# Patient Record
Sex: MALE | Race: WHITE
[De-identification: names, ages, dates, MRNs, and addresses within clinical notes are randomized per-mention and may not be internally consistent; named-entity substitution may affect disease eponyms.]

---

## 2016-12-29 NOTE — HP
Admitting History and Physical





- Primary Care Physician


PCP: Sony Poon





- Admission


Chief Complaint: I have a bladder infection


History of Present Illness: 


Mr Sumner is a 61 year old male who comes in for a bladder infection. He says 

it started around [place], he presented to the ED and received bactrim. He 

started taking it but did not improve. He says he wears a condom catheter and 

in the past couple of days noted pain on urination and general discomfort in 

his suprapubic area. He says he feels feverish with sweats and chills. He 

denies lightheadedness, dizziness, passing out, chest pain, shortness of breath

, nausea, vomiting, diarrhea, constipation, or swelling.


History Source: Patient


Limitations to Obtaining History: No Limitations





- Past Medical History


CNS: Yes: Other (paraplegia)


Infectious Disease: Yes: Other (multiple UTIs)





- Past Surgical History


Past Surgical History: Yes: Splenectomy





- Smoking History


Smoking history: Never smoked


Have you smoked in the past 12 months: No


Aproximately how many cigarettes per day: 0





- Alcohol/Substance Use


Hx Alcohol Use: Yes


History of Substance Use: reports: None





- Social History


Usual Living Arrangement: Yes: Other


ADL: Support Services


History of Recent Travel: No





Home Medications





- Allergies


Allergies/Adverse Reactions: 


 Allergies











Allergy/AdvReac Type Severity Reaction Status Date / Time


 


No Known Allergies Allergy   Verified 12/29/16 04:32














- Home Medications


Home Medications: 


Ambulatory Orders





Diazepam [Valium -] 10 mg PO TID PRN 11/20/15 


Morphine 10 mg/5 ml Liquid [Morphine 10 mg/5 mL Liquid -] 4 mg PO Q4H PRN 11/20/

15 


Sulfamethoxazole/Trimethoprim [Bactrim Ds -] 1 tab PO BID #20 tablet 12/26/16 


Levofloxacin [Levaquin] 750 mg PO DAILY #5 tablet 12/29/16 











Family Disease History





- Family Disease History


Family Disease History: Heart Disease: Father





Review of Systems


Findings/Remarks: 


full review of systems obtained, as per HPI and otherwise negative





Physical Examination


Vital Signs: 


 Vital Signs











Temperature  97.7 F   12/29/16 09:11


 


Pulse Rate  85   12/29/16 08:00


 


Respiratory Rate  18   12/29/16 08:00


 


Blood Pressure  112/60   12/29/16 08:00


 


O2 Sat by Pulse Oximetry (%)  96   12/29/16 08:00











Constitutional: Yes: Well Nourished, No Distress, Calm


Eyes: Yes: Conjunctiva Clear, EOM Intact


HENT: Yes: Atraumatic, Normocephalic


Cardiovascular: Yes: Regular Rate and Rhythm.  No: Gallop, Murmur, Rub


Respiratory: Yes: Regular, CTA Bilaterally.  No: Rales, Rhonchi, Wheezes


Gastrointestinal: Yes: Normal Bowel Sounds, Soft.  No: Distention, Tenderness


Extremities: Yes: WNL


Edema: No


Labs: 


Lab test reviewed and notable for UTI on urinalysis (positive nitrites and 

leukocyte esterase, elevated WBCs and bacteria present). All other lab test WNL





Problem List





- Problems


(1) Complicated UTI (urinary tract infection)


Assessment/Plan: 


-patient with complicated UTI, failed bactrim


-admit to hospital


-begin IV levaquin


-follow up urine cultures


-monitor for improvement


Code(s): N39.0 - URINARY TRACT INFECTION, SITE NOT SPECIFIED





(2) Paraplegia


Assessment/Plan: 


-chronic


Code(s): G82.20 - PARAPLEGIA, UNSPECIFIED





(3) Chronic pain


Assessment/Plan: 


-continue home dose of morphine and valium for pain and spasm


Code(s): G89.29 - OTHER CHRONIC PAIN

## 2016-12-29 NOTE — PDOC
History of Present Illness





<Glenroy Scanlon - Last Filed: 12/29/16 04:30>





- General


History Source: Patient


Exam Limitations: No Limitations





- History of Present Illness


Initial Comments: 





12/29/16 04:31


The patient is a 61 year old male with a significant past medical history of 

paraplegia below T2 and recurrent UTIs  who presents to the ED for a UTI. The 

patient was discharged home from the ED last night but returned because his HHA 

was not home.


Patient denies any other symptoms. 

















<Raman Kruse - Last Filed: 12/29/16 04:32>





- General


Stated Complaint: UTI


Time Seen by Provider: 12/29/16 03:56





Past History





- Past Medical History


COPD: Yes


 Disorders: Yes (Recurrent UTI)


HTN:  (Hypotension)





- Surgical History


Orthopedic Surgery: Yes (fracture right wrist s/p fusion)





- Immunization History


Td Vaccination: No


Immunization Up to Date: No





- Psycho/Social/Smoking Cessation Hx


Anxiety: No


Suicidal Ideation: No


Smoking Status: No


Smoking History: Never smoked


Years of Tobacco Use: 0


Have you smoked in the past 12 months: No


Number of Cigarettes Smoked Daily: 0


Cigars Per Day: 0


Hx Alcohol Use: No


Drug/Substance Use Hx: No


Substance Use Type: Alcohol


Hx Substance Use Treatment: No





<Glenroy Scanlon - Last Filed: 12/29/16 04:30>





<Raman Kruse - Last Filed: 12/29/16 04:32>





- Past Medical History


Allergies/Adverse Reactions: 


 Allergies











Allergy/AdvReac Type Severity Reaction Status Date / Time


 


No Known Allergies Allergy   Verified 12/26/16 13:42











Home Medications: 


Ambulatory Orders





Diazepam [Valium -] 10 mg PO TID PRN 11/20/15 


Morphine 10 mg/5 ml Liquid [Morphine 10 mg/5 mL Liquid -] 4 mg PO Q4H PRN 11/20/

15 


Sulfamethoxazole/Trimethoprim [Bactrim Ds -] 1 tab PO BID #20 tablet 12/26/16 


Levofloxacin [Levaquin] 750 mg PO DAILY #5 tablet 12/29/16 











**Review of Systems





- Review of Systems


Able to Perform ROS?: Yes


Comments:: 


12/29/16 04:31


CONSTITUTIONAL:


Absent: fever, no chills, no fatigue


EYES:


Absent: visual changes


ENT:


Absent: ear pain, no sore throat


CARDIOVASCULAR:


Absent: chest pain, no palpitations


RESPIRATORY:


Absent: cough, no SOB


GI:


Absent: abdominal pain, no nausea, no vomiting, no constipation, no diarrhea


GENITOURINARY:


Absent: dysuria, no frequency, no hematuria


MUSKULOSKELETAL:


Absent: back pain, no arthralgia, no myalgia


SKIN:


Absent: rash


NEURO:


Absent: headache











All Other Systems: Reviewed and Negative





<Raman Kruse - Last Filed: 12/29/16 04:32>





*Physical Exam





- Physical Exam


Comments: 





12/29/16 04:32


GENERAL: 


Well-appearing, well-nourished. No apparent distress.


HEENT: 


Normocephalic, atraumatic. PERRL, EOM intact.


CARDIOVASCULAR: 


Normal S1, S2. Regular rate and rhythm.


PULMONARY: 


Clear to auscultation bilaterally.


ABDOMEN: 


Soft, non-distended, non-tender. 


EXTREMITIES: 


Normal ROM in all four extremities. No gross deformities.


SKIN: 


Warm, dry.  No rash


NEUROLOGICAL: + paraplegia, contracted right hand








<Raman Kruse - Last Filed: 12/29/16 04:32>





*DC/Admit/Observation/Transfer





- Discharge Dispostion


Admit: Yes





<Glenroy Scanlon - Last Filed: 12/29/16 04:30>





- Attestations


Scribe Attestion: 





12/29/16 04:32





Documentation prepared by Raman Kruse, acting as medical scribe for Glenroy Scanlon MD





<Raman Kruse - Last Filed: 12/29/16 04:32>


Diagnosis at time of Disposition: 


UTI (urinary tract infection)


Qualifiers:


 Urinary tract infection type: site unspecified Hematuria presence: without 

hematuria Qualified Code(s): N39.0 - Urinary tract infection, site not specified

## 2016-12-30 NOTE — PN
Progress Note, Physician


Chief Complaint: 


Mr Sumner says he needs to have a bowel movement, however he needs water 

stimulation to assist with moving his bowels. Also with bladder pain still. No 

cp or sob.





- Current Medication List


Current Medications: 


Active Medications





Acetaminophen (Tylenol -)  650 mg PO Q4H PRN


   PRN Reason: FEVER OR PAIN


Diazepam (Valium -)  10 mg PO TID PRN


   PRN Reason: muscle spasms


   Last Admin: 12/30/16 10:05 Dose:  10 mg


Docusate Sodium (Colace -)  100 mg PO BID [facility]


   Last Admin: 12/30/16 10:04 Dose:  100 mg


Heparin Sodium (Porcine) (Heparin -)  5,000 unit SQ TID [facility]


   Last Admin: 12/30/16 06:21 Dose:  5,000 unit


Morphine Sulfate (Morphine 10 Mg/5 Ml Liquid)  10 mg PO Q4H PRN


   PRN Reason: PAIN


   Last Admin: 12/30/16 06:20 Dose:  10 mg


Ondansetron HCl (Zofran Injection)  4 mg IVPB Q6H PRN


   PRN Reason: NAUSEA


Polyethylene Glycol (Miralax (For Daily Use) -)  17 gm PO DAILY PRN


   PRN Reason: CONSTIPATION











- Objective


Vital Signs: 


 Vital Signs











Temperature  97.6 F   12/30/16 09:28


 


Pulse Rate  96 H  12/30/16 09:28


 


Respiratory Rate  20   12/30/16 09:28


 


Blood Pressure  96/57   12/30/16 09:28


 


O2 Sat by Pulse Oximetry (%)  95   12/30/16 09:00











Constitutional: Yes: Well Nourished, No Distress, Calm


Cardiovascular: Yes: Regular Rate and Rhythm.  No: Gallop, Murmur, Rub


Respiratory: Yes: Regular, CTA Bilaterally.  No: Rales, Rhonchi, Wheezes


Gastrointestinal: Yes: Normal Bowel Sounds, Soft.  No: Distention, Tenderness


Extremities: Yes: WNL


Edema: No


Labs: 


 CBC, BMP





 12/30/16 07:00 





 12/30/16 07:00 











Problem List





- Problems


(1) Complicated UTI (urinary tract infection)


Code(s): N39.0 - URINARY TRACT INFECTION, SITE NOT SPECIFIED





(2) Paraplegia


Code(s): G82.20 - PARAPLEGIA, UNSPECIFIED





(3) Chronic pain


Code(s): G89.29 - OTHER CHRONIC PAIN   








Assessment/Plan


(1) Complicated UTI (urinary tract infection)


Assessment/Plan: 


-patient with complicated UTI, failed bactrim


-continue levaquin


-urine cultures are contaminant, will repeat (note urine cultures do not show 

up this admission but last admission because patient left AMA and came right 

back)


Code(s): N39.0 - URINARY TRACT INFECTION, SITE NOT SPECIFIED





(2) Paraplegia


Assessment/Plan: 


-chronic


Code(s): G82.20 - PARAPLEGIA, UNSPECIFIED





(3) Chronic pain


Assessment/Plan: 


-continue home dose of morphine and valium for pain and spasm


Code(s): G89.29 - OTHER CHRONIC PAIN

## 2016-12-31 NOTE — PN
Physical Exam: 


SUBJECTIVE: Patient seen and examined.  He was comfortable at rest, denies any 

pain.  Refusing IV antibiotics, but now in agreement. 








OBJECTIVE:





GENERAL: The patient is awake, alert, and fully oriented, in no acute distress.


HEAD: Normal with no signs of trauma.


EYES: PERRL, extraocular movements intact, sclera anicteric, conjunctiva clear. 

No ptosis. 


ENT: Ears normal, nares patent, oropharynx clear without exudates, moist mucous 


membranes.


NECK: Trachea midline, full range of motion, supple. 


LUNGS: Breath sounds equal, clear to auscultation bilaterally, no wheezes, no 

crackles, no 


accessory muscle use. 


ABDOMEN: Soft, nontender, nondistended, normoactive bowel sounds, no guarding, 

no 


rebound, no hepatosplenomegaly, no masses.


PSYCH: Normal mood, normal affect.


SKIN: Warm, dry, normal turgor, no rashes or lesions noted











 Period  Temp  Pulse  Resp  BP Sys/Malone  Pulse Ox


 


 Last 24 Hr  97.8 F-98.5 F  63-98  18-20  100-139/52-61  95-95














 Laboratory Results - last 24 hr











  12/30/16 12/31/16 12/31/16





  07:00 08:45 08:45


 


WBC   4.8 


 


RBC   4.32 


 


Hgb   13.5 


 


Hct   40.3 


 


MCV   93.4 


 


MCHC   33.6 


 


RDW   15.7 


 


Plt Count   241 


 


MPV   9.5 


 


Neutrophils %   52.0  D 


 


Lymphocytes %   30.0  D 


 


Monocytes %   12.0 H 


 


Eosinophils %   2.0  D 


 


Band Neutrophils   4.0  D 


 


Platelet Estimate   Adequate 


 


Platelet Comment   No clotting detected 


 


RBC Morphology   Appears normal 


 


Sodium    139


 


Potassium    3.5


 


Chloride    104


 


Carbon Dioxide    27


 


Anion Gap    8


 


BUN    5 L D


 


Creatinine    0.5 L


 


Creat Clearance w eGFR    > 60


 


Random Glucose    101  D


 


Calcium    8.4 L


 


Total Bilirubin    0.5  D


 


AST    21


 


ALT    18


 


Alkaline Phosphatase    88  D


 


Total Protein    6.5


 


Albumin    3.0 L


 


Hepatitis C Antibody  10.2 H  








Active Medications











Generic Name Dose Route Start Last Admin





  Trade Name Freq  PRN Reason Stop Dose Admin


 


Acetaminophen  650 mg 12/29/16 09:06  





  Tylenol -  PO   





  Q4H PRN   





  FEVER OR PAIN   


 


Diazepam  10 mg 12/29/16 09:05 12/31/16 10:48





  Valium -  PO   10 mg





  TID PRN   Administration





  muscle spasms   


 


Docusate Sodium  100 mg 12/29/16 10:00 12/31/16 10:27





  Colace -  PO   100 mg





  BID ALE   Administration


 


Heparin Sodium (Porcine)  5,000 unit 12/29/16 14:00 12/31/16 14:12





  Heparin -  SQ   5,000 unit





  TID ALE   Administration


 


Levofloxacin  150 mls @ 100 mls/hr 01/01/17 08:00  





  Levaquin 750 Mg Premixed Ivpb -  IVPB 01/01/17 09:29  





  ONCE ONE   


 


Levofloxacin  750 mg 12/31/16 11:30 12/31/16 11:39





  Levaquin -  PO   750 mg





  DAILY ALE   Administration


 


Magnesium Hydroxide  30 ml 12/31/16 17:52 12/31/16 18:37





  Milk Of Magnesia -  PO   30 ml





  DAILY PRN   Administration





  CONSTIPATION   


 


Morphine Sulfate  10 mg 12/29/16 09:59 12/31/16 07:03





  Morphine 10 Mg/5 Ml Liquid  PO   10 mg





  Q4H PRN   Administration





  PAIN   


 


Ondansetron HCl  4 mg 12/29/16 09:06  





  Zofran Injection  IVPB   





  Q6H PRN   





  NAUSEA   


 


Polyethylene Glycol  17 gm 12/29/16 10:45 12/31/16 10:27





  Miralax (For Daily Use) -  PO   17 gm





  DAILY PRN   Administration





  CONSTIPATION   











ASSESSMENT/PLAN:





The patient is a 61 year old male with a significant past medical history of 

paraplegia below T2 and recurrent UTIs  who presents to the ED for a UTI. The 

patient was discharged home from the ED but returned because his HHA was not 

home.


Patient denies any other symptoms. 





:


Urinary Tract Infection - chronic


Assessment/Plan:  Patient was being treated with PO Bactrim as outpatient.  

Will be receiving Levaquin IV here for UTI, but today refused IV placement.  


Ordered Levaquin 750mg PO x 1 dose and stressed importance of Levaquin IV for 

his recurrent UTI.  He is now in agreement to get IV antibiotics.


Condom catheter with hazy yellow urine.


Urine cultures pending


Continue to monitor, he is afebrile, vitals stable





Muscular/Skeletal


Paraplegia - chronic


Assessment/Plan: Monitor skin integrity, turn and position q 2, high risk for 

skin breakdown


Pt encouraged to shift his weight, bed trapeze.


Monitor closely





Pain Management - chronic


Assessment/Plan: Continue morphine and Valium





GI:


Constipation - chronic


Assessment/Plan:  Miralax, Colace and Milk of Mag


If no BM, will order tap water enema





F.E.N.


Tolerating PO fluids


BMP in a.m.


Regular diet





Prophylaxis:


heparin TID SC





Disposition:  Requires inpatient hospitalization.  Full Code. 





Visit type





- Emergency Visit


Emergency Visit: Yes


ED Registration Date: 12/29/16


Care time: The patient presented to the Emergency Department on the above date 

and was hospitalized for further evaluation of their emergent condition.





- New Patient


This patient is new to me today: Yes


Date on this admission: 01/01/17





- Critical Care


Critical Care patient: No





- Discharge Referral


Referred to [facility] No

## 2017-01-01 NOTE — PN
Physical Exam: 


SUBJECTIVE: Patient seen and examined.  States he feels well, asymptomatic, 

denies dizziness.  States he is chronically constipated and medications are not 

working.








OBJECTIVE:





GENERAL: The patient is awake, alert, and fully oriented, in no acute distress.


HEAD: Normal with no signs of trauma.


EYES: PERRL, extraocular movements intact, sclera anicteric, conjunctiva clear. 

No ptosis. 


ENT: Ears normal, nares patent, oropharynx clear without exudates, moist mucous 


membranes.


NECK: Trachea midline, full range of motion, supple. 


LUNGS: Breath sounds equal, clear to auscultation bilaterally, no wheezes, no 

crackles, no 


accessory muscle use. 


ABDOMEN: Soft, nontender, nondistended, normoactive bowel sounds, no guarding, 

no 


rebound, no hepatosplenomegaly, no masses.


PSYCH: Normal mood, normal affect.


SKIN: Warm, dry, normal turgor, no rashes or lesions noted





               Vital Signs











 Period  Temp  Pulse  Resp  BP Sys/Malone  Pulse Ox


 


 Last 24 Hr  98.2 F-99.4 F    18-20  /50-69  96-96











 Laboratory Results - last 24 hr











  01/01/17 01/01/17





  06:00 06:00


 


WBC  5.9 


 


RBC  4.36 


 


Hgb  13.6 


 


Hct  40.8 


 


MCV  93.6 


 


MCHC  33.4 


 


RDW  15.8 


 


Plt Count  223 


 


MPV  10.0 


 


Neutrophils %  52.0 


 


Lymphocytes %  21.0  D 


 


Monocytes %  13.0 H 


 


Band Neutrophils  14.0 H D 


 


Sodium   139


 


Potassium   3.7


 


Chloride   99


 


Carbon Dioxide   29


 


Anion Gap   11


 


BUN   5 L


 


Creatinine   0.6 L


 


Creat Clearance w eGFR   > 60


 


Random Glucose   84


 


Calcium   8.4 L


 


Total Bilirubin   0.4


 


AST   17


 


ALT   22  D


 


Alkaline Phosphatase   92


 


Total Protein   6.7


 


Albumin   3.0 L








Active Medications











Generic Name Dose Route Start Last Admin





  Trade Name Freq  PRN Reason Stop Dose Admin


 


Acetaminophen  650 mg 12/29/16 09:06  





  Tylenol -  PO   





  Q4H PRN   





  FEVER OR PAIN   


 


Diazepam  10 mg 12/29/16 09:05 12/31/16 10:48





  Valium -  PO   10 mg





  TID PRN   Administration





  muscle spasms   


 


Docusate Sodium  100 mg 12/29/16 10:00 01/01/17 10:30





  Colace -  PO   100 mg





  BID ALE   Administration


 


Heparin Sodium (Porcine)  5,000 unit 12/29/16 14:00 01/01/17 14:12





  Heparin -  SQ   5,000 unit





  TID ALE   Administration


 


Sodium Chloride  1,000 mls @ 100 mls/hr 01/01/17 10:15 01/01/17 12:17





  Normal Saline -  IV   100 mls/hr





  ASDIR ALE   Administration


 


Magnesium Hydroxide  30 ml 12/31/16 17:52 01/01/17 10:30





  Milk Of Magnesia -  PO   30 ml





  DAILY PRN   Administration





  CONSTIPATION   


 


Morphine Sulfate  10 mg 12/29/16 09:59 01/01/17 10:35





  Morphine 10 Mg/5 Ml Liquid  PO   10 mg





  Q4H PRN   Administration





  PAIN   


 


Ondansetron HCl  4 mg 12/29/16 09:06  





  Zofran Injection  IVPB   





  Q6H PRN   





  NAUSEA   


 


Polyethylene Glycol  17 gm 12/29/16 10:45 12/31/16 10:27





  Miralax (For Daily Use) -  PO   17 gm





  DAILY PRN   Administration





  CONSTIPATION   











ASSESSMENT/PLAN:





The patient is a 61 year old male with a significant past medical history of 

paraplegia below T2 and recurrent UTIs  who presents to the ED for a UTI. The 

patient was discharged home from the ED but returned because his HHA was not 

home.


Patient denies any other symptoms. 





:


Urinary Tract Infection - chronic


Assessment/Plan:  Patient was being treated with PO Bactrim as outpatient, 

failed Bactrim.


Now on Levaquin IV 750mg daily.  Awaiting urine cultures.


Condom catheter with hazy yellow urine.


Today had low grade temps, tachycardia and was hypotensive.  Blood cultures 

ordered.


Normal saline 250cc x 1 and NS at 100cc maintenance fluids ordered 


Continue to monitor.





Muscular/Skeletal


Paraplegia - chronic


Assessment/Plan: Monitor skin integrity, turn and position q 2, high risk for 

skin breakdown


Pt encouraged to shift his weight, bed trapeze.


Monitor closely





Pain Management - chronic


Assessment/Plan: Continue morphine and Valium





GI:


Constipation - chronic 


Assessment/Plan:  Miralax, Colace and Milk of Mag without result.  Ordered tap 

water enema.


Not distended, abdomen soft


If no BM, may require Relistor 





F.E.N.


Fluids:  Normal saline 100cc/hr, 250cc NS bolus x 1 for hypotension


Electrolytes: within normal limits


Regular diet





Prophylaxis:


DVT: heparin TID SC


GI: tap water enema, Miralax, milk of mag, colace





Disposition.  Continues to require inpatient hospitalization.  Full Code. 





Visit type





- Emergency Visit


Emergency Visit: Yes


ED Registration Date: 12/29/16


Care time: The patient presented to the Emergency Department on the above date 

and was hospitalized for further evaluation of their emergent condition.





- New Patient


This patient is new to me today: No





- Critical Care


Critical Care patient: No





- Discharge Referral


Referred to [facility] No

## 2017-01-02 NOTE — CONSULT
Consult


Consult Specialty:: infectious diseases


Reason for Consultation:: fever,lactic acidosis





- History of Present Illness


Chief Complaint: burning in urine


History of Present Illness: 





patient known to me who has a history of repeated utis


60 to male with a PMHx of paraplegia below the armpits and recurrent bladder 

infections who presents with suprapubic pressure radiating to his back. 


patient was admitted to the hospital and was started on levaquin 


patient inspite of that spiked a fever and had a spike in lactic acid


he still has pain on palpation and he has been not feeling well for couple of 

days now and was admitted to the hospital 3 days abck


has condom catheter and has clear urine





- History Source


History Provided By: Patient


Limitations to Obtaining History: No Limitations





- Past Medical History


CNS: Yes: Other (paraplegia)


Infectious Disease: Yes: Other (multiple UTIs)





- Past Surgical History


Past Surgical History: Yes: Splenectomy





- Alcohol/Substance Use


Hx Alcohol Use: Yes


History of Substance Use: reports: None





- Smoking History


Smoking history: Never smoked


Have you smoked in the past 12 months: No


Aproximately how many cigarettes per day: 0


If you are a former smoker, when did you quit?: 1986





- Social History


ADL: Support Services


History of Recent Travel: No





Home Medications





- Allergies


Allergies/Adverse Reactions: 


 Allergies











Allergy/AdvReac Type Severity Reaction Status Date / Time


 


No Known Allergies Allergy   Verified 12/29/16 04:32














- Home Medications


Home Medications: 


Ambulatory Orders





Diazepam [Valium -] 10 mg PO TID PRN 11/20/15 


Morphine 10 mg/5 ml Liquid [Morphine 10 mg/5 mL Liquid -] 4 mg PO Q4H PRN 11/20/

15 


Levofloxacin [Levaquin] 750 mg PO DAILY #5 tablet 12/29/16 











Family Disease History





- Family Disease History


Family Disease History: Heart Disease: Father





Review of Systems





- Review of Systems


Constitutional: reports: Fever, Other


Eyes: reports: No Symptoms


HENT: reports: No Symptoms


Neck: reports: No Symptoms


Cardiovascular: reports: No Symptoms


Respiratory: reports: No Symptoms


Gastrointestinal: reports: No Symptoms


Genitourinary: reports: Pain, Other


Musculoskeletal: reports: No Symptoms


Integumentary: reports: No Symptoms


Neurological: reports: No Symptoms


Endocrine: reports: No Symptoms


Hematology/Lymphatic: reports: No Symptoms


Psychiatric: reports: No Symptoms





Physical Exam


Vital Signs: 


 Vital Signs











Temperature  98.6 F   01/02/17 14:04


 


Pulse Rate  67   01/02/17 14:04


 


Respiratory Rate  22   01/02/17 14:04


 


Blood Pressure  133/62   01/02/17 14:04


 


O2 Sat by Pulse Oximetry (%)  98   01/02/17 09:00











Constitutional: Yes: No Distress, Calm


Eyes: Yes: Conjunctiva Clear


HENT: Yes: Atraumatic


Neck: Yes: Supple, Trachea Midline


Cardiovascular: Yes: Regular Rate and Rhythm


Respiratory: Yes: Regular, CTA Bilaterally


Gastrointestinal: Yes: Normal Bowel Sounds, Soft


Renal/: Yes: Other (suprapubic tenderness,condom catheter)


Musculoskeletal: Yes: Other


Extremities: Yes: Other (deformities)


Neurological: Yes: Alert, Oriented


Psychiatric: Yes: Alert, Oriented


Labs: 


 CBC, BMP





 01/02/17 07:20 





 01/02/17 07:20 











Imaging





- Results


Chest X-ray: Report Reviewed, Image Reviewed


Cat Scan: Report Reviewed, Image Reviewed





Assessment/Plan


Assessment/Plan


(1) Complicated UTI (urinary tract infection)


Code: N39.0


paraplegia


deformities


recurrent uti





plan


i have switched patient to zosyn


will continue to monitor for fever


ct scan seen and results noted


await for all the cx report


lactic acid has come down

## 2017-01-02 NOTE — PN
Physical Exam: 


SUBJECTIVE: Patient seen and examined.  He is complaining of abdominal pain, 

abdomen noted to be distended.  States pain is worse on LLQ on light palpation. 

Denies vomiting.  No BM since admission.  Pt reports no BM since last Tuesday.  








OBJECTIVE:





GENERAL: The patient is awake, alert, and fully oriented


HEAD: Normal with no signs of trauma.


EYES: PERRL, extraocular movements intact, sclera anicteric, conjunctiva clear. 

No ptosis. 


ENT: Ears normal, nares patent, oropharynx clear without exudates, moist mucous 


membranes.


NECK: Trachea midline, full range of motion, supple. 


LUNGS: Breath sounds equal


ABDOMEN: Distended, soft, pain on palpaltion of LLQ, hypoactive bowels sounds, 

refused tap water enema last night CT of abd/pelvis ordered to r/o acute 

pathology.


PSYCH: Normal mood, normal affect.


SKIN: Warm, dry, normal turgor, no rashes or lesions noted


 Vital Signs











 Period  Temp  Pulse  Resp  BP Sys/Malone  Pulse Ox


 


 Last 24 Hr  97.6 F-99.2 F  61-80  18-18  /60-63  94








 Laboratory Results - last 24 hr











  01/02/17 01/02/17 01/02/17





  07:20 07:20 10:00


 


WBC  5.2  


 


RBC  3.99 L  


 


Hgb  12.6  


 


Hct  37.6  


 


MCV  94.3  


 


MCHC  33.5  


 


RDW  15.9  


 


Plt Count  199  


 


MPV  10.1  


 


Neutrophils %  42.8  


 


Lymphocytes %  32.9  D  


 


Monocytes %  21.5 H  


 


Eosinophils %  1.8  


 


Basophils %  1.0  D  


 


Sodium   140 


 


Potassium   3.5 


 


Chloride   105 


 


Carbon Dioxide   26 


 


Anion Gap   9 


 


BUN   6 L 


 


Creatinine   0.5 L 


 


Creat Clearance w eGFR   > 60 


 


Random Glucose   82 


 


Lactic Acid    3.045 H*


 


Calcium   7.7 L 


 


Total Bilirubin   0.3  D 


 


AST   21  D 


 


ALT   21 


 


Alkaline Phosphatase   77 


 


Total Protein   6.0 L 


 


Albumin   2.7 L 








Active Medications











Generic Name Dose Route Start Last Admin





  Trade Name Freq  PRN Reason Stop Dose Admin


 


Acetaminophen  650 mg 12/29/16 09:06  





  Tylenol -  PO   





  Q4H PRN   





  FEVER OR PAIN   


 


Diazepam  10 mg 12/29/16 09:05 01/01/17 21:34





  Valium -  PO   10 mg





  TID PRN   Administration





  muscle spasms   


 


Docusate Sodium  100 mg 12/29/16 10:00 01/02/17 09:07





  Colace -  PO   100 mg





  BID ALE   Administration


 


Heparin Sodium (Porcine)  5,000 unit 12/29/16 14:00 01/02/17 06:00





  Heparin -  SQ   Not Given





  TID ALE   


 


Sodium Chloride  1,000 mls @ 100 mls/hr 01/01/17 10:15 01/01/17 12:17





  Normal Saline -  IV   100 mls/hr





  ASDIR ALE   Administration


 


Levofloxacin  150 mls @ 150 mls/hr 01/02/17 10:00 01/02/17 09:07





  Levaquin 750 Mg Premixed Ivpb -  IVPB   150 mls/hr





  DAILY ALE   Administration


 


Magnesium Hydroxide  30 ml 12/31/16 17:52 01/02/17 10:50





  Milk Of Magnesia -  PO   30 ml





  DAILY PRN   Administration





  CONSTIPATION   


 


Morphine Sulfate  10 mg 12/29/16 09:59 01/02/17 07:14





  Morphine 10 Mg/5 Ml Liquid  PO   10 mg





  Q4H PRN   Administration





  PAIN   


 


Ondansetron HCl  4 mg 12/29/16 09:06  





  Zofran Injection  IVPB   





  Q6H PRN   





  NAUSEA   


 


Polyethylene Glycol  17 gm 12/29/16 10:45 01/02/17 09:12





  Miralax (For Daily Use) -  PO   17 gm





  DAILY PRN   Administration





  CONSTIPATION   











ASSESSMENT/PLAN:





The patient is a 61 year old male with a significant past medical history of 

paraplegia below T2 and recurrent UTIs  who presents to the ED for a UTI. The 

patient was discharged home from the ED but returned because his HHA was not 

home.


Patient denies any other symptoms. 





:


Urinary Tract Infection - chronic


Assessment/Plan:  Patient was being treated with PO Bactrim as outpatient, 

failed Bactrim therapy.  Now on Levaquin IV 750mg daily.  


Urine cultures contaminated, repeat ordered, blood cultures pending


Condom catheter with hazy yellow urine.





ID:


Lactic Acidosis - acute


Lactic levels elevated at 3.045 today, NS @500cc ordered x 1, followed by 

repeat 1 liter NS bolus.  


Repeat lactic acid now 1.006.  No fever overnight, WBC within normal limits


Noted to have elevated band neutrophils.  Blood cultures sent yesterday - 

pending.


Chest xray with no acute findings


ID consulted. On Levaquin IV may need broader spectrum coverage so Zosyn 3.375 

x 1 ordered pending ID recommendations


Continue to monitor.





GI:


Abdominal distention - acute


Assessment/Plan:  Abdomen distended this morning, soft with hypoactive bowel 

sounds, tenderness on LLQ.  Pt describes pain on palpation as "dull", non 

radiating, denies nausea or vomiting.


CT of abdomen/pelvis: bibasilar atelectasis and new left lower lobe nodule of 

uncertain etiology, no evidence of bowel obstruction or acute pathology.





Muscular/Skeletal:


Paraplegia - chronic


Assessment/Plan: Monitor skin integrity, turn and position q 2, high risk for 

skin breakdown


Pt encouraged to shift his weight, will benefit from a bed trapeze.


Monitor closely





Pain Management - chronic


Assessment/Plan: Continue morphine and Valium





F.E.N.


Fluids:  Bolus of 500 cc/NS for elevated lactic levels followed by 1 liter 

bolus of NS.   


NS at 100cc maintenance fluids to continue


Electrolytes: within normal limits


Can continue regular diet





Prophylaxis:


DVT: heparin TID SC


GI: tap water enema as needed, Fleet enema x 1 ordered, colace increased to TID





Disposition.  Continues to require inpatient hospitalization.  Full Code. 











Visit type





- Emergency Visit


Emergency Visit: Yes


ED Registration Date: 12/29/16


Care time: The patient presented to the Emergency Department on the above date 

and was hospitalized for further evaluation of their emergent condition.





- New Patient


This patient is new to me today: No





- Critical Care


Critical Care patient: No





- Discharge Referral


Referred to [facility] No

## 2017-01-03 NOTE — PN
Progress Note, Physician


History of Present Illness: 


says he feels more better


no new events





- Current Medication List


Current Medications: 


Active Medications





Acetaminophen (Tylenol -)  650 mg PO Q4H PRN


   PRN Reason: FEVER OR PAIN


Albuterol/Ipratropium (Duoneb -)  1 amp NEB Q6H PRN


   PRN Reason: SHORTNESS OF BREATH


Diazepam (Valium -)  10 mg PO TID PRN


   PRN Reason: muscle spasms


   Last Admin: 01/03/17 05:42 Dose:  10 mg


Docusate Sodium (Colace -)  100 mg PO TID [facility]


   Last Admin: 01/03/17 13:54 Dose:  Not Given


Heparin Sodium (Porcine) (Heparin -)  5,000 unit SQ TID [facility]


   Last Admin: 01/03/17 13:54 Dose:  Not Given


Sodium Chloride (Normal Saline -)  1,000 mls @ 100 mls/hr IV ASDIR [facility]


   Last Admin: 01/03/17 14:01 Dose:  Not Given


Piperacillin Sod/Tazobactam Sod (Zosyn 3.375gm Ivpb (Pre-Docked))  50 mls @ 100 

mls/hr IVPB Q8H-IV ALE


   Last Admin: 01/03/17 09:30 Dose:  100 mls/hr


Magnesium Hydroxide (Milk Of Magnesia -)  30 ml PO DAILY PRN


   PRN Reason: CONSTIPATION


   Last Admin: 01/03/17 09:30 Dose:  30 ml


Morphine Sulfate (Morphine 10 Mg/5 Ml Liquid)  10 mg PO Q4H PRN


   PRN Reason: PAIN


   Last Admin: 01/03/17 06:50 Dose:  10 mg


Ondansetron HCl (Zofran Injection)  4 mg IVPB Q6H PRN


   PRN Reason: NAUSEA


Polyethylene Glycol (Miralax (For Daily Use) -)  17 gm PO DAILY PRN


   PRN Reason: CONSTIPATION


   Last Admin: 01/03/17 09:30 Dose:  17 gm











- Objective


Vital Signs: 


 Vital Signs











Temperature  97.5 F L  01/03/17 14:00


 


Pulse Rate  80   01/03/17 14:00


 


Respiratory Rate  20   01/03/17 14:00


 


Blood Pressure  136/65   01/03/17 12:00


 


O2 Sat by Pulse Oximetry (%)  95   01/02/17 20:36











Constitutional: Yes: No Distress, Calm


Cardiovascular: Yes: Regular Rate and Rhythm


Respiratory: Yes: Regular, CTA Bilaterally


Gastrointestinal: Yes: Normal Bowel Sounds, Soft


Genitourinary: Yes: Other


Extremities: Yes: Other


Neurological: Yes: Alert, Oriented


Psychiatric: Yes: Alert


Labs: 


 CBC, BMP





 01/03/17 08:25 





 01/03/17 08:25 











Assessment/Plan


Assessment/Plan


(1) Complicated UTI (urinary tract infection)


Code: N39.0


paraplegia


deformities


recurrent uti





plan


continue zosyn


hydration

## 2017-01-03 NOTE — PN
Physical Exam: 


SUBJECTIVE: Patient seen and examined.  He denies any abdominal pain or 

discomfort.  Had multiple BMs today.  States abdomen feels better. 





OBJECTIVE:











 Period  Temp  Pulse  Resp  BP Sys/Malone  Pulse Ox


 


 Last 24 Hr  97.5 F-98.2 F  61-80  18-20  127-144/65-71  95











GENERAL: The patient is awake, alert, and fully oriented


HEAD: Normal with no signs of trauma.


EYES: PERRL, extraocular movements intact, sclera anicteric, conjunctiva clear. 

No ptosis. 


ENT: Ears normal, nares patent, oropharynx clear without exudates, moist mucous 


membranes.


NECK: Trachea midline, full range of motion, supple. 


LUNGS: Breath sounds equal


ABDOMEN: less distended today, multiple bowel movements, very loose and soft


PSYCH: Normal mood, normal affect.


SKIN: Warm, dry, normal turgor, no rashes or lesions noted


 Laboratory Results - last 24 hr











  12/30/16 01/02/17 01/03/17





  07:00 15:30 08:25


 


WBC    7.4  D


 


RBC    4.00


 


Hgb    12.6


 


Hct    37.9


 


MCV    94.6


 


MCHC    33.2


 


RDW    15.7


 


Plt Count    200


 


MPV    9.7


 


Neutrophils %    60.5  D


 


Lymphocytes %    22.9  D


 


Monocytes %    11.8 H


 


Eosinophils %    3.5  D


 


Basophils %    1.3


 


Sodium   


 


Potassium   


 


Chloride   


 


Carbon Dioxide   


 


Anion Gap   


 


BUN   


 


Creatinine   


 


Creat Clearance w eGFR   


 


Random Glucose   


 


Lactic Acid   1.006 


 


Calcium   


 


Total Bilirubin   


 


AST   


 


ALT   


 


Alkaline Phosphatase   


 


Total Protein   


 


Albumin   


 


Hepatitis C Antibody  10.2 H  














  01/03/17





  08:25


 


WBC 


 


RBC 


 


Hgb 


 


Hct 


 


MCV 


 


MCHC 


 


RDW 


 


Plt Count 


 


MPV 


 


Neutrophils % 


 


Lymphocytes % 


 


Monocytes % 


 


Eosinophils % 


 


Basophils % 


 


Sodium  139


 


Potassium  4.6  D


 


Chloride  105


 


Carbon Dioxide  28


 


Anion Gap  6 L


 


BUN  3 L D


 


Creatinine  0.6 L


 


Creat Clearance w eGFR  > 60


 


Random Glucose  97


 


Lactic Acid 


 


Calcium  7.7 L


 


Total Bilirubin  0.5  D


 


AST  34  D


 


ALT  20


 


Alkaline Phosphatase  73


 


Total Protein  6.3 L


 


Albumin  2.9 L


 


Hepatitis C Antibody 








Active Medications











Generic Name Dose Route Start Last Admin





  Trade Name Freq  PRN Reason Stop Dose Admin


 


Acetaminophen  650 mg 12/29/16 09:06  





  Tylenol -  PO   





  Q4H PRN   





  FEVER OR PAIN   


 


Albuterol/Ipratropium  1 amp 01/02/17 17:34  





  Duoneb -  NEB   





  Q6H PRN   





  SHORTNESS OF BREATH   


 


Diazepam  10 mg 12/29/16 09:05 01/03/17 05:42





  Valium -  PO   10 mg





  TID PRN   Administration





  muscle spasms   


 


Docusate Sodium  100 mg 01/02/17 22:00 01/03/17 13:54





  Colace -  PO   Not Given





  TID ALE   


 


Heparin Sodium (Porcine)  5,000 unit 12/29/16 14:00 01/03/17 13:54





  Heparin -  SQ   Not Given





  TID ALE   


 


Sodium Chloride  1,000 mls @ 100 mls/hr 01/01/17 10:15 01/03/17 14:01





  Normal Saline -  IV   Not Given





  ASDIR ALE   


 


Piperacillin Sod/Tazobactam Sod  50 mls @ 100 mls/hr 01/03/17 02:00 01/03/17 09:

30





  Zosyn 3.375gm Ivpb (Pre-Docked)  IVPB   100 mls/hr





  Q8H-IV ALE   Administration


 


Lactobacillus Acidophilus  1 tab 01/03/17 14:45  





  Bacid -  PO   





  DAILY ALE   


 


Magnesium Hydroxide  30 ml 12/31/16 17:52 01/03/17 09:30





  Milk Of Magnesia -  PO   30 ml





  DAILY PRN   Administration





  CONSTIPATION   


 


Morphine Sulfate  10 mg 12/29/16 09:59 01/03/17 06:50





  Morphine 10 Mg/5 Ml Liquid  PO   10 mg





  Q4H PRN   Administration





  PAIN   


 


Ondansetron HCl  4 mg 12/29/16 09:06  





  Zofran Injection  IVPB   





  Q6H PRN   





  NAUSEA   


 


Polyethylene Glycol  17 gm 12/29/16 10:45 01/03/17 09:30





  Miralax (For Daily Use) -  PO   17 gm





  DAILY PRN   Administration





  CONSTIPATION   











ASSESSMENT/PLAN:





The patient is a 61 year old male with a significant past medical history of 

paraplegia below T2, COPD and recurrent UTIs  who presents to the ED for a UTI. 

The patient was discharged home from the ED but returned because his A was 

not home. 





Patient denies any other symptoms. 





Had elevated lactic acid levels elevated yesterday and was treated with 

aggressive hydration of 1.5 liters of saline, Zosyn, Xray, and repeat labs. He 

was also re-cultured.  





:


Urinary Tract Infection - chronic


Assessment/Plan:  Patient was being treated with PO Bactrim as outpatient, 

failed Bactrim therapy. 


He is now on Zosyn IV secondary to elevated Lactic acid levels.  





ID:


Lactic Acidosis - acute


Lactic levels elevated at 3.045 yestereda=ay, NS @500cc ordered x 1, followed 

by repeat 1 liter NS bolus.  


Repeat lactic acid 1.006.  No fever overnight, WBC within normal limits


Noted to have elevated band neutrophils.  Blood cultures and urine cultures 

repeated


Chest xray with no acute findings


ID consulted, started on Zosyn 


Continue to monitor.





GI:


Abdominal distention - improving


Assessment/Plan:  Abdomen less distended, soft with +bowel sounds now, no 

tenderness, no pain.


Multiple soft BMs today, started on Acidophilus


CT of abdomen/pelvis: bibasilar atelectasis and new left lower lobe nodule of 

uncertain etiology, no evidence of bowel obstruction or acute pathology.


Will need further workup of lower lobe nodule as outpatient


Hep C antibody detection - plse follow up as outpatient





Muscular/Skeletal:


Paraplegia - chronic


Assessment/Plan: Monitor skin integrity, turn and position q 2, high risk for 

skin breakdown


Pt encouraged to shift his weight, will benefit from a bed trapeze.


Monitor closely





Pain Management - chronic


Assessment/Plan: Continue morphine and Valium





F.E.N.


Fluids:  Normal Saline at 100cc/hr maintenance fluids 


Electrolytes: within normal limits


Can continue regular diet





Prophylaxis:


DVT: heparin TID SC


GI: tap water enema as needed, no longer constipated





Disposition.  Continues to require inpatient hospitalization.  Full Code. 








Coverage for Dr. Ruvalcaba.  Thank you for this opportunity.   











Visit type





- Emergency Visit


Emergency Visit: Yes


ED Registration Date: 12/29/16


Care time: The patient presented to the Emergency Department on the above date 

and was hospitalized for further evaluation of their emergent condition.





- New Patient


This patient is new to me today: No





- Critical Care


Critical Care patient: No





- Discharge Referral


Referred to [facility] No

## 2017-01-04 NOTE — PN
Physical Exam: 


SUBJECTIVE: Patient seen and examined. He has no active complaints. No fever, 

chills. 








OBJECTIVE:





 Vital Signs











 Period  Temp  Pulse  Resp  BP Sys/Malone  Pulse Ox


 


 Last 24 Hr  97.8 F-98.7 F  67-82  17-20  132-140/67-76  








PE:


Neuro: alert, awake, cn 2-12intact


Pulm: CTAB


CV: s1 s2 rrr no mrg


Abd: S NT ND +BS


: condom catheter placed 


Ext: paraplegia, emaciated b/l le





 Laboratory Results - last 24 hr











  01/04/17 01/04/17





  08:00 08:00


 


WBC  6.0 


 


RBC  3.96 L 


 


Hgb  12.4 


 


Hct  37.0 


 


MCV  93.4 


 


MCHC  33.5 


 


RDW  15.5 


 


Plt Count  219 


 


MPV  9.5 


 


Neutrophils %  50.9 


 


Lymphocytes %  26.9 


 


Monocytes %  14.2 H 


 


Eosinophils %  7.1 H D 


 


Basophils %  0.9 


 


Sodium   142


 


Potassium   3.6  D


 


Chloride   105


 


Carbon Dioxide   27


 


Anion Gap   10


 


BUN   4 L D


 


Creatinine   0.5 L


 


Creat Clearance w eGFR   > 60


 


Random Glucose   86


 


Calcium   7.6 L


 


Total Bilirubin   0.3  D


 


AST   19  D


 


ALT   20


 


Alkaline Phosphatase   64


 


Total Protein   5.9 L


 


Albumin   2.7 L








Active Medications











Generic Name Dose Route Start Last Admin





  Trade Name Chintanq  PRN Reason Stop Dose Admin


 


Acetaminophen  650 mg 12/29/16 09:06  





  Tylenol -  PO   





  Q4H PRN   





  FEVER OR PAIN   


 


Albuterol/Ipratropium  1 amp 01/02/17 17:34  





  Duoneb -  NEB   





  Q6H PRN   





  SHORTNESS OF BREATH   


 


Diazepam  10 mg 12/29/16 09:05 01/04/17 11:19





  Valium -  PO   10 mg





  TID PRN   Administration





  muscle spasms   


 


Docusate Sodium  100 mg 01/02/17 22:00 01/04/17 14:44





  Colace -  PO   100 mg





  TID ALE   Administration


 


Heparin Sodium (Porcine)  5,000 unit 12/29/16 14:00 01/04/17 14:44





  Heparin -  SQ   Not Given





  TID ALE   


 


Sodium Chloride  1,000 mls @ 100 mls/hr 01/01/17 10:15 01/04/17 14:43





  Normal Saline -  IV   100 mls/hr





  ASDIR ALE   Administration


 


Piperacillin Sod/Tazobactam Sod  50 mls @ 100 mls/hr 01/03/17 02:00 01/04/17 09:

09





  Zosyn 3.375gm Ivpb (Pre-Docked)  IVPB   100 mls/hr





  Q8H-IV ALE   Administration


 


Lactobacillus Acidophilus  1 tab 01/03/17 14:45 01/04/17 09:09





  Bacid -  PO   1 tab





  DAILY ALE   Administration


 


Magnesium Hydroxide  30 ml 12/31/16 17:52 01/03/17 09:30





  Milk Of Magnesia -  PO   30 ml





  DAILY PRN   Administration





  CONSTIPATION   


 


Morphine Sulfate  10 mg 12/29/16 09:59 01/04/17 14:44





  Morphine 10 Mg/5 Ml Liquid  PO   10 mg





  Q4H PRN   Administration





  PAIN   


 


Ondansetron HCl  4 mg 12/29/16 09:06  





  Zofran Injection  IVPB   





  Q6H PRN   





  NAUSEA   


 


Polyethylene Glycol  17 gm 12/29/16 10:45 01/03/17 09:30





  Miralax (For Daily Use) -  PO   17 gm





  DAILY PRN   Administration





  CONSTIPATION   








Assessment: 58 year old male with PMHx of splenectomy, traumatic spinal injury 

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 

hx renal calculi, multiple  presented admitted with complicated UTI. 





Plan: 





1. Complicated UTI 


- Cont Zosyn 


- ID following 





2. Elevated lactate


- WNL





3. Psych 


- Discussed with home , who states for some time now patient has 

been having hallucinations, making up scenarios and bizarre situations. She is 

concerned and would like to have psych evaluate him. 


- Psych eval placed 





4. DVT


- Lovenox sq 





Visit type





- Emergency Visit


Emergency Visit: Yes


ED Registration Date: 12/29/16


Care time: The patient presented to the Emergency Department on the above date 

and was hospitalized for further evaluation of their emergent condition.





- New Patient


This patient is new to me today: Yes


Date on this admission: 01/04/17





- Critical Care


Critical Care patient: No





- Discharge Referral


Referred to [facility] No

## 2017-01-04 NOTE — PN
Progress Note, Physician


History of Present Illness: 


patient stable


no new events


noted from the notes about patient having hallucinations





- Current Medication List


Current Medications: 


Active Medications





Acetaminophen (Tylenol -)  650 mg PO Q4H PRN


   PRN Reason: FEVER OR PAIN


Albuterol/Ipratropium (Duoneb -)  1 amp NEB Q6H PRN


   PRN Reason: SHORTNESS OF BREATH


Diazepam (Valium -)  10 mg PO TID PRN


   PRN Reason: muscle spasms


   Last Admin: 01/04/17 11:19 Dose:  10 mg


Docusate Sodium (Colace -)  100 mg PO TID ALE


   Last Admin: 01/04/17 14:44 Dose:  100 mg


Enoxaparin Sodium (Lovenox -)  40 mg SQ DAILY ALE


Piperacillin Sod/Tazobactam Sod (Zosyn 3.375gm Ivpb (Pre-Docked))  50 mls @ 100 

mls/hr IVPB Q8H-IV ALE


   Last Admin: 01/04/17 09:09 Dose:  100 mls/hr


Lactobacillus Acidophilus (Bacid -)  1 tab PO DAILY ALE


   Last Admin: 01/04/17 09:09 Dose:  1 tab


Magnesium Hydroxide (Milk Of Magnesia -)  30 ml PO DAILY PRN


   PRN Reason: CONSTIPATION


   Last Admin: 01/03/17 09:30 Dose:  30 ml


Morphine Sulfate (Morphine 10 Mg/5 Ml Liquid)  10 mg PO Q4H PRN


   PRN Reason: PAIN


   Last Admin: 01/04/17 14:44 Dose:  10 mg


Ondansetron HCl (Zofran Injection)  4 mg IVPB Q6H PRN


   PRN Reason: NAUSEA


Polyethylene Glycol (Miralax (For Daily Use) -)  17 gm PO DAILY PRN


   PRN Reason: CONSTIPATION


   Last Admin: 01/03/17 09:30 Dose:  17 gm











- Objective


Vital Signs: 


 Vital Signs











Temperature  97.8 F   01/04/17 13:52


 


Pulse Rate  67   01/04/17 13:52


 


Respiratory Rate  17   01/04/17 13:52


 


Blood Pressure  140/76   01/04/17 05:40


 


O2 Sat by Pulse Oximetry (%)  100   01/04/17 09:00











Constitutional: Yes: No Distress, Calm


Cardiovascular: Yes: Regular Rate and Rhythm


Respiratory: Yes: Regular, CTA Bilaterally


Gastrointestinal: Yes: Normal Bowel Sounds, Soft


Extremities: Yes: Other


Labs: 


 CBC, BMP





 01/04/17 08:00 





 01/04/17 08:00 











Assessment/Plan


Assessment/Plan


(1) Complicated UTI (urinary tract infection)


Code: N39.0


paraplegia


deformities


recurrent uti





plan


continue zosyn


hydration

## 2017-01-05 NOTE — PN
Progress Note, Physician


History of Present Illness: 


patient stable


no new events








- Current Medication List


Current Medications: 


Active Medications





Acetaminophen (Tylenol -)  650 mg PO Q4H PRN


   PRN Reason: FEVER OR PAIN


Albuterol/Ipratropium (Duoneb -)  1 amp NEB Q6H PRN


   PRN Reason: SHORTNESS OF BREATH


Diazepam (Valium -)  10 mg PO TID PRN


   PRN Reason: muscle spasms


   Last Admin: 01/05/17 06:42 Dose:  10 mg


Docusate Sodium (Colace -)  [facility]ID [facility]


   Last Admin: 01/05/17 15:04 Dose:  Not Given


Enoxaparin Sodium (Lovenox -)  40 mg SQ DAILY [facility]


   Last Admin: 01/05/17 10:05 Dose:  40 mg


Piperacillin Sod/Tazobactam Sod (Zosyn 3.375gm Ivpb (Pre-Docked))  50 mls @ 100 

mls/hr IVPB Q8H-IV ALE


   Last Admin: 01/05/17 10:05 Dose:  100 mls/hr


Lactobacillus Acidophilus (Bacid -)  1 tab PO DAILY ALE


   Last Admin: 01/05/17 10:05 Dose:  1 tab


Magnesium Hydroxide (Milk Of Magnesia -)  30 ml PO DAILY PRN


   PRN Reason: CONSTIPATION


   Last Admin: 01/03/17 09:30 Dose:  30 ml


Morphine Sulfate (Morphine 10 Mg/5 Ml Liquid)  10 mg PO Q4H PRN


   PRN Reason: PAIN


   Last Admin: 01/05/17 15:42 Dose:  10 mg


Ondansetron HCl (Zofran Injection)  4 mg IVPB Q6H PRN


   PRN Reason: NAUSEA


Polyethylene Glycol (Miralax (For Daily Use) -)  17 gm PO DAILY PRN


   PRN Reason: CONSTIPATION


   Last Admin: 01/03/17 09:30 Dose:  17 gm











- Objective


Vital Signs: 


 Vital Signs











Temperature  98.0 F   01/05/17 13:39


 


Pulse Rate  62   01/05/17 13:39


 


Respiratory Rate  17   01/05/17 13:39


 


Blood Pressure  82/42   01/05/17 10:00


 


O2 Sat by Pulse Oximetry (%)  96   01/05/17 09:00











Constitutional: Yes: No Distress, Calm


Cardiovascular: Yes: Regular Rate and Rhythm


Respiratory: Yes: Regular, CTA Bilaterally


Gastrointestinal: Yes: Normal Bowel Sounds, Soft


Musculoskeletal: Yes: WNL


Extremities: Yes: Other


Neurological: Yes: Alert, Oriented


Psychiatric: Yes: Alert


Labs: 


 CBC, BMP





 01/05/17 07:35 





 01/05/17 07:35 











Assessment/Plan


Assessment/Plan


(1) Complicated UTI (urinary tract infection)


Code: N39.0


paraplegia


deformities


recurrent uti





plan


continue zosyn


hydration


i think patient is close to his baseline


we will give him one more day of abx tomorrow and then will stop it

## 2017-01-05 NOTE — CONSULT
Psychiatry Consult


Chief Complaint: Peports of hallucinations and paranoia on and off. Patient 

reports drinking alcohol and smoking [place] . Also on Morphine and Valium.





- Previous Psychiatric Treatment


Outpatient: None


Inpatient: None





- Previous Substance Abuse Treatment


Outpatient: None


Inpatient: None





- Family History


Family History: Unremarkable





- Current Medications


Current Medications: 


Active Medications





Acetaminophen (Tylenol -)  650 mg PO Q4H PRN


   PRN Reason: FEVER OR PAIN


Albuterol/Ipratropium (Duoneb -)  1 amp NEB Q6H PRN


   PRN Reason: SHORTNESS OF BREATH


Diazepam (Valium -)  10 mg PO TID PRN


   PRN Reason: muscle spasms


   Last Admin: 17 06:42 Dose:  10 mg


Docusate Sodium (Colace -)  100 mg PO TID [facility]


   Last Admin: 17 15:04 Dose:  Not Given


Enoxaparin Sodium (Lovenox -)  40 mg SQ DAILY [facility]


   Last Admin: 17 10:05 Dose:  40 mg


Piperacillin Sod/Tazobactam Sod (Zosyn 3.375gm Ivpb (Pre-Docked))  50 mls @ 100 

mls/hr IVPB Q8H-IV [facility]


   Last Admin: 17 10:05 Dose:  100 mls/hr


Lactobacillus Acidophilus (Bacid -)  1 tab PO DAILY [facility]


   Last Admin: 17 10:05 Dose:  1 tab


Magnesium Hydroxide (Milk Of Magnesia -)  30 ml PO DAILY PRN


   PRN Reason: CONSTIPATION


   Last Admin: 17 09:30 Dose:  30 ml


Morphine Sulfate (Morphine 10 Mg/5 Ml Liquid)  10 mg PO Q4H PRN


   PRN Reason: PAIN


   Last Admin: 17 06:40 Dose:  10 mg


Ondansetron HCl (Zofran Injection)  4 mg IVPB Q6H PRN


   PRN Reason: NAUSEA


Polyethylene Glycol (Miralax (For Daily Use) -)  17 gm PO DAILY PRN


   PRN Reason: CONSTIPATION


   Last Admin: 17 09:30 Dose:  17 gm











- Allergies


Allergies: 


 Allergies











Allergy/AdvReac Type Severity Reaction Status Date / Time


 


No Known Allergies Allergy   Verified 16 04:32














- Current Living Status


Usual Living Arrangement: With Significant Other





- Current Mental Status Evaluation


Appearance: Well Groomed


Attitude: Cooperative





- Affect


Affect: Full Range


Appropriateness: Appropriate to Content





- Mood


Mood: Euthymic





- Speech/Language


Expressive: Coherent





- Thought Process


Thought Process: Intact





- Thought Content


Hallucinations: Absent


Delusions: Absent





- Self Perception


Self Perception: No Impairment





- Cognition


Attention: Alert


Orientation: Time


Memory, Immediate Recall: Intact


Memory, Short Term: 2/3


Memory, Remote with Promptin/3





- Concentration


Serial Sevens Intact: Yes


Simple Calculations Intact: Yes





- Abstraction


Judgement: Intact





- Insight


Insight: Intact





- Impulse Control


Impulse Control: Good Control





- Suicidal Ideation


Suicidal Ideation: No





- Homicidal Ideation


Homicidal Ideation: No





Assessment/Plan


there is no on going Psychotic  Disoder in this patient. episodic psychotic 

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 

Valium, alcohol, Marijuana. 


No need for Psych Meds at tyhis time.

## 2017-01-05 NOTE — PN
Progress Note, Physician


Chief Complaint: 


Mr Sumner says he feels well and is without complaint. No further bladder pain. 

Chronic pain controlled. No cp, sob, n/v.





- Current Medication List


Current Medications: 


Active Medications





Acetaminophen (Tylenol -)  650 mg PO Q4H PRN


   PRN Reason: FEVER OR PAIN


Albuterol/Ipratropium (Duoneb -)  1 amp NEB Q6H PRN


   PRN Reason: SHORTNESS OF BREATH


Diazepam (Valium -)  10 mg PO TID PRN


   PRN Reason: muscle spasms


   Last Admin: 01/05/17 06:42 Dose:  10 mg


Docusate Sodium (Colace -)  100 mg PO TID Pending sale to [facility]


   Last Admin: 01/05/17 15:04 Dose:  Not Given


Enoxaparin Sodium (Lovenox -)  40 mg SQ DAILY Pending sale to [facility]


   Last Admin: 01/05/17 10:05 Dose:  40 mg


Piperacillin Sod/Tazobactam Sod (Zosyn 3.375gm Ivpb (Pre-Docked))  50 mls @ 100 

mls/hr IVPB Q8H-IV ALE


   Last Admin: 01/05/17 10:05 Dose:  100 mls/hr


Lactobacillus Acidophilus (Bacid -)  1 tab PO DAILY [facility]


   Last Admin: 01/05/17 10:05 Dose:  1 tab


Magnesium Hydroxide (Milk Of Magnesia -)  30 ml PO DAILY PRN


   PRN Reason: CONSTIPATION


   Last Admin: 01/03/17 09:30 Dose:  30 ml


Morphine Sulfate (Morphine 10 Mg/5 Ml Liquid)  10 mg PO Q4H PRN


   PRN Reason: PAIN


   Last Admin: 01/05/17 15:42 Dose:  10 mg


Ondansetron HCl (Zofran Injection)  4 mg IVPB Q6H PRN


   PRN Reason: NAUSEA


Polyethylene Glycol (Miralax (For Daily Use) -)  17 gm PO DAILY PRN


   PRN Reason: CONSTIPATION


   Last Admin: 01/03/17 09:30 Dose:  17 gm











- Objective


Vital Signs: 


 Vital Signs











Temperature  98.0 F   01/05/17 13:39


 


Pulse Rate  62   01/05/17 13:39


 


Respiratory Rate  17   01/05/17 13:39


 


Blood Pressure  82/42   01/05/17 10:00


 


O2 Sat by Pulse Oximetry (%)  96   01/05/17 09:00











Constitutional: Yes: Well Nourished, No Distress, Calm


Cardiovascular: Yes: Regular Rate and Rhythm.  No: Gallop, Murmur, Rub


Respiratory: Yes: Regular, CTA Bilaterally.  No: Rales, Rhonchi, Wheezes


Gastrointestinal: Yes: Normal Bowel Sounds, Soft.  No: Distention, Tenderness


Extremities: Yes: WNL


Edema: No


Labs: 


 CBC, BMP





 01/05/17 07:35 





 01/05/17 07:35 











Problem List





- Problems


(1) Complicated UTI (urinary tract infection)


Code(s): N39.0 - URINARY TRACT INFECTION, SITE NOT SPECIFIED





(2) Paraplegia


Code(s): G82.20 - PARAPLEGIA, UNSPECIFIED





(3) Chronic pain


Code(s): G89.29 - OTHER CHRONIC PAIN   








Assessment/Plan


(1) Complicated UTI (urinary tract infection)


Assessment/Plan: 


-appreciate ID assistance


-currently on zosyn


-cultures negative


-defer to ID about when to stop antibiotics


Code(s): N39.0 - URINARY TRACT INFECTION, SITE NOT SPECIFIED





(2) Paraplegia


Assessment/Plan: 


-chronic


Code(s): G82.20 - PARAPLEGIA, UNSPECIFIED





(3) Chronic pain


Assessment/Plan: 


-continue home dose of morphine and valium for pain and spasm


Code(s): G89.29 - OTHER CHRONIC PAIN

## 2017-01-06 NOTE — PN
Progress Note, Physician


Chief Complaint: 


Patient complains of diarrhea. Denies cp, sob, n/v.





- Current Medication List


Current Medications: 


Active Medications





Acetaminophen (Tylenol -)  650 mg PO Q4H PRN


   PRN Reason: FEVER OR PAIN


Albuterol/Ipratropium (Duoneb -)  1 amp NEB Q6H PRN


   PRN Reason: SHORTNESS OF BREATH


Diazepam (Valium -)  10 mg PO TID PRN


   PRN Reason: muscle spasms


   Last Admin: 01/06/17 12:39 Dose:  10 mg


Docusate Sodium (Colace -)  100 mg PO TID [facility]


   Last Admin: 01/06/17 13:00 Dose:  Not Given


Enoxaparin Sodium (Lovenox -)  40 mg SQ DAILY [facility]


   Last Admin: 01/06/17 09:24 Dose:  Not Given


Piperacillin Sod/Tazobactam Sod (Zosyn 3.375gm Ivpb (Pre-Docked))  50 mls @ 100 

mls/hr IVPB Q8H-IV [facility]


   Last Admin: 01/06/17 09:23 Dose:  100 mls/hr


Lactobacillus Acidophilus (Bacid -)  1 tab PO DAILY [facility]


   Last Admin: 01/06/17 09:23 Dose:  1 tab


Magnesium Hydroxide (Milk Of Magnesia -)  30 ml PO DAILY PRN


   PRN Reason: CONSTIPATION


   Last Admin: 01/03/17 09:30 Dose:  30 ml


Morphine Sulfate (Morphine 10 Mg/5 Ml Liquid)  10 mg PO Q4H PRN


   PRN Reason: PAIN


   Last Admin: 01/06/17 14:41 Dose:  10 mg


Ondansetron HCl (Zofran Injection)  4 mg IVPB Q6H PRN


   PRN Reason: NAUSEA


Polyethylene Glycol (Miralax (For Daily Use) -)  17 gm PO DAILY PRN


   PRN Reason: CONSTIPATION


   Last Admin: 01/03/17 09:30 Dose:  17 gm











- Objective


Vital Signs: 


 Vital Signs











Temperature  97.9 F   01/06/17 14:00


 


Pulse Rate  72   01/06/17 14:00


 


Respiratory Rate  17 01/06/17 14:00


 


Blood Pressure  132/67   01/06/17 09:00


 


O2 Sat by Pulse Oximetry (%)  94 L  01/06/17 09:00











Constitutional: Yes: Well Nourished, No Distress, Calm


Cardiovascular: Yes: Regular Rate and Rhythm.  No: Gallop, Murmur, Rub


Respiratory: Yes: Regular, CTA Bilaterally.  No: Rales, Rhonchi, Wheezes


Gastrointestinal: Yes: Normal Bowel Sounds, Soft.  No: Distention, Tenderness


Musculoskeletal: Yes: WNL


Edema: No


Labs: 


 CBC, BMP





 01/06/17 06:30 





 01/06/17 06:30 











Problem List





- Problems


(1) Complicated UTI (urinary tract infection)


Code(s): N39.0 - URINARY TRACT INFECTION, SITE NOT SPECIFIED





(2) Paraplegia


Code(s): G82.20 - PARAPLEGIA, UNSPECIFIED





(3) Chronic pain


Code(s): G89.29 - OTHER CHRONIC PAIN   








Assessment/Plan


(1) Complicated UTI (urinary tract infection)


Assessment/Plan: 


-ID following


-monitor off of antibiotics


Code(s): N39.0 - URINARY TRACT INFECTION, SITE NOT SPECIFIED





(2) Paraplegia


Assessment/Plan: 


-chronic


Code(s): G82.20 - PARAPLEGIA, UNSPECIFIED





(3) Chronic pain


Assessment/Plan: 


-continue home dose of morphine and valium for pain and spasm


Code(s): G89.29 - OTHER CHRONIC PAIN   





(4) Diarrhea


-check for c. diff


-if positive, start oral vancomycin vs flagyl. defer to ID


-if negative, supportive care





Dispo


-plan for discharge this weekend pending c. diff results

## 2017-01-06 NOTE — PN
Progress Note, Physician


History of Present Illness: 


patient stable


patient having dirrhoea


stool send for cdiff





- Current Medication List


Current Medications: 


Active Medications





Acetaminophen (Tylenol -)  650 mg PO Q4H PRN


   PRN Reason: FEVER OR PAIN


Albuterol/Ipratropium (Duoneb -)  1 amp NEB Q6H PRN


   PRN Reason: SHORTNESS OF BREATH


Diazepam (Valium -)  10 mg PO TID PRN


   PRN Reason: muscle spasms


   Last Admin: 01/06/17 12:39 Dose:  10 mg


Docusate Sodium (Colace -)  100 mg PO TID [facility]


   Last Admin: 01/06/17 13:00 Dose:  Not Given


Enoxaparin Sodium (Lovenox -)  40 mg SQ DAILY [facility]


   Last Admin: 01/06/17 09:24 Dose:  Not Given


Piperacillin Sod/Tazobactam Sod (Zosyn 3.375gm Ivpb (Pre-Docked))  50 mls @ 100 

mls/hr IVPB Q8H-IV [facility]


   Last Admin: 01/06/17 09:23 Dose:  100 mls/hr


Lactobacillus Acidophilus (Bacid -)  1 tab PO DAILY [facility]


   Last Admin: 01/06/17 09:23 Dose:  1 tab


Magnesium Hydroxide (Milk Of Magnesia -)  30 ml PO DAILY PRN


   PRN Reason: CONSTIPATION


   Last Admin: 01/03/17 09:30 Dose:  30 ml


Morphine Sulfate (Morphine 10 Mg/5 Ml Liquid)  10 mg PO Q4H PRN


   PRN Reason: PAIN


   Last Admin: 01/06/17 14:41 Dose:  10 mg


Ondansetron HCl (Zofran Injection)  4 mg IVPB Q6H PRN


   PRN Reason: NAUSEA


Polyethylene Glycol (Miralax (For Daily Use) -)  17 gm PO DAILY PRN


   PRN Reason: CONSTIPATION


   Last Admin: 01/03/17 09:30 Dose:  17 gm











- Objective


Vital Signs: 


 Vital Signs











Temperature  97.9 F   01/06/17 14:00


 


Pulse Rate  72   01/06/17 14:00


 


Respiratory Rate  17   01/06/17 14:00


 


Blood Pressure  132/67   01/06/17 09:00


 


O2 Sat by Pulse Oximetry (%)  94 L  01/06/17 09:00











Constitutional: Yes: No Distress, Calm


HENT: Yes: Atraumatic


Cardiovascular: Yes: Regular Rate and Rhythm


Respiratory: Yes: Regular, CTA Bilaterally


Extremities: Yes: Other


Labs: 


 CBC, BMP





 01/06/17 06:30 





 01/06/17 06:30 











Assessment/Plan


Assessment/Plan


(1) Complicated UTI (urinary tract infection)


Code: N39.0


paraplegia


deformities


recurrent uti





plan


can stop all abx tomorrow


patient stable


await cx report

## 2017-01-07 NOTE — PN
Progress Note, Physician


History of Present Illness: 


doing well


some discomfort suprapubic region





- Current Medication List


Current Medications: 


Active Medications





Acetaminophen (Tylenol -)  650 mg PO Q4H PRN


   PRN Reason: FEVER OR PAIN


Albuterol/Ipratropium (Duoneb -)  1 amp NEB Q6H PRN


   PRN Reason: SHORTNESS OF BREATH


Diazepam (Valium -)  10 mg PO TID PRN


   PRN Reason: muscle spasms


   Last Admin: 01/07/17 09:28 Dose:  10 mg


Docusate Sodium (Colace -)  100 mg PO TID [facility]


   Last Admin: 01/07/17 14:17 Dose:  Not Given


Enoxaparin Sodium (Lovenox -)  40 mg SQ DAILY [facility]


   Last Admin: 01/07/17 09:29 Dose:  Not Given


Piperacillin Sod/Tazobactam Sod (Zosyn 3.375gm Ivpb (Pre-Docked))  50 mls @ 100 

mls/hr IVPB Q8H-IV [facility]


   Last Admin: 01/07/17 09:29 Dose:  100 mls/hr


Lactobacillus Acidophilus (Bacid -)  1 tab PO DAILY [facility]


   Last Admin: 01/07/17 09:28 Dose:  1 tab


Magnesium Hydroxide (Milk Of Magnesia -)  30 ml PO DAILY PRN


   PRN Reason: CONSTIPATION


   Last Admin: 01/03/17 09:30 Dose:  30 ml


Morphine Sulfate (Morphine 10 Mg/5 Ml Liquid)  10 mg PO Q4H PRN


   PRN Reason: PAIN


   Last Admin: 01/07/17 14:20 Dose:  10 mg


Ondansetron HCl (Zofran Injection)  4 mg IVPB Q6H PRN


   PRN Reason: NAUSEA


Polyethylene Glycol (Miralax (For Daily Use) -)  17 gm PO DAILY PRN


   PRN Reason: CONSTIPATION


   Last Admin: 01/03/17 09:30 Dose:  17 gm











- Objective


Vital Signs: 


 Vital Signs











Temperature  98 F   01/07/17 08:00


 


Pulse Rate  59 L  01/07/17 12:26


 


Respiratory Rate  20   01/07/17 08:00


 


Blood Pressure  133/67   01/07/17 08:00


 


O2 Sat by Pulse Oximetry (%)  93 L  01/07/17 12:26











Constitutional: Yes: No Distress, Calm


Cardiovascular: Yes: Regular Rate and Rhythm


Respiratory: Yes: Regular, CTA Bilaterally


Gastrointestinal: Yes: Normal Bowel Sounds, Soft


Genitourinary: Yes: Other


Extremities: Yes: Other


Neurological: Yes: Alert, Oriented


Psychiatric: Yes: Alert


Labs: 


 CBC, BMP





 01/07/17 08:00 





 01/07/17 08:00 











Assessment/Plan


Assessment/Plan


(1) Complicated UTI (urinary tract infection)


Code: N39.0


paraplegia


deformities


recurrent uti





plan


stop all abx


patient stable


cdiff negative

## 2017-01-07 NOTE — PN
Progress Note (short form)





- Note


Progress Note: 


C/O redness in the buttocks


Loose stools are better 


Has no fever





O/E


Heart regular


Lungs clear


Abd soft


Ext no edema


Slight redness in the left half of the buttocks+





 Vital Signs











 Period  Temp  Pulse  Resp  BP Sys/Malone  Pulse Ox


 


 Last 24 Hr  97.7 F-98.1 F  47-94  20-20  133-153/67-73  93-96








 Current Medications





Acetaminophen (Tylenol -)  650 mg PO Q4H PRN


   PRN Reason: FEVER OR PAIN


Albuterol/Ipratropium (Duoneb -)  1 amp NEB Q6H PRN


   PRN Reason: SHORTNESS OF BREATH


Diazepam (Valium -)  10 mg PO TID PRN


   PRN Reason: muscle spasms


   Last Admin: 01/07/17 09:28 Dose:  10 mg


Docusate Sodium (Colace -)  100 mg PO TID [facility]


   Last Admin: 01/07/17 14:17 Dose:  Not Given


Enoxaparin Sodium (Lovenox -)  40 mg SQ DAILY [facility]


   Last Admin: 01/07/17 09:29 Dose:  Not Given


Lactobacillus Acidophilus (Bacid -)  1 tab PO DAILY [facility]


   Last Admin: 01/07/17 09:28 Dose:  1 tab


Magnesium Hydroxide (Milk Of Magnesia -)  30 ml PO DAILY PRN


   PRN Reason: CONSTIPATION


   Last Admin: 01/03/17 09:30 Dose:  30 ml


Morphine Sulfate (Morphine 10 Mg/5 Ml Liquid)  10 mg PO Q4H PRN


   PRN Reason: PAIN


   Last Admin: 01/07/17 14:20 Dose:  10 mg


Ondansetron HCl (Zofran Injection)  4 mg IVPB Q6H PRN


   PRN Reason: NAUSEA


Polyethylene Glycol (Miralax (For Daily Use) -)  17 gm PO DAILY PRN


   PRN Reason: CONSTIPATION


   Last Admin: 01/03/17 09:30 Dose:  17 gm





 Laboratory Last Values











WBC  6.4 K/mm3 (4.0-10.0)   01/07/17  08:00    


 


RBC  4.31 M/mm3 (4.00-5.60)   01/07/17  08:00    


 


Hgb  13.4 GM/dL (11.7-16.9)   01/07/17  08:00    


 


Hct  40.7 % (35.4-49)   01/07/17  08:00    


 


MCV  94.3 fl (80-96)   01/07/17  08:00    


 


MCHC  32.8 g/dl (32.0-35.9)   01/07/17  08:00    


 


RDW  15.5 % (11.9-15.9)   01/07/17  08:00    


 


Plt Count  366 K/MM3 (134-434)   01/07/17  08:00    


 


MPV  9.7 fl (7.5-11.1)   01/07/17  08:00    


 


Neutrophils %  49.1 % (42.8-82.8)   01/07/17  08:00    


 


Lymphocytes %  30.8 % (8-40)  D 01/07/17  08:00    


 


Monocytes %  12.0 % (3.8-10.2)  H  01/07/17  08:00    


 


Eosinophils %  7.3 % (0-4.5)  H  01/07/17  08:00    


 


Basophils %  0.1 % (0-2.0)   01/07/17  08:00    


 


Band Neutrophils  14.0 % (0-10)  H D 01/01/17  06:00    


 


Differential Comment  Manual diff done   12/30/16  07:00    


 


Reactive Lymphocytes  2 % (0-80)  D 12/30/16  07:00    


 


Platelet Estimate  Adequate  (NORMAL)   12/31/16  08:45    


 


Platelet Comment  No clumping noted   12/31/16  08:45    


 


Platelet Comment  No clotting detected   12/31/16  08:45    


 


RBC Morphology  Appears normal   12/31/16  08:45    


 


Sodium  140 mmol/L (136-145)   01/07/17  08:00    


 


Potassium  4.0 mmol/L (3.5-5.1)   01/07/17  08:00    


 


Chloride  103 mmol/L ()   01/07/17  08:00    


 


Carbon Dioxide  29 mmol/L (21-32)   01/07/17  08:00    


 


Anion Gap  8  (8-16)   01/07/17  08:00    


 


BUN  4 mg/dL (7-18)  L  01/07/17  08:00    


 


Creatinine  0.6 mg/dL (0.7-1.3)  L  01/07/17  08:00    


 


Creat Clearance w eGFR  > 60  (>60)   01/04/17  08:00    


 


Random Glucose  93 mg/dL ()   01/07/17  08:00    


 


Lactic Acid  1.006 mmol/L (0.4-2.0)   01/02/17  15:30    


 


Calcium  8.6 mg/dL (8.5-10.1)   01/07/17  08:00    


 


Phosphorus  2.7 mg/dL (2.5-4.9)   01/07/17  08:00    


 


Magnesium  2.1 mg/dL (1.8-2.4)   01/07/17  08:00    


 


Total Bilirubin  0.3 mg/dL (0.2-1.0)  D 01/04/17  08:00    


 


AST  19 U/L (15-37)  D 01/04/17  08:00    


 


ALT  20 U/L (12-78)   01/04/17  08:00    


 


Alkaline Phosphatase  64 U/L ()   01/04/17  08:00    


 


Total Protein  5.9 g/dl (6.4-8.2)  L  01/04/17  08:00    


 


Albumin  2.7 g/dl (3.4-5.0)  L  01/04/17  08:00    


 


Urine Color  Straw   01/02/17  12:15    


 


Urine Appearance  Slcloudy   01/02/17  12:15    


 


Urine pH  7.0  (5.0-8.0)   01/02/17  12:15    


 


Ur Specific Gravity  1.003  (1.001-1.035)   01/02/17  12:15    


 


Urine Protein  Negative  (NEGATIVE)   01/02/17  12:15    


 


Urine Glucose (UA)  Negative  (NEGATIVE)   01/02/17  12:15    


 


Urine Ketones  Negative  (NEGATIVE)   01/02/17  12:15    


 


Urine Blood  1+  (NEGATIVE)  H  01/02/17  12:15    


 


Urine Nitrite  Negative  (NEGATIVE)   01/02/17  12:15    


 


Urine Bilirubin  Negative  (NEGATIVE)   01/02/17  12:15    


 


Urine Urobilinogen  Negative E.U./dl (0.2-1.0)   01/02/17  12:15    


 


Ur Leukocyte Esterase  3+  (NEGATIVE)  H  01/02/17  12:15    


 


Urine RBC  7 /hpf (0-3)   01/02/17  12:15    


 


Urine WBC  54 /hpf (3-5)   01/02/17  12:15    


 


Ur Epithelial Cells  Rare /hpf (FEW)   01/02/17  12:15    


 


Urine Bacteria  Few /hpf (NONE SEEN)   01/02/17  12:15    


 


Hyaline Casts  1 /lpf  01/02/17  12:15    


 


Urine Mucus  Rare   01/02/17  12:15    


 


Urine Yeast  Many   01/02/17  12:15    


 


Hepatitis C Antibody  10.2 s/co ratio (0.0-0.9)  H  12/30/16  07:00    








A&P





Assessment/Plan


(1) Complicated UTI (urinary tract infection)


Assessment/Plan: 


No fever


Code(s): N39.0 - URINARY TRACT INFECTION, SITE NOT SPECIFIED





(2) Paraplegia


Assessment/Plan: 


-chronic


Code(s): G82.20 - PARAPLEGIA, UNSPECIFIED





(3) Chronic pain


Assessment/Plan: 


-continue home dose of morphine and valium for pain and spasm


Code(s): G89.29 - OTHER CHRONIC PAIN   





(4) Diarrhea


-C.Diff negative


Dispo


-wants to go home on Monday when his HHA will be available

## 2017-01-08 NOTE — PN
Progress Note (short form)





- Note


Progress Note: 


Mild lower abd pain+


No fever 


No loose stools





O/E





Heart regular


Lungs clear


Abd soft


Ext no edema





 Vital Signs











 Period  Temp  Pulse  Resp  BP Sys/Malone  Pulse Ox


 


 Last 24 Hr  97.4 F-98.6 F  51-92  18-20  118-130/65-75  98








 Current Medications





Acetaminophen (Tylenol -)  650 mg PO Q4H PRN


   PRN Reason: FEVER OR PAIN


Albuterol/Ipratropium (Duoneb -)  1 amp NEB Q6H PRN


   PRN Reason: SHORTNESS OF BREATH


Diazepam (Valium -)  10 mg PO TID PRN


   PRN Reason: muscle spasms


   Last Admin: 01/08/17 07:58 Dose:  10 mg


Docusate Sodium (Colace -)  100 mg PO TID [facility]


   Last Admin: 01/08/17 13:09 Dose:  Not Given


Enoxaparin Sodium (Lovenox -)  40 mg SQ DAILY [facility]


   Last Admin: 01/08/17 10:12 Dose:  Not Given


Lactobacillus Acidophilus (Bacid -)  1 tab PO DAILY [facility]


   Last Admin: 01/08/17 10:10 Dose:  1 tab


Magnesium Hydroxide (Milk Of Magnesia -)  30 ml PO DAILY PRN


   PRN Reason: CONSTIPATION


   Last Admin: 01/03/17 09:30 Dose:  30 ml


Morphine Sulfate (Morphine 10 Mg/5 Ml Liquid)  10 mg PO Q4H PRN


   PRN Reason: PAIN


   Last Admin: 01/08/17 12:06 Dose:  10 mg


Ondansetron HCl (Zofran Injection)  4 mg IVPB Q6H PRN


   PRN Reason: NAUSEA


Polyethylene Glycol (Miralax (For Daily Use) -)  17 gm PO DAILY PRN


   PRN Reason: CONSTIPATION


   Last Admin: 01/03/17 09:30 Dose:  17 gm





 Laboratory Results - last 24 hr











  01/02/17





  10:00


 


HCV Quantitation  


 


HCV RNA (PCR) IUs/ml  Y


 


HCV Liver Fibrosis Test  Y








Assessment/Plan


(1) Complicated UTI (urinary tract infection)


Assessment/Plan: 


Resolved and is off ABX abd has no fever


Code(s): N39.0 - URINARY TRACT INFECTION, SITE NOT SPECIFIED





(2) Paraplegia


Assessment/Plan: 


-chronic


Code(s): G82.20 - PARAPLEGIA, UNSPECIFIED





(3) Chronic pain


Assessment/Plan: 


-continue home dose of morphine and valium for pain and spasm


Code(s): G89.29 - OTHER CHRONIC PAIN   





(4) Diarrhea


-C.Diff negative and is better with being off the Zosyn





Dispo


-wants to go home on Monday when his [facility] will be available

## 2017-01-09 NOTE — PN
Progress Note, Physician


History of Present Illness: 


doing well


no complaints


says his bladder feels much better





- Current Medication List


Current Medications: 


Active Medications





Acetaminophen (Tylenol -)  650 mg PO Q4H PRN


   PRN Reason: FEVER OR PAIN


Albuterol/Ipratropium (Duoneb -)  1 amp NEB Q6H PRN


   PRN Reason: SHORTNESS OF BREATH


Diazepam (Valium -)  10 mg PO TID PRN


   PRN Reason: muscle spasms


   Last Admin: 01/09/17 10:37 Dose:  10 mg


Docusate Sodium (Colace -)  100 mg PO TID [facility]


   Last Admin: 01/09/17 14:03 Dose:  Not Given


Enoxaparin Sodium (Lovenox -)  40 mg SQ DAILY [facility]


   Last Admin: 01/09/17 10:38 Dose:  Not Given


Lactobacillus Acidophilus (Bacid -)  1 tab PO DAILY [facility]


   Last Admin: 01/09/17 10:37 Dose:  1 tab


Magnesium Hydroxide (Milk Of Magnesia -)  30 ml PO DAILY PRN


   PRN Reason: CONSTIPATION


   Last Admin: 01/03/17 09:30 Dose:  30 ml


Morphine Sulfate (Morphine 10 Mg/5 Ml Liquid)  10 mg PO Q4H PRN


   PRN Reason: PAIN


   Last Admin: 01/09/17 15:28 Dose:  10 mg


Ondansetron HCl (Zofran Injection)  4 mg IVPB Q6H PRN


   PRN Reason: NAUSEA


Polyethylene Glycol (Miralax (For Daily Use) -)  17 gm PO DAILY PRN


   PRN Reason: CONSTIPATION


   Last Admin: 01/03/17 09:30 Dose:  17 gm











- Objective


Vital Signs: 


 Vital Signs











Temperature  97.9 F   01/09/17 14:21


 


Pulse Rate  79   01/09/17 14:30


 


Respiratory Rate  20   01/09/17 09:54


 


Blood Pressure  81/39   01/09/17 14:30


 


O2 Sat by Pulse Oximetry (%)  98   01/08/17 21:00











Constitutional: Yes: No Distress, Calm


Cardiovascular: Yes: Regular Rate and Rhythm


Respiratory: Yes: Regular, CTA Bilaterally


Gastrointestinal: Yes: Normal Bowel Sounds, Soft


Extremities: Yes: Other


Neurological: Yes: Alert, Oriented


Psychiatric: Yes: Alert


Labs: 


 CBC, BMP





 01/07/17 08:00 





 01/07/17 08:00 











Assessment/Plan


Assessment/Plan


(1) Complicated UTI (urinary tract infection)


Code: N39.0


paraplegia


deformities


recurrent uti





plan


stable off of abx


rest as per the team

## 2017-01-09 NOTE — DS
Physical Examination


Vital Signs: 


 Vital Signs











Temperature  97.9 F   01/09/17 14:21


 


Pulse Rate  79   01/09/17 14:30


 


Respiratory Rate  20   01/09/17 09:54


 


Blood Pressure  81/39   01/09/17 14:30


 


O2 Sat by Pulse Oximetry (%)  98   01/08/17 21:00











Labs: 


 CBC, BMP





 01/07/17 08:00 





 01/07/17 08:00 











Discharge Summary


Reason For Visit: UTI


Current Active Problems





Chronic pain (Acute) 


Paraplegia (Acute) 


UTI (urinary tract infection) (Acute) 








Condition: Good





- Instructions


Diet, Activity, Other Instructions: 


resume previous diet and activity


Referrals: 


Sony Poon MD [Staff Physician] - 


Disposition: VNS/HOME HEALTH CARE





- Home Medications


Comprehensive Discharge Medication List: 


Ambulatory Orders





Diazepam [Valium -] 10 mg PO TID PRN 11/20/15 


Morphine 10 mg/5 ml Liquid [Morphine 10 mg/5 mL Liquid -] 4 mg PO Q4H PRN 11/20/

15

## 2017-01-11 ENCOUNTER — HOSPITAL ENCOUNTER (EMERGENCY)
Dept: HOSPITAL 74 - JER | Age: 62
Discharge: HOME | End: 2017-01-11
Payer: COMMERCIAL

## 2017-01-11 VITALS — BODY MASS INDEX: 18 KG/M2

## 2017-01-11 VITALS — DIASTOLIC BLOOD PRESSURE: 60 MMHG | SYSTOLIC BLOOD PRESSURE: 132 MMHG | TEMPERATURE: 97.2 F | HEART RATE: 60 BPM

## 2017-01-11 DIAGNOSIS — G82.20: Primary | ICD-10-CM

## 2017-01-11 DIAGNOSIS — E03.9: ICD-10-CM

## 2017-01-11 DIAGNOSIS — Z87.891: ICD-10-CM

## 2017-01-11 DIAGNOSIS — Z87.440: ICD-10-CM

## 2017-01-11 DIAGNOSIS — J44.9: ICD-10-CM

## 2017-01-11 LAB
APPEARANCE UR: CLEAR
BACTERIA #/AREA URNS HPF: (no result) /HPF
BILIRUB UR STRIP.AUTO-MCNC: NEGATIVE MG/DL
COLOR UR: (no result)
KETONES UR QL STRIP: NEGATIVE
LEUKOCYTE ESTERASE UR QL STRIP.AUTO: (no result)
MUCOUS THREADS URNS QL MICRO: (no result)
NITRITE UR QL STRIP: POSITIVE
PH UR: 7 [PH] (ref 5–8)
PROT UR QL STRIP: NEGATIVE
PROT UR QL STRIP: NEGATIVE
RBC # BLD AUTO: 3 /HPF (ref 0–3)
RBC # UR STRIP: NEGATIVE /UL
SP GR UR: 1.01 (ref 1–1.03)
UROBILINOGEN UR STRIP-MCNC: (no result) E.U./DL (ref 0.2–1)
WBC # UR AUTO: 79 /HPF (ref 3–5)
YEAST #/AREA URNS HPF: (no result) /[HPF]

## 2017-01-11 NOTE — PDOC
History of Present Illness





- General


History Source: Patient


Exam Limitations: No Limitations





- History of Present Illness


Initial Comments: 


01/11/17 14:14


The patient is a 61-year-old man, accompanied by , with a significant past 

medical history of paraplegia (below T2) and chronic recurrent urinary tract 

infections (recently admitted on 12/29 for UTI), who presents to the emergency 

department via EMS for further evaluation of suprapubic discomfort since 

yesterday. He states that he typically experineces suprpubic abdominal 

discomfort prior to urination, which immedialy resolves after he urinates. He 

states that he typically wears a condom catheter. He states that his urine has 

been otherwise clear, non-foul smelling, non cloudy without sediments. He also 

reports associated symptoms of chills. No fever,  chest pain, cough, shortness 

of breath, headache, nausea, vomiting, diarrhea, dysuria, hematuria, urinary 

frequency/urgency, flank pain, testicular pain or penile discharge.





He was recently admitted for UTI on 12/29 for which he was treated with 

antibiotics. UC were always contaminated and were noted to not grow any 

bacteria. Patient was discharge on 01/02/2017 off antibiotics. 





Allergies: None Known


Past Surgical History: Right wrist fracture s/p fusion. Splenectomy.


Social History: Former smoker (Quit 1996). Social ETOH use. No recreational 

drug use.


Primary Care Physician: Dr. Jordan Poon  [phone] 








<Leslie Jj - Last Filed: 01/11/17 15:20>





<José Luis Stevenson - Last Filed: 01/11/17 15:53>





- General


Chief Complaint: Pain


Stated Complaint: ABD PAIN


Time Seen by Provider: 01/11/17 12:52





Past History





<Leslie Jj - Last Filed: 01/11/17 15:20>





- Past Medical History


Anemia: No


Asthma: No


Cancer: No


Cardiac Disorders: No


CVA: No


COPD: Yes


CHF: No


Dementia: No


Diabetes: No


GI Disorders: No


 Disorders: Yes (Recurrent UTI)


HTN:  (Hypotension)


Hypercholesterolemia: No


Liver Disease: No


Seizures: No


Thyroid Disease: No





- Surgical History


Abdominal Surgery: No


Appendectomy: No


Cardiac Surgery: No


Cholecystectomy: No


Lung Surgery: No


Neurologic Surgery: No


Orthopedic Surgery: Yes (fracture right wrist s/p fusion)





- Immunization History


Td Vaccination: No


Immunization Up to Date: No





- Psycho/Social/Smoking Cessation Hx


Anxiety: No


Suicidal Ideation: No


Smoking Status: No


Smoking History: Former smoker


Years of Tobacco Use: 0


Have you smoked in the past 12 months: No


Number of Cigarettes Smoked Daily: 0


If you are a former smoker, when did you quit?: 1986


Cigars Per Day: 0


Information on smoking cessation initiated: No


Hx Alcohol Use: No


Drug/Substance Use Hx: No


Substance Use Type: Alcohol


Hx Substance Use Treatment: No





<José Luis Stevenson - Last Filed: 01/11/17 15:53>





- Past Medical History


Allergies/Adverse Reactions: 


 Allergies











Allergy/AdvReac Type Severity Reaction Status Date / Time


 


No Known Allergies Allergy   Verified 01/11/17 14:37











Home Medications: 


Ambulatory Orders





Diazepam [Valium] 10 mg PO TID PRN #30 tablet MDD 30mg 01/09/17 


Morphine 10 mg/5 ml Liquid [Morphine 10 mg/5 mL Liquid -] 4 mg PO Q4H PRN #30 

ml MDD 36mg 01/09/17 











**Review of Systems





- Review of Systems


Constitutional: Yes: Chills.  No: Fever


Respiratory: No: Cough, Shortness of Breath


Cardiac (ROS): No: Chest Pain


ABD/GI: No: Diarrhea, Nausea, Vomiting


: Yes: Dysuria


Musculoskeletal: No: Back Pain





<José Luis Stevenson - Last Filed: 01/11/17 15:53>





*Physical Exam





- Vital Signs


 Last Vital Signs











Temp Pulse Resp BP Pulse Ox


 


 97.9 F   58 L  16   138/57   96 


 


 01/11/17 12:43  01/11/17 14:05  01/11/17 14:05  01/11/17 14:05  01/11/17 14:05














- Physical Exam


Comments: 


01/11/17 14:14


GENERAL: The patient is awake, alert, and fully oriented, in no acute distress.


HEAD: Normal with no signs of trauma.


EYES: Pupils equal, round and reactive to light, extraocular movements intact, 

sclera anicteric, conjunctiva clear with no pallor.


ENT: Ears normal, nares patent, oropharynx clear without exudates.  Moist 

mucous membranes.


NECK: Normal range of motion, supple without lymphadenopathy, JVD, or masses.


LUNGS: Breath sounds equal, clear to auscultation bilaterally.  No wheeze/

crackles.


HEART: Regular rate and rhythm, normal S1 and S2 without murmur or rub.


ABDOMEN: Soft/nontender/nondistended. BS wnl.  No guarding or rebound.  No 

palpable masses. No hepatosplenomegaly.


: +Condom catheter in place. 


 EXTREMITIES: +Baseline paraplegia. No edema.  No clubbing or cyanosis. No cords

, erythema, or tenderness.


NEUROLOGICAL: Cranial nerves II through XII grossly intact.  Normal speech. 


PSYCH: Normal mood, normal affect.


SKIN: Warm, Dry, normal turgor, no rashes or lesions noted.








<Leslei Jj - Last Filed: 01/11/17 15:20>





- Vital Signs


 Last Vital Signs











Temp Pulse Resp BP Pulse Ox


 


 97.9 F   58 L  16   138/57   96 


 


 01/11/17 12:43  01/11/17 14:05  01/11/17 14:05  01/11/17 14:05  01/11/17 14:05














<José Luis Stevenson - Last Filed: 01/11/17 15:53>





ED Treatment Course





- ADDITIONAL ORDERS


Additional order review: 


 Laboratory  Results











  01/11/17





  13:54


 


Urine Color  Lt. yellow


 


Urine Appearance  Clear


 


Urine pH  7.0


 


Ur Specific Gravity  1.010


 


Urine Protein  Negative


 


Urine Glucose (UA)  Negative


 


Urine Ketones  Negative


 


Urine Blood  Negative


 


Urine Nitrite  Positive


 


Urine Bilirubin  Negative


 


Urine Urobilinogen  0.2 e.u/dl


 


Ur Leukocyte Esterase  3+ H


 


Urine RBC  3


 


Urine WBC  79


 


Ur Epithelial Cells  Few


 


Urine Bacteria  Rare


 


Urine Mucus  Rare


 


Urine Yeast  Many














<Leslie Jj - Last Filed: 01/11/17 15:20>





Medical Decision Making





- Medical Decision Making





01/11/17 15:21


Microblogged Dr. Lavelle Ruvalcaba.





<Leslie Jj - Last Filed: 01/11/17 15:20>





- Medical Decision Making


01/11/17 14:14


A portion of this note was documented by scribe services under my direction. I 

have reviewed the details of the note, within reason, and agree with the 

documentation with the following case summary and management plan written by me.





61-year-old male with history of lower extremity plegia, incontinence requiring 

condom catheter, frequent UTIs recently admitted here for presumed urinary 

tract infection from 12/29-1/9 without cultures revealing any specific 

antibiotic, symptoms apparently resolved with Zosyn and he was discharged off 

antibiotics 2 days ago. Now presents with complaint of suprapubic discomfort 

prior to urinating earlier today, now resolved. No fevers or chills.





Afebrile.


Exam as noted





60y/o M acute on chronic UTI 2/2 paraplegia p/w bladder discomfort, resolved. 

Well appearing, no SIRS/sepsis.


check ua/urine cx


dispo accordingly








01/11/17 15:48


O2 sat 96% on room air.


Urinalysis with baseline leuk esterase and elevated white blood cells.


This was reviewed with Dr. Ruvalcaba, who just discharged the patient, who states 

the plan is to not proceed with antibiotics as the patient is likely colonized 

chronically. Agrees no further interventions are necessary given the resolution 

of symptoms, patient remains well appearing and afebrile, agrees with discharge 

and understands return criteria.





<José Luis Stevenson - Last Filed: 01/11/17 15:53>





*DC/Admit/Observation/Transfer





- Attestations


Scribe Attestion: 





01/11/17 14:14


Documentation prepared by Leslie Jj, acting as medical scribe for 

José Luis Stevenson MD.





<Leslie Jj - Last Filed: 01/11/17 15:20>





<José Luis Stevenson - Last Filed: 01/11/17 15:53>


Diagnosis at time of Disposition: 


 Paraplegia





- Discharge Dispostion


Disposition: HOME


Condition at time of disposition: Stable





- Referrals


Referrals: 


Sony Poon MD [Primary Care Provider] - 





- Patient Instructions


Printed Discharge Instructions:  DI for Paraplegia


Additional Instructions: 


Activity as tolerated. Stay hydrated. 





A urine test today shows no evidence of an acute infection, but it does show 

chronic changes. We spoke to your primary physicians, and the decision has been 

to not treat with antibiotics at this time. A urine culture is pending, if 

antibiotics are necessary you will be called.





Continue your medications as previously prescribed by your physician. Consider 

bladder anti-spasmodics after speaking to your primary doctor.





You should follow up with Dr. Poon as soon as possible regarding today's 

emergency department visit.





Return to the emergency department for any new or concerning symptoms, 

particularly persistent pain or difficulty urinating, fevers or chills, 

abdominal pain or fevers.

## 2017-03-18 ENCOUNTER — HOSPITAL ENCOUNTER (INPATIENT)
Dept: HOSPITAL 74 - JER | Age: 62
LOS: 6 days | Discharge: HOME | DRG: 690 | End: 2017-03-24
Payer: COMMERCIAL

## 2017-03-18 VITALS — BODY MASS INDEX: 16.4 KG/M2

## 2017-03-18 DIAGNOSIS — K59.00: ICD-10-CM

## 2017-03-18 DIAGNOSIS — N39.0: Primary | ICD-10-CM

## 2017-03-18 DIAGNOSIS — G82.20: ICD-10-CM

## 2017-03-18 DIAGNOSIS — B96.5: ICD-10-CM

## 2017-03-18 DIAGNOSIS — G89.29: ICD-10-CM

## 2017-03-18 DIAGNOSIS — Z87.891: ICD-10-CM

## 2017-03-18 LAB
ALBUMIN SERPL-MCNC: 2.9 G/DL (ref 3.4–5)
ALP SERPL-CCNC: 356 U/L (ref 45–117)
ALT SERPL-CCNC: 11 U/L (ref 12–78)
ANION GAP SERPL CALC-SCNC: 6 MMOL/L (ref 8–16)
APPEARANCE UR: (no result)
AST SERPL-CCNC: 13 U/L (ref 15–37)
BACTERIA #/AREA URNS HPF: (no result) /HPF
BILIRUB SERPL-MCNC: 1.4 MG/DL (ref 0.2–1)
BILIRUB UR STRIP.AUTO-MCNC: NEGATIVE MG/DL
CALCIUM SERPL-MCNC: 8.4 MG/DL (ref 8.5–10.1)
CO2 SERPL-SCNC: 30 MMOL/L (ref 21–32)
COLOR UR: (no result)
CREAT SERPL-MCNC: 0.5 MG/DL (ref 0.7–1.3)
DEPRECATED RDW RBC AUTO: 16.4 % (ref 11.9–15.9)
EOSINOPHIL # BLD: 5 % (ref 0–4.5)
GLUCOSE SERPL-MCNC: 89 MG/DL (ref 74–106)
KETONES UR QL STRIP: NEGATIVE
LEUKOCYTE ESTERASE UR QL STRIP.AUTO: (no result)
MCH RBC QN AUTO: 28.4 PG (ref 25.7–33.7)
MCHC RBC AUTO-ENTMCNC: 32 G/DL (ref 32–35.9)
MCV RBC: 88.7 FL (ref 80–96)
NEUTROPHILS # BLD: 69 % (ref 42.8–82.8)
NITRITE UR QL STRIP: NEGATIVE
PH UR: 7 [PH] (ref 5–8)
PLATELET # BLD AUTO: 416 K/MM3 (ref 134–434)
PLATELET # BLD EST: ADEQUATE 10*3/UL
PLATELET COMMENT2: (no result)
PLATELET COMMENT3: (no result)
PMV BLD: 9.1 FL (ref 7.5–11.1)
PROT SERPL-MCNC: 6.7 G/DL (ref 6.4–8.2)
PROT UR QL STRIP: NEGATIVE
PROT UR QL STRIP: NEGATIVE
RBC # BLD AUTO: 35 /HPF (ref 0–3)
RBC # UR STRIP: (no result) /UL
SP GR UR: 1.01 (ref 1–1.03)
UROBILINOGEN UR STRIP-MCNC: (no result) E.U./DL (ref 0.2–1)
WBC # BLD AUTO: 10.5 K/MM3 (ref 4–10)
WBC # UR AUTO: 10 /HPF (ref 3–5)
YEAST #/AREA URNS HPF: (no result) /[HPF]

## 2017-03-18 PROCEDURE — G0378 HOSPITAL OBSERVATION PER HR: HCPCS

## 2017-03-18 NOTE — PDOC
History of Present Illness





- General


History Source: Patient


Exam Limitations: No Limitations





- History of Present Illness


Initial Comments: 





03/18/17 11:49


Patient is a 61 year old male, paraplegic (severed spine between T2-T4), with a 

significant medical hx of recurrent bladder infections, ETOH abuse, marijuana 

use, and bilateral femur fracture who is presenting to the ED complaining of 

bladder infection. The patient reports sudden onset of lower abdominal pain 

that began today. Patient comes accompanied with , who witnessed 

dark urine from the patient this morning. 


The patient was here last week for a UTI. 


The patient wears a condom catheter.


Denies fever, chills, hematuria, back pain, nausea, vomiting, and diarrhea.








The patient severed his spine after falling off a 30 foot nhi. 





Allergies: NKDA


Surgical Hx: Right wrist fracture s/p fusion. Splenectomy. Bilateral femur 

fracture surgery.


Social Hx: ETOH abuse. Marijuana use.


PMD: Jordan Poon MD [phone] 





<Rah Momin - Last Filed: 03/18/17 15:34>





<Jessica Quezada - Last Filed: 03/18/17 16:09>





- General


Chief Complaint: Pain, Acute


Stated Complaint: ABD PAIN


Time Seen by Provider: 03/18/17 11:12





Past History





<Rah Momin - Last Filed: 03/18/17 15:34>





- Past Medical History


Anemia: No


Asthma: No


Cancer: No


Cardiac Disorders: No


CVA: No


COPD: Yes


CHF: No


Dementia: No


Diabetes: No


GI Disorders: No


 Disorders: Yes (Recurrent UTI)


HTN:  (Hypotension)


Hypercholesterolemia: No


Liver Disease: No


Seizures: No


Thyroid Disease: No





- Surgical History


Abdominal Surgery: No


Appendectomy: No


Cardiac Surgery: No


Cholecystectomy: No


Lung Surgery: No


Neurologic Surgery: No


Orthopedic Surgery: Yes (fracture right wrist s/p fusion)





- Immunization History


Td Vaccination: No


Immunization Up to Date: No





- Psycho/Social/Smoking Cessation Hx


Anxiety: No


Suicidal Ideation: No


Smoking Status: No


Smoking History: Never smoked


Years of Tobacco Use: 0


Have you smoked in the past 12 months: No


Number of Cigarettes Smoked Daily: 0


If you are a former smoker, when did you quit?: 1986


Cigars Per Day: 0


Hx Alcohol Use: Yes (OCCASIONALLY)


Drug/Substance Use Hx: No


Substance Use Type: Alcohol


Hx Substance Use Treatment: No





<Jessica Quezada - Last Filed: 03/18/17 16:09>





- Past Medical History


Allergies/Adverse Reactions: 


 Allergies











Allergy/AdvReac Type Severity Reaction Status Date / Time


 


No Known Allergies Allergy   Verified 03/18/17 11:04











Home Medications: 


Ambulatory Orders





Diazepam [Valium] 10 mg PO TID PRN #30 tablet MDD 30mg 01/09/17 


Morphine 10 mg/5 ml Liquid [Morphine 10 mg/5 mL Liquid -] 1 mg PO Q4H PRN 03/18/ 17 











**Review of Systems





- Review of Systems


Able to Perform ROS?: Yes


Comments:: 


03/18/17 11:49


GENERAL/CONSTITUTIONAL: No fever or chills. No weakness.


HEAD, EYES, EARS, NOSE AND THROAT: No change in vision. No ear pain or 

discharge. No sore throat.


CARDIOVASCULAR: No chest pain or shortness of breath.


RESPIRATORY: No cough, wheezing, or hemoptysis.


GASTROINTESTINAL: No nausea, vomiting, diarrhea or constipation. Lower left 

abdominal pain.


GENITOURINARY: No dysuria, frequency, or change in urination.


MUSCULOSKELETAL: No joint or muscle swelling or pain. No neck or back pain.


SKIN: No rash


NEUROLOGIC: No headache, vertigo, loss of consciousness, or change in strength/

sensation.


ENDOCRINE: No increased thirst. No abnormal weight change.


HEMATOLOGIC/LYMPHATIC: No anemia, easy bleeding, or history of blood clots.


ALLERGIC/IMMUNOLOGIC: No hives or skin allergy.











<Rah Momin - Last Filed: 03/18/17 15:34>





*Physical Exam





- Vital Signs


 Last Vital Signs











Temp Pulse Resp BP Pulse Ox


 


 97.8 F   67   17   104/44   95 


 


 03/18/17 11:04  03/18/17 11:04  03/18/17 11:04  03/18/17 11:04  03/18/17 11:04














- Physical Exam


Comments: 





03/18/17 11:50


GENERAL: Awake, alert, and fully oriented, in no acute distress


HEAD: No signs of trauma


EYES: PERRLA, EOMI, sclera anicteric, conjunctiva clear


ENT: Auricles normal inspection, hearing grossly normal, nares patent, 

oropharynx clear without 


exudates. Dry mucosa


NECK: Normal ROM, supple, no lymphadenopathy, JVD, or masses


LUNGS: Breath sounds equal, clear to auscultation bilaterally.  No wheezes, and 

no crackles


HEART: Regular rate and rhythm, normal S1 and S2, no murmurs, rubs or gallops


ABDOMEN: LLQ tenderness, no rebound, no guarding. Normoactive bowel sounds. No 

masses


EXTREMITIES: Staples in left thigh. Paraplegic. Contracted lower extremities 

bilaterally. Fused right wrist.


NEUROLOGICAL: Paraplegic. Normal speech


SKIN: Warm, Dry, normal turgor, no rashes or lesions noted.


ENDOCRINE: No increased thirst. No abnormal weight change.


HEMATOLOGIC/LYMPHATIC: No anemia, easy bleeding, or history of blood clots.


ALLERGIC/IMMUNOLOGIC: No hives or skin allergy.


GENITAL: No penile lesions. Wearing condom catheter. 





<Rah Momin - Last Filed: 03/18/17 15:34>





ED Treatment Course





- LABORATORY


CBC & Chemistry Diagram: 


 03/18/17 11:45





 03/18/17 11:45





<Rah Momin - Last Filed: 03/18/17 15:34>





- LABORATORY


CBC & Chemistry Diagram: 


 03/18/17 11:45





 03/18/17 11:45





<Jessica Quezada - Last Filed: 03/18/17 16:09>





Medical Decision Making





- Medical Decision Making





03/18/17 15:34


Paged Dr. Billingsley at 15:34.





<Rah Momin - Last Filed: 03/18/17 15:34>





*DC/Admit/Observation/Transfer





- Attestations


Scribe Attestion: 





03/18/17 11:51





Documentation prepared by Rah Momin, acting as medical scribe for 

Jessica Quezada MD, MD.





<Rah Momin - Last Filed: 03/18/17 15:34>





- Discharge Dispostion


Admit: Yes





<Jessica Quezada - Last Filed: 03/18/17 16:09>


Diagnosis at time of Disposition: 


 Complicated UTI (urinary tract infection)





UTI (urinary tract infection)


Qualifiers:


 Urinary tract infection type: site unspecified Hematuria presence: without 

hematuria Qualified Code(s): N39.0 - Urinary tract infection, site not specified





- Discharge Dispostion


Condition at time of disposition: Stable

## 2017-03-18 NOTE — HP
Admitting History and Physical





- Admission


Chief Complaint: burnign urination, pain


History of Present Illness: 


62 yo male, h/o paraplegia and frequent UTI's presents to hospital with burning

, dark colored urine.  Due to paraplegia has urinary incontinence (uses condom 

catheter) and has had recent UTI, seen in ED last week and put on Bactrim, 

however, urine seems to have worsened again.  No feves, but has required 

hsopitalization for IV abx for UTI in the past.


History Source: Patient, Medical Record


Limitations to Obtaining History: No Limitations





- Past Medical History


CNS: Yes: Other (paraplegia due to accidental fall (T2 spinal fracture))


Infectious Disease: Yes: Other (multiple UTIs)





- Past Surgical History


Past Surgical History: Yes: Splenectomy





- Smoking History


Smoking history: Former smoker


Have you smoked in the past 12 months: No


Aproximately how many cigarettes per day: 0


If you are a former smoker, when did you quit?: 1986





- Alcohol/Substance Use


Hx Alcohol Use: Yes (OCCASIONALLY)


History of Substance Use: reports: Marijuana





- Social History


ADL: Support Services


History of Recent Travel: No





Home Medications





- Allergies


Allergies/Adverse Reactions: 


 Allergies











Allergy/AdvReac Type Severity Reaction Status Date / Time


 


No Known Allergies Allergy   Verified 03/18/17 11:04














- Home Medications


Home Medications: 


Ambulatory Orders





Diazepam [Valium] 10 mg PO TID PRN #30 tablet MDD 30mg 01/09/17 


Morphine 10 mg/5 ml Liquid [Morphine 10 mg/5 mL Liquid -] 1 mg PO Q4H PRN 03/18/ 17 











Family Disease History





- Family Disease History


Family Disease History: Heart Disease: Father





Review of Systems





- Review of Systems


Constitutional: denies: Fever, Loss of Appetite


Eyes: reports: No Symptoms


HENT: denies: Difficult Swallowing, Throat Pain


Neck: denies: Pain on Movement, Tenderness


Cardiovascular: denies: Chest Pain, Palpitations


Respiratory: denies: Cough, SOB, SOB on Exertion, Wheezing


Gastrointestinal: denies: Abdominal Pain, Diarrhea, Nausea, Vomiting


Genitourinary: reports: Burning


Neurological: reports: Pre-Existing Deficit ((paraplegia)).  denies: Change in 

LOC





Physical Examination


Vital Signs: 


 Vital Signs











Temperature  97.8 F   03/18/17 11:04


 


Pulse Rate  55 L  03/18/17 15:00


 


Respiratory Rate  18   03/18/17 15:00


 


Blood Pressure  141/60   03/18/17 15:00


 


O2 Sat by Pulse Oximetry (%)  96   03/18/17 15:00











Constitutional: Yes: Well Nourished, No Distress, Calm


Eyes: Yes: Conjunctiva Clear, EOM Intact, PERRL


HENT: Yes: Atraumatic, Normocephalic


Neck: Yes: Supple, Trachea Midline


Cardiovascular: Yes: Regular Rate and Rhythm, S1, S2.  No: Murmur


Respiratory: Yes: Regular, CTA Bilaterally.  No: Rales, Rhonchi, Wheezes


Gastrointestinal: Yes: Normal Bowel Sounds, Soft.  No: Distention, Tenderness


Extremities: Yes: Other (contractures b/l legs)


Edema: No


Neurological: Yes: Pre-Existing Deficit (paraplegia)


Labs: 


 Laboratory Tests











  03/18/17 03/18/17 03/18/17





  11:45 11:45 11:45


 


WBC  10.5 H  


 


RBC  4.34  


 


Hgb  12.3  


 


Hct  38.5  


 


MCV  88.7  


 


MCHC  32.0  


 


RDW  16.4 H  


 


Plt Count  416  


 


MPV  9.1  


 


Neutrophils %  69.0  


 


Lymphocytes %  15.0  


 


Monocytes %  10.0  


 


Eosinophils %  5.0 H  


 


Reactive Lymphocytes  1  D  


 


Platelet Estimate  Adequate  


 


Platelet Comment  No clotting detected  


 


Sodium    140


 


Potassium    3.2 L


 


Chloride    104


 


Carbon Dioxide    30


 


Anion Gap    6 L


 


BUN    9  D


 


Creatinine    0.5 L


 


Creat Clearance w eGFR    > 60


 


Random Glucose    89


 


Calcium    8.4 L


 


Total Bilirubin    1.4 H D


 


AST    13 L D


 


ALT    11 L D


 


Alkaline Phosphatase    356 H D


 


Total Protein    6.7


 


Albumin    2.9 L


 


Urine Color   Lt. yellow 


 


Urine Appearance   Cloudy 


 


Urine pH   7.0 


 


Ur Specific Gravity   1.010 


 


Urine Protein   Negative 


 


Urine Glucose (UA)   Negative 


 


Urine Ketones   Negative 


 


Urine Blood   2+ H 


 


Urine Nitrite   Negative 


 


Urine Bilirubin   Negative 


 


Urine Urobilinogen   4.0 e.u/dl 


 


Ur Leukocyte Esterase   2+ H 


 


Urine RBC   35 


 


Urine WBC   10 


 


Ur Epithelial Cells   Few 


 


Urine Bacteria   Moderate 


 


Urine Yeast   Moderate 














Problem List





- Problems


(1) Complicated UTI (urinary tract infection)


Assessment/Plan: 


-with history of resisted UTI's in past, will treat with IV abx and await 

culture -ID consult 


Code(s): N39.0 - URINARY TRACT INFECTION, SITE NOT SPECIFIED





(2) Chronic pain


Assessment/Plan: 


-on prn morphine


Code(s): G89.29 - OTHER CHRONIC PAIN   





(3) Paraplegia


Assessment/Plan: 


-on valium for muscle spasms


Code(s): G82.20 - PARAPLEGIA, UNSPECIFIED

## 2017-03-19 LAB
ALBUMIN SERPL-MCNC: 2.6 G/DL (ref 3.4–5)
ALP SERPL-CCNC: 299 U/L (ref 45–117)
ALT SERPL-CCNC: 9 U/L (ref 12–78)
ANION GAP SERPL CALC-SCNC: 10 MMOL/L (ref 8–16)
AST SERPL-CCNC: 12 U/L (ref 15–37)
BASOPHILS # BLD: 1.1 % (ref 0–2)
BILIRUB SERPL-MCNC: 0.9 MG/DL (ref 0.2–1)
CALCIUM SERPL-MCNC: 8 MG/DL (ref 8.5–10.1)
CO2 SERPL-SCNC: 25 MMOL/L (ref 21–32)
CREAT SERPL-MCNC: 0.6 MG/DL (ref 0.7–1.3)
DEPRECATED RDW RBC AUTO: 16.5 % (ref 11.9–15.9)
EOSINOPHIL # BLD: 4.4 % (ref 0–4.5)
GLUCOSE SERPL-MCNC: 74 MG/DL (ref 74–106)
MCH RBC QN AUTO: 29.3 PG (ref 25.7–33.7)
MCHC RBC AUTO-ENTMCNC: 32.4 G/DL (ref 32–35.9)
MCV RBC: 90.5 FL (ref 80–96)
NEUTROPHILS # BLD: 65.8 % (ref 42.8–82.8)
PLATELET # BLD AUTO: 398 K/MM3 (ref 134–434)
PMV BLD: 10.3 FL (ref 7.5–11.1)
PROT SERPL-MCNC: 5.9 G/DL (ref 6.4–8.2)
WBC # BLD AUTO: 10.3 K/MM3 (ref 4–10)

## 2017-03-19 RX ADMIN — MORPHINE SULFATE PRN MG: 10 SOLUTION ORAL at 23:38

## 2017-03-19 RX ADMIN — PIPERACILLIN SODIUM,TAZOBACTAM SODIUM SCH MLS/HR: 3; .375 INJECTION, POWDER, FOR SOLUTION INTRAVENOUS at 19:03

## 2017-03-19 RX ADMIN — PIPERACILLIN SODIUM,TAZOBACTAM SODIUM SCH MLS/HR: 3; .375 INJECTION, POWDER, FOR SOLUTION INTRAVENOUS at 15:06

## 2017-03-19 RX ADMIN — MORPHINE SULFATE PRN MG: 10 SOLUTION ORAL at 15:06

## 2017-03-19 NOTE — PN
Progress Note (short form)





- Note


Progress Note: 


ID Consult dictated


Recurrent UTI


Paraplegia


S/P Splenectomy


Pending urine c/s empiric zosyn

## 2017-03-19 NOTE — PN
Progress Note (short form)





- Note


Progress Note: 


PATIENT ADMITTED WITH UTI / PARAPLEGIC FROM FALL / ACCIDENT T 2 FX . IN NO 

DISTRESS // [place] CATHETER IN PLACE.


AWAITING ID EVAL / URINE C & S PENDING.


 Selected Entries











  03/19/17





  03:21


 


Temperature 97.9 F


 


Pulse Rate 61


 


Respiratory 20





Rate 


 


Blood Pressure 130/71


 


Blood Pressure 90





Mean 








 Laboratory Tests











  03/18/17 03/18/17 03/18/17





  11:45 11:45 11:45


 


WBC  10.5 H  


 


RBC  4.34  


 


Hgb  12.3  


 


Hct  38.5  


 


Plt Count  416  


 


Sodium    140


 


Potassium    3.2 L


 


Chloride    104


 


Carbon Dioxide    30


 


Anion Gap    6 L


 


BUN    9  D


 


Creatinine    0.5 L


 


Creat Clearance w eGFR    > 60


 


Alkaline Phosphatase    356 H D


 


Urine Color   Lt. yellow 


 


Urine Appearance   Cloudy 


 


Urine pH   7.0 


 


Ur Specific Gravity   1.010 


 


Urine Protein   Negative 


 


Urine Glucose (UA)   Negative 


 


Urine Ketones   Negative 


 


Urine Blood   2+ H 


 


Urine Nitrite   Negative 


 


Urine Bilirubin   Negative 


 


Urine Urobilinogen   4.0 e.u/dl 


 


Ur Leukocyte Esterase   2+ H 


 


Urine RBC   35 


 


Urine WBC   10 


 


Ur Epithelial Cells   Few 


 


Urine Bacteria   Moderate 


 


Urine Yeast   Moderate 








P/E <> AWAKE / COMFORTABLE 


HEENT <> NECK SUPPLE / CAROTIDS NO BRUITS


COR <> S 1 S 2 <> NO M // NO G 


CHEST <> FEW SCATTERED RHONCHI


ABD <> SOFT / NONTENDER 


EXT <> LEGS CONTRACTED .


IMP <> UTI


PARAPLEGIC 


PLAN <> ID EVAL


IV AB 


AWAIT URINE CULTURE


PAIN MEDS / VALIUM FOR SPASM.

## 2017-03-19 NOTE — CONS
DATE OF CONSULTATION:  03/19/2017

 

The patient is a 61-year-old paraplegic male with history of recurrent urinary tract

infections, now evaluated for a UTI.

 

The patient was brought to the emergency room with complaints of lower abdominal

pain.  He is a long-term paraplegic.  He has no sensation in the genital tract.  He

has a condom catheter.  He did not experience any dysuria.  The urine was noted by

his  to be concentrated.  He was seen in the emergency room on March 8, 2017, and was prescribed Bactrim for a urinary tract infection.  He now returns with

the above symptoms.  He denies any associated fever or chills.  Patient has had

multiple admissions; however, the last admission appears to have been in late

December 2016/early January 2017.

 

PAST MEDICAL HISTORY:  Positive for paraplegia at the level T4, recurrent urinary

tract infections, alcohol abuse, bilateral femur fractures.

 

PAST SURGICAL HISTORY:  Status post splenectomy and right wrist fracture.

 

No known allergies.

 

MEDICATIONS:  Valium, morphine, Zosyn.

 

SOCIAL HISTORY:  He lives at home; has a home attendant.  Nonsmoker, nondrinker.

 

SYSTEMS REVIEW:

Neurologic:  Positive for paraplegia.

Cardiac:  Negative chest pain or palpitations.

Respiratory:  Negative cough or sputum production.

Gastrointestinal:  Negative vomiting or diarrhea.

Genitourinary:  As per HPI.

 

LABORATORY DATA:  White count 10.3, hematocrit 36.4, platelet count 398.  BUN 9,

creatinine 0.6.  Urinalysis with 10 white cells.  Urine culture growing a nonlactose

.

 

PHYSICAL EXAMINATION:

General:  He is awake, he is chronically ill-appearing.

Vital Signs:  Temperature 97.9, blood pressure 130/71, pulse 61 and regular,

respirations 20/min.

HEENT:  Sclerae anicteric.

Heart Sounds:  S1, S2.

Lungs:  Clear.

Abdomen:  Soft.  No suprapubic tenderness.

Extremities:  Negative for edema.  No decubitus ulcers noted.

 

IMPRESSION:

1.  Recurrent urinary tract infection, nonlactose .

2.  Paraplegia.

3.  Status post splenectomy.

 

Will obtain blood cultures pending identification of urine isolette.  We will

continue Zosyn 3.375 g IV piggyback every 8 hours.  Patient's previous urine has been

positive for achromobacter sensitive to Zosyn.  Await culture results.  Will follow.

 

Thank you for the kind referral.

 

 

HESHAM ESCOTO M.D.

 

[id number]

DD: 03/19/2017 14:34

DT: 03/19/2017 14:57

Job #:  [id number]

## 2017-03-20 RX ADMIN — PIPERACILLIN SODIUM,TAZOBACTAM SODIUM SCH MLS/HR: 3; .375 INJECTION, POWDER, FOR SOLUTION INTRAVENOUS at 10:34

## 2017-03-20 RX ADMIN — MORPHINE SULFATE PRN MG: 10 SOLUTION ORAL at 15:08

## 2017-03-20 RX ADMIN — PIPERACILLIN SODIUM,TAZOBACTAM SODIUM SCH MLS/HR: 3; .375 INJECTION, POWDER, FOR SOLUTION INTRAVENOUS at 17:58

## 2017-03-20 RX ADMIN — PIPERACILLIN SODIUM,TAZOBACTAM SODIUM SCH: 3; .375 INJECTION, POWDER, FOR SOLUTION INTRAVENOUS at 07:46

## 2017-03-20 RX ADMIN — MORPHINE SULFATE PRN MG: 10 SOLUTION ORAL at 06:01

## 2017-03-20 RX ADMIN — PIPERACILLIN SODIUM,TAZOBACTAM SODIUM SCH MLS/HR: 3; .375 INJECTION, POWDER, FOR SOLUTION INTRAVENOUS at 02:28

## 2017-03-20 RX ADMIN — POLYETHYLENE GLYCOL 3350 SCH: 17 POWDER, FOR SOLUTION ORAL at 21:47

## 2017-03-20 RX ADMIN — DOCUSATE SODIUM SCH: 100 CAPSULE, LIQUID FILLED ORAL at 21:47

## 2017-03-20 RX ADMIN — MORPHINE SULFATE PRN MG: 10 SOLUTION ORAL at 10:48

## 2017-03-20 RX ADMIN — MORPHINE SULFATE PRN MG: 10 SOLUTION ORAL at 19:50

## 2017-03-20 NOTE — PN
Progress Note, Physician


Chief Complaint: 


Mr Burak says he is having some suprapubic pain but improved. No cp or sob.





- Current Medication List


Current Medications: 


Active Medications





Diazepam (Valium -)  10 mg PO TID PRN


   Last Admin: 03/20/17 06:01 Dose:  10 mg


Piperacillin Sod/Tazobactam Sod (Zosyn 3.375gm Ivpb (Pre-Docked))  50 mls @ 100 

mls/hr IVPB Q8H-IV ALE


   PRN Reason: Protocol


   Last Admin: 03/20/17 10:34 Dose:  100 mls/hr


Morphine Sulfate (Morphine 10 Mg/5 Ml Liquid)  4 mg PO Q4H PRN


   PRN Reason: PAIN


   Last Admin: 03/20/17 06:01 Dose:  4 mg











- Objective


Vital Signs: 


 Vital Signs











Temperature  98.3 F   03/20/17 06:00


 


Pulse Rate  52 L  03/20/17 06:00


 


Respiratory Rate  20   03/20/17 06:00


 


Blood Pressure  129/59   03/20/17 06:00


 


O2 Sat by Pulse Oximetry (%)  96   03/19/17 23:00











Constitutional: Yes: Well Nourished, No Distress, Calm


Cardiovascular: Yes: Regular Rate and Rhythm.  No: Gallop, Murmur, Rub


Respiratory: Yes: Regular, CTA Bilaterally.  No: Rales, Rhonchi, Wheezes


Gastrointestinal: Yes: Normal Bowel Sounds, Soft.  No: Distention, Tenderness


Extremities: Yes: WNL


Edema: No





Assessment/Plan


(1) Complicated UTI (urinary tract infection)


Assessment/Plan: 


-ID following and appreciate assistance


-continue zosyn


Code(s): N39.0 - URINARY TRACT INFECTION, SITE NOT SPECIFIED





(2) Chronic pain


Assessment/Plan: 


-on prn morphine


-continue home regimen


Code(s): G89.29 - OTHER CHRONIC PAIN   





(3) Paraplegia


Assessment/Plan: 


-on valium for muscle spasms


Code(s): G82.20 - PARAPLEGIA, UNSPECIFIED

## 2017-03-20 NOTE — PN
Progress Note, Physician


History of Present Illness: 


C/O constipation


No c/o fever/ chills


Urine c/s Pseudomonas (s) zosyn





- Current Medication List


Current Medications: 


Active Medications





Diazepam (Valium -)  10 mg PO TID PRN


   Last Admin: 03/20/17 06:01 Dose:  10 mg


Piperacillin Sod/Tazobactam Sod (Zosyn 3.375gm Ivpb (Pre-Docked))  50 mls @ 100 

mls/hr IVPB Q8H-IV ALE


   PRN Reason: Protocol


   Last Admin: 03/20/17 10:34 Dose:  100 mls/hr


Morphine Sulfate (Morphine 10 Mg/5 Ml Liquid)  4 mg PO Q4H PRN


   PRN Reason: PAIN


   Last Admin: 03/20/17 10:48 Dose:  4 mg











- Objective


Vital Signs: 


 Vital Signs











Temperature  98.3 F   03/20/17 06:00


 


Pulse Rate  52 L  03/20/17 06:00


 


Respiratory Rate  20   03/20/17 06:00


 


Blood Pressure  129/59   03/20/17 06:00


 


O2 Sat by Pulse Oximetry (%)  96   03/19/17 23:00











Constitutional: Yes: No Distress, Cachectic


Eyes: Yes: Conjunctiva Clear


Cardiovascular: Yes: Regular Rate and Rhythm, S1, S2


Respiratory: Yes: CTA Bilaterally


Gastrointestinal: Yes: Normal Bowel Sounds, Soft.  No: Tenderness


Genitourinary: Yes: Campbell Present (urine concentrated)





Assessment/Plan


Recurrent UTI    Pseudomonas sp


Paraplegia


S/P splenectomy





Continue zosyn

## 2017-03-21 LAB
ANION GAP SERPL CALC-SCNC: 8 MMOL/L (ref 8–16)
BASOPHILS # BLD: 1.1 % (ref 0–2)
CALCIUM SERPL-MCNC: 8.6 MG/DL (ref 8.5–10.1)
CO2 SERPL-SCNC: 28 MMOL/L (ref 21–32)
CREAT SERPL-MCNC: 0.7 MG/DL (ref 0.7–1.3)
DEPRECATED RDW RBC AUTO: 16.5 % (ref 11.9–15.9)
EOSINOPHIL # BLD: 5.5 % (ref 0–4.5)
GLUCOSE SERPL-MCNC: 68 MG/DL (ref 74–106)
MAGNESIUM SERPL-MCNC: 1.7 MG/DL (ref 1.8–2.4)
MCH RBC QN AUTO: 29.4 PG (ref 25.7–33.7)
MCHC RBC AUTO-ENTMCNC: 32.8 G/DL (ref 32–35.9)
MCV RBC: 89.6 FL (ref 80–96)
NEUTROPHILS # BLD: 60.7 % (ref 42.8–82.8)
PHOSPHATE SERPL-MCNC: 2.8 MG/DL (ref 2.5–4.9)
PLATELET # BLD AUTO: 433 K/MM3 (ref 134–434)
PMV BLD: 9.7 FL (ref 7.5–11.1)
WBC # BLD AUTO: 8.2 K/MM3 (ref 4–10)

## 2017-03-21 RX ADMIN — MORPHINE SULFATE PRN MG: 10 SOLUTION ORAL at 07:35

## 2017-03-21 RX ADMIN — PIPERACILLIN SODIUM,TAZOBACTAM SODIUM SCH MLS/HR: 3; .375 INJECTION, POWDER, FOR SOLUTION INTRAVENOUS at 18:08

## 2017-03-21 RX ADMIN — MORPHINE SULFATE PRN MG: 10 SOLUTION ORAL at 18:08

## 2017-03-21 RX ADMIN — PIPERACILLIN SODIUM,TAZOBACTAM SODIUM SCH MLS/HR: 3; .375 INJECTION, POWDER, FOR SOLUTION INTRAVENOUS at 09:51

## 2017-03-21 RX ADMIN — MORPHINE SULFATE PRN MG: 10 SOLUTION ORAL at 12:15

## 2017-03-21 RX ADMIN — POLYETHYLENE GLYCOL 3350 SCH GRAMS: 17 POWDER, FOR SOLUTION ORAL at 09:51

## 2017-03-21 RX ADMIN — DOCUSATE SODIUM SCH MG: 100 CAPSULE, LIQUID FILLED ORAL at 22:38

## 2017-03-21 RX ADMIN — POLYETHYLENE GLYCOL 3350 SCH: 17 POWDER, FOR SOLUTION ORAL at 22:45

## 2017-03-21 RX ADMIN — DOCUSATE SODIUM SCH MG: 100 CAPSULE, LIQUID FILLED ORAL at 09:51

## 2017-03-21 RX ADMIN — MORPHINE SULFATE PRN MG: 10 SOLUTION ORAL at 22:41

## 2017-03-21 RX ADMIN — PIPERACILLIN SODIUM,TAZOBACTAM SODIUM SCH MLS/HR: 3; .375 INJECTION, POWDER, FOR SOLUTION INTRAVENOUS at 02:13

## 2017-03-21 NOTE — PN
Progress Note, Physician


Chief Complaint: 


Mr Sumner says he is feeling well today. No cp, sob, n/v, abdominal pain.





- Current Medication List


Current Medications: 


Active Medications





Diazepam (Valium -)  10 mg PO TID PRN


   Last Admin: 03/20/17 22:27 Dose:  10 mg


Docusate Sodium (Colace -)  100 mg PO BID ALE


   Last Admin: 03/21/17 09:51 Dose:  100 mg


Piperacillin Sod/Tazobactam Sod (Zosyn 3.375gm Ivpb (Pre-Docked))  50 mls @ 100 

mls/hr IVPB Q8H-IV ALE


   PRN Reason: Protocol


   Last Admin: 03/21/17 09:51 Dose:  100 mls/hr


Mineral Oil (Fleet Mineral Oil Rectal Enema -)  133 ml OR DAILY PRN


   PRN Reason: CONSTIPATION


Morphine Sulfate (Morphine 10 Mg/5 Ml Liquid)  4 mg PO Q4H PRN


   PRN Reason: PAIN


   Last Admin: 03/21/17 12:15 Dose:  4 mg


Polyethylene Glycol (Miralax (For Daily Use) -)  17 gm PO BID [facility]


   Last Admin: 03/21/17 09:51 Dose:  17 grams











- Objective


Vital Signs: 


 Vital Signs











Temperature  98.4 F   03/21/17 14:56


 


Pulse Rate  79   03/21/17 14:56


 


Respiratory Rate  16   03/21/17 14:56


 


Blood Pressure  108/55   03/21/17 14:56


 


O2 Sat by Pulse Oximetry (%)  94 L  03/20/17 21:00











Constitutional: Yes: Well Nourished, No Distress, Calm


Cardiovascular: Yes: Regular Rate and Rhythm.  No: Gallop, Murmur, Rub


Respiratory: Yes: Regular, CTA Bilaterally.  No: Rales, Rhonchi, Wheezes


Gastrointestinal: Yes: Normal Bowel Sounds, Soft.  No: Distention, Tenderness


Extremities: Yes: WNL


Edema: No


Labs: 


 CBC, BMP





 03/21/17 07:30 





 03/21/17 07:30 











Assessment/Plan


(1) Complicated UTI (urinary tract infection)


Assessment/Plan: 


-ID following and appreciate assistance


-continue zosyn, growing pseudomonas


-ID says zosyn for 48 hours


Code(s): N39.0 - URINARY TRACT INFECTION, SITE NOT SPECIFIED





(2) Chronic pain


Assessment/Plan: 


-on prn morphine


-continue home regimen


Code(s): G89.29 - OTHER CHRONIC PAIN   





(3) Paraplegia


Assessment/Plan: 


-on valium for muscle spasms


Code(s): G82.20 - PARAPLEGIA, UNSPECIFIED

## 2017-03-21 NOTE — PN
Progress Note, Physician


History of Present Illness: 


awake, alert


No complaints


Afebrile


WBC improved


Urine c/s not grossly purulent





- Current Medication List


Current Medications: 


Active Medications





Diazepam (Valium -)  10 mg PO TID PRN


   Last Admin: 03/20/17 22:27 Dose:  10 mg


Docusate Sodium (Colace -)  100 mg PO BID ALE


   Last Admin: 03/21/17 09:51 Dose:  100 mg


Piperacillin Sod/Tazobactam Sod (Zosyn 3.375gm Ivpb (Pre-Docked))  50 mls @ 100 

mls/hr IVPB Q8H-IV ALE


   PRN Reason: Protocol


   Last Admin: 03/21/17 09:51 Dose:  100 mls/hr


Mineral Oil (Fleet Mineral Oil Rectal Enema -)  133 ml [place] DAILY PRN


   PRN Reason: CONSTIPATION


Morphine Sulfate (Morphine 10 Mg/5 Ml Liquid)  4 mg PO Q4H PRN


   PRN Reason: PAIN


   Last Admin: 03/21/17 12:15 Dose:  4 mg


Polyethylene Glycol (Miralax (For Daily Use) -)  17 gm PO BID [facility]


   Last Admin: 03/21/17 09:51 Dose:  17 grams











- Objective


Vital Signs: 


 Vital Signs











Temperature  98.4 F   03/21/17 05:58


 


Pulse Rate  53 L  03/21/17 05:58


 


Respiratory Rate  18   03/21/17 05:58


 


Blood Pressure  120/47   03/21/17 05:58


 


O2 Sat by Pulse Oximetry (%)  94 L  03/20/17 21:00











Constitutional: Yes: No Distress, Cachectic


Eyes: Yes: Conjunctiva Clear


Cardiovascular: Yes: Regular Rate and Rhythm, S1, S2


Respiratory: Yes: CTA Bilaterally


Gastrointestinal: Yes: Normal Bowel Sounds, Soft.  No: Tenderness


Edema: No


Labs: 


 CBC, BMP





 03/21/17 07:30 





 03/21/17 07:30 











Assessment/Plan


Recurrent UTI    Pseudomonas sp


Paraplegia


S/P splenectomy





Continue zosyn additional 48h,then stop

## 2017-03-22 LAB
ANION GAP SERPL CALC-SCNC: 10 MMOL/L (ref 8–16)
BASOPHILS # BLD: 1.2 % (ref 0–2)
CALCIUM SERPL-MCNC: 8.4 MG/DL (ref 8.5–10.1)
CO2 SERPL-SCNC: 27 MMOL/L (ref 21–32)
CREAT SERPL-MCNC: 0.6 MG/DL (ref 0.7–1.3)
DEPRECATED RDW RBC AUTO: 16.5 % (ref 11.9–15.9)
EOSINOPHIL # BLD: 5 % (ref 0–4.5)
GLUCOSE SERPL-MCNC: 79 MG/DL (ref 74–106)
MCH RBC QN AUTO: 29.2 PG (ref 25.7–33.7)
MCHC RBC AUTO-ENTMCNC: 32.4 G/DL (ref 32–35.9)
MCV RBC: 90 FL (ref 80–96)
NEUTROPHILS # BLD: 55.8 % (ref 42.8–82.8)
PLATELET # BLD AUTO: 423 K/MM3 (ref 134–434)
PMV BLD: 9.4 FL (ref 7.5–11.1)
WBC # BLD AUTO: 8.4 K/MM3 (ref 4–10)

## 2017-03-22 RX ADMIN — DOCUSATE SODIUM SCH: 100 CAPSULE, LIQUID FILLED ORAL at 21:32

## 2017-03-22 RX ADMIN — POLYETHYLENE GLYCOL 3350 SCH GRAMS: 17 POWDER, FOR SOLUTION ORAL at 11:12

## 2017-03-22 RX ADMIN — DOCUSATE SODIUM SCH MG: 100 CAPSULE, LIQUID FILLED ORAL at 11:13

## 2017-03-22 RX ADMIN — MORPHINE SULFATE PRN MG: 10 SOLUTION ORAL at 09:34

## 2017-03-22 RX ADMIN — PIPERACILLIN SODIUM,TAZOBACTAM SODIUM SCH MLS/HR: 3; .375 INJECTION, POWDER, FOR SOLUTION INTRAVENOUS at 11:10

## 2017-03-22 RX ADMIN — POLYETHYLENE GLYCOL 3350 SCH: 17 POWDER, FOR SOLUTION ORAL at 21:32

## 2017-03-22 RX ADMIN — MORPHINE SULFATE PRN MG: 10 SOLUTION ORAL at 21:33

## 2017-03-22 RX ADMIN — MORPHINE SULFATE PRN MG: 10 SOLUTION ORAL at 15:07

## 2017-03-22 RX ADMIN — PIPERACILLIN SODIUM,TAZOBACTAM SODIUM SCH MLS/HR: 3; .375 INJECTION, POWDER, FOR SOLUTION INTRAVENOUS at 18:10

## 2017-03-22 RX ADMIN — PIPERACILLIN SODIUM,TAZOBACTAM SODIUM SCH MLS/HR: 3; .375 INJECTION, POWDER, FOR SOLUTION INTRAVENOUS at 02:32

## 2017-03-22 NOTE — PN
Progress Note, Physician


Chief Complaint: 


Mr Sumner is without complaint. No cp, sob, n/v, abdominal pain.





- Current Medication List


Current Medications: 


Active Medications





Diazepam (Valium -)  10 mg PO TID PRN


   Last Admin: 03/22/17 09:33 Dose:  10 mg


Docusate Sodium (Colace -)  100 mg PO BID [facility]


   Last Admin: 03/22/17 11:13 Dose:  100 mg


Piperacillin Sod/Tazobactam Sod (Zosyn 3.375gm Ivpb (Pre-Docked))  50 mls @ 100 

mls/hr IVPB Q8H-IV ALE


   PRN Reason: Protocol


   Last Admin: 03/22/17 11:10 Dose:  100 mls/hr


Mineral Oil (Fleet Mineral Oil Rectal Enema -)  133 ml [place] DAILY PRN


   PRN Reason: CONSTIPATION


Morphine Sulfate (Morphine 10 Mg/5 Ml Liquid)  4 mg PO Q4H PRN


   PRN Reason: PAIN


   Last Admin: 03/22/17 09:34 Dose:  4 mg


Polyethylene Glycol (Miralax (For Daily Use) -)  17 gm PO BID [facility]


   Last Admin: 03/22/17 11:12 Dose:  17 grams











- Objective


Vital Signs: 


 Vital Signs











Temperature  98.6 F   03/22/17 10:21


 


Pulse Rate  71   03/22/17 10:21


 


Respiratory Rate  18   03/22/17 10:21


 


Blood Pressure  108/44   03/22/17 10:21


 


O2 Sat by Pulse Oximetry (%)  95   03/21/17 21:00











Constitutional: Yes: Well Nourished, No Distress, Calm


Cardiovascular: Yes: Regular Rate and Rhythm.  No: Gallop, Murmur, Rub


Respiratory: Yes: Regular, CTA Bilaterally.  No: Rales, Rhonchi, Wheezes


Gastrointestinal: Yes: Normal Bowel Sounds, Soft.  No: Distention, Tenderness


Extremities: Yes: WNL


Edema: No


Labs: 


 CBC, BMP





 03/22/17 06:45 





 03/22/17 06:45 











Assessment/Plan


(1) Complicated UTI (urinary tract infection)


Assessment/Plan: 


-ID following and appreciate assistance


-continue zosyn, growing pseudomonas


-ID says zosyn for 24 more hours


-stop zosyn tomorrow, observe for 24 hours off of antibiotics


-possible discharge Friday


Code(s): N39.0 - URINARY TRACT INFECTION, SITE NOT SPECIFIED





(2) Chronic pain


Assessment/Plan: 


-on prn morphine


-continue home regimen


Code(s): G89.29 - OTHER CHRONIC PAIN   





(3) Paraplegia


Assessment/Plan: 


-on valium for muscle spasms


Code(s): G82.20 - PARAPLEGIA, UNSPECIFIED

## 2017-03-22 NOTE — PN
Progress Note, Physician


History of Present Illness: 


C/O constipation


No fever/ chills


WBC WNL





- Current Medication List


Current Medications: 


Active Medications





Diazepam (Valium -)  10 mg PO TID PRN


   Last Admin: 03/22/17 09:33 Dose:  10 mg


Docusate Sodium (Colace -)  100 mg PO BID [facility]


   Last Admin: 03/21/17 22:38 Dose:  100 mg


Piperacillin Sod/Tazobactam Sod (Zosyn 3.375gm Ivpb (Pre-Docked))  50 mls @ 100 

mls/hr IVPB Q8H-IV ALE


   PRN Reason: Protocol


   Last Admin: 03/22/17 02:32 Dose:  100 mls/hr


Mineral Oil (Fleet Mineral Oil Rectal Enema -)  133 ml IN DAILY PRN


   PRN Reason: CONSTIPATION


Morphine Sulfate (Morphine 10 Mg/5 Ml Liquid)  4 mg PO Q4H PRN


   PRN Reason: PAIN


   Last Admin: 03/22/17 09:34 Dose:  4 mg


Polyethylene Glycol (Miralax (For Daily Use) -)  17 gm PO BID [facility]


   Last Admin: 03/21/17 22:45 Dose:  Not Given











- Objective


Vital Signs: 


 Vital Signs











Temperature  98.6 F   03/22/17 10:21


 


Pulse Rate  71   03/22/17 10:21


 


Respiratory Rate  18   03/22/17 10:21


 


Blood Pressure  108/44   03/22/17 10:21


 


O2 Sat by Pulse Oximetry (%)  95   03/21/17 21:00











Constitutional: Yes: Cachectic


Cardiovascular: Yes: Regular Rate and Rhythm, S1, S2


Respiratory: Yes: CTA Bilaterally


Gastrointestinal: Yes: Normal Bowel Sounds, Soft.  No: Tenderness


Labs: 


 CBC, BMP





 03/22/17 06:45 





 03/22/17 06:45 











Assessment/Plan


Recurrent UTI    Pseudomonas sp


Paraplegia


S/P splenectomy





Continue zosyn additional 24h 


D/C antibiotics 3/23/17, observe off


Please re- consult prn

## 2017-03-23 LAB
ANION GAP SERPL CALC-SCNC: 9 MMOL/L (ref 8–16)
BASOPHILS # BLD: 1 % (ref 0–2)
CALCIUM SERPL-MCNC: 8.6 MG/DL (ref 8.5–10.1)
CO2 SERPL-SCNC: 28 MMOL/L (ref 21–32)
CREAT SERPL-MCNC: 0.6 MG/DL (ref 0.7–1.3)
DEPRECATED RDW RBC AUTO: 17 % (ref 11.9–15.9)
EOSINOPHIL # BLD: 4.6 % (ref 0–4.5)
GLUCOSE SERPL-MCNC: 79 MG/DL (ref 74–106)
MAGNESIUM SERPL-MCNC: 1.9 MG/DL (ref 1.8–2.4)
MCH RBC QN AUTO: 29.1 PG (ref 25.7–33.7)
MCHC RBC AUTO-ENTMCNC: 32.5 G/DL (ref 32–35.9)
MCV RBC: 89.5 FL (ref 80–96)
NEUTROPHILS # BLD: 64.2 % (ref 42.8–82.8)
PHOSPHATE SERPL-MCNC: 2.9 MG/DL (ref 2.5–4.9)
PLATELET # BLD AUTO: 428 K/MM3 (ref 134–434)
PMV BLD: 9.6 FL (ref 7.5–11.1)
WBC # BLD AUTO: 9.8 K/MM3 (ref 4–10)

## 2017-03-23 RX ADMIN — MORPHINE SULFATE PRN MG: 10 SOLUTION ORAL at 11:01

## 2017-03-23 RX ADMIN — PIPERACILLIN SODIUM,TAZOBACTAM SODIUM SCH MLS/HR: 3; .375 INJECTION, POWDER, FOR SOLUTION INTRAVENOUS at 11:01

## 2017-03-23 RX ADMIN — DOCUSATE SODIUM SCH MG: 100 CAPSULE, LIQUID FILLED ORAL at 22:22

## 2017-03-23 RX ADMIN — POLYETHYLENE GLYCOL 3350 SCH GRAMS: 17 POWDER, FOR SOLUTION ORAL at 11:01

## 2017-03-23 RX ADMIN — PIPERACILLIN SODIUM,TAZOBACTAM SODIUM SCH: 3; .375 INJECTION, POWDER, FOR SOLUTION INTRAVENOUS at 18:55

## 2017-03-23 RX ADMIN — POLYETHYLENE GLYCOL 3350 SCH: 17 POWDER, FOR SOLUTION ORAL at 22:25

## 2017-03-23 RX ADMIN — PIPERACILLIN SODIUM,TAZOBACTAM SODIUM SCH MLS/HR: 3; .375 INJECTION, POWDER, FOR SOLUTION INTRAVENOUS at 02:50

## 2017-03-23 RX ADMIN — DOCUSATE SODIUM SCH MG: 100 CAPSULE, LIQUID FILLED ORAL at 11:01

## 2017-03-23 NOTE — PN
Physical Exam: 


SUBJECTIVE: Patient seen and examined at bedside. Pt has no complaints at this 

time and feels back to baseline. Denies CP, SOB, N/V/F/C, and says his urine is 

back to normal color.








OBJECTIVE:





 


 Vital Signs











Temperature  99 F   03/23/17 17:02


 


Pulse Rate  71   03/23/17 17:02


 


Respiratory Rate  20   03/23/17 17:02


 


Blood Pressure  124/67   03/23/17 17:02


 


O2 Sat by Pulse Oximetry (%)  95   03/23/17 09:00














GENERAL: The patient is awake, alert, and fully oriented, in no acute distress.


HEAD: Normal with no signs of trauma.


EYES: extraocular movements intact, sclera anicteric, conjunctiva clear. No 

ptosis. 


ENT: Ears normal, nares patent, oropharynx clear without exudates, moist mucous 


membranes.


NECK: Trachea midline, full range of motion


LUNGS: Breath sounds equal, clear to auscultation bilaterally, no wheezes, no 

crackles, no 


accessory muscle use. 


HEART: Regular rate and rhythm, S1, S2 without murmur, rub or gallop.


ABDOMEN: Soft, nontender, nondistended, normoactive bowel sounds, no guarding, 

no 


rebound, no hepatosplenomegaly, no masses.


EXTREMITIES: paraplegia 


NEUROLOGICAL: paraplegia. Normal speech.


PSYCH: Normal mood, normal affect.


SKIN: Warm, dry, normal turgor, no rashes or lesions noted














 Laboratory Results - last 24 hr











  03/23/17 03/23/17





  06:30 06:30


 


WBC  9.8 


 


RBC  4.12 


 


Hgb  12.0 


 


Hct  36.9 


 


MCV  89.5 


 


MCHC  32.5 


 


RDW  17.0 H 


 


Plt Count  428 


 


MPV  9.6 


 


Neutrophils %  64.2 


 


Lymphocytes %  18.8  D 


 


Monocytes %  11.4 H 


 


Eosinophils %  4.6 H 


 


Basophils %  1.0 


 


Sodium   137


 


Potassium   3.9


 


Chloride   100


 


Carbon Dioxide   28


 


Anion Gap   9


 


BUN   10  D


 


Creatinine   0.6 L


 


Random Glucose   79


 


Calcium   8.6


 


Phosphorus   2.9


 


Magnesium   1.9








Active Medications











Generic Name Dose Route Start Last Admin





  Trade Name Freq  PRN Reason Stop Dose Admin


 


Diazepam  10 mg 03/18/17 16:59 03/22/17 21:32





  Valium -  PO   10 mg





  TID PRN   Administration


 


Docusate Sodium  100 mg 03/20/17 22:00 03/23/17 11:01





  Colace -  PO   100 mg





  BID ALE   Administration


 


Mineral Oil  133 ml 03/20/17 19:22  





  Fleet Mineral Oil Rectal Enema -  [place]   





  DAILY PRN   





  CONSTIPATION   


 


Morphine Sulfate  4 mg 03/18/17 16:59 03/23/17 11:01





  Morphine 10 Mg/5 Ml Liquid  PO   4 mg





  Q4H PRN   Administration





  PAIN   


 


Polyethylene Glycol  17 gm 03/20/17 22:00 03/23/17 11:01





  Miralax (For Daily Use) -  PO   17 grams





  BID ALE   Administration











ASSESSMENT/PLAN:


60 y/o M w/ PMH of paraplegia and frequent UTIs presented to ER with dark 

colored urine. Admitted for UTI.





-complicated UTI


   -UCx: pseudomonas


   -off zosyn today as per ID, no fevers so far, pt feels clinically well





-chronic pain


   -c/w morphine prn





-paraplegia


   -c/w valium prn for muscle spasms





-constipation


   -miralax bid, colace 100 mg po bid, fleet mineral oil enema qd prn for 

constipation.





-dispo:


   -can most likely be discharged soon.





Problem List





- Problems


(1) Complicated UTI (urinary tract infection)


Code(s): N39.0 - URINARY TRACT INFECTION, SITE NOT SPECIFIED





(2) Chronic pain


Code(s): G89.29 - OTHER CHRONIC PAIN   





(3) Paraplegia


Code(s): G82.20 - PARAPLEGIA, UNSPECIFIED





(4) Constipation


Code(s): K59.00 - CONSTIPATION, UNSPECIFIED   








Visit type





- Emergency Visit


Emergency Visit: Yes


ED Registration Date: 03/19/17


Care time: The patient presented to the Emergency Department on the above date 

and was hospitalized for further evaluation of their emergent condition.





- New Patient


This patient is new to me today: Yes


Date on this admission: 03/23/17





- Critical Care


Critical Care patient: No

## 2017-03-24 VITALS — SYSTOLIC BLOOD PRESSURE: 163 MMHG | TEMPERATURE: 98 F | HEART RATE: 53 BPM | DIASTOLIC BLOOD PRESSURE: 65 MMHG

## 2017-03-24 LAB
ANION GAP SERPL CALC-SCNC: 11 MMOL/L (ref 8–16)
CALCIUM SERPL-MCNC: 8.8 MG/DL (ref 8.5–10.1)
CO2 SERPL-SCNC: 29 MMOL/L (ref 21–32)
CREAT SERPL-MCNC: 0.5 MG/DL (ref 0.7–1.3)
DEPRECATED RDW RBC AUTO: 16.6 % (ref 11.9–15.9)
GLUCOSE SERPL-MCNC: 74 MG/DL (ref 74–106)
MCH RBC QN AUTO: 29.3 PG (ref 25.7–33.7)
MCHC RBC AUTO-ENTMCNC: 33.1 G/DL (ref 32–35.9)
MCV RBC: 88.4 FL (ref 80–96)
PLATELET # BLD AUTO: 462 K/MM3 (ref 134–434)
PMV BLD: 9.6 FL (ref 7.5–11.1)
WBC # BLD AUTO: 9.1 K/MM3 (ref 4–10)

## 2017-03-24 RX ADMIN — MORPHINE SULFATE PRN MG: 10 SOLUTION ORAL at 12:51

## 2017-03-24 RX ADMIN — MORPHINE SULFATE PRN MG: 10 SOLUTION ORAL at 08:53

## 2017-03-24 RX ADMIN — DOCUSATE SODIUM SCH MG: 100 CAPSULE, LIQUID FILLED ORAL at 09:38

## 2017-03-24 RX ADMIN — POLYETHYLENE GLYCOL 3350 SCH: 17 POWDER, FOR SOLUTION ORAL at 09:39

## 2017-03-24 NOTE — DS
Physical Examination


Vital Signs: 


 Vital Signs











Temperature  98.9 F   03/23/17 22:00


 


Pulse Rate  75   03/23/17 22:00


 


Respiratory Rate  18   03/23/17 22:00


 


Blood Pressure  125/66   03/23/17 22:00


 


O2 Sat by Pulse Oximetry (%)  95   03/23/17 21:00











Constitutional: Yes: No Distress


Cardiovascular: Yes: Regular Rate and Rhythm


Respiratory: Yes: CTA Bilaterally


Gastrointestinal: Yes: Normal Bowel Sounds, Soft


Renal/: Yes: Other (condom catheter)


Neurological: Yes: Other (Paraplegia)





Discharge Summary


Reason For Visit: COMPLICATED UTI


Current Active Problems





Constipation (Chronic) 








Hospital Course: 


Please refer to daily notes  Pseudomonal UTI  Completed treatment  Constipation 

resolved  Stable for discharge home   aware


Condition: Fair





- Instructions


Diet, Activity, Other Instructions: 


As tolerated


Disposition: HOME





- Home Medications


Comprehensive Discharge Medication List: 


Ambulatory Orders





Diazepam [Valium] 10 mg PO TID PRN #30 tablet MDD 30mg 01/09/17 


Morphine 10 mg/5 ml Liquid [Morphine 10 mg/5 mL Liquid -] 1 mg PO Q4H PRN 03/18/ 17 


Docusate Sodium [Colace -] 100 mg PO BID 03/24/17 


Polyethylene Glycol 3350 [Miralax 119 gm Btl -] 17 gm PO BID  bottle 03/24/17

## 2017-03-25 ENCOUNTER — HOSPITAL ENCOUNTER (EMERGENCY)
Dept: HOSPITAL 74 - JER | Age: 62
Discharge: HOME | End: 2017-03-25
Payer: COMMERCIAL

## 2017-03-25 VITALS — HEART RATE: 60 BPM | DIASTOLIC BLOOD PRESSURE: 60 MMHG | SYSTOLIC BLOOD PRESSURE: 132 MMHG | TEMPERATURE: 98 F

## 2017-03-25 VITALS — BODY MASS INDEX: 16.7 KG/M2

## 2017-03-25 DIAGNOSIS — J44.9: ICD-10-CM

## 2017-03-25 DIAGNOSIS — N20.0: Primary | ICD-10-CM

## 2017-03-25 DIAGNOSIS — Z87.442: ICD-10-CM

## 2017-03-25 DIAGNOSIS — G82.20: ICD-10-CM

## 2017-03-25 LAB
ALBUMIN SERPL-MCNC: 3 G/DL (ref 3.4–5)
ALP SERPL-CCNC: 264 U/L (ref 45–117)
ALT SERPL-CCNC: 11 U/L (ref 12–78)
ANION GAP SERPL CALC-SCNC: 9 MMOL/L (ref 8–16)
APPEARANCE UR: CLEAR
AST SERPL-CCNC: 19 U/L (ref 15–37)
BACTERIA #/AREA URNS HPF: (no result) /HPF
BASOPHILS # BLD: 1.4 % (ref 0–2)
BILIRUB SERPL-MCNC: 0.7 MG/DL (ref 0.2–1)
BILIRUB UR STRIP.AUTO-MCNC: NEGATIVE MG/DL
CALCIUM SERPL-MCNC: 8.9 MG/DL (ref 8.5–10.1)
CO2 SERPL-SCNC: 28 MMOL/L (ref 21–32)
COLOR UR: (no result)
CREAT SERPL-MCNC: 0.6 MG/DL (ref 0.7–1.3)
DEPRECATED RDW RBC AUTO: 16.7 % (ref 11.9–15.9)
EOSINOPHIL # BLD: 3 % (ref 0–4.5)
GLUCOSE SERPL-MCNC: 80 MG/DL (ref 74–106)
KETONES UR QL STRIP: NEGATIVE
LEUKOCYTE ESTERASE UR QL STRIP.AUTO: (no result)
MCH RBC QN AUTO: 28.9 PG (ref 25.7–33.7)
MCHC RBC AUTO-ENTMCNC: 32.5 G/DL (ref 32–35.9)
MCV RBC: 89 FL (ref 80–96)
NEUTROPHILS # BLD: 70.5 % (ref 42.8–82.8)
NITRITE UR QL STRIP: NEGATIVE
PH UR: 7 [PH] (ref 5–8)
PLATELET # BLD AUTO: 474 K/MM3 (ref 134–434)
PMV BLD: 8.5 FL (ref 7.5–11.1)
PROT SERPL-MCNC: 7.1 G/DL (ref 6.4–8.2)
PROT UR QL STRIP: NEGATIVE
PROT UR QL STRIP: NEGATIVE
RBC # BLD AUTO: 2 /HPF (ref 0–3)
RBC # UR STRIP: (no result) /UL
SP GR UR: 1 (ref 1–1.03)
UROBILINOGEN UR STRIP-MCNC: NEGATIVE E.U./DL (ref 0.2–1)
WBC # BLD AUTO: 9.7 K/MM3 (ref 4–10)
WBC # UR AUTO: 2 /HPF (ref 3–5)

## 2017-03-25 PROCEDURE — 3E0337Z INTRODUCTION OF ELECTROLYTIC AND WATER BALANCE SUBSTANCE INTO PERIPHERAL VEIN, PERCUTANEOUS APPROACH: ICD-10-PCS

## 2017-03-25 PROCEDURE — 3E033NZ INTRODUCTION OF ANALGESICS, HYPNOTICS, SEDATIVES INTO PERIPHERAL VEIN, PERCUTANEOUS APPROACH: ICD-10-PCS

## 2017-03-25 NOTE — PDOC
History of Present Illness





- General


History Source: Patient, Old Records


Exam Limitations: No Limitations





- History of Present Illness


Initial Comments: 


03/25/17 08:50


The patient is a 61 year old male brought via EMS and presenting with a 

caretaker, with a significant past medical history of Paraplegia (No sensation 

from chest down), COPD, recurrent UTI, condom catheter and hypotension, who 

presents to the emergency department with suprapubic pain onset this morning. 

He reports that the suprapubic pain has increased in severity. The patient 

currently has a catheter in place. The patient was admitted on 3/19/2017 for 

abdominal pain and a diagnosed UTI. He was discharged on 3/24/17 after 

improvement of symptoms. 





The patient denies chest pain, shortness of breath, headache and dizziness. 

Denies fever, chills, nausea, vomit, diarrhea and constipation. 





Allergies: None 


Past surgical history: fracture right wrist s/p fusion


Social history: +Alcohol use. No tobacco or drug use reported 


PMD - Dr. Jordan Poon 








<Glenroy Hicks - Last Filed: 03/25/17 12:30>





<Jessica Quezada - Last Filed: 03/25/17 14:23>





- General


Chief Complaint: Urinary Problem


Stated Complaint: PAIN


Time Seen by Provider: 03/25/17 08:14





Past History





<Glenroy Hicks - Last Filed: 03/25/17 12:30>





- Past Medical History


Anemia: No


Asthma: No


Cancer: No


Cardiac Disorders: No


CVA: No


COPD: Yes


CHF: No


Dementia: No


Diabetes: No


GI Disorders: No


 Disorders: Yes (Recurrent UTI, CONDOM CATHETER)


HTN:  (Hypotension)


Hypercholesterolemia: No


Liver Disease: No


Seizures: No


Thyroid Disease: No





- Surgical History


Abdominal Surgery: No


Appendectomy: No


Cardiac Surgery: No


Cholecystectomy: No


Lung Surgery: No


Neurologic Surgery: No


Orthopedic Surgery: Yes (fracture right wrist s/p fusion)





- Immunization History


Td Vaccination: No


Immunization Up to Date: No





- Psycho/Social/Smoking Cessation Hx


Anxiety: No


Suicidal Ideation: No


Smoking Status: No


Smoking History: Former smoker


Years of Tobacco Use: 0


Have you smoked in the past 12 months: No


Number of Cigarettes Smoked Daily: 0


If you are a former smoker, when did you quit?: 1986


Cigars Per Day: 0


Information on smoking cessation initiated: No


Hx Alcohol Use: Yes


Drug/Substance Use Hx: Yes


Substance Use Type: Alcohol


Hx Substance Use Treatment: No





<Jessica Quezada - Last Filed: 03/25/17 14:23>





- Past Medical History


Allergies/Adverse Reactions: 


 Allergies











Allergy/AdvReac Type Severity Reaction Status Date / Time


 


No Known Allergies Allergy   Verified 03/25/17 08:00











Home Medications: 


Ambulatory Orders





Diazepam [Valium] 10 mg PO TID PRN #30 tablet MDD 30mg 01/09/17 


Morphine 10 mg/5 ml Liquid [Morphine 10 mg/5 mL Liquid -] 1 mg PO Q4H PRN 03/18/ 17 


Docusate Sodium [Colace -] 100 mg PO BID 03/24/17 


Polyethylene Glycol 3350 [Miralax 119 gm Btl -] 17 gm PO BID  bottle 03/24/17 











**Review of Systems





- Review of Systems


Able to Perform ROS?: Yes


Comments:: 





03/25/17 08:50


GENERAL/CONSTITUTIONAL: No fever or chills. No weakness.


HEAD, EYES, EARS, NOSE AND THROAT: No change in vision. No ear pain or 

discharge. No sore throat.


CARDIOVASCULAR: No chest pain or shortness of breath


RESPIRATORY: No cough, wheezing, or hemoptysis.


GASTROINTESTINAL: +Suprapubic abdominal pain. No nausea, vomiting, diarrhea or 

constipation.


GENITOURINARY: No dysuria, frequency, or change in urination.


MUSCULOSKELETAL: No joint or muscle swelling or pain. No neck or back pain.


SKIN: No rash


NEUROLOGIC: No headache, vertigo, loss of consciousness, or change in strength/

sensation.


ENDOCRINE: No increased thirst. No abnormal weight change


HEMATOLOGIC/LYMPHATIC: No anemia, easy bleeding, or history of blood clots.


ALLERGIC/IMMUNOLOGIC: No hives or skin allergy.








<Glenroy Hicks - Last Filed: 03/25/17 12:30>





*Physical Exam





- Vital Signs


 Last Vital Signs











Temp Pulse Resp BP Pulse Ox


 


 97.4 F L  67   16   158/88   99 


 


 03/25/17 07:50  03/25/17 07:50  03/25/17 07:50  03/25/17 07:50  03/25/17 07:50














- Physical Exam


Comments: 





03/25/17 08:50


GENERAL: Awake, alert, and fully oriented, Appears uncomfortable 


HEAD: No signs of trauma, normocephalic, atraumatic 


EYES: PERRLA, EOMI, sclera anicteric, conjunctiva clear


ENT: Auricles normal inspection, hearing grossly normal, nares patent, 

oropharynx clear without


exudates. Moist mucosa


NECK: Normal ROM, supple, no lymphadenopathy, JVD, or masses


LUNGS: No distress, speaks full sentences, clear to auscultation bilaterally 


HEART: Regular rate and rhythm, normal S1 and S2, no murmurs, rubs or gallops, 

peripheral pulses normal and equal bilaterally. 


ABDOMEN: Soft, unable to determine tenderness, normoactive bowel sounds.  No 

guarding, no rebound.  No masses


EXTREMITIES: +Atrophy in the lower extremities bilaterally, no edema.  No 

clubbing or cyanosis. 


NEUROLOGICAL: Cranial nerves II through XII grossly intact.  Normal speech, 

paraplegic 


SKIN: Warm, Dry, normal turgor, no rashes or lesions noted. 





<Glenroy Hicks - Last Filed: 03/25/17 12:30>





- Vital Signs


 Last Vital Signs











Temp Pulse Resp BP Pulse Ox


 


 97.4 F L  67   16   158/88   99 


 


 03/25/17 07:50  03/25/17 07:50  03/25/17 07:50  03/25/17 07:50  03/25/17 07:50














<Jessica Quezada - Last Filed: 03/25/17 14:23>





ED Treatment Course





- LABORATORY


CBC & Chemistry Diagram: 


 03/25/17 09:00





 03/25/17 09:00





- RADIOLOGY


Radiograph Interpretation: 





03/25/17 10:52


CT scan abdomen and pelvis 


Reviewed by: Dr. Tom Valdes


Impression: In comparison to a previous CT exam of 1/2/2017 interval 

development of a 0.2 cm


nonobstructing right renal calculus is noted.


No definite ureteral calculus is identified.


There is no evidence of hydrouretonephrosis.


Interval development of a small amount of debris or nonspecific soft tissue 

thickening is seen along the right 


lateral border of the urinary bladder.


The remainder of the study demonstrates no definite interval change.


Urinary bladder calculi are visualized.


A nonspecific noncalcified spiculated 1.5 x 0.8 cm left lower lobe pulmonary 

nodule is noted with contigous mild interstitial 


thickening. This finding may be on the basis of primary neoplastic disease.


No obvious interval change is seen in comparison to the prior CT study.


There is also an unchanged 0.9 x 0.5 cm nonspecific right lower lobe pulmonary 

nodul.


Probably centriobular emphysema.


Hyperinflation of the partially imaged left lung field as noted on prior chest 

radiographs.


Mild unchanged common su duct dilatation with a 0.8 cm diameter. 


S/p splenectomy with several small spherical soft tissue foci in the splenic 

fossa probably representing residual splenic tissue.


1.1 cm left adrenal nodule probably representing an adenoma on a statistical 

basis. 





<Glenroy Hicks - Last Filed: 03/25/17 12:30>





- LABORATORY


CBC & Chemistry Diagram: 


 03/25/17 09:00





 03/25/17 09:00





<Jessica Quezada - Last Filed: 03/25/17 14:23>





Medical Decision Making





- Medical Decision Making





03/25/17 11:26


Dr. Jordan Poon was called regarding the patient at 10:51am


Dr. Larry covering


Dr. Larry was consulted regarding the patient at 10:52am


[phone]





<Glenroy Hicks - Last Filed: 03/25/17 12:30>





- Medical Decision Making


Case d/w Dr. Poon via phone. Patient has very small non-obstructing stone. As 

per Dr. Poon, this is similar to prior pain. Patient has completed course of 

abx for the UTI as inpatient, has morphine at home for pain. D/w patient, will 

manage his pain at home with morphine. 








<Jessica Quezada - Last Filed: 03/25/17 14:23>





*DC/Admit/Observation/Transfer





- Attestations


Scribe Attestion: 





03/25/17 08:53


Documentation prepared by Glenroy Hicks, acting as medical scribe for 

Jessica Quezada MD





<Glenroy Hicks - Last Filed: 03/25/17 12:30>





- Discharge Dispostion


Admit: No





<Jessica Quezada - Last Filed: 03/25/17 14:23>


Diagnosis at time of Disposition: 


Abdominal pain


Qualifiers:


 Abdominal location: unspecified location Qualified Code(s): R10.9 - 

Unspecified abdominal pain





- Discharge Dispostion


Disposition: HOME


Condition at time of disposition: Stable





- Referrals


Referrals: 


Last Gleason MD [Staff Physician] - 


Sony Poon MD [Primary Care Provider] - 





- Patient Instructions


Printed Discharge Instructions:  DI for Kidney Stones, DI for Abdominal Pain-

Adult

## 2017-06-17 ENCOUNTER — HOSPITAL ENCOUNTER (INPATIENT)
Dept: HOSPITAL 74 - JER | Age: 62
LOS: 10 days | Discharge: HOME HEALTH | DRG: 690 | End: 2017-06-27
Attending: INTERNAL MEDICINE | Admitting: INTERNAL MEDICINE
Payer: COMMERCIAL

## 2017-06-17 VITALS — BODY MASS INDEX: 18.2 KG/M2

## 2017-06-17 DIAGNOSIS — R74.8: ICD-10-CM

## 2017-06-17 DIAGNOSIS — I95.89: ICD-10-CM

## 2017-06-17 DIAGNOSIS — S22.028S: ICD-10-CM

## 2017-06-17 DIAGNOSIS — I48.0: ICD-10-CM

## 2017-06-17 DIAGNOSIS — N39.0: Primary | ICD-10-CM

## 2017-06-17 DIAGNOSIS — G89.4: ICD-10-CM

## 2017-06-17 DIAGNOSIS — R39.89: ICD-10-CM

## 2017-06-17 DIAGNOSIS — I25.10: ICD-10-CM

## 2017-06-17 DIAGNOSIS — R19.7: ICD-10-CM

## 2017-06-17 DIAGNOSIS — N31.9: ICD-10-CM

## 2017-06-17 DIAGNOSIS — G82.20: ICD-10-CM
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
## 2017-06-17 DIAGNOSIS — I24.8: ICD-10-CM

## 2017-06-17 DIAGNOSIS — M54.9: ICD-10-CM

## 2017-06-17 DIAGNOSIS — B96.5: ICD-10-CM

## 2017-06-17 DIAGNOSIS — K59.00: ICD-10-CM

## 2017-06-17 LAB
ALBUMIN SERPL-MCNC: 3.4 G/DL (ref 3.4–5)
ALP SERPL-CCNC: 193 U/L (ref 45–117)
ALT SERPL-CCNC: 25 U/L (ref 12–78)
ANION GAP SERPL CALC-SCNC: 9 MMOL/L (ref 8–16)
APPEARANCE UR: (no result)
AST SERPL-CCNC: 25 U/L (ref 15–37)
BACTERIA #/AREA URNS HPF: (no result) /HPF
BASOPHILS # BLD: 0.6 % (ref 0–2)
BILIRUB SERPL-MCNC: 0.8 MG/DL (ref 0.2–1)
BILIRUB UR STRIP.AUTO-MCNC: NEGATIVE MG/DL
CALCIUM SERPL-MCNC: 8.8 MG/DL (ref 8.5–10.1)
CO2 SERPL-SCNC: 29 MMOL/L (ref 21–32)
COLOR UR: YELLOW
CREAT SERPL-MCNC: 0.6 MG/DL (ref 0.7–1.3)
DEPRECATED RDW RBC AUTO: 16.3 % (ref 11.9–15.9)
EOSINOPHIL # BLD: 1.8 % (ref 0–4.5)
GLUCOSE SERPL-MCNC: 79 MG/DL (ref 74–106)
HYALINE CASTS URNS QL MICRO: 1 /LPF
KETONES UR QL STRIP: (no result)
LEUKOCYTE ESTERASE UR QL STRIP.AUTO: (no result)
MCH RBC QN AUTO: 28.5 PG (ref 25.7–33.7)
MCHC RBC AUTO-ENTMCNC: 32.7 G/DL (ref 32–35.9)
MCV RBC: 87.3 FL (ref 80–96)
MUCOUS THREADS URNS QL MICRO: (no result)
NEUTROPHILS # BLD: 78.9 % (ref 42.8–82.8)
NITRITE UR QL STRIP: POSITIVE
PH UR: 6 [PH] (ref 5–8)
PLATELET # BLD AUTO: 336 K/MM3 (ref 134–434)
PMV BLD: 8.4 FL (ref 7.5–11.1)
PROT SERPL-MCNC: 7.3 G/DL (ref 6.4–8.2)
PROT UR QL STRIP: NEGATIVE
PROT UR QL STRIP: NEGATIVE
RBC # BLD AUTO: 14 /HPF (ref 0–3)
RBC # UR STRIP: (no result) /UL
SP GR UR: <= 1.005 (ref 1–1.02)
UROBILINOGEN UR STRIP-MCNC: NEGATIVE E.U./DL (ref 0.2–1)
WBC # BLD AUTO: 11.4 K/MM3 (ref 4–10)
WBC # UR AUTO: 11 /HPF (ref 3–5)

## 2017-06-17 RX ADMIN — HEPARIN SODIUM SCH UNIT: 5000 INJECTION, SOLUTION INTRAVENOUS; SUBCUTANEOUS at 21:46

## 2017-06-17 NOTE — PDOC
History of Present Illness





- General


History Source: Patient, Old Records


Exam Limitations: No Limitations





- History of Present Illness


Initial Comments: 


06/17/17 16:32


The patient is a 28 year old male with past medical history of paraplegia (

severed spine between T2-T4 secondary to falling of 30 ft nhi), recurrent 

bladder infections, EtOH abuse, marijuana use, and bilateral femur fractures 

who presents to the ED with worsening bladder infection that began yesterday. 

The patient complains of abdominal pain and relates it to his typical bladder 

infection. Yesterday (06/16/17), the patient was seen in the ED, had a CT scan 

performed, and was discharged with a prescription of Macrobid and sent home, 

however he states that his pain has worsened. He states that he typically takes 

morphine and valium for his pain. The patient denies any fevers, chills, nausea

, vomiting, diarrhea, cough, shortness of breath, chest pains, or urinary 

symptoms. 


 


Allergies: None


PCP: Dr. Jordan Poon [phone]








<Loli Alatorre - Last Filed: 06/17/17 16:32>





<Jessica Quezada - Last Filed: 06/18/17 10:56>





- General


Chief Complaint: Pain, Acute


Stated Complaint: ABD PAIN


Time Seen by Provider: 06/17/17 15:28





Past History





<Loli Alatorre - Last Filed: 06/17/17 16:32>





- Past Medical History


Anemia: No


Asthma: No


Cancer: No


Cardiac Disorders: No


CVA: No


COPD: Yes


CHF: No


Dementia: No


Diabetes: No


GI Disorders: No


 Disorders: Yes (Recurrent UTI, CONDOM CATHETER)


HTN:  (Hypotension)


Hypercholesterolemia: No


Liver Disease: No


Seizures: No


Thyroid Disease: No





- Surgical History


Abdominal Surgery: No


Appendectomy: No


Cardiac Surgery: No


Cholecystectomy: No


Lung Surgery: No


Neurologic Surgery: No


Orthopedic Surgery: Yes (fracture right wrist s/p fusion)





- Immunization History


Td Vaccination: No


Immunization Up to Date: No





- Psycho/Social/Smoking Cessation Hx


Anxiety: No


Suicidal Ideation: No


Smoking Status: No


Smoking History: Never smoked


Years of Tobacco Use: 0


Have you smoked in the past 12 months: No


Number of Cigarettes Smoked Daily: 3


If you are a former smoker, when did you quit?: 1986


Cigars Per Day: 0


Hx Alcohol Use: Yes (OCCASIONALLY)


Drug/Substance Use Hx: No


Substance Use Type: Alcohol


Hx Substance Use Treatment: No





<Jessica Quezada - Last Filed: 06/18/17 10:56>





- Past Medical History


Allergies/Adverse Reactions: 


 Allergies











Allergy/AdvReac Type Severity Reaction Status Date / Time


 


No Known Allergies Allergy   Verified 06/16/17 03:57











Home Medications: 


Ambulatory Orders





Diazepam [Valium] 10 mg PO TID PRN #30 tablet MDD 30mg 01/09/17 


Morphine 10 mg/5 ml Liquid [Morphine 10 mg/5 mL Liquid -] 1 mg PO Q4H PRN 03/18/ 17 


Nitrofurantoin Monohyd/M-Cryst [Macrobid -] 100 mg PO BID #14 capsule 06/16/17 











**Review of Systems





- Review of Systems


Able to Perform ROS?: Yes


Comments:: 


06/17/17 16:33


GENERAL/CONSTITUTIONAL: No fever or chills. No weakness.


HEAD, EYES, EARS, NOSE AND THROAT: No change in vision. No ear pain or 

discharge. No sore throat.


CARDIOVASCULAR: No chest pain or shortness of breath.


RESPIRATORY: No cough, wheezing, or hemoptysis.


GASTROINTESTINAL: 


Present: LLQ pain 


No nausea, vomiting, diarrhea or constipation.


GENITOURINARY: No dysuria, frequency, or change in urination.


MUSCULOSKELETAL: No joint or muscle swelling or pain. No neck or back pain.


SKIN: No rash


NEUROLOGIC: No headache, vertigo, loss of consciousness, or change in strength/

sensation.


ENDOCRINE: No increased thirst. No abnormal weight change.


HEMATOLOGIC/LYMPHATIC: No anemia, easy bleeding, or history of blood clots.


ALLERGIC/IMMUNOLOGIC: No hives or skin allergy.





All Other Systems: Reviewed and Negative





<Loli Alatorre - Last Filed: 06/17/17 16:32>





*Physical Exam





- Vital Signs


 Last Vital Signs











Temp Pulse Resp BP Pulse Ox


 


 98.2 F   64   18   190/71   95 


 


 06/17/17 14:30  06/17/17 14:30  06/17/17 14:30  06/17/17 14:30  06/17/17 14:30














- Physical Exam


Comments: 


06/17/17 16:33


GENERAL: Awake, alert, and fully oriented, in no acute distress


HEAD: No signs of trauma


EYES: PERRLA, EOMI, sclera anicteric, conjunctiva clear


ENT: Auricles normal inspection, hearing grossly normal, nares patent, 

oropharynx clear without 


exudates. Moist mucosa


NECK: Normal ROM, supple, no lymphadenopathy, JVD, or masses


LUNGS: Breath sounds equal, clear to auscultation bilaterally.  No wheezes, and 

no crackles


HEART: Regular rate and rhythm, normal S1 and S2, no murmurs, rubs or gallops


ABDOMEN: 


Condom catheter in place with cloudy, yellow urine, tenderness to LLQ, Soft, 

normoactive bowel sounds.  No guarding, no rebound.  No masses


EXTREMITIES: Normal range of motion, no edema.  No clubbing or cyanosis. No 

cords, erythema, or tenderness


NEUROLOGICAL: Paraplegic. Cranial nerves II through XII grossly intact.  Normal 

speech. 


SKIN: Warm, Dry, normal turgor, no rashes or lesions noted.








<Loli Alatorre - Last Filed: 06/17/17 16:32>





- Vital Signs


 Last Vital Signs











Temp Pulse Resp BP Pulse Ox


 


 98.2 F   64   18   190/71   95 


 


 06/17/17 14:30  06/17/17 14:30  06/17/17 14:30  06/17/17 14:30  06/17/17 14:30














<Jessica Quezada - Last Filed: 06/18/17 10:56>





ED Treatment Course





- LABORATORY


CBC & Chemistry Diagram: 


 06/18/17 06:17





 06/18/17 06:17





<Jessica Quezada - Last Filed: 06/18/17 10:56>





Medical Decision Making





- Medical Decision Making


Patient was treated in ED with macrobid, previous UA was negative. UA is 

currently positive, suggesting he is not responding to the macrobid. I reviewed 

his last UCx, which showed pseudomonas that was resistant to aztreonam and 

levaquin. I will give zosyn and admit. He was initially refusing admission, but 

I explained that without culture results to guide antibiotic selection, he 

should have IV antibiotics covering most recent culture results, otherwise may 

be at risk for pyelo. 








<Jessica Quezada - Last Filed: 06/18/17 10:56>





*DC/Admit/Observation/Transfer





- Attestations


Scribe Attestion: 


06/17/17 16:34


Documentation prepared by Loli Alatorre, acting as medical scribe for 

Jessica Quezada MD.





<Loli Alatorre - Last Filed: 06/17/17 16:32>





- Discharge Dispostion


Admit: Yes





<Jessica Quezada - Last Filed: 06/18/17 10:56>


Diagnosis at time of Disposition: 


 Acute UTI





- Discharge Dispostion


Condition at time of disposition: Stable

## 2017-06-18 LAB
ALBUMIN SERPL-MCNC: 3.2 G/DL (ref 3.4–5)
ALP SERPL-CCNC: 165 U/L (ref 45–117)
ALT SERPL-CCNC: 19 U/L (ref 12–78)
ANION GAP SERPL CALC-SCNC: 10 MMOL/L (ref 8–16)
AST SERPL-CCNC: 21 U/L (ref 15–37)
BASOPHILS # BLD: 0.4 % (ref 0–2)
BILIRUB SERPL-MCNC: 0.5 MG/DL (ref 0.2–1)
CALCIUM SERPL-MCNC: 8 MG/DL (ref 8.5–10.1)
CO2 SERPL-SCNC: 26 MMOL/L (ref 21–32)
CREAT SERPL-MCNC: 0.7 MG/DL (ref 0.7–1.3)
DEPRECATED RDW RBC AUTO: 16.6 % (ref 11.9–15.9)
EOSINOPHIL # BLD: 2.5 % (ref 0–4.5)
GLUCOSE SERPL-MCNC: 89 MG/DL (ref 74–106)
MCH RBC QN AUTO: 29.1 PG (ref 25.7–33.7)
MCHC RBC AUTO-ENTMCNC: 33.2 G/DL (ref 32–35.9)
MCV RBC: 87.5 FL (ref 80–96)
NEUTROPHILS # BLD: 80.2 % (ref 42.8–82.8)
PLATELET # BLD AUTO: 343 K/MM3 (ref 134–434)
PMV BLD: 8.5 FL (ref 7.5–11.1)
PROT SERPL-MCNC: 6.8 G/DL (ref 6.4–8.2)
WBC # BLD AUTO: 12.5 K/MM3 (ref 4–10)

## 2017-06-18 RX ADMIN — MORPHINE SULFATE PRN MG: 10 SOLUTION ORAL at 18:20

## 2017-06-18 RX ADMIN — PIPERACILLIN SODIUM,TAZOBACTAM SODIUM SCH GM: 3; .375 INJECTION, POWDER, FOR SOLUTION INTRAVENOUS at 09:30

## 2017-06-18 RX ADMIN — HEPARIN SODIUM SCH UNIT: 5000 INJECTION, SOLUTION INTRAVENOUS; SUBCUTANEOUS at 10:55

## 2017-06-18 RX ADMIN — PIPERACILLIN SODIUM,TAZOBACTAM SODIUM SCH MLS/HR: 3; .375 INJECTION, POWDER, FOR SOLUTION INTRAVENOUS at 17:12

## 2017-06-18 RX ADMIN — PIPERACILLIN SODIUM,TAZOBACTAM SODIUM SCH GM: 3; .375 INJECTION, POWDER, FOR SOLUTION INTRAVENOUS at 05:13

## 2017-06-18 RX ADMIN — HEPARIN SODIUM SCH UNIT: 5000 INJECTION, SOLUTION INTRAVENOUS; SUBCUTANEOUS at 21:37

## 2017-06-18 NOTE — HP
Admitting History and Physical





- Primary Care Physician


PCP: Sony Poon





- Admission


Chief Complaint: dysuria


History of Present Illness: 


Recent ER visits for dysuria.  Has h/o bladder dysfunction and recurrent UTIs 

with most recent UTI resistant to oral therapies  Present ER visit precipitated 

by painful urination ad patient is admitted for evaluation and therapy of 

possible UTI requiring parenteral Rx


History Source: Patient, Medical Record


Limitations to Obtaining History: No Limitations





- Past Medical History


CNS: Yes: Other (paraplegia due to accidental fall (T2 spinal fracture))


Renal/: Yes: Other (Bladder dysfunction)


Infectious Disease: Yes: Other (multiple UTIs  Pseudomonas)


Musculoskeletal: Yes: Other (Chronic pain)





- Past Surgical History


Past Surgical History: Yes: Splenectomy





- Smoking History


Smoking history: Never smoked


Have you smoked in the past 12 months: No


Aproximately how many cigarettes per day: 3


If you are a former smoker, when did you quit?: 1986





- Alcohol/Substance Use


Hx Alcohol Use: Yes (OCCASIONALLY)


History of Substance Use: reports: Marijuana





- Social History


ADL: Support Services


History of Recent Travel: No





Home Medications





- Allergies


Allergies/Adverse Reactions: 


 Allergies











Allergy/AdvReac Type Severity Reaction Status Date / Time


 


No Known Allergies Allergy   Verified 06/16/17 03:57














- Home Medications


Home Medications: 


Ambulatory Orders





Diazepam [Valium] 10 mg PO TID PRN #30 tablet MDD 30mg 01/09/17 


Morphine 10 mg/5 ml Liquid [Morphine 10 mg/5 mL Liquid -] 1 mg PO Q4H PRN 03/18/ 17 


Nitrofurantoin Monohyd/M-Cryst [Macrobid -] 100 mg PO BID #14 capsule 06/16/17 











Family Disease History





- Family Disease History


Family Disease History: Heart Disease: Father





Review of Systems





- Review of Systems


Constitutional: reports: Other (Feels cold at usual room temperatures)


Cardiovascular: reports: No Symptoms


Respiratory: reports: No Symptoms


Gastrointestinal: reports: No Symptoms


Genitourinary: reports: Burning, Dysuria


Musculoskeletal: reports: Back Pain


Neurological: reports: Other (paraplegia as above)





Physical Examination


Vital Signs: 


 Vital Signs











Temperature  98.1 F   06/18/17 08:00


 


Pulse Rate  55 L  06/18/17 08:00


 


Respiratory Rate  18   06/18/17 08:00


 


Blood Pressure  158/69   06/18/17 08:00


 


O2 Sat by Pulse Oximetry (%)  96   06/18/17 08:00











Constitutional: Yes: Mild Distress


Eyes: No: Sclera Icterus


HENT: Yes: Atraumatic


Neck: Yes: Supple


Cardiovascular: Yes: Regular Rate and Rhythm


Respiratory: Yes: CTA Bilaterally


Gastrointestinal: Yes: Normal Bowel Sounds, Soft.  No: Tenderness


Edema: No


Neurological: Yes: Alert, Oriented


Labs: 


 CBC, BMP





 06/18/17 06:17 





 06/18/17 06:17 











Problem List





- Problems


(1) Acute UTI


Assessment/Plan: 


Await culture  [facility] Rx for now  ID and urologic assessment


Code(s): N39.0 - URINARY TRACT INFECTION, SITE NOT SPECIFIED





(2) Bladder pain


Assessment/Plan: 


As above


Code(s): R39.89 - OTHER SYMPTOMS AND SIGNS INVOLVING THE GENITOURINARY SYSTEM





(3) Chronic pain


Assessment/Plan: 


Continue current Rx


Code(s): G89.29 - OTHER CHRONIC PAIN   





(4) Paraplegia


Assessment/Plan: 


Chronic stable  Requires home health aid


Code(s): G82.20 - PARAPLEGIA, UNSPECIFIED





(5) Constipation


Assessment/Plan: 


Chronic stable


Code(s): K59.00 - CONSTIPATION, UNSPECIFIED   








Assessment/Plan





Full extensive problem list as per old charts

## 2017-06-18 NOTE — CONSULT
Consult


Consult Specialty:: infectious diseases


Reason for Consultation:: uti





- History of Present Illness


Chief Complaint: suprapubic pain


History of Present Illness: 


patient well known to me who has been admitted for multiple utis was recently 

admitted and discharge


patient again coming back for the same abd pain and says he knows he has 

uti.most of the times patient has been correct about it


patient a little forgetful and is bed bound because of his condition





- History Source


History Provided By: Patient


Limitations to Obtaining History: No Limitations





- Past Medical History


CNS: Yes: Other (paraplegia due to accidental fall (T2 spinal fracture))


Renal/: Yes: Other (Bladder dysfunction)


Infectious Disease: Yes: Other (multiple UTIs  Pseudomonas)


Musculoskeletal: Yes: Other (Chronic pain)





- Past Surgical History


Past Surgical History: Yes: Splenectomy





- Alcohol/Substance Use


Hx Alcohol Use: Yes (OCCASIONALLY)


History of Substance Use: reports: Marijuana





- Smoking History


Smoking history: Never smoked


Have you smoked in the past 12 months: No


Aproximately how many cigarettes per day: 3


If you are a former smoker, when did you quit?: 1986





- Social History


Usual Living Arrangement: With Significant Other


ADL: Support Services


History of Recent Travel: No





Home Medications





- Allergies


Allergies/Adverse Reactions: 


 Allergies











Allergy/AdvReac Type Severity Reaction Status Date / Time


 


No Known Allergies Allergy   Verified 06/16/17 03:57














- Home Medications


Home Medications: 


Ambulatory Orders





Diazepam [Valium] 10 mg PO TID PRN #30 tablet MDD 30mg 01/09/17 


Morphine 10 mg/5 ml Liquid [Morphine 10 mg/5 mL Liquid -] 1 mg PO Q4H PRN 03/18/ 17 


Nitrofurantoin Monohyd/M-Cryst [Macrobid -] 100 mg PO BID #14 capsule 06/16/17 











Family Disease History





- Family Disease History


Family Disease History: Heart Disease: Father





Review of Systems





- Review of Systems


Constitutional: reports: No Symptoms


Eyes: reports: No Symptoms


HENT: reports: No Symptoms


Neck: reports: No Symptoms


Cardiovascular: reports: No Symptoms


Respiratory: reports: No Symptoms


Gastrointestinal: reports: No Symptoms


Genitourinary: reports: Other (suprapubic pain)


Neurological: reports: No Symptoms


Endocrine: reports: No Symptoms


Hematology/Lymphatic: reports: No Symptoms


Psychiatric: reports: No Symptoms





Physical Exam


Vital Signs: 


 Vital Signs











Temperature  98.2 F   06/18/17 14:18


 


Pulse Rate  64   06/18/17 14:18


 


Respiratory Rate  18   06/18/17 14:18


 


Blood Pressure  155/68   06/18/17 14:18


 


O2 Sat by Pulse Oximetry (%)  95   06/18/17 14:18











Constitutional: Yes: Calm, Mild Distress


Eyes: Yes: Conjunctiva Clear


Neck: Yes: Supple


Cardiovascular: Yes: Regular Rate and Rhythm


Respiratory: Yes: Regular, CTA Bilaterally


Gastrointestinal: Yes: Normal Bowel Sounds, Soft


Renal/: Yes: Other (suprapubic pain)


Musculoskeletal: Yes: Other


Extremities: Yes: Other


Neurological: Yes: Alert


Psychiatric: Yes: Alert


Labs: 


 CBC, BMP





 06/18/17 06:17 





 06/18/17 06:17 











Assessment/Plan


roblem List





- Problems


(1) Acute UTI


Code(s): N39.0 - URINARY TRACT INFECTION, SITE NOT SPECIFIED





(2) Bladder pain


Code(s): R39.89 - OTHER SYMPTOMS AND SIGNS INVOLVING THE GENITOURINARY SYSTEM





(3) Chronic pain


Code(s): G89.29 - OTHER CHRONIC PAIN   





(4) Paraplegia


Code(s): G82.20 - PARAPLEGIA, UNSPECIFIED





(5) Constipation


Code(s): K59.00 - CONSTIPATION, UNSPECIFIED  





plan





will start patient on zosyn'


await for cx reports

## 2017-06-19 LAB
ALBUMIN SERPL-MCNC: 3.1 G/DL (ref 3.4–5)
ALBUMIN SERPL-MCNC: 3.1 G/DL (ref 3.4–5)
ALP SERPL-CCNC: 150 U/L (ref 45–117)
ALP SERPL-CCNC: 152 U/L (ref 45–117)
ALT SERPL-CCNC: 18 U/L (ref 12–78)
ALT SERPL-CCNC: 20 U/L (ref 12–78)
ANION GAP SERPL CALC-SCNC: 11 MMOL/L (ref 8–16)
ANION GAP SERPL CALC-SCNC: 7 MMOL/L (ref 8–16)
APTT BLD: 34.9 SECONDS (ref 26.9–34.4)
AST SERPL-CCNC: 19 U/L (ref 15–37)
AST SERPL-CCNC: 22 U/L (ref 15–37)
BASOPHILS # BLD: 0.5 % (ref 0–2)
BASOPHILS # BLD: 0.9 % (ref 0–2)
BILIRUB SERPL-MCNC: 0.5 MG/DL (ref 0.2–1)
BILIRUB SERPL-MCNC: 0.8 MG/DL (ref 0.2–1)
CALCIUM SERPL-MCNC: 8.5 MG/DL (ref 8.5–10.1)
CALCIUM SERPL-MCNC: 8.8 MG/DL (ref 8.5–10.1)
CO2 SERPL-SCNC: 27 MMOL/L (ref 21–32)
CO2 SERPL-SCNC: 28 MMOL/L (ref 21–32)
CREAT SERPL-MCNC: 0.6 MG/DL (ref 0.7–1.3)
CREAT SERPL-MCNC: 0.8 MG/DL (ref 0.7–1.3)
DEPRECATED RDW RBC AUTO: 16.5 % (ref 11.9–15.9)
DEPRECATED RDW RBC AUTO: 16.9 % (ref 11.9–15.9)
EOSINOPHIL # BLD: 1.7 % (ref 0–4.5)
EOSINOPHIL # BLD: 2 % (ref 0–4.5)
GLUCOSE SERPL-MCNC: 107 MG/DL (ref 74–106)
GLUCOSE SERPL-MCNC: 89 MG/DL (ref 74–106)
INR BLD: 0.91 (ref 0.82–1.09)
MAGNESIUM SERPL-MCNC: 2 MG/DL (ref 1.8–2.4)
MCH RBC QN AUTO: 28.9 PG (ref 25.7–33.7)
MCH RBC QN AUTO: 29.5 PG (ref 25.7–33.7)
MCHC RBC AUTO-ENTMCNC: 33.1 G/DL (ref 32–35.9)
MCHC RBC AUTO-ENTMCNC: 33.7 G/DL (ref 32–35.9)
MCV RBC: 87.4 FL (ref 80–96)
MCV RBC: 87.6 FL (ref 80–96)
NEUTROPHILS # BLD: 74.1 % (ref 42.8–82.8)
NEUTROPHILS # BLD: 77.3 % (ref 42.8–82.8)
PLATELET # BLD AUTO: 268 K/MM3 (ref 134–434)
PLATELET # BLD AUTO: 300 K/MM3 (ref 134–434)
PMV BLD: 8.7 FL (ref 7.5–11.1)
PMV BLD: 8.9 FL (ref 7.5–11.1)
PROT SERPL-MCNC: 6.8 G/DL (ref 6.4–8.2)
PROT SERPL-MCNC: 6.9 G/DL (ref 6.4–8.2)
PT PNL PPP: 10 SEC (ref 9.98–11.88)
TROPONIN I SERPL-MCNC: 0.2 NG/ML (ref 0–0.05)
TSH SERPL-ACNC: 0.95 UIU/ML (ref 0.36–3.74)
WBC # BLD AUTO: 10.3 K/MM3 (ref 4–10)
WBC # BLD AUTO: 11.4 K/MM3 (ref 4–10)

## 2017-06-19 RX ADMIN — HEPARIN SODIUM SCH: 5000 INJECTION, SOLUTION INTRAVENOUS; SUBCUTANEOUS at 13:58

## 2017-06-19 RX ADMIN — PIPERACILLIN SODIUM,TAZOBACTAM SODIUM SCH MLS/HR: 3; .375 INJECTION, POWDER, FOR SOLUTION INTRAVENOUS at 18:20

## 2017-06-19 RX ADMIN — PIPERACILLIN SODIUM,TAZOBACTAM SODIUM SCH MLS/HR: 3; .375 INJECTION, POWDER, FOR SOLUTION INTRAVENOUS at 13:07

## 2017-06-19 RX ADMIN — PIPERACILLIN SODIUM,TAZOBACTAM SODIUM SCH MLS/HR: 3; .375 INJECTION, POWDER, FOR SOLUTION INTRAVENOUS at 01:23

## 2017-06-19 RX ADMIN — HEPARIN SODIUM SCH MLS/HR: 5000 INJECTION, SOLUTION INTRAVENOUS at 13:50

## 2017-06-19 RX ADMIN — MORPHINE SULFATE PRN MG: 10 SOLUTION ORAL at 00:29

## 2017-06-19 NOTE — CON.CARD
Consult


Consult Specialty:: Cardiology


Referred by:: Hospitalist Medicine


Reason for Consultation:: Rapid atrial fibrillation





- History of Present Illness


Chief Complaint: Dyspnea, flushing


History of Present Illness: 


60 yo paraplegia due to accidental fall (T2 spinal fracture), chronic pain 

syndrome, neurogenic bladder and recurrent UTIs with most recent UTI resistant 

to oral therapies initially presented with dysuria and started in IV abx, this 

AM reports flushing, hypotension, found to be in rapid afib @ 160, given IVF, 

IV Cardizem, denies chest pain, dyspneam near or true syncope, palpitations, 

orthopnea or LE edema.








- History Source


History Provided By: Medical Record


Limitations to Obtaining History: Poor Historian





- Past Medical History


CNS: Yes: Other (paraplegia due to accidental fall (T2 spinal fracture))


Renal/: Yes: Neurogenic Bladder, Other (Bladder dysfunction)


Infectious Disease: Yes: Other (multiple UTIs  Pseudomonas)


Musculoskeletal: Yes: Other (Chronic pain)





- Past Surgical History


Past Surgical History: Yes: Splenectomy





- Alcohol/Substance Use


Hx Alcohol Use: Yes (OCCASIONALLY)


History of Substance Use: reports: Marijuana





- Smoking History


Smoking history: Never smoked


Have you smoked in the past 12 months: No


Aproximately how many cigarettes per day: 3


If you are a former smoker, when did you quit?: 1986





- Social History


Usual Living Arrangement: With Significant Other


ADL: Support Services


History of Recent Travel: No





Home Medications





- Allergies


Allergies/Adverse Reactions: 


 Allergies











Allergy/AdvReac Type Severity Reaction Status Date / Time


 


No Known Allergies Allergy   Verified 06/16/17 03:57














- Home Medications


Home Medications: 


Ambulatory Orders





Diazepam [Valium] 10 mg PO TID PRN #30 tablet MDD 30mg 01/09/17 


Morphine 10 mg/5 ml Liquid [Morphine 10 mg/5 mL Liquid -] 1 mg PO Q4H PRN 03/18/ 17 


Nitrofurantoin Monohyd/M-Cryst [Macrobid -] 100 mg PO BID #14 capsule 06/16/17 











Family Disease History





- Family Disease History


Family Disease History: Heart Disease: Father





Review of Systems





- Review of Systems


Genitourinary: reports: Dysuria


Vital Signs: 


 Vital Signs











Temperature  98.3 F   06/19/17 07:48


 


Pulse Rate  53 L  06/19/17 07:48


 


Respiratory Rate  18   06/19/17 07:48


 


Blood Pressure  144/70   06/19/17 07:48


 


O2 Sat by Pulse Oximetry (%)  95   06/18/17 21:00











Constitutional: Yes: No Distress, Calm, Thin


Neck: Yes: Supple


Respiratory: Yes: Regular, Diminished


Gastrointestinal: Yes: Normal Bowel Sounds, Soft


Cardiovascular: Yes: Tachycardia, Pulse Irregular


JVD: No


Carotid Bruit: No


Heart Sounds: Yes: S1, S2


Edema: No





- Other Data


Labs, Other Data: 


 CBC, BMP





 06/19/17 09:40 





 06/19/17 09:40 








Afib @ 159 with rate-related changes





Problem List





- Problems


(1) Complicated UTI (urinary tract infection)


Code(s): N39.0 - URINARY TRACT INFECTION, SITE NOT SPECIFIED





(2) Chronic pain


Code(s): G89.29 - OTHER CHRONIC PAIN   Qualifiers: 


     Chronic pain type: chronic pain syndrome        Qualified Code(s): G89.4 - 

Chronic pain syndrome  





(3) Paraplegia


Code(s): G82.20 - PARAPLEGIA, UNSPECIFIED





(4) Rapid atrial fibrillation


Code(s): I48.91 - UNSPECIFIED ATRIAL FIBRILLATION








Assessment/Plan


1. Rapid atrial fibrillation [id number]


2. Neurogenic bladder with chronic UTI


3. Paraplegia referable to T2 compression


4. Chronic pain syndrome





P:1. Attempt rate-control with IV Cardizem and Lopressor with IVF to support BP

, hemodynamics will improve with increase in diastolic filling times, denies 

sxs suggestive of pulm embolism as etioloy for afib, start Lopressor 25 bid as 

hemodynamics tolerate


2. Check TSH, echocardiogram to assess ventricular, valvular and atrial sizes 

and fxn, transfer to telemetry


3. Given low risk score, will not benefit from long-term thrombophylaxis from 

afib standpoint, but certainly warranted for prevention of DVT


4. Complete abx course per C&S


5. Thank you for consultative opportunity

## 2017-06-19 NOTE — HOSP
Subjective





- Review of Symptoms


Events since last encounter: 


Called lab, obtained report of Troponin 3.5.





Subjective: 


Pt denies chest pain, palpitations, lightheadedness, SOB, diff breathing.








Physical Examination


Vital Signs: 


 Vital Signs











Temperature  98.4 F   17 10:30


 


Pulse Rate  84   17 12:00


 


Respiratory Rate  18   17 12:00


 


Blood Pressure  91/56   17 12:00


 


O2 Sat by Pulse Oximetry (%)  94 L  17 09:00








REpeat VS 8pm: /57 Pulse 86, reg


Constitutional: Yes: No Distress


Cardiovascular: Yes: Regular Rate and Rhythm, S1, S2.  No: Murmur


Respiratory: Yes: WNL


Gastrointestinal: Yes: WNL


...Rectal Exam: Yes: Hemorrhoids/Internal


Labs: 


 CBC, BMP





 17 11:59 





 17 11:59 


Repeat EC:


Sinus bradycardia, no acute ST/T wave changes.








Hospitalist Encounter


Assessment: 


Elevated troponin


   - DW Dr. Barnett, wants pt on tele ASAP, [facility] ICU NP Carlo[facility] who accepts pt as 

tele overflow as there are no tele beds available


   - cont heparin drip, no new orders


   - likely due to demand ischemia





New onset afib


   - ? etiology, does not appear septic at this time


   - now in sinus rhythm, cont metoprolol as ordered.


   - tele monitoring

## 2017-06-19 NOTE — EKG
Test Reason : 

Blood Pressure : ***/*** mmHG

Vent. Rate : 159 BPM     Atrial Rate : 113 BPM

   P-R Int : 000 ms          QRS Dur : 088 ms

    QT Int : 308 ms       P-R-T Axes : 000 072 227 degrees

   QTc Int : 501 ms

 

ATRIAL FIBRILLATION WITH RAPID VENTRICULAR RESPONSE

MARKED ST ABNORMALITY, POSSIBLE INFERIOR SUBENDOCARDIAL INJURY

ABNORMAL ECG

WHEN COMPARED WITH ECG OF 28-DEC-2016 18:45,

ATRIAL FIBRILLATION HAS REPLACED SINUS RHYTHM

VENT. RATE HAS INCREASED BY  64 BPM

ST NOW DEPRESSED IN INFERIOR LEADS

ST NOW DEPRESSED IN ANTERIOR LEADS

 

Confirmed by OFE PARKER, JAMES ([id number]) on 6/19/2017 1:21:12 PM

 

Referred By: RADHA ALVAREZ           Confirmed By:JAMES THAKUR MD

## 2017-06-19 NOTE — CONSULT
Consult


Consult Specialty:: urology


Referred by:: medicine


Reason for Consultation:: neurogenic bladder





- History of Present Illness


Chief Complaint: neurogenic bladder


History of Present Illness: 


61 year old male with paraplegia who has been voiding via reflex and Crede for 

years.  He claims that this works well for him.. I last saw him 5 years ago 

when admitted for a UTI.  He is admitted now for a UTI. He describes that he is 

voiding well





- History Source


History Provided By: Patient, Medical Record


Limitations to Obtaining History: No Limitations





- Past Medical History


CNS: Yes: Other (paraplegia due to accidental fall (T2 spinal fracture))


Renal/: Yes: Neurogenic Bladder, Other (Bladder dysfunction)


Infectious Disease: Yes: Other (multiple UTIs  Pseudomonas)


Musculoskeletal: Yes: Other (Chronic pain)





- Past Surgical History


Past Surgical History: Yes: Splenectomy





- Alcohol/Substance Use


Hx Alcohol Use: Yes (OCCASIONALLY)


History of Substance Use: reports: Marijuana





- Smoking History


Smoking history: Never smoked


Have you smoked in the past 12 months: No


Aproximately how many cigarettes per day: 3


If you are a former smoker, when did you quit?: 1986





- Social History


Usual Living Arrangement: With Significant Other


ADL: Support Services


History of Recent Travel: No





Home Medications





- Allergies


Allergies/Adverse Reactions: 


 Allergies











Allergy/AdvReac Type Severity Reaction Status Date / Time


 


No Known Allergies Allergy   Verified 06/16/17 03:57














- Home Medications


Home Medications: 


Ambulatory Orders





Diazepam [Valium] 10 mg PO TID PRN #30 tablet MDD 30mg 01/09/17 


Morphine 10 mg/5 ml Liquid [Morphine 10 mg/5 mL Liquid -] 1 mg PO Q4H PRN 03/18/ 17 


Nitrofurantoin Monohyd/M-Cryst [Macrobid -] 100 mg PO BID #14 capsule 06/16/17 











Family Disease History





- Family Disease History


Family Disease History: Heart Disease: Father





Review of Systems





- Review of Systems


Genitourinary: reports: No Symptoms





Physical Exam


Vital Signs: 


 Vital Signs











Temperature  98.4 F   06/19/17 10:30


 


Pulse Rate  84   06/19/17 12:00


 


Respiratory Rate  18   06/19/17 12:00


 


Blood Pressure  91/56   06/19/17 12:00


 


O2 Sat by Pulse Oximetry (%)  95   06/18/17 21:00











Gastrointestinal: Yes: Soft


Renal/: No: Bladder Distention, CVA Tenderness - Left, CVA Tenderness - Right

, Hematuria


Labs: 


 CBC, BMP





 06/19/17 11:59 





 06/19/17 11:59 











Problem List





- Problems


(1) UTI (urinary tract infection)


Code(s): N39.0 - URINARY TRACT INFECTION, SITE NOT SPECIFIED   Qualifiers: 


     Urinary tract infection type: site unspecified     Hematuria presence: 

without hematuria        Qualified Code(s): N39.0 - Urinary tract infection, 

site not specified  





(2) Neurogenic bladder


Assessment/Plan: 


patient voids via reflex and Crede. he is satisfied with this method.  Would 

treat for UTI and if it does not clear would reintroduce the idea of suprapubic 

tube or self intermittent catheterization though the paitent is resistant to 

these options


Code(s): N31.9 - NEUROMUSCULAR DYSFUNCTION OF BLADDER, UNSPECIFIED

## 2017-06-19 NOTE — PN
Physical Exam: 


SUBJECTIVE: Patient seen and examined at the bedside, states he feels like he 

is "burning up"


Denies chest pain.


Called by RN for hypotension.





OBJECTIVE:


Normal saline bolus of 1000cc x 1 for hypotension


Patient denies chest pain or shortness of breath


heart rate noted to be irregular


ordered repeat CBC/BMP, troponinx 1, EKG stat


Repeat blood and urine cultures ordered


***EKG atrial fibrillation with RVR @ 159: cardiology consult


Will start on heparin drip, ASA 324mg x 1 > transfer to telemonitoring, 

cardiology consulted (Dr. Harley)


Patient hypotensive 80s/40s, will not order betablocker until BP more stable


Spoke to Dr Harley: advised to order cardizem 10mg x 1 to control heart rate 

although hypotensive


 Vital Signs











 Period  Temp  Pulse  Resp  BP Sys/Malone  Pulse Ox


 


 Last 24 Hr  98.0 F-98.5 F  53-64  18-20  102-155/50-73  95-97








GENERAL: The patient is awake, alert, and fully oriented, in no acute distress.


HEAD: Normal with no signs of trauma.


EYES: PERRL, extraocular movements intact, sclera anicteric, conjunctiva clear. 

No ptosis. 


ENT: Ears normal, nares patent, oropharynx clear without exudates, moist mucous 

membranes.


NECK: Trachea midline, full range of motion, supple. 


LUNGS: Breath sounds equal, clear to auscultation bilaterally


HEART: Irregular heart rate 90s -- ekg ordered


ABDOMEN: Soft, nontender, nondistended, normoactive bowel sounds, no guarding, 

no rebound, no hepatosplenomegaly, no masses.


EXTREMITIES: paraplegia, bed bound


NEUROLOGICAL:  Normal speech


PSYCH: Normal mood, normal affect.





 Laboratory Results - last 24 hr











  06/19/17 06/19/17





  09:40 09:40


 


WBC  10.3 H 


 


RBC  4.61 


 


Hgb  13.6 


 


Hct  40.4 


 


MCV  87.6 


 


MCHC  33.7 


 


RDW  16.5 H 


 


Plt Count  268  D 


 


MPV  8.7 


 


Neutrophils %  74.1 


 


Lymphocytes %  13.4  D 


 


Monocytes %  9.6 


 


Eosinophils %  2.0 


 


Basophils %  0.9 


 


Sodium   140


 


Potassium   3.5


 


Chloride   102


 


Carbon Dioxide   27


 


Anion Gap   11


 


BUN   10


 


Creatinine   0.6 L


 


Creat Clearance w eGFR   > 60


 


Random Glucose   107 H D


 


Calcium   8.8


 


Total Bilirubin   0.8  D


 


AST   19


 


ALT   18


 


Alkaline Phosphatase   150 H


 


Total Protein   6.8


 


Albumin   3.1 L








Active Medications











Generic Name Dose Route Start Last Admin





  Trade Name Freq  PRN Reason Stop Dose Admin


 


Acetaminophen  650 mg 06/17/17 17:58  





  Tylenol -  PO   





  Q6H PRN   





  FEVER OR PAIN   


 


Diazepam  10 mg 06/17/17 17:56 06/19/17 00:28





  Valium -  PO   10 mg





  TID PRN   Administration





  muscle spasms   


 


Heparin Sodium (Porcine)  5,000 unit 06/17/17 22:00 06/18/17 21:37





  Heparin -  SQ   5,000 unit





  BID ALE   Administration


 


Piperacillin Sod/Tazobactam Sod  50 mls @ 100 mls/hr 06/18/17 16:00 06/19/17 01:

23





  Zosyn 3.375gm Ivpb (Pre-Docked)  IVPB   100 mls/hr





  Q8H-IV ALE   Administration





  Protocol   


 


Sodium Chloride  1,000 mls @ 1,000 mls/hr 06/19/17 10:40  





  Normal Saline -  IV 06/19/17 11:39  





  ASDIR STA   


 


Morphine Sulfate  4 mg 06/18/17 17:53 06/19/17 00:29





  Morphine 10 Mg/5 Ml Liquid  PO   4 mg





  Q4H PRN   Administration





  PAIN   











ASSESSMENT/PLAN:





The patient is a 61 year old male with a significant past medical history of 

paraplegia below T2, COPD and recurrent UTIs  who presents to the ED on 6/17/ 2017 with acute UTI. 





I was called by primary RN to patient's bedside stat for hypotension and 

patient stating that he feels like he is "burning" or feeling "feverish".  


On exam I noted his heart rate to be irregular.  He denied any shortness of 

breath or chest pain.





EKG 6/19/2017: EKG with atrial fib. with RVR @159, marked ST abnormality, 

possible inferior subcardial injury when compared with EKG of 12/2016, atrial 

fib has replaced sinus rhythm.





Cardiology:


Atrial Fibrillation with RVR - new onset


Assessment/Plan:  EKG confirms atrial fib with RVR


ASA 325mg x 1 ordered, will add ASA 81mg daily


Start on heparin drip


Cardizem 10mg x 1 for rate control


Lopressor BID for rate control


Heparin drip for a/c


+Troponins>0.20, awaiting two more


 


Hypotension - acute


Assessment/Plan: Likely secondary to new onset afib. and sepsis


NS x 1 bolus stat


Cardizem 10mg x 1 for rate control


Vitals q4, monitor on tele > transfer to tele floor initiated, awaiting bed 

assignment


Repeat lactic acid





ID:


Acute UTI


Assessment/Plan: Repeat blood and urine cultures ordered


Repet lactic acid ordered


On Zosyn q8


Monitor blood cultures, vitals


ID consulted and following


Urology following





Muscular/Skeletal:


Paraplegia - chronic


Assessment/Plan: Monitor skin integrity, turn and position q 2, high risk for 

skin breakdown


Pt encouraged to shift his weight, will benefit from a bed trapeze.


Monitor closely





Pain Management - chronic


Assessment/Plan: Continue morphine and Valium





F.E.N.


Fluids:  Normal Saline at 100cc/hr maintenance fluids after fluid bolus


Electrolytes: within normal limits


Can continue regular diet





Prophylaxis:


DVT: heparin drip





Disposition.  Continues to require inpatient hospitalization.  Full Code. 


Coverage for Dr. Ruvalcaba.    








Visit type





- Emergency Visit


Emergency Visit: Yes


ED Registration Date: 06/17/17


Care time: The patient presented to the Emergency Department on the above date 

and was hospitalized for further evaluation of their emergent condition.





- New Patient


This patient is new to me today: Yes


Date on this admission: 06/19/17





- Critical Care


Critical Care patient: No





- Discharge Referral


Referred to [facility] No

## 2017-06-19 NOTE — PN
Progress Note, Physician


History of Present Illness: 


patient stable


but with new afib now





- Current Medication List


Current Medications: 


Active Medications





Acetaminophen (Tylenol -)  650 mg PO Q6H PRN


   PRN Reason: FEVER OR PAIN


Diazepam (Valium -)  10 mg PO TID PRN


   PRN Reason: muscle spasms


   Last Admin: 06/19/17 00:28 Dose:  10 mg


Heparin Sodium (Porcine) (Heparin -)  1,000 unit IVPUSH PRN PRN


   PRN Reason: Heparin


Heparin Sodium (Porcine) (Heparin -)  5,000 unit IVPUSH PRN PRN


   PRN Reason: Heparin


Piperacillin Sod/Tazobactam Sod (Zosyn 3.375gm Ivpb (Pre-Docked))  50 mls @ 100 

mls/hr IVPB Q8H-IV ALE


   PRN Reason: Protocol


   Last Admin: 06/19/17 18:20 Dose:  100 mls/hr


Heparin Sodium/Dextrose (Heparin Infusion -)  500 mls @ 16 mls/hr IVPB TITR ALE

; 800 UNITS/HR


   PRN Reason: Protocol


   Last Admin: 06/19/17 13:50 Dose:  16 mls/hr


Metoprolol Tartrate (Lopressor -)  25 mg PO BID ALE


Morphine Sulfate (Morphine 10 Mg/5 Ml Liquid)  4 mg PO Q4H PRN


   PRN Reason: PAIN


   Last Admin: 06/19/17 00:29 Dose:  4 mg











- Objective


Vital Signs: 


 Vital Signs











Temperature  98.4 F   06/19/17 10:30


 


Pulse Rate  84   06/19/17 12:00


 


Respiratory Rate  18   06/19/17 12:00


 


Blood Pressure  91/56   06/19/17 12:00


 


O2 Sat by Pulse Oximetry (%)  95   06/18/17 21:00











Constitutional: Yes: No Distress, Calm


Cardiovascular: Yes: Pulse Irregular


Respiratory: Yes: Regular


Gastrointestinal: Yes: Normal Bowel Sounds, Soft


Genitourinary: Yes: Other


Musculoskeletal: Yes: Other


Extremities: Yes: Other


Neurological: Yes: Alert


Psychiatric: Yes: Alert


Labs: 


 CBC, BMP





 06/19/17 11:59 





 06/19/17 11:59 





 INR, PTT











INR  0.91  (0.82-1.09)   06/19/17  12:15    














Assessment/Plan


roblem List





- Problems


(1) Acute UTI


Code(s): N39.0 - URINARY TRACT INFECTION, SITE NOT SPECIFIED





(2) Bladder pain


Code(s): R39.89 - OTHER SYMPTOMS AND SIGNS INVOLVING THE GENITOURINARY SYSTEM





(3) Chronic pain


Code(s): G89.29 - OTHER CHRONIC PAIN   





(4) Paraplegia


Code(s): G82.20 - PARAPLEGIA, UNSPECIFIED





(5) Constipation


Code(s): K59.00 - CONSTIPATION, UNSPECIFIED  





6 afib





cx result noted





plan


continue zosyn


mgmt as per medicine


monitor in tele


rest as per primary

## 2017-06-20 LAB
ALBUMIN SERPL-MCNC: 3.3 G/DL (ref 3.4–5)
ALP SERPL-CCNC: 150 U/L (ref 45–117)
ALT SERPL-CCNC: 18 U/L (ref 12–78)
ANION GAP SERPL CALC-SCNC: 8 MMOL/L (ref 8–16)
AST SERPL-CCNC: 29 U/L (ref 15–37)
BASOPHILS # BLD: 1 % (ref 0–2)
BILIRUB SERPL-MCNC: 0.9 MG/DL (ref 0.2–1)
CALCIUM SERPL-MCNC: 8.6 MG/DL (ref 8.5–10.1)
CO2 SERPL-SCNC: 30 MMOL/L (ref 21–32)
CREAT SERPL-MCNC: 0.7 MG/DL (ref 0.7–1.3)
DEPRECATED RDW RBC AUTO: 16.8 % (ref 11.9–15.9)
EOSINOPHIL # BLD: 1.9 % (ref 0–4.5)
GLUCOSE SERPL-MCNC: 92 MG/DL (ref 74–106)
MCH RBC QN AUTO: 29.1 PG (ref 25.7–33.7)
MCHC RBC AUTO-ENTMCNC: 33.1 G/DL (ref 32–35.9)
MCV RBC: 87.8 FL (ref 80–96)
NEUTROPHILS # BLD: 76.2 % (ref 42.8–82.8)
PLATELET # BLD AUTO: 286 K/MM3 (ref 134–434)
PMV BLD: 9.2 FL (ref 7.5–11.1)
PROT SERPL-MCNC: 7.4 G/DL (ref 6.4–8.2)
TROPONIN I SERPL-MCNC: 2.12 NG/ML (ref 0–0.05)
WBC # BLD AUTO: 12.9 K/MM3 (ref 4–10)

## 2017-06-20 RX ADMIN — MORPHINE SULFATE PRN MG: 10 SOLUTION ORAL at 05:15

## 2017-06-20 RX ADMIN — HEPARIN SODIUM SCH MLS/HR: 5000 INJECTION, SOLUTION INTRAVENOUS at 12:35

## 2017-06-20 RX ADMIN — ASPIRIN SCH MG: 81 TABLET, COATED ORAL at 10:05

## 2017-06-20 RX ADMIN — METOPROLOL TARTRATE SCH MG: 25 TABLET, FILM COATED ORAL at 00:02

## 2017-06-20 RX ADMIN — MORPHINE SULFATE PRN MG: 10 SOLUTION ORAL at 21:43

## 2017-06-20 RX ADMIN — PIPERACILLIN SODIUM,TAZOBACTAM SODIUM SCH MLS/HR: 3; .375 INJECTION, POWDER, FOR SOLUTION INTRAVENOUS at 02:00

## 2017-06-20 RX ADMIN — PIPERACILLIN SODIUM,TAZOBACTAM SODIUM SCH MLS/HR: 3; .375 INJECTION, POWDER, FOR SOLUTION INTRAVENOUS at 17:28

## 2017-06-20 RX ADMIN — METOPROLOL TARTRATE SCH MG: 25 TABLET, FILM COATED ORAL at 10:02

## 2017-06-20 RX ADMIN — MORPHINE SULFATE PRN MG: 10 SOLUTION ORAL at 12:34

## 2017-06-20 RX ADMIN — PANTOPRAZOLE SODIUM SCH MG: 40 TABLET, DELAYED RELEASE ORAL at 10:05

## 2017-06-20 RX ADMIN — MORPHINE SULFATE PRN MG: 10 SOLUTION ORAL at 17:26

## 2017-06-20 RX ADMIN — MORPHINE SULFATE PRN MG: 10 SOLUTION ORAL at 20:30

## 2017-06-20 RX ADMIN — METOPROLOL TARTRATE SCH MG: 25 TABLET, FILM COATED ORAL at 21:42

## 2017-06-20 RX ADMIN — MORPHINE SULFATE PRN MG: 10 SOLUTION ORAL at 00:02

## 2017-06-20 RX ADMIN — PIPERACILLIN SODIUM,TAZOBACTAM SODIUM SCH MLS/HR: 3; .375 INJECTION, POWDER, FOR SOLUTION INTRAVENOUS at 10:03

## 2017-06-20 NOTE — PN
Progress Note, Physician


History of Present Illness: 


events noted


patient went into new onset afib


transferred to icu


troponins increased





- Current Medication List


Current Medications: 


Active Medications





Acetaminophen (Tylenol -)  650 mg PO Q6H PRN


   PRN Reason: FEVER OR PAIN


Aspirin (Ecotrin -)  81 mg PO DAILY ALE


   Last Admin: 06/20/17 10:05 Dose:  81 mg


Diazepam (Valium -)  10 mg PO TID PRN


   PRN Reason: muscle spasms


   Last Admin: 06/20/17 00:03 Dose:  10 mg


Heparin Sodium (Porcine) (Heparin -)  1,000 unit IVPUSH PRN PRN


   PRN Reason: Heparin


   Last Admin: 06/20/17 00:30 Dose:  1,000 unit


Heparin Sodium (Porcine) (Heparin -)  5,000 unit IVPUSH PRN PRN


   PRN Reason: Heparin


Piperacillin Sod/Tazobactam Sod (Zosyn 3.375gm Ivpb (Pre-Docked))  50 mls @ 100 

mls/hr IVPB Q8H-IV ALE


   PRN Reason: Protocol


   Last Admin: 06/20/17 17:28 Dose:  100 mls/hr


Heparin Sodium/Dextrose (Heparin Infusion -)  500 mls @ 16 mls/hr IVPB TITR ALE

; 800 UNITS/HR


   PRN Reason: Protocol


   Last Admin: 06/20/17 12:35 Dose:  20 mls/hr


Metoprolol Tartrate (Lopressor -)  25 mg PO BID ALE


   Last Admin: 06/20/17 10:02 Dose:  25 mg


Morphine Sulfate (Morphine 10 Mg/5 Ml Liquid)  4 mg PO Q4H PRN


   PRN Reason: PAIN


   Last Admin: 06/20/17 17:26 Dose:  4 mg


Pantoprazole Sodium (Protonix -)  40 mg PO DAILY [facility]


   Last Admin: 06/20/17 10:05 Dose:  40 mg


Polyethylene Glycol (Miralax (For Daily Use) -)  17 gm PO ONCE ONE


   Stop: 06/21/17 06:01











- Objective


Vital Signs: 


 Vital Signs











Temperature  98.4 F   06/20/17 14:00


 


Pulse Rate  66   06/20/17 14:00


 


Respiratory Rate  18   06/20/17 14:00


 


Blood Pressure  164/72   06/20/17 14:00


 


O2 Sat by Pulse Oximetry (%)  98   06/20/17 09:00











Constitutional: Yes: No Distress, Calm


Cardiovascular: Yes: Pulse Irregular, Other


Respiratory: Yes: Regular


Gastrointestinal: Yes: Normal Bowel Sounds, Soft


Genitourinary: Yes: Other


Musculoskeletal: Yes: Other


Extremities: Yes: Other


Neurological: Yes: Alert, Oriented


Psychiatric: Yes: Alert, Oriented


Labs: 


 CBC, BMP





 06/20/17 08:48 





 06/20/17 08:48 





 INR, PTT











INR  0.91  (0.82-1.09)   06/19/17  12:15    














Assessment/Plan


roblem List





- Problems


(1) Acute UTI


Code(s): N39.0 - URINARY TRACT INFECTION, SITE NOT SPECIFIED





(2) Bladder pain


Code(s): R39.89 - OTHER SYMPTOMS AND SIGNS INVOLVING THE GENITOURINARY SYSTEM





(3) Chronic pain


Code(s): G89.29 - OTHER CHRONIC PAIN   





(4) Paraplegia


Code(s): G82.20 - PARAPLEGIA, UNSPECIFIED





(5) Constipation


Code(s): K59.00 - CONSTIPATION, UNSPECIFIED  





6 afib





cx result noted





plan


continue zosyn


in icu


cardio on case


rest as per icu


close monitoring


blood cx negative


cc time 40 min

## 2017-06-20 NOTE — PN
Physical Exam: 


SUBJECTIVE: Patient seen and examined. 


knows he has chronic UTI's.


denies palpitations, chest pain, fevers, cough, lightheadedness, sob.








OBJECTIVE:





 Vital Signs











 Period  Temp  Pulse  Resp  BP Sys/Malone  Pulse Ox


 


 Last 24 Hr  98.2 F-99 F    18-18  /32-79  94-98











GENERAL: The patient is awake, alert, and fully oriented, in no acute distress.


HEAD: Normal with no signs of trauma.


EYES: PERRL, extraocular movements intact, sclera anicteric, conjunctiva clear. 

No ptosis. 


ENT: oropharynx clear without exudates, moist mucous membranes.


NECK: Trachea midline, full range of motion, supple. 


LUNGS: Breath sounds equal, clear to auscultation bilaterally, no wheezes, no 

crackles, no 


accessory muscle use. quiet at bases


HEART: Regular rate and rhythm, S1, S2 without murmur, rub or gallop.


ABDOMEN: Soft, nontender, nondistended, normoactive bowel sounds


EXTREMITIES: 2+ radial pulses, 1+ dorsalis pedis pulses, warm, well-perfused, 

no edema. freely moving arms. muscular atrophy b/l lower legs. 


NEUROLOGICAL: Normal speech, facial symmetry. paraplegic














 Laboratory Results - last 24 hr











  06/19/17 06/19/17 06/19/17





  09:40 09:40 11:59


 


WBC  10.3 H  


 


RBC  4.61  


 


Hgb  13.6  


 


Hct  40.4  


 


MCV  87.6  


 


MCHC  33.7  


 


RDW  16.5 H  


 


Plt Count  268  D  


 


MPV  8.7  


 


Neutrophils %  74.1  


 


Lymphocytes %  13.4  D  


 


Monocytes %  9.6  


 


Eosinophils %  2.0  


 


Basophils %  0.9  


 


INR   


 


PTT (Actin FS)   


 


Sodium   140  139


 


Potassium   3.5  3.8


 


Chloride   102  104


 


Carbon Dioxide   27  28


 


Anion Gap   11  7 L


 


BUN   10  13  D


 


Creatinine   0.6 L  0.8  D


 


Creat Clearance w eGFR   > 60  > 60


 


Random Glucose   107 H D  89


 


Lactic Acid   


 


Calcium   8.8  8.5


 


Magnesium    2.0


 


Total Bilirubin   0.8  D  0.5  D


 


AST   19  22


 


ALT   18  20


 


Alkaline Phosphatase   150 H  152 H


 


Troponin I    0.20 H


 


Total Protein   6.8  6.9


 


Albumin   3.1 L  3.1 L


 


TSH    0.95














  06/19/17 06/19/17 06/19/17





  11:59 12:00 12:15


 


WBC  11.4 H  


 


RBC  4.83  


 


Hgb  14.0  


 


Hct  42.2  


 


MCV  87.4  


 


MCHC  33.1  


 


RDW  16.9 H  


 


Plt Count  300  


 


MPV  8.9  


 


Neutrophils %  77.3  


 


Lymphocytes %  9.1  D  


 


Monocytes %  11.4 H  


 


Eosinophils %  1.7  


 


Basophils %  0.5  


 


INR    0.91


 


PTT (Actin FS)    34.9 H


 


Sodium   


 


Potassium   


 


Chloride   


 


Carbon Dioxide   


 


Anion Gap   


 


BUN   


 


Creatinine   


 


Creat Clearance w eGFR   


 


Random Glucose   


 


Lactic Acid   


 


Calcium   


 


Magnesium   


 


Total Bilirubin   


 


AST   


 


ALT   


 


Alkaline Phosphatase   


 


Troponin I   


 


Total Protein   


 


Albumin   


 


TSH   Cancelled 














  06/19/17 06/19/17 06/19/17





  17:30 17:30 19:30


 


WBC   


 


RBC   


 


Hgb   


 


Hct   


 


MCV   


 


MCHC   


 


RDW   


 


Plt Count   


 


MPV   


 


Neutrophils %   


 


Lymphocytes %   


 


Monocytes %   


 


Eosinophils %   


 


Basophils %   


 


INR   


 


PTT (Actin FS)    44.6 H


 


Sodium   


 


Potassium   


 


Chloride   


 


Carbon Dioxide   


 


Anion Gap   


 


BUN   


 


Creatinine   


 


Creat Clearance w eGFR   


 


Random Glucose   


 


Lactic Acid   0.7 


 


Calcium   


 


Magnesium   


 


Total Bilirubin   


 


AST   


 


ALT   


 


Alkaline Phosphatase   


 


Troponin I  3.50 H* D  


 


Total Protein   


 


Albumin   


 


TSH   














  06/19/17 06/19/17





  23:00 23:00


 


WBC  


 


RBC  


 


Hgb  


 


Hct  


 


MCV  


 


MCHC  


 


RDW  


 


Plt Count  


 


MPV  


 


Neutrophils %  


 


Lymphocytes %  


 


Monocytes %  


 


Eosinophils %  


 


Basophils %  


 


INR  


 


PTT (Actin FS)  42.5 H 


 


Sodium  


 


Potassium  


 


Chloride  


 


Carbon Dioxide  


 


Anion Gap  


 


BUN  


 


Creatinine  


 


Creat Clearance w eGFR  


 


Random Glucose  


 


Lactic Acid  


 


Calcium  


 


Magnesium  


 


Total Bilirubin  


 


AST  


 


ALT  


 


Alkaline Phosphatase  


 


Troponin I   2.61 H*


 


Total Protein  


 


Albumin  


 


TSH  








Active Medications











Generic Name Dose Route Start Last Admin





  Trade Name Freq  PRN Reason Stop Dose Admin


 


Acetaminophen  650 mg 06/17/17 17:58  





  Tylenol -  PO   





  Q6H PRN   





  FEVER OR PAIN   


 


Aspirin  81 mg 06/20/17 10:00  





  Ecotrin -  PO   





  DAILY ALE   


 


Diazepam  10 mg 06/17/17 17:56 06/20/17 00:03





  Valium -  PO   10 mg





  TID PRN   Administration





  muscle spasms   


 


Heparin Sodium (Porcine)  1,000 unit 06/19/17 11:07 06/20/17 00:30





  Heparin -  IVPUSH   1,000 unit





  PRN PRN   Administration





  Heparin   


 


Heparin Sodium (Porcine)  5,000 unit 06/19/17 11:07  





  Heparin -  IVPUSH   





  PRN PRN   





  Heparin   


 


Piperacillin Sod/Tazobactam Sod  50 mls @ 100 mls/hr 06/18/17 16:00 06/20/17 02:

00





  Zosyn 3.375gm Ivpb (Pre-Docked)  IVPB   100 mls/hr





  Q8H-IV ALE   Administration





  Protocol   


 


Heparin Sodium/Dextrose  500 mls @ 16 mls/hr 06/19/17 11:15 06/20/17 00:30





  Heparin Infusion -  IVPB   1,000 units/hr





  TITR ALE   Titration





  Protocol   





  800 UNITS/HR   


 


Metoprolol Tartrate  25 mg 06/19/17 22:00 06/20/17 00:02





  Lopressor -  PO   25 mg





  BID ALE   Administration


 


Morphine Sulfate  4 mg 06/18/17 17:53 06/20/17 05:15





  Morphine 10 Mg/5 Ml Liquid  PO   4 mg





  Q4H PRN   Administration





  PAIN   











ASSESSMENT/PLAN:


61 yr old man with paraplegia secondary to T2 compression fracture, neurogenic 

bladder, recurrent UTI's, EtOH abuse, marijuana use, bilateral femur fractures 

who presented to the ED with worsening abdominal pain admitted for acute UTI, 

had one episode of paraxosymal Afib and hypotension.


- BP and heart rate improved since transferred to ICU, troponin trending down 





Cardiovascular


Paroxysmal atrial fibrillation, currently in sinus rhythm and rate controlled


heparin drip for afib, cardiology note appreciated, given low [id number] score 0

-1 and normal echo, further anticoagulation to be discussed


pt more interested in staying, will f/u with cardiology regarding further 

cardiac testing


lopressor 25mg po BID


ASA 81mg po daily


consult:dr. sara DAVID


sepsis secondary to UTI - pseudomonas


Zosyn IVPB 3.375gm q8hr


- day 2


bld cx pending


leucocytosis improved


consult: dr. viviane CAREY


- f/u with urologist as outpatient, seen by urologist, no intervention at this 

time, patient is aware of intermittent catheterization and suprapubic tube, 

patient no interested in either at this time


consult: dr. perez





Musculoskeletal


paraplegia


pain control with :


valium 10mg po TID prn


morphine 4mg q4hr prn pain





DVT: heparin drip


Diet: regular diet






































Visit type





- Emergency Visit


Emergency Visit: No





- New Patient


This patient is new to me today: Yes


Date on this admission: 06/20/17





- Critical Care


Critical Care patient: Yes


Total Critical Care Time (in minutes): 36


Critical Care Statement: The care of this patient involved high complexity 

decision making to prevent further life threatening deterioration of the patient

's condition and/or to evalute & treat vital organ system(s) failure or risk of 

failure.

## 2017-06-20 NOTE — PN
Progress Note (short form)





- Note


Progress Note: 


Chief Complaint: Events noted, notes reviewed, denies any chest pain or dyspnea

, currently in sinus rhythm


 


History of Present Illness: 


Seen and examined in the ICU. Events noted, notes reviewed, denies any chest 

pain or dyspnea, currently in sinus rhythm





Patient currently is on Heparin which he wants to be D/C, and wants to be D/C 

home without any further evaluation, ideally patient should be on B-Blockers, +/

- A/C, ASA and additional evaluation recommended including Echocardiography, 

MPI study vs. LHC& coronary angiography (all options were reviewed in detail 

with the patient risk, benefits and alternatives)





Medications: 





 Current Medications





Acetaminophen (Tylenol -)  650 mg PO Q6H PRN


   PRN Reason: FEVER OR PAIN


Diazepam (Valium -)  10 mg PO TID PRN


   PRN Reason: muscle spasms


   Last Admin: 06/20/17 00:03 Dose:  10 mg


Heparin Sodium (Porcine) (Heparin -)  1,000 unit IVPUSH PRN PRN


   PRN Reason: Heparin


   Last Admin: 06/20/17 00:30 Dose:  1,000 unit


Heparin Sodium (Porcine) (Heparin -)  5,000 unit IVPUSH PRN PRN


   PRN Reason: Heparin


Piperacillin Sod/Tazobactam Sod (Zosyn 3.375gm Ivpb (Pre-Docked))  50 mls @ 100 

mls/hr IVPB Q8H-IV ALE


   PRN Reason: Protocol


   Last Admin: 06/20/17 02:00 Dose:  100 mls/hr


Heparin Sodium/Dextrose (Heparin Infusion -)  500 mls @ 16 mls/hr IVPB TITR ALE

; 800 UNITS/HR


   PRN Reason: Protocol


   Last Titration: 06/20/17 00:30 Dose:  1,000 units/hr


Metoprolol Tartrate (Lopressor -)  25 mg PO BID ALE


   Last Admin: 06/20/17 00:02 Dose:  25 mg


Morphine Sulfate (Morphine 10 Mg/5 Ml Liquid)  4 mg PO Q4H PRN


   PRN Reason: PAIN


   Last Admin: 06/20/17 05:15 Dose:  4 mg





Review of Systems





- Review of Systems


Constitutional: no symptoms reported


Respiratory: denies: Cough or Sputum Production


Cardiovascular: As noted above


Gastrointestinal: denies Nausea, Vomiting, Diarrhea, Constipation or Abdominal 

Pain 


Genitourinary: No symptoms reported





Vital Signs: 





 Last Vital Signs











Temp Pulse Resp BP Pulse Ox


 


 98.2 F   57 L  18   164/71   98 


 


 06/20/17 02:00  06/20/17 04:07  06/20/17 04:07  06/20/17 04:07  06/19/17 23:00











Constitutional: No Distress, Calm, Thin


Neck: Supple Negative JVD


Respiratory: Diminished at the Bases


Cardiovascular: S1 S2 Regular Rate and Rhythm No Murmurs


Gastrointestinal: Soft Benign Normal Bowel Sounds


Ext: No Edema





Labs: 





 Troponin, BNP











  06/19/17 06/19/17 06/19/17





  11:59 17:30 23:00


 


Troponin I  0.20 H  3.50 H* D  2.61 H*








 CBC, BMP





 06/19/17 11:59 





 06/19/17 11:59 





 Hepatic Panel











Total Bilirubin  0.5 mg/dL (0.2-1.0)  D 06/19/17  11:59    


 


AST  22 U/L (15-37)   06/19/17  11:59    


 


ALT  20 U/L (12-78)   06/19/17  11:59    


 


Alkaline Phosphatase  152 U/L ()  H  06/19/17  11:59    


 


Albumin  3.1 g/dl (3.4-5.0)  L  06/19/17  11:59    











 Assessment/Plan 





ASSESSMENT:





1. Paroxysmal atrial fibrillation, currently in sinus rhythm [id number] score 

of 0-1


2. CAD with evidence of demand ischemic injury


3. Neurogenic bladder with chronic UTI


4. Paraplegia referable to T2 compression


5. Chronic pain syndrome





PLAN:





1. Continue Lopressor 


2. Ideally ASA would be sufficient considering the above noted [id number] score 

of 0-1, but await echocardiography to evaluate LV function, then decision 

regarding A/C


3. Echocardiography as outlined above


4. Further cardiovascular evaluation as recommended above including MPI study 

vs. LHC& coronary angiography





As outlined patient may sign out against medical advise, recommend outpatient F/

U














Kristine Juarez MD

## 2017-06-20 NOTE — EKG
Test Reason : 

Blood Pressure : ***/*** mmHG

Vent. Rate : 053 BPM     Atrial Rate : 053 BPM

   P-R Int : 126 ms          QRS Dur : 070 ms

    QT Int : 448 ms       P-R-T Axes : 064 071 082 degrees

   QTc Int : 420 ms

 

SINUS BRADYCARDIA

OTHERWISE NORMAL ECG

WHEN COMPARED WITH ECG OF 19-JUN-2017 11:03,

SINUS RHYTHM HAS REPLACED ATRIAL FIBRILLATION

VENT. RATE HAS DECREASED  BPM



Confirmed by JAMES THAKUR MD ([id number]) on 6/20/2017 2:40:02 PM

 

Referred By:  BARI           Confirmed By:JAMES THAKUR MD

## 2017-06-20 NOTE — PN
Teaching Attending Note


Name of Resident: Laurie Gay


ATTENDING PHYSICIAN STATEMENT





I saw and evaluated the patient.


I reviewed the resident's note and discussed the case with the resident.


I agree with the resident's findings and plan as documented.








SUBJECTIVE:


Patient seen and examined in the ICU.  Awake and alert.  Denies CP or SOB. 


Noted troponin levels are down trending.


ECHO was just performed. 





 Intake & Output











 06/17/17 06/18/17 06/19/17 06/20/17





 23:59 23:59 23:59 23:59


 


Intake Total  840 1675 210


 


Output Total   1000 


 


Balance  840 675 210


 


Weight 120 lb 120 lb  








 Last Vital Signs











Temp Pulse Resp BP Pulse Ox


 


 98 F   64   18   152/69   98 


 


 06/20/17 10:00  06/20/17 12:49  06/20/17 12:49  06/20/17 12:49  06/19/17 23:00








Active Medications





Acetaminophen (Tylenol -)  650 mg PO Q6H PRN


   PRN Reason: FEVER OR PAIN


Aspirin (Ecotrin -)  81 mg PO DAILY ALE


   Last Admin: 06/20/17 10:05 Dose:  81 mg


Diazepam (Valium -)  10 mg PO TID PRN


   PRN Reason: muscle spasms


   Last Admin: 06/20/17 00:03 Dose:  10 mg


Heparin Sodium (Porcine) (Heparin -)  1,000 unit IVPUSH PRN PRN


   PRN Reason: Heparin


   Last Admin: 06/20/17 00:30 Dose:  1,000 unit


Heparin Sodium (Porcine) (Heparin -)  5,000 unit IVPUSH PRN PRN


   PRN Reason: Heparin


Piperacillin Sod/Tazobactam Sod (Zosyn 3.375gm Ivpb (Pre-Docked))  50 mls @ 100 

mls/hr IVPB Q8H-IV ALE


   PRN Reason: Protocol


   Last Admin: 06/20/17 10:03 Dose:  100 mls/hr


Heparin Sodium/Dextrose (Heparin Infusion -)  500 mls @ 16 mls/hr IVPB TITR ALE

; 800 UNITS/HR


   PRN Reason: Protocol


   Last Admin: 06/20/17 12:35 Dose:  20 mls/hr


Metoprolol Tartrate (Lopressor -)  25 mg PO BID ALE


   Last Admin: 06/20/17 10:02 Dose:  25 mg


Morphine Sulfate (Morphine 10 Mg/5 Ml Liquid)  4 mg PO Q4H PRN


   PRN Reason: PAIN


   Last Admin: 06/20/17 12:34 Dose:  4 mg


Pantoprazole Sodium (Protonix -)  40 mg PO DAILY ALE


   Last Admin: 06/20/17 10:05 Dose:  40 mg


Potassium Chloride (K-Dur -)  20 meq PO ONCE ONE


   Stop: 06/20/17 14:01





Constitutional: No Distress, Thin


Neck: Supple (-) JVD


Respiratory: Diminished at the Bases


Cardiovascular: S1 S2 RRR, (-) murmurs


Gastrointestinal: Soft Benign Normal Bowel Sounds


Ext: No Edema, contracted 





Labs: 





 


 Laboratory Results - last 24 hr











  06/19/17 06/19/17 06/19/17





  11:59 12:15 17:30


 


WBC   


 


RBC   


 


Hgb   


 


Hct   


 


MCV   


 


MCHC   


 


RDW   


 


Plt Count   


 


MPV   


 


Neutrophils %   


 


Lymphocytes %   


 


Monocytes %   


 


Eosinophils %   


 


Basophils %   


 


INR   0.91 


 


PTT (Actin FS)   34.9 H 


 


Sodium  139  


 


Potassium  3.8  


 


Chloride  104  


 


Carbon Dioxide  28  


 


Anion Gap  7 L  


 


BUN  13  D  


 


Creatinine  0.8  D  


 


Creat Clearance w eGFR  > 60  


 


Random Glucose  89  


 


Lactic Acid   


 


Calcium  8.5  


 


Magnesium  2.0  


 


Total Bilirubin  0.5  D  


 


AST  22  


 


ALT  20  


 


Alkaline Phosphatase  152 H  


 


Creatine Kinase   


 


Creatine Kinase Index   


 


CK-MB (CK-2)   


 


CK-MB (CK-2) Rel Index   


 


Troponin I  0.20 H   3.50 H* D


 


Total Protein  6.9  


 


Albumin  3.1 L  


 


TSH  0.95  














  06/19/17 06/19/17 06/19/17





  17:30 19:30 23:00


 


WBC   


 


RBC   


 


Hgb   


 


Hct   


 


MCV   


 


MCHC   


 


RDW   


 


Plt Count   


 


MPV   


 


Neutrophils %   


 


Lymphocytes %   


 


Monocytes %   


 


Eosinophils %   


 


Basophils %   


 


INR   


 


PTT (Actin FS)   44.6 H  42.5 H


 


Sodium   


 


Potassium   


 


Chloride   


 


Carbon Dioxide   


 


Anion Gap   


 


BUN   


 


Creatinine   


 


Creat Clearance w eGFR   


 


Random Glucose   


 


Lactic Acid  0.7  


 


Calcium   


 


Magnesium   


 


Total Bilirubin   


 


AST   


 


ALT   


 


Alkaline Phosphatase   


 


Creatine Kinase   


 


Creatine Kinase Index   


 


CK-MB (CK-2)   


 


CK-MB (CK-2) Rel Index   


 


Troponin I   


 


Total Protein   


 


Albumin   


 


TSH   














  06/19/17 06/20/17 06/20/17





  23:00 08:48 08:48


 


WBC   12.9 H 


 


RBC   4.77 


 


Hgb   13.8 


 


Hct   41.8 


 


MCV   87.8 


 


MCHC   33.1 


 


RDW   16.8 H 


 


Plt Count   286 


 


MPV   9.2 


 


Neutrophils %   76.2 


 


Lymphocytes %   12.8  D 


 


Monocytes %   8.1 


 


Eosinophils %   1.9 


 


Basophils %   1.0 


 


INR   


 


PTT (Actin FS)   


 


Sodium    141


 


Potassium    3.0 L D


 


Chloride    103


 


Carbon Dioxide    30


 


Anion Gap    8


 


BUN    9  D


 


Creatinine    0.7


 


Creat Clearance w eGFR    > 60


 


Random Glucose    92


 


Lactic Acid   


 


Calcium    8.6


 


Magnesium   


 


Total Bilirubin    0.9  D


 


AST    29  D


 


ALT    18


 


Alkaline Phosphatase    150 H


 


Creatine Kinase   


 


Creatine Kinase Index   


 


CK-MB (CK-2)   


 


CK-MB (CK-2) Rel Index   


 


Troponin I  2.61 H*  


 


Total Protein    7.4


 


Albumin    3.3 L


 


TSH   














  06/20/17 06/20/17 06/20/17





  08:48 08:48 08:48


 


WBC   


 


RBC   


 


Hgb   


 


Hct   


 


MCV   


 


MCHC   


 


RDW   


 


Plt Count   


 


MPV   


 


Neutrophils %   


 


Lymphocytes %   


 


Monocytes %   


 


Eosinophils %   


 


Basophils %   


 


INR   


 


PTT (Actin FS)  64.7 H D  


 


Sodium   


 


Potassium   


 


Chloride   


 


Carbon Dioxide   


 


Anion Gap   


 


BUN   


 


Creatinine   


 


Creat Clearance w eGFR   


 


Random Glucose   


 


Lactic Acid   


 


Calcium   


 


Magnesium   


 


Total Bilirubin   


 


AST   


 


ALT   


 


Alkaline Phosphatase   


 


Creatine Kinase   200 


 


Creatine Kinase Index   2.6 


 


CK-MB (CK-2)   5.232 H 


 


CK-MB (CK-2) Rel Index    Cancelled


 


Troponin I   2.12 H* 


 


Total Protein   


 


Albumin   


 


TSH   











 Assessment/Plan 





ASSESSMENT:


AMI : demand ischemia 


Paroxysmal atrial fibrillation -> NSR 


CAD 


Neurogenic bladder with chronic UTI


Paraplegia due to Fall and T2 compression


Chronic pain syndrome





PLAN:


Check ECHO 


Lopressor 


O2 as needed


ASA


Further cardiac testing per Cardiology 





Dr Machado

## 2017-06-21 LAB
ALBUMIN SERPL-MCNC: 2.9 G/DL (ref 3.4–5)
ALP SERPL-CCNC: 119 U/L (ref 45–117)
ALT SERPL-CCNC: 18 U/L (ref 12–78)
ANION GAP SERPL CALC-SCNC: 8 MMOL/L (ref 8–16)
AST SERPL-CCNC: 24 U/L (ref 15–37)
BASOPHILS # BLD: 1.3 % (ref 0–2)
BILIRUB SERPL-MCNC: 0.8 MG/DL (ref 0.2–1)
CALCIUM SERPL-MCNC: 8.1 MG/DL (ref 8.5–10.1)
CO2 SERPL-SCNC: 28 MMOL/L (ref 21–32)
CREAT SERPL-MCNC: 0.6 MG/DL (ref 0.7–1.3)
DEPRECATED RDW RBC AUTO: 17 % (ref 11.9–15.9)
EOSINOPHIL # BLD: 3.2 % (ref 0–4.5)
GLUCOSE SERPL-MCNC: 76 MG/DL (ref 74–106)
MAGNESIUM SERPL-MCNC: 1.8 MG/DL (ref 1.8–2.4)
MCH RBC QN AUTO: 29.2 PG (ref 25.7–33.7)
MCHC RBC AUTO-ENTMCNC: 33.2 G/DL (ref 32–35.9)
MCV RBC: 88 FL (ref 80–96)
NEUTROPHILS # BLD: 67.8 % (ref 42.8–82.8)
PLATELET # BLD AUTO: 272 K/MM3 (ref 134–434)
PLATELET # BLD EST: ADEQUATE 10*3/UL
PLATELET COMMENT2: (no result)
PMV BLD: 9.5 FL (ref 7.5–11.1)
PROT SERPL-MCNC: 6.6 G/DL (ref 6.4–8.2)
WBC # BLD AUTO: 11 K/MM3 (ref 4–10)

## 2017-06-21 RX ADMIN — PIPERACILLIN SODIUM,TAZOBACTAM SODIUM SCH MLS/HR: 3; .375 INJECTION, POWDER, FOR SOLUTION INTRAVENOUS at 20:01

## 2017-06-21 RX ADMIN — HEPARIN SODIUM SCH UNIT: 5000 INJECTION, SOLUTION INTRAVENOUS; SUBCUTANEOUS at 21:09

## 2017-06-21 RX ADMIN — PANTOPRAZOLE SODIUM SCH MG: 40 TABLET, DELAYED RELEASE ORAL at 09:24

## 2017-06-21 RX ADMIN — PIPERACILLIN SODIUM,TAZOBACTAM SODIUM SCH MLS/HR: 3; .375 INJECTION, POWDER, FOR SOLUTION INTRAVENOUS at 09:24

## 2017-06-21 RX ADMIN — METOPROLOL TARTRATE SCH MG: 25 TABLET, FILM COATED ORAL at 21:08

## 2017-06-21 RX ADMIN — MORPHINE SULFATE PRN MG: 10 SOLUTION ORAL at 02:29

## 2017-06-21 RX ADMIN — MORPHINE SULFATE PRN MG: 10 SOLUTION ORAL at 09:49

## 2017-06-21 RX ADMIN — PIPERACILLIN SODIUM,TAZOBACTAM SODIUM SCH MLS/HR: 3; .375 INJECTION, POWDER, FOR SOLUTION INTRAVENOUS at 02:54

## 2017-06-21 RX ADMIN — MORPHINE SULFATE PRN MG: 10 SOLUTION ORAL at 14:34

## 2017-06-21 RX ADMIN — ASPIRIN SCH MG: 81 TABLET, COATED ORAL at 09:25

## 2017-06-21 RX ADMIN — METOPROLOL TARTRATE SCH MG: 25 TABLET, FILM COATED ORAL at 09:25

## 2017-06-21 NOTE — PN
Teaching Attending Note


Name of Resident: Laurie Gay


ATTENDING PHYSICIAN STATEMENT





I saw and evaluated the patient.


I reviewed the resident's note and discussed the case with the resident.


I agree with the resident's findings and plan as documented.








SUBJECTIVE:





Pt seen and examined in the ICU. Remains in sinus rhythm. Denies shortness of 

breath or chest pain. Denies abdominal pain. No bowel movements x 4 day.





OBJECTIVE:





 Last Vital Signs











Temp Pulse Resp BP Pulse Ox


 


 98.1 F   63   19   111/56   98 


 


 06/21/17 10:00  06/21/17 10:00  06/21/17 10:00  06/21/17 10:00  06/20/17 21:00








 Intake & Output











 06/18/17 06/19/17 06/20/17 06/21/17





 23:59 23:59 23:59 23:59


 


Intake Total 840 1675 1510 490


 


Output Total  1000 2600 400


 


Balance 840 675 -1090 90


 


Weight 120 lb   109 lb 2 oz








Gen:  NAD at rest


Heart:  RRR


Lung: decreased breath sounds at the bases


Abd: soft, nontender


Ext: contracted, no edema





 CBC, BMP





 06/21/17 05:20 





 06/21/17 05:20 





Active Medications





Acetaminophen (Tylenol -)  650 mg PO Q6H PRN


   PRN Reason: FEVER OR PAIN


Aspirin (Ecotrin -)  81 mg PO DAILY [facility]


   Last Admin: 06/21/17 09:25 Dose:  81 mg


Diazepam (Valium -)  10 mg PO TID PRN


   PRN Reason: muscle spasms


   Last Admin: 06/20/17 23:54 Dose:  10 mg


Piperacillin Sod/Tazobactam Sod (Zosyn 3.375gm Ivpb (Pre-Docked))  50 mls @ 100 

mls/hr IVPB Q8H-IV ALE


   PRN Reason: Protocol


   Last Admin: 06/21/17 09:24 Dose:  100 mls/hr


Metoprolol Tartrate (Lopressor -)  25 mg PO BID [facility]


   Last Admin: 06/21/17 09:25 Dose:  25 mg


Morphine Sulfate (Morphine 10 Mg/5 Ml Liquid)  4 mg PO Q4H PRN


   PRN Reason: PAIN


   Last Admin: 06/21/17 09:49 Dose:  4 mg


Pantoprazole Sodium (Protonix -)  40 mg PO DAILY [facility]


   Last Admin: 06/21/17 09:24 Dose:  40 mg








ASSESSMENT AND PLAN:


Paroxysmal Atrial Fibrillation now in sinus


CAD/+Troponins/Demand Ischemia


UTI


Neurogenic Bladder


Paraplegia





-  rhythm controlled


-  can stop heparin gtt


-  ASA


-  cardiac work up in progress


-  continue antibiotics


-  bowel regimen


-  can monitor on floor

## 2017-06-21 NOTE — PN
Progress Note, Physician


History of Present Illness: 


Remains in SR, denies chest pain or dyspnea, trops have peaked.





- Current Medication List


Current Medications: 


Active Medications





Acetaminophen (Tylenol -)  650 mg PO Q6H PRN


   PRN Reason: FEVER OR PAIN


Aspirin (Ecotrin -)  81 mg PO DAILY [facility]


   Last Admin: 06/21/17 09:25 Dose:  81 mg


Diazepam (Valium -)  10 mg PO TID PRN


   PRN Reason: muscle spasms


   Last Admin: 06/21/17 14:29 Dose:  10 mg


Heparin Sodium (Porcine) (Heparin -)  5,000 unit SQ BID [facility]


Piperacillin Sod/Tazobactam Sod (Zosyn 3.375gm Ivpb (Pre-Docked))  50 mls @ 100 

mls/hr IVPB Q8H-IV ALE


   PRN Reason: Protocol


   Last Admin: 06/21/17 09:24 Dose:  100 mls/hr


Metoprolol Tartrate (Lopressor -)  25 mg PO BID [facility]


   Last Admin: 06/21/17 09:25 Dose:  25 mg


Morphine Sulfate (Morphine 10 Mg/5 Ml Liquid)  4 mg PO Q4H PRN


   PRN Reason: PAIN


   Last Admin: 06/21/17 14:34 Dose:  4 mg


Pantoprazole Sodium (Protonix -)  40 mg PO DAILY [facility]


   Last Admin: 06/21/17 09:24 Dose:  40 mg











- Objective


Vital Signs: 


 Vital Signs











Temperature  97.8 F   06/21/17 14:00


 


Pulse Rate  57 L  06/21/17 14:00


 


Respiratory Rate  20   06/21/17 14:00


 


Blood Pressure  107/59   06/21/17 14:00


 


O2 Sat by Pulse Oximetry (%)  98   06/21/17 09:00











Constitutional: Yes: No Distress, Calm


Neck: Yes: Supple, Tenderness


Respiratory: Yes: Regular, Diminished, On Nasal O2


Gastrointestinal: Yes: Normal Bowel Sounds, Soft


Edema: No


Labs: 


 CBC, BMP





 06/21/17 05:20 





 06/21/17 05:20 





 INR, PTT











INR  0.91  (0.82-1.09)   06/19/17  12:15    














Problem List





- Problems


(1) Complicated UTI (urinary tract infection)


Code(s): N39.0 - URINARY TRACT INFECTION, SITE NOT SPECIFIED





(2) Chronic pain


Code(s): G89.29 - OTHER CHRONIC PAIN   Qualifiers: 


     Chronic pain type: chronic pain syndrome        Qualified Code(s): G89.4 - 

Chronic pain syndrome  





(3) Paraplegia


Code(s): G82.20 - PARAPLEGIA, UNSPECIFIED





(4) Rapid atrial fibrillation


Code(s): I48.91 - UNSPECIFIED ATRIAL FIBRILLATION





(5) Neurogenic bladder


Code(s): N31.9 - NEUROMUSCULAR DYSFUNCTION OF BLADDER, UNSPECIFIED





(6) Demand ischemia


Code(s): I24.8 - OTHER FORMS OF ACUTE ISCHEMIC HEART DISEASE








Assessment/Plan


6/20/2017 Echo: Normal LV size and fxn, mild MR, TR





1. Paroxysmal atrial fibrillation, currently in sinus rhythm [id number] score 

of 0-1


2. CAD with evidence of demand ischemic injury, trops have peaked


3. Neurogenic bladder with chronic UTI


4. Paraplegia referable to T2 compression


5. Chronic pain syndrome





PLAN:





1. Continue Lopressor 25 bid, ASA 81 qd


2. Given low [id number] score, patient would not benefit from A/C


3. Complete abx course per ID


4. DVT and GI prophylaxis

## 2017-06-21 NOTE — PN
Progress Note, Physician


Chief Complaint: 


Mr Sumner says he is feeling better today. No cp, sob, n/v. Says he feels a 

little bloated as his last bowel movement was four days ago. Taking miralax.





- Current Medication List


Current Medications: 


Active Medications





Acetaminophen (Tylenol -)  650 mg PO Q6H PRN


   PRN Reason: FEVER OR PAIN


Aspirin (Ecotrin -)  81 mg PO DAILY ALE


   Last Admin: 06/21/17 09:25 Dose:  81 mg


Diazepam (Valium -)  10 mg PO TID PRN


   PRN Reason: muscle spasms


   Last Admin: 06/20/17 23:54 Dose:  10 mg


Heparin Sodium (Porcine) (Heparin -)  1,000 unit IVPUSH PRN PRN


   PRN Reason: Heparin


   Last Admin: 06/20/17 00:30 Dose:  1,000 unit


Heparin Sodium (Porcine) (Heparin -)  5,000 unit IVPUSH PRN PRN


   PRN Reason: Heparin


Piperacillin Sod/Tazobactam Sod (Zosyn 3.375gm Ivpb (Pre-Docked))  50 mls @ 100 

mls/hr IVPB Q8H-IV ALE


   PRN Reason: Protocol


   Last Admin: 06/21/17 09:24 Dose:  100 mls/hr


Heparin Sodium/Dextrose (Heparin Infusion -)  500 mls @ 16 mls/hr IVPB TITR ALE

; 800 UNITS/HR


   PRN Reason: Protocol


   Last Admin: 06/20/17 12:35 Dose:  20 mls/hr


Metoprolol Tartrate (Lopressor -)  25 mg PO BID ALE


   Last Admin: 06/21/17 09:25 Dose:  25 mg


Morphine Sulfate (Morphine 10 Mg/5 Ml Liquid)  4 mg PO Q4H PRN


   PRN Reason: PAIN


   Last Admin: 06/21/17 09:49 Dose:  4 mg


Pantoprazole Sodium (Protonix -)  40 mg PO DAILY ALE


   Last Admin: 06/21/17 09:24 Dose:  40 mg











- Objective


Vital Signs: 


 Vital Signs











Temperature  98.1 F   06/21/17 10:00


 


Pulse Rate  63   06/21/17 10:00


 


Respiratory Rate  19   06/21/17 10:00


 


Blood Pressure  111/56   06/21/17 10:00


 


O2 Sat by Pulse Oximetry (%)  98   06/20/17 21:00











Constitutional: Yes: Well Nourished, No Distress, Calm


Cardiovascular: Yes: Bradycardia.  No: Gallop, Murmur, Rub


Respiratory: Yes: Regular, CTA Bilaterally.  No: Rales, Rhonchi, Wheezes


Extremities: Yes: WNL


Edema: No


Labs: 


 CBC, BMP





 06/21/17 05:20 





 06/21/17 05:20 





 INR, PTT











INR  0.91  (0.82-1.09)   06/19/17  12:15    














Problem List





- Problems


(1) Complicated UTI (urinary tract infection)


Assessment/Plan: 


-patient with history of neurogenic bladder and repeated UTIs


-growing pseudomonas, present on last admission as well


-susceptible to zosyn


-ID following


-continue zosyn day 3


Code(s): N39.0 - URINARY TRACT INFECTION, SITE NOT SPECIFIED





(2) Rapid atrial fibrillation


Assessment/Plan: 


-now sinus janeen


-cardiology following


-ECHO report reviewed


-on heparin gtt


-will await cardiology recommendations concerning anticoagulation


-may not need chronic anticoagulation outside of aspirin


Code(s): I48.91 - UNSPECIFIED ATRIAL FIBRILLATION





(3) Elevated troponin I level


Assessment/Plan: 


-suspect stress induced from UTI and atrial fibrillation


-decreasing


-cardiology following


Code(s): R74.8 - ABNORMAL LEVELS OF OTHER SERUM ENZYMES





(4) Chronic pain


Assessment/Plan: 


-continue current management


Code(s): G89.29 - OTHER CHRONIC PAIN   Qualifiers: 


     Chronic pain type: chronic pain syndrome        Qualified Code(s): G89.4 - 

Chronic pain syndrome  





(5) Paraplegia


Assessment/Plan: 


-chronic


Code(s): G82.20 - PARAPLEGIA, UNSPECIFIED

## 2017-06-21 NOTE — PN
Physical Exam: 


SUBJECTIVE: Patient seen and examined


passed "alot" of gas this morning after miralax, abdominal pain decreased.


wants to wait for bowel movement to pass before trying any other measures.





OBJECTIVE:





 Vital Signs











 Period  Temp  Pulse  Resp  BP Sys/Malone  Pulse Ox


 


 Last 24 Hr  97.4 F-98.4 F  52-66  16-20  114-172/58-86  98-98











GENERAL: The patient is in no acute distress.


EYES:extraocular movements intact, sclera anicteric, conjunctiva clear


ENT: oropharynx clear without exudates, moist mucous membranes.


LUNGS: Breath sounds equal, clear to auscultation bilaterally, no wheezes, no 

crackles, no 


accessory muscle use. quiet at bases


HEART: Regular rate and rhythm, S1, S2 without murmur, rub or gallop.


ABDOMEN: Soft, nontender, nondistended, normoactive bowel sounds


EXTREMITIES: 2+ radial pulses, 1+ dorsalis pedis pulses, warm, well-perfused, 

no edema. freely moving arms. muscular atrophy b/l lower legs. 


NEUROLOGICAL: Normal speech, facial symmetry. paraplegic

















 Laboratory Results - last 24 hr











  06/20/17 06/20/17 06/20/17





  08:48 08:48 08:48


 


WBC  12.9 H  


 


RBC  4.77  


 


Hgb  13.8  


 


Hct  41.8  


 


MCV  87.8  


 


MCHC  33.1  


 


RDW  16.8 H  


 


Plt Count  286  


 


MPV  9.2  


 


Neutrophils %  76.2  


 


Lymphocytes %  12.8  D  


 


Monocytes %  8.1  


 


Eosinophils %  1.9  


 


Basophils %  1.0  


 


PTT (Actin FS)    64.7 H D


 


Sodium   141 


 


Potassium   3.0 L D 


 


Chloride   103 


 


Carbon Dioxide   30 


 


Anion Gap   8 


 


BUN   9  D 


 


Creatinine   0.7 


 


Creat Clearance w eGFR   > 60 


 


Random Glucose   92 


 


Calcium   8.6 


 


Magnesium   


 


Total Bilirubin   0.9  D 


 


AST   29  D 


 


ALT   18 


 


Alkaline Phosphatase   150 H 


 


Creatine Kinase   


 


Creatine Kinase Index   


 


CK-MB (CK-2)   


 


CK-MB (CK-2) Rel Index   


 


Troponin I   


 


Total Protein   7.4 


 


Albumin   3.3 L 














  06/20/17 06/20/17 06/20/17





  08:48 08:48 15:00


 


WBC   


 


RBC   


 


Hgb   


 


Hct   


 


MCV   


 


MCHC   


 


RDW   


 


Plt Count   


 


MPV   


 


Neutrophils %   


 


Lymphocytes %   


 


Monocytes %   


 


Eosinophils %   


 


Basophils %   


 


PTT (Actin FS)   


 


Sodium   


 


Potassium   


 


Chloride   


 


Carbon Dioxide   


 


Anion Gap   


 


BUN   


 


Creatinine   


 


Creat Clearance w eGFR   


 


Random Glucose   


 


Calcium   


 


Magnesium   


 


Total Bilirubin   


 


AST   


 


ALT   


 


Alkaline Phosphatase   


 


Creatine Kinase  200  


 


Creatine Kinase Index  2.6  


 


CK-MB (CK-2)  5.232 H  


 


CK-MB (CK-2) Rel Index   Cancelled 


 


Troponin I  2.12 H*   1.38 H* D


 


Total Protein   


 


Albumin   














  06/21/17 06/21/17 06/21/17





  05:20 05:20 05:20


 


WBC   11.0 H 


 


RBC   4.43 


 


Hgb   12.9 


 


Hct   39.0 


 


MCV   88.0 


 


MCHC   33.2 


 


RDW   17.0 H 


 


Plt Count   


 


MPV   


 


Neutrophils %   67.8 


 


Lymphocytes %   14.6 


 


Monocytes %   13.1 H 


 


Eosinophils %   3.2 


 


Basophils %   1.3 


 


PTT (Actin FS)  67.0 H  


 


Sodium    140


 


Potassium    3.6


 


Chloride    104


 


Carbon Dioxide    28


 


Anion Gap    8


 


BUN    7  D


 


Creatinine    0.6 L


 


Creat Clearance w eGFR    > 60


 


Random Glucose    76


 


Calcium    8.1 L


 


Magnesium    1.8


 


Total Bilirubin    0.8


 


AST    24


 


ALT    18


 


Alkaline Phosphatase    119 H D


 


Creatine Kinase   


 


Creatine Kinase Index   


 


CK-MB (CK-2)   


 


CK-MB (CK-2) Rel Index   


 


Troponin I   


 


Total Protein    6.6


 


Albumin    2.9 L








Active Medications











Generic Name Dose Route Start Last Admin





  Trade Name Freq  PRN Reason Stop Dose Admin


 


Acetaminophen  650 mg 06/17/17 17:58  





  Tylenol -  PO   





  Q6H PRN   





  FEVER OR PAIN   


 


Aspirin  81 mg 06/20/17 10:00 06/20/17 10:05





  Ecotrin -  PO   81 mg





  DAILY ALE   Administration


 


Diazepam  10 mg 06/17/17 17:56 06/20/17 23:54





  Valium -  PO   10 mg





  TID PRN   Administration





  muscle spasms   


 


Heparin Sodium (Porcine)  1,000 unit 06/19/17 11:07 06/20/17 00:30





  Heparin -  IVPUSH   1,000 unit





  PRN PRN   Administration





  Heparin   


 


Heparin Sodium (Porcine)  5,000 unit 06/19/17 11:07  





  Heparin -  IVPUSH   





  PRN PRN   





  Heparin   


 


Piperacillin Sod/Tazobactam Sod  50 mls @ 100 mls/hr 06/18/17 16:00 06/21/17 02:

54





  Zosyn 3.375gm Ivpb (Pre-Docked)  IVPB   100 mls/hr





  Q8H-IV ALE   Administration





  Protocol   


 


Heparin Sodium/Dextrose  500 mls @ 16 mls/hr 06/19/17 11:15 06/20/17 12:35





  Heparin Infusion -  IVPB   20 mls/hr





  TITR ALE   Administration





  Protocol   





  800 UNITS/HR   


 


Metoprolol Tartrate  25 mg 06/19/17 22:00 06/20/17 21:42





  Lopressor -  PO   25 mg





  BID ALE   Administration


 


Morphine Sulfate  4 mg 06/18/17 17:53 06/21/17 02:29





  Morphine 10 Mg/5 Ml Liquid  PO   4 mg





  Q4H PRN   Administration





  PAIN   


 


Pantoprazole Sodium  40 mg 06/20/17 10:00 06/20/17 10:05





  Protonix -  PO   40 mg





  DAILY ALE   Administration








 Microbiology





06/19/17 11:59   Blood - Peripheral Venous   Blood Culture - Preliminary


                            NO GROWTH OBTAINED AFTER 48 HOURS, INCUBATION TO 

CONTINUE


                            FOR 3 DAYS.


06/19/17 11:59   Blood - Peripheral Venous   Blood Culture - Preliminary


                            NO GROWTH OBTAINED AFTER 48 HOURS, INCUBATION TO 

CONTINUE


                            FOR 3 DAYS.


06/17/17 16:55   Urine - Urine Campbell   Urine Culture - Final


                            Pseudomonas Aeruginosa











ASSESSMENT/PLAN:


61 yr old man with paraplegia secondary to T2 compression fracture, neurogenic 

bladder, recurrent UTI's, EtOH abuse, marijuana use, bilateral femur fractures 

who presented to the ED with worsening abdominal pain admitted for acute UTI, 

had one episode of paraxosymal Afib and hypotension.








Cardiovascular


Paroxysmal atrial fibrillation, currently in sinus rhythm and rate controlled


heparin drip for afib dc'd, discussed with cardiology: patient's chadsvasc 

score of 0-1, patient does not require AC for afib, consider anticoagulation 

for his risk of DVT due to his immobility. 


lopressor 25mg po BID


ASA 81mg po daily


consult:dr. sara DAVID


sepsis secondary to UTI - pseudomonas


Zosyn IVPB 3.375gm q8hr


- day 3


bld cx NGTD


leucocytosis improved


consult: dr. viviane CAREY


- f/u with urologist as outpatient, seen by urologist, no intervention at this 

time, patient is aware of intermittent catheterization and suprapubic tube, 

patient no interested in either at this time


consult: dr. perez





Musculoskeletal


paraplegia


pain control with :


valium 10mg po TID prn


morphine 4mg q4hr prn pain





DVT: heparin TID


Diet: regular diet


Dispo: can be monitored on Med/surg since to arrhythmias noted on ICU monitor 

and patient is currently in NSR.





Visit type





- Emergency Visit


Emergency Visit: No





- New Patient


This patient is new to me today: No





- Critical Care


Critical Care patient: Yes


Total Critical Care Time (in minutes): 36


Critical Care Statement: The care of this patient involved high complexity 

decision making to prevent further life threatening deterioration of the patient

's condition and/or to evalute & treat vital organ system(s) failure or risk of 

failure.

## 2017-06-21 NOTE — PN
Progress Note, Physician


History of Present Illness: 


continues to be in afib


clinically stable


no complaints


still ahs vague abd discomfort





- Current Medication List


Current Medications: 


Active Medications





Acetaminophen (Tylenol -)  650 mg PO Q6H PRN


   PRN Reason: FEVER OR PAIN


Aspirin (Ecotrin -)  81 mg PO DAILY [facility]


   Last Admin: 06/21/17 09:25 Dose:  81 mg


Diazepam (Valium -)  10 mg PO TID PRN


   PRN Reason: muscle spasms


   Last Admin: 06/21/17 14:29 Dose:  10 mg


Heparin Sodium (Porcine) (Heparin -)  5,000 unit SQ BID [facility]


Piperacillin Sod/Tazobactam Sod (Zosyn 3.375gm Ivpb (Pre-Docked))  50 mls @ 100 

mls/hr IVPB Q8H-IV ALE


   PRN Reason: Protocol


   Last Admin: 06/21/17 09:24 Dose:  100 mls/hr


Metoprolol Tartrate (Lopressor -)  25 mg PO BID [facility]


   Last Admin: 06/21/17 09:25 Dose:  25 mg


Morphine Sulfate (Morphine 10 Mg/5 Ml Liquid)  4 mg PO Q4H PRN


   PRN Reason: PAIN


   Last Admin: 06/21/17 14:34 Dose:  4 mg


Pantoprazole Sodium (Protonix -)  40 mg PO DAILY [facility]


   Last Admin: 06/21/17 09:24 Dose:  40 mg











- Objective


Vital Signs: 


 Vital Signs











Temperature  97.8 F   06/21/17 14:00


 


Pulse Rate  57 L  06/21/17 14:00


 


Respiratory Rate  20   06/21/17 14:00


 


Blood Pressure  107/59   06/21/17 14:00


 


O2 Sat by Pulse Oximetry (%)  98   06/21/17 09:00











Constitutional: Yes: No Distress, Calm


Cardiovascular: Yes: Pulse Irregular


Respiratory: Yes: Regular, CTA Bilaterally


Gastrointestinal: Yes: Normal Bowel Sounds, Soft


Genitourinary: Yes: Other


Musculoskeletal: Yes: Other


Extremities: Yes: Other


Neurological: Yes: Alert, Oriented


Labs: 


 CBC, BMP





 06/21/17 05:20 





 INR, PTT











INR  0.91  (0.82-1.09)   06/19/17  12:15    














Assessment/Plan


roblem List





- Problems


(1) Acute UTI


Code(s): N39.0 - URINARY TRACT INFECTION, SITE NOT SPECIFIED





(2) Bladder pain


Code(s): R39.89 - OTHER SYMPTOMS AND SIGNS INVOLVING THE GENITOURINARY SYSTEM





(3) Chronic pain


Code(s): G89.29 - OTHER CHRONIC PAIN   





(4) Paraplegia


Code(s): G82.20 - PARAPLEGIA, UNSPECIFIED





(5) Constipation


Code(s): K59.00 - CONSTIPATION, UNSPECIFIED  





6 afib











plan


continue abx


in icu


cardio on case


rest as per icu


close monitoring





cc time 40 min

## 2017-06-22 LAB
ANION GAP SERPL CALC-SCNC: 7 MMOL/L (ref 8–16)
BASOPHILS # BLD: 0.9 % (ref 0–2)
CALCIUM SERPL-MCNC: 8.7 MG/DL (ref 8.5–10.1)
CO2 SERPL-SCNC: 30 MMOL/L (ref 21–32)
CREAT SERPL-MCNC: 0.6 MG/DL (ref 0.7–1.3)
DEPRECATED RDW RBC AUTO: 17.1 % (ref 11.9–15.9)
EOSINOPHIL # BLD: 4.1 % (ref 0–4.5)
GLUCOSE SERPL-MCNC: 83 MG/DL (ref 74–106)
MAGNESIUM SERPL-MCNC: 1.9 MG/DL (ref 1.8–2.4)
MCH RBC QN AUTO: 29.3 PG (ref 25.7–33.7)
MCHC RBC AUTO-ENTMCNC: 32.9 G/DL (ref 32–35.9)
MCV RBC: 89 FL (ref 80–96)
NEUTROPHILS # BLD: 54.1 % (ref 42.8–82.8)
PHOSPHATE SERPL-MCNC: 2.6 MG/DL (ref 2.5–4.9)
PLATELET # BLD AUTO: 256 K/MM3 (ref 134–434)
PMV BLD: 10 FL (ref 7.5–11.1)
WBC # BLD AUTO: 8.5 K/MM3 (ref 4–10)

## 2017-06-22 RX ADMIN — HEPARIN SODIUM SCH UNIT: 5000 INJECTION, SOLUTION INTRAVENOUS; SUBCUTANEOUS at 22:07

## 2017-06-22 RX ADMIN — LACTULOSE SCH: 20 SOLUTION ORAL at 18:40

## 2017-06-22 RX ADMIN — MORPHINE SULFATE PRN MG: 10 SOLUTION ORAL at 06:09

## 2017-06-22 RX ADMIN — RANITIDINE SCH MG: 150 TABLET ORAL at 13:54

## 2017-06-22 RX ADMIN — PIPERACILLIN SODIUM,TAZOBACTAM SODIUM SCH MLS/HR: 3; .375 INJECTION, POWDER, FOR SOLUTION INTRAVENOUS at 14:17

## 2017-06-22 RX ADMIN — MORPHINE SULFATE PRN MG: 10 SOLUTION ORAL at 18:35

## 2017-06-22 RX ADMIN — METOPROLOL TARTRATE SCH MG: 25 TABLET, FILM COATED ORAL at 09:33

## 2017-06-22 RX ADMIN — MORPHINE SULFATE PRN MG: 10 SOLUTION ORAL at 22:20

## 2017-06-22 RX ADMIN — LACTULOSE SCH: 20 SOLUTION ORAL at 22:16

## 2017-06-22 RX ADMIN — POLYETHYLENE GLYCOL 3350 SCH: 17 POWDER, FOR SOLUTION ORAL at 22:16

## 2017-06-22 RX ADMIN — HEPARIN SODIUM SCH UNIT: 5000 INJECTION, SOLUTION INTRAVENOUS; SUBCUTANEOUS at 09:33

## 2017-06-22 RX ADMIN — PIPERACILLIN SODIUM,TAZOBACTAM SODIUM SCH MLS/HR: 3; .375 INJECTION, POWDER, FOR SOLUTION INTRAVENOUS at 01:16

## 2017-06-22 RX ADMIN — METOPROLOL TARTRATE SCH MG: 25 TABLET, FILM COATED ORAL at 22:08

## 2017-06-22 RX ADMIN — PIPERACILLIN SODIUM,TAZOBACTAM SODIUM SCH MLS/HR: 3; .375 INJECTION, POWDER, FOR SOLUTION INTRAVENOUS at 18:36

## 2017-06-22 RX ADMIN — RANITIDINE SCH MG: 150 TABLET ORAL at 22:08

## 2017-06-22 RX ADMIN — LACTULOSE SCH GM: 20 SOLUTION ORAL at 14:16

## 2017-06-22 RX ADMIN — MORPHINE SULFATE PRN MG: 10 SOLUTION ORAL at 09:34

## 2017-06-22 RX ADMIN — ASPIRIN SCH MG: 81 TABLET, COATED ORAL at 09:32

## 2017-06-22 RX ADMIN — MORPHINE SULFATE PRN MG: 10 SOLUTION ORAL at 01:15

## 2017-06-22 RX ADMIN — POLYETHYLENE GLYCOL 3350 SCH GRAMS: 17 POWDER, FOR SOLUTION ORAL at 13:54

## 2017-06-22 RX ADMIN — MORPHINE SULFATE PRN MG: 10 SOLUTION ORAL at 14:23

## 2017-06-22 NOTE — PN
Progress Note, Physician


History of Present Illness: 


patient stable


no new issues


now in sinus





- Current Medication List


Current Medications: 


Active Medications





Acetaminophen (Tylenol -)  650 mg PO Q6H PRN


   PRN Reason: FEVER OR PAIN


Aspirin (Ecotrin -)  81 mg PO DAILY [facility]


   Last Admin: 06/22/17 09:32 Dose:  81 mg


Diazepam (Valium -)  10 mg PO Q8H PRN


   PRN Reason: muscle spasms


Heparin Sodium (Porcine) (Heparin -)  5,000 unit SQ BID [facility]


   Last Admin: 06/22/17 09:33 Dose:  5,000 unit


Piperacillin Sod/Tazobactam Sod (Zosyn 3.375gm Ivpb (Pre-Docked))  50 mls @ 100 

mls/hr IVPB Q8H-IV [facility]


   PRN Reason: Protocol


   Last Admin: 06/22/17 14:17 Dose:  100 mls/hr


Lactulose (Cephulac (Oral Use))  20 gm PO QID [facility]


   Last Admin: 06/22/17 14:16 Dose:  20 gm


Metoprolol Tartrate (Lopressor -)  25 mg PO BID [facility]


   Last Admin: 06/22/17 09:33 Dose:  25 mg


Morphine Sulfate (Morphine 10 Mg/5 Ml Liquid)  4 mg PO Q4H PRN


   PRN Reason: PAIN LEVEL 1-5


   Last Admin: 06/22/17 14:23 Dose:  4 mg


Polyethylene Glycol (Miralax (For Daily Use) -)  17 gm PO TID [facility]


   Last Admin: 06/22/17 13:54 Dose:  17 grams


Ranitidine HCl (Zantac -)  150 mg PO BID [facility]


   Last Admin: 06/22/17 13:54 Dose:  150 mg











- Objective


Vital Signs: 


 Vital Signs











Temperature  97.7 F   06/22/17 15:10


 


Pulse Rate  52 L  06/22/17 15:10


 


Respiratory Rate  18   06/22/17 15:10


 


Blood Pressure  128/58   06/22/17 15:10


 


O2 Sat by Pulse Oximetry (%)  98   06/21/17 09:00











Constitutional: Yes: No Distress, Calm


Cardiovascular: Yes: Regular Rate and Rhythm, Bradycardia


Respiratory: Yes: Regular, CTA Bilaterally


Gastrointestinal: Yes: Normal Bowel Sounds, Soft


Musculoskeletal: Yes: Other


Extremities: Yes: Other


Neurological: Yes: Alert, Oriented


Psychiatric: Yes: Alert, Oriented


Labs: 


 CBC, BMP





 06/22/17 05:20 





 06/22/17 05:20 





 INR, PTT











INR  0.91  (0.82-1.09)   06/19/17  12:15    














Assessment/Plan


brandonm List





- Problems


(1) Acute UTI


Code(s): N39.0 - URINARY TRACT INFECTION, SITE NOT SPECIFIED





(2) Bladder pain


Code(s): R39.89 - OTHER SYMPTOMS AND SIGNS INVOLVING THE GENITOURINARY SYSTEM





(3) Chronic pain


Code(s): G89.29 - OTHER CHRONIC PAIN   





(4) Paraplegia


Code(s): G82.20 - PARAPLEGIA, UNSPECIFIED





(5) Constipation


Code(s): K59.00 - CONSTIPATION, UNSPECIFIED  





6 afib











plan


continue abx


in icu


cardio on case


rest as per icu


close monitoring


urine cx pending








cc time 40 min

## 2017-06-22 NOTE — PN
Progress Note, Physician


History of Present Illness: 


Remains in SR, denies chest pain or dyspnea, trops downtrending.





- Current Medication List


Current Medications: 


Active Medications





Acetaminophen (Tylenol -)  650 mg PO Q6H PRN


   PRN Reason: FEVER OR PAIN


Aspirin (Ecotrin -)  81 mg PO DAILY [facility]


   Last Admin: 06/22/17 09:32 Dose:  81 mg


Diazepam (Valium -)  10 mg PO Q8H PRN


   PRN Reason: muscle spasms


Heparin Sodium (Porcine) (Heparin -)  5,000 unit SQ BID [facility]


   Last Admin: 06/22/17 09:33 Dose:  5,000 unit


Piperacillin Sod/Tazobactam Sod (Zosyn 3.375gm Ivpb (Pre-Docked))  50 mls @ 100 

mls/hr IVPB Q8H-IV [facility]


   PRN Reason: Protocol


   Last Admin: 06/22/17 14:17 Dose:  100 mls/hr


Lactulose (Cephulac (Oral Use))  20 gm PO QID [facility]


   Last Admin: 06/22/17 14:16 Dose:  20 gm


Metoprolol Tartrate (Lopressor -)  25 mg PO BID [facility]


   Last Admin: 06/22/17 09:33 Dose:  25 mg


Morphine Sulfate (Morphine 10 Mg/5 Ml Liquid)  4 mg PO Q4H PRN


   PRN Reason: PAIN LEVEL 1-5


   Last Admin: 06/22/17 14:23 Dose:  4 mg


Polyethylene Glycol (Miralax (For Daily Use) -)  17 gm PO TID [facility]


   Last Admin: 06/22/17 13:54 Dose:  17 grams


Ranitidine HCl (Zantac -)  150 mg PO BID [facility]


   Last Admin: 06/22/17 13:54 Dose:  150 mg











- Objective


Vital Signs: 


 Vital Signs











Temperature  98.4 F   06/22/17 02:00


 


Pulse Rate  49 L  06/22/17 08:00


 


Respiratory Rate  20   06/22/17 08:00


 


Blood Pressure  164/70   06/22/17 08:00


 


O2 Sat by Pulse Oximetry (%)  98   06/21/17 09:00











Constitutional: Yes: No Distress, Calm


Neck: Yes: Supple


Cardiovascular: Yes: Regular Rate and Rhythm


Respiratory: Yes: Regular, CTA Bilaterally


Gastrointestinal: Yes: Normal Bowel Sounds, Soft


Edema: No


Labs: 


 CBC, BMP





 06/22/17 05:20 06/22/17 05:20 





 INR, PTT











INR  0.91  (0.82-1.09)   06/19/17  12:15    














Problem List





- Problems


(1) Complicated UTI (urinary tract infection)


Code(s): N39.0 - URINARY TRACT INFECTION, SITE NOT SPECIFIED





(2) Chronic pain


Code(s): G89.29 - OTHER CHRONIC PAIN   Qualifiers: 


     Chronic pain type: chronic pain syndrome        Qualified Code(s): G89.4 - 

Chronic pain syndrome  





(3) Paraplegia


Code(s): G82.20 - PARAPLEGIA, UNSPECIFIED





(4) Rapid atrial fibrillation


Code(s): I48.91 - UNSPECIFIED ATRIAL FIBRILLATION





(5) Neurogenic bladder


Code(s): N31.9 - NEUROMUSCULAR DYSFUNCTION OF BLADDER, UNSPECIFIED





(6) Demand ischemia


Code(s): I24.8 - OTHER FORMS OF ACUTE ISCHEMIC HEART DISEASE








Assessment/Plan


6/20/2017 Echo: Normal LV size and fxn, mild MR, TR





1. Paroxysmal atrial fibrillation, currently in sinus rhythm [id number] score 

of 0-1


2. CAD with evidence of demand ischemic injury, trops have peaked


3. Neurogenic bladder with chronic UTI


4. Paraplegia referable to T2 compression


5. Chronic pain syndrome





PLAN:





1. Continue Lopressor 25 bid, ASA 81 qd


2. Given low [id number] score, patient would not benefit from A/C


3. Complete abx course per ID


4. DVT and GI prophylaxis

## 2017-06-22 NOTE — PN
Physical Exam: 


SUBJECTIVE: Patient seen and examined. 


no bowel movements, requests miralax TID. declined enema yesterday and does not 

want enema today.











OBJECTIVE:





 Vital Signs











 Period  Temp  Pulse  Resp  BP Sys/Malone  Pulse Ox


 


 Last 24 Hr  97.8 F-98.4 F  46-88  18-20  106-164/56-72  98











GENERAL: The patient is in no acute distress.


EYES:extraocular movements intact, sclera anicteric, conjunctiva clear


ENT: oropharynx clear without exudates, moist mucous membranes.


LUNGS: Breath sounds equal, clear to auscultation bilaterally, no wheezes, no 

crackles, no 


accessory muscle use


HEART: Regular rate and rhythm, S1, S2 without murmur, rub or gallop.


ABDOMEN: Soft, nontender, nondistended, normoactive bowel sounds well healing 

upper midline scar


EXTREMITIES: freely moving arms. muscular atrophy b/l lower legs. 


NEUROLOGICAL: Normal speech, facial symmetry. paraplegic














 Laboratory Results - last 24 hr











  06/21/17 06/21/17 06/21/17





  05:20 16:30 16:30


 


WBC  11.0 H  


 


RBC  4.43  


 


Hgb  12.9  


 


Hct  39.0  


 


MCV  88.0  


 


MCHC  33.2  


 


RDW  17.0 H  


 


Plt Count  272  


 


MPV  9.5  


 


Neutrophils %  67.8  


 


Lymphocytes %  14.6  


 


Monocytes %  13.1 H  


 


Eosinophils %  3.2  


 


Basophils %  1.3  


 


Platelet Estimate  Adequate  


 


Platelet Comment  Few large plts  


 


PTT (Actin FS)   


 


Potassium   4.0 


 


Troponin I    0.37 H D














  06/22/17 06/22/17





  05:20 05:20


 


WBC   8.5


 


RBC   4.48


 


Hgb   13.1


 


Hct   39.9


 


MCV   89.0


 


MCHC   32.9


 


RDW   17.1 H


 


Plt Count   256


 


MPV   10.0


 


Neutrophils %   54.1  D


 


Lymphocytes %   24.3  D


 


Monocytes %   16.6 H


 


Eosinophils %   4.1


 


Basophils %   0.9


 


Platelet Estimate  


 


Platelet Comment  


 


PTT (Actin FS)  33.7  D 


 


Potassium  


 


Troponin I  








Active Medications











Generic Name Dose Route Start Last Admin





  Trade Name Freq  PRN Reason Stop Dose Admin


 


Acetaminophen  650 mg 06/17/17 17:58  





  Tylenol -  PO   





  Q6H PRN   





  FEVER OR PAIN   


 


Aspirin  81 mg 06/20/17 10:00 06/21/17 09:25





  Ecotrin -  PO   81 mg





  DAILY [facility]   Administration


 


Diazepam  10 mg 06/17/17 17:56 06/21/17 21:31





  Valium -  PO   10 mg





  TID PRN   Administration





  muscle spasms   


 


Heparin Sodium (Porcine)  5,000 unit 06/21/17 22:00 06/21/17 21:09





  Heparin -  SQ   5,000 unit





  BID [facility]   Administration


 


Piperacillin Sod/Tazobactam Sod  50 mls @ 100 mls/hr 06/18/17 16:00 06/22/17 01:

16





  Zosyn 3.375gm Ivpb (Pre-Docked)  IVPB   100 mls/hr





  Q8H-IV [facility]   Administration





  Protocol   


 


Metoprolol Tartrate  25 mg 06/19/17 22:00 06/21/17 21:08





  Lopressor -  PO   25 mg





  BID [facility]   Administration


 


Morphine Sulfate  4 mg 06/21/17 19:52 06/22/17 06:09





  Morphine 10 Mg/5 Ml Liquid  PO   4 mg





  Q4H PRN   Administration





  PAIN LEVEL 1-5   


 


Pantoprazole Sodium  40 mg 06/20/17 10:00 06/21/17 09:24





  Protonix -  PO   40 mg





  DAILY [facility]   Administration











ASSESSMENT/PLAN:


61 yr old man with paraplegia secondary to T2 compression fracture, neurogenic 

bladder, recurrent UTI's, EtOH abuse, marijuana use, bilateral femur fractures 

who presented to the ED with worsening abdominal pain admitted for acute UTI, 

had one episode of paraxosymal Afib and hypotension no resolved, pending 

transfer to Med/surg floor.





Cardiovascular


Paroxysmal atrial fibrillation, currently in sinus rhythm and rate controlled


lopressor 25mg po BID


ASA 81mg po daily


consult:dr. sara DAVID


sepsis secondary to UTI - pseudomonas, improving


Zosyn IVPB 3.375gm q8hr


- day 4


bld cx NGTD


leucocytosis resolved


consult: dr. pan








- f/u with urologist as outpatient, seen by urologist, no intervention at this 

time, patient is aware of intermittent catheterization and suprapubic tube, 

patient no interested in either at this time


consult: dr. perez





Musculoskeletal


paraplegia, chronic pain syndrome


pain control with :


valium 10mg po TID prn


morphine 4mg q4hr prn pain





DVT: heparin BID sq


Diet: regular diet


Dispo: can be monitored on Med/surg since no arrhythmias noted on ICU monitor 

and patient is currently in NSR.








Visit type





- Emergency Visit


Emergency Visit: No





- New Patient


This patient is new to me today: No





- Critical Care


Critical Care patient: No

## 2017-06-22 NOTE — PN
Teaching Attending Note


Name of Resident: Laurie Gay


ATTENDING PHYSICIAN STATEMENT





I saw and evaluated the patient.


I reviewed the resident's note and discussed the case with the resident.


I agree with the resident's findings and plan as documented.








SUBJECTIVE:


Patient seen and examined in the ICU.  


Awake and alert.  Denies CP or SOB. 


 Intake & Output











 06/19/17 06/20/17 06/21/17 06/22/17





 23:59 23:59 23:59 23:59


 


Intake Total 1675 1510 1190 100


 


Output Total 1000 2600 2000 


 


Balance 675 -1090 -810 100


 


Weight   109 lb 2 oz 113 lb 15.664 oz








 Last Vital Signs











Temp Pulse Resp BP Pulse Ox


 


 98.4 F   49 L  20   164/70   98 


 


 06/22/17 02:00  06/22/17 08:00  06/22/17 08:00  06/22/17 08:00  06/21/17 09:00








Active Medications





Acetaminophen (Tylenol -)  650 mg PO Q6H PRN


   PRN Reason: FEVER OR PAIN


Aspirin (Ecotrin -)  81 mg PO DAILY [facility]


   Last Admin: 06/22/17 09:32 Dose:  81 mg


Diazepam (Valium -)  10 mg PO Q8H PRN


   PRN Reason: muscle spasms


Heparin Sodium (Porcine) (Heparin -)  5,000 unit SQ BID [facility]


   Last Admin: 06/22/17 09:33 Dose:  5,000 unit


Piperacillin Sod/Tazobactam Sod (Zosyn 3.375gm Ivpb (Pre-Docked))  50 mls @ 100 

mls/hr IVPB Q8H-IV [facility]


   PRN Reason: Protocol


Metoprolol Tartrate (Lopressor -)  25 mg PO BID [facility]


   Last Admin: 06/22/17 09:33 Dose:  25 mg


Morphine Sulfate (Morphine 10 Mg/5 Ml Liquid)  4 mg PO Q4H PRN


   PRN Reason: PAIN LEVEL 1-5


   Last Admin: 06/22/17 09:34 Dose:  4 mg


Polyethylene Glycol (Miralax (For Daily Use) -)  17 gm PO TID [facility]


Ranitidine HCl (Zantac -)  150 mg PO BID [facility]








Constitutional: No Distress, Thin


Neck: Supple (-) JVD


Respiratory: Diminished at the Bases


Cardiovascular: S1 S2 RRR, (-) murmurs


Gastrointestinal: Soft Benign Normal Bowel Sounds


Ext: No Edema, contracted 





Labs: 





 


 


 Laboratory Results - last 24 hr











  06/21/17 06/21/17 06/22/17





  16:30 16:30 05:20


 


WBC   


 


RBC   


 


Hgb   


 


Hct   


 


MCV   


 


MCHC   


 


RDW   


 


Plt Count   


 


MPV   


 


Neutrophils %   


 


Lymphocytes %   


 


Monocytes %   


 


Eosinophils %   


 


Basophils %   


 


PTT (Actin FS)    33.7  D


 


Sodium   


 


Potassium  4.0  


 


Chloride   


 


Carbon Dioxide   


 


Anion Gap   


 


BUN   


 


Creatinine   


 


Random Glucose   


 


Calcium   


 


Phosphorus   


 


Magnesium   


 


Troponin I   0.37 H D 














  06/22/17 06/22/17





  05:20 05:20


 


WBC  8.5 


 


RBC  4.48 


 


Hgb  13.1 


 


Hct  39.9 


 


MCV  89.0 


 


MCHC  32.9 


 


RDW  17.1 H 


 


Plt Count  256 


 


MPV  10.0 


 


Neutrophils %  54.1  D 


 


Lymphocytes %  24.3  D 


 


Monocytes %  16.6 H 


 


Eosinophils %  4.1 


 


Basophils %  0.9 


 


PTT (Actin FS)  


 


Sodium   142


 


Potassium   3.9


 


Chloride   105


 


Carbon Dioxide   30


 


Anion Gap   7 L


 


BUN   9  D


 


Creatinine   0.6 L


 


Random Glucose   83


 


Calcium   8.7


 


Phosphorus   2.6


 


Magnesium   1.9


 


Troponin I  











 Assessment/Plan 





ASSESSMENT:


AMI : demand ischemia 


Paroxysmal atrial fibrillation -> NSR 


CAD 


Neurogenic bladder with chronic UTI


Paraplegia due to Fall and T2 compression


Chronic pain syndrome





PLAN:


Lopressor 


O2 as needed


ASA


ABX per ID 


Floor 





Dr Machado

## 2017-06-22 NOTE — PN
Progress Note, Physician


Chief Complaint: 


Mr Sumner says he continues to feel better. Says the pain is much better 

controlled. Still with no bowel movement. No chest pain, shortness of breath, 

nausea/vomiting.





- Current Medication List


Current Medications: 


Active Medications





Acetaminophen (Tylenol -)  650 mg PO Q6H PRN


   PRN Reason: FEVER OR PAIN


Aspirin (Ecotrin -)  81 mg PO DAILY [facility]


   Last Admin: 06/22/17 09:32 Dose:  81 mg


Diazepam (Valium -)  10 mg PO Q8H PRN


   PRN Reason: muscle spasms


Heparin Sodium (Porcine) (Heparin -)  5,000 unit SQ BID [facility]


   Last Admin: 06/22/17 09:33 Dose:  5,000 unit


Piperacillin Sod/Tazobactam Sod (Zosyn 3.375gm Ivpb (Pre-Docked))  50 mls @ 100 

mls/hr IVPB Q8H-IV [facility]


   PRN Reason: Protocol


   Last Admin: 06/22/17 14:17 Dose:  100 mls/hr


Lactulose (Cephulac (Oral Use))  20 gm PO QID [facility]


   Last Admin: 06/22/17 14:16 Dose:  20 gm


Metoprolol Tartrate (Lopressor -)  25 mg PO BID [facility]


   Last Admin: 06/22/17 09:33 Dose:  25 mg


Morphine Sulfate (Morphine 10 Mg/5 Ml Liquid)  4 mg PO Q4H PRN


   PRN Reason: PAIN LEVEL 1-5


   Last Admin: 06/22/17 14:23 Dose:  4 mg


Polyethylene Glycol (Miralax (For Daily Use) -)  17 gm PO TID [facility]


   Last Admin: 06/22/17 13:54 Dose:  17 grams


Ranitidine HCl (Zantac -)  150 mg PO BID [facility]


   Last Admin: 06/22/17 13:54 Dose:  150 mg











- Objective


Vital Signs: 


 Vital Signs











Temperature  97.7 F   06/22/17 15:10


 


Pulse Rate  52 L  06/22/17 15:10


 


Respiratory Rate  18   06/22/17 15:10


 


Blood Pressure  128/58   06/22/17 15:10


 


O2 Sat by Pulse Oximetry (%)  98   06/21/17 09:00











Constitutional: Yes: No Distress, Calm, Thin


Cardiovascular: Yes: Bradycardia.  No: Gallop, Murmur, Rub


Respiratory: Yes: Regular, CTA Bilaterally.  No: Rales, Rhonchi, Wheezes


Gastrointestinal: Yes: Normal Bowel Sounds, Soft.  No: Distention, Tenderness


Extremities: Yes: WNL


Edema: No


Labs: 


 CBC, BMP





 06/22/17 05:20 





 06/22/17 05:20 





 INR, PTT











INR  0.91  (0.82-1.09)   06/19/17  12:15    














Problem List





- Problems


(1) Complicated UTI (urinary tract infection)


Code(s): N39.0 - URINARY TRACT INFECTION, SITE NOT SPECIFIED





(2) Rapid atrial fibrillation


Code(s): I48.91 - UNSPECIFIED ATRIAL FIBRILLATION





(3) Elevated troponin I level


Code(s): R74.8 - ABNORMAL LEVELS OF OTHER SERUM ENZYMES





(4) Chronic pain


Code(s): G89.29 - OTHER CHRONIC PAIN   Qualifiers: 


     Chronic pain type: chronic pain syndrome        Qualified Code(s): G89.4 - 

Chronic pain syndrome  





(5) Paraplegia


Code(s): G82.20 - PARAPLEGIA, UNSPECIFIED








Assessment/Plan


(1) Complicated UTI (urinary tract infection)


Assessment/Plan: 


-patient with history of neurogenic bladder and repeated UTIs


-growing pseudomonas, present on last admission as well


-susceptible to zosyn


-ID following


-continue zosyn day 4


Code(s): N39.0 - URINARY TRACT INFECTION, SITE NOT SPECIFIED





(2) Rapid atrial fibrillation


Assessment/Plan: 


-cardiology note reviewed


-does not need AC


-currently in sinus rhythm


-continue metoprolol


Code(s): I48.91 - UNSPECIFIED ATRIAL FIBRILLATION





(3) Elevated troponin I level


Assessment/Plan: 


-continue current management


Code(s): R74.8 - ABNORMAL LEVELS OF OTHER SERUM ENZYMES





(4) Chronic pain


Assessment/Plan: 


-continue current management


Code(s): G89.29 - OTHER CHRONIC PAIN   Qualifiers: 


     Chronic pain type: chronic pain syndrome        Qualified Code(s): G89.4 - 

Chronic pain syndrome  





(5) Paraplegia


Assessment/Plan: 


-chronic


Code(s): G82.20 - PARAPLEGIA, UNSPECIFIED





(6) Constipation


-patient still with constipation


-says been present for 6 days


-miralax ineffective


-will try scheduled lactulose

## 2017-06-23 LAB
ANION GAP SERPL CALC-SCNC: 8 MMOL/L (ref 8–16)
BASOPHILS # BLD: 0.8 % (ref 0–2)
CALCIUM SERPL-MCNC: 9 MG/DL (ref 8.5–10.1)
CO2 SERPL-SCNC: 29 MMOL/L (ref 21–32)
CREAT SERPL-MCNC: 0.7 MG/DL (ref 0.7–1.3)
DEPRECATED RDW RBC AUTO: 17.2 % (ref 11.9–15.9)
EOSINOPHIL # BLD: 4 % (ref 0–4.5)
GLUCOSE SERPL-MCNC: 74 MG/DL (ref 74–106)
MAGNESIUM SERPL-MCNC: 2 MG/DL (ref 1.8–2.4)
MCH RBC QN AUTO: 29.2 PG (ref 25.7–33.7)
MCHC RBC AUTO-ENTMCNC: 32.9 G/DL (ref 32–35.9)
MCV RBC: 88.9 FL (ref 80–96)
NEUTROPHILS # BLD: 60.9 % (ref 42.8–82.8)
PHOSPHATE SERPL-MCNC: 2.7 MG/DL (ref 2.5–4.9)
PLATELET # BLD AUTO: 251 K/MM3 (ref 134–434)
PMV BLD: 10.2 FL (ref 7.5–11.1)
WBC # BLD AUTO: 9.3 K/MM3 (ref 4–10)

## 2017-06-23 RX ADMIN — METOPROLOL TARTRATE SCH MG: 25 TABLET, FILM COATED ORAL at 09:28

## 2017-06-23 RX ADMIN — LACTULOSE SCH GM: 20 SOLUTION ORAL at 09:27

## 2017-06-23 RX ADMIN — POLYETHYLENE GLYCOL 3350 SCH: 17 POWDER, FOR SOLUTION ORAL at 22:18

## 2017-06-23 RX ADMIN — RANITIDINE SCH MG: 150 TABLET ORAL at 09:28

## 2017-06-23 RX ADMIN — MORPHINE SULFATE PRN MG: 10 SOLUTION ORAL at 04:29

## 2017-06-23 RX ADMIN — RANITIDINE SCH MG: 150 TABLET ORAL at 22:18

## 2017-06-23 RX ADMIN — PIPERACILLIN SODIUM,TAZOBACTAM SODIUM SCH MLS/HR: 3; .375 INJECTION, POWDER, FOR SOLUTION INTRAVENOUS at 01:14

## 2017-06-23 RX ADMIN — HEPARIN SODIUM SCH UNIT: 5000 INJECTION, SOLUTION INTRAVENOUS; SUBCUTANEOUS at 22:17

## 2017-06-23 RX ADMIN — POLYETHYLENE GLYCOL 3350 SCH: 17 POWDER, FOR SOLUTION ORAL at 13:55

## 2017-06-23 RX ADMIN — HEPARIN SODIUM SCH UNIT: 5000 INJECTION, SOLUTION INTRAVENOUS; SUBCUTANEOUS at 09:38

## 2017-06-23 RX ADMIN — POLYETHYLENE GLYCOL 3350 SCH GRAMS: 17 POWDER, FOR SOLUTION ORAL at 06:19

## 2017-06-23 RX ADMIN — PIPERACILLIN SODIUM,TAZOBACTAM SODIUM SCH MLS/HR: 3; .375 INJECTION, POWDER, FOR SOLUTION INTRAVENOUS at 09:28

## 2017-06-23 RX ADMIN — LACTULOSE SCH: 20 SOLUTION ORAL at 13:55

## 2017-06-23 RX ADMIN — MORPHINE SULFATE PRN MG: 10 SOLUTION ORAL at 22:18

## 2017-06-23 RX ADMIN — MORPHINE SULFATE PRN MG: 10 SOLUTION ORAL at 15:57

## 2017-06-23 RX ADMIN — MORPHINE SULFATE PRN MG: 10 SOLUTION ORAL at 09:29

## 2017-06-23 RX ADMIN — PIPERACILLIN SODIUM,TAZOBACTAM SODIUM SCH MLS/HR: 3; .375 INJECTION, POWDER, FOR SOLUTION INTRAVENOUS at 18:10

## 2017-06-23 RX ADMIN — ASPIRIN SCH MG: 81 TABLET, COATED ORAL at 09:27

## 2017-06-23 NOTE — PN
Progress Note, Physician


History of Present Illness: 


Remains in SR, denies chest pain or dyspnea, trops downtrending.





- Current Medication List


Current Medications: 


Active Medications





Acetaminophen (Tylenol -)  650 mg PO Q6H PRN


   PRN Reason: FEVER OR PAIN


Aspirin (Ecotrin -)  81 mg PO DAILY [facility]


   Last Admin: 06/23/17 09:27 Dose:  81 mg


Diazepam (Valium -)  10 mg PO Q8H PRN


   PRN Reason: muscle spasms


   Last Admin: 06/23/17 06:26 Dose:  10 mg


Heparin Sodium (Porcine) (Heparin -)  5,000 unit SQ BID [facility]


   Last Admin: 06/23/17 09:38 Dose:  5,000 unit


Piperacillin Sod/Tazobactam Sod (Zosyn 3.375gm Ivpb (Pre-Docked))  50 mls @ 100 

mls/hr IVPB Q8H-IV [facility]


   PRN Reason: Protocol


   Last Admin: 06/23/17 09:28 Dose:  100 mls/hr


Lactulose (Cephulac (Oral Use))  20 gm PO QID [facility]


   Last Admin: 06/23/17 13:55 Dose:  Not Given


Metoprolol Tartrate (Lopressor -)  25 mg PO BID [facility]


   Last Admin: 06/23/17 09:28 Dose:  25 mg


Morphine Sulfate (Morphine 10 Mg/5 Ml Liquid)  4 mg PO Q4H PRN


   PRN Reason: PAIN LEVEL 1-5


   Last Admin: 06/23/17 09:29 Dose:  4 mg


Polyethylene Glycol (Miralax (For Daily Use) -)  17 gm PO TID [facility]


   Last Admin: 06/23/17 13:55 Dose:  Not Given


Ranitidine HCl (Zantac -)  150 mg PO BID [facility]


   Last Admin: 06/23/17 09:28 Dose:  150 mg


Simethicone (Mylicon -)  80 mg PO QID [facility]


   PRN Reason: DYSPEPSIA


   Last Admin: 06/22/17 22:09 Dose:  80 mg











- Objective


Vital Signs: 


 Vital Signs











Temperature  99.1 F   06/23/17 06:01


 


Pulse Rate  52 L  06/23/17 06:01


 


Respiratory Rate  18   06/23/17 06:01


 


Blood Pressure  124/53   06/23/17 06:01


 


O2 Sat by Pulse Oximetry (%)  98   06/23/17 09:00











Constitutional: Yes: No Distress, Calm


Neck: Yes: Supple


Cardiovascular: Yes: Regular Rate and Rhythm


Respiratory: Yes: Regular, Diminished


Gastrointestinal: Yes: Normal Bowel Sounds, Soft


Edema: No


Labs: 


 CBC, BMP





 06/23/17 07:00 





 06/23/17 07:00 





 INR, PTT











INR  0.91  (0.82-1.09)   06/19/17  12:15    














Problem List





- Problems


(1) Complicated UTI (urinary tract infection)


Code(s): N39.0 - URINARY TRACT INFECTION, SITE NOT SPECIFIED





(2) Chronic pain


Code(s): G89.29 - OTHER CHRONIC PAIN   Qualifiers: 


     Chronic pain type: chronic pain syndrome        Qualified Code(s): G89.4 - 

Chronic pain syndrome  





(3) Paraplegia


Code(s): G82.20 - PARAPLEGIA, UNSPECIFIED





(4) Rapid atrial fibrillation


Code(s): I48.91 - UNSPECIFIED ATRIAL FIBRILLATION





(5) Neurogenic bladder


Code(s): N31.9 - NEUROMUSCULAR DYSFUNCTION OF BLADDER, UNSPECIFIED





(6) Demand ischemia


Code(s): I24.8 - OTHER FORMS OF ACUTE ISCHEMIC HEART DISEASE








Assessment/Plan


6/20/2017 Echo: Normal LV size and fxn, mild MR, TR





1. Paroxysmal atrial fibrillation, currently in sinus rhythm [id number] score 

of 0-1


2. CAD with evidence of demand ischemic injury, trops have peaked


3. Neurogenic bladder with chronic UTI


4. Paraplegia referable to T2 compression


5. Chronic pain syndrome





PLAN:





1. Change Toprol XL 25 qd, ASA 81 qd


2. Given low [id number] score, patient would not benefit from A/C


3. Complete abx course per ID


4. DVT and GI prophylaxis

## 2017-06-23 NOTE — PN
Progress Note, Physician


Chief Complaint: 


Mr Sumner had a bowel movement today and feels improved. No cp, sob, n/v.





- Current Medication List


Current Medications: 


Active Medications





Acetaminophen (Tylenol -)  650 mg PO Q6H PRN


   PRN Reason: FEVER OR PAIN


Aspirin (Ecotrin -)  81 mg PO DAILY [facility]


   Last Admin: 06/23/17 09:27 Dose:  81 mg


Diazepam (Valium -)  10 mg PO Q8H PRN


   PRN Reason: muscle spasms


   Last Admin: 06/23/17 06:26 Dose:  10 mg


Heparin Sodium (Porcine) (Heparin -)  5,000 unit SQ BID [facility]


   Last Admin: 06/23/17 09:38 Dose:  5,000 unit


Piperacillin Sod/Tazobactam Sod (Zosyn 3.375gm Ivpb (Pre-Docked))  50 mls @ 100 

mls/hr IVPB Q8H-IV [facility]


   PRN Reason: Protocol


   Last Admin: 06/23/17 09:28 Dose:  100 mls/hr


Lactulose (Cephulac (Oral Use))  20 gm PO QID [facility]


   Last Admin: 06/23/17 13:55 Dose:  Not Given


Metoprolol Succinate (Toprol Xl -)  25 mg PO DAILY [facility]


Morphine Sulfate (Morphine 10 Mg/5 Ml Liquid)  4 mg PO Q4H PRN


   PRN Reason: PAIN LEVEL 1-5


   Last Admin: 06/23/17 15:57 Dose:  4 mg


Polyethylene Glycol (Miralax (For Daily Use) -)  17 gm PO TID [facility]


   Last Admin: 06/23/17 13:55 Dose:  Not Given


Ranitidine HCl (Zantac -)  150 mg PO BID [facility]


   Last Admin: 06/23/17 09:28 Dose:  150 mg


Simethicone (Mylicon -)  80 mg PO QID PRN


   PRN Reason: DYSPEPSIA


   Last Admin: 06/22/17 22:09 Dose:  80 mg











- Objective


Vital Signs: 


 Vital Signs











Temperature  99.1 F   06/23/17 06:01


 


Pulse Rate  52 L  06/23/17 06:01


 


Respiratory Rate  18   06/23/17 06:01


 


Blood Pressure  124/53   06/23/17 06:01


 


O2 Sat by Pulse Oximetry (%)  98   06/23/17 09:00











Constitutional: Yes: Well Nourished, No Distress, Calm


Cardiovascular: Yes: Tachycardia.  No: Pulse Irregular, Gallop, Murmur, Rub


Respiratory: Yes: Regular, CTA Bilaterally.  No: Rales, Rhonchi, Wheezes


Gastrointestinal: Yes: Normal Bowel Sounds, Soft.  No: Distention, Tenderness


Extremities: Yes: WNL


Edema: No


Labs: 


 CBC, BMP





 06/23/17 07:00 





 06/23/17 07:00 





 INR, PTT











INR  0.91  (0.82-1.09)   06/19/17  12:15    














Problem List





- Problems


(1) Complicated UTI (urinary tract infection)


Code(s): N39.0 - URINARY TRACT INFECTION, SITE NOT SPECIFIED





(2) Rapid atrial fibrillation


Code(s): I48.91 - UNSPECIFIED ATRIAL FIBRILLATION





(3) Elevated troponin I level


Code(s): R74.8 - ABNORMAL LEVELS OF OTHER SERUM ENZYMES





(4) Chronic pain


Code(s): G89.29 - OTHER CHRONIC PAIN   Qualifiers: 


     Chronic pain type: chronic pain syndrome        Qualified Code(s): G89.4 - 

Chronic pain syndrome  





(5) Paraplegia


Code(s): G82.20 - PARAPLEGIA, UNSPECIFIED








Assessment/Plan


(1) Complicated UTI (urinary tract infection)


Assessment/Plan: 


-patient with history of neurogenic bladder and repeated UTIs


-growing pseudomonas, present on last admission as well


-susceptible to zosyn


-ID following


-continue zosyn day 5


-plan to stop on 6/25 per ID note


Code(s): N39.0 - URINARY TRACT INFECTION, SITE NOT SPECIFIED





(2) Rapid atrial fibrillation


Assessment/Plan: 


-cardiology note reviewed


-does not need AC


-currently in sinus rhythm, bradycardic


-continue metoprolol


Code(s): I48.91 - UNSPECIFIED ATRIAL FIBRILLATION





(3) Elevated troponin I level


Assessment/Plan: 


-continue current management


Code(s): R74.8 - ABNORMAL LEVELS OF OTHER SERUM ENZYMES





(4) Chronic pain


Assessment/Plan: 


-continue current management


Code(s): G89.29 - OTHER CHRONIC PAIN   Qualifiers: 


     Chronic pain type: chronic pain syndrome        Qualified Code(s): G89.4 - 

Chronic pain syndrome  





(5) Paraplegia


Assessment/Plan: 


-chronic


Code(s): G82.20 - PARAPLEGIA, UNSPECIFIED





(6) Constipation


-lactulose effective


-change to prn

## 2017-06-23 NOTE — PN
Progress Note, Physician


History of Present Illness: 


feels better after he had flatus


no complaints





- Current Medication List


Current Medications: 


Active Medications





Acetaminophen (Tylenol -)  650 mg PO Q6H PRN


   PRN Reason: FEVER OR PAIN


Aspirin (Ecotrin -)  81 mg PO DAILY [facility]


   Last Admin: 06/23/17 09:27 Dose:  81 mg


Diazepam (Valium -)  10 mg PO Q8H PRN


   PRN Reason: muscle spasms


   Last Admin: 06/23/17 06:26 Dose:  10 mg


Heparin Sodium (Porcine) (Heparin -)  5,000 unit SQ BID [facility]


   Last Admin: 06/23/17 09:38 Dose:  5,000 unit


Piperacillin Sod/Tazobactam Sod (Zosyn 3.375gm Ivpb (Pre-Docked))  50 mls @ 100 

mls/hr IVPB Q8H-IV [facility]


   PRN Reason: Protocol


   Last Admin: 06/23/17 09:28 Dose:  100 mls/hr


Lactulose (Cephulac (Oral Use))  20 gm PO QID [facility]


   Last Admin: 06/23/17 13:55 Dose:  Not Given


Metoprolol Tartrate (Lopressor -)  25 mg PO BID [facility]


   Last Admin: 06/23/17 09:28 Dose:  25 mg


Morphine Sulfate (Morphine 10 Mg/5 Ml Liquid)  4 mg PO Q4H PRN


   PRN Reason: PAIN LEVEL 1-5


   Last Admin: 06/23/17 09:29 Dose:  4 mg


Polyethylene Glycol (Miralax (For Daily Use) -)  17 gm PO TID [facility]


   Last Admin: 06/23/17 13:55 Dose:  Not Given


Ranitidine HCl (Zantac -)  150 mg PO BID [facility]


   Last Admin: 06/23/17 09:28 Dose:  150 mg


Simethicone (Mylicon -)  80 mg PO QID PRN


   PRN Reason: DYSPEPSIA


   Last Admin: 06/22/17 22:09 Dose:  80 mg











- Objective


Vital Signs: 


 Vital Signs











Temperature  99.1 F   06/23/17 06:01


 


Pulse Rate  52 L  06/23/17 06:01


 


Respiratory Rate  18   06/23/17 06:01


 


Blood Pressure  124/53   06/23/17 06:01


 


O2 Sat by Pulse Oximetry (%)  98   06/23/17 09:00











Constitutional: Yes: No Distress, Calm


Cardiovascular: Yes: Regular Rate and Rhythm, Bradycardia


Respiratory: Yes: Regular, CTA Bilaterally


Genitourinary: Yes: Other


Musculoskeletal: Yes: Other


Extremities: Yes: Other (paraplegia)


Neurological: Yes: Alert, Oriented


Psychiatric: Yes: Alert


Labs: 


 CBC, BMP





 06/23/17 07:00 





 06/23/17 07:00 





 INR, PTT











INR  0.91  (0.82-1.09)   06/19/17  12:15    














Assessment/Plan


roble List





- Problems


(1) Acute UTI


Code(s): N39.0 - URINARY TRACT INFECTION, SITE NOT SPECIFIED





(2) Bladder pain


Code(s): R39.89 - OTHER SYMPTOMS AND SIGNS INVOLVING THE GENITOURINARY SYSTEM





(3) Chronic pain


Code(s): G89.29 - OTHER CHRONIC PAIN   





(4) Paraplegia


Code(s): G82.20 - PARAPLEGIA, UNSPECIFIED





(5) Constipation


Code(s): K59.00 - CONSTIPATION, UNSPECIFIED  





6 afib











plan


continue abx


will continue abx till 24th


will stop abx on 25th


rest as per primary

## 2017-06-24 RX ADMIN — RANITIDINE SCH MG: 150 TABLET ORAL at 21:01

## 2017-06-24 RX ADMIN — HEPARIN SODIUM SCH UNIT: 5000 INJECTION, SOLUTION INTRAVENOUS; SUBCUTANEOUS at 21:00

## 2017-06-24 RX ADMIN — PIPERACILLIN SODIUM,TAZOBACTAM SODIUM SCH MLS/HR: 3; .375 INJECTION, POWDER, FOR SOLUTION INTRAVENOUS at 01:19

## 2017-06-24 RX ADMIN — METOPROLOL SUCCINATE SCH MG: 25 TABLET, EXTENDED RELEASE ORAL at 09:58

## 2017-06-24 RX ADMIN — ASPIRIN SCH MG: 81 TABLET, COATED ORAL at 09:56

## 2017-06-24 RX ADMIN — POLYETHYLENE GLYCOL 3350 SCH: 17 POWDER, FOR SOLUTION ORAL at 13:16

## 2017-06-24 RX ADMIN — POLYETHYLENE GLYCOL 3350 SCH: 17 POWDER, FOR SOLUTION ORAL at 21:01

## 2017-06-24 RX ADMIN — HEPARIN SODIUM SCH UNIT: 5000 INJECTION, SOLUTION INTRAVENOUS; SUBCUTANEOUS at 09:59

## 2017-06-24 RX ADMIN — MORPHINE SULFATE PRN MG: 10 SOLUTION ORAL at 14:31

## 2017-06-24 RX ADMIN — PIPERACILLIN SODIUM,TAZOBACTAM SODIUM SCH MLS/HR: 3; .375 INJECTION, POWDER, FOR SOLUTION INTRAVENOUS at 09:59

## 2017-06-24 RX ADMIN — MORPHINE SULFATE PRN MG: 10 SOLUTION ORAL at 10:19

## 2017-06-24 RX ADMIN — PIPERACILLIN SODIUM,TAZOBACTAM SODIUM SCH MLS/HR: 3; .375 INJECTION, POWDER, FOR SOLUTION INTRAVENOUS at 17:03

## 2017-06-24 RX ADMIN — MORPHINE SULFATE PRN MG: 10 SOLUTION ORAL at 22:17

## 2017-06-24 RX ADMIN — POLYETHYLENE GLYCOL 3350 SCH: 17 POWDER, FOR SOLUTION ORAL at 06:21

## 2017-06-24 RX ADMIN — RANITIDINE SCH MG: 150 TABLET ORAL at 09:56

## 2017-06-24 NOTE — PN
Progress Note (short form)





- Note


Progress Note: 


Resting in NAD. 


No acute events overnight. 


No CP or SOB. 


 


 Intake & Output











 06/21/17 06/22/17 06/23/17 06/24/17





 23:59 23:59 23:59 23:59


 


Intake Total 1190 1300 750 50


 


Output Total 2000 2200 1700 


 


Balance -810 -900 -950 50


 


Weight 109 lb 2 oz 113 lb 15.664 oz  











 Last Vital Signs











Temp Pulse Resp BP Pulse Ox


 


 98.2 F   49 L  18   144/61   98 


 


 06/24/17 06:00  06/24/17 06:00  06/24/17 06:00  06/24/17 06:00  06/23/17 21:00











Active Medications





Acetaminophen (Tylenol -)  650 mg PO Q6H PRN


   PRN Reason: FEVER OR PAIN


Aspirin (Ecotrin -)  81 mg PO DAILY [facility]


   Last Admin: 06/24/17 09:56 Dose:  81 mg


Diazepam (Valium -)  10 mg PO Q8H PRN


   PRN Reason: muscle spasms


   Last Admin: 06/24/17 10:20 Dose:  10 mg


Heparin Sodium (Porcine) (Heparin -)  5,000 unit SQ BID [facility]


   Last Admin: 06/24/17 09:59 Dose:  5,000 unit


Piperacillin Sod/Tazobactam Sod (Zosyn 3.375gm Ivpb (Pre-Docked))  50 mls @ 100 

mls/hr IVPB Q8H-IV [facility]


   PRN Reason: Protocol


   Last Admin: 06/24/17 09:59 Dose:  100 mls/hr


Lactulose (Cephulac (Oral Use))  20 gm PO QID [facility]


   PRN Reason: CONSTIPATION


Metoprolol Succinate (Toprol Xl -)  25 mg PO DAILY [facility]


   Last Admin: 06/24/17 09:58 Dose:  25 mg


Morphine Sulfate (Morphine 10 Mg/5 Ml Liquid)  4 mg PO Q4H PRN


   PRN Reason: PAIN LEVEL 1-5


   Last Admin: 06/24/17 10:19 Dose:  4 mg


Polyethylene Glycol (Miralax (For Daily Use) -)  17 gm PO TID [facility]


   Last Admin: 06/24/17 06:21 Dose:  Not Given


Ranitidine HCl (Zantac -)  150 mg PO BID [facility]


   Last Admin: 06/24/17 09:56 Dose:  150 mg


Simethicone (Mylicon -)  80 mg PO QID PRN


   PRN Reason: DYSPEPSIA


   Last Admin: 06/22/17 22:09 Dose:  80 mg








Constitutional: No Distress, Thin


Neck: Supple (-) JVD


Respiratory: Diminished at the Bases


Cardiovascular: S1 S2 RRR, (-) murmurs


Gastrointestinal: Soft Benign Normal Bowel Sounds


Ext: No Edema, contracted 





Labs: 





 


 


 


 Assessment/Plan 





ASSESSMENT:


AMI : demand ischemia 


Paroxysmal atrial fibrillation -> NSR 


CAD 


Neurogenic bladder with chronic UTI


Paraplegia due to Fall and T2 compression


Chronic pain syndrome





PLAN:


ABX per ID 


VTE prophylaxis 


ASA 





Dr Machado

## 2017-06-24 NOTE — PN
Progress Note, Physician


History of Present Illness: 


stable


some abd pain





- Current Medication List


Current Medications: 


Active Medications





Acetaminophen (Tylenol -)  650 mg PO Q6H PRN


   PRN Reason: FEVER OR PAIN


Aspirin (Ecotrin -)  81 mg PO DAILY [facility]


   Last Admin: 06/24/17 09:56 Dose:  81 mg


Diazepam (Valium -)  10 mg PO Q8H PRN


   PRN Reason: muscle spasms


   Last Admin: 06/24/17 10:20 Dose:  10 mg


Heparin Sodium (Porcine) (Heparin -)  5,000 unit SQ BID [facility]


   Last Admin: 06/24/17 09:59 Dose:  5,000 unit


Piperacillin Sod/Tazobactam Sod (Zosyn 3.375gm Ivpb (Pre-Docked))  50 mls @ 100 

mls/hr IVPB Q8H-IV [facility]


   PRN Reason: Protocol


   Last Admin: 06/24/17 09:59 Dose:  100 mls/hr


Lactulose (Cephulac (Oral Use))  20 gm PO QID [facility]


   PRN Reason: CONSTIPATION


Metoprolol Succinate (Toprol Xl -)  25 mg PO DAILY [facility]


   Last Admin: 06/24/17 09:58 Dose:  25 mg


Morphine Sulfate (Morphine 10 Mg/5 Ml Liquid)  4 mg PO Q4H PRN


   PRN Reason: PAIN LEVEL 1-5


   Last Admin: 06/24/17 10:19 Dose:  4 mg


Polyethylene Glycol (Miralax (For Daily Use) -)  17 gm PO TID [facility]


   Last Admin: 06/24/17 13:16 Dose:  Not Given


Ranitidine HCl (Zantac -)  150 mg PO BID [facility]


   Last Admin: 06/24/17 09:56 Dose:  150 mg


Simethicone (Mylicon -)  80 mg PO QID [facility]


   PRN Reason: DYSPEPSIA


   Last Admin: 06/22/17 22:09 Dose:  80 mg











- Objective


Vital Signs: 


 Vital Signs











Temperature  98.2 F   06/24/17 06:00


 


Pulse Rate  49 L  06/24/17 06:00


 


Respiratory Rate  18   06/24/17 06:00


 


Blood Pressure  144/61   06/24/17 06:00


 


O2 Sat by Pulse Oximetry (%)  98   06/23/17 21:00











Constitutional: Yes: No Distress, Calm


Eyes: Yes: Conjunctiva Clear


Cardiovascular: Yes: Regular Rate and Rhythm


Respiratory: Yes: Regular, CTA Bilaterally


Gastrointestinal: Yes: Normal Bowel Sounds, Soft


Musculoskeletal: Yes: Other


Extremities: Yes: Other


Neurological: Yes: Alert, Oriented


Psychiatric: Yes: Alert, Oriented


Labs: 


 CBC, BMP





 06/23/17 07:00 





 06/23/17 07:00 





 INR, PTT











INR  0.91  (0.82-1.09)   06/19/17  12:15    














Assessment/Plan


roblem List





- Problems


(1) Acute UTI


Code(s): N39.0 - URINARY TRACT INFECTION, SITE NOT SPECIFIED





(2) Bladder pain


Code(s): R39.89 - OTHER SYMPTOMS AND SIGNS INVOLVING THE GENITOURINARY SYSTEM





(3) Chronic pain


Code(s): G89.29 - OTHER CHRONIC PAIN   





(4) Paraplegia


Code(s): G82.20 - PARAPLEGIA, UNSPECIFIED





(5) Constipation


Code(s): K59.00 - CONSTIPATION, UNSPECIFIED  





6 afib











plan


continue abx


will continue abx till 24th


will stop abx on 25th


rest as per primary

## 2017-06-24 NOTE — PN
Physical Exam: 


SUBJECTIVE: Patient seen and examined








OBJECTIVE:





 Vital Signs











 Period  Temp  Pulse  Resp  BP Sys/Malone  Pulse Ox


 


 Last 24 Hr  98.0 F-98.6 F  49-90  16-18  111-144/52-67  98-99











GENERAL: The patient is awake, alert, and fully oriented, in no acute distress.


HEAD: Normal with no signs of trauma.


EYES: PERRL, extraocular movements intact, sclera anicteric, conjunctiva clear. 

No ptosis. 


LUNGS: Breath sounds equal, clear to auscultation bilaterally, no wheezes, no 

crackles, no 


accessory muscle use. 


HEART: Regular rate and rhythm, S1, S2 without murmur, rub or gallop.


ABDOMEN: Soft, nontender, nondistended, normoactive bowel sounds, no guarding, 

no 


rebound, no hepatosplenomegaly, no masses.


EXTREMITIES: Legs contracted and rotated 90 degress to the right 


NEUROLOGICAL: Cranial nerves II through XII grossly intact. Normal speech








                  Active Medications











Generic Name Dose Route Start Last Admin





  Trade Name Freq  PRN Reason Stop Dose Admin


 


Acetaminophen  650 mg 06/22/17 07:51  





  Tylenol -  PO   





  Q6H PRN   





  FEVER OR PAIN   


 


Aspirin  81 mg 06/22/17 10:00 06/24/17 09:56





  Ecotrin -  PO   81 mg





  DAILY [facility]   Administration


 


Diazepam  10 mg 06/22/17 07:51 06/24/17 10:20





  Valium -  PO   10 mg





  Q8H PRN   Administration





  muscle spasms   


 


Heparin Sodium (Porcine)  5,000 unit 06/21/17 22:00 06/24/17 09:59





  Heparin -  SQ   5,000 unit





  BID [facility]   Administration


 


Piperacillin Sod/Tazobactam Sod  50 mls @ 100 mls/hr 06/22/17 14:15 06/24/17 09:

59





  Zosyn 3.375gm Ivpb (Pre-Docked)  IVPB   100 mls/hr





  Q8H-IV [facility]   Administration





  Protocol   


 


Lactulose  20 gm 06/23/17 16:58  





  Cephulac (Oral Use)  PO   





  QID PRN   





  CONSTIPATION   


 


Metoprolol Succinate  25 mg 06/24/17 10:00 06/24/17 09:58





  Toprol Xl -  PO   25 mg





  DAILY [facility]   Administration


 


Morphine Sulfate  4 mg 06/21/17 19:52 06/24/17 14:31





  Morphine 10 Mg/5 Ml Liquid  PO   4 mg





  Q4H PRN   Administration





  PAIN LEVEL 1-5   


 


Polyethylene Glycol  17 gm 06/22/17 14:00 06/24/17 13:16





  Miralax (For Daily Use) -  PO   Not Given





  TID [facility]   


 


Ranitidine HCl  150 mg 06/22/17 13:30 06/24/17 09:56





  Zantac -  PO   150 mg





  BID [facility]   Administration


 


Simethicone  80 mg 06/22/17 20:55 06/22/17 22:09





  Mylicon -  PO   80 mg





  QID PRN   Administration





  DYSPEPSIA   














Assessment/Plan


(1) Complicated UTI (urinary tract infection)


Assessment/Plan: 


-patient with history of neurogenic bladder and repeated UTIs


-growing pseudomonas, present on last admission as well


-susceptible to zosyn


-ID following


-today is Zosyn day #7 


-plan to stop on 6/25 per ID note


Code(s): N39.0 - URINARY TRACT INFECTION, SITE NOT SPECIFIED





(2) Rapid atrial fibrillation


Assessment/Plan: 


-cardiology note reviewed


-does not need AC


-currently in sinus rhythm, bradycardic


-continue metoprolol


Code(s): I48.91 - UNSPECIFIED ATRIAL FIBRILLATION





(3) Elevated troponin I level


Assessment/Plan: 


-continue current management


Code(s): R74.8 - ABNORMAL LEVELS OF OTHER SERUM ENZYMES





(4) Chronic pain


Assessment/Plan: 


-continue current management


Code(s): G89.29 - OTHER CHRONIC PAIN   Qualifiers: 


     Chronic pain type: chronic pain syndrome        Qualified Code(s): G89.4 - 

Chronic pain syndrome  





(5) Paraplegia


Assessment/Plan: 


-chronic


Code(s): G82.20 - PARAPLEGIA, UNSPECIFIED





(6) Constipation


-patient complaining of numerous bowel movements


-stop lactulose











Visit type





- Emergency Visit


Emergency Visit: Yes


ED Registration Date: 06/17/17


Care time: The patient presented to the Emergency Department on the above date 

and was hospitalized for further evaluation of their emergent condition.





- New Patient


This patient is new to me today: Yes


Date on this admission: 06/24/17





- Critical Care


Critical Care patient: No

## 2017-06-24 NOTE — PN
Progress Note, Physician


Chief Complaint: 


Events noted


Not in distress


History of Present Illness: 


Patient was seen and examined. Awake and alert. Chart was reviewed


Denies chest pain, SOB or palpitations





- Current Medication List


Current Medications: 


Active Medications





Acetaminophen (Tylenol -)  650 mg PO Q6H PRN


   PRN Reason: FEVER OR PAIN


Aspirin (Ecotrin -)  81 mg PO DAILY [facility]


   Last Admin: 06/24/17 09:56 Dose:  81 mg


Diazepam (Valium -)  10 mg PO Q8H PRN


   PRN Reason: muscle spasms


   Last Admin: 06/24/17 10:20 Dose:  10 mg


Heparin Sodium (Porcine) (Heparin -)  5,000 unit SQ BID [facility]


   Last Admin: 06/24/17 09:59 Dose:  5,000 unit


Piperacillin Sod/Tazobactam Sod (Zosyn 3.375gm Ivpb (Pre-Docked))  50 mls @ 100 

mls/hr IVPB Q8H-IV [facility]


   PRN Reason: Protocol


   Last Admin: 06/24/17 09:59 Dose:  100 mls/hr


Lactulose (Cephulac (Oral Use))  20 gm PO QID [facility]


   PRN Reason: CONSTIPATION


Metoprolol Succinate (Toprol Xl -)  25 mg PO DAILY [facility]


   Last Admin: 06/24/17 09:58 Dose:  25 mg


Morphine Sulfate (Morphine 10 Mg/5 Ml Liquid)  4 mg PO Q4H PRN


   PRN Reason: PAIN LEVEL 1-5


   Last Admin: 06/24/17 14:31 Dose:  4 mg


Polyethylene Glycol (Miralax (For Daily Use) -)  17 gm PO TID [facility]


   Last Admin: 06/24/17 13:16 Dose:  Not Given


Ranitidine HCl (Zantac -)  150 mg PO BID [facility]


   Last Admin: 06/24/17 09:56 Dose:  150 mg


Simethicone (Mylicon -)  80 mg PO QID PRN


   PRN Reason: DYSPEPSIA


   Last Admin: 06/22/17 22:09 Dose:  80 mg











- Objective


Vital Signs: 


 Vital Signs











Temperature  98.0 F   06/24/17 15:16


 


Pulse Rate  56 L  06/24/17 15:16


 


Respiratory Rate  16   06/24/17 15:16


 


Blood Pressure  138/62   06/24/17 15:16


 


O2 Sat by Pulse Oximetry (%)  99   06/24/17 10:00











Neck: Yes: Supple


Cardiovascular: Yes: Regular Rate and Rhythm, S1, S2


Respiratory: Yes: CTA Bilaterally


Gastrointestinal: Yes: Normal Bowel Sounds, Soft.  No: Tenderness


Edema: No


Labs: 


 CBC, BMP





 06/23/17 07:00 





 06/23/17 07:00 





 








Problem List





- Problems


(1) Demand ischemia


Code(s): I24.8 - OTHER FORMS OF ACUTE ISCHEMIC HEART DISEASE





(2) Elevated troponin I level


Code(s): R74.8 - ABNORMAL LEVELS OF OTHER SERUM ENZYMES





(3) Neurogenic bladder


Code(s): N31.9 - NEUROMUSCULAR DYSFUNCTION OF BLADDER, UNSPECIFIED





(4) Complicated UTI (urinary tract infection)


Code(s): N39.0 - URINARY TRACT INFECTION, SITE NOT SPECIFIED





(5) Paraplegia


Code(s): G82.20 - PARAPLEGIA, UNSPECIFIED





(6) PAF (paroxysmal atrial fibrillation)


Code(s): I48.0 - PAROXYSMAL ATRIAL FIBRILLATION








Assessment/Plan


1. Paroxysmal atrial fibrillation, currently in sinus rhythm [id number] score 

of 0-1


2. CAD with evidence of demand ischemic injury, troponin trending down


3. Neurogenic bladder with chronic UTI


4. Paraplegia referable to T2 compression


5. Chronic pain syndrome





PLAN:


1. Continue Toprol XL 25 mg qd and ASA 81 mg qd


2. Given low [id number] score, patient would not benefit from anticoagulation


3. Complete antibiotic course per ID


4. DVT and GI prophylaxis





Further plans are to follow


Jake Dueñas MD

## 2017-06-25 LAB
ALBUMIN SERPL-MCNC: 3.1 G/DL (ref 3.4–5)
ALBUMIN SERPL-MCNC: 3.1 G/DL (ref 3.4–5)
ALP SERPL-CCNC: 101 U/L (ref 45–117)
ALP SERPL-CCNC: 103 U/L (ref 45–117)
ALT SERPL-CCNC: 15 U/L (ref 12–78)
ALT SERPL-CCNC: 17 U/L (ref 12–78)
ANION GAP SERPL CALC-SCNC: 8 MMOL/L (ref 8–16)
ANION GAP SERPL CALC-SCNC: 9 MMOL/L (ref 8–16)
AST SERPL-CCNC: 14 U/L (ref 15–37)
AST SERPL-CCNC: 15 U/L (ref 15–37)
BASOPHILS # BLD: 0.8 % (ref 0–2)
BASOPHILS # BLD: 1.1 % (ref 0–2)
BILIRUB SERPL-MCNC: 0.3 MG/DL (ref 0.2–1)
BILIRUB SERPL-MCNC: 0.6 MG/DL (ref 0.2–1)
CALCIUM SERPL-MCNC: 8.8 MG/DL (ref 8.5–10.1)
CALCIUM SERPL-MCNC: 9 MG/DL (ref 8.5–10.1)
CO2 SERPL-SCNC: 29 MMOL/L (ref 21–32)
CO2 SERPL-SCNC: 29 MMOL/L (ref 21–32)
CREAT SERPL-MCNC: 0.7 MG/DL (ref 0.7–1.3)
CREAT SERPL-MCNC: 0.9 MG/DL (ref 0.7–1.3)
DEPRECATED RDW RBC AUTO: 17 % (ref 11.9–15.9)
DEPRECATED RDW RBC AUTO: 17.3 % (ref 11.9–15.9)
EOSINOPHIL # BLD: 4 % (ref 0–4.5)
EOSINOPHIL # BLD: 4.8 % (ref 0–4.5)
GLUCOSE SERPL-MCNC: 104 MG/DL (ref 74–106)
GLUCOSE SERPL-MCNC: 71 MG/DL (ref 74–106)
MAGNESIUM SERPL-MCNC: 2 MG/DL (ref 1.8–2.4)
MAGNESIUM SERPL-MCNC: 2.2 MG/DL (ref 1.8–2.4)
MCH RBC QN AUTO: 28.8 PG (ref 25.7–33.7)
MCH RBC QN AUTO: 29.8 PG (ref 25.7–33.7)
MCHC RBC AUTO-ENTMCNC: 32.7 G/DL (ref 32–35.9)
MCHC RBC AUTO-ENTMCNC: 33.7 G/DL (ref 32–35.9)
MCV RBC: 88.1 FL (ref 80–96)
MCV RBC: 88.3 FL (ref 80–96)
NEUTROPHILS # BLD: 61.4 % (ref 42.8–82.8)
NEUTROPHILS # BLD: 63.6 % (ref 42.8–82.8)
PLATELET # BLD AUTO: 285 K/MM3 (ref 134–434)
PLATELET # BLD AUTO: 326 K/MM3 (ref 134–434)
PLATELET # BLD EST: ADEQUATE 10*3/UL
PLATELET COMMENT2: (no result)
PLATELET COMMENT3: (no result)
PMV BLD: 10.1 FL (ref 7.5–11.1)
PMV BLD: 9.8 FL (ref 7.5–11.1)
PROT SERPL-MCNC: 6.8 G/DL (ref 6.4–8.2)
PROT SERPL-MCNC: 7.1 G/DL (ref 6.4–8.2)
WBC # BLD AUTO: 10.2 K/MM3 (ref 4–10)
WBC # BLD AUTO: 9.6 K/MM3 (ref 4–10)

## 2017-06-25 RX ADMIN — METOPROLOL SUCCINATE SCH MG: 25 TABLET, EXTENDED RELEASE ORAL at 10:30

## 2017-06-25 RX ADMIN — POLYETHYLENE GLYCOL 3350 SCH: 17 POWDER, FOR SOLUTION ORAL at 13:52

## 2017-06-25 RX ADMIN — HEPARIN SODIUM SCH: 5000 INJECTION, SOLUTION INTRAVENOUS; SUBCUTANEOUS at 21:57

## 2017-06-25 RX ADMIN — PIPERACILLIN SODIUM,TAZOBACTAM SODIUM SCH MLS/HR: 3; .375 INJECTION, POWDER, FOR SOLUTION INTRAVENOUS at 10:30

## 2017-06-25 RX ADMIN — RANITIDINE SCH MG: 150 TABLET ORAL at 10:30

## 2017-06-25 RX ADMIN — MORPHINE SULFATE PRN MG: 10 SOLUTION ORAL at 11:33

## 2017-06-25 RX ADMIN — POLYETHYLENE GLYCOL 3350 SCH: 17 POWDER, FOR SOLUTION ORAL at 05:49

## 2017-06-25 RX ADMIN — MORPHINE SULFATE PRN MG: 10 SOLUTION ORAL at 16:10

## 2017-06-25 RX ADMIN — HEPARIN SODIUM SCH UNIT: 5000 INJECTION, SOLUTION INTRAVENOUS; SUBCUTANEOUS at 10:30

## 2017-06-25 RX ADMIN — RANITIDINE SCH MG: 150 TABLET ORAL at 21:57

## 2017-06-25 RX ADMIN — MORPHINE SULFATE PRN MG: 10 SOLUTION ORAL at 21:58

## 2017-06-25 RX ADMIN — PIPERACILLIN SODIUM,TAZOBACTAM SODIUM SCH MLS/HR: 3; .375 INJECTION, POWDER, FOR SOLUTION INTRAVENOUS at 01:14

## 2017-06-25 RX ADMIN — POLYETHYLENE GLYCOL 3350 SCH: 17 POWDER, FOR SOLUTION ORAL at 21:51

## 2017-06-25 RX ADMIN — MORPHINE SULFATE PRN MG: 10 SOLUTION ORAL at 07:33

## 2017-06-25 RX ADMIN — ASPIRIN SCH MG: 81 TABLET, COATED ORAL at 10:30

## 2017-06-25 RX ADMIN — PIPERACILLIN SODIUM,TAZOBACTAM SODIUM SCH MLS/HR: 3; .375 INJECTION, POWDER, FOR SOLUTION INTRAVENOUS at 17:34

## 2017-06-25 NOTE — PN
Progress Note (short form)





- Note


Progress Note: 


Resting in NAD. 


No acute events overnight. 


No CP or SOB. 


 


 


 Intake & Output











 06/22/17 06/23/17 06/24/17 06/25/17





 23:59 23:59 23:59 23:59


 


Intake Total 1300 750 350 50


 


Output Total 2200 1700 900 500


 


Balance -900 -950 -550 -450


 


Weight 113 lb 15.664 oz   











 Last Vital Signs











Temp Pulse Resp BP Pulse Ox


 


 98.6 F   57 L  20   100/50   99 


 


 06/25/17 09:00  06/25/17 09:00  06/25/17 09:00  06/25/17 09:00  06/24/17 21:00











Active Medications





Acetaminophen (Tylenol -)  650 mg PO Q6H PRN


   PRN Reason: FEVER OR PAIN


Aspirin (Ecotrin -)  81 mg PO DAILY [facility]


   Last Admin: 06/25/17 10:30 Dose:  81 mg


Heparin Sodium (Porcine) (Heparin -)  5,000 unit SQ BID [facility]


   Last Admin: 06/25/17 10:30 Dose:  5,000 unit


Piperacillin Sod/Tazobactam Sod (Zosyn 3.375gm Ivpb (Pre-Docked))  50 mls @ 100 

mls/hr IVPB Q8H-IV [facility]


   PRN Reason: Protocol


   Last Admin: 06/25/17 10:30 Dose:  100 mls/hr


Metoprolol Succinate (Toprol Xl -)  25 mg PO DAILY [facility]


   Last Admin: 06/25/17 10:30 Dose:  25 mg


Morphine Sulfate (Morphine 10 Mg/5 Ml Liquid)  4 mg PO Q4H PRN


   PRN Reason: PAIN


   Last Admin: 06/25/17 11:33 Dose:  4 mg


Polyethylene Glycol (Miralax (For Daily Use) -)  17 gm PO TID [facility]


   Last Admin: 06/25/17 05:49 Dose:  Not Given


Ranitidine HCl (Zantac -)  150 mg PO BID [facility]


   Last Admin: 06/25/17 10:30 Dose:  150 mg


Simethicone (Mylicon -)  80 mg PO QID [facility]


   PRN Reason: DYSPEPSIA


   Last Admin: 06/22/17 22:09 Dose:  80 mg








Constitutional: No Distress, Thin


Neck: Supple (-) JVD


Respiratory: Diminished at the Bases


Cardiovascular: S1 S2 RRR, (-) murmurs


Gastrointestinal: Soft Benign Normal Bowel Sounds


Ext: No Edema, contracted 





Labs: 





 


 


 


 Assessment/Plan 





ASSESSMENT:


AMI : demand ischemia 


Paroxysmal atrial fibrillation -> NSR 


CAD 


Neurogenic bladder with chronic UTI


Paraplegia due to Fall and T2 compression


Chronic pain syndrome





PLAN:


ABX per ID 


VTE prophylaxis 


ASA 





Dr Machado

## 2017-06-26 RX ADMIN — PIPERACILLIN SODIUM,TAZOBACTAM SODIUM SCH MLS/HR: 3; .375 INJECTION, POWDER, FOR SOLUTION INTRAVENOUS at 02:19

## 2017-06-26 RX ADMIN — HEPARIN SODIUM SCH UNIT: 5000 INJECTION, SOLUTION INTRAVENOUS; SUBCUTANEOUS at 10:34

## 2017-06-26 RX ADMIN — POLYETHYLENE GLYCOL 3350 SCH: 17 POWDER, FOR SOLUTION ORAL at 15:43

## 2017-06-26 RX ADMIN — METOPROLOL SUCCINATE SCH MG: 25 TABLET, EXTENDED RELEASE ORAL at 10:35

## 2017-06-26 RX ADMIN — POLYETHYLENE GLYCOL 3350 SCH: 17 POWDER, FOR SOLUTION ORAL at 05:33

## 2017-06-26 RX ADMIN — MORPHINE SULFATE PRN MG: 10 SOLUTION ORAL at 02:04

## 2017-06-26 RX ADMIN — HEPARIN SODIUM SCH: 5000 INJECTION, SOLUTION INTRAVENOUS; SUBCUTANEOUS at 10:50

## 2017-06-26 RX ADMIN — ASPIRIN SCH MG: 81 TABLET, COATED ORAL at 10:34

## 2017-06-26 RX ADMIN — MORPHINE SULFATE PRN MG: 10 SOLUTION ORAL at 08:55

## 2017-06-26 RX ADMIN — MORPHINE SULFATE PRN MG: 10 SOLUTION ORAL at 20:37

## 2017-06-26 RX ADMIN — RANITIDINE SCH MG: 150 TABLET ORAL at 21:55

## 2017-06-26 RX ADMIN — RANITIDINE SCH MG: 150 TABLET ORAL at 10:34

## 2017-06-26 RX ADMIN — HEPARIN SODIUM SCH UNIT: 5000 INJECTION, SOLUTION INTRAVENOUS; SUBCUTANEOUS at 21:55

## 2017-06-26 RX ADMIN — PIPERACILLIN SODIUM,TAZOBACTAM SODIUM SCH: 3; .375 INJECTION, POWDER, FOR SOLUTION INTRAVENOUS at 10:32

## 2017-06-26 RX ADMIN — POLYETHYLENE GLYCOL 3350 SCH: 17 POWDER, FOR SOLUTION ORAL at 21:52

## 2017-06-26 NOTE — PN
Progress Note, Physician


History of Present Illness: 


no events overnight


patient stable





- Current Medication List


Current Medications: 


Active Medications





Acetaminophen (Tylenol -)  650 mg PO Q6H PRN


   PRN Reason: FEVER OR PAIN


Aspirin (Ecotrin -)  81 mg PO DAILY [facility]


   Last Admin: 06/26/17 10:34 Dose:  81 mg


Diazepam (Valium -)  10 mg PO Q8H PRN


   PRN Reason: MUSCLE SPASMS


   Last Admin: 06/25/17 22:00 Dose:  10 mg


Heparin Sodium (Porcine) (Heparin -)  5,000 unit SQ BID [facility]


   Last Admin: 06/26/17 10:50 Dose:  Not Given


Metoprolol Succinate (Toprol Xl -)  25 mg PO DAILY [facility]


   Last Admin: 06/26/17 10:35 Dose:  25 mg


Morphine Sulfate (Morphine 10 Mg/5 Ml Liquid)  4 mg PO Q4H PRN


   PRN Reason: PAIN


   Last Admin: 06/26/17 08:55 Dose:  4 mg


Polyethylene Glycol (Miralax (For Daily Use) -)  17 gm PO TID [facility]


   Last Admin: 06/26/17 05:33 Dose:  Not Given


Ranitidine HCl (Zantac -)  150 mg PO BID [facility]


   Last Admin: 06/26/17 10:34 Dose:  150 mg


Simethicone (Mylicon -)  80 mg PO QID PRN


   PRN Reason: DYSPEPSIA


   Last Admin: 06/22/17 22:09 Dose:  80 mg











- Objective


Vital Signs: 


 Vital Signs











Temperature  98.7 F   06/26/17 06:00


 


Pulse Rate  77   06/26/17 06:00


 


Respiratory Rate  18   06/26/17 06:00


 


Blood Pressure  117/51   06/26/17 06:00


 


O2 Sat by Pulse Oximetry (%)  99   06/24/17 21:00











Constitutional: Yes: No Distress, Calm


HENT: Yes: Atraumatic, Normocephalic


Cardiovascular: Yes: Regular Rate and Rhythm


Respiratory: Yes: Regular, CTA Bilaterally


Gastrointestinal: Yes: Normal Bowel Sounds, Soft


Musculoskeletal: Yes: Other


Extremities: Yes: Other


Neurological: Yes: Alert, Oriented


Psychiatric: Yes: Alert, Oriented


Labs: 


 CBC, BMP





 06/25/17 19:20 





 06/25/17 19:20 





 INR, PTT











INR  0.91  (0.82-1.09)   06/19/17  12:15    














Assessment/Plan


roblem List





- Problems


(1) Acute UTI


Code(s): N39.0 - URINARY TRACT INFECTION, SITE NOT SPECIFIED





(2) Bladder pain


Code(s): R39.89 - OTHER SYMPTOMS AND SIGNS INVOLVING THE GENITOURINARY SYSTEM





(3) Chronic pain


Code(s): G89.29 - OTHER CHRONIC PAIN   





(4) Paraplegia


Code(s): G82.20 - PARAPLEGIA, UNSPECIFIED





(5) Constipation


Code(s): K59.00 - CONSTIPATION, UNSPECIFIED  





6 afib











plan


stable 


off of abx


rest as per primary

## 2017-06-26 NOTE — PN
Progress Note, Physician


History of Present Illness: 


Remains in SR, denies chest pain or dyspnea, ready for d/c.





- Current Medication List


Current Medications: 


Active Medications





Acetaminophen (Tylenol -)  650 mg PO Q6H PRN


   PRN Reason: FEVER OR PAIN


Aspirin (Ecotrin -)  81 mg PO DAILY [facility]


   Last Admin: 06/26/17 10:34 Dose:  81 mg


Diazepam (Valium -)  10 mg PO Q8H PRN


   PRN Reason: MUSCLE SPASMS


   Last Admin: 06/25/17 22:00 Dose:  10 mg


Heparin Sodium (Porcine) (Heparin -)  5,000 unit SQ BID [facility]


   Last Admin: 06/26/17 10:50 Dose:  Not Given


Metoprolol Succinate (Toprol Xl -)  25 mg PO DAILY [facility]


   Last Admin: 06/26/17 10:35 Dose:  25 mg


Morphine Sulfate (Morphine 10 Mg/5 Ml Liquid)  4 mg PO Q4H PRN


   PRN Reason: PAIN


   Last Admin: 06/26/17 08:55 Dose:  4 mg


Polyethylene Glycol (Miralax (For Daily Use) -)  17 gm PO TID [facility]


   Last Admin: 06/26/17 05:33 Dose:  Not Given


Ranitidine HCl (Zantac -)  150 mg PO BID [facility]


   Last Admin: 06/26/17 10:34 Dose:  150 mg


Simethicone (Mylicon -)  80 mg PO QID PRN


   PRN Reason: DYSPEPSIA


   Last Admin: 06/22/17 22:09 Dose:  80 mg











- Objective


Vital Signs: 


 Vital Signs











Temperature  98.7 F   06/26/17 06:00


 


Pulse Rate  77   06/26/17 06:00


 


Respiratory Rate  18   06/26/17 06:00


 


Blood Pressure  117/51   06/26/17 06:00


 


O2 Sat by Pulse Oximetry (%)  99   06/24/17 21:00











Constitutional: Yes: No Distress, Calm


Neck: Yes: Supple


Cardiovascular: Yes: Regular Rate and Rhythm


Respiratory: Yes: Regular, Diminished


Gastrointestinal: Yes: Normal Bowel Sounds, Soft


Edema: No


Labs: 


 CBC, BMP





 06/25/17 19:20 





 06/25/17 19:20 





 INR, PTT











INR  0.91  (0.82-1.09)   06/19/17  12:15    














Problem List





- Problems


(1) Complicated UTI (urinary tract infection)


Code(s): N39.0 - URINARY TRACT INFECTION, SITE NOT SPECIFIED





(2) Chronic pain


Code(s): G89.29 - OTHER CHRONIC PAIN   Qualifiers: 


     Chronic pain type: chronic pain syndrome        Qualified Code(s): G89.4 - 

Chronic pain syndrome  





(3) Paraplegia


Code(s): G82.20 - PARAPLEGIA, UNSPECIFIED





(4) Rapid atrial fibrillation


Code(s): I48.91 - UNSPECIFIED ATRIAL FIBRILLATION





(5) Neurogenic bladder


Code(s): N31.9 - NEUROMUSCULAR DYSFUNCTION OF BLADDER, UNSPECIFIED





(6) Demand ischemia


Code(s): I24.8 - OTHER FORMS OF ACUTE ISCHEMIC HEART DISEASE








Assessment/Plan


1. Paroxysmal atrial fibrillation, currently in sinus rhythm [id number] score 

of 0-1


2. CAD with evidence of demand ischemic injury, troponin trending down


3. Neurogenic bladder with chronic UTI


4. Paraplegia referable to T2 compression


5. Chronic pain syndrome





PLAN:


1. Continue Toprol XL 25 mg qd and ASA 81 mg qd


2. Given low [id number] score, patient would not benefit from anticoagulation


3. Completed antibiotic course per ID


4. DVT and GI prophylaxis. d/c planning

## 2017-06-26 NOTE — PN
Progress Note (short form)





- Note


Progress Note: 


Resting in NAD on RA.  


No acute events overnight. 


No CP or SOB. 


 


 


 


 Intake & Output











 06/23/17 06/24/17 06/25/17 06/26/17





 23:59 23:59 23:59 23:59


 


Intake Total 750 350 450 120


 


Output Total 0273 655 8414 800


 


Balance -950 -550 -750 -680











 Last Vital Signs











Temp Pulse Resp BP Pulse Ox


 


 98.7 F   77   18   117/51   99 


 


 06/26/17 06:00  06/26/17 06:00  06/26/17 06:00  06/26/17 06:00  06/24/17 21:00











Active Medications





Acetaminophen (Tylenol -)  650 mg PO Q6H PRN


   PRN Reason: FEVER OR PAIN


Aspirin (Ecotrin -)  81 mg PO DAILY [facility]


   Last Admin: 06/26/17 10:34 Dose:  81 mg


Diazepam (Valium -)  10 mg PO Q8H PRN


   PRN Reason: MUSCLE SPASMS


   Last Admin: 06/25/17 22:00 Dose:  10 mg


Heparin Sodium (Porcine) (Heparin -)  5,000 unit SQ BID [facility]


   Last Admin: 06/26/17 10:50 Dose:  Not Given


Metoprolol Succinate (Toprol Xl -)  25 mg PO DAILY [facility]


   Last Admin: 06/26/17 10:35 Dose:  25 mg


Morphine Sulfate (Morphine 10 Mg/5 Ml Liquid)  4 mg PO Q4H PRN


   PRN Reason: PAIN


   Last Admin: 06/26/17 08:55 Dose:  4 mg


Polyethylene Glycol (Miralax (For Daily Use) -)  17 gm PO TID [facility]


   Last Admin: 06/26/17 05:33 Dose:  Not Given


Ranitidine HCl (Zantac -)  150 mg PO BID [facility]


   Last Admin: 06/26/17 10:34 Dose:  150 mg


Simethicone (Mylicon -)  80 mg PO QID PRN


   PRN Reason: DYSPEPSIA


   Last Admin: 06/22/17 22:09 Dose:  80 mg











Constitutional: No Distress, Thin


Neck: Supple (-) JVD


Respiratory: Diminished at the Bases


Cardiovascular: S1 S2 RRR, (-) murmurs


Gastrointestinal: Soft Benign Normal Bowel Sounds


Ext: No Edema, contracted 





Labs: 





 Laboratory Results - last 24 hr











  06/25/17 06/25/17





  19:20 19:20


 


WBC  10.2 H 


 


RBC  4.50 


 


Hgb  13.0 


 


Hct  39.7 


 


MCV  88.1 


 


MCHC  32.7 


 


RDW  17.3 H 


 


Plt Count  326 


 


MPV  10.1 


 


Neutrophils %  63.6 


 


Lymphocytes %  16.5 


 


Monocytes %  14.0 H 


 


Eosinophils %  4.8 H 


 


Basophils %  1.1 


 


Differential Comment  Slide scanned 


 


Platelet Estimate  Adequate 


 


Platelet Comment  No clumping noted 


 


Sodium   140


 


Potassium   4.2


 


Chloride   103


 


Carbon Dioxide   29


 


Anion Gap   8


 


BUN   16  D


 


Creatinine   0.9  D


 


Creat Clearance w eGFR   > 60


 


Random Glucose   104  D


 


Calcium   9.0


 


Magnesium   2.0


 


Total Bilirubin   0.3  D


 


AST   14 L


 


ALT   17


 


Alkaline Phosphatase   103


 


Total Protein   7.1


 


Albumin   3.1 L











 


 


 


 Assessment/Plan 





ASSESSMENT:


AMI : demand ischemia 


Paroxysmal atrial fibrillation -> NSR 


CAD 


Neurogenic bladder with chronic UTI


Paraplegia due to Fall and T2 compression


Chronic pain syndrome





PLAN:


S/P ABX 


D/C home anticipated 





Dr Machado

## 2017-06-26 NOTE — PN
Progress Note, Physician


Chief Complaint: 


Mr Sumner says he is having diarrhea and does not feel well. Denies cp, sob, n/

v.





- Current Medication List


Current Medications: 


Active Medications





Acetaminophen (Tylenol -)  650 mg PO Q6H PRN


   PRN Reason: FEVER OR PAIN


Aspirin (Ecotrin -)  81 mg PO DAILY [facility]


   Last Admin: 06/26/17 10:34 Dose:  81 mg


Diazepam (Valium -)  10 mg PO Q8H PRN


   PRN Reason: MUSCLE SPASMS


   Last Admin: 06/25/17 22:00 Dose:  10 mg


Heparin Sodium (Porcine) (Heparin -)  5,000 unit SQ BID [facility]


   Last Admin: 06/26/17 10:50 Dose:  Not Given


Metoprolol Succinate (Toprol Xl -)  25 mg PO DAILY [facility]


   Last Admin: 06/26/17 10:35 Dose:  25 mg


Morphine Sulfate (Morphine 10 Mg/5 Ml Liquid)  4 mg PO Q4H PRN


   PRN Reason: PAIN


   Last Admin: 06/26/17 08:55 Dose:  4 mg


Polyethylene Glycol (Miralax (For Daily Use) -)  17 gm PO TID [facility]


   Last Admin: 06/26/17 05:33 Dose:  Not Given


Ranitidine HCl (Zantac -)  150 mg PO BID [facility]


   Last Admin: 06/26/17 10:34 Dose:  150 mg


Simethicone (Mylicon -)  80 mg PO QID PRN


   PRN Reason: DYSPEPSIA


   Last Admin: 06/22/17 22:09 Dose:  80 mg











- Objective


Vital Signs: 


 Vital Signs











Temperature  98.0 F   06/26/17 10:00


 


Pulse Rate  56 L  06/26/17 10:00


 


Respiratory Rate  18   06/26/17 10:00


 


Blood Pressure  146/59   06/26/17 10:00


 


O2 Sat by Pulse Oximetry (%)  99   06/24/17 21:00











Constitutional: Yes: Well Nourished, No Distress, Calm


Cardiovascular: Yes: Regular Rate and Rhythm.  No: Gallop, Murmur, Rub


Respiratory: Yes: Regular, CTA Bilaterally.  No: Rales, Rhonchi, Wheezes


Gastrointestinal: Yes: Normal Bowel Sounds, Soft.  No: Distention, Tenderness


Extremities: Yes: WNL


Edema: No


Labs: 


 CBC, BMP





 06/25/17 19:20 





 06/25/17 19:20 





 INR, PTT











INR  0.91  (0.82-1.09)   06/19/17  12:15    














Problem List





- Problems


(1) Complicated UTI (urinary tract infection)


Code(s): N39.0 - URINARY TRACT INFECTION, SITE NOT SPECIFIED





(2) Rapid atrial fibrillation


Code(s): I48.91 - UNSPECIFIED ATRIAL FIBRILLATION





(3) Elevated troponin I level


Code(s): R74.8 - ABNORMAL LEVELS OF OTHER SERUM ENZYMES





(4) Chronic pain


Code(s): G89.29 - OTHER CHRONIC PAIN   Qualifiers: 


     Chronic pain type: chronic pain syndrome        Qualified Code(s): G89.4 - 

Chronic pain syndrome  





(5) Paraplegia


Code(s): G82.20 - PARAPLEGIA, UNSPECIFIED








Assessment/Plan


(1) Complicated UTI (urinary tract infection)


Assessment/Plan: 


-finished full course of antibiotics


-stable for discharge from this standpoint


Code(s): N39.0 - URINARY TRACT INFECTION, SITE NOT SPECIFIED





(2) Rapid atrial fibrillation


Assessment/Plan: 


-cardiology note reviewed


-does not need AC


-currently in sinus rhythm, bradycardic


-continue metoprolol


Code(s): I48.91 - UNSPECIFIED ATRIAL FIBRILLATION





(3) Elevated troponin I level


Assessment/Plan: 


-continue current management


Code(s): R74.8 - ABNORMAL LEVELS OF OTHER SERUM ENZYMES





(4) Chronic pain


Assessment/Plan: 


-continue current management


Code(s): G89.29 - OTHER CHRONIC PAIN   Qualifiers: 


     Chronic pain type: chronic pain syndrome        Qualified Code(s): G89.4 - 

Chronic pain syndrome  





(5) Paraplegia


Assessment/Plan: 


-chronic


Code(s): G82.20 - PARAPLEGIA, UNSPECIFIED





(6)Diarrhea


-suspect secondary to lactulose


-however since on antibiotics and had increase in WBC, will check c. diff


-if negative, discharge tomorrow

## 2017-06-27 VITALS — HEART RATE: 54 BPM | TEMPERATURE: 98 F | DIASTOLIC BLOOD PRESSURE: 64 MMHG | SYSTOLIC BLOOD PRESSURE: 154 MMHG

## 2017-06-27 RX ADMIN — MORPHINE SULFATE PRN MG: 10 SOLUTION ORAL at 02:44

## 2017-06-27 RX ADMIN — RANITIDINE SCH MG: 150 TABLET ORAL at 10:33

## 2017-06-27 RX ADMIN — POLYETHYLENE GLYCOL 3350 SCH: 17 POWDER, FOR SOLUTION ORAL at 06:26

## 2017-06-27 RX ADMIN — ASPIRIN SCH MG: 81 TABLET, COATED ORAL at 10:33

## 2017-06-27 RX ADMIN — METOPROLOL SUCCINATE SCH MG: 25 TABLET, EXTENDED RELEASE ORAL at 10:33

## 2017-06-27 RX ADMIN — HEPARIN SODIUM SCH: 5000 INJECTION, SOLUTION INTRAVENOUS; SUBCUTANEOUS at 10:34

## 2017-06-27 RX ADMIN — POLYETHYLENE GLYCOL 3350 SCH: 17 POWDER, FOR SOLUTION ORAL at 14:38

## 2017-06-27 RX ADMIN — MORPHINE SULFATE PRN MG: 10 SOLUTION ORAL at 10:34

## 2017-06-27 NOTE — DS
Physical Examination


Vital Signs: 


 Vital Signs











Temperature  98 F   06/27/17 07:42


 


Pulse Rate  54 L  06/27/17 07:42


 


Respiratory Rate  20   06/27/17 07:42


 


Blood Pressure  154/64   06/27/17 07:42


 


O2 Sat by Pulse Oximetry (%)  96   06/26/17 21:00











Constitutional: Yes: Well Nourished, No Distress, Calm


Cardiovascular: Yes: Regular Rate and Rhythm.  No: Gallop, Murmur, Rub


Respiratory: Yes: Regular, CTA Bilaterally.  No: Rales, Rhonchi, Wheezes


Gastrointestinal: Yes: Normal Bowel Sounds, Soft.  No: Distention, Tenderness


Extremities: Yes: WNL


Edema: No


Labs: 


 CBC, BMP





 06/25/17 19:20 





 06/25/17 19:20 











Discharge Summary


Reason For Visit: ACUTE UTI


Current Active Problems





Acute UTI (Acute) 


Demand ischemia (Acute) 


Elevated troponin I level (Acute) 


Neurogenic bladder (Acute) 


PAF (paroxysmal atrial fibrillation) (Acute) 


Rapid atrial fibrillation (Acute) 


Constipation (Chronic) 








Hospital Course: 


(1) Complicated UTI (urinary tract infection)


Code(s): N39.0 - URINARY TRACT INFECTION, SITE NOT SPECIFIED





(2) Rapid atrial fibrillation


Code(s): I48.91 - UNSPECIFIED ATRIAL FIBRILLATION





(3) Elevated troponin I level


Code(s): R74.8 - ABNORMAL LEVELS OF OTHER SERUM ENZYMES





(4) Chronic pain


Code(s): G89.29 - OTHER CHRONIC PAIN   Qualifiers: 


     Chronic pain type: chronic pain syndrome        Qualified Code(s): G89.4 - 

Chronic pain syndrome  





(5) Paraplegia


Code(s): G82.20 - PARAPLEGIA, UNSPECIFIED





Mr Sumner is a 61 year old male who presented with complicated UTI and had an 

episode of rapid afib and troponin leak secondary to this. He was admitted to 

the hospital and seen by ID. He finished a full course of zosyn. He was seen by 

cardiology and he spontaneously converted to sinus rhythm. His CHADS-Vasc score 

was zero so does not need anticoagulation. He is to be maintained on 

metoprolol. He had some constipation that was relieved by lactulose, but this 

caused diarrhea. This has resolved. Currently he is doing well and is stable 

for discharge.





32 minutes spent in preparation of this discharge


Condition: Stable





- Instructions


Diet, Activity, Other Instructions: 


resume previous diet and activity


Referrals: 


Sony Poon MD [Staff Physician] - 


Disposition: VNS/HOME HEALTH CARE





- Home Medications


Comprehensive Discharge Medication List: 


Ambulatory Orders





Diazepam [Valium] 10 mg PO TID PRN #30 tablet MDD 30mg 01/09/17 


Morphine 10 mg/5 ml Liquid [Morphine 10 mg/5 mL Liquid -] 1 mg PO Q4H PRN 03/18/ 17 


Aspirin Coated [Ecotrin -] 81 mg PO DAILY #30 tab.ec 06/26/17 


Metoprolol Succinate [Toprol XL -] 25 mg PO DAILY #30 tab.sr 06/26/17 


Polyethylene Glycol 3350 [Miralax 119 gm Btl -] 17 gm PO TID #1 bottle 06/26/17 


Ranitidine [Zantac -] 150 mg PO BID #60 tablet 06/26/17 


Simethicone [Mylicon -] 80 mg PO QID PRN #60 tab.chew 06/26/17

## 2017-06-27 NOTE — PN
Progress Note, Physician


Chief Complaint: 


Events noted


Not in distress


History of Present Illness: 


Patient was seen and examined. Awake and alert. Chart was reviewed


Denies chest pain, SOB or palpitations





- Current Medication List


Current Medications: 


Active Medications





Acetaminophen (Tylenol -)  650 mg PO Q6H PRN


   PRN Reason: FEVER OR PAIN


Aspirin (Ecotrin -)  81 mg PO DAILY [facility]


   Last Admin: 06/27/17 10:33 Dose:  81 mg


Diazepam (Valium -)  10 mg PO Q8H PRN


   PRN Reason: MUSCLE SPASMS


   Last Admin: 06/27/17 10:33 Dose:  10 mg


Heparin Sodium (Porcine) (Heparin -)  5,000 unit SQ BID [facility]


   Last Admin: 06/27/17 10:34 Dose:  Not Given


Metoprolol Succinate (Toprol Xl -)  25 mg PO DAILY [facility]


   Last Admin: 06/27/17 10:33 Dose:  25 mg


Morphine Sulfate (Morphine 10 Mg/5 Ml Liquid)  4 mg PO Q4H PRN


   PRN Reason: PAIN


   Last Admin: 06/27/17 10:34 Dose:  4 mg


Polyethylene Glycol (Miralax (For Daily Use) -)  17 gm PO TID [facility]


   Last Admin: 06/27/17 06:26 Dose:  Not Given


Ranitidine HCl (Zantac -)  150 mg PO BID [facility]


   Last Admin: 06/27/17 10:33 Dose:  150 mg


Simethicone (Mylicon -)  80 mg PO QID PRN


   PRN Reason: DYSPEPSIA


   Last Admin: 06/22/17 22:09 Dose:  80 mg











- Objective


Vital Signs: 


 Vital Signs











Temperature  98 F   06/27/17 07:42


 


Pulse Rate  54 L  06/27/17 07:42


 


Respiratory Rate  20   06/27/17 07:42


 


Blood Pressure  154/64   06/27/17 07:42


 


O2 Sat by Pulse Oximetry (%)  96   06/26/17 21:00











Neck: Yes: Supple


Cardiovascular: Yes: Regular Rate and Rhythm, S1, S2.  No: Murmur


Respiratory: Yes: CTA Bilaterally


Gastrointestinal: Yes: Normal Bowel Sounds, Soft.  No: Tenderness


Edema: No





Problem List





- Problems


(1) Demand ischemia


Code(s): I24.8 - OTHER FORMS OF ACUTE ISCHEMIC HEART DISEASE





(2) Elevated troponin I level


Code(s): R74.8 - ABNORMAL LEVELS OF OTHER SERUM ENZYMES





(3) Neurogenic bladder


Code(s): N31.9 - NEUROMUSCULAR DYSFUNCTION OF BLADDER, UNSPECIFIED





(4) Paraplegia


Code(s): G82.20 - PARAPLEGIA, UNSPECIFIED





(5) PAF (paroxysmal atrial fibrillation)


Code(s): I48.0 - PAROXYSMAL ATRIAL FIBRILLATION





(6) UTI (urinary tract infection)


Code(s): N39.0 - URINARY TRACT INFECTION, SITE NOT SPECIFIED   Qualifiers: 


     Urinary tract infection type: site unspecified     Hematuria presence: 

without hematuria        Qualified Code(s): N39.0 - Urinary tract infection, 

site not specified  








Assessment/Plan


1. Paroxysmal atrial fibrillation, currently in sinus rhythm [id number] score 

of 0-1


2. CAD with evidence of demand ischemic injury, troponin trending down


3. Neurogenic bladder with chronic UTI


4. Paraplegia referable to T2 compression


5. Chronic pain syndrome





PLAN:


1. Continue Toprol XL 25 mg qd and ASA 81 mg qd


2. Given low [id number] score, patient would not benefit from anticoagulation


3. Completed antibiotic course per ID


4. DVT and GI prophylaxis





Discharge planning





Jake Dueñas MD

## 2017-06-27 NOTE — PN
Progress Note, Physician


History of Present Illness: 


stable


no new issues





- Current Medication List


Current Medications: 


Active Medications





Acetaminophen (Tylenol -)  650 mg PO Q6H PRN


   PRN Reason: FEVER OR PAIN


Aspirin (Ecotrin -)  81 mg PO DAILY [facility]


   Last Admin: 06/27/17 10:33 Dose:  81 mg


Diazepam (Valium -)  10 mg PO Q8H PRN


   PRN Reason: MUSCLE SPASMS


   Last Admin: 06/27/17 10:33 Dose:  10 mg


Heparin Sodium (Porcine) (Heparin -)  5,000 unit SQ BID [facility]


   Last Admin: 06/27/17 10:34 Dose:  Not Given


Metoprolol Succinate (Toprol Xl -)  25 mg PO DAILY [facility]


   Last Admin: 06/27/17 10:33 Dose:  25 mg


Morphine Sulfate (Morphine 10 Mg/5 Ml Liquid)  4 mg PO Q4H PRN


   PRN Reason: PAIN


   Last Admin: 06/27/17 10:34 Dose:  4 mg


Polyethylene Glycol (Miralax (For Daily Use) -)  17 gm PO TID [facility]


   Last Admin: 06/27/17 06:26 Dose:  Not Given


Ranitidine HCl (Zantac -)  150 mg PO BID [facility]


   Last Admin: 06/27/17 10:33 Dose:  150 mg


Simethicone (Mylicon -)  80 mg PO QID PRN


   PRN Reason: DYSPEPSIA


   Last Admin: 06/22/17 22:09 Dose:  80 mg











- Objective


Vital Signs: 


 Vital Signs











Temperature  98 F   06/27/17 07:42


 


Pulse Rate  54 L  06/27/17 07:42


 


Respiratory Rate  20   06/27/17 07:42


 


Blood Pressure  154/64   06/27/17 07:42


 


O2 Sat by Pulse Oximetry (%)  96   06/26/17 21:00











Constitutional: Yes: No Distress, Calm


Cardiovascular: Yes: Regular Rate and Rhythm


Respiratory: Yes: Regular, CTA Bilaterally


Gastrointestinal: Yes: Normal Bowel Sounds, Soft


Musculoskeletal: Yes: Other


Extremities: Yes: Other


Neurological: Yes: Alert, Oriented


Psychiatric: Yes: Alert, Oriented


Labs: 


 CBC, BMP





 06/25/17 19:20 





 06/25/17 19:20 





 INR, PTT











INR  0.91  (0.82-1.09)   06/19/17  12:15    














Assessment/Plan


roble List





- Problems


(1) Acute UTI


Code(s): N39.0 - URINARY TRACT INFECTION, SITE NOT SPECIFIED





(2) Bladder pain


Code(s): R39.89 - OTHER SYMPTOMS AND SIGNS INVOLVING THE GENITOURINARY SYSTEM





(3) Chronic pain


Code(s): G89.29 - OTHER CHRONIC PAIN   





(4) Paraplegia


Code(s): G82.20 - PARAPLEGIA, UNSPECIFIED





(5) Constipation


Code(s): K59.00 - CONSTIPATION, UNSPECIFIED  





6 afib











plan


stable 


off of abx


ct current mgmt

## 2017-07-11 ENCOUNTER — HOSPITAL ENCOUNTER (EMERGENCY)
Dept: HOSPITAL 74 - JER | Age: 62
Discharge: HOME | End: 2017-07-11
Payer: COMMERCIAL

## 2017-07-11 VITALS — TEMPERATURE: 97.9 F | SYSTOLIC BLOOD PRESSURE: 122 MMHG | DIASTOLIC BLOOD PRESSURE: 55 MMHG | HEART RATE: 59 BPM

## 2017-07-11 VITALS — BODY MASS INDEX: 16.7 KG/M2

## 2017-07-11 DIAGNOSIS — R10.84: ICD-10-CM

## 2017-07-11 DIAGNOSIS — Z87.440: ICD-10-CM
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
## 2017-07-11 DIAGNOSIS — N31.8: Primary | ICD-10-CM

## 2017-07-11 DIAGNOSIS — S24.152S: ICD-10-CM

## 2017-07-11 DIAGNOSIS — G82.21: ICD-10-CM

## 2017-07-11 LAB
APPEARANCE UR: CLEAR
BILIRUB UR STRIP.AUTO-MCNC: NEGATIVE MG/DL
COLOR UR: (no result)
KETONES UR QL STRIP: NEGATIVE
LEUKOCYTE ESTERASE UR QL STRIP.AUTO: NEGATIVE
NITRITE UR QL STRIP: NEGATIVE
PH UR: 7 [PH] (ref 5–8)
PROT UR QL STRIP: NEGATIVE
PROT UR QL STRIP: NEGATIVE
RBC # BLD AUTO: 1 /HPF (ref 0–3)
RBC # UR STRIP: (no result) /UL
SP GR UR: 1.01 (ref 1–1.02)
UROBILINOGEN UR STRIP-MCNC: NEGATIVE E.U./DL (ref 0.2–1)
WBC # UR AUTO: <1 /HPF (ref 3–5)

## 2017-07-11 PROCEDURE — 3E0233Z INTRODUCTION OF ANTI-INFLAMMATORY INTO MUSCLE, PERCUTANEOUS APPROACH: ICD-10-PCS | Performed by: EMERGENCY MEDICINE

## 2017-07-11 NOTE — PDOC
History of Present Illness





- General


History Source: Patient


Exam Limitations: No Limitations





- History of Present Illness


Initial Comments: 





07/11/17 09:50


The patient is a 61 year old male, with a significant past medical history of 

paraplegia (severed T2-T4 secondary to falling from 30 ft nhi), recurrent 

bladder infections, and EtOH abuse who presents to the emergency department 

with abdominal pain and dysuria. The patient relates having recurrent bladder 

infections, and notes his symptoms are similar to previous infections. He 

denies any recent fevers, chills, headache or dizziness. He denies any recent 

nausea, vomit, diarrhea or constipation. He denies any recent chest pain or 

shortness of breath. 





Allergies: NKA


Past surgical history: None reported.


Social History: See HPI. Marijuana use.


Primary Care Physician:  








<Rodri Curiel - Last Filed: 07/11/17 10:51>





<Venus Ham - Last Filed: 07/11/17 14:45>





- General


Chief Complaint: Palpitations


Stated Complaint: Urinary problem/palpitations


Time Seen by Provider: 07/11/17 09:22





Past History





<Rodri Curiel - Last Filed: 07/11/17 10:51>





- Past Medical History


Anemia: No


Asthma: No


Cancer: No


Cardiac Disorders: No


CVA: No


COPD: Yes


CHF: No


Dementia: No


Diabetes: No


GI Disorders: No


 Disorders: Yes (Recurrent UTI, CONDOM CATHETER)


HTN:  (Hypotension)


Hypercholesterolemia: No


Liver Disease: No


Seizures: No


Thyroid Disease: No





- Surgical History


Abdominal Surgery: No


Appendectomy: No


Cardiac Surgery: No


Cholecystectomy: No


Lung Surgery: No


Neurologic Surgery: No


Orthopedic Surgery: Yes (fracture right wrist s/p fusion)





- Immunization History


Td Vaccination: No


Immunization Up to Date: No





- Psycho/Social/Smoking Cessation Hx


Anxiety: No


Suicidal Ideation: No


Smoking Status: No


Smoking History: Never smoked


Years of Tobacco Use: 0


Have you smoked in the past 12 months: No


Number of Cigarettes Smoked Daily: 3


If you are a former smoker, when did you quit?: 1986


Cigars Per Day: 0


Hx Alcohol Use: No


Drug/Substance Use Hx: No


Substance Use Type: Alcohol


Hx Substance Use Treatment: No





<Venus Ham - Last Filed: 07/11/17 14:45>





- Past Medical History


Allergies/Adverse Reactions: 


 Allergies











Allergy/AdvReac Type Severity Reaction Status Date / Time


 


No Known Allergies Allergy   Verified 07/11/17 09:38











Home Medications: 


Ambulatory Orders





Diazepam [Valium] 10 mg PO TID PRN #30 tablet MDD 30mg 01/09/17 


Morphine 10 mg/5 ml Liquid [Morphine 10 mg/5 mL Liquid -] 1 mg PO Q4H PRN 03/18/ 17 


Aspirin Coated [Ecotrin -] 81 mg PO DAILY #30 tab.ec 06/26/17 


Metoprolol Succinate [Toprol XL -] 25 mg PO DAILY #30 tab.sr 06/26/17 











**Review of Systems





- Review of Systems


Able to Perform ROS?: Yes


Comments:: 





07/11/17 09:50


GENERAL/CONSTITUTIONAL: No: fever, chills, weakness, loss of appetite.


HEAD, EYES, EARS, NOSE AND THROAT: No: change in vision, ear pain, discharge, 

sore throat, throat swelling.


CARDIOVASCULAR:  No: chest pain, lightheadedness, syncope


RESPIRATORY: No: cough, shortness of breath, wheezing, hemoptysis, stridor.


GASTROINTESTINAL: +abd pain. No: nausea, vomiting, diarrhea, rectal bleeding, 

constipation. 


GENITOURINARY: +dysuria No: hematuria, frequency, urgency, flank pain.


MUSCULOSKELETAL: No: back pain, neck pain, joint pain, muscle swelling or pain


SKIN : No: lesions, pallor, rash or easy bruising.


NEUROLOGIC: No: headache, vertigo, paresthesias, weakness 


ENDOCRINE: No: unexplained weight gain or loss


HEMATOLOGIC/LYMPHATIC: No: anemia, easy bleeding, swelling nodes.








<Rodri Curiel - Last Filed: 07/11/17 10:51>





*Physical Exam





- Vital Signs


 Last Vital Signs











Temp Pulse Resp BP Pulse Ox


 


 99 F   60   18   132/74   96 


 


 07/11/17 09:36  07/11/17 09:36  07/11/17 09:36  07/11/17 09:36  07/11/17 09:36














- Physical Exam


Comments: 





07/11/17 09:54


GENERAL: The patient is in no acute distress. Afebrile.


HEAD: Normal with no signs of trauma.


EYES: PERRLA, EOMI, sclera anicteric, conjunctiva clear.


NECK: Normal range of motion, supple without lymphadenopathy, JVD, or masses.


LUNGS: Breath sounds equal, clear to auscultation bilaterally. No wheezes, and 

no crackles.


HEART: Regular rate and rhythm, normal S1 and S2 without murmur, rub or gallop.


ABDOMEN: Soft, normoactive bowel sounds. Mid abdominal scar Suprapubic 

tenderness LLQ minimally tender. No flank pain.  No guarding, no rebound. No 

masses palpable.


EXTREMITIES: Paraplegic legs wasted bilaterally. Moving upper extremities. 


NEUROLOGICAL: Cranial nerves II through XII grossly intact. Normal speech. 


MUSCULOSKELETAL: No CVA tenderness


SKIN: Warm, Dry, normal turgor, no rashes or lesions noted.








<Rodri Curiel - Last Filed: 07/11/17 10:51>





- Vital Signs


 Last Vital Signs











Temp Pulse Resp BP Pulse Ox


 


 99 F   60   18   132/74   96 


 


 07/11/17 09:36  07/11/17 09:36  07/11/17 09:36  07/11/17 09:36  07/11/17 09:36














<Venus Ham - Last Filed: 07/11/17 14:45>





Medical Decision Making





- Medical Decision Making





07/11/17 10:51


Call made to Dr. Poon, case discussed. 








<Rodri Curiel - Last Filed: 07/11/17 10:51>





- Medical Decision Making


07/11/17 11:28


I spoke to Dr. Poon, who tells me that pt frequently has UTIs, given his 

urogenic bladder. He follows with Dr. Paniagua, and he has been in the 

hospital multiple times recently.  Pt is afebrile. Belly soft; no flank pain. 


EKG: NSR/janeen





07/11/17 11:38


Pt has a nitrite negative urinalysis with no WBC; small blood.  I will send him 

for spiral CT of kidneys/yreters/bladder





07/11/17 14:44


CT normal. UA normal. Exam normal. Pt feels better and wants to go home. He is 

awaiting his HHAide to take him home. Pt is currently eating lunch.





<Venus Ham - Last Filed: 07/11/17 14:45>





*DC/Admit/Observation/Transfer





- Attestations


Scribe Attestion: 





07/11/17 09:50





Documentation prepared by Rodri Curiel, acting as medical scribe for Venus Ham MD.








<Rodri Curiel - Last Filed: 07/11/17 10:51>





- Discharge Dispostion


Admit: No





<Venus Ham - Last Filed: 07/11/17 14:45>


Diagnosis at time of Disposition: 


 Neurogenic bladder, Abdominal pain in male





- Discharge Dispostion


Disposition: HOME


Condition at time of disposition: Stable





- Referrals


Referrals: 


Sony Poon MD [Primary Care Provider] - 





- Patient Instructions


Printed Discharge Instructions:  Neurogenic Bladder -- Adult

## 2017-07-11 NOTE — EKG
Test Reason : 

Blood Pressure : ***/*** mmHG

Vent. Rate : 059 BPM     Atrial Rate : 059 BPM

   P-R Int : 146 ms          QRS Dur : 086 ms

    QT Int : 416 ms       P-R-T Axes : 065 071 083 degrees

   QTc Int : 411 ms

 

SINUS BRADYCARDIA

QS IN aVL

WHEN COMPARED WITH ECG OF 19-JUN-2017 20:36,

NO SIGNIFICANT CHANGE WAS FOUND

Confirmed by BREANN CHOI MD ([id number]) on 7/11/2017 1:06:35 PM

 

Referred By:             Confirmed By:BREANN CHOI MD

## 2017-07-25 ENCOUNTER — HOSPITAL ENCOUNTER (EMERGENCY)
Dept: HOSPITAL 74 - JER | Age: 62
Discharge: HOME | End: 2017-07-25
Payer: COMMERCIAL

## 2017-07-25 VITALS — TEMPERATURE: 98.4 F

## 2017-07-25 VITALS — HEART RATE: 88 BPM | DIASTOLIC BLOOD PRESSURE: 68 MMHG | SYSTOLIC BLOOD PRESSURE: 135 MMHG

## 2017-07-25 VITALS — BODY MASS INDEX: 17.2 KG/M2
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
## 2017-07-25 DIAGNOSIS — S24.152S: ICD-10-CM

## 2017-07-25 DIAGNOSIS — G82.21: ICD-10-CM

## 2017-07-25 DIAGNOSIS — M62.838: Primary | ICD-10-CM

## 2017-07-25 DIAGNOSIS — Z87.440: ICD-10-CM

## 2017-07-25 LAB
ANION GAP SERPL CALC-SCNC: 7 MMOL/L (ref 8–16)
BASOPHILS # BLD: 0.3 % (ref 0–2)
CALCIUM SERPL-MCNC: 8.5 MG/DL (ref 8.5–10.1)
CO2 SERPL-SCNC: 29 MMOL/L (ref 21–32)
CREAT SERPL-MCNC: 0.5 MG/DL (ref 0.7–1.3)
DEPRECATED RDW RBC AUTO: 17.2 % (ref 11.9–15.9)
EOSINOPHIL # BLD: 3.7 % (ref 0–4.5)
GLUCOSE SERPL-MCNC: 79 MG/DL (ref 74–106)
MCH RBC QN AUTO: 30.1 PG (ref 25.7–33.7)
MCHC RBC AUTO-ENTMCNC: 33.8 G/DL (ref 32–35.9)
MCV RBC: 89.1 FL (ref 80–96)
NEUTROPHILS # BLD: 69.9 % (ref 42.8–82.8)
PLATELET # BLD AUTO: 301 K/MM3 (ref 134–434)
PMV BLD: 8.7 FL (ref 7.5–11.1)
TROPONIN I SERPL-MCNC: < 0.02 NG/ML (ref 0–0.05)
WBC # BLD AUTO: 9 K/MM3 (ref 4–10)

## 2017-07-25 NOTE — PDOC
Attending Attestation





- Resident


Resident Name: JanineTania





- ED Attending Attestation


I have performed the following: I have examined & evaluated the patient, The 

case was reviewed & discussed with the resident, I agree w/resident's findings 

& plan, Exceptions are as noted





- HPI


HPI: 





07/25/17 09:35


60y/o M with history of paraplegia p/w L chest pain acutely this morning. Was 

lying in bed, felt a sudden onset of upper L chest muscle spasm without any 

associated n/v/diaphoresis/sob/palp. The pain lasted less than 1 minute then 

resolved. It felt identical to his usual muscle spasms that he has throughout 

his body, but because it was localized to the chest, he presents for 

evaluation. 





Asx now, wants to go home. 





no f/c/cough. 





- Physicial Exam


PE: 





07/25/17 09:40


VSS


well appearing, alert and conversant, paraplegia


cv/pulm exam wnl








- Medical Decision Making





07/25/17 09:42


Patient seen and evaluated with the resident. I agree with the overall 

evaluation, assessment, and management with the following summary of visit:





61-year-old male with history of paraplegia presents with a fleeting left upper 

chest pain, similar to his typical muscle spasms, not associated with any other 

cardiopulmonary complaints and now completely resolved. Very atypical for ACS, 

seems more likely muscular. Rule out underlying lung pathology.


EKG is unchanged from prior


Check labs


Chest x-ray


Reassess and dispo





**Heart Score/ECG Review


  ** #1


General ECG Interpretation: Sinus Rhythm, Normal Rate, Normal Intervals, No 

acute ischemic changes (isolated TWI AVL)


Compared to previous ECG there are: No significant change

## 2017-07-25 NOTE — EKG
Test Reason : 

Blood Pressure : ***/*** mmHG

Vent. Rate : 052 BPM     Atrial Rate : 052 BPM

   P-R Int : 150 ms          QRS Dur : 084 ms

    QT Int : 430 ms       P-R-T Axes : 069 071 086 degrees

   QTc Int : 399 ms

 

SINUS BRADYCARDIA

QS IN aVL WITH T WAVE INVERSIOS

WHEN COMPARED WITH ECG OF 11-JUL-2017 09:34,,NO MAJOR CHANGES SEEN

CORRELATE CLINICALLY

Confirmed by BREANN CHOI MD ([id number]) on 7/25/2017 1:54:05 PM

 

Referred By:             Confirmed By:BREANN CHOI MD

## 2017-07-25 NOTE — PDOC
History of Present Illness





- General


Chief Complaint: Chest Pain


Stated Complaint: CHEST PAIN


Time Seen by Provider: 07/25/17 08:32


History Source: Patient


Exam Limitations: Language Barrier





- History of Present Illness


Initial Comments: 


07/25/17 09:11


CC: Acute onset of momentary chest pain





Patient is a 61 y.o. male with a PMH of Afib (rate controlled on Metoprolol), 

multiple admissions for UTI and T2 paraplegia who presents today with a 

intermittent (2 minutes) of chest pain this morning at approximately 7:30 a.m. 

  Patient states he was laying in bed when he felt a "sharp stab" pain in his L 

chest near his axilla.  Patient states the pain was intense for 2-3 second and 

resolved over two minutes.  Patient denies any associated palpitations, 

diaphoresis, vomiting or nausea.  Patient states he has never experienced this 

pain in his chest on prior occasion however the pain is strikingly similar to 

pains he has experienced in his B/L LE.  Patient denies any recent medication 

changes, travel or life stresses.  





Timing/Duration: momentarily


Severity: severe





Past History





- Past Medical History


Allergies/Adverse Reactions: 


 Allergies











Allergy/AdvReac Type Severity Reaction Status Date / Time


 


No Known Allergies Allergy   Verified 07/25/17 08:31











Home Medications: 


Ambulatory Orders





Diazepam [Valium] 10 mg PO TID PRN #30 tablet MDD 30mg 01/09/17 


Morphine 10 mg/5 ml Liquid [Morphine 10 mg/5 mL Liquid -] 1 mg PO Q4H PRN 03/18/ 17 


Aspirin Coated [Ecotrin -] 81 mg PO DAILY #30 tab.ec 06/26/17 


Metoprolol Succinate [Toprol XL -] 25 mg PO DAILY #30 tab.sr 06/26/17 








Anemia: No


Asthma: No


Cancer: No


Cardiac Disorders: No


CVA: No


COPD: Yes


CHF: No


Dementia: No


Diabetes: No


GI Disorders: No


 Disorders: Yes (Recurrent UTI, CONDOM CATHETER)


HTN:  (Hypotension)


Hypercholesterolemia: No


Liver Disease: No


Seizures: No


Thyroid Disease: No





- Surgical History


Abdominal Surgery: No


Appendectomy: No


Cardiac Surgery: No


Cholecystectomy: No


Lung Surgery: No


Neurologic Surgery: No


Orthopedic Surgery: Yes (fracture right wrist s/p fusion)





- Immunization History


Td Vaccination: No


Immunization Up to Date: No





- Psycho/Social/Smoking Cessation Hx


Anxiety: No


Suicidal Ideation: No


Smoking Status: No


Smoking History: Never smoked


Years of Tobacco Use: 0


Have you smoked in the past 12 months: No


Number of Cigarettes Smoked Daily: 3


If you are a former smoker, when did you quit?: 1986


Cigars Per Day: 0


Hx Alcohol Use: Yes (OCCASIONALLY)


Drug/Substance Use Hx: No


Substance Use Type: Alcohol


Hx Substance Use Treatment: No





**Review of Systems





- Review of Systems


Constitutional: No: Chills, Diaphoresis, Fever, Malaise


HEENTM: No: Blurred Vision, Double Vision, Hearing Loss, Throat Pain


Respiratory: No: Cough, Orthopnea, SOB with Exertion, SOB at Rest


Cardiac (ROS): Yes: Chest Pain.  No: Edema, Lightheadedness, Palpitations, 

Syncope


ABD/GI: No: Constipated, Diarrhea, Nausea, Vomiting


Musculoskeletal: No: Joint Swelling, Muscle Pain, Neck Pain, Joint Stiffness


Neurological: No: Headache, Numbness, Tingling, Tremors


Psychiatric: No: Anxiety, Depression





*Physical Exam





- Physical Exam


General Appearance: Yes: Appropriately Dressed, Thin


HEENT: positive: EOMI, TRISH


Neck: positive: Trachea midline, Supple


Respiratory/Chest: positive: Lungs Clear, Normal Breath Sounds


Cardiovascular: positive: Regular Rhythm, Regular Rate, S1, S2


Gastrointestinal/Abdominal: positive: Flat, Soft


Musculoskeletal: positive: Normal Inspection


Integumentary: positive: Normal Color, Dry, Warm


Neurologic: positive: Fully Oriented, Alert, Other (T2 paraplegia)





ED Treatment Course





- LABORATORY


CBC & Chemistry Diagram: 


 07/25/17 09:02





 07/25/17 09:02





Medical Decision Making





- Medical Decision Making


07/25/17 09:34


Patient is a 61 y.o. male who presents for a momentary onset of chest pain this 

morning.   Differential diagnosis includes ACS (less likely given intermittent 

nature of pain and no associated n/v/diaphoresis) vs muscle spasm (patient 

notes pain was similar to previous muscle spasms in his LE) vs GERD vs. apical 

pneumonia.   Repeat EKG showed sinus bradycardia similar to EKG's on previous 

admission and Troponin was (-) x1.   Chest XR showed no changes from previous 

imaging and there were no indications of apical pneumonia.  Patient was 

discharged with instruction to follow up with his PCP and return to the ED 

should he experience a repeat of his chest pain, shortness of breath, fever, 

chills or severe discomfort.








*DC/Admit/Observation/Transfer


Diagnosis at time of Disposition: 


 Muscle spasm





- Discharge Dispostion


Admit: No





- Referrals


Referrals: 


Sony Poon MD [Primary Care Provider] - 





- Patient Instructions


Printed Discharge Instructions:  DI for Atypical Chest Pain, DI for Chest Pain


Additional Instructions: 


Please return to the Emergency Department should you experience a repeat of 

your chest pain, shortness of breath, fever, chills or severe discomfort.  

Please follow up with your PCP within 1 week and let them know you were 

evaluated in the Emergency Department for chest pain. 





- Attestations


Physician Attestion: 





07/25/17 10:45








I, Dr. Tania Mehta, attest that this document has been prepared under my 

direction and personally reviewed by me in its entirety.   I further attest, 

that it accurately reflects all work, treatment, procedures and medical decision

-making performed by me.

## 2017-07-27 ENCOUNTER — HOSPITAL ENCOUNTER (EMERGENCY)
Dept: HOSPITAL 74 - JER | Age: 62
Discharge: HOME | End: 2017-07-27
Payer: COMMERCIAL

## 2017-07-27 VITALS — TEMPERATURE: 98 F | DIASTOLIC BLOOD PRESSURE: 75 MMHG | HEART RATE: 60 BPM | SYSTOLIC BLOOD PRESSURE: 135 MMHG

## 2017-07-27 VITALS — BODY MASS INDEX: 19.4 KG/M2

## 2017-07-27 DIAGNOSIS — I95.9: ICD-10-CM

## 2017-07-27 DIAGNOSIS — G82.21: ICD-10-CM

## 2017-07-27 DIAGNOSIS — M62.838: Primary | ICD-10-CM

## 2017-07-27 DIAGNOSIS — S24.152S: ICD-10-CM
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
## 2017-07-27 DIAGNOSIS — I48.91: ICD-10-CM

## 2017-07-27 LAB
ALBUMIN SERPL-MCNC: 3.5 G/DL (ref 3.4–5)
ALP SERPL-CCNC: 152 U/L (ref 45–117)
ALT SERPL-CCNC: 24 U/L (ref 12–78)
ANION GAP SERPL CALC-SCNC: 8 MMOL/L (ref 8–16)
AST SERPL-CCNC: 26 U/L (ref 15–37)
BASOPHILS # BLD: 0.2 % (ref 0–2)
BILIRUB SERPL-MCNC: 0.8 MG/DL (ref 0.2–1)
CALCIUM SERPL-MCNC: 9.1 MG/DL (ref 8.5–10.1)
CO2 SERPL-SCNC: 29 MMOL/L (ref 21–32)
CREAT SERPL-MCNC: 0.6 MG/DL (ref 0.7–1.3)
DEPRECATED RDW RBC AUTO: 16.7 % (ref 11.9–15.9)
EOSINOPHIL # BLD: 4.1 % (ref 0–4.5)
GLUCOSE SERPL-MCNC: 81 MG/DL (ref 74–106)
MCH RBC QN AUTO: 30.1 PG (ref 25.7–33.7)
MCHC RBC AUTO-ENTMCNC: 33.2 G/DL (ref 32–35.9)
MCV RBC: 90.5 FL (ref 80–96)
NEUTROPHILS # BLD: 71.4 % (ref 42.8–82.8)
PLATELET # BLD AUTO: 321 K/MM3 (ref 134–434)
PMV BLD: 9.4 FL (ref 7.5–11.1)
PROT SERPL-MCNC: 7.9 G/DL (ref 6.4–8.2)
TROPONIN I SERPL-MCNC: < 0.02 NG/ML (ref 0–0.05)
WBC # BLD AUTO: 7.5 K/MM3 (ref 4–10)

## 2017-07-27 NOTE — PDOC
History of Present Illness





- General


Chief Complaint: Chest Pain


Stated Complaint: CHEST PAIN


Time Seen by Provider: 07/27/17 07:38


History Source: Patient


Exam Limitations: No Limitations





- History of Present Illness


Initial Comments: 


07/27/17 07:41


Patient is a 61 year male with history of dextracardia, afib and paraplegic 

below T2 from a fall in 1982 who presents following with one incident of sharp, 

stabbing pain in the left side chest after waking up this morning.  Patient 

states the pain started at rest and only lasted a few seconds and then resolved 

completely. Patient denies any recent history of similar pain, denies any fever

, chills, diaphoresis, palpitations, shortness of breathe, abdominal pain, 

nausea and vomiting.





Chart review shows patient was here two days ago with similar complaint.








Past History





- Past Medical History


Allergies/Adverse Reactions: 


 Allergies











Allergy/AdvReac Type Severity Reaction Status Date / Time


 


No Known Allergies Allergy   Verified 07/27/17 06:56











Home Medications: 


Ambulatory Orders





Diazepam [Valium] 10 mg PO TID PRN #30 tablet MDD 30mg 01/09/17 


Morphine 10 mg/5 ml Liquid [Morphine 10 mg/5 mL Liquid -] 1 mg PO Q4H PRN 03/18/ 17 


Aspirin Coated [Ecotrin -] 81 mg PO DAILY #30 tab.ec 06/26/17 


Metoprolol Succinate [Toprol XL -] 25 mg PO DAILY #30 tab.sr 06/26/17 








Anemia: No


Asthma: No


Cancer: No


Cardiac Disorders: No


CVA: No


COPD: Yes


CHF: No


Dementia: No


Diabetes: No


GI Disorders: No


 Disorders: Yes (Recurrent UTI, CONDOM CATHETER)


HTN:  (Hypotension)


Hypercholesterolemia: No


Liver Disease: No


Seizures: No


Thyroid Disease: No





- Surgical History


Abdominal Surgery: No


Appendectomy: No


Cardiac Surgery: No


Cholecystectomy: No


Lung Surgery: No


Neurologic Surgery: No


Orthopedic Surgery: Yes (fracture right wrist s/p fusion)





- Immunization History


Td Vaccination: No


Immunization Up to Date: No





- Psycho/Social/Smoking Cessation Hx


Anxiety: No


Suicidal Ideation: No


Smoking Status: No


Smoking History: Never smoked


Years of Tobacco Use: 0


Have you smoked in the past 12 months: No


Number of Cigarettes Smoked Daily: 3


If you are a former smoker, when did you quit?: 1986


Cigars Per Day: 0


Information on smoking cessation initiated: No


Hx Alcohol Use: No


Drug/Substance Use Hx: No


Substance Use Type: Alcohol


Hx Substance Use Treatment: No





**Review of Systems





- Review of Systems


Able to Perform ROS?: Yes


Is the patient limited English proficient: No


Constitutional: No: Chills, Diaphoresis, Fever, Malaise, Weakness


Respiratory: No: Cough, Shortness of Breath


Cardiac (ROS): No: Chest Pain, Palpitations, Syncope, Chest Tightness


ABD/GI: No: Constipated, Diarrhea, Nausea, Vomiting


Musculoskeletal: Yes: Other (No acute changes)


Neurological: No: Headache, Weakness, Dizziness


Psychiatric: No: Anxiety, Sleep Pattern Change





*Physical Exam





- Vital Signs


 Last Vital Signs











Temp Pulse Resp BP Pulse Ox


 


    56 L  14   125/90   98 


 


    07/27/17 06:56  07/27/17 06:56  07/27/17 06:56  07/27/17 06:56














- Physical Exam


General Appearance: Yes: Nourished, Thin.  No: Apparent Distress


HEENT: positive: EOMI, TRISH, Normal Voice


Respiratory/Chest: positive: Lungs Clear, Normal Breath Sounds.  negative: 

Chest Tender, Respiratory Distress, Crackles, Rales, Rhonchi, Stridor, Wheezing


Cardiovascular: positive: Regular Rhythm, Regular Rate, Other (Heart sounds 

auscultated on right side (patient dextrocardia)).  negative: JVD, Murmur


Gastrointestinal/Abdominal: positive: Normal Bowel Sounds, Other (Large central 

abdominal scar (well healed)).  negative: Tender, Flat (Altered anatomy), 

Distended, Guarding, Rebound


Musculoskeletal: positive: Other (Parapelegia below T2, Significant muscle 

wasting LE b/l, cool, no edema or erythema, no cyanosis)





ED Treatment Course





- LABORATORY


CBC & Chemistry Diagram: 


 07/27/17 08:12





 07/27/17 08:12





Medical Decision Making





- Medical Decision Making


07/27/17 07:42


61 year old male paraplegic with dextrocardia presenting with one incident of 

sharp stabbing left-sided chest pain that lasted seconds this morning and is 

now resolved. 


Ddx includes but not limited to PE, ACS, muscle cramp, anxiety   


EKG


Standard labs


Cardiac enzymes and d-dimer


monitor and reevaluate





Review of CXR from 7/25/2017 shows dextrocardia, possibly post surgical from 

fall





07/27/17 09:26


EKG: Sinus rhythm at 60 BPM, normal axes and intervals, no ST elevations/

depressions/flipped T waves. No significant changes from 7/25/2017


Elevated d-dimer 536 (age appropriate < 630)





 CBC











WBC  7.5 K/mm3 (4.0-10.0)   07/27/17  08:12    


 


RBC  5.03 M/mm3 (4.00-5.60)   07/27/17  08:12    


 


Hgb  15.1 GM/dL (11.7-16.9)   07/27/17  08:12    


 


Hct  45.5 % (35.4-49)   07/27/17  08:12    


 


MCV  90.5 fl (80-96)   07/27/17  08:12    


 


MCH  30.1 pg (25.7-33.7)   07/27/17  08:12    


 


MCHC  33.2 g/dl (32.0-35.9)   07/27/17  08:12    


 


RDW  16.7 % (11.9-15.9)  H  07/27/17  08:12    


 


Plt Count  321 K/MM3 (134-434)   07/27/17  08:12    


 


MPV  9.4 fl (7.5-11.1)   07/27/17  08:12    


 


Neutrophils %  71.4 % (42.8-82.8)   07/27/17  08:12    


 


Lymphocytes %  15.3 % (8-40)   07/27/17  08:12    


 


Monocytes %  9.0 % (3.8-10.2)   07/27/17  08:12    


 


Eosinophils %  4.1 % (0-4.5)   07/27/17  08:12    


 


Basophils %  0.2 % (0-2.0)   07/27/17  08:12    








CBC wnl





 CMP











Sodium  135 mmol/L (136-145)  L  07/27/17  08:12    


 


Potassium  4.1 mmol/L (3.5-5.1)   07/27/17  08:12    


 


Chloride  98 mmol/L ()   07/27/17  08:12    


 


Carbon Dioxide  29 mmol/L (21-32)   07/27/17  08:12    


 


Anion Gap  8  (8-16)   07/27/17  08:12    


 


BUN  10 mg/dL (7-18)   07/27/17  08:12    


 


Creatinine  0.6 mg/dL (0.7-1.3)  L  07/27/17  08:12    


 


Creat Clearance w eGFR  > 60  (>60)   07/27/17  08:12    


 


Random Glucose  81 mg/dL ()   07/27/17  08:12    


 


Calcium  9.1 mg/dL (8.5-10.1)   07/27/17  08:12    


 


Total Bilirubin  0.8 mg/dL (0.2-1.0)  D 07/27/17  08:12    


 


AST  26 U/L (15-37)  D 07/27/17  08:12    


 


ALT  24 U/L (12-78)  D 07/27/17  08:12    


 


Alkaline Phosphatase  152 U/L ()  H D 07/27/17  08:12    


 


Creatine Kinase  69 IU/L ()   07/27/17  08:12    


 


Troponin I  < 0.02 ng/ml (0.00-0.05)   07/27/17  08:12    


 


Total Protein  7.9 g/dl (6.4-8.2)   07/27/17  08:12    


 


Albumin  3.5 g/dl (3.4-5.0)   07/27/17  08:12    








Grossly normal with only mild hyponatremia and alk phos elevation that are non 

concerning.


Troponin <0.02





07/27/17 09:27


Patient states he is not having any pain, does not want any treatment and would 

like to go home


Vitals within normal limits


Discharged with return precautions.








*DC/Admit/Observation/Transfer


Diagnosis at time of Disposition: 


 Muscle spasm





- Discharge Dispostion


Disposition: HOME


Condition at time of disposition: Improved


Admit: No





- Referrals


Referrals: 


Sony Poon MD [Primary Care Provider] - 





- Patient Instructions


Additional Instructions: 


Thank you for trusting us with your health care today.  I hope you were 

astisified with the care we provided.  As we discussed, the tests we performed 

today were to rule out the most concerning causes of chest pain and did not 

indicate a serious acute problem.  However, you need to follow up with your Dr. Poon in the next 1 week and let him know you were evaluated in the Emergency 

Department for chest pain. Please return to the Emergency Department if you 

experience new or worsening chest pain, shortness of breath, fever, chills or 

severe discomfort. If it is significantly worse, call 911 immediately.





- Attestations


Physician Attestion: 





07/27/17 09:40








I, Dr. Ryan Hale, attest that this document has been prepared under my 

direction and personally reviewed by me in its entirety.   I further attest, 

that it accurately reflects all work, treatment, procedures and medical decision

-making performed by me.

## 2017-07-27 NOTE — PDOC
Attending Attestation





- Resident


Resident Name: Ryan Hale





- ED Attending Attestation


I have performed the following: I have examined & evaluated the patient, The 

case was reviewed & discussed with the resident, I agree w/resident's findings 

& plan, Exceptions are as noted





- HPI


HPI: 





07/27/17 09:29


Agree with the resident's HPI as documented in the electronic medical record.





- Physicial Exam


PE: 





07/27/17 09:29


Agree with the resident's physical examination as documented in the electronic 

medical record.





- Medical Decision Making





07/27/17 09:29


61-year-old male paraplegic status post fall many years ago presents to the 

emergency Department with complaints of one episode of chest pain that was left 

sided, sharp and lasting "a few seconds". He denies fevers chills or cough. 

Differential diagnosis includes but is not limited to: PE, ACS, musculoskeletal 

pain.


Plan:


1. We will do a d-dimerthat is elevated however it is age appropriate when 

adjusted.


2. He is asymptomatic at this time and does not want pain medication


3. EKG shows normal sinus rhythm at 60 bpm with normal axis, intervals and no 

acute ST segment changes


4. Observe and reevaluate








Addendum: The patient is feeling improved and wants to go home. We'll discharge 

home. Follow-up with primary care physician and return to the emergency 

department if symptoms persist, worsen, or new symptoms arise.

## 2017-07-27 NOTE — EKG
Test Reason : 

Blood Pressure : ***/*** mmHG

Vent. Rate : 061 BPM     Atrial Rate : 061 BPM

   P-R Int : 150 ms          QRS Dur : 080 ms

    QT Int : 436 ms       P-R-T Axes : 073 074 083 degrees

   QTc Int : 438 ms

 

*** POOR DATA QUALITY, INTERPRETATION MAY BE ADVERSELY AFFECTED

NORMAL SINUS RHYTHM

MINIMAL VOLTAGE CRITERIA FOR LVH, MAY BE NORMAL VARIANT

BORDERLINE ECG

WHEN COMPARED WITH ECG OF 25-JUL-2017 09:19,

NO SIGNIFICANT CHANGE WAS FOUND

Confirmed by LENNOX JIMENEZ MD ([id number]) on 7/27/2017 11:52:57 AM

 

Referred By:             Confirmed By:LENNOX JIMENEZ MD

## 2017-12-07 NOTE — PN
Physical Exam: 


SUBJECTIVE: Patient seen and examined at bedside. Feels tired today. Diarrhea 

has improved.








OBJECTIVE:





 Vital Signs











 Period  Temp  Pulse  Resp  BP Sys/Malone  Pulse Ox


 


 Last 24 Hr  97.8 F-98.6 F  48-57  16-20  100-139/50-68  99











GENERAL: The patient is awake, alert, and fully oriented, in no acute distress.


HEAD: Normal with no signs of trauma.


EYES: PERRL, extraocular movements intact, sclera anicteric, conjunctiva clear. 

No ptosis. 


LUNGS: Breath sounds equal, clear to auscultation bilaterally, no wheezes, no 

crackles, no 


accessory muscle use. 


HEART: Regular rate and rhythm, S1, S2 without murmur, rub or gallop.


ABDOMEN: Soft, nontender, nondistended, normoactive bowel sounds, no guarding, 

no 


rebound, no hepatosplenomegaly, no masses.


EXTREMITIES: Legs contracted and rotated 90 degress to the right 


NEUROLOGICAL: Cranial nerves II through XII grossly intact. Normal speech














 Laboratory Results - last 24 hr











  06/25/17 06/25/17





  06:00 06:00


 


WBC  9.6 


 


RBC  4.50 


 


Hgb  13.4 


 


Hct  39.8 


 


MCV  88.3 


 


MCHC  33.7 


 


RDW  17.0 H 


 


Plt Count  285 


 


MPV  9.8 


 


Neutrophils %  61.4 


 


Lymphocytes %  18.2 


 


Monocytes %  15.6 H 


 


Eosinophils %  4.0 


 


Basophils %  0.8 


 


Sodium   139


 


Potassium   4.4


 


Chloride   101


 


Carbon Dioxide   29


 


Anion Gap   9


 


BUN   13  D


 


Creatinine   0.7


 


Creat Clearance w eGFR   > 60


 


Random Glucose   71 L


 


Calcium   8.8


 


Magnesium   2.2


 


Total Bilirubin   0.6  D


 


AST   15  D


 


ALT   15


 


Alkaline Phosphatase   101


 


Total Protein   6.8


 


Albumin   3.1 L








                  Active Medications











Generic Name Dose Route Start Last Admin





  Trade Name Freq  PRN Reason Stop Dose Admin


 


Acetaminophen  650 mg 06/22/17 07:51  





  Tylenol -  PO   





  Q6H PRN   





  FEVER OR PAIN   


 


Aspirin  81 mg 06/22/17 10:00 06/25/17 10:30





  Ecotrin -  PO   81 mg





  DAILY ALE   Administration


 


Heparin Sodium (Porcine)  5,000 unit 06/21/17 22:00 06/25/17 10:30





  Heparin -  SQ   5,000 unit





  BID ALE   Administration


 


Piperacillin Sod/Tazobactam Sod  50 mls @ 100 mls/hr 06/22/17 14:15 06/25/17 10:

30





  Zosyn 3.375gm Ivpb (Pre-Docked)  IVPB   100 mls/hr





  Q8H-IV ALE   Administration





  Protocol   


 


Metoprolol Succinate  25 mg 06/24/17 10:00 06/25/17 10:30





  Toprol Xl -  PO   25 mg





  DAILY ALE   Administration


 


Morphine Sulfate  4 mg 06/24/17 22:14 06/25/17 11:33





  Morphine 10 Mg/5 Ml Liquid  PO   4 mg





  Q4H PRN   Administration





  PAIN   


 


Polyethylene Glycol  17 gm 06/22/17 14:00 06/25/17 05:49





  Miralax (For Daily Use) -  PO   Not Given





  TID ALE   


 


Ranitidine HCl  150 mg 06/22/17 13:30 06/25/17 10:30





  Zantac -  PO   150 mg





  BID ALE   Administration


 


Simethicone  80 mg 06/22/17 20:55 06/22/17 22:09





  Mylicon -  PO   80 mg





  QID PRN   Administration





  DYSPEPSIA   














Assessment/Plan


(1) Complicated UTI (urinary tract infection)


Assessment/Plan: 


-patient with history of neurogenic bladder and repeated UTIs


-growing pseudomonas, present on last admission as well


-susceptible to zosyn


-ID following


-today is Zosyn day #7 


-plan to stop on 6/25 per ID note





(2) Rapid atrial fibrillation


Assessment/Plan: 


-cardiology note reviewed


-does not need AC


-currently in sinus rhythm, bradycardic


-continue metoprolol








(3) Elevated troponin I level


Assessment/Plan: 


-continue current management








(4) Chronic pain


Assessment/Plan: 


-continue current management





(5) Paraplegia


Assessment/Plan: 


-chronic





(6) Constipation


-diarrhea has improved since stopping lactulose

















Visit type





- Emergency Visit


Emergency Visit: Yes


ED Registration Date: 06/17/17


Care time: The patient presented to the Emergency Department on the above date 

and was hospitalized for further evaluation of their emergent condition.





- New Patient


This patient is new to me today: No





- Critical Care


Critical Care patient: No (4) no impairment

## 2018-06-15 ENCOUNTER — HOSPITAL ENCOUNTER (INPATIENT)
Dept: HOSPITAL 74 - JER | Age: 63
LOS: 7 days | Discharge: HOME HEALTH | DRG: 603 | End: 2018-06-22
Payer: COMMERCIAL

## 2018-06-15 VITALS — BODY MASS INDEX: 20.2 KG/M2

## 2018-06-15 DIAGNOSIS — L03.116: Primary | ICD-10-CM

## 2018-06-15 DIAGNOSIS — S91.301A: ICD-10-CM

## 2018-06-15 DIAGNOSIS — N31.9: ICD-10-CM
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
## 2018-06-15 DIAGNOSIS — I48.0: ICD-10-CM

## 2018-06-15 DIAGNOSIS — G89.4: ICD-10-CM

## 2018-06-15 DIAGNOSIS — J44.9: ICD-10-CM

## 2018-06-15 DIAGNOSIS — G82.20: ICD-10-CM
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
## 2018-06-15 DIAGNOSIS — L08.9: ICD-10-CM

## 2018-06-15 DIAGNOSIS — Z87.891: ICD-10-CM

## 2018-06-15 LAB
ANION GAP SERPL CALC-SCNC: 8 MMOL/L (ref 8–16)
BASOPHILS # BLD: 0.4 % (ref 0–2)
BUN SERPL-MCNC: 5 MG/DL (ref 7–18)
CALCIUM SERPL-MCNC: 8.6 MG/DL (ref 8.5–10.1)
CHLORIDE SERPL-SCNC: 103 MMOL/L (ref 98–107)
CO2 SERPL-SCNC: 26 MMOL/L (ref 21–32)
CREAT SERPL-MCNC: 0.4 MG/DL (ref 0.7–1.3)
DEPRECATED RDW RBC AUTO: 13.3 % (ref 11.9–15.9)
EOSINOPHIL # BLD: 0.6 % (ref 0–4.5)
GLUCOSE SERPL-MCNC: 85 MG/DL (ref 74–106)
HCT VFR BLD CALC: 39.8 % (ref 35.4–49)
HGB BLD-MCNC: 13.5 GM/DL (ref 11.7–16.9)
LYMPHOCYTES # BLD: 10.2 % (ref 8–40)
MCH RBC QN AUTO: 32.2 PG (ref 25.7–33.7)
MCHC RBC AUTO-ENTMCNC: 33.9 G/DL (ref 32–35.9)
MCV RBC: 95.1 FL (ref 80–96)
MONOCYTES # BLD AUTO: 10.1 % (ref 3.8–10.2)
NEUTROPHILS # BLD: 78.7 % (ref 42.8–82.8)
PLATELET # BLD AUTO: 349 K/MM3 (ref 134–434)
PLATELET BLD QL SMEAR: ADEQUATE
PMV BLD: 8.3 FL (ref 7.5–11.1)
POTASSIUM SERPLBLD-SCNC: 3.9 MMOL/L (ref 3.5–5.1)
RBC # BLD AUTO: 4.19 M/MM3 (ref 4–5.6)
SODIUM SERPL-SCNC: 137 MMOL/L (ref 136–145)
WBC # BLD AUTO: 10.5 K/MM3 (ref 4–10)

## 2018-06-15 RX ADMIN — MORPHINE SULFATE SCH MG: 10 SOLUTION ORAL at 22:08

## 2018-06-15 NOTE — HP
Admitting History and Physical





- Admission


Chief Complaint: red toe


History of Present Illness: 





63 yo Male, history of paraplagia (h/o spinal fracture) recently admitted to 

hospital for Toe infection, started on PO Augmentin after receiving IV zosyn 

and discharged yesterday. Today toe seemed more erythematous and so came to ED. 

Seen by ID and advised to restart IV zosyn.  Due to paraplegia patient has no 

feeling in legs.





- Past Medical History


CNS: Yes: Other (paraplegia due to accidental fall (T2 spinal fracture))


Renal/: Yes: Other (Bladder dysfunction)


Infectious Disease: Yes: Other (multiple UTIs  Pseudomonas)


Musculoskeletal: Yes: Other (Chronic pain)





- Past Surgical History


Past Surgical History: Yes: Splenectomy





- Smoking History


Smoking history: Former smoker


Have you smoked in the past 12 months: No


Aproximately how many cigarettes per day: 3


If you are a former smoker, when did you quit?: 1986





- Alcohol/Substance Use


Hx Alcohol Use: No


History of Substance Use: reports: Marijuana





- Social History


ADL: Support Services


History of Recent Travel: No





Home Medications





- Allergies


Allergies/Adverse Reactions: 


 Allergies











Allergy/AdvReac Type Severity Reaction Status Date / Time


 


No Known Allergies Allergy   Verified 06/15/18 15:35














- Home Medications


Home Medications: 


Ambulatory Orders





Diazepam [Valium] 10 mg PO TID PRN #30 tablet MDD 30mg 01/09/17 


Morphine 10 mg/5 ml Liquid [Morphine 10 mg/5 mL Liquid -] 4 mg PO Q4H 06/11/18 


Amox-Tr/K Cl [Augmentin - 875Mg Tablet] 1 tab PO BID #14 tablet 06/14/18 


Bacitracin - [Bacitracin Topical Ointment -] 1 applic TP DAILY #1 tube 06/14/18 











Family Disease History





- Family Disease History


Family Disease History: Heart Disease: Father





Review of Systems





- Review of Systems


Constitutional: denies: Chills, Fever


Eyes: reports: No Symptoms


HENT: denies: Difficult Swallowing, Epistaxis, Throat Pain


Neck: denies: Decreased ROM, Stiffness, Swollen Glands


Cardiovascular: denies: Chest Pain, Palpitations, Shortness of Breath


Respiratory: denies: Cough


Gastrointestinal: denies: Abdominal Pain, Constipation


Genitourinary: reports: Other (condom catheter)


Musculoskeletal: reports: Back Pain (chronic)





Physical Examination


Vital Signs: 


 Vital Signs











Temperature  98.8 F   06/15/18 15:36


 


Pulse Rate  84   06/15/18 15:36


 


Respiratory Rate  20   06/15/18 15:36


 


Blood Pressure  119/83   06/15/18 15:36


 


O2 Sat by Pulse Oximetry (%)  95   06/15/18 15:36











Constitutional: Yes: Well Nourished, No Distress, Calm


Eyes: Yes: Conjunctiva Clear, EOM Intact, PERRL


HENT: Yes: Atraumatic, Normocephalic


Neck: Yes: Supple, Trachea Midline


Cardiovascular: Yes: Regular Rate and Rhythm, S1, S2.  No: Murmur


Respiratory: Yes: Regular, CTA Bilaterally.  No: Rales, Rhonchi, Wheezes


Gastrointestinal: Yes: Normal Bowel Sounds, Soft.  No: Distention, Tenderness


Musculoskeletal: Yes: Other (paraplegia, decreased muscle mass of legs)


Extremities: Yes: Other (right 1st toe with erythema, scaling skin)


Edema: No


Neurological: Yes: Alert, Oriented


Labs: 


 CBC, BMP





 06/15/18 17:27 





 06/15/18 17:27 











Problem List





- Problems


(1) Wound infection


Assessment/Plan: 


-restart IV Zosyn, ID consulted


Code(s): T14.8XXA - OTHER INJURY OF UNSPECIFIED BODY REGION, INITIAL ENCOUNTER; 

L08.9 - LOCAL INFECTION OF THE SKIN AND SUBCUTANEOUS TISSUE, UNSP   





(2) Neurogenic bladder


Assessment/Plan: 


-on condom catheter


Code(s): N31.9 - NEUROMUSCULAR DYSFUNCTION OF BLADDER, UNSPECIFIED   





(3) Paraplegia


Assessment/Plan: 


-on chronic pain medication (to continue), valium for muscle spasm


Code(s): G82.20 - PARAPLEGIA, UNSPECIFIED

## 2018-06-15 NOTE — PDOC
History of Present Illness





- General


History Source: Patient


Exam Limitations: No Limitations





- History of Present Illness


Initial Comments: 





06/15/18 17:55


The patient is a 62 year old male with a significant past medical history of 

paraplegia, COPD, and recurrent UTIs who presents to the emergency department 

for evaluation of right foot pain and swelling. The patient reports being 

discharged yesterday from the hospital on Augmentin but states his right toe 

has worsened considerably. The patient reports moderate right toe pain. He 

states he is unable to tell if he has a fever secondary to the right toe pain. 





The patient denies chest pain, shortness of breath, headache, and dizziness.


Denies fevers, chills, nausea, vomiting, diarrhea, and constipation.


Denies dysuria, frequency, urgency, and hematuria.





Allergies: NKA


Past surgical history:


Social history: No reported cigarette, alcohol, or drug use.


PCP: Dr. Sony Poon








<Chico Magana - Last Filed: 06/15/18 18:24>





<Carloz Davis - Last Filed: 06/15/18 18:25>





- General


Chief Complaint: Wound


Stated Complaint: TOE PAIN


Time Seen by Provider: 06/15/18 15:58





Past History





<Chico Magana - Last Filed: 06/15/18 18:24>





- Past Medical History


Anemia: No


Asthma: No


Cancer: No


Cardiac Disorders: No


CVA: No


COPD: Yes


CHF: No


Dementia: No


Diabetes: No


GI Disorders: No


 Disorders: Yes (Recurrent UTI, CONDOM CATHETER)


HTN:  (Hypotension)


Hypercholesterolemia: No


Liver Disease: No


Seizures: No


Thyroid Disease: No





- Surgical History


Abdominal Surgery: No


Appendectomy: No


Cardiac Surgery: No


Cholecystectomy: No


Lung Surgery: No


Neurologic Surgery: No


Orthopedic Surgery: Yes (fracture right wrist s/p fusion)





- Immunization History


Td Vaccination: No


Immunization Up to Date: No





- Suicide/Smoking/Psychosocial Hx


Smoking Status: No


Smoking History: Former smoker


Years of Tobacco Use: 0


Have you smoked in the past 12 months: No


Number of Cigarettes Smoked Daily: 3


If you are a former smoker, when did you quit?: 1986


Cigars Per Day: 0


Information on smoking cessation initiated: No


Hx Alcohol Use: No


Drug/Substance Use Hx: No


Substance Use Type: None


Hx Substance Use Treatment: No





<Carloz Davis - Last Filed: 06/15/18 18:25>





- Past Medical History


Allergies/Adverse Reactions: 


 Allergies











Allergy/AdvReac Type Severity Reaction Status Date / Time


 


No Known Allergies Allergy   Verified 06/15/18 15:35











Home Medications: 


Ambulatory Orders





Diazepam [Valium] 10 mg PO TID PRN #30 tablet MDD 30mg 01/09/17 


Morphine 10 mg/5 ml Liquid [Morphine 10 mg/5 mL Liquid -] 4 mg PO Q4H 06/11/18 


Amox-Tr/K Cl [Augmentin - 875Mg Tablet] 1 tab PO BID #14 tablet 06/14/18 


Bacitracin - [Bacitracin Topical Ointment -] 1 applic TP DAILY #1 tube 06/14/18 











**Review of Systems





- Review of Systems


Able to Perform ROS?: Yes


Comments:: 


A complete review of 10 out of 10 review of systems is taken and is negative 

apart from what is previously mentioned below and in the HPI.





<Chico Magana - Last Filed: 06/15/18 18:24>





*Physical Exam





- Vital Signs


 Last Vital Signs











Temp Pulse Resp BP Pulse Ox


 


 98.8 F   84   20   119/83   95 


 


 06/15/18 15:36  06/15/18 15:36  06/15/18 15:36  06/15/18 15:36  06/15/18 15:36














- Physical Exam


Comments: 


Vitals: Triage Vital signs reviewed


General Appearance:  no acute distress, well nourished well developed,


Head: Atraumatic, normocephalic


Neck:  Supple;No Nuchal rigidity


Chest Wall: Nontender


Cardiac: Regular rate and rhythm, no murmurs, no rubs, no gallops,


Lungs: No stridor. No tongue swelling. No wheezing. Clear to auscultation 

bilateral, good air movement bilaterally,


Abdomen:  Soft, nondistended, normal bowel sounds, nontender to palpation


Extremities: (+)redness to all 5 toes. (+)Break down of skin on great toe, 

warm. Full range of motion to all extremities, 


Skin:  Warm and dry, no rashes or lesions, no petechiae


Psych:  normal mood, normal affect








<Chico Magana - Last Filed: 06/15/18 18:24>





- Vital Signs


 Last Vital Signs











Temp Pulse Resp BP Pulse Ox


 


 98.8 F   84   20   119/83   95 


 


 06/15/18 15:36  06/15/18 15:36  06/15/18 15:36  06/15/18 15:36  06/15/18 15:36














<Carloz Davis - Last Filed: 06/15/18 18:25>





ED Treatment Course





- LABORATORY


CBC & Chemistry Diagram: 


 06/15/18 17:27





 06/15/18 17:27





- ADDITIONAL ORDERS


Additional order review: 


 











  06/15/18





  17:27


 


RBC  4.19


 


MCV  95.1


 


MCHC  33.9


 


RDW  13.3


 


MPV  8.3  D


 


Neutrophils %  78.7


 


Lymphocytes %  10.2  D


 


Monocytes %  10.1


 


Eosinophils %  0.6  D


 


Basophils %  0.4














- Medications


Given in the ED: 


ED Medications














Discontinued Medications














Generic Name Dose Route Start Last Admin





  Trade Name Freq  PRN Reason Stop Dose Admin


 


Piperacillin Sod/Tazobactam  50 mls @ 100 mls/hr  06/15/18 16:06  06/15/18 16:35





  Sod 3.375 gm/ Dextrose  IVPB  06/15/18 16:35  100 mls/hr





  ONCE ONE   Administration





     





     





  Protocol   





     














<Chico Magana - Last Filed: 06/15/18 18:24>





- LABORATORY


CBC & Chemistry Diagram: 


 06/15/18 17:27





 06/15/18 17:27





<Carloz Davis - Last Filed: 06/15/18 18:25>





Medical Decision Making





- Medical Decision Making


The patient is a 62 year old male with a significant past medical history of 

paraplegia, COPD, and recurrent UTIs who presents to the emergency department 

for evaluation of right foot pain and swelling.





Plan:


Labs





Case discussed with Dr. Ruvalcaba at 16:19. 


Case discussed with Dr. Billingsley at 18:23.








<Chico Magana - Last Filed: 06/15/18 18:24>





- Medical Decision Making





06/15/18 17:36





Patient recently admitted with foot infection discharged yesterday upon 

returning home for infection worsen. Seen in the emergency department by patient

's infectious disease doctor Dr. Man, recommends readmission for prolonged 

course of Zosyn no indication for cultures





We'll admit to medicine for further management.








<Carloz Davis - Last Filed: 06/15/18 18:25>





*DC/Admit/Observation/Transfer





- Attestations


Scribe Attestion: 





Documentation prepared by Chico Magana, acting as medical scribe for Carloz Davis MD.





<Chico Magana - Last Filed: 06/15/18 18:24>





- Discharge Dispostion


Decision to Admit order: Yes





<Carloz Davis - Last Filed: 06/15/18 18:25>


Diagnosis at time of Disposition: 


 Wound infection








- Referrals


Referrals: 


Sony Poon MD [Primary Care Provider] - 





- Patient Instructions





- Post Discharge Activity

## 2018-06-16 LAB
ANION GAP SERPL CALC-SCNC: 10 MMOL/L (ref 8–16)
APPEARANCE UR: (no result)
BASOPHILS # BLD: 0.6 % (ref 0–2)
BILIRUB UR STRIP.AUTO-MCNC: NEGATIVE MG/DL
BUN SERPL-MCNC: 8 MG/DL (ref 7–18)
CALCIUM SERPL-MCNC: 8.6 MG/DL (ref 8.5–10.1)
CHLORIDE SERPL-SCNC: 104 MMOL/L (ref 98–107)
CO2 SERPL-SCNC: 28 MMOL/L (ref 21–32)
COLOR UR: YELLOW
CREAT SERPL-MCNC: 0.5 MG/DL (ref 0.7–1.3)
DEPRECATED RDW RBC AUTO: 13.4 % (ref 11.9–15.9)
EOSINOPHIL # BLD: 2.4 % (ref 0–4.5)
EPITH CASTS URNS QL MICRO: (no result) /HPF
GLUCOSE SERPL-MCNC: 77 MG/DL (ref 74–106)
HCT VFR BLD CALC: 38.9 % (ref 35.4–49)
HGB BLD-MCNC: 13.1 GM/DL (ref 11.7–16.9)
HYALINE CASTS URNS QL MICRO: 1 /LPF
KETONES UR QL STRIP: (no result)
LEUKOCYTE ESTERASE UR QL STRIP.AUTO: (no result)
LYMPHOCYTES # BLD: 11.3 % (ref 8–40)
MCH RBC QN AUTO: 32.4 PG (ref 25.7–33.7)
MCHC RBC AUTO-ENTMCNC: 33.7 G/DL (ref 32–35.9)
MCV RBC: 96 FL (ref 80–96)
MONOCYTES # BLD AUTO: 10.2 % (ref 3.8–10.2)
MUCOUS THREADS URNS QL MICRO: (no result)
NEUTROPHILS # BLD: 75.5 % (ref 42.8–82.8)
NITRITE UR QL STRIP: NEGATIVE
PH UR: 5 [PH] (ref 5–8)
PLATELET # BLD AUTO: 321 K/MM3 (ref 134–434)
PMV BLD: 8.7 FL (ref 7.5–11.1)
POTASSIUM SERPLBLD-SCNC: 3.2 MMOL/L (ref 3.5–5.1)
PROT UR QL STRIP: NEGATIVE
PROT UR QL STRIP: NEGATIVE
RBC # BLD AUTO: 4.05 M/MM3 (ref 4–5.6)
SODIUM SERPL-SCNC: 142 MMOL/L (ref 136–145)
SP GR UR: 1.02 (ref 1–1.03)
UROBILINOGEN UR STRIP-MCNC: NEGATIVE MG/DL (ref 0.2–1)
WBC # BLD AUTO: 11.6 K/MM3 (ref 4–10)

## 2018-06-16 RX ADMIN — ENOXAPARIN SODIUM SCH MG: 30 INJECTION SUBCUTANEOUS at 10:44

## 2018-06-16 RX ADMIN — PIPERACILLIN SODIUM,TAZOBACTAM SODIUM SCH MLS/HR: 3; .375 INJECTION, POWDER, FOR SOLUTION INTRAVENOUS at 02:13

## 2018-06-16 RX ADMIN — PIPERACILLIN SODIUM,TAZOBACTAM SODIUM SCH MLS/HR: 3; .375 INJECTION, POWDER, FOR SOLUTION INTRAVENOUS at 10:44

## 2018-06-16 RX ADMIN — MORPHINE SULFATE SCH MG: 10 SOLUTION ORAL at 13:05

## 2018-06-16 RX ADMIN — MORPHINE SULFATE SCH MG: 10 SOLUTION ORAL at 06:21

## 2018-06-16 RX ADMIN — PIPERACILLIN SODIUM,TAZOBACTAM SODIUM SCH MLS/HR: 3; .375 INJECTION, POWDER, FOR SOLUTION INTRAVENOUS at 17:41

## 2018-06-16 RX ADMIN — MORPHINE SULFATE SCH MG: 10 SOLUTION ORAL at 02:16

## 2018-06-16 RX ADMIN — MORPHINE SULFATE SCH: 10 SOLUTION ORAL at 10:43

## 2018-06-16 RX ADMIN — MORPHINE SULFATE SCH MG: 10 SOLUTION ORAL at 21:58

## 2018-06-16 RX ADMIN — MORPHINE SULFATE SCH MG: 10 SOLUTION ORAL at 17:41

## 2018-06-16 NOTE — PN
Progress Note, Physician


History of Present Illness: 





Patient confused this morning. States that he was told he is going home.  

Health Aide also notes patient has been saying that he has to go to his Aunt's 

, but she had  a month ago and  had already passed.  Patient 

takes chronic pain medication and prn Valium, no other change with medication 

recently besides antibiotics.





- Current Medication List


Current Medications: 


Active Medications





Bacitracin (Bacitracin -)  1 applic TP DAILY ALE


Diazepam (Valium -)  10 mg PO TID PRN


   PRN Reason: muscle spasms


Enoxaparin Sodium (Lovenox -)  30 mg SQ DAILY ALE


   Last Admin: 18 10:44 Dose:  30 mg


Piperacillin Sod/Tazobactam (Sod 3.375 gm/ Dextrose)  50 mls @ 100 mls/hr IVPB 

Q8H-IV ALE; Protocol


Morphine Sulfate (Morphine 10 Mg/5 Ml Liquid)  5 mg PO Q4HPO ALE











- Objective


Vital Signs: 


 Vital Signs











Temperature  97.5 F L  18 06:33


 


Pulse Rate  79   18 06:33


 


Respiratory Rate  18   18 06:33


 


Blood Pressure  120/58   18 06:33


 


O2 Sat by Pulse Oximetry (%)  94 L  06/15/18 23:00











Constitutional: Yes: No Distress, Calm


Neck: Yes: Supple, Trachea Midline


Cardiovascular: Yes: Regular Rate and Rhythm, S1, S2.  No: Murmur


Respiratory: Yes: Regular, CTA Bilaterally.  No: Rales, Rhonchi, Wheezes


Gastrointestinal: Yes: Normal Bowel Sounds, Soft.  No: Distention, Tenderness


Musculoskeletal: Yes: Other (bilateral LE muscle atrophy, right 1st toe with 

erythema, skin ulcerations)


Edema: No


Neurological: Yes: Confusion, Pre-Existing Deficit (Paraplegia)


Labs: 


 CBC, BMP





 18 06:30 





 18 06:30 











Problem List





- Problems


(1) Wound infection


Code(s): T14.8XXA - OTHER INJURY OF UNSPECIFIED BODY REGION, INITIAL ENCOUNTER; 

L08.9 - LOCAL INFECTION OF THE SKIN AND SUBCUTANEOUS TISSUE, UNSP   





(2) Neurogenic bladder


Code(s): N31.9 - NEUROMUSCULAR DYSFUNCTION OF BLADDER, UNSPECIFIED   





(3) Paraplegia


Code(s): G82.20 - PARAPLEGIA, UNSPECIFIED   





Assessment/Plan





Current Active Problems





Wound infection (Acute)


Paraplegia following L2 spinal injury


Chronic back pain


History of UTI's





-with confusion will recheck UA, Urine culture, check CT head for altered 

mental status

## 2018-06-16 NOTE — PN
Progress Note, Physician


History of Present Illness: 





doing well


cellulitis improving


redness has decreased from the leg


toe starting to look better





- Current Medication List


Current Medications: 


Active Medications





Bacitracin (Bacitracin -)  1 applic TP DAILY ALE


Diazepam (Valium -)  10 mg PO TID PRN


   PRN Reason: muscle spasms


Enoxaparin Sodium (Lovenox -)  30 mg SQ DAILY ALE


Piperacillin Sod/Tazobactam (Sod 3.375 gm/ Dextrose)  50 mls @ 100 mls/hr IVPB 

Q8H-IV ALE


   Stop: 06/16/18 10:29


   Last Admin: 06/16/18 02:13 Dose:  100 mls/hr


Piperacillin Sod/Tazobactam (Sod 3.375 gm/ Dextrose)  50 mls @ 100 mls/hr IVPB 

Q8H-IV ALE; Protocol


Morphine Sulfate (Morphine 10 Mg/5 Ml Liquid)  4 mg PO Q4HPO ALE


   Last Admin: 06/16/18 06:21 Dose:  4 mg











- Objective


Vital Signs: 


 Vital Signs











Temperature  97.5 F L  06/16/18 06:33


 


Pulse Rate  79   06/16/18 06:33


 


Respiratory Rate  18   06/16/18 06:33


 


Blood Pressure  120/58   06/16/18 06:33


 


O2 Sat by Pulse Oximetry (%)  94 L  06/15/18 23:00











Constitutional: Yes: No Distress, Calm


Cardiovascular: Yes: Regular Rate and Rhythm


Respiratory: Yes: Regular, CTA Bilaterally


Gastrointestinal: Yes: Normal Bowel Sounds, Soft


Musculoskeletal: Yes: WNL


Extremities: Yes: Erythema (improving), Other


Neurological: Yes: Alert, Oriented


Psychiatric: Yes: Alert, Oriented


Labs: 


 CBC, BMP





 06/16/18 06:30 





 06/16/18 06:30 











Assessment/Plan





Problem List





- Problems


(1) Wound infection


Code(s): T14.8XXA - OTHER INJURY OF UNSPECIFIED BODY REGION, INITIAL ENCOUNTER; 

L08.9 - LOCAL INFECTION OF THE SKIN AND SUBCUTANEOUS TISSUE, UNSP   





(2) Neurogenic bladder


Code(s): N31.9 - NEUROMUSCULAR DYSFUNCTION OF BLADDER, UNSPECIFIED   





(3) Paraplegia


Code(s): G82.20 - PARAPLEGIA, UNSPECIFIED   








plan


will start patient on zosyn


we will give it for couple of days


rest as per the team

## 2018-06-16 NOTE — CON.ID
Consult


Consult Specialty:: infectious diseases


Referred by:: dr hughes


Reason for Consultation:: cellulitis of the toe





- History of Present Illness


Chief Complaint: increased redness and cellulitis of the toe


History of Present Illness: 





61 yo Male, history of paraplagia (h/o spinal fracture) recently admitted to 

hospital for Toe infection, started on PO Augmentin after receiving IV zosyn 

and discharged yesterday. Today toe seemed more erythematous and so came to ED. 

Seen by ID and advised to restart IV zosyn.  Due to paraplegia patient has no 

feeling in legs.


patient says he does not know why the redness ahs increased





- History Source


History Provided By: Patient


Limitations to Obtaining History: Poor Historian





- Past Medical History


CNS: Yes: Other (paraplegia due to accidental fall (T2 spinal fracture))


Renal/: Yes: Other (Bladder dysfunction)


Infectious Disease: Yes: Other (multiple UTIs  Pseudomonas)


Musculoskeletal: Yes: Other (Chronic pain)





- Past Surgical History


Past Surgical History: Yes: Splenectomy





- Alcohol/Substance Use


Hx Alcohol Use: No


History of Substance Use: reports: Marijuana





- Smoking History


Smoking history: Former smoker


Have you smoked in the past 12 months: No


Aproximately how many cigarettes per day: 3


If you are a former smoker, when did you quit?: 1986





- Social History


Usual Living Arrangement: With Significant Other


ADL: Support Services


History of Recent Travel: No





Home Medications





- Allergies


Allergies/Adverse Reactions: 


 Allergies











Allergy/AdvReac Type Severity Reaction Status Date / Time


 


No Known Allergies Allergy   Verified 06/15/18 15:35














- Home Medications


Home Medications: 


Ambulatory Orders





Diazepam [Valium] 10 mg PO TID PRN #30 tablet MDD 30mg 01/09/17 


Morphine 10 mg/5 ml Liquid [Morphine 10 mg/5 mL Liquid -] 4 mg PO Q4H 06/11/18 


Amox-Tr/K Cl [Augmentin - 875Mg Tablet] 1 tab PO BID #14 tablet 06/14/18 


Bacitracin - [Bacitracin Topical Ointment -] 1 applic TP DAILY #1 tube 06/14/18 











Family Disease History





- Family Disease History


Family Disease History: Heart Disease: Father





Review of Systems





- Review of Systems


Constitutional: reports: No Symptoms


Eyes: reports: No Symptoms


HENT: reports: No Symptoms


Neck: reports: No Symptoms


Cardiovascular: reports: No Symptoms


Respiratory: reports: No Symptoms


Gastrointestinal: reports: No Symptoms


Musculoskeletal: reports: No Symptoms


Integumentary: reports: Erythema


Neurological: reports: No Symptoms


Endocrine: reports: No Symptoms


Hematology/Lymphatic: reports: No Symptoms


Psychiatric: reports: No Symptoms





Physical Exam


Vital Signs: 


 Vital Signs











Temperature  97.5 F L  06/16/18 06:33


 


Pulse Rate  79   06/16/18 06:33


 


Respiratory Rate  18   06/16/18 06:33


 


Blood Pressure  120/58   06/16/18 06:33


 


O2 Sat by Pulse Oximetry (%)  94 L  06/15/18 23:00











Constitutional: Yes: No Distress, Calm


Cardiovascular: Yes: Regular Rate and Rhythm


Respiratory: Yes: Regular, CTA Bilaterally


Gastrointestinal: Yes: Normal Bowel Sounds, Soft


Musculoskeletal: Yes: WNL


Extremities: Yes: Erythema, Other


Integumentary: Yes: Erythema


Neurological: Yes: Alert, Oriented


Psychiatric: Yes: Alert, Oriented


Labs: 


 CBC, BMP





 06/16/18 06:30 





 06/16/18 06:30 











Assessment/Plan





Problem List





- Problems


(1) Wound infection


Code(s): T14.8XXA - OTHER INJURY OF UNSPECIFIED BODY REGION, INITIAL ENCOUNTER; 

L08.9 - LOCAL INFECTION OF THE SKIN AND SUBCUTANEOUS TISSUE, UNSP   





(2) Neurogenic bladder


Code(s): N31.9 - NEUROMUSCULAR DYSFUNCTION OF BLADDER, UNSPECIFIED   





(3) Paraplegia


Code(s): G82.20 - PARAPLEGIA, UNSPECIFIED   








plan


will start patient on zsoyn


we will give it for couple of days


rest as per the team

## 2018-06-17 LAB
ALBUMIN SERPL-MCNC: 3 G/DL (ref 3.4–5)
ALP SERPL-CCNC: 130 U/L (ref 45–117)
ALT SERPL-CCNC: 39 U/L (ref 12–78)
ANION GAP SERPL CALC-SCNC: 10 MMOL/L (ref 8–16)
AST SERPL-CCNC: 39 U/L (ref 15–37)
BASOPHILS # BLD: 1.5 % (ref 0–2)
BILIRUB SERPL-MCNC: 0.5 MG/DL (ref 0.2–1)
BUN SERPL-MCNC: 8 MG/DL (ref 7–18)
CALCIUM SERPL-MCNC: 8.6 MG/DL (ref 8.5–10.1)
CHLORIDE SERPL-SCNC: 104 MMOL/L (ref 98–107)
CO2 SERPL-SCNC: 26 MMOL/L (ref 21–32)
CREAT SERPL-MCNC: 0.6 MG/DL (ref 0.7–1.3)
DEPRECATED RDW RBC AUTO: 13.3 % (ref 11.9–15.9)
EOSINOPHIL # BLD: 2.9 % (ref 0–4.5)
GLUCOSE SERPL-MCNC: 85 MG/DL (ref 74–106)
HCT VFR BLD CALC: 38.8 % (ref 35.4–49)
HGB BLD-MCNC: 13.1 GM/DL (ref 11.7–16.9)
LYMPHOCYTES # BLD: 10.3 % (ref 8–40)
MCH RBC QN AUTO: 32.4 PG (ref 25.7–33.7)
MCHC RBC AUTO-ENTMCNC: 33.7 G/DL (ref 32–35.9)
MCV RBC: 96.1 FL (ref 80–96)
MONOCYTES # BLD AUTO: 12.2 % (ref 3.8–10.2)
NEUTROPHILS # BLD: 73.1 % (ref 42.8–82.8)
PLATELET # BLD AUTO: 328 K/MM3 (ref 134–434)
PMV BLD: 8.9 FL (ref 7.5–11.1)
POTASSIUM SERPLBLD-SCNC: 3.4 MMOL/L (ref 3.5–5.1)
PROT SERPL-MCNC: 7.2 G/DL (ref 6.4–8.2)
RBC # BLD AUTO: 4.04 M/MM3 (ref 4–5.6)
SODIUM SERPL-SCNC: 140 MMOL/L (ref 136–145)
WBC # BLD AUTO: 12.5 K/MM3 (ref 4–10)

## 2018-06-17 RX ADMIN — MORPHINE SULFATE SCH MG: 10 SOLUTION ORAL at 01:57

## 2018-06-17 RX ADMIN — MORPHINE SULFATE SCH MG: 10 SOLUTION ORAL at 21:59

## 2018-06-17 RX ADMIN — MORPHINE SULFATE SCH MG: 10 SOLUTION ORAL at 05:56

## 2018-06-17 RX ADMIN — MORPHINE SULFATE SCH MG: 10 SOLUTION ORAL at 09:10

## 2018-06-17 RX ADMIN — PIPERACILLIN SODIUM,TAZOBACTAM SODIUM SCH MLS/HR: 3; .375 INJECTION, POWDER, FOR SOLUTION INTRAVENOUS at 01:56

## 2018-06-17 RX ADMIN — MORPHINE SULFATE SCH MG: 10 SOLUTION ORAL at 13:57

## 2018-06-17 RX ADMIN — ENOXAPARIN SODIUM SCH MG: 30 INJECTION SUBCUTANEOUS at 09:11

## 2018-06-17 RX ADMIN — MORPHINE SULFATE SCH MG: 10 SOLUTION ORAL at 17:02

## 2018-06-17 RX ADMIN — PIPERACILLIN SODIUM,TAZOBACTAM SODIUM SCH MLS/HR: 3; .375 INJECTION, POWDER, FOR SOLUTION INTRAVENOUS at 17:03

## 2018-06-17 RX ADMIN — PIPERACILLIN SODIUM,TAZOBACTAM SODIUM SCH MLS/HR: 3; .375 INJECTION, POWDER, FOR SOLUTION INTRAVENOUS at 09:08

## 2018-06-17 NOTE — PN
Progress Note, Physician


History of Present Illness: 





stable


no new issues





- Current Medication List


Current Medications: 


Active Medications





Diazepam (Valium -)  10 mg PO TID PRN


   PRN Reason: muscle spasms


   Last Admin: 06/16/18 17:41 Dose:  10 mg


Enoxaparin Sodium (Lovenox -)  30 mg SQ DAILY [facility]


   Last Admin: 06/17/18 09:11 Dose:  30 mg


Piperacillin Sod/Tazobactam (Sod 3.375 gm/ Dextrose)  50 mls @ 100 mls/hr IVPB 

Q8H-IV ALE; Protocol


   Last Admin: 06/17/18 09:08 Dose:  100 mls/hr


Morphine Sulfate (Morphine 10 Mg/5 Ml Liquid)  5 mg PO Q4HPO ALE


   Last Admin: 06/17/18 09:10 Dose:  5 mg











- Objective


Vital Signs: 


 Vital Signs











Temperature  97.4 F L  06/17/18 09:00


 


Pulse Rate  87   06/17/18 09:00


 


Respiratory Rate  18   06/17/18 09:00


 


Blood Pressure  106/56   06/17/18 09:00


 


O2 Sat by Pulse Oximetry (%)  94 L  06/17/18 09:00











Constitutional: Yes: No Distress, Calm


Cardiovascular: Yes: Regular Rate and Rhythm


Respiratory: Yes: Regular, CTA Bilaterally


Gastrointestinal: Yes: Normal Bowel Sounds, Soft


Musculoskeletal: Yes: WNL


Extremities: Yes: Other


Neurological: Yes: Alert


Psychiatric: Yes: Alert


Labs: 


 CBC, BMP





 06/17/18 08:30 





 06/17/18 06:40 











Assessment/Plan





Problem List





- Problems


(1) Wound infection


Code(s): T14.8XXA - OTHER INJURY OF UNSPECIFIED BODY REGION, INITIAL ENCOUNTER; 

L08.9 - LOCAL INFECTION OF THE SKIN AND SUBCUTANEOUS TISSUE, UNSP   





(2) Neurogenic bladder


Code(s): N31.9 - NEUROMUSCULAR DYSFUNCTION OF BLADDER, UNSPECIFIED   





(3) Paraplegia


Code(s): G82.20 - PARAPLEGIA, UNSPECIFIED   








plan


continue zosyn


we will give it for couple of days


rest as per the team

## 2018-06-17 NOTE — PN
Progress Note, Physician


History of Present Illness: 





Notes having pain in legs, more since the infection of the toe. Otherwise, no 

fevers noted. Insists keeping wound open to air -does not want any dressing on 

toe





- Current Medication List


Current Medications: 


Active Medications





Diazepam (Valium -)  10 mg PO TID PRN


   PRN Reason: muscle spasms


   Last Admin: 06/17/18 11:45 Dose:  10 mg


Enoxaparin Sodium (Lovenox -)  30 mg SQ DAILY [facility]


   Last Admin: 06/17/18 09:11 Dose:  30 mg


Piperacillin Sod/Tazobactam (Sod 3.375 gm/ Dextrose)  50 mls @ 100 mls/hr IVPB 

Q8H-IV ALE; Protocol


   Last Admin: 06/17/18 09:08 Dose:  100 mls/hr


Morphine Sulfate (Morphine 10 Mg/5 Ml Liquid)  5 mg PO Q4HPO [facility]


   Last Admin: 06/17/18 09:10 Dose:  5 mg











- Objective


Vital Signs: 


 Vital Signs











Temperature  97.4 F L  06/17/18 09:00


 


Pulse Rate  87   06/17/18 09:00


 


Respiratory Rate  18   06/17/18 09:00


 


Blood Pressure  106/56   06/17/18 09:00


 


O2 Sat by Pulse Oximetry (%)  94 L  06/17/18 09:00











Constitutional: Yes: No Distress, Calm


Cardiovascular: Yes: Regular Rate and Rhythm, S1, S2.  No: Murmur


Respiratory: Yes: Regular, CTA Bilaterally.  No: Rales, Rhonchi, Wheezes


Gastrointestinal: Yes: Normal Bowel Sounds, Soft.  No: Distention


Extremities: Yes: Other (paraplegia with atrophy of leg muscles, rigth 1st toe 

with ulcerated skin, no edema)


Edema: No


Labs: 


 CBC, BMP





 06/17/18 08:30 





 06/17/18 06:40 











Problem List





- Problems


(1) Wound infection


Code(s): T14.8XXA - OTHER INJURY OF UNSPECIFIED BODY REGION, INITIAL ENCOUNTER; 

L08.9 - LOCAL INFECTION OF THE SKIN AND SUBCUTANEOUS TISSUE, UNSP   





(2) Neurogenic bladder


Code(s): N31.9 - NEUROMUSCULAR DYSFUNCTION OF BLADDER, UNSPECIFIED   





(3) Paraplegia


Code(s): G82.20 - PARAPLEGIA, UNSPECIFIED   





Assessment/Plan





Current Active Problems





Wound infection (Acute)


Paraplegia following L2 spinal injury


Chronic back pain


History of UTI's





-CT head negative, mental status does appear a little better today


-cont IV abx

## 2018-06-18 RX ADMIN — MORPHINE SULFATE SCH MG: 10 SOLUTION ORAL at 05:57

## 2018-06-18 RX ADMIN — ENOXAPARIN SODIUM SCH: 30 INJECTION SUBCUTANEOUS at 11:27

## 2018-06-18 RX ADMIN — MORPHINE SULFATE SCH MG: 10 SOLUTION ORAL at 18:47

## 2018-06-18 RX ADMIN — PIPERACILLIN SODIUM,TAZOBACTAM SODIUM SCH MLS/HR: 3; .375 INJECTION, POWDER, FOR SOLUTION INTRAVENOUS at 10:35

## 2018-06-18 RX ADMIN — PIPERACILLIN SODIUM,TAZOBACTAM SODIUM SCH MLS/HR: 3; .375 INJECTION, POWDER, FOR SOLUTION INTRAVENOUS at 18:49

## 2018-06-18 RX ADMIN — MORPHINE SULFATE SCH MG: 10 SOLUTION ORAL at 02:15

## 2018-06-18 RX ADMIN — MORPHINE SULFATE SCH: 10 SOLUTION ORAL at 18:28

## 2018-06-18 RX ADMIN — MORPHINE SULFATE SCH MG: 10 SOLUTION ORAL at 11:28

## 2018-06-18 RX ADMIN — PIPERACILLIN SODIUM,TAZOBACTAM SODIUM SCH MLS/HR: 3; .375 INJECTION, POWDER, FOR SOLUTION INTRAVENOUS at 02:14

## 2018-06-18 NOTE — PN
Progress Note, Physician


Chief Complaint: 


Mr Sumner complains that he is positioned uncomfortably in bed. Denies cp, sob, 

n/v. Says his toe feels much better today.





- Current Medication List


Current Medications: 


Active Medications





Diazepam (Valium -)  10 mg PO TID PRN


   PRN Reason: muscle spasms


   Last Admin: 06/17/18 11:45 Dose:  10 mg


Enoxaparin Sodium (Lovenox -)  30 mg SQ DAILY [facility]


   Last Admin: 06/18/18 11:27 Dose:  Not Given


Piperacillin Sod/Tazobactam (Sod 3.375 gm/ Dextrose)  50 mls @ 100 mls/hr IVPB 

Q8H-IV ALE; Protocol


   Last Admin: 06/18/18 10:35 Dose:  100 mls/hr


Morphine Sulfate (Morphine 10 Mg/5 Ml Liquid)  5 mg PO Q4HPO [facility]


   Last Admin: 06/18/18 11:28 Dose:  5 mg











- Objective


Vital Signs: 


 Vital Signs











Temperature  36.4 C L  06/18/18 05:30


 


Pulse Rate  80   06/18/18 05:30


 


Respiratory Rate  18   06/18/18 05:30


 


Blood Pressure  133/79   06/18/18 05:30


 


O2 Sat by Pulse Oximetry (%)  94 L  06/17/18 09:00











Constitutional: Yes: Well Nourished, No Distress, Calm


Cardiovascular: Yes: Regular Rate and Rhythm.  No: Gallop, Murmur, Rub


Respiratory: Yes: Regular, CTA Bilaterally.  No: Rales, Rhonchi, Wheezes


Gastrointestinal: Yes: Normal Bowel Sounds, Soft.  No: Distention, Tenderness


Extremities: Yes: Erythema, Other (wound dry)


Edema: No


Labs: 


 CBC, BMP





 06/17/18 08:30 





 06/17/18 06:40 











Problem List





- Problems


(1) Cellulitis of left foot


Assessment/Plan: 


-case d/w Dr Man


-continue zosyn currently


-appears improved from last week


Code(s): L03.116 - CELLULITIS OF LEFT LOWER LIMB   





(2) Wound infection


Assessment/Plan: 


-zosyn as above


Code(s): T14.8XXA - OTHER INJURY OF UNSPECIFIED BODY REGION, INITIAL ENCOUNTER; 

L08.9 - LOCAL INFECTION OF THE SKIN AND SUBCUTANEOUS TISSUE, UNSP   





(3) PAF (paroxysmal atrial fibrillation)


Assessment/Plan: 


-in sinus rhythm


Code(s): I48.0 - PAROXYSMAL ATRIAL FIBRILLATION   





(4) Chronic pain


Assessment/Plan: 


-continue home regimen


Code(s): G89.29 - OTHER CHRONIC PAIN   


Qualifiers: 


   Chronic pain type: chronic pain syndrome   Qualified Code(s): G89.4 - 

Chronic pain syndrome

## 2018-06-18 NOTE — PN
Progress Note, Physician


History of Present Illness: 





stable


no new issues


wound improving





- Current Medication List


Current Medications: 


Active Medications





Diazepam (Valium -)  10 mg PO TID PRN


   PRN Reason: muscle spasms


   Last Admin: 06/17/18 11:45 Dose:  10 mg


Enoxaparin Sodium (Lovenox -)  [facility]


   Last Admin: 06/18/18 11:27 Dose:  Not Given


Piperacillin Sod/Tazobactam (Sod 3.375 gm/ Dextrose)  50 mls @ 100 mls/hr IVPB 

Q8H-IV ALE; Protocol


   Last Admin: 06/18/18 10:35 Dose:  100 mls/hr


Morphine Sulfate (Morphine 10 Mg/5 Ml Liquid)  5 mg PO Q4HPO [facility]


   Last Admin: 06/18/18 11:28 Dose:  5 mg











- Objective


Vital Signs: 


 Vital Signs











Temperature  97.5 F L  06/18/18 05:30


 


Pulse Rate  80   06/18/18 05:30


 


Respiratory Rate  18   06/18/18 05:30


 


Blood Pressure  133/79   06/18/18 05:30


 


O2 Sat by Pulse Oximetry (%)  94 L  06/17/18 09:00











Constitutional: Yes: No Distress, Calm


Cardiovascular: Yes: Regular Rate and Rhythm


Respiratory: Yes: Regular, CTA Bilaterally


Gastrointestinal: Yes: Normal Bowel Sounds, Soft, Other


Musculoskeletal: Yes: WNL


Extremities: Yes: Erythema (of the great toe), Other


Integumentary: Yes: Erythema


Wound/Incision: Yes: Clean/Dry


Neurological: Yes: Alert, Oriented


Psychiatric: Yes: Alert, Oriented


Labs: 


 CBC, BMP





 06/17/18 08:30 





 06/17/18 06:40 











Assessment/Plan





Problem List





- Problems


(1) Wound infection


Code(s): T14.8XXA - OTHER INJURY OF UNSPECIFIED BODY REGION, INITIAL ENCOUNTER; 

L08.9 - LOCAL INFECTION OF THE SKIN AND SUBCUTANEOUS TISSUE, UNSP   





(2) Neurogenic bladder


Code(s): N31.9 - NEUROMUSCULAR DYSFUNCTION OF BLADDER, UNSPECIFIED   





(3) Paraplegia


Code(s): G82.20 - PARAPLEGIA, UNSPECIFIED   








plan


continue zosyn


wound improving


cellulitits resolving


rest as per the team

## 2018-06-19 LAB
ANION GAP SERPL CALC-SCNC: 11 MMOL/L (ref 8–16)
BASOPHILS # BLD: 0.2 % (ref 0–2)
BUN SERPL-MCNC: 7 MG/DL (ref 7–18)
CALCIUM SERPL-MCNC: 8.6 MG/DL (ref 8.5–10.1)
CHLORIDE SERPL-SCNC: 101 MMOL/L (ref 98–107)
CO2 SERPL-SCNC: 28 MMOL/L (ref 21–32)
CREAT SERPL-MCNC: 0.5 MG/DL (ref 0.7–1.3)
DEPRECATED RDW RBC AUTO: 13.4 % (ref 11.9–15.9)
EOSINOPHIL # BLD: 3.5 % (ref 0–4.5)
GLUCOSE SERPL-MCNC: 77 MG/DL (ref 74–106)
HCT VFR BLD CALC: 36.8 % (ref 35.4–49)
HGB BLD-MCNC: 12.7 GM/DL (ref 11.7–16.9)
LYMPHOCYTES # BLD: 17.9 % (ref 8–40)
MAGNESIUM SERPL-MCNC: 1.9 MG/DL (ref 1.8–2.4)
MCH RBC QN AUTO: 32.8 PG (ref 25.7–33.7)
MCHC RBC AUTO-ENTMCNC: 34.4 G/DL (ref 32–35.9)
MCV RBC: 95.4 FL (ref 80–96)
MONOCYTES # BLD AUTO: 11.8 % (ref 3.8–10.2)
NEUTROPHILS # BLD: 66.6 % (ref 42.8–82.8)
PHOSPHATE SERPL-MCNC: 2.2 MG/DL (ref 2.5–4.9)
PLATELET # BLD AUTO: 349 K/MM3 (ref 134–434)
PMV BLD: 9.1 FL (ref 7.5–11.1)
POTASSIUM SERPLBLD-SCNC: 3 MMOL/L (ref 3.5–5.1)
RBC # BLD AUTO: 3.86 M/MM3 (ref 4–5.6)
SODIUM SERPL-SCNC: 140 MMOL/L (ref 136–145)
WBC # BLD AUTO: 9.6 K/MM3 (ref 4–10)

## 2018-06-19 RX ADMIN — MORPHINE SULFATE SCH MG: 10 SOLUTION ORAL at 00:05

## 2018-06-19 RX ADMIN — PIPERACILLIN SODIUM,TAZOBACTAM SODIUM SCH MLS/HR: 3; .375 INJECTION, POWDER, FOR SOLUTION INTRAVENOUS at 09:50

## 2018-06-19 RX ADMIN — MORPHINE SULFATE SCH MG: 10 SOLUTION ORAL at 06:52

## 2018-06-19 RX ADMIN — MORPHINE SULFATE SCH: 10 SOLUTION ORAL at 06:31

## 2018-06-19 RX ADMIN — PIPERACILLIN SODIUM,TAZOBACTAM SODIUM SCH MLS/HR: 3; .375 INJECTION, POWDER, FOR SOLUTION INTRAVENOUS at 18:34

## 2018-06-19 RX ADMIN — POTASSIUM & SODIUM PHOSPHATES POWDER PACK 280-160-250 MG SCH PACKET: 280-160-250 PACK at 14:22

## 2018-06-19 RX ADMIN — MORPHINE SULFATE SCH MG: 10 SOLUTION ORAL at 09:06

## 2018-06-19 RX ADMIN — POTASSIUM & SODIUM PHOSPHATES POWDER PACK 280-160-250 MG SCH PACKET: 280-160-250 PACK at 21:55

## 2018-06-19 RX ADMIN — ENOXAPARIN SODIUM SCH: 30 INJECTION SUBCUTANEOUS at 14:26

## 2018-06-19 RX ADMIN — MORPHINE SULFATE SCH MG: 10 SOLUTION ORAL at 14:27

## 2018-06-19 RX ADMIN — MORPHINE SULFATE SCH MG: 10 SOLUTION ORAL at 21:54

## 2018-06-19 RX ADMIN — PIPERACILLIN SODIUM,TAZOBACTAM SODIUM SCH MLS/HR: 3; .375 INJECTION, POWDER, FOR SOLUTION INTRAVENOUS at 02:37

## 2018-06-19 RX ADMIN — MORPHINE SULFATE SCH MG: 10 SOLUTION ORAL at 17:40

## 2018-06-19 NOTE — PN
Progress Note, Physician


History of Present Illness: 





doing well


wound looks good


cellulitis resolving


swelling minimal





- Current Medication List


Current Medications: 


Active Medications





Diazepam (Valium -)  10 mg PO TID PRN


   PRN Reason: muscle spasms


   Last Admin: 06/17/18 11:45 Dose:  10 mg


Enoxaparin Sodium (Lovenox -)  30 mg SQ DAILY [facility]


   Last Admin: 06/19/18 14:26 Dose:  Not Given


Piperacillin Sod/Tazobactam (Sod 3.375 gm/ Dextrose)  50 mls @ 100 mls/hr IVPB 

Q8H-IV ALE; Protocol


   Last Admin: 06/19/18 09:50 Dose:  100 mls/hr


Morphine Sulfate (Morphine 10 Mg/5 Ml Liquid)  5 mg PO Q4HPO ALE


   Last Admin: 06/19/18 14:27 Dose:  5 mg


Potassium Phos/Sodium Phos (Phos-Nak Packet -)  1 packet PO TID [facility]


   Stop: 06/21/18 06:01


   Last Admin: 06/19/18 14:22 Dose:  1 packet











- Objective


Vital Signs: 


 Vital Signs











Temperature  98.4 F   06/19/18 14:00


 


Pulse Rate  58 L  06/19/18 14:00


 


Respiratory Rate  17   06/19/18 14:00


 


Blood Pressure  143/59   06/19/18 14:00


 


O2 Sat by Pulse Oximetry (%)  97   06/19/18 09:00











Constitutional: Yes: No Distress, Calm


Cardiovascular: Yes: Regular Rate and Rhythm


Respiratory: Yes: Regular, CTA Bilaterally


Gastrointestinal: Yes: Normal Bowel Sounds, Soft


Musculoskeletal: Yes: Other


Extremities: Yes: Other


Neurological: Yes: Alert, Oriented


Psychiatric: Yes: Alert, Oriented


Labs: 


 CBC, BMP





 06/19/18 06:45 





 06/19/18 06:45 











Assessment/Plan





Problem List





- Problems


(1) Wound infection


Code(s): T14.8XXA - OTHER INJURY OF UNSPECIFIED BODY REGION, INITIAL ENCOUNTER; 

L08.9 - LOCAL INFECTION OF THE SKIN AND SUBCUTANEOUS TISSUE, UNSP   





(2) Neurogenic bladder


Code(s): N31.9 - NEUROMUSCULAR DYSFUNCTION OF BLADDER, UNSPECIFIED   





(3) Paraplegia


Code(s): G82.20 - PARAPLEGIA, UNSPECIFIED   








plan


continue zosyn


wound improving


cellulitits resolving


rest as per the team


will be able to deescalate by thursday

## 2018-06-19 NOTE — PN
Progress Note, Physician


Chief Complaint: 


Mr Sumner is without complaint. Denies cp, sob, n/v.





- Current Medication List


Current Medications: 


Active Medications





Diazepam (Valium -)  10 mg PO TID PRN


   PRN Reason: muscle spasms


   Last Admin: 06/17/18 11:45 Dose:  10 mg


Enoxaparin Sodium (Lovenox -)  30 mg SQ DAILY [facility]


   Last Admin: 06/19/18 14:26 Dose:  Not Given


Piperacillin Sod/Tazobactam (Sod 3.375 gm/ Dextrose)  50 mls @ 100 mls/hr IVPB 

Q8H-IV ALE; Protocol


   Last Admin: 06/19/18 09:50 Dose:  100 mls/hr


Morphine Sulfate (Morphine 10 Mg/5 Ml Liquid)  5 mg PO Q4HPO [facility]


   Last Admin: 06/19/18 14:27 Dose:  5 mg


Potassium Phos/Sodium Phos (Phos-Nak Packet -)  1 packet PO TID [facility]


   Stop: 06/21/18 06:01


   Last Admin: 06/19/18 14:22 Dose:  1 packet











- Objective


Vital Signs: 


 Vital Signs











Temperature  36.9 C   06/19/18 14:00


 


Pulse Rate  58 L  06/19/18 14:00


 


Respiratory Rate  17   06/19/18 14:00


 


Blood Pressure  143/59   06/19/18 14:00


 


O2 Sat by Pulse Oximetry (%)  97   06/19/18 09:00











Constitutional: Yes: Well Nourished, No Distress, Calm


Cardiovascular: Yes: Regular Rate and Rhythm.  No: Gallop, Murmur, Rub


Respiratory: Yes: Regular, CTA Bilaterally.  No: Rales, Rhonchi, Wheezes


Gastrointestinal: Yes: Normal Bowel Sounds, Soft.  No: Distention, Tenderness


Extremities: Yes: Erythema (improved), Other (wound dry)


Edema: No


Labs: 


 CBC, BMP





 06/19/18 06:45 





 06/19/18 06:45 











Problem List





- Problems


(1) Cellulitis of left foot


Code(s): L03.116 - CELLULITIS OF LEFT LOWER LIMB   





(2) Wound infection


Code(s): T14.8XXA - OTHER INJURY OF UNSPECIFIED BODY REGION, INITIAL ENCOUNTER; 

L08.9 - LOCAL INFECTION OF THE SKIN AND SUBCUTANEOUS TISSUE, UNSP   





(3) PAF (paroxysmal atrial fibrillation)


Code(s): I48.0 - PAROXYSMAL ATRIAL FIBRILLATION   





(4) Chronic pain


Code(s): G89.29 - OTHER CHRONIC PAIN   


Qualifiers: 


   Chronic pain type: chronic pain syndrome   Qualified Code(s): G89.4 - 

Chronic pain syndrome   





Assessment/Plan





(1) Cellulitis of left foot


Assessment/Plan: 


-continue zosyn


-ID note reviewed


-de-escalate on Thursday


Code(s): L03.116 - CELLULITIS OF LEFT LOWER LIMB   





(2) Wound infection


Assessment/Plan: 


-zosyn as above


Code(s): T14.8XXA - OTHER INJURY OF UNSPECIFIED BODY REGION, INITIAL ENCOUNTER; 

L08.9 - LOCAL INFECTION OF THE SKIN AND SUBCUTANEOUS TISSUE, UNSP   





(3) PAF (paroxysmal atrial fibrillation)


Assessment/Plan: 


-in sinus rhythm


Code(s): I48.0 - PAROXYSMAL ATRIAL FIBRILLATION   





(4) Chronic pain


Assessment/Plan: 


-continue home regimen


Code(s): G89.29 - OTHER CHRONIC PAIN   


Qualifiers: 


   Chronic pain type: chronic pain syndrome   Qualified Code(s): G89.4 - 

Chronic pain syndrome

## 2018-06-20 LAB
ANION GAP SERPL CALC-SCNC: 9 MMOL/L (ref 8–16)
BASOPHILS # BLD: 0.3 % (ref 0–2)
BUN SERPL-MCNC: 6 MG/DL (ref 7–18)
CALCIUM SERPL-MCNC: 8.4 MG/DL (ref 8.5–10.1)
CHLORIDE SERPL-SCNC: 103 MMOL/L (ref 98–107)
CO2 SERPL-SCNC: 29 MMOL/L (ref 21–32)
CREAT SERPL-MCNC: 0.5 MG/DL (ref 0.7–1.3)
DEPRECATED RDW RBC AUTO: 13.5 % (ref 11.9–15.9)
EOSINOPHIL # BLD: 4.3 % (ref 0–4.5)
GLUCOSE SERPL-MCNC: 77 MG/DL (ref 74–106)
HCT VFR BLD CALC: 38 % (ref 35.4–49)
HGB BLD-MCNC: 12.9 GM/DL (ref 11.7–16.9)
LYMPHOCYTES # BLD: 13.9 % (ref 8–40)
MAGNESIUM SERPL-MCNC: 1.7 MG/DL (ref 1.8–2.4)
MCH RBC QN AUTO: 32.9 PG (ref 25.7–33.7)
MCHC RBC AUTO-ENTMCNC: 34.1 G/DL (ref 32–35.9)
MCV RBC: 96.5 FL (ref 80–96)
MONOCYTES # BLD AUTO: 11.2 % (ref 3.8–10.2)
NEUTROPHILS # BLD: 70.3 % (ref 42.8–82.8)
PHOSPHATE SERPL-MCNC: 3.1 MG/DL (ref 2.5–4.9)
PLATELET # BLD AUTO: 366 K/MM3 (ref 134–434)
PMV BLD: 9.5 FL (ref 7.5–11.1)
POTASSIUM SERPLBLD-SCNC: 3.6 MMOL/L (ref 3.5–5.1)
RBC # BLD AUTO: 3.94 M/MM3 (ref 4–5.6)
SODIUM SERPL-SCNC: 141 MMOL/L (ref 136–145)
WBC # BLD AUTO: 12.8 K/MM3 (ref 4–10)

## 2018-06-20 RX ADMIN — POTASSIUM & SODIUM PHOSPHATES POWDER PACK 280-160-250 MG SCH PACKET: 280-160-250 PACK at 05:31

## 2018-06-20 RX ADMIN — AMOXICILLIN AND CLAVULANATE POTASSIUM SCH TAB: 500; 125 TABLET, FILM COATED ORAL at 18:43

## 2018-06-20 RX ADMIN — MORPHINE SULFATE SCH MG: 10 SOLUTION ORAL at 14:43

## 2018-06-20 RX ADMIN — MORPHINE SULFATE SCH MG: 10 SOLUTION ORAL at 10:03

## 2018-06-20 RX ADMIN — ENOXAPARIN SODIUM SCH: 30 INJECTION SUBCUTANEOUS at 10:05

## 2018-06-20 RX ADMIN — MORPHINE SULFATE SCH MG: 10 SOLUTION ORAL at 21:33

## 2018-06-20 RX ADMIN — PIPERACILLIN SODIUM,TAZOBACTAM SODIUM SCH MLS/HR: 3; .375 INJECTION, POWDER, FOR SOLUTION INTRAVENOUS at 02:34

## 2018-06-20 RX ADMIN — POTASSIUM & SODIUM PHOSPHATES POWDER PACK 280-160-250 MG SCH PACKET: 280-160-250 PACK at 14:43

## 2018-06-20 RX ADMIN — PIPERACILLIN SODIUM,TAZOBACTAM SODIUM SCH MLS/HR: 3; .375 INJECTION, POWDER, FOR SOLUTION INTRAVENOUS at 10:05

## 2018-06-20 RX ADMIN — POTASSIUM & SODIUM PHOSPHATES POWDER PACK 280-160-250 MG SCH PACKET: 280-160-250 PACK at 21:31

## 2018-06-20 RX ADMIN — MORPHINE SULFATE SCH MG: 10 SOLUTION ORAL at 01:05

## 2018-06-20 RX ADMIN — MORPHINE SULFATE SCH MG: 10 SOLUTION ORAL at 05:31

## 2018-06-20 RX ADMIN — MORPHINE SULFATE SCH MG: 10 SOLUTION ORAL at 18:44

## 2018-06-20 NOTE — PN
Progress Note, Physician


Chief Complaint: 


Mr Sumner is without complaint. Denies cp, sob, n/v. ID note reviewed, see 

patient is having green stool.





- Current Medication List


Current Medications: 


Active Medications





Amoxicillin/Clavulanate Potassium (Augmentin - 500mg Tablet)  1 tab PO BID@0800,

[facility]


Diazepam (Valium -)  10 mg PO TID PRN


   PRN Reason: muscle spasms


   Last Admin: 06/20/18 10:06 Dose:  10 mg


Enoxaparin Sodium (Lovenox -)  30 mg SQ DAILY [facility]


   Last Admin: 06/20/18 10:05 Dose:  Not Given


Morphine Sulfate (Morphine 10 Mg/5 Ml Liquid)  5 mg PO Q4HPO [facility]


   Last Admin: 06/20/18 10:03 Dose:  5 mg


Potassium Phos/Sodium Phos (Phos-Nak Packet -)  1 packet PO TID [facility]


   Stop: 06/21/18 06:01


   Last Admin: 06/20/18 05:31 Dose:  1 packet











- Objective


Vital Signs: 


 Vital Signs











Temperature  36.1 C L  06/20/18 08:29


 


Pulse Rate  58 L  06/20/18 08:29


 


Respiratory Rate  18   06/20/18 08:29


 


Blood Pressure  149/76   06/20/18 08:29


 


O2 Sat by Pulse Oximetry (%)  97   06/19/18 21:00











Constitutional: Yes: Well Nourished, No Distress, Calm


Cardiovascular: Yes: Regular Rate and Rhythm.  No: Gallop, Murmur, Rub


Respiratory: Yes: Regular, CTA Bilaterally.  No: Rales, Rhonchi, Wheezes


Gastrointestinal: Yes: Normal Bowel Sounds, Soft.  No: Distention, Tenderness


Extremities: Yes: Erythema (improved), Other (wound dry)


Edema: No


Labs: 


 CBC, BMP





 06/20/18 07:20 





 06/20/18 07:20 











Problem List





- Problems


(1) Cellulitis of left foot


Code(s): L03.116 - CELLULITIS OF LEFT LOWER LIMB   





(2) Wound infection


Code(s): T14.8XXA - OTHER INJURY OF UNSPECIFIED BODY REGION, INITIAL ENCOUNTER; 

L08.9 - LOCAL INFECTION OF THE SKIN AND SUBCUTANEOUS TISSUE, UNSP   





(3) PAF (paroxysmal atrial fibrillation)


Code(s): I48.0 - PAROXYSMAL ATRIAL FIBRILLATION   





(4) Chronic pain


Code(s): G89.29 - OTHER CHRONIC PAIN   


Qualifiers: 


   Chronic pain type: chronic pain syndrome   Qualified Code(s): G89.4 - 

Chronic pain syndrome   





Assessment/Plan





(1) Cellulitis of left foot


Assessment/Plan: 


-antibiotics changed to augmentin


-monitor on oral antibiotics


-stool for c diff


Code(s): L03.116 - CELLULITIS OF LEFT LOWER LIMB   





(2) Wound infection


Assessment/Plan: 


-antibiotics changed to augmentin


Code(s): T14.8XXA - OTHER INJURY OF UNSPECIFIED BODY REGION, INITIAL ENCOUNTER; 

L08.9 - LOCAL INFECTION OF THE SKIN AND SUBCUTANEOUS TISSUE, UNSP   





(3) PAF (paroxysmal atrial fibrillation)


Assessment/Plan: 


-in sinus rhythm


Code(s): I48.0 - PAROXYSMAL ATRIAL FIBRILLATION   





(4) Chronic pain


Assessment/Plan: 


-continue home regimen


Code(s): G89.29 - OTHER CHRONIC PAIN   


Qualifiers: 


   Chronic pain type: chronic pain syndrome   Qualified Code(s): G89.4 - 

Chronic pain syndrome

## 2018-06-20 NOTE — PN
Progress Note, Physician


History of Present Illness: 





patient having loose greenish stool


leg looking better


wound healing





- Current Medication List


Current Medications: 


Active Medications





Diazepam (Valium -)  10 mg PO TID PRN


   PRN Reason: muscle spasms


   Last Admin: 06/20/18 10:06 Dose:  10 mg


Enoxaparin Sodium (Lovenox -)  30 mg SQ DAILY [facility]


   Last Admin: 06/20/18 10:05 Dose:  Not Given


Piperacillin Sod/Tazobactam (Sod 3.375 gm/ Dextrose)  50 mls @ 100 mls/hr IVPB 

Q8H-IV ALE; Protocol


   Last Admin: 06/20/18 10:05 Dose:  100 mls/hr


Morphine Sulfate (Morphine 10 Mg/5 Ml Liquid)  5 mg PO Q4HPO ALE


   Last Admin: 06/20/18 10:03 Dose:  5 mg


Potassium Phos/Sodium Phos (Phos-Nak Packet -)  1 packet PO TID [facility]


   Stop: 06/21/18 06:01


   Last Admin: 06/20/18 05:31 Dose:  1 packet











- Objective


Vital Signs: 


 Vital Signs











Temperature  97.0 F L  06/20/18 08:29


 


Pulse Rate  58 L  06/20/18 08:29


 


Respiratory Rate  18   06/20/18 08:29


 


Blood Pressure  149/76   06/20/18 08:29


 


O2 Sat by Pulse Oximetry (%)  97   06/19/18 21:00











Constitutional: Yes: Calm, Mild Distress


Cardiovascular: Yes: Regular Rate and Rhythm


Respiratory: Yes: Regular, CTA Bilaterally


Gastrointestinal: Yes: Normal Bowel Sounds, Soft, Other (dirrhoea)


Musculoskeletal: Yes: Other


Extremities: Yes: Other


Neurological: Yes: Alert, Oriented


Psychiatric: Yes: Alert, Oriented


Labs: 


 CBC, BMP





 06/20/18 07:20 





 06/20/18 07:20 











Assessment/Plan





Problem List





- Problems


(1) Wound infection


Code(s): T14.8XXA - OTHER INJURY OF UNSPECIFIED BODY REGION, INITIAL ENCOUNTER; 

L08.9 - LOCAL INFECTION OF THE SKIN AND SUBCUTANEOUS TISSUE, UNSP   





(2) Neurogenic bladder


Code(s): N31.9 - NEUROMUSCULAR DYSFUNCTION OF BLADDER, UNSPECIFIED   





(3) Paraplegia


Code(s): G82.20 - PARAPLEGIA, UNSPECIFIED   








plan


will change abx to oral


cdiff ordered


rest continue current mgmt


improving

## 2018-06-21 LAB
ANION GAP SERPL CALC-SCNC: 9 MMOL/L (ref 8–16)
BASOPHILS # BLD: 0.4 % (ref 0–2)
BUN SERPL-MCNC: 5 MG/DL (ref 7–18)
CALCIUM SERPL-MCNC: 8.4 MG/DL (ref 8.5–10.1)
CHLORIDE SERPL-SCNC: 103 MMOL/L (ref 98–107)
CO2 SERPL-SCNC: 29 MMOL/L (ref 21–32)
CREAT SERPL-MCNC: 0.5 MG/DL (ref 0.7–1.3)
DEPRECATED RDW RBC AUTO: 13.3 % (ref 11.9–15.9)
EOSINOPHIL # BLD: 4 % (ref 0–4.5)
GLUCOSE SERPL-MCNC: 86 MG/DL (ref 74–106)
HCT VFR BLD CALC: 38.1 % (ref 35.4–49)
HGB BLD-MCNC: 12.9 GM/DL (ref 11.7–16.9)
LYMPHOCYTES # BLD: 11.2 % (ref 8–40)
MAGNESIUM SERPL-MCNC: 1.7 MG/DL (ref 1.8–2.4)
MCH RBC QN AUTO: 32.5 PG (ref 25.7–33.7)
MCHC RBC AUTO-ENTMCNC: 33.9 G/DL (ref 32–35.9)
MCV RBC: 95.9 FL (ref 80–96)
MONOCYTES # BLD AUTO: 10.1 % (ref 3.8–10.2)
NEUTROPHILS # BLD: 74.3 % (ref 42.8–82.8)
PHOSPHATE SERPL-MCNC: 2.6 MG/DL (ref 2.5–4.9)
PLATELET # BLD AUTO: 377 K/MM3 (ref 134–434)
PMV BLD: 8.6 FL (ref 7.5–11.1)
POTASSIUM SERPLBLD-SCNC: 3.4 MMOL/L (ref 3.5–5.1)
RBC # BLD AUTO: 3.97 M/MM3 (ref 4–5.6)
SODIUM SERPL-SCNC: 141 MMOL/L (ref 136–145)
WBC # BLD AUTO: 11.4 K/MM3 (ref 4–10)

## 2018-06-21 RX ADMIN — MORPHINE SULFATE SCH MG: 10 SOLUTION ORAL at 09:53

## 2018-06-21 RX ADMIN — MORPHINE SULFATE SCH MG: 10 SOLUTION ORAL at 05:38

## 2018-06-21 RX ADMIN — MORPHINE SULFATE SCH MG: 10 SOLUTION ORAL at 02:30

## 2018-06-21 RX ADMIN — AMOXICILLIN AND CLAVULANATE POTASSIUM SCH TAB: 500; 125 TABLET, FILM COATED ORAL at 17:06

## 2018-06-21 RX ADMIN — MORPHINE SULFATE SCH MG: 10 SOLUTION ORAL at 17:06

## 2018-06-21 RX ADMIN — MORPHINE SULFATE SCH MG: 10 SOLUTION ORAL at 21:28

## 2018-06-21 RX ADMIN — ENOXAPARIN SODIUM SCH MG: 30 INJECTION SUBCUTANEOUS at 09:54

## 2018-06-21 RX ADMIN — MORPHINE SULFATE SCH MG: 10 SOLUTION ORAL at 13:57

## 2018-06-21 RX ADMIN — AMOXICILLIN AND CLAVULANATE POTASSIUM SCH TAB: 500; 125 TABLET, FILM COATED ORAL at 08:44

## 2018-06-21 RX ADMIN — POTASSIUM & SODIUM PHOSPHATES POWDER PACK 280-160-250 MG SCH PACKET: 280-160-250 PACK at 05:39

## 2018-06-21 NOTE — PN
Progress Note, Physician


History of Present Illness: 





stable


patient doing well


awaiting cdiff results





- Current Medication List


Current Medications: 


Active Medications





Amoxicillin/Clavulanate Potassium (Augmentin - 500mg Tablet)  1 tab PO BID@0800,

1730 [facility]


   Last Admin: 06/21/18 08:44 Dose:  1 tab


Diazepam (Valium -)  10 mg PO TID PRN


   PRN Reason: muscle spasms


   Last Admin: 06/21/18 05:37 Dose:  10 mg


Enoxaparin Sodium (Lovenox -)  30 mg SQ DAILY [facility]


   Last Admin: 06/21/18 09:54 Dose:  30 mg


Morphine Sulfate (Morphine 10 Mg/5 Ml Liquid)  5 mg PO Q4HPO [facility]


   Last Admin: 06/21/18 09:53 Dose:  5 mg











- Objective


Vital Signs: 


 Vital Signs











Temperature  97.6 F   06/21/18 06:00


 


Pulse Rate  65   06/21/18 06:00


 


Respiratory Rate  18   06/21/18 06:00


 


Blood Pressure  131/57   06/21/18 06:00


 


O2 Sat by Pulse Oximetry (%)  97   06/20/18 09:00











Constitutional: Yes: No Distress, Calm


Cardiovascular: Yes: Regular Rate and Rhythm


Respiratory: Yes: Regular, CTA Bilaterally


Gastrointestinal: Yes: Normal Bowel Sounds, Soft


Musculoskeletal: Yes: WNL


Extremities: Yes: Other


Neurological: Yes: Alert, Oriented


Psychiatric: Yes: Alert, Oriented


Labs: 


 CBC, BMP





 06/21/18 06:00 





 06/21/18 06:30 











Assessment/Plan





Problem List





- Problems


(1) Wound infection


Code(s): T14.8XXA - OTHER INJURY OF UNSPECIFIED BODY REGION, INITIAL ENCOUNTER; 

L08.9 - LOCAL INFECTION OF THE SKIN AND SUBCUTANEOUS TISSUE, UNSP   





(2) Neurogenic bladder


Code(s): N31.9 - NEUROMUSCULAR DYSFUNCTION OF BLADDER, UNSPECIFIED   





(3) Paraplegia


Code(s): G82.20 - PARAPLEGIA, UNSPECIFIED   








plan


continue oral abx


await for cdiff results


wound care


rest as per the team

## 2018-06-21 NOTE — PN
Progress Note, Physician


Chief Complaint: 


Mr Sumner still having diarrhea. No cp, sob, n/v.





- Current Medication List


Current Medications: 


Active Medications





Amoxicillin/Clavulanate Potassium (Augmentin - 500mg Tablet)  1 tab PO BID@0800,

1730 [facility]


   Last Admin: 06/21/18 08:44 Dose:  1 tab


Diazepam (Valium -)  10 mg PO TID PRN


   PRN Reason: muscle spasms


   Last Admin: 06/21/18 05:37 Dose:  10 mg


Enoxaparin Sodium (Lovenox -)  30 mg SQ DAILY [facility]


   Last Admin: 06/21/18 09:54 Dose:  30 mg


Morphine Sulfate (Morphine 10 Mg/5 Ml Liquid)  5 mg PO Q4HPO [facility]


   Last Admin: 06/21/18 09:53 Dose:  5 mg











- Objective


Vital Signs: 


 Vital Signs











Temperature  36.4 C   06/21/18 06:00


 


Pulse Rate  65   06/21/18 06:00


 


Respiratory Rate  18   06/21/18 06:00


 


Blood Pressure  131/57   06/21/18 06:00


 


O2 Sat by Pulse Oximetry (%)  97   06/20/18 09:00











Constitutional: Yes: Well Nourished, No Distress, Calm


Cardiovascular: Yes: Regular Rate and Rhythm.  No: Gallop, Murmur, Rub


Respiratory: Yes: Regular, CTA Bilaterally.  No: Rales, Rhonchi, Wheezes


Gastrointestinal: Yes: Normal Bowel Sounds, Soft.  No: Distention, Tenderness


Extremities: Yes: WNL


Edema: No


Labs: 


 CBC, BMP





 06/21/18 06:00 





 06/21/18 06:30 











Problem List





- Problems


(1) Cellulitis of left foot


Code(s): L03.116 - CELLULITIS OF LEFT LOWER LIMB   





(2) Wound infection


Code(s): T14.8XXA - OTHER INJURY OF UNSPECIFIED BODY REGION, INITIAL ENCOUNTER; 

L08.9 - LOCAL INFECTION OF THE SKIN AND SUBCUTANEOUS TISSUE, UNSP   





(3) PAF (paroxysmal atrial fibrillation)


Code(s): I48.0 - PAROXYSMAL ATRIAL FIBRILLATION   





(4) Chronic pain


Code(s): G89.29 - OTHER CHRONIC PAIN   


Qualifiers: 


   Chronic pain type: chronic pain syndrome   Qualified Code(s): G89.4 - 

Chronic pain syndrome   





Assessment/Plan





(1) Cellulitis of left foot


Assessment/Plan: 


-doing well on augmentin


-awaiting c diff results


Code(s): L03.116 - CELLULITIS OF LEFT LOWER LIMB   





(2) Wound infection


Assessment/Plan: 


-continue augmentin


Code(s): T14.8XXA - OTHER INJURY OF UNSPECIFIED BODY REGION, INITIAL ENCOUNTER; 

L08.9 - LOCAL INFECTION OF THE SKIN AND SUBCUTANEOUS TISSUE, UNSP   





(3) PAF (paroxysmal atrial fibrillation)


Assessment/Plan: 


-in sinus rhythm


Code(s): I48.0 - PAROXYSMAL ATRIAL FIBRILLATION   





(4) Chronic pain


Assessment/Plan: 


-continue home regimen


Code(s): G89.29 - OTHER CHRONIC PAIN   


Qualifiers: 


   Chronic pain type: chronic pain syndrome   Qualified Code(s): G89.4 - 

Chronic pain syndrome   





Dispo


-d/c tomorrow

## 2018-06-22 VITALS — HEART RATE: 58 BPM

## 2018-06-22 VITALS — DIASTOLIC BLOOD PRESSURE: 45 MMHG | TEMPERATURE: 98.1 F | SYSTOLIC BLOOD PRESSURE: 128 MMHG

## 2018-06-22 LAB
ANION GAP SERPL CALC-SCNC: 9 MMOL/L (ref 8–16)
BASOPHILS # BLD: 1.1 % (ref 0–2)
BUN SERPL-MCNC: 8 MG/DL (ref 7–18)
CALCIUM SERPL-MCNC: 8.4 MG/DL (ref 8.5–10.1)
CHLORIDE SERPL-SCNC: 104 MMOL/L (ref 98–107)
CO2 SERPL-SCNC: 29 MMOL/L (ref 21–32)
CREAT SERPL-MCNC: 0.6 MG/DL (ref 0.7–1.3)
DEPRECATED RDW RBC AUTO: 13.6 % (ref 11.9–15.9)
EOSINOPHIL # BLD: 4.9 % (ref 0–4.5)
GLUCOSE SERPL-MCNC: 72 MG/DL (ref 74–106)
HCT VFR BLD CALC: 37.3 % (ref 35.4–49)
HGB BLD-MCNC: 12.7 GM/DL (ref 11.7–16.9)
LYMPHOCYTES # BLD: 16.7 % (ref 8–40)
MAGNESIUM SERPL-MCNC: 1.6 MG/DL (ref 1.8–2.4)
MCH RBC QN AUTO: 32.5 PG (ref 25.7–33.7)
MCHC RBC AUTO-ENTMCNC: 34 G/DL (ref 32–35.9)
MCV RBC: 95.6 FL (ref 80–96)
MONOCYTES # BLD AUTO: 10.9 % (ref 3.8–10.2)
NEUTROPHILS # BLD: 66.4 % (ref 42.8–82.8)
PHOSPHATE SERPL-MCNC: 2.3 MG/DL (ref 2.5–4.9)
PLATELET # BLD AUTO: 409 K/MM3 (ref 134–434)
PMV BLD: 9.2 FL (ref 7.5–11.1)
POTASSIUM SERPLBLD-SCNC: 3.5 MMOL/L (ref 3.5–5.1)
RBC # BLD AUTO: 3.91 M/MM3 (ref 4–5.6)
SODIUM SERPL-SCNC: 142 MMOL/L (ref 136–145)
WBC # BLD AUTO: 10.4 K/MM3 (ref 4–10)

## 2018-06-22 RX ADMIN — MORPHINE SULFATE SCH MG: 10 SOLUTION ORAL at 06:13

## 2018-06-22 RX ADMIN — MORPHINE SULFATE SCH MG: 10 SOLUTION ORAL at 01:18

## 2018-06-22 RX ADMIN — MORPHINE SULFATE SCH MG: 10 SOLUTION ORAL at 14:38

## 2018-06-22 RX ADMIN — MORPHINE SULFATE SCH MG: 10 SOLUTION ORAL at 17:47

## 2018-06-22 RX ADMIN — MORPHINE SULFATE SCH MG: 10 SOLUTION ORAL at 09:44

## 2018-06-22 RX ADMIN — AMOXICILLIN AND CLAVULANATE POTASSIUM SCH TAB: 500; 125 TABLET, FILM COATED ORAL at 17:48

## 2018-06-22 RX ADMIN — ENOXAPARIN SODIUM SCH MG: 30 INJECTION SUBCUTANEOUS at 09:44

## 2018-06-22 RX ADMIN — AMOXICILLIN AND CLAVULANATE POTASSIUM SCH TAB: 500; 125 TABLET, FILM COATED ORAL at 09:44

## 2018-06-22 NOTE — DS
Physical Examination


Vital Signs: 


 Vital Signs











Temperature  36.7 C   06/22/18 09:00


 


Pulse Rate  58 L  06/22/18 09:00


 


Respiratory Rate  18   06/22/18 09:00


 


Blood Pressure  128/45   06/22/18 09:00


 


O2 Sat by Pulse Oximetry (%)  98   06/21/18 21:00











Constitutional: Yes: Well Nourished, No Distress, Calm


Cardiovascular: Yes: Regular Rate and Rhythm.  No: Gallop, Murmur, Rub


Respiratory: Yes: Regular, CTA Bilaterally.  No: Rales, Rhonchi, Wheezes


Gastrointestinal: Yes: Normal Bowel Sounds, Soft.  No: Distention, Tenderness


Extremities: Yes: WNL


Edema: No


Labs: 


 CBC, BMP





 06/22/18 07:00 





 06/22/18 07:00 











Discharge Summary


Reason For Visit: WOUND OF RIGHT FOOT


Current Active Problems





Cellulitis of left foot (Acute)


Wound infection (Acute)








Hospital Course: 





(1) Cellulitis of left foot


Code(s): L03.116 - CELLULITIS OF LEFT LOWER LIMB   





(2) Wound infection


Code(s): T14.8XXA - OTHER INJURY OF UNSPECIFIED BODY REGION, INITIAL ENCOUNTER; 

L08.9 - LOCAL INFECTION OF THE SKIN AND SUBCUTANEOUS TISSUE, UNSP   





(3) PAF (paroxysmal atrial fibrillation)


Code(s): I48.0 - PAROXYSMAL ATRIAL FIBRILLATION   





(4) Chronic pain


Code(s): G89.29 - OTHER CHRONIC PAIN   


Qualifiers: 


   Chronic pain type: chronic pain syndrome   Qualified Code(s): G89.4 - 

Chronic pain syndrome   





Mr Sumner is a 62 year old who comes in with wound infection and cellulitis. He 

was admitted and started on zosyn. ID saw him and continued patient on zosyn. 

He improved significantly and was successfully transitioned to augmentin. He 

was monitored on augmentin and continued to improve. Currently he is safe for 

discharge home on augmentin.





32 minutes spent in preparation of this discharge


Condition: Good





- Instructions


Diet, Activity, Other Instructions: 


resume previous diet and activity


Referrals: 


Sony Poon MD [Primary Care Provider] - 


Disposition: VNS/HOME HEALTH CARE





- Home Medications


Comprehensive Discharge Medication List: 


Ambulatory Orders





Diazepam [Valium] 10 mg PO TID PRN #30 tablet MDD 30mg 01/09/17 


Morphine 10 mg/5 ml Liquid [Morphine 10 mg/5 mL Liquid -] 4 mg PO Q4H 06/11/18 


Bacitracin - [Bacitracin Topical Ointment -] 1 applic TP DAILY #1 tube 06/14/18 


Amox-Tr/K Cl [Augmentin 500-125mg Tablet -] 1 tab PO BID@0800,1730 #14 tablet 06 /22/18

## 2018-06-22 NOTE — PN
Progress Note, Physician





- Current Medication List


Current Medications: 


Active Medications





Amoxicillin/Clavulanate Potassium (Augmentin - 500mg Tablet)  1 tab PO BID@0800,

1730 [facility]


   Last Admin: 06/22/18 09:44 Dose:  1 tab


Diazepam (Valium -)  10 mg PO TID PRN


   PRN Reason: muscle spasms


   Last Admin: 06/21/18 16:02 Dose:  10 mg


Enoxaparin Sodium (Lovenox -)  30 mg SQ DAILY [facility]


   Last Admin: 06/22/18 09:44 Dose:  30 mg


Morphine Sulfate (Morphine 10 Mg/5 Ml Liquid)  5 mg PO Q4HPO [facility]


   Last Admin: 06/22/18 14:38 Dose:  5 mg











- Objective


Vital Signs: 


 Vital Signs











Temperature  98.1 F   06/22/18 09:00


 


Pulse Rate  58 L  06/22/18 09:00


 


Respiratory Rate  18   06/22/18 09:00


 


Blood Pressure  128/45   06/22/18 09:00


 


O2 Sat by Pulse Oximetry (%)  98   06/21/18 21:00











Labs: 


 CBC, BMP





 06/22/18 07:00 





 06/22/18 07:00

## 2018-08-23 ENCOUNTER — HOSPITAL ENCOUNTER (INPATIENT)
Dept: HOSPITAL 74 - JER | Age: 63
LOS: 5 days | Discharge: HOME HEALTH | DRG: 690 | End: 2018-08-28
Attending: INTERNAL MEDICINE | Admitting: INTERNAL MEDICINE
Payer: COMMERCIAL

## 2018-08-23 VITALS — BODY MASS INDEX: 20.3 KG/M2

## 2018-08-23 DIAGNOSIS — L89.222: ICD-10-CM

## 2018-08-23 DIAGNOSIS — Z87.440: ICD-10-CM

## 2018-08-23 DIAGNOSIS — I74.10: ICD-10-CM

## 2018-08-23 DIAGNOSIS — I48.0: ICD-10-CM

## 2018-08-23 DIAGNOSIS — N39.0: Primary | ICD-10-CM

## 2018-08-23 DIAGNOSIS — G82.20: ICD-10-CM

## 2018-08-23 DIAGNOSIS — K59.00: ICD-10-CM

## 2018-08-23 DIAGNOSIS — R91.1: ICD-10-CM

## 2018-08-23 LAB
ALBUMIN SERPL-MCNC: 3.1 G/DL (ref 3.4–5)
ALP SERPL-CCNC: 157 U/L (ref 45–117)
ALT SERPL-CCNC: 35 U/L (ref 12–78)
ANION GAP SERPL CALC-SCNC: 9 MMOL/L (ref 8–16)
APPEARANCE UR: (no result)
APTT BLD: 32.7 SECONDS (ref 25.2–36.5)
AST SERPL-CCNC: 37 U/L (ref 15–37)
BACTERIA #/AREA URNS HPF: (no result) /HPF
BASOPHILS # BLD: 1.1 % (ref 0–2)
BILIRUB SERPL-MCNC: 0.8 MG/DL (ref 0.2–1)
BILIRUB UR STRIP.AUTO-MCNC: NEGATIVE MG/DL
BUN SERPL-MCNC: 3 MG/DL (ref 7–18)
CALCIUM SERPL-MCNC: 8.5 MG/DL (ref 8.5–10.1)
CHLORIDE SERPL-SCNC: 100 MMOL/L (ref 98–107)
CO2 SERPL-SCNC: 28 MMOL/L (ref 21–32)
COLOR UR: YELLOW
CREAT SERPL-MCNC: 0.6 MG/DL (ref 0.7–1.3)
DEPRECATED RDW RBC AUTO: 14.5 % (ref 11.9–15.9)
EOSINOPHIL # BLD: 0.7 % (ref 0–4.5)
EPITH CASTS URNS QL MICRO: (no result) /HPF
GLUCOSE SERPL-MCNC: 94 MG/DL (ref 74–106)
HCT VFR BLD CALC: 42.3 % (ref 35.4–49)
HGB BLD-MCNC: 14.7 GM/DL (ref 11.7–16.9)
INR BLD: 0.92 (ref 0.83–1.09)
KETONES UR QL STRIP: NEGATIVE
LEUKOCYTE ESTERASE UR QL STRIP.AUTO: NEGATIVE
LYMPHOCYTES # BLD: 10.5 % (ref 8–40)
MCH RBC QN AUTO: 31.9 PG (ref 25.7–33.7)
MCHC RBC AUTO-ENTMCNC: 34.8 G/DL (ref 32–35.9)
MCV RBC: 91.7 FL (ref 80–96)
MONOCYTES # BLD AUTO: 10.2 % (ref 3.8–10.2)
NEUTROPHILS # BLD: 77.5 % (ref 42.8–82.8)
NITRITE UR QL STRIP: POSITIVE
PH BLDV: 7.35 [PH] (ref 7.32–7.42)
PH UR: 8 [PH] (ref 5–8)
PLATELET # BLD AUTO: 414 K/MM3 (ref 134–434)
PMV BLD: 10.1 FL (ref 7.5–11.1)
POTASSIUM SERPLBLD-SCNC: 4.2 MMOL/L (ref 3.5–5.1)
PROT SERPL-MCNC: 7.1 G/DL (ref 6.4–8.2)
PROT UR QL STRIP: NEGATIVE
PROT UR QL STRIP: NEGATIVE
PT PNL PPP: 10.4 SEC (ref 9.7–13)
RBC # BLD AUTO: 4.61 M/MM3 (ref 4–5.6)
SODIUM SERPL-SCNC: 137 MMOL/L (ref 136–145)
SP GR UR: 1 (ref 1–1.03)
UROBILINOGEN UR STRIP-MCNC: NEGATIVE MG/DL (ref 0.2–1)
VENOUS PC02: 51.9 MMHG (ref 38–52)
VENOUS PO2: 32.9 MMHG (ref 28–48)
WBC # BLD AUTO: 9.9 K/MM3 (ref 4–10)

## 2018-08-23 RX ADMIN — SODIUM CHLORIDE SCH MLS/HR: 9 INJECTION, SOLUTION INTRAVENOUS at 18:44

## 2018-08-23 RX ADMIN — MORPHINE SULFATE PRN MG: 10 SOLUTION ORAL at 23:28

## 2018-08-23 RX ADMIN — SODIUM CHLORIDE SCH MLS/HR: 9 INJECTION, SOLUTION INTRAVENOUS at 20:53

## 2018-08-23 NOTE — PDOC
Attending Attestation





- Resident


Resident Name: Juliano Escobar





- ED Attending Attestation


I have performed the following: The case was reviewed & discussed with the 

resident, I agree w/resident's findings & plan





- HPI


HPI: 





08/23/18 18:15





62 year old male with a past medical history of spinal transection T4, 

recurrent UTI, and neurogenic bladder, who presents to the the emergency 

department for evaluation of suspected UTI. Pt reports groin pain radiating to 

his chest. Patient notes he has a catheter which has not been changed in 

several days. Pt reports associated symptoms of diarrhea, subjective fever, 

chills, and diaphoresis.


Denies chest pain, SOB, headache, nausea, vomiting, and blood in urine or 

stool. 











- Physicial Exam


PE: 





08/23/18 18:15


NAD, well appearing, MMM, nl conjunctiva, anicteric; neck supple. lungs clear, 

RRR, abdomen soft diffusely tender, nonperitoneal.. condom catheter in place. 

No peripheral edema. normal color for ethnicity, WWP. +ulcer at right hip, dry. 

chronic lower back TTP





Patient insensate below nipple line; no motor function below nipple line 

either. Patient with sensation and motor grossly intact from nipple-line up. 








- Medical Decision Making





08/23/18 18:12





 62M with a history of spinal transection (~T4), neurogenic bladder, and 

recurrent UTI presenting with vague pain in suprapubic and groin area, limited 

sensation due to spinal injury, uses condom catheter. +hip ulcer as well





vital with no fever, mild tachy.


labs with mild lactic. no wbc ct. UA with nitrites, no WBCs, however bacteria 

present. f/u urine culture. due to catheter use and prior U culture with 

Pseudomonas, will treat with zosyn, IVF, repeat lactic. admit for medical 

management, abx for UTI sx, pain control and supportive care. pt made aware of 

impression and plan, amenable.





08/23/18 18:13

## 2018-08-23 NOTE — PDOC
History of Present Illness





- General


Chief Complaint: Urinary Problem


Stated Complaint: UTI INFECTION


Time Seen by Provider: 08/23/18 13:52


History Source: Patient





- History of Present Illness


Initial Comments: 


The patient is a 62M with a history of spinal transection (~T4), neurogenic 

bladder, and recurrent UTI who presents with suspected UTI. The patient reports 

that he feels as though he has a UTI becuase he feels a vague pain generating 

from his groin that radiates towards his chest. Patient uses a condom catheter 

which he states he hasn't changed in several days.


He endorses subjective fever/chills, sweating. Endorses 1d of loose stool. 

Denies blood in urine or stool.


He denies HA, chest pain, SOB, or new changes in sensation. Patient denies N/V. 


08/23/18 13:57














Past History





- Past Medical History


Allergies/Adverse Reactions: 


 Allergies











Allergy/AdvReac Type Severity Reaction Status Date / Time


 


No Known Allergies Allergy   Verified 06/15/18 15:35











Home Medications: 


Ambulatory Orders





Diazepam [Valium] 10 mg PO TID PRN #30 tablet MDD 30mg 01/09/17 


Morphine 10 mg/5 ml Liquid [Morphine 10 mg/5 mL Liquid -] 4 mg PO Q4H 06/11/18 


Bacitracin - [Bacitracin Topical Ointment -] 1 applic TP DAILY #1 tube 06/14/18 








Anemia: No


Asthma: No


Cancer: No


Cardiac Disorders: No


CVA: No


COPD: Yes


CHF: No


Dementia: No


Diabetes: No


GI Disorders: No


 Disorders: Yes (Recurrent UTI, CONDOM CATHETER)


HTN:  (Hypotension)


Hypercholesterolemia: No


Liver Disease: No


Seizures: No


Thyroid Disease: No





- Surgical History


Abdominal Surgery: No


Appendectomy: No


Cardiac Surgery: No


Cholecystectomy: No


Lung Surgery: No


Neurologic Surgery: No


Orthopedic Surgery: Yes (fracture right wrist s/p fusion)





- Immunization History


Td Vaccination: No


Immunization Up to Date: No





- Suicide/Smoking/Psychosocial Hx


Smoking Status: No


Smoking History: Never smoked


Years of Tobacco Use: 0


Have you smoked in the past 12 months: No


Number of Cigarettes Smoked Daily: 3


If you are a former smoker, when did you quit?: 1986


Cigars Per Day: 0


Information on smoking cessation initiated: No


Hx Alcohol Use: Yes (social)


Drug/Substance Use Hx: No


Substance Use Type: None


Hx Substance Use Treatment: No





**Review of Systems





- Review of Systems


Able to Perform ROS?: Yes


Comments:: 





GENERAL/CONSTITUTIONAL: + fevers and chills


HEAD, EYES, EARS, NOSE AND THROAT: No change in vision. No ear pain or 

discharge. No sore throat


CARDIOVASCULAR: No chest pain or shortness of breath


RESPIRATORY: +cough


GASTROINTESTINAL: +Diarrhea x1; No nausea, vomiting


MUSCULOSKELETAL: No change in sensation


SKIN: No rash


NEUROLOGIC: No headache, vertigo, loss of consciousness, or change in strength/

sensation


ENDOCRINE: No increased thirst. No abnormal weight change








08/23/18 14:30








Is the patient limited English proficient: No





*Physical Exam





- Vital Signs


 Last Vital Signs











Temp Pulse Resp BP Pulse Ox


 


 97.4 F L  103 H  20   125/95   95 


 


 08/23/18 13:49  08/23/18 13:49  08/23/18 13:49  08/23/18 13:49  08/23/18 13:49














- Physical Exam


Comments: 





GENERAL: Awake, alert, and fully oriented, in no acute distress


HEAD: No signs of trauma, normocephalic, atraumatic


EYES: PERRL, EOMI, sclera anicteric, conjunctiva clear


ENT: Hearing grossly normal, nares patent, oropharynx clear without exudates. 

Moist mucosa


LUNGS: No distress, speaks full sentences, clear to auscultation bilaterally


HEART: Tachycardic and rhythm, normal S1 and S2, no murmurs appreciated, 

peripheral pulses normal and equal bilaterally


ABDOMEN: Soft, normoactive bowel sounds, non-distended


EXTREMITIES : BLE without sensation or motor function. 


NEUROLOGICAL: Cranial nerves II through XII grossly intact.  Patient insensate 

below nipple line; no motor function below nipple line either. Patient with 

sensation and motor grossly intact from nipple-line up. Normal speech


SKIN: Warm, Dry, normal turgor





08/23/18 14:23











ED Treatment Course





- LABORATORY


CBC & Chemistry Diagram: 


 08/24/18 16:50





 08/24/18 16:50





Medical Decision Making





- Medical Decision Making


The patient is a 62M w/ a history of complete spinal transection, neurogenic 

bladder, and recurrent UTI who presents for evaluation of UTI





ED Course


Patient tachycardic


Sepsis w/u


ECG NSR with rate 95; no T-wave inversions, no signs of acute ischemia, no axis 

deviation, QTc not prolonged (437)


08/23/18 14:12





Lactate 3, will initiate resuscitation with NS and repeat lactate 


CMP resent 2/2 sample hemolyzing


Will give Zosyn 4.5g IV for complicated UTI based on prior UCx w/ hx of 

Pseudomonas growth


08/23/18 16:31





Plan for admission for IV abx for UTI


Morphine 4mg IV for pain


08/23/18 16:33





Dispo: Admit for IV abx and resuscitation


08/23/18 17:23











*DC/Admit/Observation/Transfer


Diagnosis at time of Disposition: 


 PAF (paroxysmal atrial fibrillation), Neurogenic bladder





UTI (urinary tract infection)


Qualifiers:


 Urinary tract infection type: acute cystitis Hematuria presence: without 

hematuria Qualified Code(s): N30.00 - Acute cystitis without hematuria








- Discharge Dispostion


Condition at time of disposition: Good


Decision to Admit order: Yes





- Referrals





- Patient Instructions





- Post Discharge Activity

## 2018-08-23 NOTE — HP
CHIEF COMPLAINT:   suprapubic pain/burning





PCP:  Dr. Poon





HISTORY OF PRESENT ILLNESS:





Patient is a 62 year old male with a significant past medical history of 

paraplagia (h/o spinal fracture),  prox. atrial fibrillation, neurogenic bladder

, and recurrent UTIs.  He presents to the ED today with complaints of 

suprapubic pain and discomfort.  States it is the same suprapubic pain, 

discomfort he experiences when he has a UTI.  He has a condom catheter attached 

to starr bag with cloudy yellow urine.  He endorses subjective fever/chills, 

sweating, general weakness and loose stools x 1 day. He denies chest pain, SOB, 

nausea or vomiting.





Patient with recent hospitalization for left toe infection s/p IV zosyn.  

Patient has no feelings of his legs secondary to paraplegia.  





On admission, he was noted to have an elevated lactic level of 3.7 and was 

given fluid resuscitation.  A repeat lactic acid is now 2.3.  He is afebrile, 

but appears weak and fatigued.  He is noted to have mild tachycardia.  





Blood and urine cultures pending. 





ER course was notable for:


(1)right hip ulceration - dimed sized


(2) ua: no wbc ct, +nitrates, +bacteria.  UC pending.


(3) +tachycardia, lactic acidosis 3.7.


(4) zosyn


(5) ekg NSR





Recent Travel: none


Social History:


Smoking: denies


Alcohol:denies


Drugs:on chronic morphine/valium





Family History:


Allergies





No Known Allergies Allergy (Verified 06/15/18 15:35)


 








HOME MEDICATIONS:


 Home Medications











 Medication  Instructions  Recorded


 


Diazepam [Valium] 10 mg PO TID PRN #30 tablet MDD 01/09/17





 30mg 


 


Morphine 10 mg/5 ml Liquid 4 mg PO Q4H 06/11/18





[Morphine 10 mg/5 mL Liquid -]  


 


Bacitracin - [Bacitracin Topical 1 applic TP DAILY #1 tube 06/14/18





Ointment -]  


 


Amox-Tr/K Cl [Augmentin 500-125mg 1 tab PO BID@0800,1730 #14 tablet 06/22/18





Tablet -]  











PHYSICAL EXAMINATION


 Vital Signs - 24 hr











  08/23/18





  13:49


 


Temperature 97.4 F L


 


Pulse Rate 103 H


 


Respiratory 20





Rate 


 


Blood Pressure 125/95


 


O2 Sat by Pulse 95





Oximetry (%) 








GENERAL: Awake, alert, and fully oriented, in no acute distress.


HEAD: Normal with no signs of trauma.


EYES: Pupils equal, round and reactive to light, extraocular movements intact, 

sclera anicteric, conjunctiva clear. No lid lag.


EARS, NOSE, THROAT: Ears normal, nares patent, oropharynx clear without 

exudates. Moist mucous membranes.


NECK: Normal range of motion, supple without lymphadenopathy, JVD, or masses.


LUNGS: Breath sounds equal, clear to auscultation bilaterally. No wheezes, and 

no crackles. No accessory muscle use.


HEART: Regular rate and rhythm, normal S1 and S2 without murmur


ABDOMEN: Soft, nontender, not distended, normoactive bowel sounds, no guarding, 

no rebound, no masses.  


MUSCULOSKELETAL: history of paraplagia (h/o spinal fracture)


UPPER EXTREMITIES:  No peripheral edema.


LOWER EXTREMITIES: history of paraplagia (h/o spinal fracture)


PSYCHIATRIC: Cooperative. Good eye contact. Appropriate mood and affect.


SKIN: small round dimed-sized pressure sore on right hip, no drainage, no odor, 

present on admission.


 Laboratory Results - last 24 hr











  08/23/18 08/23/18 08/23/18





  14:30 14:30 14:30


 


WBC   9.9 


 


RBC   4.61 


 


Hgb   14.7 


 


Hct   42.3 


 


MCV   91.7 


 


MCH   31.9 


 


MCHC   34.8 


 


RDW   14.5 


 


Plt Count   414 


 


MPV   10.1 


 


Absolute Neuts (auto)   7.7 


 


Neutrophils %   77.5 


 


Lymphocytes %   10.5  D 


 


Monocytes %   10.2 


 


Eosinophils %   0.7  D 


 


Basophils %   1.1 


 


Nucleated RBC %   0 


 


PT with INR    10.40


 


INR    0.92


 


PTT (Actin FS)    32.7


 


VBG pH   


 


POC VBG pCO2   


 


POC VBG pO2   


 


Mixed VBG HCO3   


 


Sodium   


 


Potassium   


 


Chloride   


 


Carbon Dioxide   


 


Anion Gap   


 


BUN   


 


Creatinine   


 


Creat Clearance w eGFR   


 


Random Glucose   


 


Lactic Acid   


 


Calcium   


 


Total Bilirubin   


 


AST   


 


ALT   


 


Alkaline Phosphatase   


 


Troponin I   


 


Total Protein   


 


Albumin   


 


Urine Color  Yellow  


 


Urine Appearance  Cloudy  


 


Urine pH  8.0  D  


 


Ur Specific Gravity  1.003  


 


Urine Protein  Negative  


 


Urine Glucose (UA)  Negative  


 


Urine Ketones  Negative  


 


Urine Blood  Negative  


 


Urine Nitrite  Positive  


 


Urine Bilirubin  Negative  


 


Urine Urobilinogen  Negative  


 


Ur Leukocyte Esterase  Negative  


 


Urine WBC (Auto)  None  


 


Urine RBC (Auto)  6  


 


Ur Epithelial Cells  Many  


 


Urine Bacteria  Many  














  08/23/18 08/23/18 08/23/18





  14:30 14:30 14:35


 


WBC   


 


RBC   


 


Hgb   


 


Hct   


 


MCV   


 


MCH   


 


MCHC   


 


RDW   


 


Plt Count   


 


MPV   


 


Absolute Neuts (auto)   


 


Neutrophils %   


 


Lymphocytes %   


 


Monocytes %   


 


Eosinophils %   


 


Basophils %   


 


Nucleated RBC %   


 


PT with INR   


 


INR   


 


PTT (Actin FS)   


 


VBG pH   


 


POC VBG pCO2   


 


POC VBG pO2   


 


Mixed VBG HCO3   


 


Sodium  Cancelled  


 


Potassium  Cancelled  


 


Chloride  Cancelled  


 


Carbon Dioxide  Cancelled  


 


Anion Gap  Cancelled  


 


BUN  Cancelled  


 


Creatinine  Cancelled  


 


Creat Clearance w eGFR  Cancelled  


 


Random Glucose  Cancelled  


 


Lactic Acid    3.7 H*


 


Calcium  Cancelled  


 


Total Bilirubin  Cancelled  


 


AST  Cancelled  


 


ALT  Cancelled  


 


Alkaline Phosphatase  Cancelled  


 


Troponin I   Cancelled 


 


Total Protein  Cancelled  


 


Albumin  Cancelled  


 


Urine Color   


 


Urine Appearance   


 


Urine pH   


 


Ur Specific Gravity   


 


Urine Protein   


 


Urine Glucose (UA)   


 


Urine Ketones   


 


Urine Blood   


 


Urine Nitrite   


 


Urine Bilirubin   


 


Urine Urobilinogen   


 


Ur Leukocyte Esterase   


 


Urine WBC (Auto)   


 


Urine RBC (Auto)   


 


Ur Epithelial Cells   


 


Urine Bacteria   














  08/23/18 08/23/18





  14:48 16:17


 


WBC  


 


RBC  


 


Hgb  


 


Hct  


 


MCV  


 


MCH  


 


MCHC  


 


RDW  


 


Plt Count  


 


MPV  


 


Absolute Neuts (auto)  


 


Neutrophils %  


 


Lymphocytes %  


 


Monocytes %  


 


Eosinophils %  


 


Basophils %  


 


Nucleated RBC %  


 


PT with INR  


 


INR  


 


PTT (Actin FS)  


 


VBG pH  7.35 


 


POC VBG pCO2  51.9  D 


 


POC VBG pO2  32.9  D 


 


Mixed VBG HCO3  28.2 H 


 


Sodium   137


 


Potassium   4.2


 


Chloride   100


 


Carbon Dioxide   28


 


Anion Gap   9


 


BUN   3 L


 


Creatinine   0.6 L


 


Creat Clearance w eGFR   > 60


 


Random Glucose   94  D


 


Lactic Acid  


 


Calcium   8.5


 


Total Bilirubin   0.8


 


AST   37


 


ALT   35


 


Alkaline Phosphatase   157 H


 


Troponin I  


 


Total Protein   7.1


 


Albumin   3.1 L


 


Urine Color  


 


Urine Appearance  


 


Urine pH  


 


Ur Specific Gravity  


 


Urine Protein  


 


Urine Glucose (UA)  


 


Urine Ketones  


 


Urine Blood  


 


Urine Nitrite  


 


Urine Bilirubin  


 


Urine Urobilinogen  


 


Ur Leukocyte Esterase  


 


Urine WBC (Auto)  


 


Urine RBC (Auto)  


 


Ur Epithelial Cells  


 


Urine Bacteria  











ASSESSMENT/PLAN:





Patient is a 62 year old male with a significant past medical history of 

paraplagia (h/o spinal fracture),  prox. atrial fibrillation, neurogenic bladder

, and recurrent UTIs.  He presents to the ED today with complaints of 

suprapubic pain and discomfort.  





UTI:


Rule out urinary tract infection, acute on chronic: Presents with suprapubic 

discomfort and pain.  On condom cath and urine noted to be yellow, cloudy.  UA 

no wbc, +nitrates, +bacteria.  UC and blood cultures pending.  Given zosyn in 

ED.  ID consulted for further recommendations.  Bladder ultrasound ordered and 

pending.  Consider urology consult.





Neuro:


Paraplegia (h/o spinal fracture): bedbound.  On Valium and morphine for chronic 

pain.  Turn and position q 2 to protect bony prominences





Skin:


Right hip pressure sore, turn and position q2.  daily wound care. 





Cardiology:


Atrial Fibrillation with RVR history.  No AC secondary to low CHADVASC score.  

EKG 8/23/18 shows NSR.





Muscular/Skeletal:


Paraplegia - chronic


Monitor skin integrity, turn and position q 2, high risk for skin breakdown.  

Pt encouraged to shift his weight, will benefit from a bed trapeze.





Pain Management - chronic


Assessment/Plan: Continue morphine and Valium





F.E.N.


Fluids:  Normal Saline at 100cc/hr maintenance fluids after fluid bolus


Electrolytes: within normal limits


Can continue regular diet, low salt





Prophylaxis:


DVT: heparin BID





Disposition.  requires inpatient hospitalization.  Full Code. 














Visit type





- Emergency Visit


Emergency Visit: Yes


ED Registration Date: 08/23/18


Care time: The patient presented to the Emergency Department on the above date 

and was hospitalized for further evaluation of their emergent condition.





- New Patient


This patient is new to me today: Yes


Date on this admission: 08/23/18





- Critical Care


Critical Care patient: No





Hospitalist Screening





- Colonoscopy Questionnaire


Colonoscopy Questionnaire: 





Colonoscopy Questionnaire








-   Patient:


50 - 75 years old and never had a screening colonoscopy: Unknown


History of colon or rectal polyps, or CA: Unknown


History of IBD, Crohn's disease or UC: Unknown


History of abdominal radiation therapy as a child: Unknown





-   Relative:


1 with colon or rectal CA, or polyps at age 60 or younger: Unknown


Colon or rectal CA diagnosed at age 45 or younger: Unknown


Multiple relatives with colon or rectal CA: Unknown





-   Outcome:


Screening Result: Negative Screen

## 2018-08-24 LAB
ANION GAP SERPL CALC-SCNC: 9 MMOL/L (ref 8–16)
APPEARANCE UR: CLEAR
BASOPHILS # BLD: 0.3 % (ref 0–2)
BILIRUB UR STRIP.AUTO-MCNC: NEGATIVE MG/DL
BUN SERPL-MCNC: 5 MG/DL (ref 7–18)
CALCIUM SERPL-MCNC: 8.3 MG/DL (ref 8.5–10.1)
CHLORIDE SERPL-SCNC: 105 MMOL/L (ref 98–107)
CO2 SERPL-SCNC: 25 MMOL/L (ref 21–32)
COLOR UR: (no result)
CREAT SERPL-MCNC: 0.5 MG/DL (ref 0.7–1.3)
DEPRECATED RDW RBC AUTO: 14.2 % (ref 11.9–15.9)
DEPRECATED RDW RBC AUTO: 14.4 % (ref 11.9–15.9)
EOSINOPHIL # BLD: 1.2 % (ref 0–4.5)
GLUCOSE SERPL-MCNC: 103 MG/DL (ref 74–106)
HCT VFR BLD CALC: 40.4 % (ref 35.4–49)
HCT VFR BLD CALC: 43 % (ref 35.4–49)
HGB BLD-MCNC: 13.6 GM/DL (ref 11.7–16.9)
HGB BLD-MCNC: 14.4 GM/DL (ref 11.7–16.9)
KETONES UR QL STRIP: NEGATIVE
LEUKOCYTE ESTERASE UR QL STRIP.AUTO: NEGATIVE
LYMPHOCYTES # BLD: 10 % (ref 8–40)
MCH RBC QN AUTO: 31.6 PG (ref 25.7–33.7)
MCH RBC QN AUTO: 31.9 PG (ref 25.7–33.7)
MCHC RBC AUTO-ENTMCNC: 33.4 G/DL (ref 32–35.9)
MCHC RBC AUTO-ENTMCNC: 33.8 G/DL (ref 32–35.9)
MCV RBC: 94.3 FL (ref 80–96)
MCV RBC: 94.4 FL (ref 80–96)
MONOCYTES # BLD AUTO: 9.4 % (ref 3.8–10.2)
NEUTROPHILS # BLD: 79.1 % (ref 42.8–82.8)
NITRITE UR QL STRIP: NEGATIVE
PH UR: 5 [PH] (ref 5–8)
PLATELET # BLD AUTO: 316 K/MM3 (ref 134–434)
PLATELET # BLD AUTO: 329 K/MM3 (ref 134–434)
PMV BLD: 9.2 FL (ref 7.5–11.1)
PMV BLD: 9.5 FL (ref 7.5–11.1)
POTASSIUM SERPLBLD-SCNC: 4 MMOL/L (ref 3.5–5.1)
PROT UR QL STRIP: NEGATIVE
PROT UR QL STRIP: NEGATIVE
RBC # BLD AUTO: 4.28 M/MM3 (ref 4–5.6)
RBC # BLD AUTO: 4.56 M/MM3 (ref 4–5.6)
SODIUM SERPL-SCNC: 139 MMOL/L (ref 136–145)
SP GR UR: 1.01 (ref 1–1.03)
UROBILINOGEN UR STRIP-MCNC: NEGATIVE MG/DL (ref 0.2–1)
WBC # BLD AUTO: 11 K/MM3 (ref 4–10)
WBC # BLD AUTO: 9.9 K/MM3 (ref 4–10)

## 2018-08-24 RX ADMIN — PIPERACILLIN SODIUM,TAZOBACTAM SODIUM SCH MLS/HR: 3; .375 INJECTION, POWDER, FOR SOLUTION INTRAVENOUS at 13:33

## 2018-08-24 RX ADMIN — MORPHINE SULFATE PRN MG: 10 SOLUTION ORAL at 06:12

## 2018-08-24 RX ADMIN — MORPHINE SULFATE PRN MG: 10 SOLUTION ORAL at 20:04

## 2018-08-24 RX ADMIN — PIPERACILLIN SODIUM,TAZOBACTAM SODIUM SCH MLS/HR: 3; .375 INJECTION, POWDER, FOR SOLUTION INTRAVENOUS at 20:05

## 2018-08-24 RX ADMIN — SODIUM CHLORIDE SCH MLS/HR: 9 INJECTION, SOLUTION INTRAVENOUS at 06:13

## 2018-08-24 RX ADMIN — MORPHINE SULFATE PRN MG: 10 SOLUTION ORAL at 15:48

## 2018-08-24 RX ADMIN — BACITRACIN ZINC SCH APPLIC: 500 OINTMENT TOPICAL at 09:29

## 2018-08-24 RX ADMIN — HEPARIN SODIUM SCH UNIT: 5000 INJECTION, SOLUTION INTRAVENOUS; SUBCUTANEOUS at 22:16

## 2018-08-24 RX ADMIN — HEPARIN SODIUM SCH UNIT: 5000 INJECTION, SOLUTION INTRAVENOUS; SUBCUTANEOUS at 09:28

## 2018-08-24 NOTE — CON.ID
Consult


Consult Specialty:: infectious diseases


Reason for Consultation:: uti





- History of Present Illness


Chief Complaint: not feeling well


History of Present Illness: 








Patient is a 62 year old male with a significant past medical history of 

paraplagia (h/o spinal fracture),  prox. atrial fibrillation, neurogenic bladder

, and recurrent UTIs.  admitted with complaints of suprapubic pain and 

discomfort.  States it is the same suprapubic pain, discomfort he experiences 

when he has a UTI.  He has a condom catheter attached to starr bag with cloudy 

yellow urine.  He endorses subjective fever/chills, sweating, general weakness 

and loose stools x 1 day. He denies chest pain, SOB, nausea or vomiting.


patient known to me from previous admissions and treated recently for toe 

cellulitits








- History Source


History Provided By: Patient


Limitations to Obtaining History: No Limitations





- Past Medical History


CNS: Yes: Other (paraplegia due to accidental fall (T2 spinal fracture))


Renal/: Yes: Other (Bladder dysfunction)


Infectious Disease: Yes: Other (multiple UTIs  Pseudomonas)


Musculoskeletal: Yes: Other (Chronic pain)





- Past Surgical History


Past Surgical History: Yes: Splenectomy





- Alcohol/Substance Use


Hx Alcohol Use: Yes (social)


History of Substance Use: reports: Marijuana





- Smoking History


Smoking history: Never smoked


Have you smoked in the past 12 months: No


Aproximately how many cigarettes per day: 3


If you are a former smoker, when did you quit?: 1986





- Social History


Usual Living Arrangement: With Significant Other


ADL: Support Services


History of Recent Travel: No





Home Medications





- Allergies


Allergies/Adverse Reactions: 


 Allergies











Allergy/AdvReac Type Severity Reaction Status Date / Time


 


No Known Allergies Allergy   Verified 06/15/18 15:35














- Home Medications


Home Medications: 


Ambulatory Orders





Diazepam [Valium] 10 mg PO TID PRN #30 tablet MDD 30mg 01/09/17 


Morphine 10 mg/5 ml Liquid [Morphine 10 mg/5 mL Liquid -] 4 mg PO Q4H 06/11/18 


Bacitracin - [Bacitracin Topical Ointment -] 1 applic TP DAILY #1 tube 06/14/18 











Family Disease History





- Family Disease History


Family Disease History: Heart Disease: Father





Review of Systems





- Review of Systems


Constitutional: reports: No Symptoms


Eyes: reports: No Symptoms


HENT: reports: No Symptoms


Neck: reports: No Symptoms


Cardiovascular: reports: No Symptoms


Respiratory: reports: No Symptoms


Gastrointestinal: reports: No Symptoms


Genitourinary: reports: Pain (suprapubic)


Musculoskeletal: reports: No Symptoms


Integumentary: reports: No Symptoms


Neurological: reports: No Symptoms


Endocrine: reports: No Symptoms


Hematology/Lymphatic: reports: No Symptoms


Psychiatric: reports: No Symptoms





Physical Exam


Vital Signs: 


 Vital Signs











Temperature  97.8 F   08/24/18 09:07


 


Pulse Rate  61   08/24/18 09:07


 


Respiratory Rate  18   08/24/18 09:07


 


Blood Pressure  147/61   08/24/18 09:07


 


O2 Sat by Pulse Oximetry (%)  98   08/23/18 21:00











Constitutional: Yes: Calm, Thin


Eyes: Yes: Conjunctiva Clear


HENT: Yes: Atraumatic


Neck: Yes: Supple, Trachea Midline


Cardiovascular: Yes: Regular Rate and Rhythm


Respiratory: Yes: Regular, CTA Bilaterally


Gastrointestinal: Yes: Normal Bowel Sounds, Soft


Musculoskeletal: Yes: Other


Extremities: Yes: Other


Neurological: Yes: Other (paraplegia)


Psychiatric: Yes: Alert, Oriented


Labs: 


 CBC, BMP





 08/24/18 06:15 





 08/23/18 16:17 











Imaging





- Results


Chest X-ray: Report Reviewed, Image Reviewed


Cat Scan: Report Reviewed, Image Reviewed





Assessment/Plan





patient coming with possible uti





plan


will start on zosyn


await for all cx to be back


monitor for his symptoms

## 2018-08-24 NOTE — EKG
Test Reason : 

Blood Pressure : ***/*** mmHG

Vent. Rate : 095 BPM     Atrial Rate : 095 BPM

   P-R Int : 144 ms          QRS Dur : 080 ms

    QT Int : 348 ms       P-R-T Axes : 070 072 080 degrees

   QTc Int : 437 ms

 

NORMAL SINUS RHYTHM

NORMAL ECG

WHEN COMPARED WITH ECG OF 11-JUN-2018 17:49,

NO SIGNIFICANT CHANGE WAS FOUND

Confirmed by TOYA EVANS MD ([id number]) on 8/24/2018 12:07:20 PM

 

Referred By:             Confirmed By:TOYA EVANS MD

## 2018-08-24 NOTE — DS
Physical Examination


Vital Signs: 


 Vital Signs











Temperature  97.8 F   08/24/18 09:07


 


Pulse Rate  61   08/24/18 09:07


 


Respiratory Rate  18   08/24/18 09:07


 


Blood Pressure  147/61   08/24/18 09:07


 


O2 Sat by Pulse Oximetry (%)  98   08/23/18 21:00











Constitutional: Yes: Well Nourished, No Distress, Calm


Cardiovascular: Yes: Regular Rate and Rhythm.  No: Gallop, Murmur


Respiratory: Yes: Regular, CTA Bilaterally.  No: Accessory Muscle Use, SOB, 

Tachypnea, Wheezes


Gastrointestinal: Yes: WNL, Normal Bowel Sounds, Soft.  No: Distention, 

Tenderness


Renal/: Yes: Incontinence


Edema: No


Integumentary: Yes: Pressure Ulcer (stage 2 decubitus on right abdomen)


Neurological: Yes: Alert, Oriented, Pre-Existing Deficit (paraplegia)


Psychiatric: Yes: WNL, Alert, Oriented


Labs: 


 CBC, BMP





 08/24/18 06:15 





 08/23/18 16:17 











Discharge Summary


Reason For Visit: PARAPLEGIA; NEUROGENIC BLADDER; ACUTE UTI


Current Active Problems





Neurogenic bladder (Acute)


PAF (paroxysmal atrial fibrillation) (Acute)


UTI (urinary tract infection) (Acute)








Hospital Course: 


 is a 62 year old male who comes in with complaints of suprapubic pain 

and evaluation for his "bed sore". Lactic acid at admission around 3 which 

resolved s/p ivf. otherwise, all work up neg. repeat UA neg. wbc 11 this am, 

suspect due to dehydration.  pt reports resolution of suprapubic pain today. He 

reports he is feeling well, and wants to go home. Pt denies any further 

complaints. unable to perform pelvic/bladder US due to pt contracted per 

radiology dept. Case discussed w/ ID, cleared for d/c. Case discussed with PCP, 

agrees w/ plan. f/u as directed.  Informed SW to set up VS for care of pressure 

ulcer on his abdomen. 


Condition: Good





- Instructions


Diet, Activity, Other Instructions: 


resume prev diet, activity 


contact pcp if any worsening symptoms


f/u as directed 


Referrals: 


Sony Poon MD [Primary Care Provider] - 1 Week


Disposition: VNS/HOME HEALTH CARE





- Home Medications


Comprehensive Discharge Medication List: 


Ambulatory Orders





Diazepam [Valium] 10 mg PO TID PRN #30 tablet MDD 30mg 01/09/17 


Morphine 10 mg/5 ml Liquid [Morphine 10 mg/5 mL Liquid -] 4 mg PO Q4H 06/11/18 


Bacitracin - [Bacitracin Topical Ointment -] 1 applic TP DAILY #1 tube 06/14/18

## 2018-08-25 RX ADMIN — BACITRACIN ZINC SCH: 500 OINTMENT TOPICAL at 18:03

## 2018-08-25 RX ADMIN — HEPARIN SODIUM SCH: 5000 INJECTION, SOLUTION INTRAVENOUS; SUBCUTANEOUS at 21:57

## 2018-08-25 RX ADMIN — PIPERACILLIN SODIUM,TAZOBACTAM SODIUM SCH MLS/HR: 3; .375 INJECTION, POWDER, FOR SOLUTION INTRAVENOUS at 10:25

## 2018-08-25 RX ADMIN — PIPERACILLIN SODIUM,TAZOBACTAM SODIUM SCH MLS/HR: 3; .375 INJECTION, POWDER, FOR SOLUTION INTRAVENOUS at 01:33

## 2018-08-25 RX ADMIN — MORPHINE SULFATE PRN MG: 10 SOLUTION ORAL at 04:14

## 2018-08-25 RX ADMIN — MORPHINE SULFATE PRN MG: 10 SOLUTION ORAL at 12:02

## 2018-08-25 RX ADMIN — HEPARIN SODIUM SCH: 5000 INJECTION, SOLUTION INTRAVENOUS; SUBCUTANEOUS at 10:26

## 2018-08-25 RX ADMIN — MORPHINE SULFATE PRN MG: 10 SOLUTION ORAL at 00:19

## 2018-08-25 RX ADMIN — MORPHINE SULFATE PRN MG: 10 SOLUTION ORAL at 21:59

## 2018-08-25 RX ADMIN — BACITRACIN ZINC SCH APPLIC: 500 OINTMENT TOPICAL at 17:51
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
## 2018-08-25 NOTE — PN
Progress Note, Physician


Chief Complaint: 





c/o abd pain





- Current Medication List


Current Medications: 


Active Medications





Bacitracin (Bacitracin -)  1 applic TP DAILY [facility]LE


   Last Admin: 08/24/18 09:29 Dose:  1 applic


Diazepam (Valium -)  10 mg PO TID PRN


   PRN Reason: muscle spasms


   Last Admin: 08/25/18 06:51 Dose:  10 mg


Heparin Sodium (Porcine) (Heparin -)  5,000 unit SQ BID ALE


   Last Admin: 08/25/18 10:26 Dose:  Not Given


Piperacillin Sod/Tazobactam (Sod 3.375 gm/ Dextrose)  50 mls @ 100 mls/hr IVPB 

Q8H-IV ALE; Protocol


   Last Admin: 08/25/18 10:25 Dose:  100 mls/hr


Morphine Sulfate (Morphine 10 Mg/5 Ml Liquid)  4 mg PO Q4H PRN


   PRN Reason: PAIN LEVEL 7 - 10


   Last Admin: 08/25/18 04:14 Dose:  4 mg











- Objective


Vital Signs: 


 Vital Signs











Temperature  98.6 F   08/25/18 09:00


 


Pulse Rate  55 L  08/25/18 09:00


 


Respiratory Rate  18   08/25/18 09:00


 


Blood Pressure  127/67   08/25/18 09:00


 


O2 Sat by Pulse Oximetry (%)  93 L  08/25/18 09:00














Constitutional: Yes: Well Nourished, No Distress, Calm


Cardiovascular: Yes: Regular Rate and Rhythm.  No: Gallop, Murmur


Respiratory: Yes: Regular, CTA Bilaterally.  No: Accessory Muscle Use, SOB, 

Tachypnea, Wheezes


Gastrointestinal: Yes: WNL, Normal Bowel Sounds, Soft.  No: Distention, 

Tenderness


Renal/: Yes: Incontinence


Edema: No


Integumentary: Yes: Pressure Ulcer (stage 2 decubitus on right abdomen)


Neurological: Yes: Alert, Oriented, Pre-Existing Deficit (paraplegia)


Psychiatric: Yes: WNL, Alert, Oriented


Labs: 


 CBC, BMP





 08/24/18 16:50 





 08/24/18 16:50 





 INR, PTT











INR  0.92  (0.83-1.09)   08/23/18  14:30    














Problem List





- Problems


(1) Abdominal pain


Assessment/Plan: 


KUB shows Ileus will F/U GI recommendations, CT scan abdomen


Code(s): R10.9 - UNSPECIFIED ABDOMINAL PAIN   


Qualifiers: 


   Abdominal location: unspecified location   Qualified Code(s): R10.9 - 

Unspecified abdominal pain   





(2) Paraplegia


Assessment/Plan: 


post traumatic


Code(s): G82.20 - PARAPLEGIA, UNSPECIFIED   





(3) Neurogenic bladder


Assessment/Plan: 


Due to paraplegia


Code(s): N31.9 - NEUROMUSCULAR DYSFUNCTION OF BLADDER, UNSPECIFIED   





(4) Decubital ulcer


Assessment/Plan: 


wound care


Code(s): L89.90 - PRESSURE ULCER OF UNSPECIFIED SITE, UNSPECIFIED STAGE

## 2018-08-26 RX ADMIN — HEPARIN SODIUM SCH: 5000 INJECTION, SOLUTION INTRAVENOUS; SUBCUTANEOUS at 21:01

## 2018-08-26 RX ADMIN — MORPHINE SULFATE PRN MG: 10 SOLUTION ORAL at 13:53

## 2018-08-26 RX ADMIN — BACITRACIN ZINC SCH: 500 OINTMENT TOPICAL at 13:22

## 2018-08-26 RX ADMIN — MORPHINE SULFATE PRN MG: 10 SOLUTION ORAL at 21:02

## 2018-08-26 RX ADMIN — MORPHINE SULFATE PRN MG: 10 SOLUTION ORAL at 06:28

## 2018-08-26 RX ADMIN — HEPARIN SODIUM SCH: 5000 INJECTION, SOLUTION INTRAVENOUS; SUBCUTANEOUS at 09:43

## 2018-08-26 NOTE — PN
GI Progress Note


Subjective: 





Pelvic pain improved


Preliminary CT scan report suggests total occlusion of the aortal distal to the 

JAZIEL, COPD changes a suspicious right  lung nodule, multiple nodules in the LUQ





- Objective


Vital Signs: 


 Vital Signs











Temperature  97.9 F   08/26/18 09:00


 


Pulse Rate  62   08/26/18 09:00


 


Respiratory Rate  18   08/26/18 09:00


 


Blood Pressure  148/79   08/26/18 09:00


 


O2 Sat by Pulse Oximetry (%)  93 L  08/26/18 09:00











Constitutional: Calm


Eyes: No: Sclera Icterus


Cardiovascular: Yes: Regular Rate and Rhythm


Respiratory: Yes: CTA Bilaterally


Gastrointestinal Inspection: No: Distention


...Auscultate: Yes: Normoactive Bowel Sounds


...Palpate: No: Tenderness


Edema: No (No LE edema)


Neurological: Yes: Alert


Labs: 


 CBC, BMP





 08/24/18 16:50 





 08/24/18 16:50 





 INR, PTT











INR  0.92  (0.83-1.09)   08/23/18  14:30    








 Hepatic Panel











Total Bilirubin  0.8 mg/dL (0.2-1.0)   08/23/18  16:17    


 


AST  37 U/L (15-37)   08/23/18  16:17    


 


ALT  35 U/L (12-78)   08/23/18  16:17    


 


Alkaline Phosphatase  157 U/L ()  H  08/23/18  16:17    


 


Albumin  3.1 g/dl (3.4-5.0)  L  08/23/18  16:17    














- ....Imaging


Cat Scan: Report Reviewed





Problem List





- Problems


(1) Abdominal pain


Assessment/Plan: 


Pelvic pain resolved


Code(s): R10.9 - UNSPECIFIED ABDOMINAL PAIN   


Qualifiers: 


   Abdominal location: unspecified location   Qualified Code(s): R10.9 - 

Unspecified abdominal pain   





(2) Constipation


Assessment/Plan: 


Miralax 17g daily


Code(s): K59.00 - CONSTIPATION, UNSPECIFIED   





(3) Occlusion of aorta


Assessment/Plan: 


Consider vascular surgery evaluation to comment on this and await official CT 

read


Code(s): I74.10 - EMBOLISM AND THROMBOSIS OF UNSPECIFIED PARTS OF AORTA   





(4) Lung nodule


Assessment/Plan: 


Former smoker


Consider Pulm eval


Code(s): R91.1 - SOLITARY PULMONARY NODULE

## 2018-08-26 NOTE — PN
Progress Note, Physician


History of Present Illness: 





feels much better


no complaints





- Current Medication List


Current Medications: 


Active Medications





Bacitracin (Bacitracin -)  1 applic TP DAILY [facility]


   Last Admin: 08/25/18 18:03 Dose:  Not Given


Diazepam (Valium -)  10 mg PO TID PRN


   PRN Reason: muscle spasms


   Last Admin: 08/26/18 06:28 Dose:  10 mg


Heparin Sodium (Porcine) (Heparin -)  5,000 unit SQ BID [facility]


   Last Admin: 08/26/18 09:43 Dose:  Not Given


Morphine Sulfate (Morphine 10 Mg/5 Ml Liquid)  4 mg PO Q4H PRN


   PRN Reason: PAIN LEVEL 7 - 10


   Last Admin: 08/26/18 06:28 Dose:  4 mg











- Objective


Vital Signs: 


 Vital Signs











Temperature  97.9 F   08/26/18 09:00


 


Pulse Rate  62   08/26/18 09:00


 


Respiratory Rate  18   08/26/18 09:00


 


Blood Pressure  148/79   08/26/18 09:00


 


O2 Sat by Pulse Oximetry (%)  93 L  08/26/18 09:00











Constitutional: Yes: No Distress, Calm


Eyes: Yes: Conjunctiva Clear


Cardiovascular: Yes: Pulse Irregular


Respiratory: Yes: Regular, CTA Bilaterally


Gastrointestinal: Yes: Normal Bowel Sounds, Soft


Musculoskeletal: Yes: Other


Extremities: Yes: Other


Labs: 


 CBC, BMP





 08/24/18 16:50 





 08/24/18 16:50 





 INR, PTT











INR  0.92  (0.83-1.09)   08/23/18  14:30    














Assessment/Plan








 Problem List 





- Problems


(1) Lung nodule


Code(s): R91.1 - SOLITARY PULMONARY NODULE   





(2) Decubital ulcer


Code(s): L89.90 - PRESSURE ULCER OF UNSPECIFIED SITE, UNSPECIFIED STAGE   





(3) Paraplegia


Code(s): G82.20 - PARAPLEGIA, UNSPECIFIED   








all cx negative


will stop abx and watch


rest as per the team


plan was for discharge

## 2018-08-26 NOTE — PN
Progress Note, Physician


Chief Complaint: 





BNo new complaints c/o abd pain





- Current Medication List


Current Medications: 


Active Medications





Bacitracin (Bacitracin -)  1 applic TP DAILY [facility]


   Last Admin: 08/25/18 18:03 Dose:  Not Given


Diazepam (Valium -)  10 mg PO TID PRN


   PRN Reason: muscle spasms


   Last Admin: 08/26/18 06:28 Dose:  10 mg


Heparin Sodium (Porcine) (Heparin -)  5,000 unit SQ BID [facility]


   Last Admin: 08/26/18 09:43 Dose:  Not Given


Morphine Sulfate (Morphine 10 Mg/5 Ml Liquid)  4 mg PO Q4H PRN


   PRN Reason: PAIN LEVEL 7 - 10


   Last Admin: 08/26/18 06:28 Dose:  4 mg











- Objective


Vital Signs: 


 Vital Signs











Temperature  97.9 F   08/26/18 09:00


 


Pulse Rate  62   08/26/18 09:00


 


Respiratory Rate  18   08/26/18 09:00


 


Blood Pressure  148/79   08/26/18 09:00


 


O2 Sat by Pulse Oximetry (%)  93 L  08/25/18 09:00

















Constitutional: : Well Nourished, No Distress, Calm


NECK: No JVD No Bruit


Respiratory: Regular, CTA Bilaterally.  No: Accessory Muscle Use, SOB, Tachypnea

, Wheezes


Cardiovascular: Regular Rate and Rhythm.  No: Gallop, Murmur


Gastrointestinal:  WNL, Normal Bowel Sounds, Soft.  No: Distention, Tenderness


Renal/: Incontinence


LE : No Edema, Pulses +, contractures


Integumentary: Pressure Ulcer (stage 2 decubitus on right abdomen)


Neurological: Alert, Oriented, Pre-Existing Deficit (paraplegia)


Psychiatric: WNL, Alert, Oriented











Labs: 


 CBC, BMP





 08/24/18 16:50 





 08/24/18 16:50 





 INR, PTT











INR  0.92  (0.83-1.09)   08/23/18  14:30    














- ....Imaging


Cat Scan: Report Reviewed (Abd: No obstruction Occluded Dital a abdominal aorta 

Pulmonary nodule)





Problem List





- Problems


(1) Abdominal pain


Assessment/Plan: 


still c/o abd pain but improved Ct abd no obstruction 


Code(s): R10.9 - UNSPECIFIED ABDOMINAL PAIN   


Qualifiers: 


   Abdominal location: unspecified location   Qualified Code(s): R10.9 - 

Unspecified abdominal pain   





(2) Paraplegia


Assessment/Plan: 


post traumatic


Code(s): G82.20 - PARAPLEGIA, UNSPECIFIED   





(3) Neurogenic bladder


Assessment/Plan: 


Due to paraplegia


Code(s): N31.9 - NEUROMUSCULAR DYSFUNCTION OF BLADDER, UNSPECIFIED   





(4) Decubital ulcer


Assessment/Plan: 


wound care


Code(s): L89.90 - PRESSURE ULCER OF UNSPECIFIED SITE, UNSPECIFIED STAGE   





(5) Pulmonary nodule


Assessment/Plan: 


Pulmonary consultation, ? Rt lower lobe nodule concerning neoplasm  h/O smoking


Code(s): R91.1 - SOLITARY PULMONARY NODULE   





(6) Stenosis of aorta


Assessment/Plan: 


Ct shows stenosis of Distal Aorta, asymptomatic,  f/u Vasculr consult.


Code(s): Q25.3 - SUPRAVALVULAR AORTIC STENOSIS

## 2018-08-27 LAB
ANION GAP SERPL CALC-SCNC: 11 MMOL/L (ref 8–16)
ANISOCYTOSIS BLD QL: (no result)
BASOPHILS # BLD: 0.5 % (ref 0–2)
BUN SERPL-MCNC: 7 MG/DL (ref 7–18)
CALCIUM SERPL-MCNC: 8.7 MG/DL (ref 8.5–10.1)
CHLORIDE SERPL-SCNC: 104 MMOL/L (ref 98–107)
CO2 SERPL-SCNC: 27 MMOL/L (ref 21–32)
CREAT SERPL-MCNC: 0.4 MG/DL (ref 0.7–1.3)
DEPRECATED RDW RBC AUTO: 14.2 % (ref 11.9–15.9)
EOSINOPHIL # BLD: 1.7 % (ref 0–4.5)
GLUCOSE SERPL-MCNC: 107 MG/DL (ref 74–106)
HCT VFR BLD CALC: 39.9 % (ref 35.4–49)
HGB BLD-MCNC: 13.5 GM/DL (ref 11.7–16.9)
LYMPHOCYTES # BLD: 9.6 % (ref 8–40)
MACROCYTES BLD QL: (no result)
MCH RBC QN AUTO: 31.6 PG (ref 25.7–33.7)
MCHC RBC AUTO-ENTMCNC: 33.8 G/DL (ref 32–35.9)
MCV RBC: 93.3 FL (ref 80–96)
MONOCYTES # BLD AUTO: 10.9 % (ref 3.8–10.2)
NEUTROPHILS # BLD: 77.3 % (ref 42.8–82.8)
PLATELET # BLD AUTO: 309 K/MM3 (ref 134–434)
PLATELET BLD QL SMEAR: NORMAL
PMV BLD: 9.1 FL (ref 7.5–11.1)
POTASSIUM SERPLBLD-SCNC: 3.5 MMOL/L (ref 3.5–5.1)
RBC # BLD AUTO: 4.28 M/MM3 (ref 4–5.6)
SODIUM SERPL-SCNC: 142 MMOL/L (ref 136–145)
WBC # BLD AUTO: 10.9 K/MM3 (ref 4–10)

## 2018-08-27 RX ADMIN — HEPARIN SODIUM SCH: 5000 INJECTION, SOLUTION INTRAVENOUS; SUBCUTANEOUS at 11:09

## 2018-08-27 RX ADMIN — MORPHINE SULFATE PRN MG: 10 SOLUTION ORAL at 11:59

## 2018-08-27 RX ADMIN — MORPHINE SULFATE PRN MG: 10 SOLUTION ORAL at 02:05

## 2018-08-27 RX ADMIN — BACITRACIN ZINC SCH: 500 OINTMENT TOPICAL at 11:09

## 2018-08-27 RX ADMIN — MORPHINE SULFATE PRN MG: 10 SOLUTION ORAL at 21:57

## 2018-08-27 RX ADMIN — HEPARIN SODIUM SCH: 5000 INJECTION, SOLUTION INTRAVENOUS; SUBCUTANEOUS at 22:01

## 2018-08-27 NOTE — DS
Physical Examination


Vital Signs: 


 Vital Signs











Temperature  97.7 F   08/27/18 06:00


 


Pulse Rate  50 L  08/27/18 06:00


 


Respiratory Rate  18   08/27/18 06:00


 


Blood Pressure  129/70   08/27/18 06:00


 


O2 Sat by Pulse Oximetry (%)  93 L  08/26/18 09:00











Constitutional: Yes: Well Nourished, No Distress, Calm


Cardiovascular: Yes: WNL, Regular Rate and Rhythm


Respiratory: Yes: WNL, Regular, CTA Bilaterally


Gastrointestinal: Yes: WNL, Normal Bowel Sounds, Soft.  No: Distention, 

Tenderness, Vomiting


Edema: No


Wound/Incision: Yes: Open to air (right abdomen stage 2 pressure ulcer)


Neurological: Yes: Alert, Oriented, Pre-Existing Deficit (paraplegia)


Psychiatric: Yes: Alert, Oriented


Labs: 


 CBC, BMP





 08/24/18 16:50 





 08/24/18 16:50 











Discharge Summary


Reason For Visit: PARAPLEGIA; NEUROGENIC BLADDER; ACUTE UTI


Current Active Problems





Decubital ulcer (Acute)


Lung nodule (Acute)


Neurogenic bladder (Acute)


Occlusion of aorta (Acute)


PAF (paroxysmal atrial fibrillation) (Acute)


Pulmonary nodule (Acute)


Stenosis of aorta (Acute)


UTI (urinary tract infection) (Acute)








Hospital Course: 


 is a 62 year old male who comes in with complaints of suprapubic pain 

and evaluation for his "bed sore". Lactic acid at admission around 3 which 

resolved s/p ivf. otherwise, all work up neg. repeat UA neg. wbc 11 this am, 

suspect due to dehydration.  pt reports resolution of suprapubic pain today. He 

reports he is feeling well, and wants to go home. Pt denies any further 

complaints. unable to perform pelvic/bladder US due to pt contracted per 

radiology dept. Case discussed w/ ID, cleared for d/c. Case discussed with PCP, 

agrees w/ plan. f/u as directed.  Informed SW to set up VS for care of pressure 

ulcer on his abdomen. 





8/25 Pt c/o LLQ pain, GI was consulted by covering physician, Abd CT- abd aorta 

stenosis, pulm nodules. Pt evaluated by , vascular surgery who 

reports this is a chronic finding, no intervention needed. Pulm consulted 

regarding pulm nodules. lung nodule has been present since 2012, has had a PET 

scan previously but unknown results, pulm recommends  PET scan as outpt to 

determine avidity


-  if PET avid, will need tissue biopsy


-  no further inpatient work up at this time by pulm. 





8/27 vitals stable. labs unremarkable. Pt seen and evaluated at bedside, pt 

reports hesitancy in leaving, reports "i don't feel completely well, I might 

get sick". Pt informed that he has been cleared for discharge. SW informed of pt

's discharge. advise outpt f/u as directed. 


Condition: Good





- Instructions


Diet, Activity, Other Instructions: 


resume prev diet, activity 


contact pcp if any worsening symptoms


f/u as directed 


Referrals: 


Ksenia Mendoza MD [Staff Physician] - 


Pierre Machado MD [Staff Physician] - 


Sony Poon MD [Primary Care Provider] - 1 Week


Disposition: VNS/HOME HEALTH CARE





- Home Medications


Comprehensive Discharge Medication List: 


Ambulatory Orders





Diazepam [Valium] 10 mg PO TID PRN #30 tablet MDD 30mg 01/09/17 


Morphine 10 mg/5 ml Liquid [Morphine 10 mg/5 mL Liquid -] 4 mg PO Q4H 06/11/18 


Bacitracin - [Bacitracin Topical Ointment -] 1 applic TP DAILY #1 tube 06/14/18

## 2018-08-27 NOTE — PN
Progress Note, Physician


History of Present Illness: 





today the patient does not feel to well


vitals and lab has been stable


feels very weak





- Current Medication List


Current Medications: 


Active Medications





Bacitracin (Bacitracin -)  1 applic TP DAILY [facility]


   Last Admin: 08/27/18 11:09 Dose:  Not Given


Diazepam (Valium -)  10 mg PO TID PRN


   PRN Reason: muscle spasms


   Last Admin: 08/27/18 12:00 Dose:  10 mg


Heparin Sodium (Porcine) (Heparin -)  5,000 unit SQ BID [facility]


   Last Admin: 08/27/18 11:09 Dose:  Not Given


Morphine Sulfate (Morphine 10 Mg/5 Ml Liquid)  4 mg PO Q4H PRN


   PRN Reason: PAIN LEVEL 7 - 10


   Last Admin: 08/27/18 11:59 Dose:  4 mg











- Objective


Vital Signs: 


 Vital Signs











Temperature  97.7 F   08/27/18 06:00


 


Pulse Rate  50 L  08/27/18 06:00


 


Respiratory Rate  18   08/27/18 06:00


 


Blood Pressure  129/70   08/27/18 06:00


 


O2 Sat by Pulse Oximetry (%)  93 L  08/26/18 09:00











Constitutional: Yes: Mild Distress


Cardiovascular: Yes: Pulse Irregular


Respiratory: Yes: Regular, CTA Bilaterally


Gastrointestinal: Yes: Normal Bowel Sounds, Soft


Musculoskeletal: Yes: Other


Extremities: Yes: Other


Neurological: Yes: Alert, Oriented


Psychiatric: Yes: Alert, Oriented


Labs: 


 CBC, BMP





 08/24/18 16:50 





 08/24/18 16:50 





 INR, PTT











INR  0.92  (0.83-1.09)   08/23/18  14:30    














Assessment/Plan








 Problem List 





- Problems


(1) Lung nodule


Code(s): R91.1 - SOLITARY PULMONARY NODULE   





(2) Decubital ulcer


Code(s): L89.90 - PRESSURE ULCER OF UNSPECIFIED SITE, UNSPECIFIED STAGE   





(3) Paraplegia


Code(s): G82.20 - PARAPLEGIA, UNSPECIFIED  





4 abd pain 








plan


continue to monitor


nutrition


rest as per the team

## 2018-08-28 VITALS — HEART RATE: 81 BPM | DIASTOLIC BLOOD PRESSURE: 70 MMHG | SYSTOLIC BLOOD PRESSURE: 108 MMHG | TEMPERATURE: 98.6 F

## 2018-08-28 RX ADMIN — MORPHINE SULFATE PRN MG: 10 SOLUTION ORAL at 09:09

## 2018-08-28 RX ADMIN — HEPARIN SODIUM SCH UNIT: 5000 INJECTION, SOLUTION INTRAVENOUS; SUBCUTANEOUS at 09:11

## 2018-08-28 RX ADMIN — MORPHINE SULFATE PRN MG: 10 SOLUTION ORAL at 04:12

## 2018-08-28 NOTE — PN
Progress Note, Physician





- Current Medication List


Current Medications: 


Active Medications





Bacitracin (Bacitracin -)  1 applic TP DAILY [facility]


   Last Admin: 08/27/18 11:09 Dose:  Not Given


Diazepam (Valium -)  10 mg PO TID PRN


   PRN Reason: muscle spasms


   Last Admin: 08/27/18 21:59 Dose:  10 mg


Heparin Sodium (Porcine) (Heparin -)  5,000 unit SQ BID [facility]


   Last Admin: 08/28/18 09:11 Dose:  5,000 unit


Morphine Sulfate (Morphine 10 Mg/5 Ml Liquid)  4 mg PO Q4H PRN


   PRN Reason: PAIN LEVEL 7 - 10


   Last Admin: 08/28/18 09:09 Dose:  4 mg











- Objective


Vital Signs: 


 Vital Signs











Temperature  98.2 F   08/28/18 06:00


 


Pulse Rate  59 L  08/28/18 06:00


 


Respiratory Rate  20   08/28/18 06:00


 


Blood Pressure  142/73   08/28/18 06:00


 


O2 Sat by Pulse Oximetry (%)  94 L  08/27/18 21:00











Constitutional: Yes: Well Nourished, No Distress, Calm


Cardiovascular: Yes: WNL, Regular Rate and Rhythm.  No: Murmur


Respiratory: Yes: WNL, Regular, CTA Bilaterally


Gastrointestinal: Yes: WNL, Normal Bowel Sounds, Soft


Genitourinary: Yes: WNL


Edema: No


Neurological: Yes: WNL, Alert, Oriented, Pre-Existing Deficit (paraplegia)


Psychiatric: Yes: WNL, Alert, Oriented


Labs: 


 CBC, BMP





 08/27/18 13:50 





 08/27/18 13:50 





 INR, PTT











INR  0.92  (0.83-1.09)   08/23/18  14:30    














Problem List





- Problems


(1) Decubital ulcer


Code(s): L89.90 - PRESSURE ULCER OF UNSPECIFIED SITE, UNSPECIFIED STAGE   





(2) Lung nodule


Code(s): R91.1 - SOLITARY PULMONARY NODULE   





(3) Neurogenic bladder


Code(s): N31.9 - NEUROMUSCULAR DYSFUNCTION OF BLADDER, UNSPECIFIED   





(4) Stenosis of aorta


Code(s): Q25.3 - SUPRAVALVULAR AORTIC STENOSIS   





(5) Paraplegia


Code(s): G82.20 - PARAPLEGIA, UNSPECIFIED   





(6) Abdominal pain


Code(s): R10.9 - UNSPECIFIED ABDOMINAL PAIN   


Qualifiers: 


   Abdominal location: unspecified location   Qualified Code(s): R10.9 - 

Unspecified abdominal pain   





Assessment/Plan


Pt seen and evaluated. Pt in no acute distress, denies any pain, abdominal pain

, chest pain, sob, nb/v/d. He reports he is leaving today. Has been cleared for 

discharge. Appeal pending.

## 2018-09-20 ENCOUNTER — HOSPITAL ENCOUNTER (INPATIENT)
Dept: HOSPITAL 74 - JER | Age: 63
LOS: 7 days | Discharge: HOME HEALTH | DRG: 603 | End: 2018-09-27
Attending: INTERNAL MEDICINE | Admitting: INTERNAL MEDICINE
Payer: COMMERCIAL

## 2018-09-20 VITALS — BODY MASS INDEX: 20.2 KG/M2

## 2018-09-20 DIAGNOSIS — Z87.891: ICD-10-CM

## 2018-09-20 DIAGNOSIS — D18.00: ICD-10-CM

## 2018-09-20 DIAGNOSIS — K56.7: ICD-10-CM

## 2018-09-20 DIAGNOSIS — G89.4: ICD-10-CM

## 2018-09-20 DIAGNOSIS — F41.9: ICD-10-CM

## 2018-09-20 DIAGNOSIS — N39.0: ICD-10-CM

## 2018-09-20 DIAGNOSIS — R91.1: ICD-10-CM

## 2018-09-20 DIAGNOSIS — L89.892: ICD-10-CM

## 2018-09-20 DIAGNOSIS — G82.20: ICD-10-CM

## 2018-09-20 DIAGNOSIS — D72.829: ICD-10-CM

## 2018-09-20 DIAGNOSIS — I35.0: ICD-10-CM

## 2018-09-20 DIAGNOSIS — B96.5: ICD-10-CM

## 2018-09-20 DIAGNOSIS — I48.0: ICD-10-CM

## 2018-09-20 DIAGNOSIS — N31.9: ICD-10-CM

## 2018-09-20 DIAGNOSIS — R10.9: ICD-10-CM

## 2018-09-20 DIAGNOSIS — M80.062A: ICD-10-CM

## 2018-09-20 DIAGNOSIS — L03.116: Primary | ICD-10-CM

## 2018-09-20 LAB
ALBUMIN SERPL-MCNC: 3 G/DL (ref 3.4–5)
ALP SERPL-CCNC: 125 U/L (ref 45–117)
ALT SERPL-CCNC: 18 U/L (ref 13–61)
ANION GAP SERPL CALC-SCNC: 6 MMOL/L (ref 8–16)
AST SERPL-CCNC: 18 U/L (ref 15–37)
BASOPHILS # BLD: 0 % (ref 0–2)
BILIRUB SERPL-MCNC: 0.6 MG/DL (ref 0.2–1)
BUN SERPL-MCNC: 6 MG/DL (ref 7–18)
CALCIUM SERPL-MCNC: 8.8 MG/DL (ref 8.5–10.1)
CHLORIDE SERPL-SCNC: 101 MMOL/L (ref 98–107)
CO2 SERPL-SCNC: 27 MMOL/L (ref 21–32)
CREAT SERPL-MCNC: 0.6 MG/DL (ref 0.55–1.3)
DEPRECATED RDW RBC AUTO: 14.1 % (ref 11.9–15.9)
EOSINOPHIL # BLD: 0.5 % (ref 0–4.5)
GLUCOSE SERPL-MCNC: 101 MG/DL (ref 74–106)
HCT VFR BLD CALC: 43.3 % (ref 35.4–49)
HGB BLD-MCNC: 14.6 GM/DL (ref 11.7–16.9)
LYMPHOCYTES # BLD: 4.8 % (ref 8–40)
MCH RBC QN AUTO: 31 PG (ref 25.7–33.7)
MCHC RBC AUTO-ENTMCNC: 33.6 G/DL (ref 32–35.9)
MCV RBC: 92.2 FL (ref 80–96)
MONOCYTES # BLD AUTO: 7.2 % (ref 3.8–10.2)
NEUTROPHILS # BLD: 87.5 % (ref 42.8–82.8)
PLATELET # BLD AUTO: 432 K/MM3 (ref 134–434)
PLATELET BLD QL SMEAR: (no result)
PMV BLD: 9 FL (ref 7.5–11.1)
POTASSIUM SERPLBLD-SCNC: 4.1 MMOL/L (ref 3.5–5.1)
PROT SERPL-MCNC: 7.5 G/DL (ref 6.4–8.2)
RBC # BLD AUTO: 4.7 M/MM3 (ref 4–5.6)
SODIUM SERPL-SCNC: 134 MMOL/L (ref 136–145)
WBC # BLD AUTO: 15.6 K/MM3 (ref 4–10)

## 2018-09-20 PROCEDURE — A9503 TC99M MEDRONATE: HCPCS

## 2018-09-20 RX ADMIN — HEPARIN SODIUM SCH UNIT: 5000 INJECTION, SOLUTION INTRAVENOUS; SUBCUTANEOUS at 22:00

## 2018-09-20 RX ADMIN — VANCOMYCIN HYDROCHLORIDE SCH MLS/HR: 1 INJECTION, POWDER, LYOPHILIZED, FOR SOLUTION INTRAVENOUS at 20:30

## 2018-09-20 NOTE — PDOC
History of Present Illness





- General


Stated Complaint: INJURY


Time Seen by Provider: 09/20/18 15:52





- History of Present Illness


Initial Comments: 





09/20/18 15:57


Mr. 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

, neurogenic bladder (w/ recurrent UTI), paroxysmal afib (not on AC) who 

presents for evaluation of Left ankle deformity/discoloration. Patient reports 

that directly before presentation he looked at his leg and noted left ankle 

swelling and redness. Patient reports he typically does not have sensation to 

his legs post spinal transection however patient reports he is now having mild 

pain sensation to this area. Patient also reports headache and intermittent 

chest pain he attributes to his positioning in bed. Patient denies any other 

symptoms at this time.





The patient denies shortness of breath, and dizziness. Denies fever, chills, 

nausea, vomit, diarrhea and constipation. Denies dysuria, frequency, urgency 

and hematuria. 





Allergies: NKDA





Past History





- Past Medical History


Allergies/Adverse Reactions: 


 Allergies











Allergy/AdvReac Type Severity Reaction Status Date / Time


 


No Known Allergies Allergy   Verified 09/20/18 16:02











Home Medications: 


Ambulatory Orders





Diazepam [Valium] 10 mg PO TID PRN #30 tablet MDD 30mg 01/09/17 








Anemia: No


Asthma: No


Cancer: No


Cardiac Disorders: No


CVA: No


COPD: Yes


CHF: No


Dementia: No


Diabetes: No


GI Disorders: No


 Disorders: Yes (Recurrent UTI, CONDOM CATHETER)


HTN:  (Hypotension)


Hypercholesterolemia: No


Liver Disease: No


Seizures: No


Thyroid Disease: No





- Surgical History


Abdominal Surgery: No


Appendectomy: No


Cardiac Surgery: No


Cholecystectomy: No


Lung Surgery: No


Neurologic Surgery: No


Orthopedic Surgery: Yes (fracture right wrist s/p fusion)





- Immunization History


Td Vaccination: No


Immunization Up to Date: No





- Suicide/Smoking/Psychosocial Hx


Smoking Status: No


Smoking History: Never smoked


Years of Tobacco Use: 0


Have you smoked in the past 12 months: No


Number of Cigarettes Smoked Daily: 3


If you are a former smoker, when did you quit?: 1986


Cigars Per Day: 0


Hx Alcohol Use: Yes (social)


Drug/Substance Use Hx: No


Substance Use Type: None


Hx Substance Use Treatment: No





**Review of Systems





- Review of Systems


Comments:: 





09/20/18 17:05


GENERAL/CONSTITUTIONAL: No fever or chills. No weakness.


HEAD, EYES, EARS, NOSE AND THROAT: No change in vision. No ear pain or 

discharge. No sore throat.


CARDIOVASCULAR: No chest pain or shortness of breath


RESPIRATORY: No cough, wheezing, or hemoptysis.


GASTROINTESTINAL: No nausea, vomiting, diarrhea or constipation.


GENITOURINARY: No dysuria, frequency, or change in urination.


MUSCULOSKELETAL: +LLE redness and deformity as noted with non-specific pain to 

limb


SKIN: No rash


NEUROLOGIC: No headache, vertigo, loss of consciousness, or change in strength/

sensation.


ENDOCRINE: No increased thirst. No abnormal weight change


HEMATOLOGIC/LYMPHATIC: No anemia, easy bleeding, or history of blood clots.


ALLERGIC/IMMUNOLOGIC: No hives or skin allergy.





*Physical Exam





- Physical Exam


Comments: 





09/20/18 17:07


GENERAL: Awake, alert, and fully oriented, in no acute distress


HEAD: No signs of trauma, normocephalic, atraumatic 


EYES: PERRLA, EOMI, sclera anicteric, conjunctiva clear


ENT: Auricles normal inspection, hearing grossly normal, nares patent, 

oropharynx clear without


exudates. Moist mucosa


NECK: Normal ROM, supple, no lymphadenopathy, JVD, or masses


LUNGS: No distress, speaks full sentences, clear to auscultation bilaterally 


HEART: Regular rate and rhythm, normal S1 and S2, no murmurs, rubs or gallops, 

peripheral pulses normal and equal bilaterally. 


ABDOMEN: Soft, nontender, normoactive bowel sounds. No guarding, no rebound. No 

masses


EXTREMITIES: +Lower extremities atrophied su c/w spinal transection; dry 

ezcematous skin noted to feet su. Left ankle exam significant for swelling 

with small ecchymosis and warmth noted to mid calf. 


NEUROLOGICAL: Cranial nerves II through XII grossly intact. Normal speech, 

normal gait, no focal sensorimotor deficits 


SKIN: Warm, Dry, normal turgor, no rashes or lesions noted.








ED Treatment Course





- LABORATORY


CBC & Chemistry Diagram: 


 09/20/18 16:25





 09/20/18 16:25





Medical Decision Making





- Medical Decision Making





09/20/18 19:54


Mr. Sumner is a 64 yo male w/ pmh as described who presents for evaluation of 

left lower extremity changes as described. Patient evaluation concerning for 

DVT vs. fracture vs. cellulitic process. 





09/20/18 20:16


Patient noted to have a elevated white count as below. US negative for DVT. XR 

concerning for acute vs. chronic changes. Will admit patient for treatment of 

cellulitis and consult ortho for evaluation. Patient placed in posterior splint 

for temporary protection.





 Laboratory Results - last 24 hr











  09/20/18 09/20/18





  16:25 16:25


 


WBC  15.6 H 


 


RBC  4.70 


 


Hgb  14.6 


 


Hct  43.3 


 


MCV  92.2 


 


MCH  31.0 


 


MCHC  33.6 


 


RDW  14.1 


 


Plt Count  432  D 


 


MPV  9.0 


 


Absolute Neuts (auto)  13.7 H 


 


Total Counted  100 


 


Neutrophils %  87.5 H 


 


Neutrophils % (Manual)  86.0 H 


 


Band Neutrophils %  1.0 


 


Lymphocytes %  4.8 L D 


 


Lymphocytes % (Manual)  3.0 L D 


 


Monocytes %  7.2 


 


Monocytes % (Manual)  9 


 


Eosinophils %  0.5 


 


Eosinophils % (Manual)  1.0 


 


Basophils %  0.0 


 


Nucleated RBC %  0 


 


Platelet Estimate  Slt increase 


 


Platelet Comment  Giant platelets 


 


Sodium   134 L


 


Potassium   4.1


 


Chloride   101


 


Carbon Dioxide   27


 


Anion Gap   6 L


 


BUN   6 L


 


Creatinine   0.6


 


Creat Clearance w eGFR   > 60


 


Random Glucose   101


 


Calcium   8.8


 


Total Bilirubin   0.6


 


AST   18


 


ALT   18


 


Alkaline Phosphatase   125 H


 


Total Protein   7.5


 


Albumin   3.0 L














*DC/Admit/Observation/Transfer


Diagnosis at time of Disposition: 


Cellulitis


Qualifiers:


 Site of cellulitis: extremity Site of cellulitis of extremity: lower extremity 

Laterality: left Qualified Code(s): L03.116 - Cellulitis of left lower limb








- Discharge Dispostion


Decision to Admit order: Yes





- Referrals


Referrals: 


Sony Poon MD [Primary Care Provider] - 





- Patient Instructions





- Post Discharge Activity

## 2018-09-20 NOTE — PDOC
Attending Attestation





- HPI


HPI: 





09/20/18 17:45





The patient is a 63 year old male with a past medical history of spinal 

transection T4, paroxysmal afib, neurogenic bladder with recurrent UTIs who 

presents to the the emergency department for evaluation of left ankle swelling 

and redness with bilateral leg pain. Patient endorses associated symptoms of 

chest pain and headache. 





The patient denies shortness of breath, dizziness, fever, chills, nausea, 

vomiting, diarrhea and constipation. Denies dysuria, frequency, urgency and 

hematuria. 








<Chico Magana - Last Filed: 09/20/18 17:45>





- Resident


Resident Name: Kalyan Bull





- ED Attending Attestation


I have performed the following: I have examined & evaluated the patient, The 

case was reviewed & discussed with the resident, I agree w/resident's findings 

& plan, Exceptions are as noted





- Physicial Exam


PE: 





09/20/18 21:40





Agree with resident's physical exam.








- Medical Decision Making





09/20/18 21:40





63 years old paraplegic with fractured left ankle and superficial cellulitis. 

Likely nonoperative injury given patient's immobility posterior splint placed





Patient covered with antibiotics we'll admit to medicine for further management.








<Carloz Davis - Last Filed: 09/20/18 21:40>





Attestations





- Attestations


Documentation prepared by Chico Magana, acting as medical scribe for Carloz Davis MD.





<Chico Magana - Last Filed: 09/20/18 17:45>

## 2018-09-20 NOTE — HP
Admitting History and Physical





- Primary Care Physician


PCP: Sony Poon





- Admission


Chief Complaint: Left Ankle Redness and Swelling


History of Present Illness: 





This is a 64 y/o man with a past medical history of Spinal Fracture (w/ T2-T4 

cord transection), Neurogenic Bladder (w/ recurrent UTI), Paroxysmal Afib (not 

on AC). Who presents to the ED from home with left ankle deformity/

discoloration. Patient reports that before coming to the ED, he looked at his 

leg and noticed left ankle swelling and redness. Patient reports he typically 

does not have sensation to his legs post spinal transection, however patient 

reports he is now having mild pain sensation to this area. Patient had reported 

to the ED resident having a headache and intermittent chest pain he attributes 

to his positioning in bed now resolved. Patient denies fever, chills, cough, N/

V.


History Source: Patient


Limitations to Obtaining History: Physical Impairment





- Past Medical History


CNS: Yes: Other (paraplegia due to accidental fall (T2 spinal fracture))


Gastrointestinal: Yes: Other (Hepatic hemangiomas)


Renal/: Yes: Other (Bladder dysfunction)


Heme/Onc: Yes: Other (Neurogenic bladder w/ h/o UTI)


Infectious Disease: Yes: Other (multiple UTIs  Pseudomonas)


Musculoskeletal: Yes: Paraplegia, Other (Chronic pain)





- Past Surgical History


Past Surgical History: Yes: Splenectomy


Additional Past Surgical History: 





Right Wrist s/p Fusion





- Smoking History


Smoking history: Former smoker


Have you smoked in the past 12 months: No


Aproximately how many cigarettes per day: 3


If you are a former smoker, when did you quit?: 1986





- Alcohol/Substance Use


Hx Alcohol Use: Yes (social)


History of Substance Use: reports: Marijuana





- Social History


Usual Living Arrangement: Yes: Alone


ADL: Support Services (A (24hr))


History of Recent Travel: No





Home Medications





- Allergies


Allergies/Adverse Reactions: 


 Allergies











Allergy/AdvReac Type Severity Reaction Status Date / Time


 


No Known Allergies Allergy   Verified 09/20/18 16:02














- Home Medications


Home Medications: 


Ambulatory Orders





Diazepam [Valium] 10 mg PO TID PRN #30 tablet MDD 30mg 01/09/17 











Family Disease History





- Family Disease History


Family Disease History: Heart Disease: Father





Review of Systems





- Review of Systems


Constitutional: reports: No Symptoms


Eyes: reports: No Symptoms


HENT: reports: No Symptoms


Neck: reports: No Symptoms


Cardiovascular: reports: Chest Pain


Respiratory: reports: No Symptoms


Gastrointestinal: reports: No Symptoms


Breasts: reports: No Symptoms Reported


Musculoskeletal: reports: Extremity Pain (left ankle)


Integumentary: reports: Erythema


Neurological: reports: Other (paraplegia)


Endocrine: reports: No Symptoms


Hematology/Lymphatic: reports: No Symptoms


Psychiatric: reports: No Symptoms





Physical Examination


Vital Signs: 


 Vital Signs











Temperature  97.7 F   09/20/18 16:03


 


Pulse Rate  85   09/20/18 16:03


 


Respiratory Rate  16   09/20/18 16:03


 


Blood Pressure  118/67   09/20/18 16:03


 


O2 Sat by Pulse Oximetry (%)  96   09/20/18 16:03











Constitutional: Yes: Anxious, Mild Distress


Eyes: Yes: Conjunctiva Clear, EOM Intact, PERRL


HENT: Yes: WNL, Atraumatic, Normocephalic


Neck: Yes: WNL, Supple, Trachea Midline


Cardiovascular: Yes: WNL, Regular Rate and Rhythm, S1, S2


Respiratory: Yes: WNL, Regular, CTA Bilaterally


Gastrointestinal: Yes: WNL, Normal Bowel Sounds, Soft


Breast(s): Yes: WNL


Musculoskeletal: Yes: Joint Swelling (left ankle), Muscle Weakness, Other (

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


Extremities: Yes: Deformity (left ankle), Erythema (left ankle)


Edema: Yes


Edema: LLE: 1+


Peripheral Pulses WNL: Yes


Neurological: Yes: Alert


Psychiatric: Yes: WNL, Alert, Oriented


Labs: 


 CBC, BMP





 09/20/18 16:25 





 09/20/18 16:25 





 Current Medications











Generic Name Dose Route Start Last Admin





  Trade Name Chintanq  PRN Reason Stop Dose Admin


 


Acetaminophen  650 mg  09/21/18 01:35  





  Tylenol -  PO   





  Q6H PRN   





  PAIN OR FEVER   





     





     





     


 


Diazepam  5 mg  09/21/18 01:33  09/21/18 01:40





  Valium -  PO   5 mg





  Q8H PRN   Administration





  MUSCLE SPASMS   





     





     





     


 


Heparin Sodium (Porcine)  5,000 unit  09/20/18 22:00  09/21/18 10:09





  Heparin -  SQ   5,000 unit





  BID ALE   Administration





     





     





     





     


 


Vancomycin HCl 1,000 mg/  250 mls @ 166.667 mls/hr  09/20/18 20:00  09/21/18 08:

10





  Dextrose  IVPB   166.667 mls/hr





  Q12H ALE   Administration





     





     





  Protocol   





     


 


Vancomycin HCl 1,000 mg/  250 mls @ 200 mls/hr  09/21/18 10:00  





  Dextrose  IVPB   





  Q24H ALE   





     





     





  Protocol   





     


 


Vancomycin HCl 1,000 mg/  250 mls @ 166.667 mls/hr  09/21/18 20:30  





  Dextrose  IVPB  09/21/18 21:59  





  ONCE ONE   





     





     





     





     


 


Morphine Sulfate  4 mg  09/21/18 09:54  09/21/18 10:09





  Morphine 10 Mg/5 Ml Liquid  PO   4 mg





  Q4H PRN   Administration





  PAIN LEVEL 6-10   





     





     





     














Imaging





- Results


Chest X-ray: Report Reviewed, Image Reviewed


X-ray: Report Reviewed, Image Reviewed


Ultrasound: Report Reviewed, Image Reviewed


EKG: Image Reviewed





Problem List





- Problems


(1) Cellulitis


Code(s): L03.90 - CELLULITIS, UNSPECIFIED   


Qualifiers: 


   Site of cellulitis: extremity   Site of cellulitis of extremity: lower 

extremity   Laterality: left   Qualified Code(s): L03.116 - Cellulitis of left 

lower limb   





(2) Fracture of tibia, distal, left, closed


Assessment/Plan: 


- Foot/ankle left- showed severe osteoporosis with fracture deformity of the 

distal tibia that most likely is chronic in nature


- Splint placed in ED by resident for stability


- Appreciate Ortho Consult


- Tylenol prn





Code(s): S82.302A - UNSP FRACTURE OF LOWER END OF LEFT TIBIA, INIT FOR CLOS FX 

  





(3) Chronic pain


Assessment/Plan: 


- Will continue to monitor and treat with interventions accordingly





Code(s): G89.29 - OTHER CHRONIC PAIN   


Qualifiers: 


   Chronic pain type: chronic pain syndrome   Qualified Code(s): G89.4 - 

Chronic pain syndrome   





(4) Neurogenic bladder


Assessment/Plan: 


- secondary to spinal cord T4 transection


- Condom Catheter


- UA-pending


- Monitor urine output





Code(s): N31.9 - NEUROMUSCULAR DYSFUNCTION OF BLADDER, UNSPECIFIED   





(5) Paraplegia


Assessment/Plan: 


- s/p Back Injury 


- Turn & Position Q2H


- Tejinder lift


- Fall precautions





Code(s): G82.20 - PARAPLEGIA, UNSPECIFIED   





Assessment/Plan





This is a 64 y/o man with a PMHx of Spinal Fx T2-T4 cord transection (Lumbar Fx

- fall, 2/28/82), Neurogenic bladder, recurrent UTIs, Paroxysmal Afib (no AC). 

Admitted for Cellulitis of LLE, L- Distal Tibia Fx for further evaluation of 

their emergent condition.





Plan:





FEN


PO fluids as tolerated


Replete lytes as indicated


Regular Diet





DVT ppx


SCD to Right leg only


Heparin SQ





Code Status: Full Code








Dispo: Requires Inpatient Care




















Visit type





- Emergency Visit


Emergency Visit: Yes


ED Registration Date: 09/20/18


Care time: The patient presented to the Emergency Department on the above date 

and was hospitalized for further evaluation of their emergent condition.





- New Patient


This patient is new to me today: Yes


Date on this admission: 09/20/18





- Critical Care


Critical Care patient: No





Hospitalist Screening





- Colonoscopy Questionnaire


Colonoscopy Questionnaire: 





Colonoscopy Questionnaire








-   Patient:


50 - 75 years old and never had a screening colonoscopy: Unknown


History of colon or rectal polyps, or CA: Unknown


History of IBD, Crohn's disease or UC: Unknown


History of abdominal radiation therapy as a child: Unknown





-   Relative:


1 with colon or rectal CA, or polyps at age 60 or younger: Unknown


Colon or rectal CA diagnosed at age 45 or younger: Unknown


Multiple relatives with colon or rectal CA: Unknown





-   Outcome:


Screening Result: Negative Screen

## 2018-09-21 LAB
ANION GAP SERPL CALC-SCNC: 7 MMOL/L (ref 8–16)
APPEARANCE UR: (no result)
BACTERIA #/AREA URNS HPF: (no result) /HPF
BASOPHILS # BLD: 0.2 % (ref 0–2)
BILIRUB UR STRIP.AUTO-MCNC: NEGATIVE MG/DL
BUN SERPL-MCNC: 8 MG/DL (ref 7–18)
CALCIUM SERPL-MCNC: 8.7 MG/DL (ref 8.5–10.1)
CHLORIDE SERPL-SCNC: 101 MMOL/L (ref 98–107)
CO2 SERPL-SCNC: 27 MMOL/L (ref 21–32)
COLOR UR: (no result)
CREAT SERPL-MCNC: 0.5 MG/DL (ref 0.55–1.3)
DEPRECATED RDW RBC AUTO: 14.4 % (ref 11.9–15.9)
EOSINOPHIL # BLD: 0.5 % (ref 0–4.5)
EPITH CASTS URNS QL MICRO: (no result) /HPF
GLUCOSE SERPL-MCNC: 94 MG/DL (ref 74–106)
HCT VFR BLD CALC: 42.3 % (ref 35.4–49)
HGB BLD-MCNC: 14 GM/DL (ref 11.7–16.9)
HYALINE CASTS URNS QL MICRO: 2 /LPF
KETONES UR QL STRIP: NEGATIVE
LEUKOCYTE ESTERASE UR QL STRIP.AUTO: NEGATIVE
LYMPHOCYTES # BLD: 6.4 % (ref 8–40)
MCH RBC QN AUTO: 30.7 PG (ref 25.7–33.7)
MCHC RBC AUTO-ENTMCNC: 33.1 G/DL (ref 32–35.9)
MCV RBC: 92.7 FL (ref 80–96)
MONOCYTES # BLD AUTO: 8.1 % (ref 3.8–10.2)
NEUTROPHILS # BLD: 84.8 % (ref 42.8–82.8)
NITRITE UR QL STRIP: POSITIVE
PH UR: 6 [PH] (ref 5–8)
PLATELET # BLD AUTO: 397 K/MM3 (ref 134–434)
PMV BLD: 8.8 FL (ref 7.5–11.1)
POTASSIUM SERPLBLD-SCNC: 4.3 MMOL/L (ref 3.5–5.1)
PROT UR QL STRIP: NEGATIVE
PROT UR QL STRIP: NEGATIVE
RBC # BLD AUTO: 4.57 M/MM3 (ref 4–5.6)
SODIUM SERPL-SCNC: 136 MMOL/L (ref 136–145)
SP GR UR: 1.01 (ref 1–1.03)
UROBILINOGEN UR STRIP-MCNC: 2 MG/DL (ref 0.2–1)
WBC # BLD AUTO: 13.4 K/MM3 (ref 4–10)

## 2018-09-21 RX ADMIN — HEPARIN SODIUM SCH UNIT: 5000 INJECTION, SOLUTION INTRAVENOUS; SUBCUTANEOUS at 10:09

## 2018-09-21 RX ADMIN — PIPERACILLIN SODIUM,TAZOBACTAM SODIUM SCH MLS/HR: 3; .375 INJECTION, POWDER, FOR SOLUTION INTRAVENOUS at 13:03

## 2018-09-21 RX ADMIN — MORPHINE SULFATE PRN MG: 10 SOLUTION ORAL at 22:53

## 2018-09-21 RX ADMIN — PIPERACILLIN SODIUM,TAZOBACTAM SODIUM SCH MLS/HR: 3; .375 INJECTION, POWDER, FOR SOLUTION INTRAVENOUS at 18:34

## 2018-09-21 RX ADMIN — VANCOMYCIN HYDROCHLORIDE SCH MLS/HR: 1 INJECTION, POWDER, LYOPHILIZED, FOR SOLUTION INTRAVENOUS at 08:10

## 2018-09-21 RX ADMIN — HEPARIN SODIUM SCH UNIT: 5000 INJECTION, SOLUTION INTRAVENOUS; SUBCUTANEOUS at 21:34

## 2018-09-21 RX ADMIN — MORPHINE SULFATE PRN MG: 10 SOLUTION ORAL at 10:09

## 2018-09-21 NOTE — CON.ORTH
Consult


Reason for Consultation:: left ankle fx





- Past Medical History


CNS: Yes: Other (paraplegia due to accidental fall (T2 spinal fracture))


Gastrointestinal: Yes: Other (Hepatic hemangiomas)


Renal/: Yes: Other (Bladder dysfunction)


Infectious Disease: Yes: Other (multiple UTIs  Pseudomonas)


Musculoskeletal: Yes: Other (Chronic pain)





- Past Surgical History


Past Surgical History: Yes: Splenectomy





- Alcohol/Substance Use


Hx Alcohol Use: Yes (social)


History of Substance Use: reports: Marijuana





- Smoking History


Smoking history: Never smoked


Have you smoked in the past 12 months: No


Aproximately how many cigarettes per day: 3


If you are a former smoker, when did you quit?: 1986





- Social History


Usual Living Arrangement: With Significant Other


ADL: Support Services


History of Recent Travel: No





Home Medications





- Allergies


Allergies/Adverse Reactions: 


 Allergies











Allergy/AdvReac Type Severity Reaction Status Date / Time


 


No Known Allergies Allergy   Verified 09/20/18 16:02














- Home Medications


Home Medications: 


Ambulatory Orders





Diazepam [Valium] 10 mg PO TID PRN #30 tablet MDD 30mg 01/09/17 











Family Disease History





- Family Disease History


Family Disease History: Heart Disease: Father





Physical Exam for Ortho


Vital Signs: 


 Vital Signs











Temperature  98.2 F   09/21/18 07:00


 


Pulse Rate  95 H  09/21/18 07:00


 


Respiratory Rate  16   09/21/18 07:00


 


Blood Pressure  109/63   09/21/18 07:00


 


O2 Sat by Pulse Oximetry (%)  100   09/21/18 07:00











Labs: 


 CBC, BMP





 09/21/18 06:30 





 09/21/18 06:30 











- Lower Extremity


Ankle: Yes: Left, Erythema, Swelling





Imaging





- Results


X-ray: Report Reviewed, Image Reviewed





Assessment/Plan





 62 yo male w/ pmh of spinal fracture (w/ T2-T4 cord transection), neurogenic 

bladder (w/ recurrent UTI), paroxysmal afib (not on AC) who presents for 

evaluation of Left ankle deformity/discoloration. Patient reports that directly 

before presentation he looked at his leg and noted left ankle swelling and 

redness. He states that 2 days ago his aid got his foot stuck while 

transferring pt.





a/p- left distal tibia fx- cellulitis


No surgical intervention


splint for immobilization- ok to remove to eval for pressure ulcers


Abx as per medicine/ID


elevation


d/w Dr. Man

## 2018-09-21 NOTE — PN
Progress Note (short form)





- Note


Progress Note: 





PULMONARY





CONSULTATION DICTATED 9/21/18





IMP BILATERAL PULMONARY NODULES


      CELLULITIS


      PARAPLEGIA S/P SPINAL INJURY WITH L2-L4 CORD TRANSECTION


      PAF


      RECURRENT UTIS





PLAN ABX AS PER ID


       CULTURES


       IR CONSULT FOR CT GUIDED BX LUNG NODULE








DR ARANA





Problem List





- Problems


(1) Cellulitis


Code(s): L03.90 - CELLULITIS, UNSPECIFIED   


Qualifiers: 


   Site of cellulitis: extremity   Site of cellulitis of extremity: lower 

extremity   Laterality: left   Qualified Code(s): L03.116 - Cellulitis of left 

lower limb   





(2) Lung nodule


Code(s): R91.1 - SOLITARY PULMONARY NODULE   





(3) Pulmonary nodule


Code(s): R91.1 - SOLITARY PULMONARY NODULE   





(4) Neurogenic bladder


Code(s): N31.9 - NEUROMUSCULAR DYSFUNCTION OF BLADDER, UNSPECIFIED   





(5) Paraplegia


Code(s): G82.20 - PARAPLEGIA, UNSPECIFIED

## 2018-09-21 NOTE — CON.ID
Consult


Consult Specialty:: infectious diseases


Reason for Consultation:: cellulittis of the leg





- History of Present Illness


History of Present Illness: 





64 y/o man with a past medical history of Spinal Fracture (w/ T2-T4 cord 

transection), Neurogenic Bladder (w/ recurrent UTI), Paroxysmal Afib (not on AC)

. Who presents to the ED from home with left ankle deformity/discoloration. 

Patient reports that before coming to the ED, he looked at his leg and noticed 

left ankle swelling and redness. Patient reports he typically does not have 

sensation to his legs post spinal transection, however patient reports he is 

now having mild pain sensation to this area. Patient had reported to the ED 

resident having a headache and intermittent chest pain he attributes to his 

positioning in bed now 


he mentions that probably the aid might have sat on is leg


also patient mentions that he has been having some burning of his urine





- History Source


History Provided By: Patient, Medical Record


Limitations to Obtaining History: Poor Historian





- Past Medical History


CNS: Yes: Other (paraplegia due to accidental fall (T2 spinal fracture))


Gastrointestinal: Yes: Other (Hepatic hemangiomas)


Renal/: Yes: Other (Bladder dysfunction)


Infectious Disease: Yes: Other (multiple UTIs  Pseudomonas)


Musculoskeletal: Yes: Paraplegia, Other (Chronic pain)





- Past Surgical History


Past Surgical History: Yes: Splenectomy





- Alcohol/Substance Use


Hx Alcohol Use: Yes (social)


History of Substance Use: reports: Marijuana





- Smoking History


Smoking history: Former smoker


Have you smoked in the past 12 months: No


Aproximately how many cigarettes per day: 3


If you are a former smoker, when did you quit?: 1986





- Social History


Usual Living Arrangement: With Significant Other


ADL: Support Services (A (24hr))


History of Recent Travel: No





Home Medications





- Allergies


Allergies/Adverse Reactions: 


 Allergies











Allergy/AdvReac Type Severity Reaction Status Date / Time


 


No Known Allergies Allergy   Verified 09/20/18 16:02














- Home Medications


Home Medications: 


Ambulatory Orders





Diazepam [Valium] 10 mg PO TID PRN #30 tablet MDD 30mg 01/09/17 











Family Disease History





- Family Disease History


Family Disease History: Heart Disease: Father





Review of Systems





- Review of Systems


Constitutional: reports: No Symptoms


Eyes: reports: No Symptoms


HENT: reports: No Symptoms


Neck: reports: No Symptoms


Cardiovascular: reports: No Symptoms


Respiratory: reports: No Symptoms


Gastrointestinal: reports: No Symptoms


Genitourinary: reports: No Symptoms


Musculoskeletal: reports: Other


Integumentary: reports: No Symptoms


Neurological: reports: No Symptoms


Endocrine: reports: No Symptoms


Hematology/Lymphatic: reports: No Symptoms


Psychiatric: reports: No Symptoms





Physical Exam


Vital Signs: 


 Vital Signs











Temperature  98.2 F   09/21/18 07:00


 


Pulse Rate  95 H  09/21/18 07:00


 


Respiratory Rate  16   09/21/18 07:00


 


Blood Pressure  109/63   09/21/18 07:00


 


O2 Sat by Pulse Oximetry (%)  100   09/21/18 07:00











Constitutional: Yes: Calm, Mild Distress


HENT: Yes: Atraumatic


Neck: Yes: Supple, Trachea Midline


Cardiovascular: Yes: Regular Rate and Rhythm


Respiratory: Yes: Regular


Gastrointestinal: Yes: Normal Bowel Sounds, Soft


Musculoskeletal: Yes: WNL


Extremities: Yes: Other


Integumentary: Yes: Erythema, Other (of left leg)


Neurological: Yes: Alert, Oriented


Psychiatric: Yes: Alert, Oriented


Labs: 


 CBC, BMP





 09/21/18 06:30 





 09/21/18 06:30 











Imaging





- Results


Chest X-ray: Report Reviewed, Image Reviewed


X-ray: Report Reviewed, Image Reviewed





Assessment/Plan





Problem List





- Problems


(1) Acute UTI


Code(s): N39.0 - URINARY TRACT INFECTION, SITE NOT SPECIFIED   





(2) Pulmonary nodule


Code(s): R91.1 - SOLITARY PULMONARY NODULE   





(3) Chronic pain


Code(s): G89.29 - OTHER CHRONIC PAIN   


Qualifiers: 


   Chronic pain type: chronic pain syndrome   Qualified Code(s): G89.4 - 

Chronic pain syndrome   





(4) Abdominal pain


Code(s): R10.9 - UNSPECIFIED ABDOMINAL PAIN   


Qualifiers: 


   Abdominal location: unspecified location   Qualified Code(s): R10.9 - 

Unspecified abdominal pain   





plan is also for lung biopsy





plan


will start patient on zosyn


await for all cx report


monitor swelling of the leg


rest as per the team

## 2018-09-21 NOTE — EKG
Test Reason : 

Blood Pressure : ***/*** mmHG

Vent. Rate : 096 BPM     Atrial Rate : 096 BPM

   P-R Int : 118 ms          QRS Dur : 072 ms

    QT Int : 348 ms       P-R-T Axes : 063 074 090 degrees

   QTc Int : 439 ms

 

*** POOR DATA QUALITY, INTERPRETATION MAY BE ADVERSELY AFFECTED

NORMAL SINUS RHYTHM

NORMAL ECG

WHEN COMPARED WITH ECG OF 23-AUG-2018 13:53,

NO SIGNIFICANT CHANGE WAS FOUND

Confirmed by LATANYA GONZALEZ MD ([id number]) on 9/21/2018 12:10:38 PM

 

Referred By:             Confirmed By:LATANYA GONZALEZ MD

## 2018-09-21 NOTE — CONSULT
Consult - text type





- Consultation


Consultation Note: 





Mr. Sumner is a 62 yo male w/ pmh of spinal fracture (w/ T2-T4 cord transection)

, neurogenic bladder (w/ recurrent UTI), paroxysmal afib (not on AC) who 

presents for evaluation of Left ankle deformity/discoloration. 


 Patient reports he typically does not have sensation to his legs post spinal 

transection however patient reports he is now having mild pain sensation to 

this area. Patient also reports headache and intermittent chest pain. 


The patient denies shortness of breath, and dizziness. Denies fever, chills, 

nausea, vomit, diarrhea and constipation. Denies dysuria, frequency, urgency 

and hematuria. 





Allergies: NKDA








Allergies/Adverse Reactions: 


 Allergies











Allergy/AdvReac Type Severity Reaction Status Date / Time


 


No Known Allergies Allergy   Verified 09/20/18 16:02











Home Medications: 


Ambulatory Orders





Diazepam [Valium] 10 mg PO TID PRN 








Past Medical History


Paraplegic


COPD: Yes


 Disorders: Yes (Recurrent UTI, CONDOM CATHETER)


(Hypotension)








- Surgical History


Orthopedic Surgery: Yes (fracture right wrist s/p fusion)








- Suicide/Smoking/Psychosocial Hx


Smoking History: Never smoked

















 Laboratory Results - last 24 hr











  09/20/18 09/20/18





  16:25 16:25


 


WBC  15.6 H 


 


RBC  4.70 


 


Hgb  14.6 


 


Hct  43.3 


 


MCV  92.2 


 


MCH  31.0 


 


MCHC  33.6 


 


RDW  14.1 


 


Plt Count  432  D 


 


MPV  9.0 


 


Absolute Neuts (auto)  13.7 H 


 


Total Counted  100 


 


Neutrophils %  87.5 H 


 


Neutrophils % (Manual)  86.0 H 


 


Band Neutrophils %  1.0 


 


Lymphocytes %  4.8 L D 


 


Lymphocytes % (Manual)  3.0 L D 


 


Monocytes %  7.2 


 


Monocytes % (Manual)  9 


 


Eosinophils %  0.5 


 


Eosinophils % (Manual)  1.0 


 


Basophils %  0.0 


 


Nucleated RBC %  0 


 


Platelet Estimate  Slt increase 


 


Platelet Comment  Giant platelets 


 


Sodium   134 L


 


Potassium   4.1


 


Chloride   101


 


Carbon Dioxide   27


 


Anion Gap   6 L


 


BUN   6 L


 


Creatinine   0.6


 


Creat Clearance w eGFR   > 60


 


Random Glucose   101


 


Calcium   8.8


 


Total Bilirubin   0.6


 


AST   18


 


ALT   18


 


Alkaline Phosphatase   125 H


 


Total Protein   7.5


 


Albumin   3.0 L

















- Past Medical History


CNS: Yes: Other (paraplegia due to accidental fall (T2 spinal fracture))


Gastrointestinal: Yes: Other (Hepatic hemangiomas)


Renal/: Yes: Other (Bladder dysfunction)


Heme/Onc: Yes: Other (Neurogenic bladder w/ h/o UTI)


Infectious Disease: Yes: Other (multiple UTIs  Pseudomonas)


Musculoskeletal: Yes: Paraplegia, Other (Chronic pain)





- Past Surgical History


Past Surgical History: Yes: Splenectomy


Right Wrist s/p Fusion





- Smoking History


Smoking history: Former smoker





- Alcohol/Substance Use


Hx Alcohol Use: Yes (social)


History of Substance Use: reports: Marijuana





- Social History


Usual Living Arrangement: Yes: Alone


ADL: Support Services ([facility] (24hr))








- Allergies


Allergies/Adverse Reactions: 


 Allergies











Allergy/AdvReac Type Severity Reaction Status Date / Time


 


No Known Allergies Allergy   Verified 09/20/18 16:02














- Home Medications


Home Medications: 


Ambulatory Orders





Diazepam [Valium] 10 mg PO TID PRN #30 tablet MDD 30mg 01/09/17 











Family Disease History





- Family Disease History


Family Disease History: Heart Disease: Father








Physical Examination


Vital Signs: 





AFVSS


Cor: RSR, No murmurs, No gallops


Lungs: Clear to P&A


Abd: Soft, Normal bowel sounds, No organomegaly


Ext:paraplegic/wasting








CBC, BMP





 09/20/18 16:25 





 09/20/18 16:25 





 Current Medications











Generic Name Dose Route Start Last Admin





  Trade Name Freq  PRN Reason Stop Dose Admin


 


Acetaminophen  650 mg  09/21/18 01:35  





  Tylenol -  PO   





  Q6H PRN   





  PAIN OR FEVER   





     





     





     


 


Diazepam  5 mg  09/21/18 01:33  09/21/18 01:40





  Valium -  PO   5 mg





  Q8H PRN   Administration





  MUSCLE SPASMS   





     





     





     


 


Heparin Sodium (Porcine)  5,000 unit  09/20/18 22:00  09/21/18 10:09





  Heparin -  SQ   5,000 unit





  BID ALE   Administration





     





     





     





     


 


Vancomycin HCl 1,000 mg/  250 mls @ 166.667 mls/hr  09/20/18 20:00  09/21/18 08:

10





  Dextrose  IVPB   166.667 mls/hr





  Q12H ALE   Administration





     





     





  Protocol   





     


 


Vancomycin HCl 1,000 mg/  250 mls @ 200 mls/hr  09/21/18 10:00  





  Dextrose  IVPB   





  Q24H ALE   





     





     





  Protocol   





     


 


Vancomycin HCl 1,000 mg/  250 mls @ 166.667 mls/hr  09/21/18 20:30  





  Dextrose  IVPB  09/21/18 21:59  





  ONCE ONE   





     





     





     





     


 


Morphine Sulfate  4 mg  09/21/18 09:54  09/21/18 10:09





  Morphine 10 Mg/5 Ml Liquid  PO   4 mg





  Q4H PRN   Administration





  PAIN LEVEL 6-10   





     





     





     














Imaging





- Results


Chest X-ray: Report Reviewed, Image Reviewed


X-ray: Report Reviewed, Image Reviewed


Ultrasound: Report Reviewed, Image Reviewed


EKG: Image Reviewed











Assessment/Plan





This is a 64 y/o man with a PMHx of Spinal Fx T2-T4 cord transection (Lumbar Fx

- fall, 2/28/82), Neurogenic bladder, recurrent UTIs, Paroxysmal Afib (no AC). 





We have been consulted about bilateral pulmonary masses/sclerotic T4 focus


check coags


will discuss with pulmonary/IR about biopsy of pulmonary nodules


will check bone scan

## 2018-09-21 NOTE — PN
Progress Note, Physician


Chief Complaint: 


pt in no acute distress. denies any chest pain, sob, n/v/d. comfortable 





- Current Medication List


Current Medications: 


Active Medications





Acetaminophen (Tylenol -)  650 mg PO Q6H PRN


   PRN Reason: PAIN OR FEVER


Diazepam (Valium -)  5 mg PO Q8H PRN


   PRN Reason: MUSCLE SPASMS


   Last Admin: 09/21/18 01:40 Dose:  5 mg


Heparin Sodium (Porcine) (Heparin -)  5,000 unit SQ BID ALE


   Last Admin: 09/21/18 10:09 Dose:  5,000 unit


Vancomycin HCl 1,000 mg/ (Dextrose)  250 mls @ 166.667 mls/hr IVPB Q12H ALE; 

Protocol


   Last Admin: 09/21/18 08:10 Dose:  166.667 mls/hr


Vancomycin HCl 1,000 mg/ (Dextrose)  250 mls @ 200 mls/hr IVPB Q24H ALE; 

Protocol


Vancomycin HCl 1,000 mg/ (Dextrose)  250 mls @ 166.667 mls/hr IVPB ONCE ONE


   Stop: 09/21/18 21:59


Morphine Sulfate (Morphine 10 Mg/5 Ml Liquid)  4 mg PO Q4H PRN


   PRN Reason: PAIN LEVEL 6-10


   Last Admin: 09/21/18 10:09 Dose:  4 mg











- Objective


Vital Signs: 


 Vital Signs











Temperature  98.2 F   09/21/18 07:00


 


Pulse Rate  95 H  09/21/18 07:00


 


Respiratory Rate  16   09/21/18 07:00


 


Blood Pressure  109/63   09/21/18 07:00


 


O2 Sat by Pulse Oximetry (%)  100   09/21/18 07:00











Constitutional: Yes: Well Nourished, No Distress


Cardiovascular: Yes: WNL, Regular Rate and Rhythm.  No: Murmur, Rub


Respiratory: Yes: WNL, Regular, CTA Bilaterally.  No: Rhonchi, SOB, Tachypnea, 

Wheezes


Gastrointestinal: Yes: WNL, Normal Bowel Sounds, Soft.  No: Distention, 

Tenderness


Genitourinary: Yes: WNL


Musculoskeletal: Yes: Joint Swelling (left ankle, splint in place)


Integumentary: Yes: Erythema (left ankle)


Neurological: Yes: Alert, Oriented, Pre-Existing Deficit (paraplegia)


Psychiatric: Yes: WNL, Alert, Oriented


Labs: 


 CBC, BMP





 09/21/18 06:30 





 09/21/18 06:30 











Problem List





- Problems


(1) Cellulitis


Assessment/Plan: 


+edema/moderate erythema/tenderness to LLE ankle 


antibx per ID


vanco/zosyn day 1 


monitor 


Code(s): L03.90 - CELLULITIS, UNSPECIFIED   


Qualifiers: 


   Site of cellulitis: extremity   Site of cellulitis of extremity: lower 

extremity   Laterality: left   Qualified Code(s): L03.116 - Cellulitis of left 

lower limb   





(2) Fracture of tibia, distal, left, closed


Assessment/Plan: 


evaluated by ortho


no surgical intervention


splint in place


Code(s): S82.302A - UNSP FRACTURE OF LOWER END OF LEFT TIBIA, INIT FOR CLOS FX 

  


Qualifiers: 


   Encounter type: initial encounter 





(3) Leukocytosis


Assessment/Plan: 


improving


antibx per ID


monitor 


Code(s): D72.829 - ELEVATED WHITE BLOOD CELL COUNT, UNSPECIFIED   





(4) Pulmonary nodule


Assessment/Plan: 


recent chest CT- pulm lesions suspicious for metastatic disease 


has biopsy scheduled for Wed per PCP 


pulm/IR consulted 


Code(s): R91.1 - SOLITARY PULMONARY NODULE   





(5) Chronic pain


Assessment/Plan: 


controlled


continue morphine prn


Code(s): G89.29 - OTHER CHRONIC PAIN   


Qualifiers: 


   Chronic pain type: chronic pain syndrome   Qualified Code(s): G89.4 - 

Chronic pain syndrome   





(6) Paraplegia


Assessment/Plan: 


chronic


reposition q2hrs


Code(s): G82.20 - PARAPLEGIA, UNSPECIFIED   





(7) Anxiety


Assessment/Plan: 


chronic


continue valium


Code(s): F41.9 - ANXIETY DISORDER, UNSPECIFIED

## 2018-09-22 LAB
ANION GAP SERPL CALC-SCNC: 12 MMOL/L (ref 8–16)
BASOPHILS # BLD: 0.1 % (ref 0–2)
BUN SERPL-MCNC: 12 MG/DL (ref 7–18)
CALCIUM SERPL-MCNC: 8.9 MG/DL (ref 8.5–10.1)
CHLORIDE SERPL-SCNC: 103 MMOL/L (ref 98–107)
CO2 SERPL-SCNC: 27 MMOL/L (ref 21–32)
CREAT SERPL-MCNC: 0.7 MG/DL (ref 0.55–1.3)
DEPRECATED RDW RBC AUTO: 14.2 % (ref 11.9–15.9)
EOSINOPHIL # BLD: 2.7 % (ref 0–4.5)
ERYTHROCYTE [SEDIMENTATION RATE] IN BLOOD BY WESTERGREN METHOD: 67 MM/HR (ref 0–20)
GLUCOSE SERPL-MCNC: 83 MG/DL (ref 74–106)
HCT VFR BLD CALC: 41.9 % (ref 35.4–49)
HGB BLD-MCNC: 13.6 GM/DL (ref 11.7–16.9)
LYMPHOCYTES # BLD: 10.1 % (ref 8–40)
MCH RBC QN AUTO: 30.4 PG (ref 25.7–33.7)
MCHC RBC AUTO-ENTMCNC: 32.5 G/DL (ref 32–35.9)
MCV RBC: 93.3 FL (ref 80–96)
MONOCYTES # BLD AUTO: 10.3 % (ref 3.8–10.2)
NEUTROPHILS # BLD: 76.8 % (ref 42.8–82.8)
PLATELET # BLD AUTO: 389 K/MM3 (ref 134–434)
PMV BLD: 9 FL (ref 7.5–11.1)
POTASSIUM SERPLBLD-SCNC: 3.8 MMOL/L (ref 3.5–5.1)
RBC # BLD AUTO: 4.49 M/MM3 (ref 4–5.6)
SODIUM SERPL-SCNC: 142 MMOL/L (ref 136–145)
WBC # BLD AUTO: 12.9 K/MM3 (ref 4–10)

## 2018-09-22 RX ADMIN — PIPERACILLIN SODIUM,TAZOBACTAM SODIUM SCH MLS/HR: 3; .375 INJECTION, POWDER, FOR SOLUTION INTRAVENOUS at 01:26

## 2018-09-22 RX ADMIN — HEPARIN SODIUM SCH UNIT: 5000 INJECTION, SOLUTION INTRAVENOUS; SUBCUTANEOUS at 11:02

## 2018-09-22 RX ADMIN — MORPHINE SULFATE PRN MG: 10 SOLUTION ORAL at 06:24

## 2018-09-22 RX ADMIN — MORPHINE SULFATE PRN MG: 10 SOLUTION ORAL at 11:38

## 2018-09-22 RX ADMIN — PIPERACILLIN SODIUM,TAZOBACTAM SODIUM SCH MLS/HR: 3; .375 INJECTION, POWDER, FOR SOLUTION INTRAVENOUS at 17:14

## 2018-09-22 RX ADMIN — HEPARIN SODIUM SCH UNIT: 5000 INJECTION, SOLUTION INTRAVENOUS; SUBCUTANEOUS at 09:44

## 2018-09-22 RX ADMIN — MORPHINE SULFATE PRN MG: 10 SOLUTION ORAL at 16:24

## 2018-09-22 RX ADMIN — PIPERACILLIN SODIUM,TAZOBACTAM SODIUM SCH MLS/HR: 3; .375 INJECTION, POWDER, FOR SOLUTION INTRAVENOUS at 09:43

## 2018-09-22 NOTE — PN
Progress Note, Physician


History of Present Illness: 





says he is feeling better


but still not very well


abd pain/discomfort





- Current Medication List


Current Medications: 


Active Medications





Acetaminophen (Tylenol -)  650 mg PO Q6H PRN


   PRN Reason: PAIN OR FEVER


Diazepam (Valium -)  5 mg PO Q8H PRN


   PRN Reason: MUSCLE SPASMS


   Last Admin: 09/21/18 01:40 Dose:  5 mg


Heparin Sodium (Porcine) (Heparin -)  5,000 unit SQ BID ALE


   Last Admin: 09/21/18 21:34 Dose:  5,000 unit


Piperacillin Sod/Tazobactam (Sod 3.375 gm/ Dextrose)  50 mls @ 100 mls/hr IVPB 

Q8H-IV ALE; Protocol


   Last Admin: 09/22/18 01:26 Dose:  100 mls/hr


Sodium Chloride (Normal Saline -)  1,000 mls @ 50 mls/hr IV ASDIR ALE


   Stop: 09/22/18 17:54


   Last Admin: 09/21/18 18:34 Dose:  50 mls/hr


Morphine Sulfate (Morphine 10 Mg/5 Ml Liquid)  4 mg PO Q4H PRN


   PRN Reason: PAIN LEVEL 6-10


   Last Admin: 09/22/18 06:24 Dose:  4 mg











- Objective


Vital Signs: 


 Vital Signs











Temperature  97.6 F   09/22/18 05:17


 


Pulse Rate  71   09/22/18 05:17


 


Respiratory Rate  18   09/22/18 05:17


 


Blood Pressure  125/58 L  09/22/18 05:17


 


O2 Sat by Pulse Oximetry (%)  97   09/21/18 16:30











Constitutional: Yes: Calm, Mild Distress


Cardiovascular: Yes: Regular Rate and Rhythm


Respiratory: Yes: Regular, CTA Bilaterally


Gastrointestinal: Yes: Normal Bowel Sounds, Soft


Musculoskeletal: Yes: WNL, Other


Extremities: Yes: Other


Integumentary: Yes: Erythema


Wound/Incision: Yes: Dressing Dry and Intact


Neurological: Yes: Alert, Oriented


Psychiatric: Yes: Alert, Oriented


Labs: 


 CBC, BMP





 09/22/18 06:30 





 09/22/18 06:30 











Assessment/Plan





Problem List





- Problems


(1) Acute UTI


Code(s): N39.0 - URINARY TRACT INFECTION, SITE NOT SPECIFIED   





(2) Pulmonary nodule


Code(s): R91.1 - SOLITARY PULMONARY NODULE   





(3) Chronic pain


Code(s): G89.29 - OTHER CHRONIC PAIN   


Qualifiers: 


   Chronic pain type: chronic pain syndrome   Qualified Code(s): G89.4 - 

Chronic pain syndrome   





(4) Abdominal pain


Code(s): R10.9 - UNSPECIFIED ABDOMINAL PAIN   


Qualifiers: 


   Abdominal location: unspecified location   Qualified Code(s): R10.9 - 

Unspecified abdominal pain   





plan is also for lung biopsy





plan


will start patient on zosyn


await for all cx report


monitor swelling of the leg


rest as per the team

## 2018-09-22 NOTE — PN
Progress Note, Physician


Chief Complaint: 





Left ankle cellulitis


History of Present Illness: 


Mr. 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

, neurogenic bladder (w/ recurrent UTI), paroxysmal afib (not on AC) who 

presents for evaluation of Left ankle deformity/discoloration. 








- Current Medication List


Current Medications: 


Active Medications





Acetaminophen (Tylenol -)  650 mg PO Q6H PRN


   PRN Reason: PAIN OR FEVER


Diazepam (Valium -)  5 mg PO Q8H PRN


   PRN Reason: MUSCLE SPASMS


   Last Admin: 09/21/18 01:40 Dose:  5 mg


Heparin Sodium (Porcine) (Heparin -)  5,000 unit SQ BID ALE


   Last Admin: 09/22/18 09:44 Dose:  5,000 unit


Piperacillin Sod/Tazobactam (Sod 3.375 gm/ Dextrose)  50 mls @ 100 mls/hr IVPB 

Q8H-IV ALE; Protocol


   Last Admin: 09/22/18 09:43 Dose:  100 mls/hr


Sodium Chloride (Normal Saline -)  1,000 mls @ 50 mls/hr IV ASDIR ALE


   Stop: 09/22/18 17:54


   Last Admin: 09/21/18 18:34 Dose:  50 mls/hr


Morphine Sulfate (Morphine 10 Mg/5 Ml Liquid)  4 mg PO Q4H PRN


   PRN Reason: PAIN LEVEL 6-10


   Last Admin: 09/22/18 11:38 Dose:  4 mg











- Objective


Vital Signs: 


 Vital Signs











Temperature  98.4 F   09/22/18 08:15


 


Pulse Rate  60   09/22/18 08:15


 


Respiratory Rate  18   09/22/18 08:15


 


Blood Pressure  107/50 L  09/22/18 08:15


 


O2 Sat by Pulse Oximetry (%)  97   09/21/18 16:30








Constitutional: Yes: Well Nourished, No Distress, Calm


Cardiovascular: Yes: Regular Rate and Rhythm.  No: Gallop, Murmur


Respiratory: Yes: Regular, CTA Bilaterally.  No: Accessory Muscle Use, SOB, 

Tachypnea, Wheezes


Gastrointestinal: Yes: WNL, Normal Bowel Sounds, Soft.  No: Distention, 

Tenderness


Renal/: Yes: Incontinence


EXT; Left ankle Cellulitis 


Integumentary: Yes: Pressure Ulcer (stage 2 decubitus on right abdomen)


Neurological: Yes: Alert, Oriented, Pre-Existing Deficit (paraplegia)


Psychiatric: Yes: WNL, Alert, Oriented


Labs: 


 CBC, BMP





 09/22/18 06:30 





 09/22/18 06:30 











Problem List





- Problems


(1) Cellulitis


Assessment/Plan: 


On Zosyn as ID F/U cultures


Code(s): L03.90 - CELLULITIS, UNSPECIFIED   


Qualifiers: 


   Site of cellulitis: extremity   Site of cellulitis of extremity: lower 

extremity   Laterality: left   Qualified Code(s): L03.116 - Cellulitis of left 

lower limb   





(2) Decubital ulcer


Assessment/Plan: 


wound care


Code(s): L89.90 - PRESSURE ULCER OF UNSPECIFIED SITE, UNSPECIFIED STAGE   





(3) Pulmonary nodule


Assessment/Plan: 


Schedule for IR guided Biopsy


Code(s): R91.1 - SOLITARY PULMONARY NODULE   





(4) Neurogenic bladder


Assessment/Plan: 


S/O Supr pubic cathter grew Pseudomonas cont zosyn


Code(s): N31.9 - NEUROMUSCULAR DYSFUNCTION OF BLADDER, UNSPECIFIED   





(5) Stenosis of aorta


Assessment/Plan: 


Chronic


Code(s): Q25.3 - SUPRAVALVULAR AORTIC STENOSIS   





(6) Paraplegia


Assessment/Plan: 


Traumatic chronic bed bound


Code(s): G82.20 - PARAPLEGIA, UNSPECIFIED

## 2018-09-22 NOTE — PN
Progress Note, Physician


History of Present Illness: 





pulmonary





alert,no distress,-sob,-cough





- Current Medication List


Current Medications: 


Active Medications





Acetaminophen (Tylenol -)  650 mg PO Q6H PRN


   PRN Reason: PAIN OR FEVER


Diazepam (Valium -)  5 mg PO Q8H PRN


   PRN Reason: MUSCLE SPASMS


   Last Admin: 09/21/18 01:40 Dose:  5 mg


Heparin Sodium (Porcine) (Heparin -)  5,000 unit SQ BID ALE


   Last Admin: 09/22/18 09:44 Dose:  5,000 unit


Piperacillin Sod/Tazobactam (Sod 3.375 gm/ Dextrose)  50 mls @ 100 mls/hr IVPB 

Q8H-IV ALE; Protocol


   Last Admin: 09/22/18 09:43 Dose:  100 mls/hr


Sodium Chloride (Normal Saline -)  1,000 mls @ 50 mls/hr IV ASDIR ALE


   Stop: 09/22/18 17:54


   Last Admin: 09/21/18 18:34 Dose:  50 mls/hr


Morphine Sulfate (Morphine 10 Mg/5 Ml Liquid)  4 mg PO Q4H PRN


   PRN Reason: PAIN LEVEL 6-10


   Last Admin: 09/22/18 11:38 Dose:  4 mg











- Objective


Vital Signs: 


 Vital Signs











Temperature  98.4 F   09/22/18 08:15


 


Pulse Rate  60   09/22/18 08:15


 


Respiratory Rate  18   09/22/18 08:15


 


Blood Pressure  107/50 L  09/22/18 08:15


 


O2 Sat by Pulse Oximetry (%)  97   09/21/18 16:30











Constitutional: Yes: Well Nourished, Calm


Eyes: Yes: WNL


HENT: Yes: WNL


Neck: Yes: WNL


Cardiovascular: Yes: Regular Rate and Rhythm, S1, S2


Respiratory: Yes: CTA Bilaterally


Gastrointestinal: Yes: Normal Bowel Sounds, Soft


Extremities: Yes: Erythema (lle)


Labs: 


 CBC, BMP





 09/22/18 06:30 





 09/22/18 06:30 











Problem List





- Problems


(1) Cellulitis


Code(s): L03.90 - CELLULITIS, UNSPECIFIED   


Qualifiers: 


   Site of cellulitis: extremity   Site of cellulitis of extremity: lower 

extremity   Laterality: left   Qualified Code(s): L03.116 - Cellulitis of left 

lower limb   





(2) Lung nodule


Code(s): R91.1 - SOLITARY PULMONARY NODULE   





(3) Pulmonary nodule


Code(s): R91.1 - SOLITARY PULMONARY NODULE   





(4) Neurogenic bladder


Code(s): N31.9 - NEUROMUSCULAR DYSFUNCTION OF BLADDER, UNSPECIFIED   





(5) Paraplegia


Code(s): G82.20 - PARAPLEGIA, UNSPECIFIED   





Assessment/Plan





MP BILATERAL PULMONARY NODULES


      CELLULITIS


      PARAPLEGIA S/P SPINAL INJURY WITH L2-L4 CORD TRANSECTION


      PAF


      RECURRENT UTIS





PLAN ABX AS PER ID


       IR CONSULT FOR CT GUIDED BX LUNG NODULE








DR ARANA





 Problem List 





- Problems


(1) Cellulitis


Code(s): L03.90 - CELLULITIS, UNSPECIFIED   


Qualifiers: 


   Site of cellulitis: extremity   Site of cellulitis of extremity: lower 

extremity   Laterality: left   Qualified Code(s): L03.116 - Cellulitis of left 

lower limb   





(2) Lung nodule


Code(s): R91.1 - SOLITARY PULMONARY NODULE   





(3) Pulmonary nodule


Code(s): R91.1 - SOLITARY PULMONARY NODULE   





(4) Neurogenic bladder


Code(s): N31.9 - NEUROMUSCULAR DYSFUNCTION OF BLADDER, UNSPECIFIED   





(5) Paraplegia


Code(s): G82.20 - PARAPLEGIA, UNSPECIFIED

## 2018-09-23 LAB
ALBUMIN SERPL-MCNC: 2.8 G/DL (ref 3.4–5)
ALP SERPL-CCNC: 91 U/L (ref 45–117)
ALT SERPL-CCNC: 16 U/L (ref 13–61)
ANION GAP SERPL CALC-SCNC: 10 MMOL/L (ref 8–16)
APTT BLD: 30.2 SECONDS (ref 25.2–36.5)
AST SERPL-CCNC: 14 U/L (ref 15–37)
BASOPHILS # BLD: 1.4 % (ref 0–2)
BILIRUB SERPL-MCNC: 0.4 MG/DL (ref 0.2–1)
BUN SERPL-MCNC: 12 MG/DL (ref 7–18)
CALCIUM SERPL-MCNC: 8.9 MG/DL (ref 8.5–10.1)
CHLORIDE SERPL-SCNC: 104 MMOL/L (ref 98–107)
CO2 SERPL-SCNC: 27 MMOL/L (ref 21–32)
CREAT SERPL-MCNC: 0.7 MG/DL (ref 0.55–1.3)
DEPRECATED RDW RBC AUTO: 14.6 % (ref 11.9–15.9)
EOSINOPHIL # BLD: 4.2 % (ref 0–4.5)
GLUCOSE SERPL-MCNC: 74 MG/DL (ref 74–106)
HCT VFR BLD CALC: 41.3 % (ref 35.4–49)
HGB BLD-MCNC: 13.3 GM/DL (ref 11.7–16.9)
INR BLD: 1 (ref 0.83–1.09)
LYMPHOCYTES # BLD: 16.9 % (ref 8–40)
MCH RBC QN AUTO: 30.5 PG (ref 25.7–33.7)
MCHC RBC AUTO-ENTMCNC: 32.2 G/DL (ref 32–35.9)
MCV RBC: 94.7 FL (ref 80–96)
MONOCYTES # BLD AUTO: 8.5 % (ref 3.8–10.2)
NEUTROPHILS # BLD: 69 % (ref 42.8–82.8)
PLATELET # BLD AUTO: 342 K/MM3 (ref 134–434)
PMV BLD: 9.6 FL (ref 7.5–11.1)
POTASSIUM SERPLBLD-SCNC: 4.3 MMOL/L (ref 3.5–5.1)
PROT SERPL-MCNC: 6.8 G/DL (ref 6.4–8.2)
PT PNL PPP: 11.3 SEC (ref 9.7–13)
RBC # BLD AUTO: 4.36 M/MM3 (ref 4–5.6)
SODIUM SERPL-SCNC: 141 MMOL/L (ref 136–145)
WBC # BLD AUTO: 11.3 K/MM3 (ref 4–10)

## 2018-09-23 RX ADMIN — HEPARIN SODIUM SCH UNIT: 5000 INJECTION, SOLUTION INTRAVENOUS; SUBCUTANEOUS at 09:53

## 2018-09-23 RX ADMIN — MORPHINE SULFATE PRN MG: 10 SOLUTION ORAL at 12:47

## 2018-09-23 RX ADMIN — MORPHINE SULFATE PRN MG: 10 SOLUTION ORAL at 07:09

## 2018-09-23 RX ADMIN — PIPERACILLIN SODIUM,TAZOBACTAM SODIUM SCH MLS/HR: 3; .375 INJECTION, POWDER, FOR SOLUTION INTRAVENOUS at 02:36

## 2018-09-23 RX ADMIN — MORPHINE SULFATE PRN MG: 10 SOLUTION ORAL at 22:50

## 2018-09-23 RX ADMIN — PIPERACILLIN SODIUM,TAZOBACTAM SODIUM SCH MLS/HR: 3; .375 INJECTION, POWDER, FOR SOLUTION INTRAVENOUS at 17:11

## 2018-09-23 RX ADMIN — MORPHINE SULFATE PRN MG: 10 SOLUTION ORAL at 17:10

## 2018-09-23 RX ADMIN — PIPERACILLIN SODIUM,TAZOBACTAM SODIUM SCH MLS/HR: 3; .375 INJECTION, POWDER, FOR SOLUTION INTRAVENOUS at 09:53

## 2018-09-23 RX ADMIN — HEPARIN SODIUM SCH UNIT: 5000 INJECTION, SOLUTION INTRAVENOUS; SUBCUTANEOUS at 21:21

## 2018-09-23 RX ADMIN — MORPHINE SULFATE PRN MG: 10 SOLUTION ORAL at 00:07

## 2018-09-23 NOTE — PN
Progress Note, Physician


History of Present Illness: 





pulmonary








alert,no distress,-cp,-sob,-cough





- Current Medication List


Current Medications: 


Active Medications





Acetaminophen (Tylenol -)  650 mg PO Q6H PRN


   PRN Reason: PAIN OR FEVER


Diazepam (Valium -)  5 mg PO Q8H PRN


   PRN Reason: MUSCLE SPASMS


   Last Admin: 09/23/18 00:02 Dose:  5 mg


Heparin Sodium (Porcine) (Heparin -)  5,000 unit SQ BID ALE


   Last Admin: 09/23/18 09:53 Dose:  5,000 unit


Piperacillin Sod/Tazobactam (Sod 3.375 gm/ Dextrose)  50 mls @ 100 mls/hr IVPB 

Q8H-IV ALE; Protocol


   Last Admin: 09/23/18 09:53 Dose:  100 mls/hr


Morphine Sulfate (Morphine 10 Mg/5 Ml Liquid)  4 mg PO Q4H PRN


   PRN Reason: PAIN LEVEL 6-10


   Last Admin: 09/23/18 12:47 Dose:  4 mg











- Objective


Vital Signs: 


 Vital Signs











Temperature  98.2 F   09/23/18 09:45


 


Pulse Rate  93 H  09/23/18 09:45


 


Respiratory Rate  18   09/23/18 09:45


 


Blood Pressure  114/61   09/23/18 09:45


 


O2 Sat by Pulse Oximetry (%)  97   09/21/18 16:30











Constitutional: Yes: Well Nourished, Calm


Eyes: Yes: WNL


HENT: Yes: WNL


Neck: Yes: WNL


Cardiovascular: Yes: Regular Rate and Rhythm, S1, S2


Respiratory: Yes: CTA Bilaterally


Gastrointestinal: Yes: Normal Bowel Sounds, Soft


Extremities: Yes: Erythema


Edema: Yes


Labs: 


 CBC, BMP





 09/23/18 06:30 





 09/23/18 06:30 





 INR, PTT











INR  1.00  (0.83-1.09)   09/23/18  06:30    


 


Fibrinogen  > 500.0 mg/dL (238-498)  H  09/23/18  06:30    














Problem List





- Problems


(1) Cellulitis


Code(s): L03.90 - CELLULITIS, UNSPECIFIED   


Qualifiers: 


   Site of cellulitis: extremity   Site of cellulitis of extremity: lower 

extremity   Laterality: left   Qualified Code(s): L03.116 - Cellulitis of left 

lower limb   





(2) Lung nodule


Code(s): R91.1 - SOLITARY PULMONARY NODULE   





(3) Pulmonary nodule


Code(s): R91.1 - SOLITARY PULMONARY NODULE   





(4) Neurogenic bladder


Code(s): N31.9 - NEUROMUSCULAR DYSFUNCTION OF BLADDER, UNSPECIFIED   





(5) Paraplegia


Code(s): G82.20 - PARAPLEGIA, UNSPECIFIED   





Assessment/Plan





MP BILATERAL PULMONARY NODULES


      CELLULITIS


      PARAPLEGIA S/P SPINAL INJURY WITH L2-L4 CORD TRANSECTION


      PAF


      RECURRENT UTIS





PLAN ABX AS PER ID


       IR CONSULT FOR CT GUIDED BX LUNG NODULE








DR ARANA





 Problem List 





- Problems


(1) Cellulitis


Code(s): L03.90 - CELLULITIS, UNSPECIFIED   


Qualifiers: 


   Site of cellulitis: extremity   Site of cellulitis of extremity: lower 

extremity   Laterality: left   Qualified Code(s): L03.116 - Cellulitis of left 

lower limb   





(2) Lung nodule


Code(s): R91.1 - SOLITARY PULMONARY NODULE   





(3) Pulmonary nodule


Code(s): R91.1 - SOLITARY PULMONARY NODULE   





(4) Neurogenic bladder


Code(s): N31.9 - NEUROMUSCULAR DYSFUNCTION OF BLADDER, UNSPECIFIED   





(5) Paraplegia


Code(s): G82.20 - PARAPLEGIA, UNSPECIFIED

## 2018-09-23 NOTE — PN
Progress Note, Physician


Chief Complaint: 





Left ankle cellulitis


History of Present Illness: 


Mr. Sumner is a 62 yo male w/ pmh of spinal fracture (w/ T2-T4 cord transection)

, neurogenic bladder (w/ recurrent UTI), paroxysmal afib (not on AC) who 

presents for evaluation of Left ankle deformity/discoloration. 








- Current Medication List


Current Medications: 


Active Medications





Acetaminophen (Tylenol -)  650 mg PO Q6H PRN


   PRN Reason: PAIN OR FEVER


Diazepam (Valium -)  5 mg PO Q8H PRN


   PRN Reason: MUSCLE SPASMS


   Last Admin: 09/23/18 00:02 Dose:  5 mg


Heparin Sodium (Porcine) (Heparin -)  5,000 unit SQ BID ALE


   Last Admin: 09/23/18 09:53 Dose:  5,000 unit


Piperacillin Sod/Tazobactam (Sod 3.375 gm/ Dextrose)  50 mls @ 100 mls/hr IVPB 

Q8H-IV ALE; Protocol


   Last Admin: 09/23/18 09:53 Dose:  100 mls/hr


Morphine Sulfate (Morphine 10 Mg/5 Ml Liquid)  4 mg PO Q4H PRN


   PRN Reason: PAIN LEVEL 6-10


   Last Admin: 09/23/18 12:47 Dose:  4 mg











- Objective


Vital Signs: 


 Vital Signs











Temperature  98.2 F   09/23/18 09:45


 


Pulse Rate  93 H  09/23/18 09:45


 


Respiratory Rate  18   09/23/18 09:45


 


Blood Pressure  114/61   09/23/18 09:45


 


O2 Sat by Pulse Oximetry (%)  97   09/21/18 16:30








Constitutional: Yes: Well Nourished, No Distress, Calm


Cardiovascular: Yes: Regular Rate and Rhythm.  No: Gallop, Murmur


Respiratory: Yes: Regular, CTA Bilaterally.  No: Accessory Muscle Use, SOB, 

Tachypnea, Wheezes


Gastrointestinal: Yes: WNL, Normal Bowel Sounds, Soft.  No: Distention, 

Tenderness


Renal/: Yes: Incontinence


EXT; Left ankle Cellulitis 


Integumentary: Yes: Pressure Ulcer (stage 2 decubitus on right abdomen)


Neurological: Yes: Alert, Oriented, Pre-Existing Deficit (paraplegia)


Psychiatric: Yes: WNL, Alert, Oriented


Labs: 


 CBC, BMP





 09/23/18 06:30 





 09/23/18 06:30 





 INR, PTT











INR  1.00  (0.83-1.09)   09/23/18  06:30    


 


Fibrinogen  > 500.0 mg/dL (238-498)  H  09/23/18  06:30    








Microbiology





09/20/18 07:50   Urine - Urostomy Bag   Urine Culture - Preliminary


                            Pseudomonas Species


                            Non Lactose Fermenting Gnb


09/20/18 16:25   Blood - Peripheral Venous   Blood Culture - Preliminary


                            NO GROWTH OBTAINED AFTER 48 HOURS, INCUBATION TO 

CONTINUE


                            FOR 3 DAYS.


09/20/18 16:25   Blood - Peripheral Venous   Blood Culture - Preliminary


                            NO GROWTH OBTAINED AFTER 48 HOURS, INCUBATION TO 

CONTINUE


                            FOR 3 DAYS.











Problem List





- Problems


(1) Cellulitis


Assessment/Plan: 


On Zosyn as ID F/U cultures


Code(s): L03.90 - CELLULITIS, UNSPECIFIED   


Qualifiers: 


   Site of cellulitis: extremity   Site of cellulitis of extremity: lower 

extremity   Laterality: left   Qualified Code(s): L03.116 - Cellulitis of left 

lower limb   





(2) Decubital ulcer


Assessment/Plan: 


wound care


Code(s): L89.90 - PRESSURE ULCER OF UNSPECIFIED SITE, UNSPECIFIED STAGE   





(3) Pulmonary nodule


Assessment/Plan: 


Schedule for IR guided Biopsy


Code(s): R91.1 - SOLITARY PULMONARY NODULE   





(4) Neurogenic bladder


Assessment/Plan: 


S/O Supr pubic cathter grew Pseudomonas cont zosyn


Code(s): N31.9 - NEUROMUSCULAR DYSFUNCTION OF BLADDER, UNSPECIFIED   





(5) Stenosis of aorta


Assessment/Plan: 


Chronic


Code(s): Q25.3 - SUPRAVALVULAR AORTIC STENOSIS   





(6) Paraplegia


Assessment/Plan: 


Traumatic chronic bed bound


Code(s): G82.20 - PARAPLEGIA, UNSPECIFIED

## 2018-09-24 LAB
ANION GAP SERPL CALC-SCNC: 7 MMOL/L (ref 8–16)
BASOPHILS # BLD: 0.5 % (ref 0–2)
BUN SERPL-MCNC: 10 MG/DL (ref 7–18)
CALCIUM SERPL-MCNC: 8.7 MG/DL (ref 8.5–10.1)
CHLORIDE SERPL-SCNC: 104 MMOL/L (ref 98–107)
CO2 SERPL-SCNC: 29 MMOL/L (ref 21–32)
CREAT SERPL-MCNC: 0.8 MG/DL (ref 0.55–1.3)
DEPRECATED RDW RBC AUTO: 14.6 % (ref 11.9–15.9)
EOSINOPHIL # BLD: 4 % (ref 0–4.5)
GLUCOSE SERPL-MCNC: 72 MG/DL (ref 74–106)
HCT VFR BLD CALC: 39.8 % (ref 35.4–49)
HGB BLD-MCNC: 13.3 GM/DL (ref 11.7–16.9)
LYMPHOCYTES # BLD: 15 % (ref 8–40)
MCH RBC QN AUTO: 31 PG (ref 25.7–33.7)
MCHC RBC AUTO-ENTMCNC: 33.3 G/DL (ref 32–35.9)
MCV RBC: 92.8 FL (ref 80–96)
MONOCYTES # BLD AUTO: 10.7 % (ref 3.8–10.2)
NEUTROPHILS # BLD: 69.8 % (ref 42.8–82.8)
PLATELET # BLD AUTO: 378 K/MM3 (ref 134–434)
PMV BLD: 9.4 FL (ref 7.5–11.1)
POTASSIUM SERPLBLD-SCNC: 3.8 MMOL/L (ref 3.5–5.1)
RBC # BLD AUTO: 4.29 M/MM3 (ref 4–5.6)
SODIUM SERPL-SCNC: 139 MMOL/L (ref 136–145)
WBC # BLD AUTO: 10.4 K/MM3 (ref 4–10)

## 2018-09-24 RX ADMIN — MORPHINE SULFATE PRN MG: 10 SOLUTION ORAL at 11:01

## 2018-09-24 RX ADMIN — MORPHINE SULFATE PRN MG: 10 SOLUTION ORAL at 15:19

## 2018-09-24 RX ADMIN — HEPARIN SODIUM SCH UNIT: 5000 INJECTION, SOLUTION INTRAVENOUS; SUBCUTANEOUS at 10:42

## 2018-09-24 RX ADMIN — PIPERACILLIN SODIUM,TAZOBACTAM SODIUM SCH MLS/HR: 3; .375 INJECTION, POWDER, FOR SOLUTION INTRAVENOUS at 01:38

## 2018-09-24 RX ADMIN — SODIUM CHLORIDE SCH: 9 INJECTION, SOLUTION INTRAVENOUS at 19:05

## 2018-09-24 RX ADMIN — PIPERACILLIN SODIUM,TAZOBACTAM SODIUM SCH MLS/HR: 3; .375 INJECTION, POWDER, FOR SOLUTION INTRAVENOUS at 10:41

## 2018-09-24 RX ADMIN — PIPERACILLIN SODIUM,TAZOBACTAM SODIUM SCH MLS/HR: 3; .375 INJECTION, POWDER, FOR SOLUTION INTRAVENOUS at 17:50

## 2018-09-24 NOTE — PN
Teaching Attending Note


Name of Resident: Brian Castrejon





ATTENDING PHYSICIAN STATEMENT





I saw and evaluated the patient.


I reviewed the resident's note and discussed the case with the resident.


I agree with the resident's findings and plan as documented.





This is a 64 y/o man with a PMHx of Spinal Fx T2-T4 cord transection (Lumbar Fx

- fall, 2/28/82), Neurogenic bladder, recurrent UTIs, Paroxysmal Afib (no AC). 





We have been consulted about bilateral pulmonary masses/sclerotic T4 focus


Will schedule IR guided biopsy of lung masses


check bone scan

## 2018-09-24 NOTE — PN
Progress Note (short form)





- Note


Progress Note: 





Ortho





Pt seen and examined s/p left distal tibia fx- improving





splint intact, erythema resolving





a/p


continue with splint


Pt is paraplegic so will not be ambulating


abx as per ID for cellulitis


will follow


d/w Dr. Amin

## 2018-09-24 NOTE — PN
Progress Note, Physician


History of Present Illness: 





no specific complaints


no new issues





- Current Medication List


Current Medications: 


Active Medications





Acetaminophen (Tylenol -)  650 mg PO Q6H PRN


   PRN Reason: PAIN OR FEVER


Diazepam (Valium -)  5 mg PO Q8H PRN


   PRN Reason: MUSCLE SPASMS


   Last Admin: 09/24/18 07:38 Dose:  5 mg


Heparin Sodium (Porcine) (Heparin -)  5,000 unit SQ BID ALE


   Last Admin: 09/24/18 10:42 Dose:  5,000 unit


Piperacillin Sod/Tazobactam (Sod 3.375 gm/ Dextrose)  50 mls @ 100 mls/hr IVPB 

Q8H-IV ALE; Protocol


   Last Admin: 09/24/18 10:41 Dose:  100 mls/hr


Morphine Sulfate (Morphine 10 Mg/5 Ml Liquid)  4 mg PO Q4H PRN


   PRN Reason: PAIN LEVEL 6-10


   Last Admin: 09/24/18 11:01 Dose:  4 mg











- Objective


Vital Signs: 


 Vital Signs











Temperature  98.2 F   09/24/18 10:00


 


Pulse Rate  68   09/24/18 10:00


 


Respiratory Rate  18   09/24/18 10:00


 


Blood Pressure  165/86   09/24/18 10:00


 


O2 Sat by Pulse Oximetry (%)  95   09/23/18 21:00











Constitutional: Yes: No Distress, Calm


Cardiovascular: Yes: Regular Rate and Rhythm


Respiratory: Yes: Regular, CTA Bilaterally


Gastrointestinal: Yes: Normal Bowel Sounds, Soft


Musculoskeletal: Yes: WNL


Extremities: Yes: Other (improving)


Neurological: Yes: Alert, Oriented


Psychiatric: Yes: Alert, Oriented


Labs: 


 CBC, BMP





 09/24/18 07:30 





 09/24/18 07:30 





 INR, PTT











INR  1.00  (0.83-1.09)   09/23/18  06:30    


 


Fibrinogen  > 500.0 mg/dL (238-498)  H  09/23/18  06:30    














Assessment/Plan





Problem List





- Problems


(1) Acute UTI


Code(s): N39.0 - URINARY TRACT INFECTION, SITE NOT SPECIFIED   





(2) Pulmonary nodule


Code(s): R91.1 - SOLITARY PULMONARY NODULE   





(3) Chronic pain


Code(s): G89.29 - OTHER CHRONIC PAIN   


Qualifiers: 


   Chronic pain type: chronic pain syndrome   Qualified Code(s): G89.4 - 

Chronic pain syndrome   





(4) Abdominal pain


Code(s): R10.9 - UNSPECIFIED ABDOMINAL PAIN   


Qualifiers: 


   Abdominal location: unspecified location   Qualified Code(s): R10.9 - 

Unspecified abdominal pain   





plan is also for lung biopsy





plan


continue zosyn


still awaiting another cx report


rest as per the team


biopsy of the lung nodule

## 2018-09-24 NOTE — PN
Progress Note, Physician


Chief Complaint: 


Mr Sumner complains of some abdominal pain. No cp or sob.





- Current Medication List


Current Medications: 


Active Medications





Acetaminophen (Tylenol -)  650 mg PO Q6H PRN


   PRN Reason: PAIN OR FEVER


Diazepam (Valium -)  5 mg PO Q8H PRN


   PRN Reason: MUSCLE SPASMS


   Last Admin: 09/24/18 07:38 Dose:  5 mg


Heparin Sodium (Porcine) (Heparin -)  5,000 unit SQ BID ALE


   Last Admin: 09/24/18 10:42 Dose:  5,000 unit


Piperacillin Sod/Tazobactam (Sod 3.375 gm/ Dextrose)  50 mls @ 100 mls/hr IVPB 

Q8H-IV ALE; Protocol


   Last Admin: 09/24/18 10:41 Dose:  100 mls/hr


Morphine Sulfate (Morphine 10 Mg/5 Ml Liquid)  4 mg PO Q4H PRN


   PRN Reason: PAIN LEVEL 6-10


   Last Admin: 09/24/18 11:01 Dose:  4 mg











- Objective


Vital Signs: 


 Vital Signs











Temperature  36.8 C   09/24/18 10:00


 


Pulse Rate  68   09/24/18 10:00


 


Respiratory Rate  18   09/24/18 10:00


 


Blood Pressure  165/86   09/24/18 10:00


 


O2 Sat by Pulse Oximetry (%)  95   09/23/18 21:00











Constitutional: Yes: Well Nourished, No Distress, Calm


Cardiovascular: Yes: Regular Rate and Rhythm.  No: Gallop, Murmur, Rub


Respiratory: Yes: Regular, CTA Bilaterally.  No: Rales, Rhonchi, Wheezes


Gastrointestinal: Yes: Normal Bowel Sounds, Soft, Tenderness (slight, LLQ).  No

: Distention


Extremities: Yes: WNL


Edema: No


Labs: 


 CBC, BMP





 09/24/18 07:30 





 09/24/18 07:30 





 INR, PTT











INR  1.00  (0.83-1.09)   09/23/18  06:30    


 


Fibrinogen  > 500.0 mg/dL (238-498)  H  09/23/18  06:30    














Problem List





- Problems


(1) Acute UTI


Assessment/Plan: 


-growing pseudomonas and gram negative rods


-ID following


-continue zosyn


Code(s): N39.0 - URINARY TRACT INFECTION, SITE NOT SPECIFIED   





(2) Pulmonary nodule


Assessment/Plan: 


-plan for CT guided biopsy


Code(s): R91.1 - SOLITARY PULMONARY NODULE   





(3) Chronic pain


Assessment/Plan: 


-continue home morphine


Code(s): G89.29 - OTHER CHRONIC PAIN   


Qualifiers: 


   Chronic pain type: chronic pain syndrome   Qualified Code(s): G89.4 - 

Chronic pain syndrome   





(4) Abdominal pain


Assessment/Plan: 


-will obtain AXR


-chronic 


Code(s): R10.9 - UNSPECIFIED ABDOMINAL PAIN   


Qualifiers: 


   Abdominal location: unspecified location   Qualified Code(s): R10.9 - 

Unspecified abdominal pain

## 2018-09-24 NOTE — PN
Physical Exam: 


SUBJECTIVE: Patient seen and examined at bedside. No overnight events, 

complaining of severe abdominal pain. He states pain in constant sharp 10/10 

pain localized to RUQ. No alleviating or aggrevating factors. He states "pain 

medication is not enough". Denies CP,HA, SOB, nausea or vomiting. 








OBJECTIVE:





 Vital Signs











 Period  Temp  Pulse  Resp  BP Sys/Malone  Pulse Ox


 


 Last 24 Hr  97.3 F-98.7 F  58-68  18-20  130-186/62-86  95











GENERAL: The patient is awake, alert, and fully oriented, in no acute distress.


HEAD: Normal with no signs of trauma.


EYES: PERRL, extraocular movements intact, sclera anicteric, conjunctiva clear. 

No ptosis. 


ENT: Ears normal, nares patent, oropharynx clear without exudates, moist mucous 


membranes.


NECK: Trachea midline, full range of motion, supple. 


LUNGS: Breath sounds equal, clear to auscultation bilaterally, no wheezes, no 

crackles, no 


accessory muscle use. 


HEART: Regular rate and rhythm, S1, S2 without murmur, rub or gallop.


ABDOMEN: Soft, nontender, nondistended, normoactive bowel sounds, no guarding, 

no 


rebound, no hepatosplenomegaly, no masses.


EXTREMITIES: 2+ pulses, warm, well-perfused, no edema. 


NEUROLOGICAL: Cranial nerves II through XII grossly intact. Normal speech, gait 

not 


observed.


PSYCH: Normal mood, normal affect.


SKIN: Warm, dry, normal turgor, no rashes or lesions noted














 Laboratory Results - last 24 hr











  09/24/18 09/24/18





  07:30 07:30


 


WBC  10.4 H 


 


RBC  4.29 


 


Hgb  13.3 


 


Hct  39.8 


 


MCV  92.8 


 


MCH  31.0 


 


MCHC  33.3 


 


RDW  14.6 


 


Plt Count  378 


 


MPV  9.4 


 


Absolute Neuts (auto)  7.3 


 


Neutrophils %  69.8 


 


Lymphocytes %  15.0 


 


Monocytes %  10.7 H 


 


Eosinophils %  4.0 


 


Basophils %  0.5 


 


Nucleated RBC %  0 


 


Sodium   139


 


Potassium   3.8


 


Chloride   104


 


Carbon Dioxide   29


 


Anion Gap   7 L


 


BUN   10


 


Creatinine   0.8


 


Creat Clearance w eGFR   > 60


 


Random Glucose   72 L


 


Calcium   8.7








Active Medications











Generic Name Dose Route Start Last Admin





  Trade Name Freq  PRN Reason Stop Dose Admin


 


Acetaminophen  650 mg  09/21/18 01:35  





  Tylenol -  PO   





  Q6H PRN   





  PAIN OR FEVER   





     





     





     


 


Diazepam  5 mg  09/21/18 01:33  09/24/18 07:38





  Valium -  PO   5 mg





  Q8H PRN   Administration





  MUSCLE SPASMS   





     





     





     


 


Heparin Sodium (Porcine)  5,000 unit  09/20/18 22:00  09/24/18 10:42





  Heparin -  SQ   5,000 unit





  BID ALE   Administration





     





     





     





     


 


Piperacillin Sod/Tazobactam  50 mls @ 100 mls/hr  09/21/18 13:00  09/24/18 10:41





  Sod 3.375 gm/ Dextrose  IVPB   100 mls/hr





  Q8H-IV ALE   Administration





     





     





  Protocol   





     


 


Morphine Sulfate  4 mg  09/21/18 09:54  09/24/18 11:01





  Morphine 10 Mg/5 Ml Liquid  PO   4 mg





  Q4H PRN   Administration





  PAIN LEVEL 6-10   





     





     





     











ASSESSMENT/PLAN:


64 y/o man with a PMHx of Spinal Fx T2-T4 cord transection (Lumbar Fx- fall, 2/ 28/82), Neurogenic bladder, recurrent UTIs, Paroxysmal Afib (no AC).





Problem List





- Problems


(1) Pulmonary nodule


Assessment/Plan: 


bilateral pulmonary masses/sclerotic T4 focus


* IR guided biopsy of lung masses tomorrow. 


* Will hold lovenox 24hrs.








(2) Paroxysmal A-fib








(3) Cellulitis








Visit type





- Emergency Visit


Emergency Visit: Yes


ED Registration Date: 09/20/18


Care time: The patient presented to the Emergency Department on the above date 

and was hospitalized for further evaluation of their emergent condition.





- New Patient


This patient is new to me today: Yes


Date on this admission: 09/25/18





- Critical Care


Critical Care patient: No





- Discharge Referral


Referred to [facility] No

## 2018-09-24 NOTE — PN
Progress Note (short form)





- Note


Progress Note: 





PULMONARY





Denies shortness of breath, cough. No fevers or chills.





 Vital Signs











 Period  Temp  Pulse  Resp  BP Sys/Malone  Pulse Ox


 


 Last 24 Hr  97.3 F-98.7 F  58-64  18-20  130-186/62-77  95








Gen:  NAD at rest


Heart: RRR


Lung: decreased breath sounds at the bases


Abd: soft, nontender


Ext: no edema





 CBC, BMP





 09/24/18 07:30 





 09/24/18 07:30 





Active Medications





Acetaminophen (Tylenol -)  650 mg PO Q6H PRN


   PRN Reason: PAIN OR FEVER


Diazepam (Valium -)  5 mg PO Q8H PRN


   PRN Reason: MUSCLE SPASMS


   Last Admin: 09/24/18 07:38 Dose:  5 mg


Heparin Sodium (Porcine) (Heparin -)  5,000 unit SQ BID ALE


   Last Admin: 09/23/18 21:21 Dose:  5,000 unit


Piperacillin Sod/Tazobactam (Sod 3.375 gm/ Dextrose)  50 mls @ 100 mls/hr IVPB 

Q8H-IV ALE; Protocol


   Last Admin: 09/24/18 01:38 Dose:  100 mls/hr


Morphine Sulfate (Morphine 10 Mg/5 Ml Liquid)  4 mg PO Q4H PRN


   PRN Reason: PAIN LEVEL 6-10


   Last Admin: 09/23/18 22:50 Dose:  4 mg





A/P


Lung Nodules


Cellulitis


Paroxysmal Atrial Fibrillation


Paraplegia





-  continue antibiotics per ID


-  for CT guided needle biopsy


-  DVT prophylaxis

## 2018-09-24 NOTE — PN
Progress Note, Physician


History of Present Illness: 





comfortable


mild discomfort in belly


leg looks better





- Current Medication List


Current Medications: 


Active Medications





Acetaminophen (Tylenol -)  650 mg PO Q6H PRN


   PRN Reason: PAIN OR FEVER


Diazepam (Valium -)  5 mg PO Q8H PRN


   PRN Reason: MUSCLE SPASMS


   Last Admin: 09/24/18 07:38 Dose:  5 mg


Heparin Sodium (Porcine) (Heparin -)  5,000 unit SQ BID ALE


   Last Admin: 09/24/18 10:42 Dose:  5,000 unit


Piperacillin Sod/Tazobactam (Sod 3.375 gm/ Dextrose)  50 mls @ 100 mls/hr IVPB 

Q8H-IV ALE; Protocol


   Last Admin: 09/24/18 10:41 Dose:  100 mls/hr


Morphine Sulfate (Morphine 10 Mg/5 Ml Liquid)  4 mg PO Q4H PRN


   PRN Reason: PAIN LEVEL 6-10


   Last Admin: 09/24/18 11:01 Dose:  4 mg











- Objective


Vital Signs: 


 Vital Signs











Temperature  98.2 F   09/24/18 10:00


 


Pulse Rate  68   09/24/18 10:00


 


Respiratory Rate  18   09/24/18 10:00


 


Blood Pressure  165/86   09/24/18 10:00


 


O2 Sat by Pulse Oximetry (%)  95   09/23/18 21:00











Constitutional: Yes: No Distress, Calm


Cardiovascular: Yes: Regular Rate and Rhythm


Respiratory: Yes: Regular, CTA Bilaterally


Gastrointestinal: Yes: Normal Bowel Sounds, Soft


Musculoskeletal: Yes: WNL


Extremities: Yes: Other


Integumentary: Yes: Erythema (improving)


Neurological: Yes: Alert, Oriented


Psychiatric: Yes: Alert, Oriented


Labs: 


 CBC, BMP





 09/24/18 07:30 





 09/24/18 07:30 





 INR, PTT











INR  1.00  (0.83-1.09)   09/23/18  06:30    


 


Fibrinogen  > 500.0 mg/dL (238-498)  H  09/23/18  06:30    














Assessment/Plan





Problem List





- Problems


(1) Acute UTI


Code(s): N39.0 - URINARY TRACT INFECTION, SITE NOT SPECIFIED   





(2) Pulmonary nodule


Code(s): R91.1 - SOLITARY PULMONARY NODULE   





(3) Chronic pain


Code(s): G89.29 - OTHER CHRONIC PAIN   


Qualifiers: 


   Chronic pain type: chronic pain syndrome   Qualified Code(s): G89.4 - 

Chronic pain syndrome   





(4) Abdominal pain


Code(s): R10.9 - UNSPECIFIED ABDOMINAL PAIN   


Qualifiers: 


   Abdominal location: unspecified location   Qualified Code(s): R10.9 - 

Unspecified abdominal pain   





plan is also for lung biopsy





plan


continue zosyn


await for all cx report


monitor swelling of the leg


rest as per the team


pseudomonas


other organism awaited

## 2018-09-25 LAB
ANION GAP SERPL CALC-SCNC: 6 MMOL/L (ref 8–16)
BASOPHILS # BLD: 0.7 % (ref 0–2)
BUN SERPL-MCNC: 9 MG/DL (ref 7–18)
CALCIUM SERPL-MCNC: 8.5 MG/DL (ref 8.5–10.1)
CHLORIDE SERPL-SCNC: 104 MMOL/L (ref 98–107)
CO2 SERPL-SCNC: 29 MMOL/L (ref 21–32)
CREAT SERPL-MCNC: 0.7 MG/DL (ref 0.55–1.3)
DEPRECATED RDW RBC AUTO: 14.4 % (ref 11.9–15.9)
EOSINOPHIL # BLD: 3 % (ref 0–4.5)
GLUCOSE SERPL-MCNC: 80 MG/DL (ref 74–106)
HCT VFR BLD CALC: 39.2 % (ref 35.4–49)
HGB BLD-MCNC: 13 GM/DL (ref 11.7–16.9)
LYMPHOCYTES # BLD: 15.9 % (ref 8–40)
MAGNESIUM SERPL-MCNC: 2.2 MG/DL (ref 1.8–2.4)
MCH RBC QN AUTO: 31.2 PG (ref 25.7–33.7)
MCHC RBC AUTO-ENTMCNC: 33.3 G/DL (ref 32–35.9)
MCV RBC: 93.7 FL (ref 80–96)
MONOCYTES # BLD AUTO: 10.3 % (ref 3.8–10.2)
NEUTROPHILS # BLD: 70.1 % (ref 42.8–82.8)
PHOSPHATE SERPL-MCNC: 2.7 MG/DL (ref 2.5–4.9)
PLATELET # BLD AUTO: 407 K/MM3 (ref 134–434)
PMV BLD: 9.2 FL (ref 7.5–11.1)
POTASSIUM SERPLBLD-SCNC: 3.8 MMOL/L (ref 3.5–5.1)
RBC # BLD AUTO: 4.19 M/MM3 (ref 4–5.6)
SODIUM SERPL-SCNC: 139 MMOL/L (ref 136–145)
WBC # BLD AUTO: 10.1 K/MM3 (ref 4–10)

## 2018-09-25 PROCEDURE — 0BBK3ZX EXCISION OF RIGHT LUNG, PERCUTANEOUS APPROACH, DIAGNOSTIC: ICD-10-PCS | Performed by: RADIOLOGY

## 2018-09-25 RX ADMIN — MORPHINE SULFATE PRN MG: 10 SOLUTION ORAL at 11:54

## 2018-09-25 RX ADMIN — PIPERACILLIN SODIUM,TAZOBACTAM SODIUM SCH MLS/HR: 3; .375 INJECTION, POWDER, FOR SOLUTION INTRAVENOUS at 11:54

## 2018-09-25 RX ADMIN — MORPHINE SULFATE PRN MG: 10 SOLUTION ORAL at 15:57

## 2018-09-25 RX ADMIN — PIPERACILLIN SODIUM,TAZOBACTAM SODIUM SCH MLS/HR: 3; .375 INJECTION, POWDER, FOR SOLUTION INTRAVENOUS at 18:23

## 2018-09-25 RX ADMIN — SODIUM CHLORIDE SCH: 9 INJECTION, SOLUTION INTRAVENOUS at 18:24

## 2018-09-25 RX ADMIN — PIPERACILLIN SODIUM,TAZOBACTAM SODIUM SCH MLS/HR: 3; .375 INJECTION, POWDER, FOR SOLUTION INTRAVENOUS at 01:50

## 2018-09-25 RX ADMIN — MORPHINE SULFATE PRN MG: 10 SOLUTION ORAL at 02:17

## 2018-09-25 RX ADMIN — MORPHINE SULFATE PRN MG: 10 SOLUTION ORAL at 07:11

## 2018-09-25 NOTE — PN
Progress Note, Physician


Chief Complaint: 


Mr Burak states that IR biopsied the wrong side. Explained to him that he has 

multiple nodules and they accessed the safest one but he remains convinced and 

demands a second biopsy of the left nodule. Denies cp, sob, n/v. Wants to eat.





- Current Medication List


Current Medications: 


Active Medications





Acetaminophen (Tylenol -)  650 mg PO Q6H PRN


   PRN Reason: PAIN OR FEVER


Diazepam (Valium -)  5 mg PO Q8H PRN


   PRN Reason: MUSCLE SPASMS


   Last Admin: 09/24/18 07:38 Dose:  5 mg


Piperacillin Sod/Tazobactam (Sod 3.375 gm/ Dextrose)  50 mls @ 100 mls/hr IVPB 

Q8H-IV ALE; Protocol


   Last Admin: 09/25/18 11:54 Dose:  100 mls/hr


Sodium Chloride (Normal Saline -)  1,000 mls @ 75 mls/hr IV ASDIR ALE


   Last Admin: 09/24/18 19:05 Dose:  Not Given


Morphine Sulfate (Morphine 10 Mg/5 Ml Liquid)  4 mg PO Q4H PRN


   PRN Reason: PAIN LEVEL 6-10


   Last Admin: 09/25/18 11:54 Dose:  4 mg











- Objective


Vital Signs: 


 Vital Signs











Temperature  36.4 C   09/25/18 09:34


 


Pulse Rate  61   09/25/18 11:46


 


Respiratory Rate  18   09/25/18 11:46


 


Blood Pressure  138/62   09/25/18 11:46


 


O2 Sat by Pulse Oximetry (%)  99   09/25/18 11:09











Constitutional: Yes: Well Nourished, No Distress


Cardiovascular: Yes: Regular Rate and Rhythm.  No: Gallop, Murmur, Rub


Respiratory: Yes: Regular, CTA Bilaterally.  No: Rales, Rhonchi, Wheezes


Gastrointestinal: Yes: Soft, Hyperactive Bowel Sounds.  No: Distention, 

Tenderness


Extremities: Yes: WNL


Edema: No


Labs: 


 CBC, BMP





 09/25/18 07:15 





 09/25/18 07:15 





 INR, PTT











INR  1.00  (0.83-1.09)   09/23/18  06:30    


 


Fibrinogen  > 500.0 mg/dL (238-498)  H  09/23/18  06:30    














Problem List





- Problems


(1) Acute UTI


Code(s): N39.0 - URINARY TRACT INFECTION, SITE NOT SPECIFIED   





(2) Pulmonary nodule


Code(s): R91.1 - SOLITARY PULMONARY NODULE   





(3) Chronic pain


Code(s): G89.29 - OTHER CHRONIC PAIN   


Qualifiers: 


   Chronic pain type: chronic pain syndrome   Qualified Code(s): G89.4 - 

Chronic pain syndrome   





(4) Abdominal pain


Code(s): R10.9 - UNSPECIFIED ABDOMINAL PAIN   


Qualifiers: 


   Abdominal location: unspecified location   Qualified Code(s): R10.9 - 

Unspecified abdominal pain   





Assessment/Plan





(1) Acute UTI


Assessment/Plan: 


-awaiting second organism


-ID following


-continue zosyn


Code(s): N39.0 - URINARY TRACT INFECTION, SITE NOT SPECIFIED   





(2) Pulmonary nodule


Assessment/Plan: 


-s/p biopsy of R nodule


-explained to patient that a second biopsy of the L nodule is not indicated at 

this time


-appreciate oncology assistance


-await biopsy results


-will order bone scan as well


Code(s): R91.1 - SOLITARY PULMONARY NODULE   





(3) Chronic pain


Assessment/Plan: 


-continue home morphine


Code(s): G89.29 - OTHER CHRONIC PAIN   


Qualifiers: 


   Chronic pain type: chronic pain syndrome   Qualified Code(s): G89.4 - 

Chronic pain syndrome   





(4) Abdominal pain


Assessment/Plan: 


-AXR shows possible ileus


-HOWEVER patient has excellent bowel sounds, is hungry, and no pain on palpation


-will restart diet today to see if tolerates


-if has worsening pain or nausea/vomiting, will make NPO and obtain CT scan A/P


Code(s): R10.9 - UNSPECIFIED ABDOMINAL PAIN   


Qualifiers: 


   Abdominal location: unspecified location   Qualified Code(s): R10.9 - 

Unspecified abdominal pain

## 2018-09-25 NOTE — CONS
PULMONARY CONSULTATION

 

REFERRING PHYSICIAN:  Lavelle Ruvalcaba MD

 

HISTORY OF PRESENT ILLNESS:  The patient is a 63-year-old white male with a past

medical history of spinal fracture with a T2-T4 cord transection secondary to trauma,

neurogenic bladder, recurrent urinary tract infections, paroxysmal atrial

fibrillation, _____ anticoagulation, left ankle deformity.  Also, he was recently

noted to have bilateral pulmonary nodules on CAT scan.  He is a nonsmoker.  He was

admitted to [facility] for left ankle swelling and redness. 

Apparently, a home health aide sat on his bed.  He does not normally have pain but at

that time, he felt a mild pain sensation in that area.  He presented to the emergency

room with the above.  The patient was admitted for therapy for cellulitis of the

lower extremity.  Again, the patient is a nonsmoker.  He has no history of

occupational exposures.  

 

PAST MEDICAL HISTORY:  Again, this includes a spinal fracture with an T2-T4 cord

transection secondary to trauma, neurogenic bladder, recurrent urinary tract

infections, paroxysmal atrial fibrillation.  

 

REVIEW OF SYSTEMS:  No orthopnea, PND, chest pain or palpitations.  No cough, no

hemoptysis.  No abdominal pain.  

 

CURRENT MEDICATIONS:  Tylenol, piperacillin, vancomycin, heparin, Valium and

morphine. 

 

PHYSICAL EXAMINATION:

General:  The patient is a well-developed, well-nourished male, awake and alert, in

no acute distress.  

Vital Signs:  He is afebrile.  Blood pressure is 140/64, respiratory rate 18, O2

saturation is 97% on room air.  

HEENT:  Head is normocephalic, atraumatic.  

Neck:  Supple. 

Heart:  Regular.  S1, S2.  

Chest:  Clear. 

Abdomen:  Soft.  Bowel sounds are positive. 

Extremities:  There is swelling of the left lower extremity.  There is erythema of

the left ankle.  

 

IMAGING:  A chest x-ray shows no infiltrates or effusions.  A chest CT done on

September 14, 2018 showed multiple bilateral spiculated masses suspicious for

malignancy and areas of ground-glass opacification bilaterally.  

 

IMPRESSION: 

1.  Left lower extremity cellulitis.

2.  Paraplegia secondary to his T2-T4 spinal cord transection. 

3.  Multiple pulmonary masses/nodules, etiology undetermined; _____ malignancy, _____

inflammatory. 

4.  Hypotension. 

5.  Recurrent urinary tract infections.

6.  Paroxysmal atrial fibrillation. 

 

PLAN:  

1.  Antibiotics as per Infectious Disease. 

2.  Continue current medications. 

3.  We will speak with the interventional radiologist about the possibility of

obtaining a tissue diagnosis with a CT-guided biopsy.  

4.  Obtain his previous workup, which includes PET scans.

 

SHIRA ARANA M.D.

 

[id number]

DD: 09/21/2018 16:53

DT: 09/21/2018 18:04

Job #:  [id number]

## 2018-09-25 NOTE — PN
Progress Note (short form)





- Note


Progress Note: 





PULMONARY





Denies shortness of breath, cough. No fevers or chills. Going for lung biopsy 

today.





 Vital Signs











 Period  Temp  Pulse  Resp  BP Sys/Malone  Pulse Ox


 


 Last 24 Hr  97.6 F-98.4 F  48-86  14-20  125-156/53-86  











Gen:  NAD at rest


Heart: RRR


Lung: decreased breath sounds at the bases


Abd: soft, nontender


Ext: no edema





 CBC, BMP





 09/25/18 07:15 





 09/25/18 07:15 





Active Medications





Acetaminophen (Tylenol -)  650 mg PO Q6H PRN


   PRN Reason: PAIN OR FEVER


Diazepam (Valium -)  5 mg PO Q8H PRN


   PRN Reason: MUSCLE SPASMS


   Last Admin: 09/24/18 07:38 Dose:  5 mg


Piperacillin Sod/Tazobactam (Sod 3.375 gm/ Dextrose)  50 mls @ 100 mls/hr IVPB 

Q8H-IV ALE; Protocol


   Last Admin: 09/25/18 01:50 Dose:  100 mls/hr


Sodium Chloride (Normal Saline -)  1,000 mls @ 75 mls/hr IV ASDIR ALE


   Last Admin: 09/24/18 19:05 Dose:  Not Given


Morphine Sulfate (Morphine 10 Mg/5 Ml Liquid)  4 mg PO Q4H PRN


   PRN Reason: PAIN LEVEL 6-10


   Last Admin: 09/25/18 07:11 Dose:  4 mg








A/P


Lung Nodules


Cellulitis


Paroxysmal Atrial Fibrillation


Paraplegia





-  continue antibiotics per ID


-  for CT guided needle biopsy


-  DVT prophylaxis

## 2018-09-25 NOTE — PN
Progress Note, Physician


History of Present Illness: 





stable


coming from post biopsy fo the lung


things that it was on the wrong side


stable





- Current Medication List


Current Medications: 


Active Medications





Acetaminophen (Tylenol -)  650 mg PO Q6H PRN


   PRN Reason: PAIN OR FEVER


Diazepam (Valium -)  5 mg PO Q8H PRN


   PRN Reason: MUSCLE SPASMS


   Last Admin: 09/24/18 07:38 Dose:  5 mg


Piperacillin Sod/Tazobactam (Sod 3.375 gm/ Dextrose)  50 mls @ 100 mls/hr IVPB 

Q8H-IV ALE; Protocol


   Last Admin: 09/25/18 11:54 Dose:  100 mls/hr


Sodium Chloride (Normal Saline -)  1,000 mls @ 75 mls/hr IV ASDIR ALE


   Last Admin: 09/24/18 19:05 Dose:  Not Given


Morphine Sulfate (Morphine 10 Mg/5 Ml Liquid)  4 mg PO Q4H PRN


   PRN Reason: PAIN LEVEL 6-10


   Last Admin: 09/25/18 11:54 Dose:  4 mg











- Objective


Vital Signs: 


 Vital Signs











Temperature  97.6 F   09/25/18 09:34


 


Pulse Rate  61   09/25/18 11:46


 


Respiratory Rate  18   09/25/18 11:46


 


Blood Pressure  138/62   09/25/18 11:46


 


O2 Sat by Pulse Oximetry (%)  99   09/25/18 11:09











Constitutional: Yes: No Distress, Calm


Cardiovascular: Yes: Regular Rate and Rhythm


Respiratory: Yes: Regular, CTA Bilaterally


Gastrointestinal: Yes: Normal Bowel Sounds, Soft


Musculoskeletal: Yes: WNL


Extremities: Yes: Other


Neurological: Yes: Alert, Oriented


Psychiatric: Yes: Alert, Oriented


Labs: 


 CBC, BMP





 09/25/18 07:15 





 09/25/18 07:15 





 INR, PTT











INR  1.00  (0.83-1.09)   09/23/18  06:30    


 


Fibrinogen  > 500.0 mg/dL (238-498)  H  09/23/18  06:30    














Assessment/Plan





Problem List





- Problems


(1) Acute UTI


Code(s): N39.0 - URINARY TRACT INFECTION, SITE NOT SPECIFIED   





(2) Pulmonary nodule


Code(s): R91.1 - SOLITARY PULMONARY NODULE   





(3) Chronic pain


Code(s): G89.29 - OTHER CHRONIC PAIN   


Qualifiers: 


   Chronic pain type: chronic pain syndrome   Qualified Code(s): G89.4 - 

Chronic pain syndrome   





(4) Abdominal pain


Code(s): R10.9 - UNSPECIFIED ABDOMINAL PAIN   


Qualifiers: 


   Abdominal location: unspecified location   Qualified Code(s): R10.9 - 

Unspecified abdominal pain   





post lung biopsy


plan


continue zosyn


still awaiting another cx report


rest as per the team


biopsy of the lung nodule

## 2018-09-25 NOTE — PN
Physical Exam: 


SUBJECTIVE: Patient seen and examined at bedside. No overnight events. No new 

complaints. Denies CP,HA, SOB, abdominal pain, nausea or vomiting. 








OBJECTIVE:





 Vital Signs











 Period  Temp  Pulse  Resp  BP Sys/Malone  Pulse Ox


 


 Last 24 Hr  97.6 F-98.4 F  48-86  13-20  125-160/53-86  











GENERAL:awake and alert, NAD


Eyes: Yes: Conjunctiva Clear, EOM Intact, PERRL


HENT: Yes: WNL, Atraumatic, Normocephalic


Neck: Yes: WNL, Supple, Trachea Midline


Cardiovascular: Yes: WNL, Regular Rate and Rhythm, S1, S2


Respiratory: Yes: WNL, Regular, CTA Bilaterally


Gastrointestinal: Yes: WNL, Normal Bowel Sounds, Soft


Breast(s): Yes: WNL


Musculoskeletal: Yes: Joint Swelling (left ankle), Muscle Weakness, Other (

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


Extremities: Yes: Deformity (left ankle), Erythema (left ankle)


Edema: Yes


Edema: LLE: 1+


Peripheral Pulses WNL: Yes


Neurological: Yes: Alert


Psychiatric: Yes: WNL, Alert, Oriented








 Laboratory Results - last 24 hr











  09/25/18 09/25/18





  07:15 07:15


 


WBC  10.1 H 


 


RBC  4.19 


 


Hgb  13.0 


 


Hct  39.2 


 


MCV  93.7 


 


MCH  31.2 


 


MCHC  33.3 


 


RDW  14.4 


 


Plt Count  407 


 


MPV  9.2 


 


Absolute Neuts (auto)  7.1 


 


Neutrophils %  70.1 


 


Lymphocytes %  15.9 


 


Monocytes %  10.3 H 


 


Eosinophils %  3.0 


 


Basophils %  0.7 


 


Nucleated RBC %  0 


 


Sodium   139


 


Potassium   3.8


 


Chloride   104


 


Carbon Dioxide   29


 


Anion Gap   6 L


 


BUN   9


 


Creatinine   0.7


 


Creat Clearance w eGFR   > 60


 


Random Glucose   80


 


Calcium   8.5


 


Phosphorus   2.7


 


Magnesium   2.2








Active Medications











Generic Name Dose Route Start Last Admin





  Trade Name Freq  PRN Reason Stop Dose Admin


 


Acetaminophen  650 mg  09/21/18 01:35  





  Tylenol -  PO   





  Q6H PRN   





  PAIN OR FEVER   





     





     





     


 


Diazepam  5 mg  09/21/18 01:33  09/25/18 14:57





  Valium -  PO   5 mg





  Q8H PRN   Administration





  MUSCLE SPASMS   





     





     





     


 


Piperacillin Sod/Tazobactam  50 mls @ 100 mls/hr  09/21/18 13:00  09/25/18 11:54





  Sod 3.375 gm/ Dextrose  IVPB   100 mls/hr





  Q8H-IV LAE   Administration





     





     





  Protocol   





     


 


Sodium Chloride  1,000 mls @ 75 mls/hr  09/24/18 18:15  09/24/18 19:05





  Normal Saline -  IV   Not Given





  ASDIR ALE   





     





     





     





     


 


Morphine Sulfate  4 mg  09/21/18 09:54  09/25/18 15:57





  Morphine 10 Mg/5 Ml Liquid  PO   4 mg





  Q4H PRN   Administration





  PAIN LEVEL 6-10   





     





     





     











ASSESSMENT/PLAN:


This is a 64 y/o man with a PMHx of Spinal Fx T2-T4 cord transection (Lumbar Fx

- fall, 2/28/82), Neurogenic bladder, recurrent UTIs, Paroxysmal Afib (no AC). 








Problem List





- Problems


(1) Pulmonary nodule


Assessment/Plan: 


bilateral pulmonary masses/sclerotic T4 focus


* IR guided biopsy of lung masses today


* Will hold lovenox for 3 days. 








(2) Paroxysmal A-fib








(3) Cellulitis








Visit type





- Emergency Visit


Emergency Visit: Yes


ED Registration Date: 09/20/18


Care time: The patient presented to the Emergency Department on the above date 

and was hospitalized for further evaluation of their emergent condition.





- New Patient


This patient is new to me today: No





- Critical Care


Critical Care patient: No

## 2018-09-26 LAB
ANION GAP SERPL CALC-SCNC: 9 MMOL/L (ref 8–16)
BASOPHILS # BLD: 0.3 % (ref 0–2)
BUN SERPL-MCNC: 9 MG/DL (ref 7–18)
CALCIUM SERPL-MCNC: 8.8 MG/DL (ref 8.5–10.1)
CHLORIDE SERPL-SCNC: 108 MMOL/L (ref 98–107)
CO2 SERPL-SCNC: 24 MMOL/L (ref 21–32)
CREAT SERPL-MCNC: 0.6 MG/DL (ref 0.55–1.3)
DEPRECATED RDW RBC AUTO: 14.2 % (ref 11.9–15.9)
EOSINOPHIL # BLD: 3.3 % (ref 0–4.5)
GLUCOSE SERPL-MCNC: 75 MG/DL (ref 74–106)
HCT VFR BLD CALC: 39.6 % (ref 35.4–49)
HGB BLD-MCNC: 12.9 GM/DL (ref 11.7–16.9)
LYMPHOCYTES # BLD: 15.9 % (ref 8–40)
MAGNESIUM SERPL-MCNC: 2.2 MG/DL (ref 1.8–2.4)
MCH RBC QN AUTO: 30.3 PG (ref 25.7–33.7)
MCHC RBC AUTO-ENTMCNC: 32.6 G/DL (ref 32–35.9)
MCV RBC: 93.2 FL (ref 80–96)
MONOCYTES # BLD AUTO: 8.5 % (ref 3.8–10.2)
NEUTROPHILS # BLD: 72 % (ref 42.8–82.8)
PHOSPHATE SERPL-MCNC: 2.1 MG/DL (ref 2.5–4.9)
PLATELET # BLD AUTO: 407 K/MM3 (ref 134–434)
PMV BLD: 9.3 FL (ref 7.5–11.1)
POTASSIUM SERPLBLD-SCNC: 3.9 MMOL/L (ref 3.5–5.1)
RBC # BLD AUTO: 4.25 M/MM3 (ref 4–5.6)
SODIUM SERPL-SCNC: 142 MMOL/L (ref 136–145)
WBC # BLD AUTO: 11.2 K/MM3 (ref 4–10)

## 2018-09-26 RX ADMIN — MORPHINE SULFATE PRN MG: 10 SOLUTION ORAL at 20:26

## 2018-09-26 RX ADMIN — MORPHINE SULFATE PRN MG: 10 SOLUTION ORAL at 12:37

## 2018-09-26 RX ADMIN — MORPHINE SULFATE PRN MG: 10 SOLUTION ORAL at 05:50

## 2018-09-26 RX ADMIN — PIPERACILLIN SODIUM,TAZOBACTAM SODIUM SCH MLS/HR: 3; .375 INJECTION, POWDER, FOR SOLUTION INTRAVENOUS at 17:32

## 2018-09-26 RX ADMIN — MORPHINE SULFATE PRN MG: 10 SOLUTION ORAL at 16:25

## 2018-09-26 RX ADMIN — PIPERACILLIN SODIUM,TAZOBACTAM SODIUM SCH MLS/HR: 3; .375 INJECTION, POWDER, FOR SOLUTION INTRAVENOUS at 01:19

## 2018-09-26 RX ADMIN — MORPHINE SULFATE PRN MG: 10 SOLUTION ORAL at 00:16

## 2018-09-26 RX ADMIN — PIPERACILLIN SODIUM,TAZOBACTAM SODIUM SCH MLS/HR: 3; .375 INJECTION, POWDER, FOR SOLUTION INTRAVENOUS at 09:45

## 2018-09-26 NOTE — PN
Progress Note, Physician


Chief Complaint: 


Mr Sumner complains of LLQ abdominal pain that he thinks is exacerbated by 

eating. No cp, sob, n/v.





- Current Medication List


Current Medications: 


Active Medications





Acetaminophen (Tylenol -)  650 mg PO Q6H PRN


   PRN Reason: PAIN OR FEVER


Diazepam (Valium -)  5 mg PO Q8H PRN


   PRN Reason: MUSCLE SPASMS


   Last Admin: 09/25/18 14:57 Dose:  5 mg


Piperacillin Sod/Tazobactam (Sod 3.375 gm/ Dextrose)  50 mls @ 100 mls/hr IVPB 

Q8H-IV ALE; Protocol


   Last Admin: 09/26/18 09:45 Dose:  100 mls/hr


Sodium Chloride (Normal Saline -)  1,000 mls @ 75 mls/hr IV ASDIR ALE


   Last Admin: 09/25/18 18:24 Dose:  Not Given


Morphine Sulfate (Morphine 10 Mg/5 Ml Liquid)  4 mg PO Q4H PRN


   PRN Reason: PAIN LEVEL 6-10


   Last Admin: 09/26/18 05:50 Dose:  4 mg











- Objective


Vital Signs: 


 Vital Signs











Temperature  36.8 C   09/26/18 10:00


 


Pulse Rate  64   09/26/18 10:00


 


Respiratory Rate  16   09/26/18 10:00


 


Blood Pressure  113/48 L  09/26/18 10:00


 


O2 Sat by Pulse Oximetry (%)  97   09/25/18 21:00











Constitutional: Yes: Well Nourished, No Distress, Calm


Cardiovascular: Yes: Regular Rate and Rhythm.  No: Gallop, Murmur, Rub


Respiratory: Yes: Regular, CTA Bilaterally.  No: Rales, Rhonchi, Wheezes


Gastrointestinal: Yes: Normal Bowel Sounds, Soft.  No: Distention, Tenderness


Extremities: Yes: WNL


Edema: No


Labs: 


 CBC, BMP





 09/26/18 06:30 





 09/26/18 06:30 





 INR, PTT











INR  1.00  (0.83-1.09)   09/23/18  06:30    


 


Fibrinogen  > 500.0 mg/dL (238-498)  H  09/23/18  06:30    














Problem List





- Problems


(1) Acute UTI


Code(s): N39.0 - URINARY TRACT INFECTION, SITE NOT SPECIFIED   





(2) Pulmonary nodule


Code(s): R91.1 - SOLITARY PULMONARY NODULE   





(3) Chronic pain


Code(s): G89.29 - OTHER CHRONIC PAIN   


Qualifiers: 


   Chronic pain type: chronic pain syndrome   Qualified Code(s): G89.4 - 

Chronic pain syndrome   





(4) Abdominal pain


Code(s): R10.9 - UNSPECIFIED ABDOMINAL PAIN   


Qualifiers: 


   Abdominal location: unspecified location   Qualified Code(s): R10.9 - 

Unspecified abdominal pain   





Assessment/Plan





(1) Acute UTI


Assessment/Plan: 


-growing serratia and pseudomonas


-currently on zosyn


-however both susceptible to levaquin


-will d/w ID about possible change 


Code(s): N39.0 - URINARY TRACT INFECTION, SITE NOT SPECIFIED   





(2) Pulmonary nodule


Assessment/Plan: 


-awaiting results


-bone scan today


-oncology following


Code(s): R91.1 - SOLITARY PULMONARY NODULE   





(3) Chronic pain


Assessment/Plan: 


-continue home morphine


Code(s): G89.29 - OTHER CHRONIC PAIN   


Qualifiers: 


   Chronic pain type: chronic pain syndrome   Qualified Code(s): G89.4 - 

Chronic pain syndrome   





(4) Abdominal pain


Assessment/Plan: 


-says abdominal pain is worse today


-normal bowel sounds and no pain on palpation


-however since concern for possible ileus, will obtain CT scan


-possibly secondary to constipation or his chronic abdominal pain


Code(s): R10.9 - UNSPECIFIED ABDOMINAL PAIN   


Qualifiers: 


   Abdominal location: unspecified location   Qualified Code(s): R10.9 - 

Unspecified abdominal pain

## 2018-09-26 NOTE — PN
Progress Note (short form)





- Note


Progress Note: 





PULMONARY





s/p CT guided needle biopsy yesterday. Denies shortness of breath, cough. No 

fevers or chills. 





 Vital Signs











 Period  Temp  Pulse  Resp  BP Sys/Malone  Pulse Ox


 


 Last 24 Hr  98.1 F-98.8 F  62-97  16-20  /45-74  97











Gen:  NAD at rest


Heart: RRR


Lung: decreased breath sounds at the bases


Abd: soft, nontender


Ext: no edema





 CBC, BMP





 09/26/18 06:30 





 09/26/18 06:30 





Active Medications





Acetaminophen (Tylenol -)  650 mg PO Q6H PRN


   PRN Reason: PAIN OR FEVER


Diazepam (Valium -)  5 mg PO Q8H PRN


   PRN Reason: MUSCLE SPASMS


   Last Admin: 09/25/18 14:57 Dose:  5 mg


Piperacillin Sod/Tazobactam (Sod 3.375 gm/ Dextrose)  50 mls @ 100 mls/hr IVPB 

Q8H-IV ALE; Protocol


   Last Admin: 09/26/18 09:45 Dose:  100 mls/hr


Sodium Chloride (Normal Saline -)  1,000 mls @ 75 mls/hr IV ASDIR ALE


   Last Admin: 09/25/18 18:24 Dose:  Not Given


Potassium Phosphate 8 mm/ (Sodium Chloride)  252.6667 mls @ 63.16 mls/hr IVPB 

ONCE ONE


   Stop: 09/26/18 15:00


   Last Admin: 09/26/18 12:21 Dose:  63.16 mls/hr


Morphine Sulfate (Morphine 10 Mg/5 Ml Liquid)  4 mg PO Q4H PRN


   PRN Reason: PAIN LEVEL 6-10


   Last Admin: 09/26/18 12:37 Dose:  4 mg








A/P


Lung Nodules


Cellulitis


Paroxysmal Atrial Fibrillation


Paraplegia





-  continue antibiotics per ID


-  f/u pathology


-  DVT prophylaxis

## 2018-09-26 NOTE — PN
Physical Exam: 


SUBJECTIVE:  Patient seen and examined at bedside. No overnight events, 

continues to complain of abdominal pain. "I need more pain meds".  Denies CP,HA

, SOB, nausea or vomiting. 








OBJECTIVE:





 Vital Signs











 Period  Temp  Pulse  Resp  BP Sys/Malone  Pulse Ox


 


 Last 24 Hr  98.2 F-98.8 F  61-64  16-20  110-156/48-69  97-98











GENERAL: AAOx3, NAD


ENT: moist mucous membranes.


LUNGS: CTAB, no wheezes, no crackles, no accessory muscle use. 


HEART: RRR, S1, S2 without murmur, rub or gallop.


ABDOMEN: Soft, NT/ND,NABS no guarding, no rebound, no hepatosplenomegaly, no 

masses.


EXTREMITIES: 2+ pulses, warm, well-perfused, no edema. 


NEUROLOGICAL: paraplegic from waist down. 














 Laboratory Results - last 24 hr











  09/26/18 09/26/18





  06:30 06:30


 


WBC  11.2 H 


 


RBC  4.25 


 


Hgb  12.9 


 


Hct  39.6 


 


MCV  93.2 


 


MCH  30.3 


 


MCHC  32.6 


 


RDW  14.2 


 


Plt Count  407 


 


MPV  9.3 


 


Absolute Neuts (auto)  8.0 


 


Neutrophils %  72.0 


 


Lymphocytes %  15.9 


 


Monocytes %  8.5 


 


Eosinophils %  3.3 


 


Basophils %  0.3 


 


Nucleated RBC %  0 


 


Sodium   142


 


Potassium   3.9


 


Chloride   108 H


 


Carbon Dioxide   24


 


Anion Gap   9


 


BUN   9


 


Creatinine   0.6


 


Creat Clearance w eGFR   > 60


 


Random Glucose   75


 


Calcium   8.8


 


Phosphorus   2.1 L


 


Magnesium   2.2








Active Medications











Generic Name Dose Route Start Last Admin





  Trade Name Freq  PRN Reason Stop Dose Admin


 


Acetaminophen  650 mg  09/21/18 01:35  





  Tylenol -  PO   





  Q6H PRN   





  PAIN OR FEVER   





     





     





     


 


Diazepam  5 mg  09/21/18 01:33  09/25/18 14:57





  Valium -  PO   5 mg





  Q8H PRN   Administration





  MUSCLE SPASMS   





     





     





     


 


Piperacillin Sod/Tazobactam  50 mls @ 100 mls/hr  09/21/18 13:00  09/26/18 17:32





  Sod 3.375 gm/ Dextrose  IVPB   100 mls/hr





  Q8H-IV ALE   Administration





     





     





  Protocol   





     


 


Sodium Chloride  1,000 mls @ 75 mls/hr  09/24/18 18:15  09/25/18 18:24





  Normal Saline -  IV   Not Given





  ASDIR ALE   





     





     





     





     


 


Morphine Sulfate  4 mg  09/21/18 09:54  09/26/18 16:25





  Morphine 10 Mg/5 Ml Liquid  PO   4 mg





  Q4H PRN   Administration





  PAIN LEVEL 6-10   





     





     





     











ASSESSMENT/PLAN:


This is a 64 y/o man with a PMHx of Spinal Fx T2-T4 cord transection (Lumbar Fx

- fall, 2/28/82), Neurogenic bladder, recurrent UTIs, Paroxysmal Afib (no AC). 

Found to have pulmonary nodule. 





Problem List





- Problems


(1) Pulmonary nodule


Assessment/Plan: 


bilateral pulmonary masses/sclerotic T4 focus


* IR guided biopsy of lung mass results pending


 








(2) Paroxysmal A-fib








(3) Cellulitis








Visit type





- Emergency Visit


Emergency Visit: Yes


ED Registration Date: 09/20/18


Care time: The patient presented to the Emergency Department on the above date 

and was hospitalized for further evaluation of their emergent condition.





- New Patient


This patient is new to me today: No





- Critical Care


Critical Care patient: No





- Discharge Referral


Referred to [facility] No

## 2018-09-26 NOTE — PN
Progress Note, Physician


History of Present Illness: 





some abd pain


otherwise is stable





- Current Medication List


Current Medications: 


Active Medications





Acetaminophen (Tylenol -)  650 mg PO Q6H PRN


   PRN Reason: PAIN OR FEVER


Diazepam (Valium -)  5 mg PO Q8H PRN


   PRN Reason: MUSCLE SPASMS


   Last Admin: 09/25/18 14:57 Dose:  5 mg


Piperacillin Sod/Tazobactam (Sod 3.375 gm/ Dextrose)  50 mls @ 100 mls/hr IVPB 

Q8H-IV ALE; Protocol


   Last Admin: 09/26/18 09:45 Dose:  100 mls/hr


Sodium Chloride (Normal Saline -)  1,000 mls @ 75 mls/hr IV ASDIR ALE


   Last Admin: 09/25/18 18:24 Dose:  Not Given


Potassium Phosphate 8 mm/ (Sodium Chloride)  252.6667 mls @ 63.16 mls/hr IVPB 

ONCE ONE


   Stop: 09/26/18 15:00


   Last Admin: 09/26/18 12:21 Dose:  63.16 mls/hr


Morphine Sulfate (Morphine 10 Mg/5 Ml Liquid)  4 mg PO Q4H PRN


   PRN Reason: PAIN LEVEL 6-10


   Last Admin: 09/26/18 12:37 Dose:  4 mg











- Objective


Vital Signs: 


 Vital Signs











Temperature  98.3 F   09/26/18 10:00


 


Pulse Rate  64   09/26/18 10:00


 


Respiratory Rate  16   09/26/18 10:00


 


Blood Pressure  113/48 L  09/26/18 10:00


 


O2 Sat by Pulse Oximetry (%)  97   09/25/18 21:00











Constitutional: Yes: Calm, Mild Distress


Cardiovascular: Yes: Regular Rate and Rhythm


Respiratory: Yes: Regular, CTA Bilaterally


Gastrointestinal: Yes: Normal Bowel Sounds, Soft


Extremities: Yes: Other


Neurological: Yes: Alert, Oriented


Psychiatric: Yes: Alert, Oriented


Labs: 


 CBC, BMP





 09/26/18 06:30 





 09/26/18 06:30 





 INR, PTT











INR  1.00  (0.83-1.09)   09/23/18  06:30    


 


Fibrinogen  > 500.0 mg/dL (238-498)  H  09/23/18  06:30    














Assessment/Plan





Problem List





- Problems


(1) Acute UTI


Code(s): N39.0 - URINARY TRACT INFECTION, SITE NOT SPECIFIED   





(2) Pulmonary nodule


Code(s): R91.1 - SOLITARY PULMONARY NODULE   





(3) Chronic pain


Code(s): G89.29 - OTHER CHRONIC PAIN   


Qualifiers: 


   Chronic pain type: chronic pain syndrome   Qualified Code(s): G89.4 - 

Chronic pain syndrome   





(4) Abdominal pain


Code(s): R10.9 - UNSPECIFIED ABDOMINAL PAIN   


Qualifiers: 


   Abdominal location: unspecified location   Qualified Code(s): R10.9 - 

Unspecified abdominal pain   





post lung biopsy





plan


continue zosyn


will deescalate after today


rest continue current mgmt

## 2018-09-27 VITALS — HEART RATE: 68 BPM

## 2018-09-27 VITALS — DIASTOLIC BLOOD PRESSURE: 68 MMHG | TEMPERATURE: 98.2 F | SYSTOLIC BLOOD PRESSURE: 147 MMHG

## 2018-09-27 LAB
ANION GAP SERPL CALC-SCNC: 12 MMOL/L (ref 8–16)
BASOPHILS # BLD: 1.2 % (ref 0–2)
BUN SERPL-MCNC: 7 MG/DL (ref 7–18)
CALCIUM SERPL-MCNC: 9 MG/DL (ref 8.5–10.1)
CHLORIDE SERPL-SCNC: 106 MMOL/L (ref 98–107)
CO2 SERPL-SCNC: 23 MMOL/L (ref 21–32)
CREAT SERPL-MCNC: 0.6 MG/DL (ref 0.55–1.3)
DEPRECATED RDW RBC AUTO: 14.4 % (ref 11.9–15.9)
EOSINOPHIL # BLD: 5.1 % (ref 0–4.5)
GLUCOSE SERPL-MCNC: 63 MG/DL (ref 74–106)
HCT VFR BLD CALC: 38.8 % (ref 35.4–49)
HGB BLD-MCNC: 12.6 GM/DL (ref 11.7–16.9)
LYMPHOCYTES # BLD: 15.3 % (ref 8–40)
MAGNESIUM SERPL-MCNC: 2 MG/DL (ref 1.8–2.4)
MCH RBC QN AUTO: 30.5 PG (ref 25.7–33.7)
MCHC RBC AUTO-ENTMCNC: 32.4 G/DL (ref 32–35.9)
MCV RBC: 94 FL (ref 80–96)
MONOCYTES # BLD AUTO: 10.6 % (ref 3.8–10.2)
NEUTROPHILS # BLD: 67.8 % (ref 42.8–82.8)
PHOSPHATE SERPL-MCNC: 2.6 MG/DL (ref 2.5–4.9)
PLATELET # BLD AUTO: 400 K/MM3 (ref 134–434)
PMV BLD: 9.8 FL (ref 7.5–11.1)
POTASSIUM SERPLBLD-SCNC: 4.5 MMOL/L (ref 3.5–5.1)
RBC # BLD AUTO: 4.13 M/MM3 (ref 4–5.6)
SODIUM SERPL-SCNC: 142 MMOL/L (ref 136–145)
WBC # BLD AUTO: 10.9 K/MM3 (ref 4–10)

## 2018-09-27 RX ADMIN — SODIUM CHLORIDE SCH MLS/HR: 9 INJECTION, SOLUTION INTRAVENOUS at 06:45

## 2018-09-27 RX ADMIN — MORPHINE SULFATE PRN MG: 10 SOLUTION ORAL at 10:31

## 2018-09-27 RX ADMIN — PIPERACILLIN SODIUM,TAZOBACTAM SODIUM SCH MLS/HR: 3; .375 INJECTION, POWDER, FOR SOLUTION INTRAVENOUS at 10:31

## 2018-09-27 RX ADMIN — MORPHINE SULFATE PRN MG: 10 SOLUTION ORAL at 06:49

## 2018-09-27 RX ADMIN — MORPHINE SULFATE PRN MG: 10 SOLUTION ORAL at 01:03

## 2018-09-27 RX ADMIN — SODIUM CHLORIDE SCH: 9 INJECTION, SOLUTION INTRAVENOUS at 07:37

## 2018-09-27 RX ADMIN — PIPERACILLIN SODIUM,TAZOBACTAM SODIUM SCH MLS/HR: 3; .375 INJECTION, POWDER, FOR SOLUTION INTRAVENOUS at 01:07

## 2018-09-27 NOTE — DS
Physical Examination


Vital Signs: 


 Vital Signs











Temperature  36.8 C   09/27/18 06:00


 


Pulse Rate  68   09/27/18 06:00


 


Respiratory Rate  18   09/27/18 06:00


 


Blood Pressure  160/58 L  09/27/18 06:00


 


O2 Sat by Pulse Oximetry (%)  97   09/26/18 21:00











Constitutional: Yes: Well Nourished, No Distress, Calm


Cardiovascular: Yes: Regular Rate and Rhythm.  No: Gallop, Murmur, Rub


Respiratory: Yes: Regular, CTA Bilaterally.  No: Rales, Rhonchi, Wheezes


Gastrointestinal: Yes: Normal Bowel Sounds, Soft.  No: Distention, Tenderness


Extremities: Yes: WNL


Edema: No


Labs: 


 CBC, BMP





 09/27/18 06:44 





 09/27/18 06:44 











Discharge Summary


Reason For Visit: CELLULITIS


Current Active Problems





Anxiety (Acute)


Cellulitis (Acute)


Fracture of tibia, distal, left, closed (Acute)


Leukocytosis (Acute)


Paroxysmal A-fib (Acute)








Hospital Course: 





(1) Acute UTI


Code(s): N39.0 - URINARY TRACT INFECTION, SITE NOT SPECIFIED   





(2) Pulmonary nodule


Code(s): R91.1 - SOLITARY PULMONARY NODULE   





(3) Chronic pain


Code(s): G89.29 - OTHER CHRONIC PAIN   


Qualifiers: 


   Chronic pain type: chronic pain syndrome   Qualified Code(s): G89.4 - 

Chronic pain syndrome   





(4) Abdominal pain


Code(s): R10.9 - UNSPECIFIED ABDOMINAL PAIN   


Qualifiers: 


   Abdominal location: unspecified location   Qualified Code(s): R10.9 - 

Unspecified abdominal pain   





Mr Sumner is a 63 year old male who comes in with acute UTI secondary to 

pseudomonas and serratia and biopsy for a pulmonary nodule. He was admitted to 

the hospital and seen by ID. He was placed on zosyn, when the cultures resulted 

both bacteria are sensitive to levaquin and per ID he can finish his course 

with oral levaquin. He was seen by pulmonary and oncology. He underwent lung 

biopsy and results are pending. He also had a bone scan with uptake at L1. 

Results are pending but can be followed up as an outpatient. He is currently 

stable for discharge home.





33 minutes spent in preparation of this discharge


Condition: Stable





- Instructions


Diet, Activity, Other Instructions: 


resume previous diet and activity


Referrals: 


Sony Poon MD [Primary Care Provider] - 


Disposition: VNS/HOME HEALTH CARE





- Home Medications


Comprehensive Discharge Medication List: 


Ambulatory Orders





Diazepam [Valium] 10 mg PO TID PRN #30 tablet MDD 30mg 01/09/17 


Morphine 10 mg/5 ml Liquid [Morphine 10 mg/5 mL Liquid -] 4 mg PO Q4H PRN  ml 

MDD 60mg 09/27/18 


levoFLOXacin [Levaquin] 750 mg PO DAILY #7 tab 09/27/18

## 2018-09-27 NOTE — PN
Progress Note (short form)





- Note


Progress Note: 





PULMONARY





Pathology pending. Denies shortness of breath, cough. No fevers or chills. 





 Vital Signs











 Period  Temp  Pulse  Resp  BP Sys/Malone  Pulse Ox


 


 Last 24 Hr  97.6 F-98.6 F  61-69  18-20  150-160/58-69  97











Gen:  NAD at rest


Heart: RRR


Lung: decreased breath sounds at the bases


Abd: soft, nontender


Ext: no edema





 CBC, BMP





 09/27/18 06:44 





 09/27/18 06:44 





Active Medications





Acetaminophen (Tylenol -)  650 mg PO Q6H PRN


   PRN Reason: PAIN OR FEVER


Diazepam (Valium -)  5 mg PO Q8H PRN


   PRN Reason: MUSCLE SPASMS


   Last Admin: 09/27/18 06:51 Dose:  5 mg


Piperacillin Sod/Tazobactam (Sod 3.375 gm/ Dextrose)  50 mls @ 100 mls/hr IVPB 

Q8H-IV ALE; Protocol


   Last Admin: 09/27/18 01:07 Dose:  100 mls/hr


Sodium Chloride (Normal Saline -)  1,000 mls @ 75 mls/hr IV ASDIR ALE


   Last Admin: 09/27/18 07:37 Dose:  Not Given


Morphine Sulfate (Morphine 10 Mg/5 Ml Liquid)  4 mg PO Q4H PRN


   PRN Reason: PAIN LEVEL 6-10


   Last Admin: 09/27/18 06:49 Dose:  4 mg











A/P


Lung Nodules


Cellulitis


Paroxysmal Atrial Fibrillation


Paraplegia





-  continue antibiotics per ID


-  f/u pathology, can be followed up as outpt


-  DVT prophylaxis

## 2018-10-01 NOTE — PATH
Surgical Pathology Report



Patient Name:  ELIAZAR ESCALANTE

Accession #:  [id number]

Med. Rec. #:  [id number]                                                        

   /Age/Gender:  1955 (Age: 63) / M

Account:  [id number]                                                          

             Location: [facility]

Taken:  2018

Received:  2018

Reported:  10/1/2018

Physicians:  Joesph Garay M.D.

Lavelle Ruvalcaba M.D.

  



Specimen(s) Received

 RIGHT LUNG BIOPSY 





Clinical History

Bilateral lung spiculated lesions with ground glass opacities, rule out lung

primary cancer.







Final Diagnosis

RIGHT LUNG, BIOPSY:

ADENOCARCINOMA, WITH PAPILLARY FEATURES, CONSISTENT WITH LUNG PRIMARY.



COMMENT: Immunohistochemical stained slides demonstrate tumor cells to be

positive for TTF-1, CK 7, Napsin A, while negative for Thyroglobulin, CK20, and

P40. The morphology and immunophenotype support a diagnosis of adenocarcinoma of

lung primary.



Immunohistochemistry stains Thyroglobulin, Napsin A, and P40 performed at [place], [place] ([id number]) interpreted at [facility]. 

Immunohistochemistry stains CK7, CK20, and TTF-1 performed and interpreted at

[facility]. 

Positive and negative controls (internal if applicable) show appropriate

results.

Intradepartmental case reviewed with consensus on diagnosis. This case was

discussed with Dr. Ruvalcaba on 2018.





***Electronically Signed***

Bill Felder M.D.





Gross Description

Received in formalin, labeled "right lung biopsy", are multiple tan, cylindrical

soft tissue measuring 0.4 x 0.3 x 0.1 cm in aggregate. The specimen is submitted

in toto in one cassette.

KWS/[email or web address]



pelon/[email or web address]

## 2018-10-04 ENCOUNTER — HOSPITAL ENCOUNTER (EMERGENCY)
Dept: HOSPITAL 74 - JER | Age: 63
Discharge: HOME | End: 2018-10-04
Payer: COMMERCIAL

## 2018-10-04 VITALS — TEMPERATURE: 98.6 F | HEART RATE: 62 BPM | SYSTOLIC BLOOD PRESSURE: 150 MMHG | DIASTOLIC BLOOD PRESSURE: 71 MMHG

## 2018-10-04 VITALS — BODY MASS INDEX: 18.8 KG/M2

## 2018-10-04 DIAGNOSIS — N39.0: ICD-10-CM

## 2018-10-04 DIAGNOSIS — J44.9: ICD-10-CM

## 2018-10-04 DIAGNOSIS — G95.9: ICD-10-CM

## 2018-10-04 DIAGNOSIS — R10.32: Primary | ICD-10-CM

## 2018-10-04 LAB
ALBUMIN SERPL-MCNC: 3 G/DL (ref 3.4–5)
ALP SERPL-CCNC: 173 U/L (ref 45–117)
ALT SERPL-CCNC: 18 U/L (ref 13–61)
ANION GAP SERPL CALC-SCNC: 7 MMOL/L (ref 8–16)
APPEARANCE UR: (no result)
AST SERPL-CCNC: 16 U/L (ref 15–37)
BASOPHILS # BLD: 1 % (ref 0–2)
BILIRUB SERPL-MCNC: 0.5 MG/DL (ref 0.2–1)
BILIRUB UR STRIP.AUTO-MCNC: NEGATIVE MG/DL
BNP SERPL-MCNC: 146.5 PG/ML (ref 5–125)
BUN SERPL-MCNC: 6 MG/DL (ref 7–18)
CALCIUM SERPL-MCNC: 9.2 MG/DL (ref 8.5–10.1)
CHLORIDE SERPL-SCNC: 105 MMOL/L (ref 98–107)
CO2 SERPL-SCNC: 26 MMOL/L (ref 21–32)
COLOR UR: (no result)
CREAT SERPL-MCNC: 0.6 MG/DL (ref 0.55–1.3)
DEPRECATED RDW RBC AUTO: 14.6 % (ref 11.9–15.9)
EOSINOPHIL # BLD: 1.8 % (ref 0–4.5)
GLUCOSE SERPL-MCNC: 97 MG/DL (ref 74–106)
HCT VFR BLD CALC: 41.7 % (ref 35.4–49)
HGB BLD-MCNC: 13.7 GM/DL (ref 11.7–16.9)
KETONES UR QL STRIP: NEGATIVE
LEUKOCYTE ESTERASE UR QL STRIP.AUTO: NEGATIVE
LYMPHOCYTES # BLD: 12.7 % (ref 8–40)
MCH RBC QN AUTO: 30.2 PG (ref 25.7–33.7)
MCHC RBC AUTO-ENTMCNC: 32.8 G/DL (ref 32–35.9)
MCV RBC: 92.1 FL (ref 80–96)
MONOCYTES # BLD AUTO: 10 % (ref 3.8–10.2)
NEUTROPHILS # BLD: 74.5 % (ref 42.8–82.8)
NITRITE UR QL STRIP: NEGATIVE
PH UR: 7 [PH] (ref 5–8)
PHOSPHATE SERPL-MCNC: 2 MG/DL (ref 2.5–4.9)
PLATELET # BLD AUTO: 638 K/MM3 (ref 134–434)
PMV BLD: 9.7 FL (ref 7.5–11.1)
POTASSIUM SERPLBLD-SCNC: 4.4 MMOL/L (ref 3.5–5.1)
PROT SERPL-MCNC: 7.2 G/DL (ref 6.4–8.2)
PROT UR QL STRIP: NEGATIVE
PROT UR QL STRIP: NEGATIVE
RBC # BLD AUTO: 4.53 M/MM3 (ref 4–5.6)
SODIUM SERPL-SCNC: 138 MMOL/L (ref 136–145)
SP GR UR: 1.01 (ref 1–1.03)
UROBILINOGEN UR STRIP-MCNC: NEGATIVE MG/DL (ref 0.2–1)
WBC # BLD AUTO: 9 K/MM3 (ref 4–10)

## 2018-10-04 PROCEDURE — 3E033NZ INTRODUCTION OF ANALGESICS, HYPNOTICS, SEDATIVES INTO PERIPHERAL VEIN, PERCUTANEOUS APPROACH: ICD-10-PCS | Performed by: EMERGENCY MEDICINE

## 2018-10-04 NOTE — PDOC
Attending Attestation





- Resident


Resident Name: Ghislaine Kirby





- ED Attending Attestation


I have performed the following: I have examined & evaluated the patient, The 

case was reviewed & discussed with the resident, I agree w/resident's findings 

& plan, Exceptions are as noted





- HPI


HPI: 





10/04/18 17:04


"The patient is a 63 year old male, with a significant past medical history of 

spinal fracture (w/ T2-T4 cord transection), neurogenic bladder (w/ recurrent 

UTI), paroxysmal afib (not on AC) who presents to the emergency department with 

bilateral breast pain today. He states he had a lung biopsy recently and has 

been having pain at the biopsy site. Denies SOB. Denies F/C. Pt also reports 

"bladder pain". States that he has a soreness in his lower abdomen that is 

usually a sign of a bladder infection. He denies any other complaints at this 

time. 





The patient denies fever, chills, nausea, vomit, diarrhea and constipation. The 

patient denies dysuria, frequency, urgency and hematuria. 





Allergies: NKDA


PCP - Dr. Jordan Poon


"





- Physicial Exam


PE: 





10/04/18 17:06


GENERAL: Awake, alert, and fully oriented, in no acute distress.


HEAD: No signs of trauma


EYES: PERRLA, EOMI, sclera anicteric, conjunctiva clear


ENT: Auricles normal inspection, hearing grossly normal, nares patent, 

oropharynx clear without exudates. Moist mucosa


NECK: Nontender, no stepoffs, Normal ROM, supple, no lymphadenopathy, JVD, or 

masses


LUNGS: + Biopsy site well healing, no erythema/induration/fluctuance, Breath 

sounds equal, clear to auscultation bilaterally.  No wheezes, and no crackles


HEART: Regular rate and rhythm, normal S1 and S2, no murmurs, rubs or gallops


ABDOMEN: Soft, nontender, normoactive bowel sounds.  No guarding, no rebound.  

No masses


EXTREMITIES: Normal range of motion, no edema.  No clubbing or cyanosis. No 

cords, erythema, or tenderness


NEUROLOGICAL: Cranial nerves II through XII intact.


SKIN: Warm, Dry, normal turgor, no rashes or lesions noted.





- Medical Decision Making





10/04/18 17:07


63 M with pain at biopsy sites and suprapubic discomfort. Will evaluate for 

pneumo or other complication 2/2 lung biopsy. Also check labs and urine to r/o 

UTI.


- labs, UA


- CXR


- Pain control








Labs, XR unremarkable


Pt is well appearing, with normal vitals. Clinically stable for DC at this time.


I discussed the physical exam findings, ancillary test results and final 

diagnoses with the patient. I answered all of the patient's questions. The 

patient was satisfied with the care received and felt comfortable with the 

discharge plan and treatment plan.  The patient agrees to follow up with the 

primary care physician within 24-72 hours.

## 2018-10-04 NOTE — PDOC
History of Present Illness





- General


Chief Complaint: Pain


Stated Complaint: ABD PAIN


Time Seen by Provider: 10/04/18 14:04


History Source: Patient


Exam Limitations: No Limitations





- History of Present Illness


Initial Comments: 





10/04/18 14:45


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 

with recurrent UTI, COPD, splenectomy presenting to ED with complaints of 

"bladder pain" and breast pain. Pt said he called Dr. Heller about the breast 

this morning and was told to come to the hospital. He had a biopsy done and pt 

states the he was told it was cancerous. His bladder pain started about 3 days 

ago and he states it feels like a uti. He denies fever, chills, new rashes/

lesions, cough, shortness of breath, chest pain, headache, neck pain, back pain

, n/v/d (he does not have control over bladder/bowel). 





PCP: Arron


PMH: see hpi


PSH: wrist surgery, splenectomy


Meds: 


Social: denies


Allergies: nkda








Past History





- Past Medical History


Allergies/Adverse Reactions: 


 Allergies











Allergy/AdvReac Type Severity Reaction Status Date / Time


 


No Known Allergies Allergy   Verified 10/04/18 13:13











Home Medications: 


Ambulatory Orders





NK [No Known Home Medication]  10/04/18 








Anemia: No


Asthma: No


Cancer: No


Cardiac Disorders: No


CVA: No


COPD: Yes


CHF: No


Dementia: No


Diabetes: No


GI Disorders: No


 Disorders: Yes (Recurrent UTI, CONDOM CATHETER)


HTN:  (Hypotension)


Hypercholesterolemia: No


Liver Disease: No


Seizures: No


Thyroid Disease: No


Other medical history: PARAPALGIA





- Surgical History


Abdominal Surgery: No


Appendectomy: No


Cardiac Surgery: No


Cholecystectomy: No


Lung Surgery: No


Neurologic Surgery: No


Orthopedic Surgery: Yes (fracture right wrist s/p fusion)





- Immunization History


Td Vaccination: No


Immunization Up to Date: No





- Suicide/Smoking/Psychosocial Hx


Smoking Status: No


Smoking History: Never smoked


Years of Tobacco Use: 0


Have you smoked in the past 12 months: No


Number of Cigarettes Smoked Daily: 3


If you are a former smoker, when did you quit?: 1986


Cigars Per Day: 0


Information on smoking cessation initiated: No


Hx Alcohol Use: Yes (social)


Drug/Substance Use Hx: No


Substance Use Type: None


Hx Substance Use Treatment: No





**Review of Systems





- Review of Systems


Able to Perform ROS?: Yes


Comments:: 





10/04/18 23:38


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 

axilla. 


Is the patient limited English proficient: No


Constitutional: No: Chills, Fever


HEENTM: No: Recent change in vision, Nose Congestion, Throat Pain


Respiratory: No: Cough, Shortness of Breath, Hemoptysis


Cardiac (ROS): No: Chest Pain, Lightheadedness, Palpitations, Syncope


ABD/GI: Yes: Other (Pt does not have control of bowels. ).  No: Nausea, Rectal 

Bleeding, Vomiting, Tarry Stools


: Yes: Other ("Bladder pain" Pt does not have control of bladder and does not 

have sensation of fullness or urgency.).  No: Hematuria, Testicular Mass


Musculoskeletal: Yes: Muscle Pain ("breast pain"), Other (Pt does not have 

sensation below axilla)


Integumentary: No: Erythema, Lesions, Rash


Neurological: Yes: Pre-Existing Deficit (paralyzed and no sensation below axilla

).  No: Headache, Numbness, Tingling, Tremors


Hematologic/Lymphatic: No: Blood Clots





*Physical Exam





- Vital Signs


 Last Vital Signs











Temp Pulse Resp BP Pulse Ox


 


 98.6 F   62   18   150/71   94 L


 


 10/04/18 13:08  10/04/18 13:08  10/04/18 13:08  10/04/18 13:08  10/04/18 13:08














- Physical Exam


Comments: 





10/04/18 23:41


Pt lying in stretcher on R side and is holding condom catheter in place.


Pt is paralyzed below the arms and does not have sensation below the nipple line


L foot in ace wrap and posterior board from previous admission.


10/04/18 23:44





10/04/18 23:46





General Appearance: Yes: Appropriately Dressed, Other (legs are thin and 

contracted).  No: Apparent Distress


HEENT: positive: EOMI, TRISH, Normal ENT Inspection, Pharynx Normal, Hearing 

Grossly Normal.  negative: Pale Conjunctivae, Scleral Icterus (R), Scleral 

Icterus (L), Nasal Congestion, Sinus Tenderness


Neck: positive: Trachea midline, Supple.  negative: Carotid bruit, 

Lymphadenopathy (R), Lymphadenopathy (L)


Respiratory/Chest: positive: Lungs Clear, Normal Breath Sounds.  negative: 

Crackles, Rales, Rhonchi, Stridor, Wheezing


Cardiovascular: positive: Regular Rhythm, Regular Rate, S1, S2.  negative: Edema

, JVD, Murmur


Vascular Pulses: Carotid (R): 2+, Carotid (L): 2+, Dorsalis-Pedis (R): 2+, 

Doralis-Pedis (L): 2+


Gastrointestinal/Abdominal: positive: Normal Bowel Sounds, Soft.  negative: 

Distended, Guarding, Rebound, Tenderness (Pt does not have sensation)


Male Genitalia: positive: normal genitalia, other (no scrotal rash).  negative: 

testicular tenderness (Pt does not have sensation), testicular mass, hematuria


Musculoskeletal: positive: Other (full ROM of upper extremities)


Extremity: positive: Normal Capillary Refill, Pedal Edema (Edema of L foot. ), 

Calf Tenderness.  negative: Erythema


Integumentary: positive: Normal Color, Dry, Warm.  negative: Rash


Neurologic: positive: CNs II-XII NML intact, Fully Oriented, Alert, Normal Mood/

Affect, Normal Response, Motor Strength 5/5 (in upper extremities)





ED Treatment Course





- LABORATORY


CBC & Chemistry Diagram: 


 10/04/18 15:00





 10/04/18 15:50





Medical Decision Making





- Medical Decision Making





10/04/18 16:53


62yo m with PMH of spinal cord transection at t4, neurogenic bladder with 

recurrent uti, COPD, newly diagnosed lung andenocarcinoma presenting to ED with 

"bladder and breast pain" 


pt recently had biopsy on R side. Will check for lung pathology. 


UA- negative for infection. Labs hemolyzed, had to be redrawn. CBC wnl





second cmp wnl. 





Spoke to Dr. Poon. He is aware pt is in the ED. Stated that patient should 

receive outpt follow up for lung ca. 


deferring abdominal ct at this time, no abdominal symptoms (n/v) and UA 

negative for uti.  CXR- no new changes since CXR in september. R lobe density 

resolved.


Pt was given 4mg morphine for pain management. 





Spoke to patient regarding outpatient follow up for lung cancer with Dr. Lyon. 

Pt agreed and did not want to stay in hospital. Pain adequately managed with 

morhpine. Pt is taking morphine at home. Pt hemodynamically stable, afebrile, 

labs wnl and has outpatient follow up, home aide. Safe for d/c home. Pt agreed 

with plan. Given strict return precautions. PT and aide verbalized understanding





10/04/18 18:07


Pt still complaining of pain while waiting for transportation back home. Will 

order another 4mg morphine








*DC/Admit/Observation/Transfer


Diagnosis at time of Disposition: 


Abdominal pain


Qualifiers:


 Abdominal location: left lower quadrant Qualified Code(s): R10.32 - Left lower 

quadrant pain








- Discharge Dispostion


Disposition: HOME


Condition at time of disposition: Good


Decision to Admit order: No





- Referrals


Referrals: 


Sony Poon MD [Primary Care Provider] - 


Juan Lyon MD [Staff Physician] - 





- Patient Instructions


Additional Instructions: 


You were seen here today because you were having bladder pain and breast pain. 

All your tests were normal. 





Please remember to follow up with Dr. Poon and make an appointment to see Dr. Lyon for further evaluation and management of your lungs. 


His number is [phone]





Please come back to the emergency room if: your pain gets worse, you notice a 

change in the color of your urine, you notice blood in the stools, you develop 

a new rash or skin changes, you start to cough up blood, you develop fever, you 

develop chest pain or if any new concerning symptom develops. 





Thank you





- Post Discharge Activity

## 2018-10-05 NOTE — EKG
Test Reason : 

Blood Pressure : ***/*** mmHG

Vent. Rate : 056 BPM     Atrial Rate : 056 BPM

   P-R Int : 146 ms          QRS Dur : 084 ms

    QT Int : 440 ms       P-R-T Axes : 054 073 086 degrees

   QTc Int : 424 ms

 

SINUS BRADYCARDIA

OTHERWISE NORMAL ECG

WHEN COMPARED WITH ECG OF 20-SEP-2018 16:59,

VENT. RATE HAS DECREASED BY  40 BPM

Confirmed by HESHAM ELIZONDO MD ([id number]) on 10/5/2018 9:31:19 AM

 

Referred By:             Confirmed By:HESHAM ELIZONDO MD

## 2018-10-08 ENCOUNTER — HOSPITAL ENCOUNTER (OUTPATIENT)
Dept: HOSPITAL 74 - JER | Age: 63
Setting detail: OBSERVATION
LOS: 2 days | Discharge: HOME HEALTH | End: 2018-10-10
Attending: INTERNAL MEDICINE | Admitting: INTERNAL MEDICINE
Payer: COMMERCIAL

## 2018-10-08 VITALS — BODY MASS INDEX: 20.7 KG/M2

## 2018-10-08 DIAGNOSIS — I48.0: ICD-10-CM

## 2018-10-08 DIAGNOSIS — G89.29: Primary | ICD-10-CM

## 2018-10-08 DIAGNOSIS — G82.20: ICD-10-CM

## 2018-10-08 DIAGNOSIS — C34.91: ICD-10-CM

## 2018-10-08 DIAGNOSIS — D72.829: ICD-10-CM

## 2018-10-08 DIAGNOSIS — F41.9: ICD-10-CM

## 2018-10-08 LAB
ALBUMIN SERPL-MCNC: 3 G/DL (ref 3.4–5)
ALP SERPL-CCNC: 175 U/L (ref 45–117)
ALT SERPL-CCNC: 14 U/L (ref 13–61)
ANION GAP SERPL CALC-SCNC: 8 MMOL/L (ref 8–16)
AST SERPL-CCNC: 25 U/L (ref 15–37)
BASOPHILS # BLD: 0.4 % (ref 0–2)
BILIRUB SERPL-MCNC: 0.5 MG/DL (ref 0.2–1)
BUN SERPL-MCNC: 6 MG/DL (ref 7–18)
CALCIUM SERPL-MCNC: 8.5 MG/DL (ref 8.5–10.1)
CHLORIDE SERPL-SCNC: 104 MMOL/L (ref 98–107)
CO2 SERPL-SCNC: 26 MMOL/L (ref 21–32)
CREAT SERPL-MCNC: 0.6 MG/DL (ref 0.55–1.3)
DEPRECATED RDW RBC AUTO: 14.4 % (ref 11.9–15.9)
EOSINOPHIL # BLD: 1.3 % (ref 0–4.5)
GLUCOSE SERPL-MCNC: 93 MG/DL (ref 74–106)
HCT VFR BLD CALC: 42.9 % (ref 35.4–49)
HGB BLD-MCNC: 14.2 GM/DL (ref 11.7–16.9)
LYMPHOCYTES # BLD: 8.9 % (ref 8–40)
MCH RBC QN AUTO: 30.4 PG (ref 25.7–33.7)
MCHC RBC AUTO-ENTMCNC: 33.2 G/DL (ref 32–35.9)
MCV RBC: 91.5 FL (ref 80–96)
MONOCYTES # BLD AUTO: 9.1 % (ref 3.8–10.2)
NEUTROPHILS # BLD: 80.3 % (ref 42.8–82.8)
PLATELET # BLD AUTO: 591 K/MM3 (ref 134–434)
PLATELET BLD QL SMEAR: (no result)
PMV BLD: 8.6 FL (ref 7.5–11.1)
POTASSIUM SERPLBLD-SCNC: 4.9 MMOL/L (ref 3.5–5.1)
PROT SERPL-MCNC: 7.3 G/DL (ref 6.4–8.2)
RBC # BLD AUTO: 4.68 M/MM3 (ref 4–5.6)
SODIUM SERPL-SCNC: 138 MMOL/L (ref 136–145)
WBC # BLD AUTO: 13.8 K/MM3 (ref 4–10)

## 2018-10-08 PROCEDURE — 3E033NZ INTRODUCTION OF ANALGESICS, HYPNOTICS, SEDATIVES INTO PERIPHERAL VEIN, PERCUTANEOUS APPROACH: ICD-10-PCS | Performed by: INTERNAL MEDICINE

## 2018-10-08 PROCEDURE — G0378 HOSPITAL OBSERVATION PER HR: HCPCS

## 2018-10-08 PROCEDURE — 3E033GC INTRODUCTION OF OTHER THERAPEUTIC SUBSTANCE INTO PERIPHERAL VEIN, PERCUTANEOUS APPROACH: ICD-10-PCS | Performed by: INTERNAL MEDICINE

## 2018-10-08 RX ADMIN — MORPHINE SULFATE PRN MG: 10 SOLUTION ORAL at 23:40

## 2018-10-08 RX ADMIN — SENNOSIDES SCH: 8.6 TABLET, FILM COATED ORAL at 23:37

## 2018-10-08 RX ADMIN — DOCUSATE SODIUM SCH: 100 CAPSULE, LIQUID FILLED ORAL at 23:37

## 2018-10-08 NOTE — PDOC
History of Present Illness





- General


Stated Complaint: ABD,CHEST,BACK PAIN


Time Seen by Provider: 10/08/18 14:12


History Source: Patient, Primary Care Provider





Past History





- Past Medical History


Allergies/Adverse Reactions: 


 Allergies











Allergy/AdvReac Type Severity Reaction Status Date / Time


 


No Known Allergies Allergy   Verified 10/08/18 14:10











Home Medications: 


Ambulatory Orders





NK [No Known Home Medication]  10/08/18 








Anemia: No


Asthma: No


Cancer: No


Cardiac Disorders: No


CVA: No


COPD: Yes


CHF: No


Dementia: No


Diabetes: No


GI Disorders: No


 Disorders: Yes (Recurrent UTI, CONDOM CATHETER)


HTN:  (Hypotension)


Hypercholesterolemia: No


Liver Disease: No


Seizures: No


Thyroid Disease: No





- Surgical History


Abdominal Surgery: No


Appendectomy: No


Cardiac Surgery: No


Cholecystectomy: No


Lung Surgery: No


Neurologic Surgery: No


Orthopedic Surgery: Yes (fracture right wrist s/p fusion)





- Immunization History


Td Vaccination: No


Immunization Up to Date: No





- Suicide/Smoking/Psychosocial Hx


Smoking Status: No


Smoking History: Never smoked


Years of Tobacco Use: 0


Have you smoked in the past 12 months: No


Number of Cigarettes Smoked Daily: 3


If you are a former smoker, when did you quit?: 1986


Cigars Per Day: 0


Hx Alcohol Use: Yes (social)


Drug/Substance Use Hx: No


Substance Use Type: None


Hx Substance Use Treatment: No





**Review of Systems





- Review of Systems


Constitutional: No: Chills, Fever


Respiratory: No: Cough, Shortness of Breath


Cardiac (ROS): Yes: Chest Pain.  No: Lightheadedness, Palpitations


ABD/GI: No: Constipated, Diarrhea, Nausea, Rectal Bleeding, Vomiting


: No: Hematuria





*Physical Exam





- Physical Exam


General Appearance: Yes: Appropriately Dressed.  No: Apparent Distress


HEENT: positive: Normal Voice


Neck: positive: Supple


Respiratory/Chest: positive: Lungs Clear, Normal Breath Sounds.  negative: 

Respiratory Distress


Cardiovascular: positive: Regular Rate, S1, S2


Gastrointestinal/Abdominal: positive: Soft.  negative: Tender, Distended


Musculoskeletal: negative: CVA Tenderness


Integumentary: positive: Dry, Warm


Neurologic: positive: Fully Oriented, Alert, Normal Mood/Affect, Other (no 

sensation below nipple line (2/2 SCI remotely))





ED Treatment Course





- LABORATORY


CBC & Chemistry Diagram: 


 10/08/18 15:15





 10/08/18 15:15





- RADIOLOGY


Radiology Studies Ordered: 














 Category Date Time Status


 


 CHEST X-RAY PORTABLE* [RAD] Stat Radiology  10/08/18 14:22 Ordered














Medical Decision Making





- Medical Decision Making





10/08/18 14:26





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 

pafib (not on AC), chronic back/chest/abd pain, on morphine, newly diagnosed 

lung ca (not yet been treated), here w/ his usual chronic pain, worse 

yesterday. States he has no pain medication at home. Denies any SOB, diaphoresis

, palpitations, leg pain/swelling, n/v/f/c





See exam





Acute on chronic pain


Ran out of pain meds


Denies acute sxs


Stable and well steve w/ unremarkable exam


-pain control


-ekg/cxr/labs


-discuss dispo w/ PMD








10/08/18 14:36


Case discussed with Dr. Poon, patient's PMD, who confirmed that patient is on 

4 mg of morphine for his chronic pain.  Reports that he only sees patient on 

home visits as patient only travels by [facility] which makes it difficult for 

patient to visit office.  Due to this limit in transportation, patient has not 

yet been evaluated by oncology to determine if lung mass represents primary 

versus metastatic disease. Agrees with attempt to control pain in ED and 

discharge if improved, otherwise admit to [facility] for intractable pain.  If 

admitted, plan is to have oncology see patient in-house 








10/08/18 16:22


Work up unremarkable. R lung mass re-demonstrated on XR. On reassessment, 

patient continues to report pain and states he does not feel well enough to go 

home.  As discussed with Dr. Poon, will admit to Dr. Ruvalcaba for intractable 

pain





10/08/18 16:29


Case discussed with Dr. Ruvalcaba who confirms that patient has primary lung 

adenocarcinoma.  Recommends admission to Obs for intractable back pain.  I 

placed consult to Dr. Lyon


10/08/18 16:47








*DC/Admit/Observation/Transfer


Diagnosis at time of Disposition: 


 Intractable pain








- Discharge Dispostion


Condition at time of disposition: Fair


Decision to Admit order: Yes





- Referrals





- Patient Instructions





- Post Discharge Activity

## 2018-10-08 NOTE — HP
Admitting History and Physical





- Primary Care Physician


PCP: Sony Poon





- Admission


Chief Complaint: I'm hurting everywhere


History of Present Illness: 


Mr Sumner is a 63 year old male with chronic pain who comes in with 

exacerbation of his chronic pain. He normally takes morphine 4mg q4h prn 

however he says that he has ran out and has not taken it for a long time. Last 

night the pain in his back, chest, and abdomen intensified. He says it is a 10/

10. He was hoping that it would resolve but it has not. Because of this he is 

coming into the hospital. He denies fevers, chills, lightheadedness, dizziness, 

passing out, chest pressure, shortness of breath, nausea, vomiting, diarrhea, 

or swelling. He has chronic constipation and this is unchanged.


History Source: Patient


Limitations to Obtaining History: No Limitations





- Past Medical History


CNS: Yes: Other (paraplegia due to accidental fall (T2 spinal fracture))


Gastrointestinal: Yes: Other (Hepatic hemangiomas)


Renal/: Yes: Other (Bladder dysfunction)


Heme/Onc: Yes: Cancer (adenocarcinoma of the lung), Other (Neurogenic bladder w

/ h/o UTI)


Infectious Disease: Yes: Other (multiple UTIs  Pseudomonas)


Musculoskeletal: Yes: Paraplegia, Other (Chronic pain)





- Past Surgical History


Past Surgical History: Yes: Splenectomy





- Smoking History


Smoking history: Never smoked


Have you smoked in the past 12 months: No


Aproximately how many cigarettes per day: 3


If you are a former smoker, when did you quit?: 1986





- Alcohol/Substance Use


Hx Alcohol Use: Yes (social)


History of Substance Use: reports: Marijuana





- Social History


Usual Living Arrangement: Yes: Alone


ADL: Support Services ([facility] (24hr))


History of Recent Travel: No





Home Medications





- Allergies


Allergies/Adverse Reactions: 


 Allergies











Allergy/AdvReac Type Severity Reaction Status Date / Time


 


No Known Allergies Allergy   Verified 10/08/18 14:10














- Home Medications


Home Medications: 


Ambulatory Orders





NK [No Known Home Medication]  10/08/18 











Family Disease History





- Family Disease History


Family Disease History: Heart Disease: Father





Review of Systems


Findings/Remarks: 


Full review of systems obtained, as per HPI and otherwise negative.





Physical Examination


Vital Signs: 


 Vital Signs











Temperature  36.6 C   10/08/18 14:50


 


Pulse Rate  68   10/08/18 14:50


 


Respiratory Rate  18   10/08/18 14:50


 


Blood Pressure  123/62   10/08/18 14:50


 


O2 Sat by Pulse Oximetry (%)  97   10/08/18 14:50











Constitutional: Yes: Well Nourished, No Distress, Calm


Eyes: Yes: Conjunctiva Clear, EOM Intact, PERRL


HENT: Yes: Atraumatic, Normocephalic


Cardiovascular: Yes: Regular Rate and Rhythm.  No: Gallop, Murmur, Rub


Respiratory: Yes: Regular, CTA Bilaterally.  No: Rales, Rhonchi, Wheezes


Gastrointestinal: Yes: Normal Bowel Sounds, Soft.  No: Distention, Tenderness


Extremities: Yes: WNL


Edema: No


Labs: 


 CBC, BMP





 10/08/18 15:15 





 10/08/18 15:15 











Imaging





- Results


Chest X-ray: Report Reviewed, Image Reviewed





Problem List





- Problems


(1) Intractable pain


Assessment/Plan: 


-patient with acute exacerbation of pain secondary to not having morphine


-received IV morphine in the ED


-will place back on morphing 4mg q4h prn


-pain management consult, may need adjustment of pain regimen


Code(s): R52 - PAIN, UNSPECIFIED   





(2) Adenocarcinoma of right lung


Assessment/Plan: 


-biopsied and diagnosed last admission


-patient with difficulty going to outpatient office visits


-request oncology to see while here and discuss options


Code(s): C34.91 - MALIGNANT NEOPLASM OF UNSP PART OF RIGHT BRONCHUS OR LUNG   





(3) Anxiety


Assessment/Plan: 


-continue valium 10mg tid prn


Code(s): F41.9 - ANXIETY DISORDER, UNSPECIFIED   





(4) Leukocytosis


Assessment/Plan: 


-monitor


Code(s): D72.829 - ELEVATED WHITE BLOOD CELL COUNT, UNSPECIFIED

## 2018-10-08 NOTE — EKG
Test Reason : 

Blood Pressure : ***/*** mmHG

Vent. Rate : 059 BPM     Atrial Rate : 059 BPM

   P-R Int : 154 ms          QRS Dur : 086 ms

    QT Int : 400 ms       P-R-T Axes : 064 070 082 degrees

   QTc Int : 396 ms

 

SINUS BRADYCARDIA

OTHERWISE NORMAL ECG

WHEN COMPARED WITH ECG OF 04-OCT-2018 13:43,

NO SIGNIFICANT CHANGE WAS FOUND

Confirmed by JAMES THAKUR MD ([id number]) on 10/8/2018 3:32:59 PM

 

Referred By:             Confirmed By:JAMES THAKUR MD

## 2018-10-09 LAB
ANION GAP SERPL CALC-SCNC: 4 MMOL/L (ref 8–16)
BASOPHILS # BLD: 0.6 % (ref 0–2)
BUN SERPL-MCNC: 12 MG/DL (ref 7–18)
CALCIUM SERPL-MCNC: 8.7 MG/DL (ref 8.5–10.1)
CHLORIDE SERPL-SCNC: 106 MMOL/L (ref 98–107)
CO2 SERPL-SCNC: 26 MMOL/L (ref 21–32)
CREAT SERPL-MCNC: 0.6 MG/DL (ref 0.55–1.3)
DEPRECATED RDW RBC AUTO: 14.6 % (ref 11.9–15.9)
EOSINOPHIL # BLD: 3.4 % (ref 0–4.5)
GLUCOSE SERPL-MCNC: 80 MG/DL (ref 74–106)
HCT VFR BLD CALC: 40.6 % (ref 35.4–49)
HGB BLD-MCNC: 13.1 GM/DL (ref 11.7–16.9)
LYMPHOCYTES # BLD: 19.5 % (ref 8–40)
MAGNESIUM SERPL-MCNC: 2 MG/DL (ref 1.8–2.4)
MCH RBC QN AUTO: 29.8 PG (ref 25.7–33.7)
MCHC RBC AUTO-ENTMCNC: 32.2 G/DL (ref 32–35.9)
MCV RBC: 92.6 FL (ref 80–96)
MONOCYTES # BLD AUTO: 7.3 % (ref 3.8–10.2)
NEUTROPHILS # BLD: 69.2 % (ref 42.8–82.8)
PHOSPHATE SERPL-MCNC: 2.5 MG/DL (ref 2.5–4.9)
PLATELET # BLD AUTO: 488 K/MM3 (ref 134–434)
PMV BLD: 9.4 FL (ref 7.5–11.1)
POTASSIUM SERPLBLD-SCNC: 4.3 MMOL/L (ref 3.5–5.1)
RBC # BLD AUTO: 4.39 M/MM3 (ref 4–5.6)
SODIUM SERPL-SCNC: 136 MMOL/L (ref 136–145)
WBC # BLD AUTO: 12.1 K/MM3 (ref 4–10)

## 2018-10-09 RX ADMIN — MORPHINE SULFATE PRN MG: 10 SOLUTION ORAL at 07:03

## 2018-10-09 RX ADMIN — DOCUSATE SODIUM SCH: 100 CAPSULE, LIQUID FILLED ORAL at 13:26

## 2018-10-09 RX ADMIN — SENNOSIDES SCH: 8.6 TABLET, FILM COATED ORAL at 22:31

## 2018-10-09 RX ADMIN — DOCUSATE SODIUM SCH MG: 100 CAPSULE, LIQUID FILLED ORAL at 22:25

## 2018-10-09 RX ADMIN — DOCUSATE SODIUM SCH: 100 CAPSULE, LIQUID FILLED ORAL at 06:57

## 2018-10-09 RX ADMIN — MORPHINE SULFATE PRN MG: 10 SOLUTION ORAL at 22:25

## 2018-10-09 RX ADMIN — MORPHINE SULFATE PRN MG: 10 SOLUTION ORAL at 19:03

## 2018-10-09 RX ADMIN — MORPHINE SULFATE PRN MG: 10 SOLUTION ORAL at 15:02

## 2018-10-09 RX ADMIN — POLYETHYLENE GLYCOL 3350 SCH GM: 17 POWDER, FOR SOLUTION ORAL at 10:16

## 2018-10-09 NOTE — PN
Teaching Attending Note


Name of Resident: Brian Castrejon





ATTENDING PHYSICIAN STATEMENT





I saw and evaluated the patient.


I reviewed the resident's note and discussed the case with the resident.


I agree with the resident's findings and plan as documented.








SUBJECTIVE:Patient seen and examined 


Discussed with HCP


Will ask path  to do mutation analysis on tumor. 


Unfortunately multiple masses bilateral lungs.








OBJECTIVE:








ASSESSMENT AND PLAN:

## 2018-10-09 NOTE — CONSULT
Consultation: 


REQUESTING PROVIDER:





CONSULT REQUEST: We have been asked to medically evaluate this patient for 

adenocarcinoma of lung. 





HISTORY OF PRESENT ILLNESS:


62 y/o man with a past medical history of Spinal Fracture (w/ T2-T4 cord 

transection), Neurogenic Bladder (w/ recurrent UTI), Paroxysmal Afib (not on AC

) and recent diagnosis of adenocarcinoma of lung presents with worsening pain. 

He states that pain is located mainly in spine and radiates to stomach. He take 

morphine at home but has not resolved with home dose. He was recently admitted 

for recurrent UTI and was found to have lung mass that was biopsied and found 

to have adeno of lung. Denies HA, SOB, nausea, vomiting, fever or chills. 





- Past Medical History


CNS: Yes: Other (paraplegia due to accidental fall (T2 spinal fracture))


Gastrointestinal: Yes: Other (Hepatic hemangiomas)


Renal/: Yes: Other (Bladder dysfunction)


Heme/Onc: Yes: Cancer (adenocarcinoma of the lung), Other (Neurogenic bladder w

/ h/o UTI)


Infectious Disease: Yes: Other (multiple UTIs  Pseudomonas)


Musculoskeletal: Yes: Paraplegia, Other (Chronic pain)





- Past Surgical History


Past Surgical History: Yes: Splenectomy





- Smoking History


Smoking history: Never smoked


Have you smoked in the past 12 months: No


Aproximately how many cigarettes per day: 3


If you are a former smoker, when did you quit?: 1986





- Alcohol/Substance Use


Hx Alcohol Use: Yes (social)


History of Substance Use: reports: Marijuana





- Social History


Usual Living Arrangement: Yes: Alone


ADL: Support Services ([facility] (24hr))


History of Recent Travel: No











REVIEW OF SYSTEMS:


CONSTITUTIONAL: 


Absent:  fever, chills, diaphoresis, generalized weakness, malaise, loss of 

appetite, weight change


HEENT: 


Absent:  rhinorrhea, nasal congestion, throat pain, throat swelling, difficulty 

swallowing, mouth swelling, ear pain, eye pain, visual changes


CARDIOVASCULAR: 


Absent: chest pain, syncope, palpitations, irregular heart rate, lightheadedness

, peripheral edema


RESPIRATORY: 


Absent: cough, shortness of breath, dyspnea with exertion, orthopnea, wheezing, 

stridor, hemoptysis


GASTROINTESTINAL:


Absent: abdominal pain, abdominal distension, nausea, vomiting, diarrhea, 

constipation, melena, hematochezia


GENITOURINARY: 


Absent: dysuria, frequency, urgency, hesitancy, hematuria, flank pain, genital 

pain


MUSCULOSKELETAL: back pain,


Absent: myalgia, arthralgia, joint swelling,  neck pain


SKIN: 


Absent: rash, itching, pallor


HEMATOLOGIC/IMMUNOLOGIC: 


Absent: easy bleeding, easy bruising, lymphadenopathy, frequent infections


ENDOCRINE:


Absent: unexplained weight gain, unexplained weight loss, heat intolerance, 

cold intolerance


NEUROLOGIC: 


Absent: headache, focal weakness or paresthesias, dizziness, unsteady gait, 

seizure, mental status changes, bladder or bowel incontinence


PSYCHIATRIC: 


Absent: anxiety, depression, suicidal or homicidal ideation, hallucinations.





PHYSICAL EXAMINATION





 Vital Signs - 24 hr











  10/08/18 10/08/18 10/08/18





  14:50 22:03 22:50


 


Temperature 97.9 F 98.1 F 97.5 F L


 


Pulse Rate 68  92 H


 


Pulse Rate [  68 





Apical]   


 


Respiratory 18 18 20





Rate   


 


Blood Pressure 123/62  165/71


 


Blood Pressure  129/72 





[Arm]   


 


O2 Sat by Pulse 97 98 





Oximetry (%)   














  10/09/18 10/09/18





  00:43 05:20


 


Temperature  98.3 F


 


Pulse Rate  64


 


Pulse Rate [  





Apical]  


 


Respiratory  20





Rate  


 


Blood Pressure  133/72


 


Blood Pressure  





[Arm]  


 


O2 Sat by Pulse 98 





Oximetry (%)  














GENERAL: AAOx3, NAD


HEAD: NCAT


EYES: PERRLA, EOMI, sclera anicteric, conjunctiva clear. 


EARS, NOSE, THROAT: Moist mucous membranes.


NECK: Supple without lymphadenopathy, JVD, or masses.


LUNGS: CTAB. No wheezes, and no crackles. No accessory muscle use.


HEART: RRR, normal S1 and S2 without murmur, rub or gallop.


ABDOMEN: Soft, NTND, NABS, no guarding, no rebound, no masses.  No hepatomegaly 

or splenomegaly. 


MUSCULOSKELETAL: Decreased ROM LE. Left ankle bony deformity. contracture of 

right hand. 


UPPER EXTREMITIES: contracture of right hand. no edema. 


LOWER EXTREMITIES: 2+ pulses, warm, well-perfused. No peripheral edema. 


NEUROLOGICAL:  Cranial nerves II-XII intact. Normal speech. Flacid paralysis to 

bilateral LE. 5/5 upper ext. strength bilaterallly.  sensation loss from level 

of T4 and below.  


PSYCHIATRIC: Appropriate mood and affect.


SKIN: Warm, dry, normal turgor, no rashes or lesions noted. 











 Laboratory Results - last 24 hr











  10/08/18 10/08/18 10/09/18





  15:15 15:15 07:10


 


WBC   13.8 H  12.1 H


 


RBC   4.68  4.39


 


Hgb   14.2  13.1


 


Hct   42.9  40.6


 


MCV   91.5  92.6


 


MCH   30.4  29.8


 


MCHC   33.2  32.2


 


RDW   14.4  14.6


 


Plt Count   591 H  488 H


 


MPV   8.6  D  9.4


 


Absolute Neuts (auto)   11.0 H  8.4 H


 


Neutrophils %   80.3  69.2


 


Neutrophils % (Manual)   78.0 


 


Band Neutrophils %   0.0 


 


Lymphocytes %   8.9  D  19.5  D


 


Lymphocytes % (Manual)   10.0  D 


 


Monocytes %   9.1  7.3


 


Monocytes % (Manual)   5 


 


Eosinophils %   1.3  3.4  D


 


Eosinophils % (Manual)   1.0 


 


Basophils %   0.4  0.6


 


Basophils % (Manual)   0.0 


 


Myelocytes % (Man)   4 H D 


 


Nucleated RBC %   0  0


 


Metamyelocytes   1  D 


 


Platelet Estimate   Increased 


 


Platelet Comment   Large platelets 


 


Sodium  138  


 


Potassium  4.9  


 


Chloride  104  


 


Carbon Dioxide  26  


 


Anion Gap  8  


 


BUN  6 L  


 


Creatinine  0.6  


 


Creat Clearance w eGFR  > 60  


 


Random Glucose  93  


 


Calcium  8.5  


 


Phosphorus   


 


Magnesium   


 


Total Bilirubin  0.5  


 


AST  25  


 


ALT  14  


 


Alkaline Phosphatase  175 H  


 


Creatine Kinase  95  


 


Troponin I  < 0.02  


 


Total Protein  7.3  


 


Albumin  3.0 L  














  10/09/18





  07:10


 


WBC 


 


RBC 


 


Hgb 


 


Hct 


 


MCV 


 


MCH 


 


MCHC 


 


RDW 


 


Plt Count 


 


MPV 


 


Absolute Neuts (auto) 


 


Neutrophils % 


 


Neutrophils % (Manual) 


 


Band Neutrophils % 


 


Lymphocytes % 


 


Lymphocytes % (Manual) 


 


Monocytes % 


 


Monocytes % (Manual) 


 


Eosinophils % 


 


Eosinophils % (Manual) 


 


Basophils % 


 


Basophils % (Manual) 


 


Myelocytes % (Man) 


 


Nucleated RBC % 


 


Metamyelocytes 


 


Platelet Estimate 


 


Platelet Comment 


 


Sodium  136


 


Potassium  4.3


 


Chloride  106


 


Carbon Dioxide  26


 


Anion Gap  4 L


 


BUN  12


 


Creatinine  0.6


 


Creat Clearance w eGFR  > 60


 


Random Glucose  80


 


Calcium  8.7


 


Phosphorus  2.5


 


Magnesium  2.0


 


Total Bilirubin 


 


AST 


 


ALT 


 


Alkaline Phosphatase 


 


Creatine Kinase 


 


Troponin I 


 


Total Protein 


 


Albumin 








Active Medications











Generic Name Dose Route Start Last Admin





  Trade Name Freq  PRN Reason Stop Dose Admin


 


Acetaminophen  650 mg  10/08/18 16:42  





  Tylenol -  PO   





  Q4H PRN   





  PAIN LEVEL 1-5   





     





     





     


 


Diazepam  10 mg  10/08/18 16:45  10/08/18 23:39





  Valium -  PO   10 mg





  TID PRN   Administration





  ANXIETY   





     





     





     


 


Docusate Sodium  100 mg  10/08/18 22:00  10/09/18 06:57





  Colace -  PO   Not Given





  TID ALE   





     





     





     





     


 


Morphine Sulfate  4 mg  10/08/18 16:45  10/09/18 07:03





  Morphine 10 Mg/5 Ml Liquid  PO   4 mg





  Q4H PRN   Administration





  PAIN LEVEL 6-10   





     





     





     


 


Ondansetron HCl  4 mg  10/08/18 16:42  





  Zofran Injection  IVPUSH   





  Q6H PRN   





  NAUSEA   





     





     





     


 


Polyethylene Glycol  17 gm  10/09/18 10:00  10/09/18 10:16





  Miralax (For Daily Use) -  PO   17 gm





  DAILY ALE   Administration





     





     





     





     


 


Senna  2 tab  10/08/18 22:00  10/08/18 23:37





  Senna -  PO   Not Given





  HS ALE   





     





     





     





     











ASSESSMENT/PLAN:





Dispo: We will continue to follow the patient. Thank you for this consultative 

opportunity.











Problem List





- Problems


(1) Adenocarcinoma of right lung


Assessment/Plan: 


Given his functional status not candidate for Chemo. 


* multiple areas of both lungs makes surgery or stereotactic RT  not options. 


* CT of abdomen did not show definitive mets. 


* Will send pathology for PDL-1 and mutation analysis to see if he is candidate 

for immunotherapy. 











(2) Intractable pain


Assessment/Plan: 


pain control with IV narcotics. 


* possible referall to pain management. 


* Diazepam (Valium -)  10 mg PO TID


* Morphine Sulfate (Morphine 10 Mg/5 Ml Liquid)  4 mg PO Q4H


* Polyethylene Glycol (Miralax (For Daily Use) -)  17 gm PO DAILY 


* Senna (Senna -)  2 tab PO HS ALE








(3) Paraplegia








Visit type





- Emergency Visit


Emergency Visit: Yes


ED Registration Date: 10/08/18


Care time: The patient presented to the Emergency Department on the above date 

and was hospitalized for further evaluation of their emergent condition.





- New Patient


This patient is new to me today: Yes


Date on this admission: 10/09/18





- Critical Care


Critical Care patient: No

## 2018-10-09 NOTE — PN
Progress Note, Physician


Chief Complaint: 


Mr Sumner says he is still having pain that is diffuse. Denies chest pressure, 

sob, n/v.





- Current Medication List


Current Medications: 


Active Medications





Acetaminophen (Tylenol -)  650 mg PO Q4H PRN


   PRN Reason: PAIN LEVEL 1-5


Diazepam (Valium -)  10 mg PO TID PRN


   PRN Reason: ANXIETY


   Last Admin: 10/08/18 23:39 Dose:  10 mg


Docusate Sodium (Colace -)  100 mg PO TID [facility]


   Last Admin: 10/09/18 13:26 Dose:  Not Given


Morphine Sulfate (Morphine 10 Mg/5 Ml Liquid)  4 mg PO Q4H PRN


   PRN Reason: PAIN LEVEL 6-10


   Last Admin: 10/09/18 07:03 Dose:  4 mg


Ondansetron HCl (Zofran Injection)  4 mg IVPUSH Q6H PRN


   PRN Reason: NAUSEA


Polyethylene Glycol (Miralax (For Daily Use) -)  17 gm PO DAILY [facility]


   Last Admin: 10/09/18 10:16 Dose:  17 gm


Senna (Senna -)  2 tab PO HS [facility]


   Last Admin: 10/08/18 23:37 Dose:  Not Given











- Objective


Vital Signs: 


 Vital Signs











Temperature  36.2 C L  10/09/18 08:05


 


Pulse Rate  61   10/09/18 08:05


 


Respiratory Rate  18   10/09/18 08:05


 


Blood Pressure  126/69   10/09/18 08:05


 


O2 Sat by Pulse Oximetry (%)  98   10/09/18 00:43











Constitutional: Yes: Well Nourished, No Distress, Calm


Cardiovascular: Yes: Regular Rate and Rhythm.  No: Gallop, Murmur, Rub


Respiratory: Yes: Regular, CTA Bilaterally.  No: Rales, Rhonchi, Wheezes


Gastrointestinal: Yes: Normal Bowel Sounds, Soft.  No: Distention, Tenderness


Extremities: Yes: WNL


Edema: No


Labs: 


 CBC, BMP





 10/09/18 07:10 





 10/09/18 07:10 











Problem List





- Problems


(1) Intractable pain


Code(s): R52 - PAIN, UNSPECIFIED   





(2) Adenocarcinoma of right lung


Code(s): C34.91 - MALIGNANT NEOPLASM OF UNSP PART OF RIGHT BRONCHUS OR LUNG   





(3) Anxiety


Code(s): F41.9 - ANXIETY DISORDER, UNSPECIFIED   





(4) Leukocytosis


Code(s): D72.829 - ELEVATED WHITE BLOOD CELL COUNT, UNSPECIFIED   





Assessment/Plan





(1) Intractable pain


Assessment/Plan: 


-pain better controlled on home morphine dose but still present


-pain management consulted, awaiting recommendations


-will need outpatient follow up for improved control


Code(s): R52 - PAIN, UNSPECIFIED   





(2) Adenocarcinoma of right lung


Assessment/Plan: 


-biopsied and diagnosed last admission


-patient with difficulty going to outpatient office visits


-oncology recommendations pending


Code(s): C34.91 - MALIGNANT NEOPLASM OF UNSP PART OF RIGHT BRONCHUS OR LUNG   





(3) Anxiety


Assessment/Plan: 


-continue valium 10mg tid prn


Code(s): F41.9 - ANXIETY DISORDER, UNSPECIFIED   





(4) Leukocytosis


Assessment/Plan: 


-monitor


Code(s): D72.829 - ELEVATED WHITE BLOOD CELL COUNT, UNSPECIFIED

## 2018-10-09 NOTE — CONSULT
Consult


Consult Specialty:: back pain


Referred by:: lencho


Reason for Consultation:: back pain





- History of Present Illness


Chief Complaint: back pain


History of Present Illness: 


62 y/o man with a past medical history of Spinal Fracture (w/ T2-T4 cord 

transection), Neurogenic Bladder (w/ recurrent UTI), Paroxysmal Afib (not on AC

) and recent diagnosis of adenocarcinoma of lung presents with worsening pain. 

He states that pain is located mainly in spine and radiates to stomach. He take 

morphine at home but has not resolved with home dose. He was recently admitted 

for recurrent UTI and was found to have lung mass that was biopsied and found 

to have adeno of lung. Denies HA, SOB, nausea, vomiting, fever or chills. 





He continues to have lower back pain





- Past Medical History


CNS: Yes: Other (paraplegia due to accidental fall (T2 spinal fracture))


Gastrointestinal: Yes: Other (Hepatic hemangiomas)


Renal/: Yes: Other (Bladder dysfunction)


Heme/Onc: Yes: Cancer (adenocarcinoma of the lung), Other (Neurogenic bladder w

/ h/o UTI)


Infectious Disease: Yes: Other (multiple UTIs  Pseudomonas)


Musculoskeletal: Yes: Paraplegia, Other (Chronic pain)





- Past Surgical History


Past Surgical History: Yes: Splenectomy





- Past Medical History


CNS: Yes: Other (paraplegia due to accidental fall (T2 spinal fracture))


Gastrointestinal: Yes: Other (Hepatic hemangiomas)


Renal/: Yes: Other (Bladder dysfunction)


Infectious Disease: Yes: Other (multiple UTIs  Pseudomonas)


Musculoskeletal: Yes: Paraplegia, Other (Chronic pain)





- Past Surgical History


Past Surgical History: Yes: Splenectomy





- Alcohol/Substance Use


Hx Alcohol Use: Yes (social)


History of Substance Use: reports: Marijuana





- Smoking History


Smoking history: Former smoker


Have you smoked in the past 12 months: No


Aproximately how many cigarettes per day: 3


If you are a former smoker, when did you quit?: 1986





- Social History


Usual Living Arrangement: With Significant Other


ADL: Support Services ([facility] (24hr))


History of Recent Travel: No





Home Medications





- Allergies


Allergies/Adverse Reactions: 


 Allergies











Allergy/AdvReac Type Severity Reaction Status Date / Time


 


No Known Allergies Allergy   Verified 10/08/18 14:10














- Home Medications


Home Medications: 


Ambulatory Orders





NK [No Known Home Medication]  10/08/18 











Family Disease History





- Family Disease History


Family Disease History: Heart Disease: Father





Physical Exam


Vital Signs: 


 Vital Signs











Temperature  98.2 F   10/09/18 17:04


 


Pulse Rate  67   10/09/18 17:04


 


Respiratory Rate  20   10/09/18 17:04


 


Blood Pressure  125/61   10/09/18 17:04


 


O2 Sat by Pulse Oximetry (%)  98   10/09/18 00:43











Labs: 


 CBC, BMP





 10/09/18 07:10 





 10/09/18 07:10 











Assessment/Plan





severe back pain secondary to degnerative changes vs possible l1 lesion





1. Consider l1 biopsy/treatment


2. would recommend increasing PO morphine to 10mg

## 2018-10-10 VITALS — TEMPERATURE: 98.5 F | HEART RATE: 92 BPM | DIASTOLIC BLOOD PRESSURE: 64 MMHG | SYSTOLIC BLOOD PRESSURE: 136 MMHG

## 2018-10-10 RX ADMIN — DOCUSATE SODIUM SCH: 100 CAPSULE, LIQUID FILLED ORAL at 13:56

## 2018-10-10 RX ADMIN — MORPHINE SULFATE PRN MG: 10 SOLUTION ORAL at 09:21

## 2018-10-10 RX ADMIN — DOCUSATE SODIUM SCH MG: 100 CAPSULE, LIQUID FILLED ORAL at 05:43

## 2018-10-10 RX ADMIN — POLYETHYLENE GLYCOL 3350 SCH GM: 17 POWDER, FOR SOLUTION ORAL at 09:22

## 2018-10-10 RX ADMIN — MORPHINE SULFATE PRN MG: 10 SOLUTION ORAL at 13:55

## 2018-10-10 NOTE — PN
Physical Exam: 


SUBJECTIVE: Patient seen and examined at bedside. No overnight events. No new 

complaints. Pain well controlled. Denies CP,HA, SOB,palpitations, nausea or 

vomiting. 








OBJECTIVE:





 Vital Signs











 Period  Temp  Pulse  Resp  BP Sys/Malone  Pulse Ox


 


 Last 24 Hr  65 F-98.2 F  65-76  18-20  117-137/61-67  98











GENERAL: AAOx3, NAD


HEAD: NCAT


EYES: PERRLA, EOMI, sclera anicteric, conjunctiva clear. 


EARS, NOSE, THROAT: Moist mucous membranes.


NECK: Supple without lymphadenopathy, JVD, or masses.


LUNGS: CTAB. No wheezes, and no crackles. No accessory muscle use.


HEART: RRR, normal S1 and S2 without murmur, rub or gallop.


ABDOMEN: Soft, NTND, NABS, no guarding, no rebound, no masses.  No hepatomegaly 

or splenomegaly. 


MUSCULOSKELETAL: Decreased ROM LE. Left ankle bony deformity. contracture of 

right hand. 


UPPER EXTREMITIES: contracture of right hand. no edema. 


LOWER EXTREMITIES: 2+ pulses, warm, well-perfused. No peripheral edema. 


NEUROLOGICAL:  Cranial nerves II-XII intact. Normal speech. Flacid paralysis to 

bilateral LE. 5/5 upper ext. strength bilaterallly.  sensation loss from level 

of T4 and below.  


PSYCHIATRIC: Appropriate mood and affect.

















Active Medications











Generic Name Dose Route Start Last Admin





  Trade Name Freq  PRN Reason Stop Dose Admin


 


Acetaminophen  650 mg  10/08/18 16:42  





  Tylenol -  PO   





  Q4H PRN   





  PAIN LEVEL 1-5   





     





     





     


 


Diazepam  10 mg  10/08/18 16:45  10/10/18 00:45





  Valium -  PO   10 mg





  TID PRN   Administration





  ANXIETY   





     





     





     


 


Docusate Sodium  100 mg  10/08/18 22:00  10/10/18 05:43





  Colace -  PO   100 mg





  TID ALE   Administration





     





     





     





     


 


Morphine Sulfate  4 mg  10/08/18 16:45  10/10/18 09:21





  Morphine 10 Mg/5 Ml Liquid  PO   4 mg





  Q4H PRN   Administration





  PAIN LEVEL 6-10   





     





     





     


 


Ondansetron HCl  4 mg  10/08/18 16:42  





  Zofran Injection  IVPUSH   





  Q6H PRN   





  NAUSEA   





     





     





     


 


Polyethylene Glycol  17 gm  10/09/18 10:00  10/10/18 09:22





  Miralax (For Daily Use) -  PO   17 gm





  DAILY ALE   Administration





     





     





     





     


 


Senna  2 tab  10/08/18 22:00  10/09/18 22:31





  Senna -  PO   Not Given





  HS ALE   





     





     





     





     











ASSESSMENT/PLAN:








Problem List





- Problems


(1) Adenocarcinoma of right lung


Assessment/Plan: 


Given his functional status not candidate for Chemo. 


* PDL-1 and mutation analysis  pending to see if he is candidate for 

immunotherapy. 


* multiple areas of both lungs makes surgery or stereotactic RT  not options. 


* CT of abdomen did not show definitive mets. 














(2) Intractable pain


Assessment/Plan: 


pain control with IV narcotics. 


* possible referall to pain management. 


* Diazepam (Valium -)  10 mg PO TID


* Morphine Sulfate (Morphine 10 Mg/5 Ml Liquid)  4 mg PO Q4H


* Polyethylene Glycol (Miralax (For Daily Use) -)  17 gm PO DAILY 


* Senna (Senna -)  2 tab PO HS ALE








(3) Paraplegia








Visit type





- Emergency Visit


Emergency Visit: Yes


ED Registration Date: 10/08/18


Care time: The patient presented to the Emergency Department on the above date 

and was hospitalized for further evaluation of their emergent condition.





- New Patient


This patient is new to me today: No





- Critical Care


Critical Care patient: No

## 2018-10-10 NOTE — DS
Physical Examination


Vital Signs: 


 Vital Signs











Temperature  36.4 C L  10/10/18 10:00


 


Pulse Rate  65   10/10/18 10:00


 


Respiratory Rate  20   10/10/18 10:00


 


Blood Pressure  129/64   10/10/18 10:00


 


O2 Sat by Pulse Oximetry (%)  98   10/09/18 21:00











Constitutional: Yes: Well Nourished, No Distress, Calm


Cardiovascular: Yes: Regular Rate and Rhythm.  No: Gallop, Murmur, Rub


Respiratory: Yes: Regular, CTA Bilaterally.  No: Rales, Rhonchi, Wheezes


Gastrointestinal: Yes: Normal Bowel Sounds, Soft.  No: Distention, Tenderness


Extremities: Yes: WNL


Edema: No


Labs: 


 CBC, BMP





 10/09/18 07:10 





 10/09/18 07:10 











Discharge Summary


Reason For Visit: INTRACTABLE PAIN


Current Active Problems





Adenocarcinoma of right lung (Acute)


Intractable pain (Acute)








Hospital Course: 





(1) Intractable pain


Code(s): R52 - PAIN, UNSPECIFIED   





(2) Adenocarcinoma of right lung


Code(s): C34.91 - MALIGNANT NEOPLASM OF UNSP PART OF RIGHT BRONCHUS OR LUNG   





(3) Anxiety


Code(s): F41.9 - ANXIETY DISORDER, UNSPECIFIED   





(4) Leukocytosis


Code(s): D72.829 - ELEVATED WHITE BLOOD CELL COUNT, UNSPECIFIED   





Mr Sumner is a 63 year old man who comes in with intractable pain. He was 

admitted under observation. He was seen by pain management and recommended to 

have his morphine increased to 10mg q4h prn. He was seen by oncology and 

awaiting special testing on his biopsy for possibility of immunotherapy. 

Currently he is stable and can be discharged today. He was discharged on 

increased morphine dose.





32 minutes spent in preparation of this discharge


Condition: Stable





- Instructions


Diet, Activity, Other Instructions: 


resume previous diet and activity


Referrals: 


Quique Cano MD [Staff Physician] - 


Sony Poon MD [Staff Physician] - 


Juan Lyon MD [Staff Physician] - 


Disposition: [facility]/HOME HEALTH CARE





- Home Medications


Comprehensive Discharge Medication List: 


Ambulatory Orders





Diazepam [Valium] 10 mg PO TID PRN  tablet MDD 30mg 10/10/18 


Docusate Sodium [Colace -] 100 mg PO TID #90 capsule 10/10/18 


Morphine 10 mg/5 ml Liquid [Morphine 10 mg/5 mL Liquid -] 10 mg PO Q4H #150 ml 

MDD 60mg 10/10/18 


Polyethylene Glycol 3350 [Miralax 119 gm Btl -] 17 gm PO DAILY #1 bottle 10/10/

18 


Sennosides [Senna -] 2 tab PO HS #60 tablet 10/10/18

## 2018-12-20 ENCOUNTER — HOSPITAL ENCOUNTER (INPATIENT)
Dept: HOSPITAL 74 - JER | Age: 63
LOS: 14 days | Discharge: HOME HEALTH | DRG: 690 | End: 2019-01-03
Attending: INTERNAL MEDICINE | Admitting: INTERNAL MEDICINE
Payer: COMMERCIAL

## 2018-12-20 VITALS — BODY MASS INDEX: 25.4 KG/M2

## 2018-12-20 DIAGNOSIS — F41.9: ICD-10-CM

## 2018-12-20 DIAGNOSIS — C34.91: ICD-10-CM

## 2018-12-20 DIAGNOSIS — M48.54XG: ICD-10-CM

## 2018-12-20 DIAGNOSIS — R10.84: ICD-10-CM

## 2018-12-20 DIAGNOSIS — N31.9: ICD-10-CM

## 2018-12-20 DIAGNOSIS — G82.20: ICD-10-CM

## 2018-12-20 DIAGNOSIS — Z90.81: ICD-10-CM

## 2018-12-20 DIAGNOSIS — R19.7: ICD-10-CM

## 2018-12-20 DIAGNOSIS — I48.0: ICD-10-CM

## 2018-12-20 DIAGNOSIS — Z74.01: ICD-10-CM

## 2018-12-20 DIAGNOSIS — Z87.891: ICD-10-CM

## 2018-12-20 DIAGNOSIS — R91.1: ICD-10-CM

## 2018-12-20 DIAGNOSIS — N28.1: ICD-10-CM

## 2018-12-20 DIAGNOSIS — N39.0: Primary | ICD-10-CM

## 2018-12-20 DIAGNOSIS — I74.5: ICD-10-CM

## 2018-12-20 DIAGNOSIS — D72.829: ICD-10-CM

## 2018-12-20 DIAGNOSIS — K59.00: ICD-10-CM

## 2018-12-20 DIAGNOSIS — E83.39: ICD-10-CM

## 2018-12-20 DIAGNOSIS — J44.9: ICD-10-CM

## 2018-12-20 DIAGNOSIS — K76.89: ICD-10-CM

## 2018-12-20 DIAGNOSIS — G89.29: ICD-10-CM

## 2018-12-20 LAB
ALBUMIN SERPL-MCNC: 2.9 G/DL (ref 3.4–5)
ALP SERPL-CCNC: 137 U/L (ref 45–117)
ALT SERPL-CCNC: 16 U/L (ref 13–61)
ANION GAP SERPL CALC-SCNC: 10 MMOL/L (ref 8–16)
APPEARANCE UR: (no result)
AST SERPL-CCNC: 14 U/L (ref 15–37)
BACTERIA #/AREA URNS HPF: (no result) /HPF
BASOPHILS # BLD: 0.8 % (ref 0–2)
BILIRUB SERPL-MCNC: 0.7 MG/DL (ref 0.2–1)
BILIRUB UR STRIP.AUTO-MCNC: NEGATIVE MG/DL
BUN SERPL-MCNC: 5 MG/DL (ref 7–18)
CALCIUM SERPL-MCNC: 9.3 MG/DL (ref 8.5–10.1)
CHLORIDE SERPL-SCNC: 100 MMOL/L (ref 98–107)
CO2 SERPL-SCNC: 28 MMOL/L (ref 21–32)
COLOR UR: YELLOW
CREAT SERPL-MCNC: 0.7 MG/DL (ref 0.55–1.3)
DEPRECATED RDW RBC AUTO: 15.6 % (ref 11.9–15.9)
EOSINOPHIL # BLD: 0.3 % (ref 0–4.5)
EPITH CASTS URNS QL MICRO: (no result) /HPF
GLUCOSE SERPL-MCNC: 104 MG/DL (ref 74–106)
HCT VFR BLD CALC: 43.7 % (ref 35.4–49)
HGB BLD-MCNC: 15.2 GM/DL (ref 11.7–16.9)
KETONES UR QL STRIP: NEGATIVE
LEUKOCYTE ESTERASE UR QL STRIP.AUTO: (no result)
LIPASE SERPL-CCNC: 360 U/L (ref 73–393)
LYMPHOCYTES # BLD: 6.7 % (ref 8–40)
MCH RBC QN AUTO: 31.2 PG (ref 25.7–33.7)
MCHC RBC AUTO-ENTMCNC: 34.7 G/DL (ref 32–35.9)
MCV RBC: 89.8 FL (ref 80–96)
MONOCYTES # BLD AUTO: 10.3 % (ref 3.8–10.2)
MUCOUS THREADS URNS QL MICRO: (no result)
NEUTROPHILS # BLD: 81.9 % (ref 42.8–82.8)
NITRITE UR QL STRIP: NEGATIVE
PH UR: 6 [PH] (ref 5–8)
PLATELET # BLD AUTO: 455 K/MM3 (ref 134–434)
PMV BLD: 8.2 FL (ref 7.5–11.1)
POTASSIUM SERPLBLD-SCNC: 3.9 MMOL/L (ref 3.5–5.1)
PROT SERPL-MCNC: 7.3 G/DL (ref 6.4–8.2)
PROT UR QL STRIP: NEGATIVE
PROT UR QL STRIP: NEGATIVE
RBC # BLD AUTO: 4.86 M/MM3 (ref 4–5.6)
SODIUM SERPL-SCNC: 137 MMOL/L (ref 136–145)
SP GR UR: 1.02 (ref 1.01–1.03)
UROBILINOGEN UR STRIP-MCNC: (no result) MG/DL (ref 0.2–1)
WBC # BLD AUTO: 15.6 K/MM3 (ref 4–10)

## 2018-12-20 NOTE — PDOC
History of Present Illness





- General


Chief Complaint: Pain


Stated Complaint: ABD PAIN


Time Seen by Provider: 12/20/18 15:49


History Source: Patient


Exam Limitations: No Limitations





- History of Present Illness


Initial Comments: 





62 yo m with a PMH of adenocarcinoma of the right lung, spinal cord transection 

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 

ED with 3 days of worsening LLQ abdominal pain assocaited with nausea but no 

emesis, and multiple episodes of diarrhea which he states are black in 

appearance. He is not able to walk or feel anything from the chest down. 





He denies recent fevers, chills, or infections, chest pain, SOB, difficulty 

breathing, back pain, leg pain, urinary difficulty. 





PCP: Jordan Poon 


Allergies: NKA, NKDA


Social Hx: Does not currently smoke, drink, or use illicit drugs. 




















Past History





- Past Medical History


Allergies/Adverse Reactions: 


 Allergies











Allergy/AdvReac Type Severity Reaction Status Date / Time


 


No Known Allergies Allergy   Verified 12/20/18 16:31











Home Medications: 


Ambulatory Orders





Diazepam [Valium] 10 mg PO TID PRN  tablet MDD 30mg 10/10/18 


Morphine 10 mg/5 ml Liquid [Morphine 10 mg/5 mL Liquid -] 10 mg PO Q4H #150 ml 

MDD 60mg 10/10/18 








Anemia: No


Asthma: No


Cancer: No


Cardiac Disorders: No


CVA: No


COPD: Yes


CHF: No


Dementia: No


Diabetes: No


GI Disorders: No


 Disorders: Yes (Recurrent UTI, CONDOM CATHETER)


HTN:  (Hypotension)


Hypercholesterolemia: No


Liver Disease: No


Seizures: No


Thyroid Disease: No





- Surgical History


Abdominal Surgery: No


Appendectomy: No


Cardiac Surgery: No


Cholecystectomy: No


Lung Surgery: No


Neurologic Surgery: No


Orthopedic Surgery: Yes (fracture right wrist s/p fusion)





- Immunization History


Td Vaccination: No


Immunization Up to Date: No





- Suicide/Smoking/Psychosocial Hx


Smoking Status: No


Smoking History: Former smoker


Years of Tobacco Use: 0


Have you smoked in the past 12 months: No


Number of Cigarettes Smoked Daily: 3


If you are a former smoker, when did you quit?: 1986


Cigars Per Day: 0


Hx Alcohol Use: Yes (social)


Drug/Substance Use Hx: No


Substance Use Type: None


Hx Substance Use Treatment: No





**Review of Systems





- Review of Systems


Constitutional: No: Chills, Diaphoresis, Loss of Appetite


HEENTM: No: Eye Pain, Blurred Vision, Tinnitus


Respiratory: No: Cough, Shortness of Breath, Wheezing


Cardiac (ROS): No: Chest Pain, Edema, Irregular Heart Rate, Lightheadedness, 

Syncope


ABD/GI: Yes: Diarrhea (black tinged), Nausea, Poor Appetite, Poor Fluid Intake, 

Abdominal cramping.  No: Vomiting


: No: Burning, Dysuria


Musculoskeletal: No: Back Pain, Joint Pain, Neck Pain


Integumentary: No: Bruising, Change in Color, Pallor


Neurological: No: Headache, Numbness, Tingling


Psychiatric: No: Anxiety, Depression


Endocrine: No: Excessive Sweating, Unexplained Weight Loss


Hematologic/Lymphatic: No: Anemia, Easy Bleeding





*Physical Exam





- Physical Exam


General Appearance: Yes: Nourished, Apparent Distress


HEENT: positive: EOMI, TRISH, Normal ENT Inspection, Normal Voice


Neck: positive: Trachea midline, Supple.  negative: Lymphadenopathy (R), 

Lymphadenopathy (L), Rigidity


Respiratory/Chest: positive: Lungs Clear, Normal Breath Sounds.  negative: 

Respiratory Distress, Accessory Muscle Use


Cardiovascular: positive: Regular Rhythm, Regular Rate, S1, S2


Vascular Pulses: Dorsalis-Pedis (R): 2+, Doralis-Pedis (L): 2+


Gastrointestinal/Abdominal: positive: Normal Bowel Sounds, Tender (LLQ), Soft, 

Tenderness.  negative: Guarding, Rebound, Spleenomegaly


Rectal Exam: positive: deferred


Lymphatic: negative: Adenopathy


Musculoskeletal: positive: Normal Inspection


Extremity: positive: Normal Capillary Refill, Normal Inspection.  negative: 

Normal Range of Motion, Pedal Edema


Integumentary: positive: Normal Color, Dry, Warm


Neurologic: positive: CNs II-XII NML intact, Fully Oriented, Alert, Normal Mood/

Affect, Normal Response





ED Treatment Course





- LABORATORY


CBC & Chemistry Diagram: 


 12/20/18 16:35





 12/20/18 16:35





Medical Decision Making





- Medical Decision Making





62 yo m with a PMH of spinal cord transection at T4, neurogenic bladder with 

recurrent UTI, COPD, splenectomy presents to the ED with 3 days of worsening 

LLQ abdominal pain assocaited with nausea but no emesis, and multiple episodes 

of diarrhea which he states are black in appearance.





DDx IBNLT: Diverticulitis, UTI/pylo, appendicitis, cholecystitis, pancreatitis, 

gastroenteritis





Plan: Cbc, Cmp, Lipase, abdominal CT w contrast, acetaminophen, ua/uc, re-

assess. 





WBC elevated to 15.


Otherwise, Labs unremarkable and WNL. Lipase normal. 





Patient will be signed out to the night team.














*DC/Admit/Observation/Transfer


Diagnosis at time of Disposition: 


 Abdominal pain








- Referrals


Referrals: 


Sony Poon MD [Primary Care Provider] - 





- Patient Instructions





- Post Discharge Activity

## 2018-12-20 NOTE — PDOC
Attending Attestation





- Resident


Resident Name: Usman Mortensen





- ED Attending Attestation


I have performed the following: I have examined & evaluated the patient, The 

case was reviewed & discussed with the resident, I agree w/resident's findings 

& plan, Exceptions are as noted





- HPI


HPI: 





12/20/18 15:59


63y spinal cord transection at T4 sp paraplegia, recent dx of lung ca with bony 

mets, recurrent uti, copd, splenectomy, presents with 3 days of worsening left 

lower quadrant abd pain with n w/o vomiting with multiple episodes of dark 

color diarrhea. wo fever, cp, sob, 





general: NAD


card: rrr, no mrg/


abd: +ttp palpitatoins, no rebound/guarding, condom cath in place





will obtain ua to r/o uti (pt is colonized - no systemci complaints - higher 

threshold for abx)


will obtain CT abd to r/o diverticulitis


will sign out to evening team to fu with results and reassess/dispo the pt








- Physicial Exam


PE: 





12/28/18 07:33


see above








- Medical Decision Making





12/28/18 07:33


see [email or web address]

## 2018-12-21 LAB
ALBUMIN SERPL-MCNC: 2.7 G/DL (ref 3.4–5)
ALP SERPL-CCNC: 121 U/L (ref 45–117)
ALT SERPL-CCNC: 14 U/L (ref 13–61)
ANION GAP SERPL CALC-SCNC: 10 MMOL/L (ref 8–16)
AST SERPL-CCNC: 15 U/L (ref 15–37)
BASOPHILS # BLD: 0.8 % (ref 0–2)
BILIRUB SERPL-MCNC: 0.6 MG/DL (ref 0.2–1)
BUN SERPL-MCNC: 11 MG/DL (ref 7–18)
CALCIUM SERPL-MCNC: 7.9 MG/DL (ref 8.5–10.1)
CHLORIDE SERPL-SCNC: 106 MMOL/L (ref 98–107)
CO2 SERPL-SCNC: 23 MMOL/L (ref 21–32)
CREAT SERPL-MCNC: 0.7 MG/DL (ref 0.55–1.3)
DEPRECATED RDW RBC AUTO: 16 % (ref 11.9–15.9)
EOSINOPHIL # BLD: 1.7 % (ref 0–4.5)
GLUCOSE SERPL-MCNC: 86 MG/DL (ref 74–106)
HCT VFR BLD CALC: 41.8 % (ref 35.4–49)
HGB BLD-MCNC: 13.2 GM/DL (ref 11.7–16.9)
LYMPHOCYTES # BLD: 10.7 % (ref 8–40)
MAGNESIUM SERPL-MCNC: 2 MG/DL (ref 1.8–2.4)
MCH RBC QN AUTO: 29.1 PG (ref 25.7–33.7)
MCHC RBC AUTO-ENTMCNC: 31.7 G/DL (ref 32–35.9)
MCV RBC: 92 FL (ref 80–96)
MONOCYTES # BLD AUTO: 10.1 % (ref 3.8–10.2)
NEUTROPHILS # BLD: 76.7 % (ref 42.8–82.8)
PHOSPHATE SERPL-MCNC: 2.3 MG/DL (ref 2.5–4.9)
PLATELET # BLD AUTO: 421 K/MM3 (ref 134–434)
PMV BLD: 8.6 FL (ref 7.5–11.1)
POTASSIUM SERPLBLD-SCNC: 3.9 MMOL/L (ref 3.5–5.1)
PROT SERPL-MCNC: 6.3 G/DL (ref 6.4–8.2)
RBC # BLD AUTO: 4.55 M/MM3 (ref 4–5.6)
SODIUM SERPL-SCNC: 138 MMOL/L (ref 136–145)
WBC # BLD AUTO: 15 K/MM3 (ref 4–10)

## 2018-12-21 RX ADMIN — MORPHINE SULFATE PRN MG: 10 SOLUTION ORAL at 20:04

## 2018-12-21 RX ADMIN — ENOXAPARIN SODIUM SCH: 40 INJECTION SUBCUTANEOUS at 11:07

## 2018-12-21 RX ADMIN — PIPERACILLIN SODIUM AND TAZOBACTAM SODIUM SCH MLS/HR: .25; 2 INJECTION, POWDER, LYOPHILIZED, FOR SOLUTION INTRAVENOUS at 16:21

## 2018-12-21 RX ADMIN — PIPERACILLIN SODIUM AND TAZOBACTAM SODIUM SCH MLS/HR: .25; 2 INJECTION, POWDER, LYOPHILIZED, FOR SOLUTION INTRAVENOUS at 22:05

## 2018-12-21 NOTE — PN
Teaching Attending Note


Name of Resident: Aster Solomon





ATTENDING PHYSICIAN STATEMENT





I saw and evaluated the patient.


I reviewed the resident's note and discussed the case with the resident.


I agree with the resident's findings and plan as documented.








SUBJECTIVE: Patient says LLQ abdominal pain is improving. Diarrhea is at 

baseline.








OBJECTIVE:


 Vital Signs











 Period  Temp  Pulse  Resp  BP Sys/Malone  Pulse Ox


 


 Last 24 Hr  97.7 F-98.5 F  56-68  18-22  122-154/56-70  92-96








HEART: S1S2, RRR


LUNGS: Clear


ABDOMEN: Soft, mild LLQ/suprapubic tenderness, non-distended, normal BS


EXTREMITIES: No edema








 Laboratory Results - last 24 hr











  12/20/18 12/20/18 12/20/18





  16:35 16:35 17:00


 


WBC  15.6 H  


 


RBC  4.86  


 


Hgb  15.2  


 


Hct  43.7  


 


MCV  89.8  


 


MCH  31.2  


 


MCHC  34.7  


 


RDW  15.6  


 


Plt Count  455 H  


 


MPV  8.2  D  


 


Absolute Neuts (auto)  12.8 H  


 


Neutrophils %  81.9  


 


Lymphocytes %  6.7 L D  


 


Monocytes %  10.3 H  


 


Eosinophils %  0.3  D  


 


Basophils %  0.8  


 


Nucleated RBC %  0  


 


Sodium   137 


 


Potassium   3.9 


 


Chloride   100 


 


Carbon Dioxide   28 


 


Anion Gap   10 


 


BUN   5 L 


 


Creatinine   0.7 


 


Creat Clearance w eGFR   > 60 


 


Random Glucose   104 


 


Calcium   9.3 


 


Phosphorus   


 


Magnesium   


 


Total Bilirubin   0.7 


 


AST   14 L 


 


ALT   16 


 


Alkaline Phosphatase   137 H 


 


Total Protein   7.3 


 


Albumin   2.9 L 


 


Lipase   360 


 


Urine Color   


 


Urine Appearance   


 


Urine pH   


 


Ur Specific Gravity   


 


Urine Protein   


 


Urine Glucose (UA)   


 


Urine Ketones   


 


Urine Blood   


 


Urine Nitrite   


 


Urine Bilirubin   


 


Urine Urobilinogen   


 


Ur Leukocyte Esterase   


 


Urine WBC (Auto)   


 


Urine RBC (Auto)   


 


Ur Epithelial Cells   


 


Urine Bacteria   


 


Urine Mucus   


 


Blood Type    B POSITIVE


 


Antibody Screen    Negative














  12/20/18 12/21/18 12/21/18





  23:13 06:00 06:00


 


WBC   15.0 H 


 


RBC   4.55 


 


Hgb   13.2 


 


Hct   41.8 


 


MCV   92.0 


 


MCH   29.1 


 


MCHC   31.7 L 


 


RDW   16.0 H 


 


Plt Count   421 


 


MPV   8.6 


 


Absolute Neuts (auto)   11.5 H 


 


Neutrophils %   76.7 


 


Lymphocytes %   10.7  D 


 


Monocytes %   10.1 


 


Eosinophils %   1.7  D 


 


Basophils %   0.8 


 


Nucleated RBC %   0 


 


Sodium    138


 


Potassium    3.9


 


Chloride    106


 


Carbon Dioxide    23


 


Anion Gap    10


 


BUN    11


 


Creatinine    0.7


 


Creat Clearance w eGFR    > 60


 


Random Glucose    86


 


Calcium    7.9 L


 


Phosphorus    2.3 L


 


Magnesium    2.0


 


Total Bilirubin    0.6


 


AST    15


 


ALT    14


 


Alkaline Phosphatase    121 H


 


Total Protein    6.3 L


 


Albumin    2.7 L


 


Lipase   


 


Urine Color  Yellow  


 


Urine Appearance  Cloudy  


 


Urine pH  6.0  


 


Ur Specific Gravity  1.023  


 


Urine Protein  Negative  


 


Urine Glucose (UA)  Negative  


 


Urine Ketones  Negative  


 


Urine Blood  Negative  


 


Urine Nitrite  Negative  


 


Urine Bilirubin  Negative  


 


Urine Urobilinogen  4.0 e.u/dl  


 


Ur Leukocyte Esterase  Trace  


 


Urine WBC (Auto)  27  


 


Urine RBC (Auto)  8  


 


Ur Epithelial Cells  Moderate  


 


Urine Bacteria  Rare  


 


Urine Mucus  Many  


 


Blood Type   


 


Antibody Screen   








 Current Medications











Generic Name Dose Route Start Last Admin





  Trade Name Freq  PRN Reason Stop Dose Admin


 


Enoxaparin Sodium  40 mg  12/21/18 10:00  12/21/18 11:07





  Lovenox -  SQ   Not Given





  DAILY ALE   





     





     





     





     


 


Sodium Chloride  1,000 mls @ 75 mls/hr  12/21/18 06:00  12/21/18 06:09





  Normal Saline -  IV   75 mls/hr





  ASDIR ALE   Administration





     





     





     





     














ASSESSMENT AND PLAN:


This is a 63 year old man with a history of paraplegia, spinal cord transection

, neurogenic bladder, recurrent UTIs who presented to the ED with LLQ abdominal 

pain.





1. Abdominal pain secondary to UTI


   - Continue Zosyn


   - ID consult


   - Follow up urine culture


2. Diarrhea


   - Patient reports this is his baseline


3. Neurogenic bladder


4. 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


5. Lung nodules


   - Outpatient pulmonary follow up


6. Anxiety disorder


   - Continue Valium as needed


7. B/L iliac artery occlusion


   - Chronic

## 2018-12-21 NOTE — PDOC
*Physical Exam





- Vital Signs


 Last Vital Signs











Temp Pulse Resp BP Pulse Ox


 


 97.9 F   62   22 H  122/70   95 


 


 12/20/18 16:10  12/20/18 21:10  12/20/18 21:10  12/20/18 21:10  12/20/18 21:10














ED Treatment Course





- LABORATORY


CBC & Chemistry Diagram: 


 01/03/19 05:00





 01/01/19 06:15





- ADDITIONAL ORDERS


Additional order review: 


 Laboratory  Results











  12/20/18 12/20/18 12/20/18





  23:13 17:00 16:35


 


Sodium    137


 


Potassium    3.9


 


Chloride    100


 


Carbon Dioxide    28


 


Anion Gap    10


 


BUN    5 L


 


Creatinine    0.7


 


Creat Clearance w eGFR    > 60


 


Random Glucose    104


 


Calcium    9.3


 


Total Bilirubin    0.7


 


AST    14 L


 


ALT    16


 


Alkaline Phosphatase    137 H


 


Total Protein    7.3


 


Albumin    2.9 L


 


Lipase    360


 


Urine Color  Yellow  


 


Urine Appearance  Cloudy  


 


Urine pH  6.0  


 


Ur Specific Gravity  1.023  


 


Urine Protein  Negative  


 


Urine Glucose (UA)  Negative  


 


Urine Ketones  Negative  


 


Urine Blood  Negative  


 


Urine Nitrite  Negative  


 


Urine Bilirubin  Negative  


 


Urine Urobilinogen  4.0 e.u/dl  


 


Ur Leukocyte Esterase  Trace  


 


Urine WBC (Auto)  27  


 


Urine RBC (Auto)  8  


 


Ur Epithelial Cells  Moderate  


 


Urine Bacteria  Rare  


 


Urine Mucus  Many  


 


Blood Type   B POSITIVE 


 


Antibody Screen   Negative 








 











  12/20/18





  16:35


 


RBC  4.86


 


MCV  89.8


 


MCHC  34.7


 


RDW  15.6


 


MPV  8.2  D


 


Neutrophils %  81.9


 


Lymphocytes %  6.7 L D


 


Monocytes %  10.3 H


 


Eosinophils %  0.3  D


 


Basophils %  0.8














- Medications


Given in the ED: 


ED Medications














Discontinued Medications














Generic Name Dose Route Start Last Admin





  Trade Name Juan R  PRN Reason Stop Dose Admin


 


Acetaminophen  1,000 mg  12/20/18 15:57  12/20/18 17:46





  Ofirmev Injection -  IVPB  12/20/18 15:58  1,000 mg





  ONCE ONE   Administration





     





     





     





     


 


Sodium Chloride  500 mls @ 500 mls/hr  12/20/18 20:09  12/20/18 20:16





  Normal Saline -  IV  12/20/18 21:08  500 mls/hr





  ASDIR STA   Administration





     





     





     





     


 


Morphine Sulfate  6 mg  12/20/18 17:41  12/20/18 17:46





  Morphine Injection -  IVPUSH  12/20/18 17:42  6 mg





  ONCE ONE   Administration





     





     





     





     


 


Sodium Chloride  1,000 ml  12/20/18 15:57  12/20/18 17:46





  Normal Saline -  IV  12/20/18 15:58  1,000 ml





  ONCE ONE   Administration





     





     





     





     














Medical Decision Making





- Medical Decision Making





12/21/18 00:11


sign out from night team





64 yo male with [place] catheter, spinal cord transection at T4 and neurogenic 

bladder with recurrent UTI presents to the ED with 3 days of worsening LLQ 

abdominal pain and black stools.








DDX includes but is not limited to: Diverticulitis, Ileus, UTI/pylo, 

appendicitis, cholecystitis, pancreatitis, gastroenteritis











WBC elevated to 15 


Abdominal CT shows ileus  








Admitted to hospitalist under Dr. Rendon for ileus and elevated WBC 




















*DC/Admit/Observation/Transfer


Diagnosis at time of Disposition: 


 Ileus





Abdominal pain


Qualifiers:


 Abdominal location: generalized Qualified Code(s): R10.84 - Generalized 

abdominal pain





UTI (urinary tract infection)


Qualifiers:


 Urinary tract infection type: acute cystitis Hematuria presence: without 

hematuria Qualified Code(s): N30.00 - Acute cystitis without hematuria








- Discharge Dispostion


Disposition: VNS/HOME HEALTH CARE


Condition at time of disposition: Stable


Decision to Admit order: Yes





- Prescriptions





- Referrals





- Patient Instructions





- Post Discharge Activity

## 2018-12-21 NOTE — PN
Teaching Attending Note


Name of Resident: Kacie Griffin





ATTENDING PHYSICIAN STATEMENT





I saw and evaluated the patient.


I reviewed the resident's note and discussed the case with the resident.


I agree with the resident's findings and plan as documented.








SUBJECTIVE:


Patient is a 63 year old man with a PMH of recently diagnosed adenocarcinoma of 

the right lung, spinal cord transection at T4, neurogenic bladder with 

recurrent UTI, COPD and splenectomy who presents to the ED with 3 days of 

worsening LLQ abdominal pain associated with nausea but no emesis, and multiple 

episodes of diarrhea which he states are black in appearance. He is not able to 

walk or feel anything from the nipple line down. Take morphine for "bladder pain

" at home. He denies recent fevers, chills, or infections, chest pain, SOB, 

difficulty breathing or back pain.





OBJECTIVE:


Alert and bed bound


 Vital Signs











 Period  Temp  Pulse  Resp  BP Sys/Malone  Pulse Ox


 


 Last 24 Hr  97.9 F  62-68  20-22  122-127/59-70  92-95











HEENT: No Jaundice, eye redness or discharge, PERRLA, EOMI. Normocephalic, 

atraumatic. External ears are normal and hearing is grossly intact. No nasal 

discharge.


Neck: Supple, nontender. No palpable adenopathy or thyromegaly. No JVD


Chest: Good effort. Clear to auscultation and percussion.


Heart: Regular. No S3, rub or murmur


Abdomen: Not distended, soft, nontender and no HSM. No rebound or guarding.  

Normoactive bowel sounds.


Ext: Peripheral pulses intact. No leg edema.


Skin: Warm and dry. No petechiae, rash or ecchymosis.


Neuro: Alert. Oriented x3. CN 2-12 grossly intact. Paraplegia.


 Current Medications











Generic Name Dose Route Start Last Admin





  Trade Name Freq  PRN Reason Stop Dose Admin


 


Enoxaparin Sodium  40 mg  12/21/18 10:00  





  Lovenox -  SQ   





  DAILY ALE   





     





     





     





     








 Home Medications











 Medication  Instructions  Recorded


 


Diazepam [Valium] 10 mg PO TID PRN  tablet MDD 30mg 10/10/18


 


Morphine 10 mg/5 ml Liquid 10 mg PO Q4H #150 ml MDD 60mg 10/10/18





[Morphine 10 mg/5 mL Liquid -]  








 Abnormal Lab Results











  12/20/18 12/20/18





  16:35 16:35


 


WBC  15.6 H 


 


Plt Count  455 H 


 


Absolute Neuts (auto)  12.8 H 


 


Lymphocytes %  6.7 L D 


 


Monocytes %  10.3 H 


 


BUN   5 L


 


AST   14 L


 


Alkaline Phosphatase   137 H


 


Albumin   2.9 L








ASSESSMENT AND PLAN:


1. UTI - Based on past urine culture showing pseudomonas and serretia, will 

treat with Zosyn pending urine culture. CT shows localized ileus. Send stool 

for C. Diff and repeat CT scan if he does not improve. Keep NPO for now, give 

IV NS, and strive to reduce dose of narcotics. Consult ID and GI. Continue 

frequent repositioning to avoid decubitus ulcer.





Consult oncology to craft a plan of care for recently diagnosed right lung 

cancer. Patient may not "follow up" as outpatient to initiate cance care.





2. DVT prophylaxis - Lovenox 40 mg SQ q 24 hours.





3. Advance directives - Full code

## 2018-12-21 NOTE — PN
Physical Exam: 


SUBJECTIVE: Patient seen and examined at bed side this morning. States 

suprapubic tenderness has decreased. no loose BM. Wants to drink and eat. 

Denies chest pain, sob, cough, palpitation, abdominal pain, nausea or vomiting. 





No acute overnight events. 








OBJECTIVE:





 Vital Signs











 Period  Temp  Pulse  Resp  BP Sys/Malone  Pulse Ox


 


 Last 24 Hr  97.7 F-98.5 F  53-62  18-22  122-154/56-70  95-96











GENERAL: The patient is awake, alert, and fully oriented, in no acute distress.


HEAD: Normal with no signs of trauma.


EYES: EOM intact, no pallor or icterus. . 


ENT: Ears normal, dry mucous membranes.


NECK: Supple. 


LUNGS: Breath sounds equal, clear to auscultation bilaterally, no wheezes, no 

crackles, no accessory muscle use. 


HEART: Regular rate and rhythm, S1, S2 without murmur.


ABDOMEN: Surgical scar perico +, Soft, nontender, no organomegaly.


EXTREMITIES: UPPER 2+ pulses, warm, well-perfused, no edema. 


LOWER EXTREMITIES: Contracted lower limbs,  muscle wasting, no sensation


NEUROLOGICAL: No facial droop. Power 5/5 in Upper Ext.  Cranial nerves II 

through XII grossly intact. Normal speech. Loss of sensation from mid thoracic 

below. Power 0/5 in Lower ext. Pulses intact. 


PSYCH: Normal mood, normal affect.


No sacral decubiti: Healed scar.


SKIN: Warm, dry, normal turgor, no rashes or lesions noted














 Laboratory Results - last 24 hr











  12/20/18 12/20/18 12/21/18





  17:00 23:13 06:00


 


WBC    15.0 H


 


RBC    4.55


 


Hgb    13.2


 


Hct    41.8


 


MCV    92.0


 


MCH    29.1


 


MCHC    31.7 L


 


RDW    16.0 H


 


Plt Count    421


 


MPV    8.6


 


Absolute Neuts (auto)    11.5 H


 


Neutrophils %    76.7


 


Lymphocytes %    10.7  D


 


Monocytes %    10.1


 


Eosinophils %    1.7  D


 


Basophils %    0.8


 


Nucleated RBC %    0


 


Sodium   


 


Potassium   


 


Chloride   


 


Carbon Dioxide   


 


Anion Gap   


 


BUN   


 


Creatinine   


 


Creat Clearance w eGFR   


 


Random Glucose   


 


Calcium   


 


Phosphorus   


 


Magnesium   


 


Total Bilirubin   


 


AST   


 


ALT   


 


Alkaline Phosphatase   


 


Total Protein   


 


Albumin   


 


Urine Color   Yellow 


 


Urine Appearance   Cloudy 


 


Urine pH   6.0 


 


Ur Specific Gravity   1.023 


 


Urine Protein   Negative 


 


Urine Glucose (UA)   Negative 


 


Urine Ketones   Negative 


 


Urine Blood   Negative 


 


Urine Nitrite   Negative 


 


Urine Bilirubin   Negative 


 


Urine Urobilinogen   4.0 e.u/dl 


 


Ur Leukocyte Esterase   Trace 


 


Urine WBC (Auto)   27 


 


Urine RBC (Auto)   8 


 


Ur Epithelial Cells   Moderate 


 


Urine Bacteria   Rare 


 


Urine Mucus   Many 


 


Blood Type  B POSITIVE  


 


Antibody Screen  Negative  














  12/21/18





  06:00


 


WBC 


 


RBC 


 


Hgb 


 


Hct 


 


MCV 


 


MCH 


 


MCHC 


 


RDW 


 


Plt Count 


 


MPV 


 


Absolute Neuts (auto) 


 


Neutrophils % 


 


Lymphocytes % 


 


Monocytes % 


 


Eosinophils % 


 


Basophils % 


 


Nucleated RBC % 


 


Sodium  138


 


Potassium  3.9


 


Chloride  106


 


Carbon Dioxide  23


 


Anion Gap  10


 


BUN  11


 


Creatinine  0.7


 


Creat Clearance w eGFR  > 60


 


Random Glucose  86


 


Calcium  7.9 L


 


Phosphorus  2.3 L


 


Magnesium  2.0


 


Total Bilirubin  0.6


 


AST  15


 


ALT  14


 


Alkaline Phosphatase  121 H


 


Total Protein  6.3 L


 


Albumin  2.7 L


 


Urine Color 


 


Urine Appearance 


 


Urine pH 


 


Ur Specific Gravity 


 


Urine Protein 


 


Urine Glucose (UA) 


 


Urine Ketones 


 


Urine Blood 


 


Urine Nitrite 


 


Urine Bilirubin 


 


Urine Urobilinogen 


 


Ur Leukocyte Esterase 


 


Urine WBC (Auto) 


 


Urine RBC (Auto) 


 


Ur Epithelial Cells 


 


Urine Bacteria 


 


Urine Mucus 


 


Blood Type 


 


Antibody Screen 








Active Medications











Generic Name Dose Route Start Last Admin





  Trade Name Juan R  PRN Reason Stop Dose Admin


 


Enoxaparin Sodium  40 mg  12/21/18 10:00  12/21/18 11:07





  Lovenox -  SQ   Not Given





  DAILY ALE   





     





     





     





     


 


Piperacillin Sod/Tazobactam  50 mls @ 100 mls/hr  12/21/18 15:15  





  Sod 2.25 gm/ Dextrose  IVPB   





  Q8H-IV ALE   





     





     





  Protocol   





     


 


Piperacillin Sod/Tazobactam  50 mls @ 100 mls/hr  12/21/18 15:15  12/21/18 16:21





  Sod 2.25 gm/ Dextrose  IVPB  12/22/18 07:44  100 mls/hr





  Q8H ALE   Administration





     





     





  Protocol   





     











ASSESSMENT/PLAN:





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 

Neurogenic Bladder with recurrent UTI presents with worsening LLQ Abdominal 

pain for the past 3 days





# Suprapubic Pain likely secondary to UTI


   Has a hx. chronic/recurrent cystitis. CT abdomen/pelvis was done which 

showed mild localized ileus. 


   Continue IV Zosyn 3.375 gm Q8H . Confirmed with Dr. Man. Urine cultures 

pending. Change abx as per c/s


   Urine culture in 9/20/18 showed pseudomonas sensitive to zosyn. 


   


# Diarrhoea has resolved


   Was taking stool softners at home, will hold for now. 








# Lung nodule


   RLL 1.7 cm nodule which increased from 1.4 cm (8/25/18). 1.3 x 0.5 cm 

spiculated nodule in LLL without any interval change from prior CT.


   Needs outpatient f/up with Pulmonologist. 





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 


   Was seen in prior CT's as well. 





# Anxiety


   Continue Diazepam PRN





#FEN


   IV fluids discontinued , can tolerate PO


   Electrolytes Hypophos, repleted. 


   Regular diet





# Prophylaxis


   For DVT : On Lovenox sq daily


   For GI: Not indicated





# Medications : Confirmed. 





# Code Status: Full Code





# Dispo: Admit to Med-Surg





Illness, Investigation and Plan of care explained to the patient. He verbalized 

understanding. 





Case discussed with Dr. Perez. 

















Visit type





- Emergency Visit


Emergency Visit: Yes


ED Registration Date: 12/21/18


Care time: The patient presented to the Emergency Department on the above date 

and was hospitalized for further evaluation of their emergent condition.





- New Patient


This patient is new to me today: Yes


Date on this admission: 12/21/18





- Critical Care


Critical Care patient: No





- Discharge Referral


Referred to [facility] No

## 2018-12-21 NOTE — PDOC
*Physical Exam





- Vital Signs


 Last Vital Signs











Temp Pulse Resp BP Pulse Ox


 


 97.9 F   62   22 H  122/70   95 


 


 12/20/18 16:10  12/20/18 21:10  12/20/18 22:38  12/20/18 21:10  12/20/18 22:38














- Physical Exam


Comments: 





12/21/18 00:52


gen: aaox3, resting comfortably


abd: soft,  mild LLQ ttp, no rebound or gurading, nondistended





ED Treatment Course





- LABORATORY


CBC & Chemistry Diagram: 


 12/20/18 16:35





 12/20/18 16:35





- ADDITIONAL ORDERS


Additional order review: 


 Laboratory  Results











  12/20/18 12/20/18 12/20/18





  23:13 17:00 16:35


 


Sodium    137


 


Potassium    3.9


 


Chloride    100


 


Carbon Dioxide    28


 


Anion Gap    10


 


BUN    5 L


 


Creatinine    0.7


 


Creat Clearance w eGFR    > 60


 


Random Glucose    104


 


Calcium    9.3


 


Total Bilirubin    0.7


 


AST    14 L


 


ALT    16


 


Alkaline Phosphatase    137 H


 


Total Protein    7.3


 


Albumin    2.9 L


 


Lipase    360


 


Urine Color  Yellow  


 


Urine Appearance  Cloudy  


 


Urine pH  6.0  


 


Ur Specific Gravity  1.023  


 


Urine Protein  Negative  


 


Urine Glucose (UA)  Negative  


 


Urine Ketones  Negative  


 


Urine Blood  Negative  


 


Urine Nitrite  Negative  


 


Urine Bilirubin  Negative  


 


Urine Urobilinogen  4.0 e.u/dl  


 


Ur Leukocyte Esterase  Trace  


 


Urine WBC (Auto)  27  


 


Urine RBC (Auto)  8  


 


Ur Epithelial Cells  Moderate  


 


Urine Bacteria  Rare  


 


Urine Mucus  Many  


 


Blood Type   B POSITIVE 


 


Antibody Screen   Negative 








 











  12/20/18





  16:35


 


RBC  4.86


 


MCV  89.8


 


MCHC  34.7


 


RDW  15.6


 


MPV  8.2  D


 


Neutrophils %  81.9


 


Lymphocytes %  6.7 L D


 


Monocytes %  10.3 H


 


Eosinophils %  0.3  D


 


Basophils %  0.8














- Medications


Given in the ED: 


ED Medications














Discontinued Medications














Generic Name Dose Route Start Last Admin





  Trade Name Freq  PRN Reason Stop Dose Admin


 


Acetaminophen  1,000 mg  12/20/18 15:57  12/20/18 17:46





  Ofirmev Injection -  IVPB  12/20/18 15:58  1,000 mg





  ONCE ONE   Administration





     





     





     





     


 


Sodium Chloride  500 mls @ 500 mls/hr  12/20/18 20:09  12/20/18 20:16





  Normal Saline -  IV  12/20/18 21:08  500 mls/hr





  ASDIR STA   Administration





     





     





     





     


 


Morphine Sulfate  6 mg  12/20/18 17:41  12/20/18 17:46





  Morphine Injection -  IVPUSH  12/20/18 17:42  6 mg





  ONCE ONE   Administration





     





     





     





     


 


Sodium Chloride  1,000 ml  12/20/18 15:57  12/20/18 17:46





  Normal Saline -  IV  12/20/18 15:58  1,000 ml





  ONCE ONE   Administration





     





     





     





     














Medical Decision Making





- Medical Decision Making





12/21/18 00:52


a/p: 62yo male with hx of T4 spinal cord injury with paraplegia


-ct shows ileus


-abd is soft


-has had episodes of abd pain today - had a bm today


-no vomiting


-chronic occlusion of the infrarenal aorta





12/21/18 01:08


pt states pain is similar to prior UTI sensation


-pt wears a [place] catheter at home secondary to T4 injury


-denies n/v


-had a bm today


-passing gas


-elevated WBC


-ileus on ct


-will place on obs for ileus and uti


case discussed with Dr. Rendon





*DC/Admit/Observation/Transfer


Diagnosis at time of Disposition: 


 Abdominal pain, Ileus, UTI (urinary tract infection)








- Discharge Dispostion


Condition at time of disposition: Fair


Decision to Admit order: Yes





- Referrals


Referrals: 


Sony Poon MD [Primary Care Provider] - 





- Patient Instructions





- Post Discharge Activity
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
## 2018-12-21 NOTE — HP
CHIEF COMPLAINT: Abdominal Pain





PCP: Dr. Poon





HISTORY OF PRESENT ILLNESS:


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 

Neurogenic Bladder with recurrent UTI, COPD, Splenectomy, Paroxysmal Afib (not 

on AC) presents with worsening LLQ Abdominal pain for the past 3 days. Patient 

says he has a hx of multiple UTIs and this pain feels similar. He describes the 

pain as 10/10 sharp pressure that does not radiate. His pain is associated with 

Nausea, 1 episode of black loose stools that were partially formed. During my 

interview, the pain improved to 7/10 and is no longer feeling nausea. Patient 

has tried to increase his PO Intake, and to drink more water, as this has 

helped in the past however this has not provided relief this time. He is unable 

to identify any triggers and mentions adequate hygiene as he routinely wears a 

[place] catheter. Denies any current fevers, chills, chest pain, SOB, vomiting. 





Recent Travel:


Denies





PAST MEDICAL HISTORY:


Adenocarcinoma R Lung


Spinal cord transection at T4


Neurogenic Bladder with recurrent UTI


COPD


Splenectomy


Paroxysmal Afib (not on AC)





PAST SURGICAL HISTORY:


Multiple orthopedic surgery's for broken bones





Social History:


Smoking: Quit; Smoked 1ppd Age [age]


Alcohol: Socially


Drugs: Occasional Marijuana Use


Ambulation: Bed Bound


Residence: Lives in own Home with Homemaker





Family History:


Mother:  of MI at age 27


Father: Cardiac hx





Allergies





No Known Allergies Allergy (Verified 18 16:31)


 








HOME MEDICATIONS:


 Home Medications











 Medication  Instructions  Recorded


 


Diazepam [Valium] 10 mg PO TID PRN  tablet MDD 30mg 10/10/18


 


Morphine 10 mg/5 ml Liquid 10 mg PO Q4H #150 ml MDD 60mg 10/10/18





[Morphine 10 mg/5 mL Liquid -]  








REVIEW OF SYSTEMS


As per HPI








PHYSICAL EXAMINATION


 Vital Signs - 24 hr











  18





  16:10 21:10 22:38


 


Temperature 97.9 F  


 


Pulse Rate 68  


 


Pulse Rate [  62 





Radial]   


 


Respiratory 20 22 H 22 H





Rate   


 


Blood Pressure 127/59 L  


 


Blood Pressure  122/70 





[Left Arm]   


 


O2 Sat by Pulse 92 L 95 95





Oximetry (%)   











GENERAL: A&Ox3, NAD, Body habitus is rotated right


HEAD: NCAT


EYES: PERRL, EOMI


EARS, NOSE, THROAT: Oropharynx clear without exudates. Moist mucous membranes.


NECK: No JVD


LUNGS: Breath sounds equal, clear to auscultation bilaterally. No wheezes


HEART: Regular rate and rhythm, normal S1 and S2 without murmur


ABDOMEN: Soft, Tender to palpation in the LLQ, not distended, + bowel sounds, 

no guarding, no rebound, Negative [place] sign 


MUSCULOSKELETAL: No CVA tenderness.


EXTREMITIES: No peripheral edema. 


NEUROLOGICAL: Cranial nerves II-XII intact. Normal speech. Flaccid paralysis of 

B/L LE. 5/5 Muscle strength to handgrip, elbow flexion/extension bilaterally.  

Loss of sensation from level of T4 and below.  


SKIN: Warm, dry





 Laboratory Results - last 24 hr











  18





  16:35 16:35 17:00


 


WBC  15.6 H  


 


RBC  4.86  


 


Hgb  15.2  


 


Hct  43.7  


 


MCV  89.8  


 


MCH  31.2  


 


MCHC  34.7  


 


RDW  15.6  


 


Plt Count  455 H  


 


MPV  8.2  D  


 


Absolute Neuts (auto)  12.8 H  


 


Neutrophils %  81.9  


 


Lymphocytes %  6.7 L D  


 


Monocytes %  10.3 H  


 


Eosinophils %  0.3  D  


 


Basophils %  0.8  


 


Nucleated RBC %  0  


 


Sodium   137 


 


Potassium   3.9 


 


Chloride   100 


 


Carbon Dioxide   28 


 


Anion Gap   10 


 


BUN   5 L 


 


Creatinine   0.7 


 


Creat Clearance w eGFR   > 60 


 


Random Glucose   104 


 


Calcium   9.3 


 


Total Bilirubin   0.7 


 


AST   14 L 


 


ALT   16 


 


Alkaline Phosphatase   137 H 


 


Total Protein   7.3 


 


Albumin   2.9 L 


 


Lipase   360 


 


Urine Color   


 


Urine Appearance   


 


Urine pH   


 


Ur Specific Gravity   


 


Urine Protein   


 


Urine Glucose (UA)   


 


Urine Ketones   


 


Urine Blood   


 


Urine Nitrite   


 


Urine Bilirubin   


 


Urine Urobilinogen   


 


Ur Leukocyte Esterase   


 


Urine WBC (Auto)   


 


Urine RBC (Auto)   


 


Ur Epithelial Cells   


 


Urine Bacteria   


 


Urine Mucus   


 


Blood Type    B POSITIVE


 


Antibody Screen    Negative














  18





  23:13


 


WBC 


 


RBC 


 


Hgb 


 


Hct 


 


MCV 


 


MCH 


 


MCHC 


 


RDW 


 


Plt Count 


 


MPV 


 


Absolute Neuts (auto) 


 


Neutrophils % 


 


Lymphocytes % 


 


Monocytes % 


 


Eosinophils % 


 


Basophils % 


 


Nucleated RBC % 


 


Sodium 


 


Potassium 


 


Chloride 


 


Carbon Dioxide 


 


Anion Gap 


 


BUN 


 


Creatinine 


 


Creat Clearance w eGFR 


 


Random Glucose 


 


Calcium 


 


Total Bilirubin 


 


AST 


 


ALT 


 


Alkaline Phosphatase 


 


Total Protein 


 


Albumin 


 


Lipase 


 


Urine Color  Yellow


 


Urine Appearance  Cloudy


 


Urine pH  6.0


 


Ur Specific Gravity  1.023


 


Urine Protein  Negative


 


Urine Glucose (UA)  Negative


 


Urine Ketones  Negative


 


Urine Blood  Negative


 


Urine Nitrite  Negative


 


Urine Bilirubin  Negative


 


Urine Urobilinogen  4.0 e.u/dl


 


Ur Leukocyte Esterase  Trace


 


Urine WBC (Auto)  27


 


Urine RBC (Auto)  8


 


Ur Epithelial Cells  Moderate


 


Urine Bacteria  Rare


 


Urine Mucus  Many


 


Blood Type 


 


Antibody Screen 











ASSESSMENT/PLAN:


62 y/o M with PMHx of Spinal cord transection at T4, Neurogenic Bladder with 

recurrent UTI presents with worsening LLQ Abdominal pain for the past 3 days





#Abdominal Pain


-UA reveals 27 WBC, Trace LE. CBC reveals 15.6k WBC


-CT A/P: ?Mild localized Ileus, Chronic/recurrent Cystitis


-Urine Cx pending


-Patient given Fentanyl, Morphine, Acetaminophen for pain control in ED


-Continue on Zosyn given previous Urine Cx grew Serratia, Pseudomonas


-ID (Dr. Man) consulted


-C. Diff toxin and antigen ordered given hx of recent lose stools


-Can consider GI consult if diarrhea persists, and given ?Ileus on CT can 

consider CTAP with contrast


-NPO


-NS @ 75 mls/hr





#FEN


-NS @ 75 mls/hr


-Lytes WNL


-NPO





#PPx


-DVT: Lovenox





Dispo: Admit to Med-Surg











Visit type





- Emergency Visit


Emergency Visit: Yes


ED Registration Date: 18


Care time: The patient presented to the Emergency Department on the above date 

and was hospitalized for further evaluation of their emergent condition.





- New Patient


This patient is new to me today: Yes


Date on this admission: 18





- Critical Care


Critical Care patient: No

## 2018-12-22 LAB
ANION GAP SERPL CALC-SCNC: 10 MMOL/L (ref 8–16)
BUN SERPL-MCNC: 5 MG/DL (ref 7–18)
CALCIUM SERPL-MCNC: 8 MG/DL (ref 8.5–10.1)
CHLORIDE SERPL-SCNC: 102 MMOL/L (ref 98–107)
CO2 SERPL-SCNC: 25 MMOL/L (ref 21–32)
CREAT SERPL-MCNC: 0.5 MG/DL (ref 0.55–1.3)
DEPRECATED RDW RBC AUTO: 16.1 % (ref 11.9–15.9)
GLUCOSE SERPL-MCNC: 78 MG/DL (ref 74–106)
HCT VFR BLD CALC: 40.7 % (ref 35.4–49)
HGB BLD-MCNC: 13 GM/DL (ref 11.7–16.9)
MAGNESIUM SERPL-MCNC: 2.1 MG/DL (ref 1.8–2.4)
MCH RBC QN AUTO: 29.4 PG (ref 25.7–33.7)
MCHC RBC AUTO-ENTMCNC: 32 G/DL (ref 32–35.9)
MCV RBC: 92 FL (ref 80–96)
PHOSPHATE SERPL-MCNC: 2.2 MG/DL (ref 2.5–4.9)
PLATELET # BLD AUTO: 379 K/MM3 (ref 134–434)
PMV BLD: 9.5 FL (ref 7.5–11.1)
POTASSIUM SERPLBLD-SCNC: 3.7 MMOL/L (ref 3.5–5.1)
RBC # BLD AUTO: 4.42 M/MM3 (ref 4–5.6)
SODIUM SERPL-SCNC: 137 MMOL/L (ref 136–145)
WBC # BLD AUTO: 12.3 K/MM3 (ref 4–10)

## 2018-12-22 RX ADMIN — POTASSIUM & SODIUM PHOSPHATES POWDER PACK 280-160-250 MG SCH PACKET: 280-160-250 PACK at 13:56

## 2018-12-22 RX ADMIN — PIPERACILLIN SODIUM AND TAZOBACTAM SODIUM SCH MLS/HR: .25; 2 INJECTION, POWDER, LYOPHILIZED, FOR SOLUTION INTRAVENOUS at 09:30

## 2018-12-22 RX ADMIN — POTASSIUM & SODIUM PHOSPHATES POWDER PACK 280-160-250 MG SCH PACKET: 280-160-250 PACK at 21:20

## 2018-12-22 RX ADMIN — PIPERACILLIN SODIUM AND TAZOBACTAM SODIUM SCH MLS/HR: .25; 2 INJECTION, POWDER, LYOPHILIZED, FOR SOLUTION INTRAVENOUS at 17:25

## 2018-12-22 RX ADMIN — PIPERACILLIN SODIUM AND TAZOBACTAM SODIUM SCH: .25; 2 INJECTION, POWDER, LYOPHILIZED, FOR SOLUTION INTRAVENOUS at 09:30

## 2018-12-22 RX ADMIN — ENOXAPARIN SODIUM SCH MG: 40 INJECTION SUBCUTANEOUS at 09:30

## 2018-12-22 RX ADMIN — METOPROLOL TARTRATE SCH MG: 25 TABLET, FILM COATED ORAL at 18:07

## 2018-12-22 RX ADMIN — METOPROLOL TARTRATE SCH MG: 25 TABLET, FILM COATED ORAL at 21:20

## 2018-12-22 RX ADMIN — MORPHINE SULFATE PRN MG: 10 SOLUTION ORAL at 01:50

## 2018-12-22 RX ADMIN — MORPHINE SULFATE PRN MG: 10 SOLUTION ORAL at 21:20

## 2018-12-22 RX ADMIN — MORPHINE SULFATE PRN MG: 10 SOLUTION ORAL at 09:38

## 2018-12-22 NOTE — PN
Progress Note, Physician


History of Present Illness: 





Pt seen and examined and events noted. He states he is feeling better, has less 

abdominal discomfort. He is afebrile and without any other specific complaints.





- Current Medication List


Current Medications: 


Active Medications





Diazepam (Valium -)  10 mg PO Q8H PRN


   PRN Reason: ANXIETY


   Last Admin: 12/21/18 23:48 Dose:  10 mg


Enoxaparin Sodium (Lovenox -)  40 mg SQ DAILY ALE


   Last Admin: 12/22/18 09:30 Dose:  40 mg


Piperacillin Sod/Tazobactam (Sod 2.25 gm/ Dextrose)  50 mls @ 100 mls/hr IVPB 

Q8H-IV ALE; Protocol


   Last Admin: 12/22/18 17:25 Dose:  100 mls/hr


Morphine Sulfate (Morphine 10 Mg/5 Ml Liquid)  10 mg PO Q4H PRN


   PRN Reason: PAIN LEVEL 7 - 10


   Last Admin: 12/22/18 09:38 Dose:  10 mg


Potassium Phos/Sodium Phos (Phos-Nak Packet -)  1 packet PO TID ALE


   Last Admin: 12/22/18 13:56 Dose:  1 packet











- Objective


Vital Signs: 


 Vital Signs











Temperature  98.4 F   12/22/18 13:05


 


Pulse Rate  108 H  12/22/18 15:31


 


Respiratory Rate  20   12/22/18 13:05


 


Blood Pressure  70/34 L  12/22/18 13:05


 


O2 Sat by Pulse Oximetry (%)  96   12/22/18 09:00











Constitutional: Yes: No Distress, Calm, Other (paraplegia)


Cardiovascular: Yes: Regular Rate and Rhythm


Respiratory: Yes: Regular


Gastrointestinal: Yes: Normal Bowel Sounds, Soft


Genitourinary: Yes: Other ([place] catheter draining urine)


Integumentary: Yes: WNL


Neurological: Yes: Alert


Labs: 


 CBC, BMP





 12/22/18 06:45 





 12/22/18 06:45 





 Microbiology





12/21/18 02:20   Urine - Urine Clean Catch   Urine Culture - Preliminary


                            Non Lactose Fermenting Gnb











Problem List





- Problems


(1) UTI (urinary tract infection)


Code(s): N39.0 - URINARY TRACT INFECTION, SITE NOT SPECIFIED   


Qualifiers: 


   Urinary tract infection type: site unspecified   Hematuria presence: without 

hematuria   Qualified Code(s): N39.0 - Urinary tract infection, site not 

specified   





(2) Adenocarcinoma of right lung


Code(s): C34.91 - MALIGNANT NEOPLASM OF UNSP PART OF RIGHT BRONCHUS OR LUNG   





(3) Leukocytosis


Code(s): D72.829 - ELEVATED WHITE BLOOD CELL COUNT, UNSPECIFIED   





(4) Paroxysmal A-fib


Code(s): I48.0 - PAROXYSMAL ATRIAL FIBRILLATION   





(5) Neurogenic bladder


Code(s): N31.9 - NEUROMUSCULAR DYSFUNCTION OF BLADDER, UNSPECIFIED   





(6) Paraplegia


Code(s): G82.20 - PARAPLEGIA, UNSPECIFIED   





Assessment/Plan





UTI


Leukocytosis


Neurogenic bladder


Paraplegia


T2 fracture





-- follow up urine culture isolates


-- continue Zosyn for now


-- wbc decreasing, pt is afebrile/alert


continue monitor
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
## 2018-12-23 LAB
ANION GAP SERPL CALC-SCNC: 8 MMOL/L (ref 8–16)
BUN SERPL-MCNC: 5 MG/DL (ref 7–18)
CALCIUM SERPL-MCNC: 8.1 MG/DL (ref 8.5–10.1)
CHLORIDE SERPL-SCNC: 103 MMOL/L (ref 98–107)
CO2 SERPL-SCNC: 26 MMOL/L (ref 21–32)
CREAT SERPL-MCNC: 0.7 MG/DL (ref 0.55–1.3)
GLUCOSE SERPL-MCNC: 73 MG/DL (ref 74–106)
PHOSPHATE SERPL-MCNC: 3.4 MG/DL (ref 2.5–4.9)
POTASSIUM SERPLBLD-SCNC: 4.2 MMOL/L (ref 3.5–5.1)
SODIUM SERPL-SCNC: 137 MMOL/L (ref 136–145)

## 2018-12-23 RX ADMIN — POTASSIUM & SODIUM PHOSPHATES POWDER PACK 280-160-250 MG SCH PACKET: 280-160-250 PACK at 15:46

## 2018-12-23 RX ADMIN — CEFTRIAXONE SCH MLS/HR: 1 INJECTION, POWDER, FOR SOLUTION INTRAMUSCULAR; INTRAVENOUS at 15:46

## 2018-12-23 RX ADMIN — POTASSIUM & SODIUM PHOSPHATES POWDER PACK 280-160-250 MG SCH PACKET: 280-160-250 PACK at 06:03

## 2018-12-23 RX ADMIN — MORPHINE SULFATE PRN MG: 10 SOLUTION ORAL at 15:45

## 2018-12-23 RX ADMIN — MORPHINE SULFATE PRN MG: 10 SOLUTION ORAL at 03:13

## 2018-12-23 RX ADMIN — MORPHINE SULFATE PRN MG: 10 SOLUTION ORAL at 11:11

## 2018-12-23 RX ADMIN — PIPERACILLIN SODIUM AND TAZOBACTAM SODIUM SCH MLS/HR: .25; 2 INJECTION, POWDER, LYOPHILIZED, FOR SOLUTION INTRAVENOUS at 10:18

## 2018-12-23 RX ADMIN — ENOXAPARIN SODIUM SCH MG: 40 INJECTION SUBCUTANEOUS at 10:17

## 2018-12-23 RX ADMIN — MORPHINE SULFATE PRN MG: 10 SOLUTION ORAL at 21:27

## 2018-12-23 RX ADMIN — PIPERACILLIN SODIUM AND TAZOBACTAM SODIUM SCH MLS/HR: .25; 2 INJECTION, POWDER, LYOPHILIZED, FOR SOLUTION INTRAVENOUS at 01:30

## 2018-12-23 RX ADMIN — POTASSIUM & SODIUM PHOSPHATES POWDER PACK 280-160-250 MG SCH PACKET: 280-160-250 PACK at 21:27

## 2018-12-23 RX ADMIN — METOPROLOL TARTRATE SCH MG: 25 TABLET, FILM COATED ORAL at 21:27

## 2018-12-23 RX ADMIN — METOPROLOL TARTRATE SCH MG: 25 TABLET, FILM COATED ORAL at 10:17
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
## 2018-12-23 NOTE — PN
Teaching Attending Note


Name of Resident: Aster Solomon





ATTENDING PHYSICIAN STATEMENT





I saw and evaluated the patient.


I reviewed the resident's note and discussed the case with the resident.


I agree with the resident's findings and plan as documented.








SUBJECTIVE: Patient had episode of atrial fib with RVR and hypotension 

yesterday evening. He improved with IV fluid and Lopressor. He says abdominal 

pain continues to improve. 








OBJECTIVE:


 Vital Signs











 Period  Temp  Pulse  Resp  BP Sys/Malone  Pulse Ox


 


 Last 24 Hr  97.7 F-98.5 F  63-81  16-18  103-128/43-71  97








GENERAL: The patient is awake, alert, and fully oriented, in no acute distress.


LUNGS: Breath sounds equal, clear to auscultation bilaterally, no wheezes, no 

crackles, no accessory muscle use. 


HEART: Regular rate and rhythm, S1, S2 without murmur, rub or gallop.


ABDOMEN: Soft, nontender, nondistended, normoactive bowel sounds, no guarding, 

no rebound, no hepatosplenomegaly, no masses.


EXTREMITIES: 2+ pulses, warm, well-perfused, no edema, contracted, muscle 

atrophy. 








 Laboratory Results - last 24 hr











  12/23/18





  06:20


 


Sodium  137


 


Potassium  4.2


 


Chloride  103


 


Carbon Dioxide  26


 


Anion Gap  8


 


BUN  5 L


 


Creatinine  0.7


 


Creat Clearance w eGFR  > 60


 


Random Glucose  73 L


 


Calcium  8.1 L


 


Phosphorus  3.4








 Current Medications











Generic Name Dose Route Start Last Admin





  Trade Name Freq  PRN Reason Stop Dose Admin


 


Diazepam  10 mg  12/21/18 18:20  12/23/18 15:45





  Valium -  PO   10 mg





  Q8H PRN   Administration





  ANXIETY   





     





     





     


 


Enoxaparin Sodium  40 mg  12/21/18 10:00  12/23/18 10:17





  Lovenox -  SQ   40 mg





  DAILY ALE   Administration





     





     





     





     


 


Ceftriaxone Sodium 1 gm/  50 mls @ 100 mls/hr  12/23/18 14:15  12/23/18 15:46





  Dextrose  IVPB   100 mls/hr





  DAILY ALE   Administration





     





     





  Protocol   





     


 


Metoprolol Tartrate  12.5 mg  12/22/18 18:00  12/23/18 10:17





  Lopressor -  PO   12.5 mg





  BID ALE   Administration





     





     





     





     


 


Morphine Sulfate  10 mg  12/21/18 18:25  12/23/18 15:45





  Morphine 10 Mg/5 Ml Liquid  PO   10 mg





  Q4H PRN   Administration





  PAIN LEVEL 7 - 10   





     





     





     


 


Potassium Phos/Sodium Phos  1 packet  12/22/18 14:00  12/23/18 15:46





  Phos-Nak Packet -  PO   1 packet





  TID ALE   Administration





     





     





     





     














ASSESSMENT AND PLAN:


This is a 63 year old man with a history of paraplegia, spinal cord transection

, neurogenic bladder, recurrent UTIs who presented to the ED with LLQ abdominal 

pain.





1. Abdominal pain secondary to UTI


   - Afebrile, WBC improving


   - Urine culture growing Serratia and Zosyn changed to Rocephin


2. Paroxysmal atrial fibrillation with RVR


   - Currently in sinus rhythm


   - Continue Lopressor


   - Not on anticoagulation because it was previously determined that he would 

not benefit from anticoagulation


3. Diarrhea


   - Improved


4. Hypophosphatemia


   - Improved


5. Neurogenic bladder


6. 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


7. Adenocarcinoma of the lung


   - Patient has opted for no treatment


8. Anxiety disorder


   - Continue Valium as needed


9. B/L iliac artery occlusion


   - Chronic

## 2018-12-23 NOTE — PN
Progress Note, Physician


History of Present Illness: 





No new events. Pt states he has abd discomfort but believes he ate too much. 

Remains afebrile. 





- Current Medication List


Current Medications: 


Active Medications





Diazepam (Valium -)  10 mg PO Q8H PRN


   PRN Reason: ANXIETY


   Last Admin: 12/22/18 22:05 Dose:  10 mg


Enoxaparin Sodium (Lovenox -)  40 mg SQ DAILY [facility]


   Last Admin: 12/23/18 10:17 Dose:  40 mg


Ceftriaxone Sodium 1 gm/ (Dextrose)  100 mls @ 200 mls/hr IVPB DAILY [facility]; 

Protocol


Metoprolol Tartrate (Lopressor -)  12.5 mg PO BID [facility]


   Last Admin: 12/23/18 10:17 Dose:  12.5 mg


Morphine Sulfate (Morphine 10 Mg/5 Ml Liquid)  10 mg PO Q4H PRN


   PRN Reason: PAIN LEVEL 7 - 10


   Last Admin: 12/23/18 11:11 Dose:  10 mg


Potassium Phos/Sodium Phos (Phos-Nak Packet -)  1 packet PO TID [facility]


   Last Admin: 12/23/18 06:03 Dose:  1 packet











- Objective


Vital Signs: 


 Vital Signs











Temperature  98 F   12/23/18 09:00


 


Pulse Rate  72   12/23/18 09:00


 


Respiratory Rate  18   12/23/18 09:00


 


Blood Pressure  127/43 L  12/23/18 09:00


 


O2 Sat by Pulse Oximetry (%)  97   12/22/18 21:00











Constitutional: Yes: No Distress, Calm


Cardiovascular: Yes: Regular Rate and Rhythm


Respiratory: Yes: Regular


Gastrointestinal: Yes: Normal Bowel Sounds, Soft


Neurological: Yes: Other (paraplegia)


Labs: 


 CBC, BMP





 12/22/18 06:45 





 12/23/18 06:20 











Problem List





- Problems


(1) UTI (urinary tract infection)


Code(s): N39.0 - URINARY TRACT INFECTION, SITE NOT SPECIFIED   


Qualifiers: 


   Urinary tract infection type: site unspecified   Hematuria presence: without 

hematuria   Qualified Code(s): N39.0 - Urinary tract infection, site not 

specified   





(2) Adenocarcinoma of right lung


Code(s): C34.91 - MALIGNANT NEOPLASM OF UNSP PART OF RIGHT BRONCHUS OR LUNG   





(3) Leukocytosis


Code(s): D72.829 - ELEVATED WHITE BLOOD CELL COUNT, UNSPECIFIED   





(4) Paroxysmal A-fib


Code(s): I48.0 - PAROXYSMAL ATRIAL FIBRILLATION   





(5) Neurogenic bladder


Code(s): N31.9 - NEUROMUSCULAR DYSFUNCTION OF BLADDER, UNSPECIFIED   





(6) Paraplegia


Code(s): G82.20 - PARAPLEGIA, UNSPECIFIED   





Assessment/Plan





Complicated UTI


Leukocytosis


Neurogenic bladder


Paraplegia


T2 fracture





-- urine culture results noted


-- d/c Zosyn, switch to Ceftriaxone


-- repeat cbc, if continues to improve switch to oral antibiotics


pt currently stable

## 2018-12-24 LAB
BASOPHILS # BLD: 0.3 % (ref 0–2)
DEPRECATED RDW RBC AUTO: 15.7 % (ref 11.9–15.9)
EOSINOPHIL # BLD: 3.9 % (ref 0–4.5)
HCT VFR BLD CALC: 42.7 % (ref 35.4–49)
HGB BLD-MCNC: 14 GM/DL (ref 11.7–16.9)
LYMPHOCYTES # BLD: 13.5 % (ref 8–40)
MCH RBC QN AUTO: 30.1 PG (ref 25.7–33.7)
MCHC RBC AUTO-ENTMCNC: 32.7 G/DL (ref 32–35.9)
MCV RBC: 92.2 FL (ref 80–96)
MONOCYTES # BLD AUTO: 12.4 % (ref 3.8–10.2)
NEUTROPHILS # BLD: 69.9 % (ref 42.8–82.8)
PLATELET # BLD AUTO: 406 K/MM3 (ref 134–434)
PMV BLD: 9.3 FL (ref 7.5–11.1)
RBC # BLD AUTO: 4.63 M/MM3 (ref 4–5.6)
WBC # BLD AUTO: 11.4 K/MM3 (ref 4–10)

## 2018-12-24 RX ADMIN — DOCUSATE SODIUM SCH MG: 100 CAPSULE, LIQUID FILLED ORAL at 09:31

## 2018-12-24 RX ADMIN — ENOXAPARIN SODIUM SCH MG: 40 INJECTION SUBCUTANEOUS at 09:31

## 2018-12-24 RX ADMIN — DOCUSATE SODIUM SCH MG: 100 CAPSULE, LIQUID FILLED ORAL at 12:00

## 2018-12-24 RX ADMIN — DOCUSATE SODIUM SCH MG: 100 CAPSULE, LIQUID FILLED ORAL at 17:18

## 2018-12-24 RX ADMIN — METOPROLOL TARTRATE SCH MG: 25 TABLET, FILM COATED ORAL at 09:32

## 2018-12-24 RX ADMIN — MORPHINE SULFATE PRN MG: 10 SOLUTION ORAL at 22:36

## 2018-12-24 RX ADMIN — POTASSIUM & SODIUM PHOSPHATES POWDER PACK 280-160-250 MG SCH PACKET: 280-160-250 PACK at 13:40

## 2018-12-24 RX ADMIN — METOPROLOL TARTRATE SCH MG: 25 TABLET, FILM COATED ORAL at 21:48

## 2018-12-24 RX ADMIN — POTASSIUM & SODIUM PHOSPHATES POWDER PACK 280-160-250 MG SCH PACKET: 280-160-250 PACK at 06:20

## 2018-12-24 RX ADMIN — CEFTRIAXONE SCH MLS/HR: 1 INJECTION, POWDER, FOR SOLUTION INTRAMUSCULAR; INTRAVENOUS at 09:32

## 2018-12-24 RX ADMIN — MORPHINE SULFATE PRN MG: 10 SOLUTION ORAL at 18:26

## 2018-12-24 RX ADMIN — MORPHINE SULFATE PRN MG: 10 SOLUTION ORAL at 09:43

## 2018-12-24 RX ADMIN — MORPHINE SULFATE PRN MG: 10 SOLUTION ORAL at 14:20

## 2018-12-24 NOTE — PN
Physical Exam: 


SUBJECTIVE: Patient seen and examined. He feels much better. 








OBJECTIVE:





 Vital Signs











 Period  Temp  Pulse  Resp  BP Sys/Malone  Pulse Ox


 


 Last 24 Hr  98.1 F-99.1 F  66-72  18-18  /52-82  95-96











GENERAL: The patient is awake, alert, and fully oriented, in no acute distress.


LUNGS: Breath sounds equal, clear to auscultation bilaterally, no wheezes, no 

crackles, no accessory muscle use. 


HEART: Regular rate and rhythm, S1, S2 without murmur, rub or gallop.


ABDOMEN: Soft, nontender, nondistended, normoactive bowel sounds, no guarding, 

no rebound, no hepatosplenomegaly, no masses.


EXTREMITIES: 2+ pulses, warm, well-perfused, no edema, contracted, muscle 

atrophy. 








 Laboratory Results - last 24 hr











  12/24/18





  07:00


 


WBC  11.4 H


 


RBC  4.63


 


Hgb  14.0


 


Hct  42.7


 


MCV  92.2


 


MCH  30.1


 


MCHC  32.7


 


RDW  15.7


 


Plt Count  406


 


MPV  9.3


 


Absolute Neuts (auto)  8.0


 


Neutrophils %  69.9


 


Lymphocytes %  13.5  D


 


Monocytes %  12.4 H


 


Eosinophils %  3.9  D


 


Basophils %  0.3


 


Nucleated RBC %  0








Active Medications











Generic Name Dose Route Start Last Admin





  Trade Name Freq  PRN Reason Stop Dose Admin


 


Diazepam  10 mg  12/21/18 18:20  12/23/18 21:27





  Valium -  PO   10 mg





  Q8H PRN   Administration





  ANXIETY   





     





     





     


 


Docusate Sodium  100 mg  12/24/18 08:49  12/24/18 12:00





  Colace -  PO   100 mg





  TIDCM ALE   Administration





     





     





     





     


 


Enoxaparin Sodium  40 mg  12/21/18 10:00  12/24/18 09:31





  Lovenox -  SQ   40 mg





  DAILY ALE   Administration





     





     





     





     


 


Ceftriaxone Sodium 1 gm/  50 mls @ 100 mls/hr  12/23/18 14:15  12/24/18 09:32





  Dextrose  IVPB   100 mls/hr





  DAILY LAE   Administration





     





     





  Protocol   





     


 


Metoprolol Tartrate  12.5 mg  12/22/18 18:00  12/24/18 09:32





  Lopressor -  PO   12.5 mg





  BID ALE   Administration





     





     





     





     


 


Morphine Sulfate  10 mg  12/21/18 18:25  12/24/18 09:43





  Morphine 10 Mg/5 Ml Liquid  PO   10 mg





  Q4H PRN   Administration





  PAIN LEVEL 7 - 10   





     





     





     


 


Potassium Phos/Sodium Phos  1 packet  12/22/18 14:00  12/24/18 06:20





  Phos-Nak Packet -  PO   1 packet





  TID ALE   Administration





     





     





     





     











ASSESSMENT/PLAN:


This is a 63 year old man with a history of paraplegia, spinal cord transection

, neurogenic bladder, recurrent UTIs who presented to the ED with LLQ abdominal 

pain.





1. Abdominal pain secondary to UTI


   - Afebrile, WBC improving


   - Urine culture growing Serratia


   - Continue Rocephin (day 3 of antibiotics)


2. Paroxysmal atrial fibrillation with RVR


   - Remains in sinus rhythm


   - Continue Lopressor


   - Not on anticoagulation because it was previously determined that he would 

not benefit from anticoagulation


3. Diarrhea


   - Improved


4. Hypophosphatemia


   - Improved


5. Neurogenic bladder


6. 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


7. Adenocarcinoma of the lung


   - Patient was previously determined not to be a candidate for chemo or 

stereotactic RT, and immunotherapy was being looked into


   - He says he was told he has a terminal condition with 8 months to live and 

would like to look into home hospice


   - Oncology, palliative care evals


8. Anxiety disorder


   - Continue Valium as needed


9. B/L iliac artery occlusion


   - Chronic





Visit type





- Emergency Visit


Emergency Visit: Yes


ED Registration Date: 12/21/18


Care time: The patient presented to the Emergency Department on the above date 

and was hospitalized for further evaluation of their emergent condition.





- New Patient


This patient is new to me today: No





- Critical Care


Critical Care patient: No





- Discharge Referral


Referred to [facility] No

## 2018-12-24 NOTE — PN
Progress Note, Physician


History of Present Illness: 





still not feeling very well








- Current Medication List


Current Medications: 


Active Medications





Diazepam (Valium -)  10 mg PO Q8H PRN


   PRN Reason: ANXIETY


   Last Admin: 12/23/18 21:27 Dose:  10 mg


Docusate Sodium (Colace -)  100 mg PO TIDCM [facility]


   Last Admin: 12/24/18 12:00 Dose:  100 mg


Enoxaparin Sodium (Lovenox -)  40 mg SQ DAILY [facility]


   Last Admin: 12/24/18 09:31 Dose:  40 mg


Ceftriaxone Sodium 1 gm/ (Dextrose)  50 mls @ 100 mls/hr IVPB DAILY [facility]; 

Protocol


   Last Admin: 12/24/18 09:32 Dose:  100 mls/hr


Metoprolol Tartrate (Lopressor -)  12.5 mg PO BID [facility]


   Last Admin: 12/24/18 09:32 Dose:  12.5 mg


Morphine Sulfate (Morphine 10 Mg/5 Ml Liquid)  10 mg PO Q4H PRN


   PRN Reason: PAIN LEVEL 7 - 10


   Last Admin: 12/24/18 14:20 Dose:  10 mg











- Objective


Vital Signs: 


 Vital Signs











Temperature  98.7 F   12/24/18 14:57


 


Pulse Rate  59 L  12/24/18 14:57


 


Respiratory Rate  20   12/24/18 14:57


 


Blood Pressure  155/75   12/24/18 14:57


 


O2 Sat by Pulse Oximetry (%)  95   12/24/18 09:00











Constitutional: Yes: No Distress, Calm


Cardiovascular: Yes: Regular Rate and Rhythm


Respiratory: Yes: Regular, CTA Bilaterally


Gastrointestinal: Yes: Normal Bowel Sounds, Soft


Musculoskeletal: Yes: Other


Extremities: Yes: Other (contracted)


Neurological: Yes: Alert, Oriented


Psychiatric: Yes: Alert, Oriented


Labs: 


 CBC, BMP





 12/24/18 07:00 





 12/23/18 06:20 











Assessment/Plan





Problem List





- Problems


(1) UTI (urinary tract infection)


Code(s): N39.0 - URINARY TRACT INFECTION, SITE NOT SPECIFIED   


Qualifiers: 


   Urinary tract infection type: site unspecified   Hematuria presence: without 

hematuria   Qualified Code(s): N39.0 - Urinary tract infection, site not 

specified   





(2) Adenocarcinoma of right lung


Code(s): C34.91 - MALIGNANT NEOPLASM OF UNSP PART OF RIGHT BRONCHUS OR LUNG   





(3) Leukocytosis


Code(s): D72.829 - ELEVATED WHITE BLOOD CELL COUNT, UNSPECIFIED   





(4) Paroxysmal A-fib


Code(s): I48.0 - PAROXYSMAL ATRIAL FIBRILLATION   





(5) Neurogenic bladder


Code(s): N31.9 - NEUROMUSCULAR DYSFUNCTION OF BLADDER, UNSPECIFIED   





(6) Paraplegia


Code(s): G82.20 - PARAPLEGIA, UNSPECIFIED   





Assessment/Plan





Complicated UTI


Leukocytosis


Neurogenic bladder


Paraplegia


T2 fracture








plan


continue current abx


rest as per the team

## 2018-12-24 NOTE — EKG
Test Reason : 

Blood Pressure : ***/*** mmHG

Vent. Rate : 102 BPM     Atrial Rate : 066 BPM

   P-R Int : 000 ms          QRS Dur : 076 ms

    QT Int : 334 ms       P-R-T Axes : 000 069 088 degrees

   QTc Int : 435 ms

 

*** POOR DATA QUALITY, INTERPRETATION MAY BE ADVERSELY AFFECTED

ATRIAL FIBRILLATION WITH RAPID VENTRICULAR RESPONSE

ABNORMAL ECG

WHEN COMPARED WITH ECG OF 08-OCT-2018 14:21,

ATRIAL FIBRILLATION HAS REPLACED SINUS RHYTHM

VENT. RATE HAS INCREASED BY  43 BPM

Confirmed by EARNEST FOSTER MD ([id number]) on 12/24/2018 7:18:57 AM

 

Referred By: HERMILA WESLEY           Confirmed By:EARNEST FOSTER MD

## 2018-12-24 NOTE — CONSULT
Consult


Consult Specialty:: Heme/onc


Referred by:: Dr. Mack Perez


Reason for Consultation:: lung cancer





- History of Present Illness


Chief Complaint: abdominal pain


History of Present Illness: 





63M with paraplegia due to remote T2-T4 fracture, c/b neurogenic bladder, pAF (

no AC) and recently diagnosed lung ca admitted with recurrent UTI. Pt was found 

ot have bilateral lung nodules during 9/2018 admission. RUL nodule bx showed 

adenocarcinoma. Per last oncology note, tumor was sent for further testing. Pt 

has not yet followed-up.





Feeling a little better in the hospital. Denies SOB, chest pain, cough.





- History Source


History Provided By: Patient, Medical Record


Limitations to Obtaining History: No Limitations





- Past Medical History


CNS: Yes: Other (paraplegia due to accidental fall (T2 spinal fracture))


Gastrointestinal: Yes: Other (Hepatic hemangiomas)


Renal/: Yes: Other (Bladder dysfunction)


Infectious Disease: Yes: Other (multiple UTIs  Pseudomonas)


Musculoskeletal: Yes: Paraplegia, Other (Chronic pain)





- Past Surgical History


Past Surgical History: Yes: Splenectomy





- Alcohol/Substance Use


Hx Alcohol Use: Yes (social)


History of Substance Use: reports: Marijuana





- Smoking History


Smoking history: Former smoker


Have you smoked in the past 12 months: No


Aproximately how many cigarettes per day: 3


If you are a former smoker, when did you quit?: 1986





- Social History


Usual Living Arrangement: With Significant Other


ADL: Support Services ([facility] (24hr))


History of Recent Travel: No





Home Medications





- Allergies


Allergies/Adverse Reactions: 


 Allergies











Allergy/AdvReac Type Severity Reaction Status Date / Time


 


No Known Allergies Allergy   Verified 12/20/18 16:31














- Home Medications


Home Medications: 


Ambulatory Orders





Diazepam [Valium] 10 mg PO TID PRN  tablet MDD 30mg 10/10/18 


Morphine 10 mg/5 ml Liquid [Morphine 10 mg/5 mL Liquid -] 10 mg PO Q4H #150 ml 

MDD 60mg 10/10/18 











Family Disease History





- Family Disease History


Family Disease History: Heart Disease: Father





Review of Systems





- Review of Systems


Constitutional: reports: No Symptoms


Cardiovascular: reports: No Symptoms


Respiratory: reports: No Symptoms


Gastrointestinal: reports: Abdominal Pain


Hematology/Lymphatic: reports: No Symptoms





Physical Exam


Vital Signs: 


 Vital Signs











Temperature  98.5 F   12/24/18 18:58


 


Pulse Rate  64   12/24/18 18:58


 


Respiratory Rate  18   12/24/18 18:58


 


Blood Pressure  130/63   12/24/18 18:58


 


O2 Sat by Pulse Oximetry (%)  95   12/24/18 09:00











Constitutional: Yes: Well Nourished


Cardiovascular: Yes: Regular Rate and Rhythm


Respiratory: Yes: Regular, CTA Bilaterally


Gastrointestinal: Yes: Soft


Musculoskeletal: Yes: WNL


Extremities: Yes: Other (+contractions)


Labs: 


 CBC, BMP





 12/24/18 07:00 





 12/23/18 06:20 











Assessment/Plan





63M with paraplegia due to remote T2-T4 fracture, c/b neurogenic bladder with 

recurrent UTIs, pAF (no AC) and recently diagnosed lung adenocarcinoma, with 

bilateral lung involvement.  Will f/u on results from further path testing (EGFR

, ALK) that could allow pt to benefit from targeted therapy

## 2018-12-25 LAB
ANION GAP SERPL CALC-SCNC: 8 MMOL/L (ref 8–16)
BASOPHILS # BLD: 1.3 % (ref 0–2)
BUN SERPL-MCNC: 7 MG/DL (ref 7–18)
CALCIUM SERPL-MCNC: 8.6 MG/DL (ref 8.5–10.1)
CHLORIDE SERPL-SCNC: 102 MMOL/L (ref 98–107)
CO2 SERPL-SCNC: 26 MMOL/L (ref 21–32)
CREAT SERPL-MCNC: 0.6 MG/DL (ref 0.55–1.3)
DEPRECATED RDW RBC AUTO: 15.7 % (ref 11.9–15.9)
EOSINOPHIL # BLD: 2.8 % (ref 0–4.5)
GLUCOSE SERPL-MCNC: 68 MG/DL (ref 74–106)
HCT VFR BLD CALC: 42.7 % (ref 35.4–49)
HGB BLD-MCNC: 13.6 GM/DL (ref 11.7–16.9)
LYMPHOCYTES # BLD: 12.4 % (ref 8–40)
MCH RBC QN AUTO: 29.5 PG (ref 25.7–33.7)
MCHC RBC AUTO-ENTMCNC: 31.9 G/DL (ref 32–35.9)
MCV RBC: 92.4 FL (ref 80–96)
MONOCYTES # BLD AUTO: 12.1 % (ref 3.8–10.2)
NEUTROPHILS # BLD: 71.4 % (ref 42.8–82.8)
PLATELET # BLD AUTO: 443 K/MM3 (ref 134–434)
PMV BLD: 9.8 FL (ref 7.5–11.1)
POTASSIUM SERPLBLD-SCNC: 4.7 MMOL/L (ref 3.5–5.1)
RBC # BLD AUTO: 4.63 M/MM3 (ref 4–5.6)
SODIUM SERPL-SCNC: 136 MMOL/L (ref 136–145)
WBC # BLD AUTO: 11.6 K/MM3 (ref 4–10)

## 2018-12-25 RX ADMIN — ENOXAPARIN SODIUM SCH MG: 40 INJECTION SUBCUTANEOUS at 09:03

## 2018-12-25 RX ADMIN — DOCUSATE SODIUM SCH MG: 100 CAPSULE, LIQUID FILLED ORAL at 17:08

## 2018-12-25 RX ADMIN — MORPHINE SULFATE PRN MG: 10 SOLUTION ORAL at 18:27

## 2018-12-25 RX ADMIN — MORPHINE SULFATE PRN MG: 10 SOLUTION ORAL at 10:46

## 2018-12-25 RX ADMIN — CEFTRIAXONE SCH MLS/HR: 1 INJECTION, POWDER, FOR SOLUTION INTRAMUSCULAR; INTRAVENOUS at 09:03

## 2018-12-25 RX ADMIN — MORPHINE SULFATE PRN MG: 10 SOLUTION ORAL at 14:26

## 2018-12-25 RX ADMIN — DOCUSATE SODIUM SCH MG: 100 CAPSULE, LIQUID FILLED ORAL at 12:32

## 2018-12-25 RX ADMIN — METOPROLOL TARTRATE SCH MG: 25 TABLET, FILM COATED ORAL at 09:03

## 2018-12-25 RX ADMIN — MORPHINE SULFATE PRN MG: 10 SOLUTION ORAL at 06:17

## 2018-12-25 RX ADMIN — MORPHINE SULFATE PRN MG: 10 SOLUTION ORAL at 23:09

## 2018-12-25 RX ADMIN — DOCUSATE SODIUM SCH MG: 100 CAPSULE, LIQUID FILLED ORAL at 09:03

## 2018-12-25 RX ADMIN — METOPROLOL TARTRATE SCH MG: 25 TABLET, FILM COATED ORAL at 22:20

## 2018-12-25 NOTE — PN
Progress Note (short form)





- Note


Progress Note: 





CONSULT PROGRESS NOTE: HEMATOLOGY ONCOLOGY





breathing feels more comfortable.


denies chest pain, palpitations, abdominal pain, fever, headache


 Vital Signs











Temperature  98.9 F   12/25/18 14:40


 


Pulse Rate  59 L  12/25/18 14:40


 


Respiratory Rate  18   12/25/18 14:40


 


Blood Pressure  109/66   12/25/18 14:40


 


O2 Sat by Pulse Oximetry (%)  95   12/25/18 09:00








PE:


NAD, 


EOMI, BALTA, anic sclera


CTAB, afib, s1, s2, no mrg


abd soft, nontender


freely moving arms. muscular atrophy b/l contracted lower legs.








 CBCD











WBC  11.6 K/mm3 (4.0-10.0)  H  12/25/18  06:00    


 


RBC  4.63 M/mm3 (4.00-5.60)   12/25/18  06:00    


 


Hgb  13.6 GM/dL (11.7-16.9)   12/25/18  06:00    


 


Hct  42.7 % (35.4-49)   12/25/18  06:00    


 


MCV  92.4 fl (80-96)   12/25/18  06:00    


 


MCHC  31.9 g/dl (32.0-35.9)  L  12/25/18  06:00    


 


RDW  15.7 % (11.9-15.9)   12/25/18  06:00    


 


Plt Count  443 K/MM3 (134-434)  H  12/25/18  06:00    


 


MPV  9.8 fl (7.5-11.1)   12/25/18  06:00    








 CMP











Sodium  136 mmol/L (136-145)   12/25/18  06:00    


 


Potassium  4.7 mmol/L (3.5-5.1)   12/25/18  06:00    


 


Chloride  102 mmol/L ()   12/25/18  06:00    


 


Carbon Dioxide  26 mmol/L (21-32)   12/25/18  06:00    


 


Anion Gap  8 MMOL/L (8-16)   12/25/18  06:00    


 


BUN  7 mg/dL (7-18)   12/25/18  06:00    


 


Creatinine  0.6 mg/dL (0.55-1.3)   12/25/18  06:00    


 


Creat Clearance w eGFR  > 60  (>60)   12/25/18  06:00    


 


Calcium  8.6 mg/dL (8.5-10.1)   12/25/18  06:00    


 


Total Bilirubin  0.6 mg/dL (0.2-1)   12/21/18  06:00    


 


AST  15 U/L (15-37)   12/21/18  06:00    


 


ALT  14 U/L (13-61)   12/21/18  06:00    


 


Alkaline Phosphatase  121 U/L ()  H  12/21/18  06:00    


 


Total Protein  6.3 g/dl (6.4-8.2)  L  12/21/18  06:00    


 


Albumin  2.7 g/dl (3.4-5.0)  L  12/21/18  06:00    














Active Medications





Diazepam (Valium -)  10 mg PO Q8H PRN


   PRN Reason: ANXIETY


   Last Admin: 12/25/18 17:08 Dose:  10 mg


Docusate Sodium (Colace -)  100 mg PO TIDCM [facility]


   Last Admin: 12/25/18 17:08 Dose:  100 mg


Enoxaparin Sodium (Lovenox -)  40 mg SQ DAILY [facility]


   Last Admin: 12/25/18 09:03 Dose:  40 mg


Ceftriaxone Sodium 1 gm/ (Dextrose)  50 mls @ 100 mls/hr IVPB DAILY [facility]; 

Protocol


   Last Admin: 12/25/18 09:03 Dose:  100 mls/hr


Metoprolol Tartrate (Lopressor -)  12.5 mg PO BID [facility]


   Last Admin: 12/25/18 09:03 Dose:  12.5 mg


Morphine Sulfate (Morphine 10 Mg/5 Ml Liquid)  10 mg PO Q4H PRN


   PRN Reason: PAIN LEVEL 7 - 10


   Last Admin: 12/25/18 14:26 Dose:  10 mg





 


61 yr old man with paraplegia secondary to T2 compression fracture, neurogenic 

bladder, recurrent UTI's, EtOH abuse, marijuana use, bilateral femur fractures 

found to have b/l lungs nodules during 9/2018 admission, RUL nodule biopsy 

revealed lung adenocarcinoma(primary). 





Problem List:


Lung adenocarcinoma


Afib (not on AC)


paraplegia





A/P


Path report from 9/25/2018 shows tumor cells to be positive for TTF-1, CK 7, 

Napsin A while neg for thyroglobulin, CK20 and p40. FISH report without 

rearrangement of ROS1, PD-L1 = 0, no amplifiable DNA was detected and no 

further tissue remained for further tests.


currently reported on the abd/pelvis ct, there is a 1.7cm right lower lobe pulm 

nodule that may have increased in size


discussed repeat biopsy for further testing with patient, he wants to think 

about it and discuss with his HCP. he is unsure about wanting chemotherapy.

## 2018-12-25 NOTE — PN
Teaching Attending Note


Name of Resident: Aster Solomon





ATTENDING PHYSICIAN STATEMENT





I saw and evaluated the patient.


I reviewed the resident's note and discussed the case with the resident.


I agree with the resident's findings and plan as documented.








SUBJECTIVE: No complaints. 








OBJECTIVE:


 Vital Signs











 Period  Temp  Pulse  Resp  BP Sys/Malone  Pulse Ox


 


 Last 24 Hr  97.9 F-98.9 F  59-85  18-18  105-150/56-80  95-97








GENERAL: The patient is awake, alert, and fully oriented, in no acute distress.


LUNGS: Breath sounds equal, clear to auscultation bilaterally, no wheezes, no 

crackles, no accessory muscle use. 


HEART: Regular rate and rhythm, S1, S2 without murmur, rub or gallop.


ABDOMEN: Soft, nontender, nondistended, normoactive bowel sounds, no guarding, 

no rebound, no hepatosplenomegaly, no masses.


EXTREMITIES: 2+ pulses, warm, well-perfused, no edema, contracted, muscle 

atrophy. 





 Laboratory Results - last 24 hr











  12/25/18 12/25/18





  06:00 06:00


 


WBC  11.6 H 


 


RBC  4.63 


 


Hgb  13.6 


 


Hct  42.7 


 


MCV  92.4 


 


MCH  29.5 


 


MCHC  31.9 L 


 


RDW  15.7 


 


Plt Count  443 H 


 


MPV  9.8 


 


Absolute Neuts (auto)  8.3 H 


 


Neutrophils %  71.4 


 


Lymphocytes %  12.4 


 


Monocytes %  12.1 H 


 


Eosinophils %  2.8 


 


Basophils %  1.3  D 


 


Nucleated RBC %  0 


 


Sodium   136


 


Potassium   4.7


 


Chloride   102


 


Carbon Dioxide   26


 


Anion Gap   8


 


BUN   7


 


Creatinine   0.6


 


Creat Clearance w eGFR   > 60


 


Random Glucose   68 L


 


Calcium   8.6








 Current Medications











Generic Name Dose Route Start Last Admin





  Trade Name Freq  PRN Reason Stop Dose Admin


 


Diazepam  10 mg  12/21/18 18:20  12/24/18 23:57





  Valium -  PO   10 mg





  Q8H PRN   Administration





  ANXIETY   





     





     





     


 


Docusate Sodium  100 mg  12/24/18 08:49  12/25/18 12:32





  Colace -  PO   100 mg





  TIDCM ALE   Administration





     





     





     





     


 


Enoxaparin Sodium  40 mg  12/21/18 10:00  12/25/18 09:03





  Lovenox -  SQ   40 mg





  DAILY ALE   Administration





     





     





     





     


 


Ceftriaxone Sodium 1 gm/  50 mls @ 100 mls/hr  12/23/18 14:15  12/25/18 09:03





  Dextrose  IVPB   100 mls/hr





  DAILY ALE   Administration





     





     





  Protocol   





     


 


Metoprolol Tartrate  12.5 mg  12/22/18 18:00  12/25/18 09:03





  Lopressor -  PO   12.5 mg





  BID ALE   Administration





     





     





     





     


 


Morphine Sulfate  10 mg  12/21/18 18:25  12/25/18 14:26





  Morphine 10 Mg/5 Ml Liquid  PO   10 mg





  Q4H PRN   Administration





  PAIN LEVEL 7 - 10   





     





     





     














ASSESSMENT AND PLAN:


This is a 63 year old man with a history of paraplegia, spinal cord transection

, neurogenic bladder, recurrent UTIs who presented to the ED with LLQ abdominal 

pain.





1. Abdominal pain secondary to UTI


   - Afebrile, WBC improving


   - Urine culture growing Serratia


   - Continue Rocephin (day 4 of antibiotics)


2. Paroxysmal atrial fibrillation with RVR


   - Remains in sinus rhythm


   - Continue Lopressor


   - Not on anticoagulation because it was previously determined that he would 

not benefit from anticoagulation


3. Diarrhea


   - Improved


4. Hypophosphatemia


   - Improved


5. Neurogenic bladder


6. 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


7. Adenocarcinoma of the lung


   - Patient was previously determined not to be a candidate for chemo or 

stereotactic RT, and immunotherapy was being looked into


   - Patient had been requesting home hospice but is now considering treatment


8. Anxiety disorder


   - Continue Valium as needed


9. B/L iliac artery occlusion


   - Chronic
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
## 2018-12-26 LAB
APTT BLD: 35.5 SECONDS (ref 25.2–36.5)
DEPRECATED RDW RBC AUTO: 15.8 % (ref 11.9–15.9)
HCT VFR BLD CALC: 42.2 % (ref 35.4–49)
HGB BLD-MCNC: 14.5 GM/DL (ref 11.7–16.9)
INR BLD: 0.97 (ref 0.83–1.09)
MCH RBC QN AUTO: 31.4 PG (ref 25.7–33.7)
MCHC RBC AUTO-ENTMCNC: 34.4 G/DL (ref 32–35.9)
MCV RBC: 91.5 FL (ref 80–96)
PLATELET # BLD AUTO: 441 K/MM3 (ref 134–434)
PMV BLD: 10.2 FL (ref 7.5–11.1)
PT PNL PPP: 11.5 SEC (ref 9.7–13)
RBC # BLD AUTO: 4.61 M/MM3 (ref 4–5.6)
WBC # BLD AUTO: 10.2 K/MM3 (ref 4–10)

## 2018-12-26 RX ADMIN — DOCUSATE SODIUM SCH MG: 100 CAPSULE, LIQUID FILLED ORAL at 11:55

## 2018-12-26 RX ADMIN — MORPHINE SULFATE PRN MG: 10 SOLUTION ORAL at 16:30

## 2018-12-26 RX ADMIN — MORPHINE SULFATE PRN MG: 10 SOLUTION ORAL at 22:24

## 2018-12-26 RX ADMIN — MORPHINE SULFATE PRN MG: 10 SOLUTION ORAL at 08:53

## 2018-12-26 RX ADMIN — ENOXAPARIN SODIUM SCH MG: 40 INJECTION SUBCUTANEOUS at 10:29

## 2018-12-26 RX ADMIN — DOCUSATE SODIUM SCH MG: 100 CAPSULE, LIQUID FILLED ORAL at 10:28

## 2018-12-26 RX ADMIN — METOPROLOL TARTRATE SCH MG: 25 TABLET, FILM COATED ORAL at 10:29

## 2018-12-26 RX ADMIN — CEFTRIAXONE SCH MLS/HR: 1 INJECTION, POWDER, FOR SOLUTION INTRAMUSCULAR; INTRAVENOUS at 10:30

## 2018-12-26 RX ADMIN — DOCUSATE SODIUM SCH MG: 100 CAPSULE, LIQUID FILLED ORAL at 16:30

## 2018-12-26 RX ADMIN — METOPROLOL TARTRATE SCH MG: 25 TABLET, FILM COATED ORAL at 22:23

## 2018-12-26 RX ADMIN — MORPHINE SULFATE PRN MG: 10 SOLUTION ORAL at 03:09

## 2018-12-26 NOTE — PN
Progress Note, Physician


History of Present Illness: 





patient doing well


no issues


says still not feeling very well but better than yesterday











- Current Medication List


Current Medications: 


Active Medications





Diazepam (Valium -)  10 mg PO Q8H PRN


   PRN Reason: ANXIETY


   Last Admin: 12/25/18 17:08 Dose:  10 mg


Docusate Sodium (Colace -)  100 mg PO TIDCM [facility]


   Last Admin: 12/26/18 10:28 Dose:  100 mg


Enoxaparin Sodium (Lovenox -)  40 mg SQ DAILY [facility]


   Last Admin: 12/26/18 10:29 Dose:  40 mg


Ceftriaxone Sodium 1 gm/ (Dextrose)  50 mls @ 100 mls/hr IVPB DAILY [facility]; 

Protocol


   Last Admin: 12/26/18 10:30 Dose:  100 mls/hr


Metoprolol Tartrate (Lopressor -)  12.5 mg PO BID [facility]


   Last Admin: 12/26/18 10:29 Dose:  12.5 mg


Morphine Sulfate (Morphine 10 Mg/5 Ml Liquid)  10 mg PO Q4H PRN


   PRN Reason: PAIN LEVEL 7 - 10


   Last Admin: 12/26/18 08:53 Dose:  10 mg











- Objective


Vital Signs: 


 Vital Signs











Temperature  98.5 F   12/26/18 10:00


 


Pulse Rate  72   12/26/18 10:00


 


Respiratory Rate  20   12/26/18 10:00


 


Blood Pressure  129/82   12/26/18 10:00


 


O2 Sat by Pulse Oximetry (%)  95   12/25/18 20:35











Constitutional: Yes: No Distress, Calm


HENT: Yes: Atraumatic, Normocephalic


Cardiovascular: Yes: Regular Rate and Rhythm


Respiratory: Yes: Regular, CTA Bilaterally


Gastrointestinal: Yes: Normal Bowel Sounds, Soft


Extremities: Yes: Other (contracted)


Neurological: Yes: Alert, Oriented


Psychiatric: Yes: Alert, Oriented


Labs: 


 CBC, BMP





 12/26/18 06:00 





 12/25/18 06:00 





 INR, PTT











INR  0.97  (0.83-1.09)   12/26/18  06:00    














Assessment/Plan





Problem List





- Problems


(1) UTI (urinary tract infection)


Code(s): N39.0 - URINARY TRACT INFECTION, SITE NOT SPECIFIED   


Qualifiers: 


   Urinary tract infection type: site unspecified   Hematuria presence: without 

hematuria   Qualified Code(s): N39.0 - Urinary tract infection, site not 

specified   





(2) Adenocarcinoma of right lung


Code(s): C34.91 - MALIGNANT NEOPLASM OF UNSP PART OF RIGHT BRONCHUS OR LUNG   





(3) Leukocytosis


Code(s): D72.829 - ELEVATED WHITE BLOOD CELL COUNT, UNSPECIFIED   





(4) Paroxysmal A-fib


Code(s): I48.0 - PAROXYSMAL ATRIAL FIBRILLATION   





(5) Neurogenic bladder


Code(s): N31.9 - NEUROMUSCULAR DYSFUNCTION OF BLADDER, UNSPECIFIED   





(6) Paraplegia


Code(s): G82.20 - PARAPLEGIA, UNSPECIFIED   





Assessment/Plan





Complicated UTI


Leukocytosis


Neurogenic bladder


Paraplegia


T2 fracture








plan


continue current abx


rest as per the team
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
## 2018-12-26 NOTE — PN
Teaching Attending Note


Name of Resident: Aster Solomon





ATTENDING PHYSICIAN STATEMENT





I saw and evaluated the patient.


I reviewed the resident's note and discussed the case with the resident.


I agree with the resident's findings and plan as documented.








SUBJECTIVE: Mr Sumner says he is feeling better, with some minor abdominal pain 

but otherwise w/o complaint. Denies cp, sob, n/v.








OBJECTIVE:


 Last Vital Signs











Temp Pulse Resp BP Pulse Ox


 


 37.1 C   53 L  20   116/57 L  95 


 


 12/26/18 14:41  12/26/18 14:41  12/26/18 14:41  12/26/18 14:41  12/26/18 09:00








Gen: nad


Pulm: ctab w/o w/r/r


CV: rrr w/o m/r/g


Abd: +bs, s/nt/nd


Ext: no c/c/e





 CBC, BMP





 12/26/18 06:00 





 12/25/18 06:00 














Problem List





- Problems


(1) UTI (urinary tract infection)


Assessment/Plan: 


-urine culture growing serratia


-continue rocephin


Code(s): N39.0 - URINARY TRACT INFECTION, SITE NOT SPECIFIED   


Qualifiers: 


   Urinary tract infection type: acute cystitis   Hematuria presence: without 

hematuria   Qualified Code(s): N30.00 - Acute cystitis without hematuria   





(2) Abdominal pain


Assessment/Plan: 


-secondary to UTI


-improving


Code(s): R10.9 - UNSPECIFIED ABDOMINAL PAIN   


Qualifiers: 


   Abdominal location: generalized   Qualified Code(s): R10.84 - Generalized 

abdominal pain   





(3) Adenocarcinoma of right lung


Assessment/Plan: 


-planning for repeat biopsy to see if candidate for immunotherapy


-appreciate oncology assistance


Code(s): C34.91 - MALIGNANT NEOPLASM OF UNSP PART OF RIGHT BRONCHUS OR LUNG   





(4) Paroxysmal A-fib


Assessment/Plan: 


-continue metoprolol


-rate controlled


-poor candidate for anticoagulation


Code(s): I48.0 - PAROXYSMAL ATRIAL FIBRILLATION   





(5) Chronic pain


Assessment/Plan: 


-continue morphine


Code(s): G89.29 - OTHER CHRONIC PAIN   


Qualifiers: 


   Chronic pain type: chronic pain syndrome   Qualified Code(s): G89.4 - 

Chronic pain syndrome   





(6) Constipation


Assessment/Plan: 


-continue colace


Code(s): K59.00 - CONSTIPATION, UNSPECIFIED   





(7) Neurogenic bladder


Code(s): N31.9 - NEUROMUSCULAR DYSFUNCTION OF BLADDER, UNSPECIFIED   





(8) Paraplegia


Code(s): G82.20 - PARAPLEGIA, UNSPECIFIED

## 2018-12-27 RX ADMIN — DOCUSATE SODIUM SCH MG: 100 CAPSULE, LIQUID FILLED ORAL at 14:42

## 2018-12-27 RX ADMIN — METOPROLOL TARTRATE SCH MG: 25 TABLET, FILM COATED ORAL at 10:06

## 2018-12-27 RX ADMIN — CEFTRIAXONE SCH MLS/HR: 1 INJECTION, POWDER, FOR SOLUTION INTRAMUSCULAR; INTRAVENOUS at 09:53

## 2018-12-27 RX ADMIN — MORPHINE SULFATE PRN MG: 10 SOLUTION ORAL at 19:15

## 2018-12-27 RX ADMIN — DOCUSATE SODIUM SCH MG: 100 CAPSULE, LIQUID FILLED ORAL at 19:16

## 2018-12-27 RX ADMIN — MORPHINE SULFATE PRN MG: 10 SOLUTION ORAL at 09:55

## 2018-12-27 RX ADMIN — METOPROLOL TARTRATE SCH MG: 25 TABLET, FILM COATED ORAL at 21:11

## 2018-12-27 RX ADMIN — METOPROLOL TARTRATE SCH MG: 25 TABLET, FILM COATED ORAL at 09:59

## 2018-12-27 RX ADMIN — MORPHINE SULFATE PRN MG: 10 SOLUTION ORAL at 23:15

## 2018-12-27 RX ADMIN — MORPHINE SULFATE PRN MG: 10 SOLUTION ORAL at 04:09

## 2018-12-27 RX ADMIN — MORPHINE SULFATE PRN MG: 10 SOLUTION ORAL at 14:41

## 2018-12-27 RX ADMIN — DOCUSATE SODIUM SCH MG: 100 CAPSULE, LIQUID FILLED ORAL at 09:53

## 2018-12-27 RX ADMIN — ENOXAPARIN SODIUM SCH MG: 40 INJECTION SUBCUTANEOUS at 09:53
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
## 2018-12-27 NOTE — PN
Teaching Attending Note


Name of Resident: Aster Solomon





ATTENDING PHYSICIAN STATEMENT





I saw and evaluated the patient.


I reviewed the resident's note and discussed the case with the resident.


I agree with the resident's findings and plan as documented.








SUBJECTIVE: Mr Sumner complains of chronic pain but it is unchanged. No cp, sob

, n/v.








OBJECTIVE:


 Last Vital Signs











Temp Pulse Resp BP Pulse Ox


 


 37.1 C   55 L  20   112/68   95 


 


 12/27/18 14:26  12/27/18 14:26  12/27/18 14:26  12/27/18 14:26  12/26/18 09:00








Gen: nad


Pulm: ctab w/o w/r/r


CV: rrr w/o m/r/g


Abd: +bs, s/nt/nd


Ext: no c/c/e











(1) UTI (urinary tract infection)


Assessment/Plan: 


-urine culture growing serratia


-continue rocephin since planning for biopsy per ID


-case d/w Dr Man


Code(s): N39.0 - URINARY TRACT INFECTION, SITE NOT SPECIFIED   


Qualifiers: 


   Urinary tract infection type: acute cystitis   Hematuria presence: without 

hematuria   Qualified Code(s): N30.00 - Acute cystitis without hematuria   





(2) Abdominal pain


Assessment/Plan: 


-secondary to UTI


-improving


Code(s): R10.9 - UNSPECIFIED ABDOMINAL PAIN   


Qualifiers: 


   Abdominal location: generalized   Qualified Code(s): R10.84 - Generalized 

abdominal pain   





(3) Adenocarcinoma of right lung


Assessment/Plan: 


-planning for repeat biopsy to see if candidate for immunotherapy


-appreciate oncology assistance


Code(s): C34.91 - MALIGNANT NEOPLASM OF UNSP PART OF RIGHT BRONCHUS OR LUNG   





(4) Paroxysmal A-fib


Assessment/Plan: 


-continue metoprolol


-rate controlled


-poor candidate for anticoagulation


Code(s): I48.0 - PAROXYSMAL ATRIAL FIBRILLATION   





(5) Chronic pain


Assessment/Plan: 


-continue morphine


Code(s): G89.29 - OTHER CHRONIC PAIN   


Qualifiers: 


   Chronic pain type: chronic pain syndrome   Qualified Code(s): G89.4 - 

Chronic pain syndrome   





(6) Constipation


Assessment/Plan: 


-continue colace


Code(s): K59.00 - CONSTIPATION, UNSPECIFIED   





(7) Neurogenic bladder


Code(s): N31.9 - NEUROMUSCULAR DYSFUNCTION OF BLADDER, UNSPECIFIED   





(8) Paraplegia


Code(s): G82.20 - PARAPLEGIA, UNSPECIFIED   








Problem List





- Problems


(1) UTI (urinary tract infection)


Code(s): N39.0 - URINARY TRACT INFECTION, SITE NOT SPECIFIED   


Qualifiers: 


   Urinary tract infection type: acute cystitis   Hematuria presence: without 

hematuria   Qualified Code(s): N30.00 - Acute cystitis without hematuria   





(2) Abdominal pain


Code(s): R10.9 - UNSPECIFIED ABDOMINAL PAIN   


Qualifiers: 


   Abdominal location: generalized   Qualified Code(s): R10.84 - Generalized 

abdominal pain   





(3) Adenocarcinoma of right lung


Code(s): C34.91 - MALIGNANT NEOPLASM OF UNSP PART OF RIGHT BRONCHUS OR LUNG   





(4) Paroxysmal A-fib


Code(s): I48.0 - PAROXYSMAL ATRIAL FIBRILLATION   





(5) Chronic pain


Code(s): G89.29 - OTHER CHRONIC PAIN   


Qualifiers: 


   Chronic pain type: chronic pain syndrome   Qualified Code(s): G89.4 - 

Chronic pain syndrome   





(6) Constipation


Code(s): K59.00 - CONSTIPATION, UNSPECIFIED   





(7) Neurogenic bladder


Code(s): N31.9 - NEUROMUSCULAR DYSFUNCTION OF BLADDER, UNSPECIFIED   





(8) Paraplegia


Code(s): G82.20 - PARAPLEGIA, UNSPECIFIED

## 2018-12-27 NOTE — PN
Progress Note (short form)





- Note


Progress Note: 





Patient seen and examined





Feels well





AFVSS


Cor: RSR, No murmurs, No gallops


Lungs: Clear to P&A


Abd: Soft, Normal bowel sounds, No organomegaly


Ext:paraplegic





A/P








64 y/o patient with metastatic lung cancer, adenoca stage IV


MRI brain neg.


[id number].


ROS neg.





IR guided biopsy for next gen sequencing for other mutation analysis








discussed with patient who is in agreement

## 2018-12-27 NOTE — PN
Progress Note, Physician


History of Present Illness: 





patient stable


no new issues


awaiting biopsy





- Current Medication List


Current Medications: 


Active Medications





Diazepam (Valium -)  10 mg PO Q8H PRN


   PRN Reason: ANXIETY


   Last Admin: 12/27/18 04:08 Dose:  10 mg


Docusate Sodium (Colace -)  100 mg PO [facility]CM [facility]


   Last Admin: 12/27/18 09:53 Dose:  100 mg


Enoxaparin Sodium (Lovenox -)  40 mg SQ DAILY [facility]


   Last Admin: 12/27/18 09:53 Dose:  40 mg


Ceftriaxone Sodium 1 gm/ (Dextrose)  50 mls @ 100 mls/hr IVPB DAILY [facility]; 

Protocol


   Last Admin: 12/27/18 09:53 Dose:  100 mls/hr


Metoprolol Tartrate (Lopressor -)  12.5 mg PO BID [facility]


   Last Admin: 12/27/18 10:06 Dose:  12.5 mg


Morphine Sulfate (Morphine 10 Mg/5 Ml Liquid)  10 mg PO Q4H PRN


   PRN Reason: PAIN LEVEL 7 - 10


   Last Admin: 12/27/18 09:55 Dose:  10 mg











- Objective


Vital Signs: 


 Vital Signs











Temperature  98.3 F   12/27/18 06:00


 


Pulse Rate  54 L  12/27/18 06:00


 


Respiratory Rate  18   12/27/18 06:00


 


Blood Pressure  107/48 L  12/27/18 06:00


 


O2 Sat by Pulse Oximetry (%)  95   12/26/18 09:00











Constitutional: Yes: No Distress, Calm, Other (bed bound)


Cardiovascular: Yes: Regular Rate and Rhythm


Respiratory: Yes: Regular, CTA Bilaterally


Gastrointestinal: Yes: Normal Bowel Sounds, Soft


Musculoskeletal: Yes: Other


Extremities: Yes: Other


Neurological: Yes: Alert


Psychiatric: Yes: Alert


Labs: 


 CBC, BMP





 12/26/18 06:00 





 12/25/18 06:00 





 INR, PTT











INR  0.97  (0.83-1.09)   12/26/18  06:00    














Assessment/Plan





Problem List





- Problems


(1) UTI (urinary tract infection)


Code(s): N39.0 - URINARY TRACT INFECTION, SITE NOT SPECIFIED   


Qualifiers: 


   Urinary tract infection type: site unspecified   Hematuria presence: without 

hematuria   Qualified Code(s): N39.0 - Urinary tract infection, site not 

specified   





(2) Adenocarcinoma of right lung


Code(s): C34.91 - MALIGNANT NEOPLASM OF UNSP PART OF RIGHT BRONCHUS OR LUNG   





(3) Leukocytosis


Code(s): D72.829 - ELEVATED WHITE BLOOD CELL COUNT, UNSPECIFIED   





(4) Paroxysmal A-fib


Code(s): I48.0 - PAROXYSMAL ATRIAL FIBRILLATION   





(5) Neurogenic bladder


Code(s): N31.9 - NEUROMUSCULAR DYSFUNCTION OF BLADDER, UNSPECIFIED   





(6) Paraplegia


Code(s): G82.20 - PARAPLEGIA, UNSPECIFIED   





Assessment/Plan





Complicated UTI


Leukocytosis


Neurogenic bladder


Paraplegia


T2 fracture








plan


continue current abx


rest as per the team


will stop abx tomorrow


patient for biopsy

## 2018-12-28 PROCEDURE — 0BDC4ZX EXTRACTION OF RIGHT UPPER LUNG LOBE, PERCUTANEOUS ENDOSCOPIC APPROACH, DIAGNOSTIC: ICD-10-PCS

## 2018-12-28 RX ADMIN — MORPHINE SULFATE PRN MG: 10 SOLUTION ORAL at 10:00

## 2018-12-28 RX ADMIN — MORPHINE SULFATE PRN MG: 10 SOLUTION ORAL at 03:57

## 2018-12-28 RX ADMIN — MORPHINE SULFATE PRN MG: 10 SOLUTION ORAL at 23:00

## 2018-12-28 RX ADMIN — CEFTRIAXONE SCH MLS/HR: 1 INJECTION, POWDER, FOR SOLUTION INTRAMUSCULAR; INTRAVENOUS at 10:00

## 2018-12-28 RX ADMIN — DOCUSATE SODIUM SCH MG: 100 CAPSULE, LIQUID FILLED ORAL at 10:01

## 2018-12-28 RX ADMIN — DOCUSATE SODIUM SCH MG: 100 CAPSULE, LIQUID FILLED ORAL at 17:59

## 2018-12-28 RX ADMIN — DOCUSATE SODIUM SCH: 100 CAPSULE, LIQUID FILLED ORAL at 14:28

## 2018-12-28 RX ADMIN — METOPROLOL TARTRATE SCH MG: 25 TABLET, FILM COATED ORAL at 21:28

## 2018-12-28 RX ADMIN — METOPROLOL TARTRATE SCH MG: 25 TABLET, FILM COATED ORAL at 10:01

## 2018-12-28 RX ADMIN — MORPHINE SULFATE PRN MG: 10 SOLUTION ORAL at 19:08

## 2018-12-28 NOTE — PN
Progress Note, Physician


History of Present Illness: 





stable


no new issues





- Current Medication List


Current Medications: 


Active Medications





Diazepam (Valium -)  10 mg PO Q8H PRN


   PRN Reason: ANXIETY


   Last Admin: 12/27/18 21:12 Dose:  10 mg


Docusate Sodium (Colace -)  100 mg PO TIDCM [facility]


   Last Admin: 12/28/18 10:01 Dose:  100 mg


Enoxaparin Sodium (Lovenox -)  40 mg SQ DAILY [facility]


   Last Admin: 12/27/18 09:53 Dose:  40 mg


Ceftriaxone Sodium 1 gm/ (Dextrose)  50 mls @ 100 mls/hr IVPB DAILY [facility]; 

Protocol


   Last Admin: 12/28/18 10:00 Dose:  100 mls/hr


Metoprolol Tartrate (Lopressor -)  12.5 mg PO BID [facility]


   Last Admin: 12/28/18 10:01 Dose:  12.5 mg


Morphine Sulfate (Morphine 10 Mg/5 Ml Liquid)  10 mg PO Q4H PRN


   PRN Reason: PAIN LEVEL 7 - 10


   Last Admin: 12/28/18 10:00 Dose:  10 mg


Senna (Senna -)  2 tab PO HS PRN


   PRN Reason: CONSTIPATION


   Last Admin: 12/27/18 21:10 Dose:  2 tab











- Objective


Vital Signs: 


 Vital Signs











Temperature  98.6 F   12/28/18 05:53


 


Pulse Rate  62   12/28/18 05:53


 


Respiratory Rate  18   12/28/18 05:53


 


Blood Pressure  92/53 L  12/28/18 05:53


 


O2 Sat by Pulse Oximetry (%)  95   12/26/18 09:00











Constitutional: Yes: No Distress, Calm


Respiratory: Yes: Regular, CTA Bilaterally


Gastrointestinal: Yes: Normal Bowel Sounds, Soft


Musculoskeletal: Yes: WNL


Extremities: Yes: WNL


Neurological: Yes: Alert, Oriented


Psychiatric: Yes: Alert, Oriented


Labs: 


 CBC, BMP





 12/26/18 06:00 





 12/25/18 06:00 





 INR, PTT











INR  0.97  (0.83-1.09)   12/26/18  06:00    














Assessment/Plan





Problem List





- Problems


(1) UTI (urinary tract infection)


Code(s): N39.0 - URINARY TRACT INFECTION, SITE NOT SPECIFIED   


Qualifiers: 


   Urinary tract infection type: site unspecified   Hematuria presence: without 

hematuria   Qualified Code(s): N39.0 - Urinary tract infection, site not 

specified   





(2) Adenocarcinoma of right lung


Code(s): C34.91 - MALIGNANT NEOPLASM OF UNSP PART OF RIGHT BRONCHUS OR LUNG   





(3) Leukocytosis


Code(s): D72.829 - ELEVATED WHITE BLOOD CELL COUNT, UNSPECIFIED   





(4) Paroxysmal A-fib


Code(s): I48.0 - PAROXYSMAL ATRIAL FIBRILLATION   





(5) Neurogenic bladder


Code(s): N31.9 - NEUROMUSCULAR DYSFUNCTION OF BLADDER, UNSPECIFIED   





(6) Paraplegia


Code(s): G82.20 - PARAPLEGIA, UNSPECIFIED   





Assessment/Plan





Complicated UTI


Leukocytosis


Neurogenic bladder


Paraplegia


T2 fracture








plan


continue current abx


rest as per the team


will stop abx tomorrow


post biopsy

## 2018-12-28 NOTE — PN
Progress Note (short form)





- Note


Progress Note: 





Patient seen and examined





Feels well





s/p IR guided biopsy








AFVSS


Cor: RSR, No murmurs, No gallops


Lungs: Clear to P&A


Abd: Soft, Normal bowel sounds, No organomegaly


Ext:No significant edema








Labs/Meds reviewed








A/P





62 y/o patient with metastatic lung cancer, adenoca stage IV


MRI brain neg.


IFL0dom.


ROS neg.





IR guided biopsy for next gen sequencing for other mutation analysis


Discussed various options with this patient with poor performance status, 

advanced stage lung cnacer with comorbiditeias--paraplegia/UTIs, poor social 

support


? palliative single agent chemotherapy versus targeted therapy based on next 

gen sequencing


understands treating him is a great challenge given above scenario and 

plliative care is a reasonable option





Gave contact nos. to patient to follow up on results and f/u with Dr. Lyon


Will discuss with PMD
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
## 2018-12-28 NOTE — PN
Teaching Attending Note


Name of Resident: Aster Solomon





ATTENDING PHYSICIAN STATEMENT





I saw and evaluated the patient.


I reviewed the resident's note and discussed the case with the resident.


I agree with the resident's findings and plan as documented.








SUBJECTIVE: Mr Sumner has no new complaints. His abdominal pain is unchanged. 

No cp, sob, n/v.








OBJECTIVE:


 Last Vital Signs











Temp Pulse Resp BP Pulse Ox


 


 36.9 C   57 L  18   105/52 L  97 


 


 12/28/18 15:10  12/28/18 15:10  12/28/18 15:10  12/28/18 15:10  12/28/18 13:22








Gen: nad


Pulm: ctab w/o w/r/r


CV: rrr w/o m/r/g


Abd: +bs, s/nt/nd


Ext: no c/c/e








ASSESSMENT AND PLAN:


(1) UTI (urinary tract infection)


Assessment/Plan: 


-urine culture growing serratia


-continue rocephin since planning for biopsy per ID


-plan to transition to oral soon


Code(s): N39.0 - URINARY TRACT INFECTION, SITE NOT SPECIFIED   


Qualifiers: 


   Urinary tract infection type: acute cystitis   Hematuria presence: without 

hematuria   Qualified Code(s): N30.00 - Acute cystitis without hematuria   





(2) Abdominal pain


Assessment/Plan: 


-secondary to UTI


-improving


Code(s): R10.9 - UNSPECIFIED ABDOMINAL PAIN   


Qualifiers: 


   Abdominal location: generalized   Qualified Code(s): R10.84 - Generalized 

abdominal pain   





(3) Adenocarcinoma of right lung


Assessment/Plan: 


-planning for repeat biopsy today to see if candidate for immunotherapy


-appreciate oncology assistance


Code(s): C34.91 - MALIGNANT NEOPLASM OF UNSP PART OF RIGHT BRONCHUS OR LUNG   





(4) Paroxysmal A-fib


Assessment/Plan: 


-continue metoprolol


-rate controlled


-poor candidate for anticoagulation


Code(s): I48.0 - PAROXYSMAL ATRIAL FIBRILLATION   





(5) Chronic pain


Assessment/Plan: 


-continue morphine


Code(s): G89.29 - OTHER CHRONIC PAIN   


Qualifiers: 


   Chronic pain type: chronic pain syndrome   Qualified Code(s): G89.4 - 

Chronic pain syndrome   





(6) Constipation


Assessment/Plan: 


-continue colace


Code(s): K59.00 - CONSTIPATION, UNSPECIFIED   





(7) Neurogenic bladder


Code(s): N31.9 - NEUROMUSCULAR DYSFUNCTION OF BLADDER, UNSPECIFIED   





(8) Paraplegia


Code(s): G82.20 - PARAPLEGIA, UNSPECIFIED   





Problem List





- Problems


(1) UTI (urinary tract infection)


Code(s): N39.0 - URINARY TRACT INFECTION, SITE NOT SPECIFIED   


Qualifiers: 


   Urinary tract infection type: acute cystitis   Hematuria presence: without 

hematuria   Qualified Code(s): N30.00 - Acute cystitis without hematuria   





(2) Abdominal pain


Code(s): R10.9 - UNSPECIFIED ABDOMINAL PAIN   


Qualifiers: 


   Abdominal location: generalized   Qualified Code(s): R10.84 - Generalized 

abdominal pain   





(3) Adenocarcinoma of right lung


Code(s): C34.91 - MALIGNANT NEOPLASM OF UNSP PART OF RIGHT BRONCHUS OR LUNG   





(4) Paroxysmal A-fib


Code(s): I48.0 - PAROXYSMAL ATRIAL FIBRILLATION   





(5) Chronic pain


Code(s): G89.29 - OTHER CHRONIC PAIN   


Qualifiers: 


   Chronic pain type: chronic pain syndrome   Qualified Code(s): G89.4 - 

Chronic pain syndrome   





(6) Constipation


Code(s): K59.00 - CONSTIPATION, UNSPECIFIED   





(7) Neurogenic bladder


Code(s): N31.9 - NEUROMUSCULAR DYSFUNCTION OF BLADDER, UNSPECIFIED   





(8) Paraplegia


Code(s): G82.20 - PARAPLEGIA, UNSPECIFIED

## 2018-12-29 LAB
ANION GAP SERPL CALC-SCNC: 8 MMOL/L (ref 8–16)
BUN SERPL-MCNC: 12 MG/DL (ref 7–18)
CALCIUM SERPL-MCNC: 8.4 MG/DL (ref 8.5–10.1)
CHLORIDE SERPL-SCNC: 101 MMOL/L (ref 98–107)
CO2 SERPL-SCNC: 27 MMOL/L (ref 21–32)
CREAT SERPL-MCNC: 0.6 MG/DL (ref 0.55–1.3)
DEPRECATED RDW RBC AUTO: 15.5 % (ref 11.9–15.9)
GLUCOSE SERPL-MCNC: 82 MG/DL (ref 74–106)
HCT VFR BLD CALC: 41.8 % (ref 35.4–49)
HGB BLD-MCNC: 13.3 GM/DL (ref 11.7–16.9)
MCH RBC QN AUTO: 29.4 PG (ref 25.7–33.7)
MCHC RBC AUTO-ENTMCNC: 31.8 G/DL (ref 32–35.9)
MCV RBC: 92.4 FL (ref 80–96)
PLATELET # BLD AUTO: 471 K/MM3 (ref 134–434)
PMV BLD: 10 FL (ref 7.5–11.1)
POTASSIUM SERPLBLD-SCNC: 4.7 MMOL/L (ref 3.5–5.1)
RBC # BLD AUTO: 4.52 M/MM3 (ref 4–5.6)
SODIUM SERPL-SCNC: 136 MMOL/L (ref 136–145)
WBC # BLD AUTO: 10 K/MM3 (ref 4–10)

## 2018-12-29 RX ADMIN — METOPROLOL TARTRATE SCH MG: 25 TABLET, FILM COATED ORAL at 09:10

## 2018-12-29 RX ADMIN — MORPHINE SULFATE PRN MG: 10 SOLUTION ORAL at 22:00

## 2018-12-29 RX ADMIN — DOCUSATE SODIUM SCH MG: 100 CAPSULE, LIQUID FILLED ORAL at 12:52

## 2018-12-29 RX ADMIN — CEFTRIAXONE SCH MLS/HR: 1 INJECTION, POWDER, FOR SOLUTION INTRAMUSCULAR; INTRAVENOUS at 09:08

## 2018-12-29 RX ADMIN — METOPROLOL TARTRATE SCH MG: 25 TABLET, FILM COATED ORAL at 21:14

## 2018-12-29 RX ADMIN — DOCUSATE SODIUM SCH MG: 100 CAPSULE, LIQUID FILLED ORAL at 09:08

## 2018-12-29 RX ADMIN — MORPHINE SULFATE PRN MG: 10 SOLUTION ORAL at 17:38

## 2018-12-29 RX ADMIN — MORPHINE SULFATE PRN MG: 10 SOLUTION ORAL at 09:08

## 2018-12-29 RX ADMIN — DOCUSATE SODIUM SCH: 100 CAPSULE, LIQUID FILLED ORAL at 17:26

## 2018-12-29 RX ADMIN — MORPHINE SULFATE PRN MG: 10 SOLUTION ORAL at 03:19

## 2018-12-29 NOTE — PN
Progress Note, Physician


Chief Complaint: 


Mr Sumner without complaint. No cp, sob, n/v.





- Current Medication List


Current Medications: 


Active Medications





Diazepam (Valium -)  10 mg PO Q8H PRN


   PRN Reason: ANXIETY


   Last Admin: 12/28/18 21:28 Dose:  10 mg


Docusate Sodium (Colace -)  100 mg PO [facility]CM [facility]


   Last Admin: 12/29/18 12:52 Dose:  100 mg


Enoxaparin Sodium (Lovenox -)  40 mg SQ DAILY [facility]


   Last Admin: 12/27/18 09:53 Dose:  40 mg


Metoprolol Tartrate (Lopressor -)  12.5 mg PO BID [facility]


   Last Admin: 12/29/18 09:10 Dose:  12.5 mg


Morphine Sulfate (Morphine 10 Mg/5 Ml Liquid)  10 mg PO Q4H PRN


   PRN Reason: PAIN LEVEL 7 - 10


   Last Admin: 12/29/18 09:08 Dose:  10 mg


Senna (Senna -)  2 tab PO HS PRN


   PRN Reason: CONSTIPATION


   Last Admin: 12/27/18 21:10 Dose:  2 tab











- Objective


Vital Signs: 


 Vital Signs











Temperature  36.9 C   12/29/18 14:00


 


Pulse Rate  64   12/29/18 14:00


 


Respiratory Rate  18   12/29/18 14:00


 


Blood Pressure  121/54 L  12/29/18 14:00


 


O2 Sat by Pulse Oximetry (%)  97   12/28/18 21:00











Constitutional: Yes: Well Nourished, No Distress, Calm


Cardiovascular: Yes: Regular Rate and Rhythm.  No: Gallop, Murmur, Rub


Respiratory: Yes: Regular, CTA Bilaterally.  No: Rales, Rhonchi, Wheezes


Gastrointestinal: Yes: Normal Bowel Sounds, Soft.  No: Distention, Tenderness


Extremities: Yes: WNL


Edema: No


Labs: 


 CBC, BMP





 12/29/18 06:00 





 12/29/18 06:00 





 INR, PTT











INR  0.97  (0.83-1.09)   12/26/18  06:00    














Problem List





- Problems


(1) UTI (urinary tract infection)


Code(s): N39.0 - URINARY TRACT INFECTION, SITE NOT SPECIFIED   


Qualifiers: 


   Urinary tract infection type: acute cystitis   Hematuria presence: without 

hematuria   Qualified Code(s): N30.00 - Acute cystitis without hematuria   





(2) Abdominal pain


Code(s): R10.9 - UNSPECIFIED ABDOMINAL PAIN   


Qualifiers: 


   Abdominal location: generalized   Qualified Code(s): R10.84 - Generalized 

abdominal pain   





(3) Adenocarcinoma of right lung


Code(s): C34.91 - MALIGNANT NEOPLASM OF UNSP PART OF RIGHT BRONCHUS OR LUNG   





(4) Paroxysmal A-fib


Code(s): I48.0 - PAROXYSMAL ATRIAL FIBRILLATION   





(5) Chronic pain


Code(s): G89.29 - OTHER CHRONIC PAIN   


Qualifiers: 


   Chronic pain type: chronic pain syndrome   Qualified Code(s): G89.4 - 

Chronic pain syndrome   





(6) Constipation


Code(s): K59.00 - CONSTIPATION, UNSPECIFIED   





(7) Neurogenic bladder


Code(s): N31.9 - NEUROMUSCULAR DYSFUNCTION OF BLADDER, UNSPECIFIED   





(8) Paraplegia


Code(s): G82.20 - PARAPLEGIA, UNSPECIFIED   





Assessment/Plan





(1) UTI (urinary tract infection)


Assessment/Plan: 


-case d/w ID


-stop rocephin


-monitor


Code(s): N39.0 - URINARY TRACT INFECTION, SITE NOT SPECIFIED   


Qualifiers: 


   Urinary tract infection type: acute cystitis   Hematuria presence: without 

hematuria   Qualified Code(s): N30.00 - Acute cystitis without hematuria   





(2) Abdominal pain


Assessment/Plan: 


-resolved


Code(s): R10.9 - UNSPECIFIED ABDOMINAL PAIN   


Qualifiers: 


   Abdominal location: generalized   Qualified Code(s): R10.84 - Generalized 

abdominal pain   





(3) Adenocarcinoma of right lung


Assessment/Plan: 


-s/p biopsy


-will d/w oncology about next step in care


Code(s): C34.91 - MALIGNANT NEOPLASM OF UNSP PART OF RIGHT BRONCHUS OR LUNG   





(4) Paroxysmal A-fib


Assessment/Plan: 


-continue metoprolol


-rate controlled


-poor candidate for anticoagulation


Code(s): I48.0 - PAROXYSMAL ATRIAL FIBRILLATION   





(5) Chronic pain


Assessment/Plan: 


-continue morphine


Code(s): G89.29 - OTHER CHRONIC PAIN   


Qualifiers: 


   Chronic pain type: chronic pain syndrome   Qualified Code(s): G89.4 - 

Chronic pain syndrome   





(6) Constipation


Assessment/Plan: 


-continue colace


Code(s): K59.00 - CONSTIPATION, UNSPECIFIED   





(7) Neurogenic bladder


Code(s): N31.9 - NEUROMUSCULAR DYSFUNCTION OF BLADDER, UNSPECIFIED   





(8) Paraplegia


Code(s): G82.20 - PARAPLEGIA, UNSPECIFIED

## 2018-12-29 NOTE — PN
Progress Note, Physician


History of Present Illness: 





no new issues


had bm


comfortable





- Current Medication List


Current Medications: 


Active Medications





Diazepam (Valium -)  10 mg PO Q8H PRN


   PRN Reason: ANXIETY


   Last Admin: 12/28/18 21:28 Dose:  10 mg


Docusate Sodium (Colace -)  100 mg PO TIDCM [facility]


   Last Admin: 12/29/18 12:52 Dose:  100 mg


Enoxaparin Sodium (Lovenox -)  40 mg SQ DAILY [facility]


   Last Admin: 12/27/18 09:53 Dose:  40 mg


Ceftriaxone Sodium 1 gm/ (Dextrose)  50 mls @ 100 mls/hr IVPB DAILY [facility]; 

Protocol


   Last Admin: 12/29/18 09:08 Dose:  100 mls/hr


Metoprolol Tartrate (Lopressor -)  12.5 mg PO BID [facility]


   Last Admin: 12/29/18 09:10 Dose:  12.5 mg


Morphine Sulfate (Morphine 10 Mg/5 Ml Liquid)  10 mg PO Q4H PRN


   PRN Reason: PAIN LEVEL 7 - 10


   Last Admin: 12/29/18 09:08 Dose:  10 mg


Senna (Senna -)  2 tab PO HS PRN


   PRN Reason: CONSTIPATION


   Last Admin: 12/27/18 21:10 Dose:  2 tab











- Objective


Vital Signs: 


 Vital Signs











Temperature  97.8 F   12/29/18 10:00


 


Pulse Rate  64   12/29/18 10:00


 


Respiratory Rate  18   12/29/18 10:00


 


Blood Pressure  101/61   12/29/18 10:00


 


O2 Sat by Pulse Oximetry (%)  97   12/28/18 21:00











Constitutional: Yes: No Distress, Calm, Other (bed bound)


Cardiovascular: Yes: Regular Rate and Rhythm


Respiratory: Yes: Regular, CTA Bilaterally


Gastrointestinal: Yes: Normal Bowel Sounds, Soft


Musculoskeletal: Yes: Other


Extremities: Yes: Other


Neurological: Yes: Alert, Oriented


Psychiatric: Yes: Alert, Oriented


Labs: 


 CBC, BMP





 12/29/18 06:00 





 12/29/18 06:00 





 INR, PTT











INR  0.97  (0.83-1.09)   12/26/18  06:00    














Assessment/Plan





Problem List





- Problems


(1) UTI (urinary tract infection)


Code(s): N39.0 - URINARY TRACT INFECTION, SITE NOT SPECIFIED   


Qualifiers: 


   Urinary tract infection type: site unspecified   Hematuria presence: without 

hematuria   Qualified Code(s): N39.0 - Urinary tract infection, site not 

specified   





(2) Adenocarcinoma of right lung


Code(s): C34.91 - MALIGNANT NEOPLASM OF UNSP PART OF RIGHT BRONCHUS OR LUNG   





(3) Leukocytosis


Code(s): D72.829 - ELEVATED WHITE BLOOD CELL COUNT, UNSPECIFIED   





(4) Paroxysmal A-fib


Code(s): I48.0 - PAROXYSMAL ATRIAL FIBRILLATION   





(5) Neurogenic bladder


Code(s): N31.9 - NEUROMUSCULAR DYSFUNCTION OF BLADDER, UNSPECIFIED   





(6) Paraplegia


Code(s): G82.20 - PARAPLEGIA, UNSPECIFIED   





Assessment/Plan





Complicated UTI


Leukocytosis


Neurogenic bladder


Paraplegia


T2 fracture








plan


will stop all abx 


monitor off of abx


post lung biopsy


rest as per the team

## 2018-12-30 RX ADMIN — ENOXAPARIN SODIUM SCH MG: 40 INJECTION SUBCUTANEOUS at 11:20

## 2018-12-30 RX ADMIN — MORPHINE SULFATE PRN MG: 10 SOLUTION ORAL at 15:44

## 2018-12-30 RX ADMIN — MORPHINE SULFATE PRN MG: 10 SOLUTION ORAL at 11:19

## 2018-12-30 RX ADMIN — MORPHINE SULFATE PRN MG: 10 SOLUTION ORAL at 20:14

## 2018-12-30 RX ADMIN — METOPROLOL TARTRATE SCH MG: 25 TABLET, FILM COATED ORAL at 21:17

## 2018-12-30 RX ADMIN — DOCUSATE SODIUM SCH: 100 CAPSULE, LIQUID FILLED ORAL at 17:41

## 2018-12-30 RX ADMIN — DOCUSATE SODIUM SCH MG: 100 CAPSULE, LIQUID FILLED ORAL at 13:25

## 2018-12-30 RX ADMIN — DOCUSATE SODIUM SCH MG: 100 CAPSULE, LIQUID FILLED ORAL at 11:20

## 2018-12-30 RX ADMIN — MORPHINE SULFATE PRN MG: 10 SOLUTION ORAL at 04:11

## 2018-12-30 RX ADMIN — METOPROLOL TARTRATE SCH MG: 25 TABLET, FILM COATED ORAL at 11:20
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
## 2018-12-30 NOTE — DS
Physical Examination


Vital Signs: 


 Vital Signs











Temperature  36.9 C   12/30/18 06:00


 


Pulse Rate  53 L  12/30/18 06:00


 


Respiratory Rate  20   12/30/18 06:00


 


Blood Pressure  124/54 L  12/30/18 06:00


 


O2 Sat by Pulse Oximetry (%)  97   12/29/18 21:00











Constitutional: Yes: Well Nourished, No Distress, Calm


Cardiovascular: Yes: Regular Rate and Rhythm.  No: Gallop, Murmur, Rub


Respiratory: Yes: Regular, CTA Bilaterally.  No: Rales, Rhonchi, Wheezes


Gastrointestinal: Yes: Normal Bowel Sounds, Soft.  No: Distention, Tenderness


Extremities: Yes: WNL


Edema: No


Labs: 


 CBC, BMP





 12/29/18 06:00 





 12/29/18 06:00 











Discharge Summary


Reason For Visit: URINARY TRACT INFECTION


Current Active Problems





Abdominal pain (Acute)


Ileus (Acute)


UTI (urinary tract infection) (Acute)








Hospital Course: 





(1) UTI (urinary tract infection)


Code(s): N39.0 - URINARY TRACT INFECTION, SITE NOT SPECIFIED   


Qualifiers: 


   Urinary tract infection type: acute cystitis   Hematuria presence: without 

hematuria   Qualified Code(s): N30.00 - Acute cystitis without hematuria   





(2) Abdominal pain


Code(s): R10.9 - UNSPECIFIED ABDOMINAL PAIN   


Qualifiers: 


   Abdominal location: generalized   Qualified Code(s): R10.84 - Generalized 

abdominal pain   





(3) Adenocarcinoma of right lung


Code(s): C34.91 - MALIGNANT NEOPLASM OF UNSP PART OF RIGHT BRONCHUS OR LUNG   





(4) Paroxysmal A-fib


Code(s): I48.0 - PAROXYSMAL ATRIAL FIBRILLATION   





(5) Chronic pain


Code(s): G89.29 - OTHER CHRONIC PAIN   


Qualifiers: 


   Chronic pain type: chronic pain syndrome   Qualified Code(s): G89.4 - 

Chronic pain syndrome   





(6) Constipation


Code(s): K59.00 - CONSTIPATION, UNSPECIFIED   





(7) Neurogenic bladder


Code(s): N31.9 - NEUROMUSCULAR DYSFUNCTION OF BLADDER, UNSPECIFIED   





(8) Paraplegia


Code(s): G82.20 - PARAPLEGIA, UNSPECIFIED   





Mr Sumner is a 63 year old male who came in with UTI. He was admitted to the 

hospital and seen by ID. He was placed on rocephin and finished a full course. 

He was monitored 24 hours off of antibiotics and remained stable. He also 

underwent another biopsy of his lung in order to gain more tissue for special 

staining. Oncology following along, will take some time to result so results 

will be called to Mr Sumner as an outpatient and Dr Lyon can discuss if a 

candidate for further treatment. He will be discharged on stool softeners and 

low dose metoprolol. He has close telephone contact with Dr Poon as an 

outpatient.





32 minutes spent in preparation of this discharge


Condition: Stable





- Instructions


Diet, Activity, Other Instructions: 


resume previous diet and activity


Referrals: 


Sony Poon MD [Primary Care Provider] - 


Disposition: VNS/HOME HEALTH CARE





- Home Medications


Comprehensive Discharge Medication List: 


Ambulatory Orders





Diazepam [Valium] 10 mg PO TID PRN  tablet MDD 30mg 10/10/18 


Morphine 10 mg/5 ml Liquid [Morphine 10 mg/5 mL Liquid -] 10 mg PO Q4H #150 ml 

MDD 60mg 10/10/18 


Docusate Sodium [Colace -] 100 mg PO TIDCM #90 capsule 12/30/18 


Metoprolol Tartrate [Lopressor -] 12.5 mg PO BID #30 tablet 12/30/18 


Sennosides [Senna -] 2 tab PO HS PRN #60 tablet 12/30/18

## 2018-12-31 RX ADMIN — METOPROLOL TARTRATE SCH MG: 25 TABLET, FILM COATED ORAL at 22:15

## 2018-12-31 RX ADMIN — DOCUSATE SODIUM SCH MG: 100 CAPSULE, LIQUID FILLED ORAL at 14:38

## 2018-12-31 RX ADMIN — MORPHINE SULFATE PRN MG: 10 SOLUTION ORAL at 00:28

## 2018-12-31 RX ADMIN — DOCUSATE SODIUM SCH MG: 100 CAPSULE, LIQUID FILLED ORAL at 19:09

## 2018-12-31 RX ADMIN — MORPHINE SULFATE PRN MG: 10 SOLUTION ORAL at 23:00

## 2018-12-31 RX ADMIN — ENOXAPARIN SODIUM SCH MG: 40 INJECTION SUBCUTANEOUS at 09:57

## 2018-12-31 RX ADMIN — METOPROLOL TARTRATE SCH: 25 TABLET, FILM COATED ORAL at 09:59

## 2018-12-31 RX ADMIN — DOCUSATE SODIUM SCH MG: 100 CAPSULE, LIQUID FILLED ORAL at 08:34

## 2018-12-31 RX ADMIN — MORPHINE SULFATE PRN MG: 10 SOLUTION ORAL at 04:33

## 2018-12-31 RX ADMIN — MORPHINE SULFATE PRN MG: 10 SOLUTION ORAL at 09:57

## 2018-12-31 RX ADMIN — MORPHINE SULFATE PRN MG: 10 SOLUTION ORAL at 14:42

## 2018-12-31 RX ADMIN — MORPHINE SULFATE PRN MG: 10 SOLUTION ORAL at 19:08

## 2018-12-31 NOTE — PATH
Surgical Pathology Report



Patient Name:  ELIAZAR ESCALANTE

Accession #:  [id number]

Med. Rec. #:  [id number]                                                        

   /Age/Gender:  1955 (Age: 63) / M

Account:  [id number]                                                          

             Location: [facility] MED/SURG

Taken:  2018

Received:  2018

Reported:  2018

Physicians:  IRAIS Liu M.D. Smitha Mellacheruvu, M.D.

  



Specimen(s) Received

 RIGHT UPPER LUNG MASS 





Clinical History

Lung cancer, history of adenocarcinoma of lung







Final Diagnosis

LUNG, RIGHT, BIOPSY:

FRAGMENTS OF BENIGN LUNG PARENCHYMA, FIBROADIPOSE TISSUE, AND SKELETAL MUSCLE.

NO DEFINITIVE MALIGNANCY IDENTIFIED.



Comment: Deeper levels have been examined. Prior material noted. Findings

discussed with Dr. Capps.







***Electronically Signed***

Yessenia Schmitz M.D.





Gross Description

Received in formalin labeled "right lung biopsy," is a 0.4 x 0.3 x 0.1 cm

aggregate of tan soft tissue fragments. The formalin is filtered and the

specimen is entirely submitted in one cassette.

DL/2018



saudi

## 2018-12-31 NOTE — PN
Progress Note, Physician


History of Present Illness: 





generalized pain


says not feeling good


looks comfortable


off of abx





- Current Medication List


Current Medications: 


Active Medications





Diazepam (Valium -)  10 mg PO Q8H PRN


   PRN Reason: ANXIETY


   Last Admin: 12/31/18 00:30 Dose:  10 mg


Docusate Sodium (Colace -)  100 mg PO TIDCM [facility]


   Last Admin: 12/31/18 14:38 Dose:  100 mg


Enoxaparin Sodium (Lovenox -)  40 mg SQ DAILY [facility]


   Last Admin: 12/31/18 09:57 Dose:  40 mg


Metoprolol Tartrate (Lopressor -)  12.5 mg PO BID [facility]


   Last Admin: 12/31/18 09:59 Dose:  Not Given


Morphine Sulfate (Morphine 10 Mg/5 Ml Liquid)  10 mg PO Q4H PRN


   PRN Reason: PAIN LEVEL 7 - 10


   Last Admin: 12/31/18 14:42 Dose:  10 mg


Senna (Senna -)  2 tab PO HS PRN


   PRN Reason: CONSTIPATION


   Last Admin: 12/27/18 21:10 Dose:  2 tab











- Objective


Vital Signs: 


 Vital Signs











Temperature  98.6 F   12/31/18 13:36


 


Pulse Rate  55 L  12/31/18 13:36


 


Respiratory Rate  18   12/31/18 13:36


 


Blood Pressure  101/50 L  12/31/18 13:36


 


O2 Sat by Pulse Oximetry (%)  94 L  12/31/18 09:00











Constitutional: Yes: Calm, Mild Distress


Cardiovascular: Yes: Regular Rate and Rhythm


Respiratory: Yes: Regular, CTA Bilaterally


Gastrointestinal: Yes: Normal Bowel Sounds, Soft


Extremities: Yes: Other (contracted)


Neurological: Yes: Alert, Oriented


Psychiatric: Yes: Alert, Oriented


Labs: 


 CBC, BMP





 12/29/18 06:00 





 12/29/18 06:00 





 INR, PTT











INR  0.97  (0.83-1.09)   12/26/18  06:00    














Assessment/Plan





Problem List





- Problems


(1) UTI (urinary tract infection)


Code(s): N39.0 - URINARY TRACT INFECTION, SITE NOT SPECIFIED   


Qualifiers: 


   Urinary tract infection type: site unspecified   Hematuria presence: without 

hematuria   Qualified Code(s): N39.0 - Urinary tract infection, site not 

specified   





(2) Adenocarcinoma of right lung


Code(s): C34.91 - MALIGNANT NEOPLASM OF UNSP PART OF RIGHT BRONCHUS OR LUNG   





(3) Leukocytosis


Code(s): D72.829 - ELEVATED WHITE BLOOD CELL COUNT, UNSPECIFIED   





(4) Paroxysmal A-fib


Code(s): I48.0 - PAROXYSMAL ATRIAL FIBRILLATION   





(5) Neurogenic bladder


Code(s): N31.9 - NEUROMUSCULAR DYSFUNCTION OF BLADDER, UNSPECIFIED   





(6) Paraplegia


Code(s): G82.20 - PARAPLEGIA, UNSPECIFIED   





Assessment/Plan





Complicated UTI


Leukocytosis


Neurogenic bladder


Paraplegia


T2 fracture








plan


stable off of abx


rest as per the team


nutrition


generalized support

## 2018-12-31 NOTE — PN
Teaching Attending Note


Name of Resident: Aster Solomon





ATTENDING PHYSICIAN STATEMENT





I saw and evaluated the patient.


I reviewed the resident's note and discussed the case with the resident.


I agree with the resident's findings and plan as documented.








SUBJECTIVE: Mr Sumner is complaining that he has a lot of paperwork to do this 

morning but has no new physical complaints. Complains of abdominal pain but it 

is unchanged. No cp or sob.








OBJECTIVE:


 Last Vital Signs











Temp Pulse Resp BP Pulse Ox


 


 37.0 C   55 L  18   101/50 L  94 L


 


 12/31/18 13:36  12/31/18 13:36  12/31/18 13:36  12/31/18 13:36  12/31/18 09:00








Gen: nad


Pulm: ctab w/o w/r/r


CV: rrr w/o m/r/g


Abd: +bs, s/nt/nd


Ext: no c/c/e








ASSESSMENT AND PLAN:


(1) UTI (urinary tract infection)


Assessment/Plan: 


-s/p full course of rocephin


Code(s): N39.0 - URINARY TRACT INFECTION, SITE NOT SPECIFIED   


Qualifiers: 


   Urinary tract infection type: acute cystitis   Hematuria presence: without 

hematuria   Qualified Code(s): N30.00 - Acute cystitis without hematuria   





(2) Abdominal pain


Assessment/Plan: 


-chronic


Code(s): R10.9 - UNSPECIFIED ABDOMINAL PAIN   


Qualifiers: 


   Abdominal location: generalized   Qualified Code(s): R10.84 - Generalized 

abdominal pain   





(3) Adenocarcinoma of right lung


Assessment/Plan: 


-s/p biopsy


-awaiting results, can be called by oncology to the patient


Code(s): C34.91 - MALIGNANT NEOPLASM OF UNSP PART OF RIGHT BRONCHUS OR LUNG   





(4) Paroxysmal A-fib


Assessment/Plan: 


-continue metoprolol


-rate controlled


-poor candidate for anticoagulation


Code(s): I48.0 - PAROXYSMAL ATRIAL FIBRILLATION   





(5) Chronic pain


Assessment/Plan: 


-continue morphine


Code(s): G89.29 - OTHER CHRONIC PAIN   


Qualifiers: 


   Chronic pain type: chronic pain syndrome   Qualified Code(s): G89.4 - 

Chronic pain syndrome   





(6) Constipation


Assessment/Plan: 


-continue colace


Code(s): K59.00 - CONSTIPATION, UNSPECIFIED   





(7) Neurogenic bladder


Code(s): N31.9 - NEUROMUSCULAR DYSFUNCTION OF BLADDER, UNSPECIFIED   





(8) Paraplegia


Code(s): G82.20 - PARAPLEGIA, UNSPECIFIED   





Patient is currently disputing the discharge. Will continue current management. 

Will recheck labs in the am. CM/SW aware and discussing with home health 

services timing and safety of discharge.





Problem List





- Problems


(1) UTI (urinary tract infection)


Code(s): N39.0 - URINARY TRACT INFECTION, SITE NOT SPECIFIED   


Qualifiers: 


   Urinary tract infection type: acute cystitis   Hematuria presence: without 

hematuria   Qualified Code(s): N30.00 - Acute cystitis without hematuria   





(2) Abdominal pain


Code(s): R10.9 - UNSPECIFIED ABDOMINAL PAIN   


Qualifiers: 


   Abdominal location: generalized   Qualified Code(s): R10.84 - Generalized 

abdominal pain   





(3) Adenocarcinoma of right lung


Code(s): C34.91 - MALIGNANT NEOPLASM OF UNSP PART OF RIGHT BRONCHUS OR LUNG   





(4) Paroxysmal A-fib


Code(s): I48.0 - PAROXYSMAL ATRIAL FIBRILLATION   





(5) Chronic pain


Code(s): G89.29 - OTHER CHRONIC PAIN   


Qualifiers: 


   Chronic pain type: chronic pain syndrome   Qualified Code(s): G89.4 - 

Chronic pain syndrome   





(6) Constipation


Code(s): K59.00 - CONSTIPATION, UNSPECIFIED   





(7) Neurogenic bladder


Code(s): N31.9 - NEUROMUSCULAR DYSFUNCTION OF BLADDER, UNSPECIFIED   





(8) Paraplegia


Code(s): G82.20 - PARAPLEGIA, UNSPECIFIED
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
## 2019-01-01 LAB
ANION GAP SERPL CALC-SCNC: 8 MMOL/L (ref 8–16)
BASOPHILS # BLD: 0.9 % (ref 0–2)
BUN SERPL-MCNC: 15 MG/DL (ref 7–18)
CALCIUM SERPL-MCNC: 8.8 MG/DL (ref 8.5–10.1)
CHLORIDE SERPL-SCNC: 101 MMOL/L (ref 98–107)
CO2 SERPL-SCNC: 26 MMOL/L (ref 21–32)
CREAT SERPL-MCNC: 0.6 MG/DL (ref 0.55–1.3)
DEPRECATED RDW RBC AUTO: 15.5 % (ref 11.9–15.9)
EOSINOPHIL # BLD: 3.7 % (ref 0–4.5)
GLUCOSE SERPL-MCNC: 83 MG/DL (ref 74–106)
HCT VFR BLD CALC: 41.1 % (ref 35.4–49)
HGB BLD-MCNC: 13.3 GM/DL (ref 11.7–16.9)
LYMPHOCYTES # BLD: 14.8 % (ref 8–40)
MAGNESIUM SERPL-MCNC: 2 MG/DL (ref 1.8–2.4)
MCH RBC QN AUTO: 29.4 PG (ref 25.7–33.7)
MCHC RBC AUTO-ENTMCNC: 32.3 G/DL (ref 32–35.9)
MCV RBC: 91 FL (ref 80–96)
MONOCYTES # BLD AUTO: 10.3 % (ref 3.8–10.2)
NEUTROPHILS # BLD: 70.3 % (ref 42.8–82.8)
PHOSPHATE SERPL-MCNC: 3.1 MG/DL (ref 2.5–4.9)
PLATELET # BLD AUTO: 591 K/MM3 (ref 134–434)
PMV BLD: 9.5 FL (ref 7.5–11.1)
POTASSIUM SERPLBLD-SCNC: 4.6 MMOL/L (ref 3.5–5.1)
RBC # BLD AUTO: 4.52 M/MM3 (ref 4–5.6)
SODIUM SERPL-SCNC: 135 MMOL/L (ref 136–145)
WBC # BLD AUTO: 10.7 K/MM3 (ref 4–10)

## 2019-01-01 RX ADMIN — MORPHINE SULFATE PRN MG: 10 SOLUTION ORAL at 21:55

## 2019-01-01 RX ADMIN — DOCUSATE SODIUM SCH MG: 100 CAPSULE, LIQUID FILLED ORAL at 09:44

## 2019-01-01 RX ADMIN — MORPHINE SULFATE PRN MG: 10 SOLUTION ORAL at 17:19

## 2019-01-01 RX ADMIN — DOCUSATE SODIUM SCH MG: 100 CAPSULE, LIQUID FILLED ORAL at 17:19

## 2019-01-01 RX ADMIN — MORPHINE SULFATE PRN MG: 10 SOLUTION ORAL at 09:45

## 2019-01-01 RX ADMIN — METOPROLOL TARTRATE SCH: 25 TABLET, FILM COATED ORAL at 09:45

## 2019-01-01 RX ADMIN — MORPHINE SULFATE PRN MG: 10 SOLUTION ORAL at 02:58

## 2019-01-01 RX ADMIN — DOCUSATE SODIUM SCH MG: 100 CAPSULE, LIQUID FILLED ORAL at 13:17

## 2019-01-01 RX ADMIN — METOPROLOL TARTRATE SCH MG: 25 TABLET, FILM COATED ORAL at 21:55

## 2019-01-01 RX ADMIN — ENOXAPARIN SODIUM SCH MG: 40 INJECTION SUBCUTANEOUS at 09:45

## 2019-01-01 NOTE — PN
Progress Note, Physician


History of Present Illness: 





stable


doing well





- Current Medication List


Current Medications: 


Active Medications





Diazepam (Valium -)  10 mg PO Q8H PRN


   PRN Reason: ANXIETY


   Last Admin: 12/31/18 22:15 Dose:  10 mg


Docusate Sodium (Colace -)  100 mg PO TIDCM [facility]


   Last Admin: 01/01/19 09:44 Dose:  100 mg


Enoxaparin Sodium (Lovenox -)  40 mg SQ DAILY [facility]


   Last Admin: 01/01/19 09:45 Dose:  40 mg


Metoprolol Tartrate (Lopressor -)  12.5 mg PO BID [facility]


   Last Admin: 01/01/19 09:45 Dose:  Not Given


Morphine Sulfate (Morphine 10 Mg/5 Ml Liquid)  10 mg PO Q4H PRN


   PRN Reason: PAIN LEVEL 7 - 10


   Last Admin: 01/01/19 09:45 Dose:  10 mg


Senna (Senna -)  2 tab PO HS PRN


   PRN Reason: CONSTIPATION


   Last Admin: 12/27/18 21:10 Dose:  2 tab











- Objective


Vital Signs: 


 Vital Signs











Temperature  98.2 F   01/01/19 09:43


 


Pulse Rate  62   01/01/19 09:43


 


Respiratory Rate  18   01/01/19 09:43


 


Blood Pressure  115/42 L  01/01/19 09:43


 


O2 Sat by Pulse Oximetry (%)  94 L  12/31/18 21:00











Constitutional: Yes: No Distress, Calm, Other (bed bound)


Cardiovascular: Yes: Regular Rate and Rhythm


Respiratory: Yes: Regular, CTA Bilaterally


Gastrointestinal: Yes: Normal Bowel Sounds, Soft


Musculoskeletal: Yes: Other


Extremities: Yes: Other


Neurological: Yes: Alert, Oriented


Psychiatric: Yes: Alert, Oriented


Labs: 


 CBC, BMP





 01/01/19 06:15 





 01/01/19 06:15 





 INR, PTT











INR  0.97  (0.83-1.09)   12/26/18  06:00    














Assessment/Plan





Problem List





- Problems


(1) UTI (urinary tract infection)


Code(s): N39.0 - URINARY TRACT INFECTION, SITE NOT SPECIFIED   


Qualifiers: 


   Urinary tract infection type: site unspecified   Hematuria presence: without 

hematuria   Qualified Code(s): N39.0 - Urinary tract infection, site not 

specified   





(2) Adenocarcinoma of right lung


Code(s): C34.91 - MALIGNANT NEOPLASM OF UNSP PART OF RIGHT BRONCHUS OR LUNG   





(3) Leukocytosis


Code(s): D72.829 - ELEVATED WHITE BLOOD CELL COUNT, UNSPECIFIED   





(4) Paroxysmal A-fib


Code(s): I48.0 - PAROXYSMAL ATRIAL FIBRILLATION   





(5) Neurogenic bladder


Code(s): N31.9 - NEUROMUSCULAR DYSFUNCTION OF BLADDER, UNSPECIFIED   





(6) Paraplegia


Code(s): G82.20 - PARAPLEGIA, UNSPECIFIED   





Assessment/Plan





Complicated UTI


Leukocytosis


Neurogenic bladder


Paraplegia


T2 fracture








plan


stable off of abx


rest as per the team


nutrition


generalized support

## 2019-01-01 NOTE — PN
Progress Note, Physician


Chief Complaint: 


Mr Sumner complains of chronic abdominal pain. No cp, sob, n/v.





- Current Medication List


Current Medications: 


Active Medications





Diazepam (Valium -)  10 mg PO Q8H PRN


   PRN Reason: ANXIETY


   Last Admin: 12/31/18 22:15 Dose:  10 mg


Docusate Sodium (Colace -)  100 mg PO TIDCM [facility]


   Last Admin: 01/01/19 13:17 Dose:  100 mg


Enoxaparin Sodium (Lovenox -)  40 mg SQ DAILY [facility]


   Last Admin: 01/01/19 09:45 Dose:  40 mg


Metoprolol Tartrate (Lopressor -)  12.5 mg PO BID [facility]


   Last Admin: 01/01/19 09:45 Dose:  Not Given


Morphine Sulfate (Morphine 10 Mg/5 Ml Liquid)  10 mg PO Q4H PRN


   PRN Reason: PAIN LEVEL 7 - 10


   Last Admin: 01/01/19 09:45 Dose:  10 mg


Senna (Senna -)  2 tab PO HS PRN


   PRN Reason: CONSTIPATION


   Last Admin: 12/27/18 21:10 Dose:  2 tab











- Objective


Vital Signs: 


 Vital Signs











Temperature  36.6 C   01/01/19 13:15


 


Pulse Rate  74   01/01/19 13:15


 


Respiratory Rate  20   01/01/19 13:15


 


Blood Pressure  101/56 L  01/01/19 13:15


 


O2 Sat by Pulse Oximetry (%)  94 L  01/01/19 09:00











Constitutional: Yes: Well Nourished, No Distress, Calm


Cardiovascular: Yes: Regular Rate and Rhythm.  No: Gallop, Murmur, Rub


Respiratory: Yes: Regular, CTA Bilaterally.  No: Rales, Rhonchi, Wheezes


Gastrointestinal: Yes: Normal Bowel Sounds, Soft.  No: Distention, Tenderness


Extremities: Yes: WNL


Edema: No


Labs: 


 CBC, BMP





 01/01/19 06:15 





 01/01/19 06:15 





 INR, PTT











INR  0.97  (0.83-1.09)   12/26/18  06:00    














Problem List





- Problems


(1) UTI (urinary tract infection)


Code(s): N39.0 - URINARY TRACT INFECTION, SITE NOT SPECIFIED   


Qualifiers: 


   Urinary tract infection type: acute cystitis   Hematuria presence: without 

hematuria   Qualified Code(s): N30.00 - Acute cystitis without hematuria   





(2) Abdominal pain


Code(s): R10.9 - UNSPECIFIED ABDOMINAL PAIN   


Qualifiers: 


   Abdominal location: generalized   Qualified Code(s): R10.84 - Generalized 

abdominal pain   





(3) Adenocarcinoma of right lung


Code(s): C34.91 - MALIGNANT NEOPLASM OF UNSP PART OF RIGHT BRONCHUS OR LUNG   





(4) Paroxysmal A-fib


Code(s): I48.0 - PAROXYSMAL ATRIAL FIBRILLATION   





(5) Chronic pain


Code(s): G89.29 - OTHER CHRONIC PAIN   


Qualifiers: 


   Chronic pain type: chronic pain syndrome   Qualified Code(s): G89.4 - 

Chronic pain syndrome   





(6) Constipation


Code(s): K59.00 - CONSTIPATION, UNSPECIFIED   





(7) Neurogenic bladder


Code(s): N31.9 - NEUROMUSCULAR DYSFUNCTION OF BLADDER, UNSPECIFIED   





(8) Paraplegia


Code(s): G82.20 - PARAPLEGIA, UNSPECIFIED   





Assessment/Plan





(1) UTI (urinary tract infection)


Assessment/Plan: 


-s/p full course of rocephin


Code(s): N39.0 - URINARY TRACT INFECTION, SITE NOT SPECIFIED   


Qualifiers: 


   Urinary tract infection type: acute cystitis   Hematuria presence: without 

hematuria   Qualified Code(s): N30.00 - Acute cystitis without hematuria   





(2) Abdominal pain


Assessment/Plan: 


-chronic


Code(s): R10.9 - UNSPECIFIED ABDOMINAL PAIN   


Qualifiers: 


   Abdominal location: generalized   Qualified Code(s): R10.84 - Generalized 

abdominal pain   





(3) Adenocarcinoma of right lung


Assessment/Plan: 


-s/p biopsy


-awaiting results, can be called by oncology to the patient


Code(s): C34.91 - MALIGNANT NEOPLASM OF UNSP PART OF RIGHT BRONCHUS OR LUNG   





(4) Paroxysmal A-fib


Assessment/Plan: 


-continue metoprolol


-rate controlled


-poor candidate for anticoagulation


Code(s): I48.0 - PAROXYSMAL ATRIAL FIBRILLATION   





(5) Chronic pain


Assessment/Plan: 


-continue morphine


Code(s): G89.29 - OTHER CHRONIC PAIN   


Qualifiers: 


   Chronic pain type: chronic pain syndrome   Qualified Code(s): G89.4 - 

Chronic pain syndrome   





(6) Constipation


Assessment/Plan: 


-continue colace


Code(s): K59.00 - CONSTIPATION, UNSPECIFIED   





(7) Neurogenic bladder


Code(s): N31.9 - NEUROMUSCULAR DYSFUNCTION OF BLADDER, UNSPECIFIED   





(8) Paraplegia


Code(s): G82.20 - PARAPLEGIA, UNSPECIFIED   





Patient appeals process has been evaluated, stable at this time for discharge 

home. Unable to set up home services until tomorrow, plan for discharge on 1/2

## 2019-01-02 RX ADMIN — MORPHINE SULFATE PRN MG: 10 SOLUTION ORAL at 22:27

## 2019-01-02 RX ADMIN — MORPHINE SULFATE PRN MG: 10 SOLUTION ORAL at 17:51

## 2019-01-02 RX ADMIN — DOCUSATE SODIUM SCH MG: 100 CAPSULE, LIQUID FILLED ORAL at 13:39

## 2019-01-02 RX ADMIN — MORPHINE SULFATE PRN MG: 10 SOLUTION ORAL at 13:42

## 2019-01-02 RX ADMIN — MORPHINE SULFATE PRN MG: 10 SOLUTION ORAL at 04:17

## 2019-01-02 RX ADMIN — ENOXAPARIN SODIUM SCH MG: 40 INJECTION SUBCUTANEOUS at 09:35

## 2019-01-02 RX ADMIN — METOPROLOL TARTRATE SCH MG: 25 TABLET, FILM COATED ORAL at 22:28

## 2019-01-02 RX ADMIN — METOPROLOL TARTRATE SCH: 25 TABLET, FILM COATED ORAL at 09:35

## 2019-01-02 RX ADMIN — DOCUSATE SODIUM SCH MG: 100 CAPSULE, LIQUID FILLED ORAL at 09:34

## 2019-01-02 RX ADMIN — DOCUSATE SODIUM SCH MG: 100 CAPSULE, LIQUID FILLED ORAL at 17:51
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
## 2019-01-03 VITALS — TEMPERATURE: 98.2 F | DIASTOLIC BLOOD PRESSURE: 62 MMHG | HEART RATE: 62 BPM | SYSTOLIC BLOOD PRESSURE: 106 MMHG

## 2019-01-03 LAB
DEPRECATED RDW RBC AUTO: 15.6 % (ref 11.9–15.9)
HCT VFR BLD CALC: 42.4 % (ref 35.4–49)
HGB BLD-MCNC: 13.7 GM/DL (ref 11.7–16.9)
MCH RBC QN AUTO: 29.5 PG (ref 25.7–33.7)
MCHC RBC AUTO-ENTMCNC: 32.4 G/DL (ref 32–35.9)
MCV RBC: 90.9 FL (ref 80–96)
PLATELET # BLD AUTO: 583 K/MM3 (ref 134–434)
PMV BLD: 9.7 FL (ref 7.5–11.1)
RBC # BLD AUTO: 4.66 M/MM3 (ref 4–5.6)
WBC # BLD AUTO: 10.3 K/MM3 (ref 4–10)

## 2019-01-03 RX ADMIN — DOCUSATE SODIUM SCH MG: 100 CAPSULE, LIQUID FILLED ORAL at 08:41

## 2019-01-03 RX ADMIN — ENOXAPARIN SODIUM SCH MG: 40 INJECTION SUBCUTANEOUS at 09:49

## 2019-01-03 RX ADMIN — MORPHINE SULFATE PRN MG: 10 SOLUTION ORAL at 08:40

## 2019-01-03 RX ADMIN — MORPHINE SULFATE PRN MG: 10 SOLUTION ORAL at 02:04

## 2019-01-03 RX ADMIN — METOPROLOL TARTRATE SCH MG: 25 TABLET, FILM COATED ORAL at 09:49
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
## 2019-06-06 ENCOUNTER — HOSPITAL ENCOUNTER (EMERGENCY)
Dept: HOSPITAL 74 - JER | Age: 64
Discharge: HOME | End: 2019-06-06
Payer: COMMERCIAL

## 2019-06-06 VITALS — DIASTOLIC BLOOD PRESSURE: 68 MMHG | TEMPERATURE: 97.9 F | HEART RATE: 60 BPM | SYSTOLIC BLOOD PRESSURE: 105 MMHG

## 2019-06-06 VITALS — BODY MASS INDEX: 23.3 KG/M2

## 2019-06-06 DIAGNOSIS — N31.9: ICD-10-CM

## 2019-06-06 DIAGNOSIS — I48.0: ICD-10-CM

## 2019-06-06 DIAGNOSIS — J44.9: ICD-10-CM

## 2019-06-06 DIAGNOSIS — N39.0: Primary | ICD-10-CM

## 2019-06-06 DIAGNOSIS — G82.20: ICD-10-CM

## 2019-06-06 DIAGNOSIS — C34.91: ICD-10-CM

## 2019-06-06 DIAGNOSIS — Z74.01: ICD-10-CM

## 2019-06-06 LAB
ALBUMIN SERPL-MCNC: 2.9 G/DL (ref 3.4–5)
ALP SERPL-CCNC: 146 U/L (ref 45–117)
ALT SERPL-CCNC: 26 U/L (ref 13–61)
ANION GAP SERPL CALC-SCNC: 7 MMOL/L (ref 8–16)
APPEARANCE UR: (no result)
AST SERPL-CCNC: 22 U/L (ref 15–37)
BACTERIA #/AREA URNS HPF: 2040.4 /HPF
BILIRUB SERPL-MCNC: 0.6 MG/DL (ref 0.2–1)
BILIRUB UR STRIP.AUTO-MCNC: NEGATIVE MG/DL
BUN SERPL-MCNC: 4 MG/DL (ref 7–18)
CALCIUM SERPL-MCNC: 8.9 MG/DL (ref 8.5–10.1)
CASTS #/AREA URNS LPF: 33 /LPF (ref 0–8)
CHLORIDE SERPL-SCNC: 102 MMOL/L (ref 98–107)
CO2 SERPL-SCNC: 27 MMOL/L (ref 21–32)
COLOR UR: YELLOW
CREAT SERPL-MCNC: 0.7 MG/DL (ref 0.55–1.3)
DEPRECATED RDW RBC AUTO: 13.8 % (ref 11.9–15.9)
EPITH CASTS URNS QL MICRO: 9.6 /HPF
GLUCOSE SERPL-MCNC: 103 MG/DL (ref 74–106)
HCT VFR BLD CALC: 48.1 % (ref 35.4–49)
HGB BLD-MCNC: 15.6 GM/DL (ref 11.7–16.9)
KETONES UR QL STRIP: NEGATIVE
LEUKOCYTE ESTERASE UR QL STRIP.AUTO: NEGATIVE
MCH RBC QN AUTO: 30.3 PG (ref 25.7–33.7)
MCHC RBC AUTO-ENTMCNC: 32.5 G/DL (ref 32–35.9)
MCV RBC: 93.3 FL (ref 80–96)
NITRITE UR QL STRIP: POSITIVE
PH UR: 8.5 [PH] (ref 5–8)
PLATELET # BLD AUTO: 506 K/MM3 (ref 134–434)
PMV BLD: 9.2 FL (ref 7.5–11.1)
POTASSIUM SERPLBLD-SCNC: 4.2 MMOL/L (ref 3.5–5.1)
PROT SERPL-MCNC: 7.4 G/DL (ref 6.4–8.2)
PROT UR QL STRIP: NEGATIVE
PROT UR QL STRIP: NEGATIVE
RBC # BLD AUTO: 10.7 /HPF (ref 0–4)
RBC # BLD AUTO: 5.15 M/MM3 (ref 4–5.6)
SODIUM SERPL-SCNC: 137 MMOL/L (ref 136–145)
SP GR UR: 1.01 (ref 1.01–1.03)
UROBILINOGEN UR STRIP-MCNC: 0.2 MG/DL (ref 0.2–1)
WBC # BLD AUTO: 14.3 K/MM3 (ref 4–10)
WBC # UR AUTO: 33.4 /HPF (ref 0–5)
YEAST #/AREA URNS HPF: 0 /[HPF]

## 2019-06-06 PROCEDURE — 3E03329 INTRODUCTION OF OTHER ANTI-INFECTIVE INTO PERIPHERAL VEIN, PERCUTANEOUS APPROACH: ICD-10-PCS | Performed by: EMERGENCY MEDICINE

## 2019-06-06 PROCEDURE — 3E0337Z INTRODUCTION OF ELECTROLYTIC AND WATER BALANCE SUBSTANCE INTO PERIPHERAL VEIN, PERCUTANEOUS APPROACH: ICD-10-PCS | Performed by: EMERGENCY MEDICINE

## 2019-06-06 PROCEDURE — 3E033NZ INTRODUCTION OF ANALGESICS, HYPNOTICS, SEDATIVES INTO PERIPHERAL VEIN, PERCUTANEOUS APPROACH: ICD-10-PCS | Performed by: EMERGENCY MEDICINE

## 2019-06-06 NOTE — PDOC
History of Present Illness





- General


Chief Complaint: Pain


Stated Complaint: ABD PAIN/FEVER


Time Seen by Provider: 19 13:05


History Source: Patient





- History of Present Illness


Initial Comments: 





19 14:00





PCP: Dr. Poon





Patient  is a 63 year old male brought in via EMS with the chief complaint of 

feeling weak, tired  and not feeling well for 2 weeks. Patient was admitted at 

[facility] in 2019 for the treatment of UTI (Serratia, treated with IV Rocephin) 

and discharged home. Since then he was feeling fine. About 2 weeks ago, started 

having nausea, decreased appetite, feeling weak and tired. Also states that he 

has chronic abdominal pain, but for few days has been getting worse. Pain 

located in the suprapubic area, left lower quadrant. 


Denies fever but used to feel hot at night with sweating, no chills or rigors. 

No chest pain, sob, cough, palpitation. 





Hx of Adenoca of lung. As per the patient's HCP, he reports patient was told by 

his oncologist that he wouldn't be a candidate for chemo as it would be 

difficult for him to go to the clinic 2-3 times a week. Patient has never 

followed up with oncologist as outpatient. 





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 

Neurogenic Bladder with recurrent UTI, COPD, Splenectomy, Paroxysmal Afib (not 

on AC) 


Allergies: NKDA


Past surgical history: Multiple orthopedic surgery's for broken bones


Social History:Lives at Home, has health aid


Smoking: Quit; Smoked 1ppd Age 3-30


Alcohol: Socially


Drugs: Occasional Marijuana Use


Ambulation: Bed Bound


Family History:


Mother:  of MI at age 27


Father: Cardiac hx











Past History





- Past Medical History


Allergies/Adverse Reactions: 


 Allergies











Allergy/AdvReac Type Severity Reaction Status Date / Time


 


No Known Allergies Allergy   Verified 19 12:29











Home Medications: 


Ambulatory Orders





Diazepam [Valium] 10 mg PO TID PRN  tablet MDD 30mg 10/10/18 


Morphine 10 mg/5 ml Liquid [Morphine 10 mg/5 mL Liquid -] 10 mg PO Q4H #150 ml 

MDD 60mg 10/10/18 


Docusate Sodium [Colace -] 100 mg PO TIDCM #90 capsule 18 


Metoprolol Tartrate [Lopressor -] 12.5 mg PO BID #30 tablet 18 


Sennosides [Senna -] 2 tab PO HS PRN #60 tablet 18 


Sulfamethoxazole/Trimethoprim [Bactrim Ds -] 1 tab PO BID #14 tablet 19 








Anemia: No


Asthma: No


Cancer: No


Cardiac Disorders: No


CVA: No


COPD: Yes


CHF: No


Dementia: No


Diabetes: No


GI Disorders: No


 Disorders: Yes (Recurrent UTI, CONDOM CATHETER)


HTN:  (Hypotension)


Hypercholesterolemia: No


Liver Disease: No


Seizures: No


Thyroid Disease: No





- Surgical History


Abdominal Surgery: No


Appendectomy: No


Cardiac Surgery: No


Cholecystectomy: No


Lung Surgery: No


Neurologic Surgery: No


Orthopedic Surgery: Yes (fracture right wrist s/p fusion)





- Immunization History


Td Vaccination: No


Immunization Up to Date: No





- Suicide/Smoking/Psychosocial Hx


Smoking Status: No


Smoking History: Never smoked


Years of Tobacco Use: 0


Have you smoked in the past 12 months: No


Number of Cigarettes Smoked Daily: 3


If you are a former smoker, when did you quit?: 1986


Cigars Per Day: 0


Information on smoking cessation initiated: No


Hx Alcohol Use: No


Drug/Substance Use Hx: No


Substance Use Type: None


Hx Substance Use Treatment: No





*Physical Exam





- Vital Signs


 Last Vital Signs











Temp Pulse Resp BP Pulse Ox


 


 98.5 F   66   16   118/57 L  96 


 


 19 11:51  19 11:51  19 11:51  19 11:51  19 11:51














- Physical Exam


Comments: 





19 14:59








GENERAL: The patient is awake, alert, and fully oriented, in no acute distress.


HEAD: Normal with no signs of trauma.


EYES: EOM intact, no pallor or icterus. . 


ENT: Ears normal, dry mucous membranes.


NECK: Supple. 


LUNGS: Breath sounds equal, clear to auscultation bilaterally, no wheezes, no 

crackles, no accessory muscle use. 


HEART: Regular rate and rhythm, S1, S2 without murmur.


ABDOMEN: Surgical scar perico +, Soft, tender to palpation in the suprapubic area 

and left lower quadrant, no organomegaly. 


LOWER EXTREMITIES: Contracted lower limbs,  muscle wasting, no sensation


NEUROLOGICAL: No facial droop. Power 5/5 in Upper Ext.  Cranial nerves II 

through XII grossly intact. Normal speech. Loss of sensation from mid thoracic 

below. Power 0/5 in Lower ext. Pulses intact. 














ED Treatment Course





- LABORATORY


CBC & Chemistry Diagram: 


 19 13:41





 19 13:41





Medical Decision Making





- Medical Decision Making





19 15:00





Patient  is a 63 year old male brought in via EMS with the chief complaint of 

feeling weak, tired  and not feeling well for 2 weeks. Associated with 

abdominal pain, nausea and vomiting. 





Will send CBC, CMP, trops, UA, Urine culture, blood culture


Will give 1 L of NS





19 15:29


IV Morphine 2 mg.


Labs normal. UA positive for UTI. 


Will order CT abdomen pelvis with IV contrast. 





19 18:58


Patient is feeling better. CT abdomen/pelvis negative for acute pathology. 





19 19:04


Discussed reports with the patient. He feels comfortable going home with PO 

antibiotics. 


Will send him home with Bactrim. 


Call placed to Dr. Poon. 


Signed out to Dr. Herron.





19 19:23


called patient's HCP Perico, told him that patient is ready for discharge.


His aid is at home so will arrange transportation for him to go home. 


Will give one dose of bactrim.








*DC/Admit/Observation/Transfer


Diagnosis at time of Disposition: 


 UTI (urinary tract infection)








- Discharge Dispostion


Disposition: HOME


Condition at time of disposition: Improved


Decision to Admit order: No





- Prescriptions


Prescriptions: 


Sulfamethoxazole/Trimethoprim [Bactrim Ds -] 1 tab PO BID #14 tablet





- Referrals


Referrals: 


Sony Poon MD [Primary Care Provider] - 





- Patient Instructions


Additional Instructions: 


You were evaluated for abdominal pain and weakness. Found to have urinary tract 

infection. Please take antibiotics as directed. Drink plenty of fluids. 


Please follow up with your primary physician this week. If your symptoms 

persist or gets worse, please come to the ED immediately. 





- Post Discharge Activity

## 2019-06-06 NOTE — PDOC
Documentation entered by Tiffany Shen SCRIBE, acting as scribe for Julius Wagoner MD.








Julius Wagoner MD:  This documentation has been prepared by the Hermelinda whelan Xhesika, SCRIBE, under my direction and personally reviewed by me in its entirety.  I 

confirm that the documentation accurately reflects all work, treatment, 

procedures, and medical decision making performed by me.  





Attending Attestation





- Resident


Resident Name: JuanitoAster





- ED Attending Attestation


I have performed the following: I have examined & evaluated the patient, The 

case was reviewed & discussed with the resident, I agree w/resident's findings 

& plan, Exceptions are as noted





- HPI


HPI: 





06/06/19 13:35


The patient is a 63 year old male, with a significant PMH of COPD, hypotension, 

adenocarcinoma of the right lung, spinal cord transection at T4, neurogenic 

bladder with recurrent UTI (was admitted at [facility] in Jan, 2019) , and splenectomy 

who presents to the emergency department with 2 weeks of chronic abdominal 

pain. The patient states he has been having  nausea, vomiting, generalized 

weakness, low appetite and night sweats as well. 





The patient denies chest pain, shortness of breath, headache and dizziness. 

Denies diarrhea and constipation. Denies dysuria, frequency, urgency and 

hematuria.





Allergies: NKDA


PCP: Sony Baker





PCP: Dr. Poon





- Physicial Exam


PE: 





06/06/19 15:06


"GENERAL: Awake, alert, and fully oriented, in no acute distress.


HEAD: No signs of trauma


EYES: PERRLA, EOMI, sclera anicteric, conjunctiva clear


ENT: Auricles normal inspection, hearing grossly normal, nares patent, 

oropharynx clear without exudates. Moist mucosa


NECK: Nontender, no stepoffs, Normal ROM, supple, no lymphadenopathy, JVD, or 

masses


LUNGS: Breath sounds equal, clear to auscultation bilaterally.  No wheezes, and 

no crackles


HEART: Regular rate and rhythm, normal S1 and S2, no murmurs, rubs or gallops


ABDOMEN: + LLQ and suprapubic TTP, normoactive bowel sounds.  No guarding, no 

rebound.  No masses


EXTREMITIES: Normal range of motion, no edema.  No clubbing or cyanosis. No 

cords, erythema, or tenderness


NEUROLOGICAL: Cranial nerves II through XII intact. 5/5 strength and sensation 

in all extremities, Normal speech, normal gait, normal cerebellar function


SKIN: Warm, Dry, normal turgor, no rashes or lesions noted.





- Medical Decision Making





06/06/19 15:06


63 M with LLQ and suprapubic pain. Suspect chronic in nature. However, pt has h/

o recurrent UTIs.


- Labs, UA


- CTAP


- Pain control





06/06/19 16:13


Labs notable for WBC 14


UA consistent with UTI





CT still pending





Pt signed out to Dr. Stevenson at 5PM, pending CT and re-evaluation.

## 2019-06-07 NOTE — EKG
Test Reason : 

Blood Pressure : ***/*** mmHG

Vent. Rate : 063 BPM     Atrial Rate : 063 BPM

   P-R Int : 142 ms          QRS Dur : 080 ms

    QT Int : 418 ms       P-R-T Axes : 068 071 081 degrees

   QTc Int : 427 ms

 

NORMAL SINUS RHYTHM

WHEN COMPARED WITH ECG OF 06-JUN-2019 12:05,

NO SIGNIFICANT CHANGE WAS FOUND

Confirmed by HESHAM ELIZONDO MD ([id number]) on 6/7/2019 12:37:03 PM

 

Referred By:             Confirmed By:HESHAM ELIZONDO MD

## 2019-10-07 ENCOUNTER — HOSPITAL ENCOUNTER (OUTPATIENT)
Dept: HOSPITAL 74 - JER | Age: 64
Setting detail: OBSERVATION
LOS: 5 days | Discharge: HOME | End: 2019-10-12
Attending: INTERNAL MEDICINE | Admitting: INTERNAL MEDICINE
Payer: COMMERCIAL

## 2019-10-07 VITALS — BODY MASS INDEX: 20.2 KG/M2

## 2019-10-07 DIAGNOSIS — Z87.440: ICD-10-CM
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
## 2019-10-07 DIAGNOSIS — Z85.118: ICD-10-CM
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
## 2019-10-07 DIAGNOSIS — Z87.81: ICD-10-CM

## 2019-10-07 DIAGNOSIS — N39.0: ICD-10-CM

## 2019-10-07 DIAGNOSIS — B35.3: ICD-10-CM

## 2019-10-07 DIAGNOSIS — I95.9: ICD-10-CM

## 2019-10-07 DIAGNOSIS — G89.29: ICD-10-CM

## 2019-10-07 DIAGNOSIS — N31.9: ICD-10-CM

## 2019-10-07 DIAGNOSIS — R07.89: ICD-10-CM

## 2019-10-07 DIAGNOSIS — S82.202A: Primary | ICD-10-CM

## 2019-10-07 DIAGNOSIS — S82.832A: ICD-10-CM

## 2019-10-07 DIAGNOSIS — Z29.8: ICD-10-CM

## 2019-10-07 DIAGNOSIS — G82.20: ICD-10-CM

## 2019-10-07 DIAGNOSIS — M54.9: ICD-10-CM

## 2019-10-07 DIAGNOSIS — D72.829: ICD-10-CM

## 2019-10-07 DIAGNOSIS — J44.9: ICD-10-CM

## 2019-10-07 LAB
ALBUMIN SERPL-MCNC: 2.6 G/DL (ref 3.4–5)
ALP SERPL-CCNC: 138 U/L (ref 45–117)
ALT SERPL-CCNC: 19 U/L (ref 13–61)
ANION GAP SERPL CALC-SCNC: 13 MMOL/L (ref 8–16)
APPEARANCE UR: CLEAR
APTT BLD: 34.5 SECONDS (ref 25.2–36.5)
AST SERPL-CCNC: 25 U/L (ref 15–37)
BACTERIA # UR AUTO: 2.6 /HPF
BASOPHILS # BLD: 0.2 % (ref 0–2)
BILIRUB SERPL-MCNC: 0.8 MG/DL (ref 0.2–1)
BILIRUB UR STRIP.AUTO-MCNC: NEGATIVE MG/DL
BUN SERPL-MCNC: 2.6 MG/DL (ref 7–18)
CALCIUM SERPL-MCNC: 8.4 MG/DL (ref 8.5–10.1)
CASTS URNS QL MICRO: 2 /LPF (ref 0–8)
CHLORIDE SERPL-SCNC: 99 MMOL/L (ref 98–107)
CO2 SERPL-SCNC: 22 MMOL/L (ref 21–32)
COLOR UR: YELLOW
CREAT SERPL-MCNC: 0.5 MG/DL (ref 0.55–1.3)
DEPRECATED RDW RBC AUTO: 14.7 % (ref 11.9–15.9)
EOSINOPHIL # BLD: 0.7 % (ref 0–4.5)
EPITH CASTS URNS QL MICRO: 2.3 /HPF
GLUCOSE SERPL-MCNC: 82 MG/DL (ref 74–106)
HCT VFR BLD CALC: 41.5 % (ref 35.4–49)
HGB BLD-MCNC: 13.7 GM/DL (ref 11.7–16.9)
INR BLD: 0.97 (ref 0.83–1.09)
KETONES UR QL STRIP: NEGATIVE
LEUKOCYTE ESTERASE UR QL STRIP.AUTO: (no result)
LYMPHOCYTES # BLD: 9.1 % (ref 8–40)
MCH RBC QN AUTO: 31.5 PG (ref 25.7–33.7)
MCHC RBC AUTO-ENTMCNC: 33 G/DL (ref 32–35.9)
MCV RBC: 95.4 FL (ref 80–96)
MONOCYTES # BLD AUTO: 7.7 % (ref 3.8–10.2)
NEUTROPHILS # BLD: 82.3 % (ref 42.8–82.8)
NITRITE UR QL STRIP: NEGATIVE
PH UR: 6 [PH] (ref 5–8)
PLATELET # BLD AUTO: 503 K/MM3 (ref 134–434)
PMV BLD: 8.7 FL (ref 7.5–11.1)
POTASSIUM SERPLBLD-SCNC: 4.3 MMOL/L (ref 3.5–5.1)
PROT SERPL-MCNC: 7 G/DL (ref 6.4–8.2)
PROT UR QL STRIP: NEGATIVE
PROT UR QL STRIP: NEGATIVE
PT PNL PPP: 11.5 SEC (ref 9.7–13)
RBC # BLD AUTO: 1 /HPF (ref 0–4)
RBC # BLD AUTO: 4.35 M/MM3 (ref 4–5.6)
SODIUM SERPL-SCNC: 134 MMOL/L (ref 136–145)
SP GR UR: 1 (ref 1.01–1.03)
UROBILINOGEN UR STRIP-MCNC: 0.2 MG/DL (ref 0.2–1)
WBC # BLD AUTO: 14.7 K/MM3 (ref 4–10)
WBC # UR AUTO: 3 /HPF (ref 0–5)

## 2019-10-07 PROCEDURE — 0QSKXZZ REPOSITION LEFT FIBULA, EXTERNAL APPROACH: ICD-10-PCS | Performed by: ORTHOPAEDIC SURGERY

## 2019-10-07 PROCEDURE — G0378 HOSPITAL OBSERVATION PER HR: HCPCS

## 2019-10-07 PROCEDURE — 0QSHXZZ REPOSITION LEFT TIBIA, EXTERNAL APPROACH: ICD-10-PCS | Performed by: ORTHOPAEDIC SURGERY

## 2019-10-07 PROCEDURE — 3E03329 INTRODUCTION OF OTHER ANTI-INFECTIVE INTO PERIPHERAL VEIN, PERCUTANEOUS APPROACH: ICD-10-PCS | Performed by: INTERNAL MEDICINE

## 2019-10-07 PROCEDURE — 3E033NZ INTRODUCTION OF ANALGESICS, HYPNOTICS, SEDATIVES INTO PERIPHERAL VEIN, PERCUTANEOUS APPROACH: ICD-10-PCS | Performed by: INTERNAL MEDICINE

## 2019-10-07 PROCEDURE — 3E0337Z INTRODUCTION OF ELECTROLYTIC AND WATER BALANCE SUBSTANCE INTO PERIPHERAL VEIN, PERCUTANEOUS APPROACH: ICD-10-PCS | Performed by: INTERNAL MEDICINE

## 2019-10-07 PROCEDURE — 3E033GC INTRODUCTION OF OTHER THERAPEUTIC SUBSTANCE INTO PERIPHERAL VEIN, PERCUTANEOUS APPROACH: ICD-10-PCS | Performed by: INTERNAL MEDICINE

## 2019-10-07 RX ADMIN — BACITRACIN ZINC SCH: 500 OINTMENT TOPICAL at 23:27

## 2019-10-07 RX ADMIN — HEPARIN SODIUM SCH: 5000 INJECTION, SOLUTION INTRAVENOUS; SUBCUTANEOUS at 23:27
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
## 2019-10-07 NOTE — HP
CHIEF COMPLAINT:  Right leg injury





PCP: None





HISTORY OF PRESENT ILLNESS:


63 y/o M with PMH T4 spinal cord transection with  paraplegia, neurogenic 

bladder, recurrent UTIs (admitted at [facility]), COPD(though pt denies such history)

, chronic pain, splenectomy, adenocarcinoma of R lung. Per patient, he was 

being transferred in the bed by his aide who was using his legs to mobilize him 

when he subsequently felt a pop and heard a crack in his legs. Pt denies having 

any pain in his legs acutely as he is unable to feel however the patient does 

have chronic pain for which he takes morphine and valium daily. Pt denies any 

fever, chills, N/V, or change in bowel movement. Pt does have a condom catheter 

for urine collection and didnt notice increase output, foul smelling or 

increased cloudiness from catheter.








ER course was notable for:


(1) CBC significant for leukocystosis at 14.7, BMP, PT/INR, UA but pt refusing 

straight cath


(2)Xray of the hip/pelvis, femur,Knee, tib/fib, foot/ankle which was 

significant for tibia/fib acute fractures


(3) ortho Dr Man s/p R leg splinting, morphine





Recent Travel:none





PAST MEDICAL HISTORY: as noted above





PAST SURGICAL HISTORY: pt endorsed multiple surgeries without specifications





Social History:


SmokinPPD over 10-20 years but quit a long time ago


Alcohol:denies


Drugs:denies





Allergies





No Known Allergies Allergy (Verified 10/07/19 12:11)


 








HOME MEDICATIONS:


 Home Medications











 Medication  Instructions  Recorded


 


Diazepam [Valium] 10 mg PO TID PRN  tablet MDD 30mg 10/10/18


 


Metoprolol Tartrate [Lopressor -] 25 mg PO DAILY 10/07/19


 


Morphine 10 mg/5 ml Liquid 20 mg PO Q4H MDD 60mg 10/07/19





[Morphine 10 mg/5 mL Liquid -]  








REVIEW OF SYSTEMS


CONSTITUTIONAL: 


Absent:  fever, chills, diaphoresis, generalized weakness, malaise, loss of 

appetite, weight change


HEENT: 


Absent:  rhinorrhea, nasal congestion, throat pain, throat swelling, difficulty 

swallowing, mouth swelling, ear pain, eye pain, visual changes


CARDIOVASCULAR: 


Absent: chest pain, syncope, palpitations, irregular heart rate, lightheadedness

, peripheral edema


RESPIRATORY: 


Absent: cough, shortness of breath, dyspnea with exertion, orthopnea, wheezing, 

stridor, hemoptysis


GASTROINTESTINAL:


Absent: abdominal pain, abdominal distension, nausea, vomiting, diarrhea, 

constipation, melena, hematochezia


GENITOURINARY: 


Absent: dysuria, frequency, urgency, hesitancy, hematuria, flank pain, genital 

pain


MUSCULOSKELETAL: 


Absent: myalgia, arthralgia, joint swelling, back pain, neck pain


SKIN: rash, with possible fungal rash under toes


Absent:  itching, pallor


HEMATOLOGIC/IMMUNOLOGIC: 


Absent: easy bleeding, easy bruising, lymphadenopathy, frequent infections


ENDOCRINE:


Absent: unexplained weight gain, unexplained weight loss, heat intolerance, 

cold intolerance


NEUROLOGIC: 


Absent: headache, focal weakness or paresthesias, dizziness, unsteady gait, 

seizure, mental status changes, bladder or bowel incontinence


PSYCHIATRIC: 


Absent: anxiety, depression, suicidal or homicidal ideation, hallucinations.








PHYSICAL EXAMINATION


 Vital Signs - 24 hr











  10/07/19





  12:07


 


Temperature 98.1 F


 


Pulse Rate 97 H


 


Respiratory 18





Rate 


 


Blood Pressure 100/53 L


 


O2 Sat by Pulse 98





Oximetry (%) 











GENERAL: Awake, alert, and fully oriented, in no acute distress.


HEAD: Normal with no signs of trauma.


EYES: Pupils equal, round and reactive to light, extraocular movements intact, 

sclera anicteric, conjunctiva clear. No lid lag.


EARS, NOSE, THROAT: oropharynx clear without exudates. Moist mucous membranes.


LUNGS: Breath sounds equal, clear to auscultation bilaterally. No wheezes, and 

no crackles. No accessory muscle use.


HEART: Regular rate and rhythm, normal S1 and S2 without murmur, rub or gallop.


ABDOMEN: Soft, nontender, mildly distended, normoactive bowel sounds, no 

guarding, no rebound, no masses.  No hepatomegaly or  splenomegaly. 


MUSCULOSKELETAL: Normal range of motion at all joints. No bony deformities or 

tenderness. No CVA tenderness.


UPPER EXTREMITIES: 2+ pulses, warm, well-perfused. No cyanosis. No clubbing. No 

peripheral edema.


LOWER EXTREMITIES: 2+ pulses, warm, well-perfused. BLE significantly atrophied 

with splint around right knee and leg, distal pulses present


NEUROLOGICAL:  Cranial nerves II-XII intact. Normal speech. Normal gait.


PSYCHIATRIC: Cooperative. Good eye contact. Appropriate mood and affect.


SKIN: Warm, dry and scaly, with small lesion in left foot with fungal rash 

under toe





 Laboratory Results - last 24 hr











  10/07/19 10/07/19 10/07/19





  14:33 14:33 14:33


 


WBC  14.7 H  


 


RBC  4.35  


 


Hgb  13.7  


 


Hct  41.5  


 


MCV  95.4  


 


MCH  31.5  


 


MCHC  33.0  


 


RDW  14.7  


 


Plt Count  503 H  


 


MPV  8.7  


 


Absolute Neuts (auto)  12.1 H  


 


Neutrophils %  82.3  


 


Lymphocytes %  9.1  D  


 


Monocytes %  7.7  


 


Eosinophils %  0.7  D  


 


Basophils %  0.2  


 


Nucleated RBC %  0  


 


PT with INR   


 


INR   


 


PTT (Actin FS)   


 


Sodium   134 L 


 


Potassium   4.3 


 


Chloride   99 


 


Carbon Dioxide   22 


 


Anion Gap   13 


 


BUN   2.6 L* 


 


Creatinine   0.5 L 


 


Est GFR (CKD-EPI)AfAm   130.88 


 


Est GFR (CKD-EPI)NonAf   112.92 


 


Random Glucose   82 


 


Calcium   8.4 L 


 


Total Bilirubin   0.8 


 


AST   25 


 


ALT   19 


 


Alkaline Phosphatase   138 H 


 


Total Protein   7.0 


 


Albumin   2.6 L 


 


Blood Type    Cancelled


 


Antibody Screen    Cancelled














  10/07/19





  14:33


 


WBC 


 


RBC 


 


Hgb 


 


Hct 


 


MCV 


 


MCH 


 


MCHC 


 


RDW 


 


Plt Count 


 


MPV 


 


Absolute Neuts (auto) 


 


Neutrophils % 


 


Lymphocytes % 


 


Monocytes % 


 


Eosinophils % 


 


Basophils % 


 


Nucleated RBC % 


 


PT with INR  11.50


 


INR  0.97


 


PTT (Actin FS)  34.5


 


Sodium 


 


Potassium 


 


Chloride 


 


Carbon Dioxide 


 


Anion Gap 


 


BUN 


 


Creatinine 


 


Est GFR (CKD-EPI)AfAm 


 


Est GFR (CKD-EPI)NonAf 


 


Random Glucose 


 


Calcium 


 


Total Bilirubin 


 


AST 


 


ALT 


 


Alkaline Phosphatase 


 


Total Protein 


 


Albumin 


 


Blood Type 


 


Antibody Screen 











ASSESSMENT/PLAN:


63 y/o M with PMH T4 spinal cord transection with  paraplegia, neurogenic 

bladder, recurrent UTIs (admitted at [facility]), COPD(though pt denies such history)

, chronic pain, splenectomy, adenocarcinoma of R lung admitted for leg fracture.





L TIb/Fib fx


s/p cast in ED by Dr Man


 f/u with ortho as out pt 





 Leukocytosis


 pt has hx of recurrent UTI poss due to neurogenic bladder 


 pt denies any fever, chills


 UA pending 


 No abx for now as pt does not appear toxic


 monitor for signs of systemic infection





 chronic chest and upper back pain


 EKG with sinus rhythm


will cont home pain meds 





 cont home metoprolol . not sure what indication ( HTN or tachycardia ) 





DVT PPX 


heparin TID 





Visit type





- Emergency Visit


Emergency Visit: Yes


ED Registration Date: 10/07/19


Care time: The patient presented to the Emergency Department on the above date 

and was hospitalized for further evaluation of their emergent condition.





- New Patient


This patient is new to me today: Yes


Date on this admission: 10/07/19





- Critical Care


Critical Care patient: No





ATTENDING PHYSICIAN STATEMENT





I saw and evaluated the patient.


I reviewed the resident's note and discussed the case with the resident.


I agree with the resident's findings and plan as documented.








SUBJECTIVE:








OBJECTIVE:








ASSESSMENT AND PLAN:

## 2019-10-07 NOTE — EKG
Test Reason : 

Blood Pressure : ***/*** mmHG

Vent. Rate : 076 BPM     Atrial Rate : 076 BPM

   P-R Int : 106 ms          QRS Dur : 072 ms

    QT Int : 392 ms       P-R-T Axes : 054 071 079 degrees

   QTc Int : 441 ms

 

SINUS RHYTHM WITH SHORT PA

OTHERWISE NORMAL ECG

WHEN COMPARED WITH ECG OF 06-JUN-2019 15:17,

PA INTERVAL HAS DECREASED

Confirmed by JAMES THAKUR MD ([id number]) on 10/7/2019 4:10:09 PM

 

Referred By:             Confirmed By:JAMES THAKUR MD
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
## 2019-10-08 LAB
ALBUMIN SERPL-MCNC: 2.3 G/DL (ref 3.4–5)
ALP SERPL-CCNC: 120 U/L (ref 45–117)
ALT SERPL-CCNC: 16 U/L (ref 13–61)
ANION GAP SERPL CALC-SCNC: 7 MMOL/L (ref 8–16)
AST SERPL-CCNC: 13 U/L (ref 15–37)
BASOPHILS # BLD: 0 % (ref 0–2)
BILIRUB SERPL-MCNC: 1 MG/DL (ref 0.2–1)
BUN SERPL-MCNC: 4.6 MG/DL (ref 7–18)
CALCIUM SERPL-MCNC: 8 MG/DL (ref 8.5–10.1)
CHLORIDE SERPL-SCNC: 105 MMOL/L (ref 98–107)
CO2 SERPL-SCNC: 24 MMOL/L (ref 21–32)
CREAT SERPL-MCNC: 0.5 MG/DL (ref 0.55–1.3)
DEPRECATED RDW RBC AUTO: 14.9 % (ref 11.9–15.9)
EOSINOPHIL # BLD: 0.5 % (ref 0–4.5)
GLUCOSE SERPL-MCNC: 87 MG/DL (ref 74–106)
HCT VFR BLD CALC: 37.8 % (ref 35.4–49)
HGB BLD-MCNC: 12.7 GM/DL (ref 11.7–16.9)
LYMPHOCYTES # BLD: 4.2 % (ref 8–40)
MAGNESIUM SERPL-MCNC: 2 MG/DL (ref 1.8–2.4)
MCH RBC QN AUTO: 32.2 PG (ref 25.7–33.7)
MCHC RBC AUTO-ENTMCNC: 33.7 G/DL (ref 32–35.9)
MCV RBC: 95.7 FL (ref 80–96)
MONOCYTES # BLD AUTO: 7.9 % (ref 3.8–10.2)
NEUTROPHILS # BLD: 87.4 % (ref 42.8–82.8)
PHOSPHATE SERPL-MCNC: 1.8 MG/DL (ref 2.5–4.9)
PLATELET # BLD AUTO: 478 K/MM3 (ref 134–434)
PMV BLD: 9.1 FL (ref 7.5–11.1)
POTASSIUM SERPLBLD-SCNC: 4.1 MMOL/L (ref 3.5–5.1)
PROT SERPL-MCNC: 5.9 G/DL (ref 6.4–8.2)
RBC # BLD AUTO: 3.95 M/MM3 (ref 4–5.6)
SODIUM SERPL-SCNC: 136 MMOL/L (ref 136–145)
WBC # BLD AUTO: 15.1 K/MM3 (ref 4–10)

## 2019-10-08 RX ADMIN — HEPARIN SODIUM SCH: 5000 INJECTION, SOLUTION INTRAVENOUS; SUBCUTANEOUS at 14:04

## 2019-10-08 RX ADMIN — HEPARIN SODIUM SCH: 5000 INJECTION, SOLUTION INTRAVENOUS; SUBCUTANEOUS at 06:52

## 2019-10-08 RX ADMIN — HEPARIN SODIUM SCH UNIT: 5000 INJECTION, SOLUTION INTRAVENOUS; SUBCUTANEOUS at 22:31

## 2019-10-08 RX ADMIN — Medication SCH EACH: at 22:28

## 2019-10-08 RX ADMIN — NYSTATIN SCH: 100000 OINTMENT TOPICAL at 06:52

## 2019-10-08 RX ADMIN — BACITRACIN ZINC SCH APPLIC: 500 OINTMENT TOPICAL at 22:28

## 2019-10-08 RX ADMIN — HEPARIN SODIUM SCH: 5000 INJECTION, SOLUTION INTRAVENOUS; SUBCUTANEOUS at 22:48

## 2019-10-08 RX ADMIN — MORPHINE SULFATE PRN MG: 10 SOLUTION ORAL at 14:08

## 2019-10-08 RX ADMIN — MORPHINE SULFATE PRN MG: 10 SOLUTION ORAL at 22:28

## 2019-10-08 RX ADMIN — MORPHINE SULFATE PRN MG: 10 SOLUTION ORAL at 06:59
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
## 2019-10-08 NOTE — PN
Physical Exam: 


SUBJECTIVE: Patient seen and examined. pt complaining of abdominal pain 5/10 

and leg pain








OBJECTIVE:





 Vital Signs











 Period  Temp  Pulse  Resp  BP Sys/Malone  Pulse Ox


 


 Last 24 Hr  97.9 F-99.3 F  84-89  16-20  111-136/54-60  96-96











GENERAL: Awake, alert, and fully oriented, in no acute distress.


HEAD: Normal with no signs of trauma.


EYES: Pupils equal, round and reactive to light, extraocular movements intact, 

sclera anicteric, conjunctiva clear. No lid lag.


EARS, NOSE, THROAT: oropharynx clear without exudates. Moist mucous membranes.


LUNGS: Breath sounds equal, clear to auscultation bilaterally. No wheezes, and 

no crackles. No accessory muscle use.


HEART: Regular rate and rhythm, normal S1 and S2 without murmur, rub or gallop.


ABDOMEN: Soft, nontender, mildly distended, normoactive bowel sounds, no 

guarding, no rebound, no masses.  No hepatomegaly or  splenomegaly. 


MUSCULOSKELETAL: Normal range of motion at all joints. No bony deformities or 

tenderness. No CVA tenderness.


UPPER EXTREMITIES: 2+ pulses, warm, well-perfused. No cyanosis. No clubbing. No 

peripheral edema.


LOWER EXTREMITIES: 2+ pulses, warm, well-perfused. BLE significantly atrophied 

with splint around left knee and leg, distal pulses present


NEUROLOGICAL:  Cranial nerves II-XII intact. Normal speech. Normal gait.


PSYCHIATRIC: Cooperative. Good eye contact. Appropriate mood and affect.


SKIN: Warm, dry and scaly, with small lesion in left foot with fungal rash 

under toe








 Laboratory Results - last 24 hr











  10/07/19 10/07/19 10/07/19





  14:33 14:33 14:33


 


WBC  14.7 H  


 


RBC  4.35  


 


Hgb  13.7  


 


Hct  41.5  


 


MCV  95.4  


 


MCH  31.5  


 


MCHC  33.0  


 


RDW  14.7  


 


Plt Count  503 H  


 


MPV  8.7  


 


Absolute Neuts (auto)  12.1 H  


 


Neutrophils %  82.3  


 


Lymphocytes %  9.1  D  


 


Monocytes %  7.7  


 


Eosinophils %  0.7  D  


 


Basophils %  0.2  


 


Nucleated RBC %  0  


 


PT with INR   


 


INR   


 


PTT (Actin FS)   


 


Sodium   134 L 


 


Potassium   4.3 


 


Chloride   99 


 


Carbon Dioxide   22 


 


Anion Gap   13 


 


BUN   2.6 L* 


 


Creatinine   0.5 L 


 


Est GFR (CKD-EPI)AfAm   130.88 


 


Est GFR (CKD-EPI)NonAf   112.92 


 


Random Glucose   82 


 


Calcium   8.4 L 


 


Phosphorus   


 


Magnesium   


 


Total Bilirubin   0.8 


 


AST   25 


 


ALT   19 


 


Alkaline Phosphatase   138 H 


 


Total Protein   7.0 


 


Albumin   2.6 L 


 


Urine Color   


 


Urine Appearance   


 


Urine pH   


 


Ur Specific Gravity   


 


Urine Protein   


 


Urine Glucose (UA)   


 


Urine Ketones   


 


Urine Blood   


 


Urine Nitrite   


 


Urine Bilirubin   


 


Urine Urobilinogen   


 


Ur Leukocyte Esterase   


 


Urine WBC (Auto)   


 


Urine RBC (Auto)   


 


Urine Casts (Auto)   


 


U Epithel Cells (Auto)   


 


Urine Bacteria (Auto)   


 


Blood Type    Cancelled


 


Antibody Screen    Cancelled














  10/07/19 10/07/19 10/08/19





  14:33 20:33 07:41


 


WBC    15.1 H


 


RBC    3.95 L


 


Hgb    12.7


 


Hct    37.8


 


MCV    95.7


 


MCH    32.2


 


MCHC    33.7


 


RDW    14.9


 


Plt Count    478 H


 


MPV    9.1


 


Absolute Neuts (auto)    13.2 H


 


Neutrophils %    87.4 H


 


Lymphocytes %    4.2 L D


 


Monocytes %    7.9


 


Eosinophils %    0.5


 


Basophils %    0.0


 


Nucleated RBC %    0


 


PT with INR  11.50  


 


INR  0.97  


 


PTT (Actin FS)  34.5  


 


Sodium   


 


Potassium   


 


Chloride   


 


Carbon Dioxide   


 


Anion Gap   


 


BUN   


 


Creatinine   


 


Est GFR (CKD-EPI)AfAm   


 


Est GFR (CKD-EPI)NonAf   


 


Random Glucose   


 


Calcium   


 


Phosphorus   


 


Magnesium   


 


Total Bilirubin   


 


AST   


 


ALT   


 


Alkaline Phosphatase   


 


Total Protein   


 


Albumin   


 


Urine Color   Yellow 


 


Urine Appearance   Clear 


 


Urine pH   6.0  D 


 


Ur Specific Gravity   1.004 L 


 


Urine Protein   Negative 


 


Urine Glucose (UA)   Negative 


 


Urine Ketones   Negative 


 


Urine Blood   Negative 


 


Urine Nitrite   Negative 


 


Urine Bilirubin   Negative 


 


Urine Urobilinogen   0.2 


 


Ur Leukocyte Esterase   Trace 


 


Urine WBC (Auto)   3 


 


Urine RBC (Auto)   1 


 


Urine Casts (Auto)   2 


 


U Epithel Cells (Auto)   2.3 


 


Urine Bacteria (Auto)   2.6 


 


Blood Type   


 


Antibody Screen   














  10/08/19





  07:41


 


WBC 


 


RBC 


 


Hgb 


 


Hct 


 


MCV 


 


MCH 


 


MCHC 


 


RDW 


 


Plt Count 


 


MPV 


 


Absolute Neuts (auto) 


 


Neutrophils % 


 


Lymphocytes % 


 


Monocytes % 


 


Eosinophils % 


 


Basophils % 


 


Nucleated RBC % 


 


PT with INR 


 


INR 


 


PTT (Actin FS) 


 


Sodium  136


 


Potassium  4.1


 


Chloride  105


 


Carbon Dioxide  24


 


Anion Gap  7 L


 


BUN  4.6 L


 


Creatinine  0.5 L


 


Est GFR (CKD-EPI)AfAm  130.88


 


Est GFR (CKD-EPI)NonAf  112.92


 


Random Glucose  87


 


Calcium  8.0 L


 


Phosphorus  1.8 L


 


Magnesium  2.0


 


Total Bilirubin  1.0


 


AST  13 L


 


ALT  16


 


Alkaline Phosphatase  120 H


 


Total Protein  5.9 L


 


Albumin  2.3 L


 


Urine Color 


 


Urine Appearance 


 


Urine pH 


 


Ur Specific Gravity 


 


Urine Protein 


 


Urine Glucose (UA) 


 


Urine Ketones 


 


Urine Blood 


 


Urine Nitrite 


 


Urine Bilirubin 


 


Urine Urobilinogen 


 


Ur Leukocyte Esterase 


 


Urine WBC (Auto) 


 


Urine RBC (Auto) 


 


Urine Casts (Auto) 


 


U Epithel Cells (Auto) 


 


Urine Bacteria (Auto) 


 


Blood Type 


 


Antibody Screen 








Active Medications











Generic Name Dose Route Start Last Admin





  Trade Name Freq  PRN Reason Stop Dose Admin


 


Bacitracin  1 applic  10/07/19 22:00  10/07/19 23:27





  Bacitracin -  TP   Not Given





  HS [facility]   





     





     





     





     


 


Heparin Sodium (Porcine)  5,000 unit  10/07/19 22:00  10/08/19 14:04





  Heparin -  SQ   Not Given





  TID [facility]   





     





     





     





     


 


Metoprolol Succinate  25 mg  10/08/19 10:00  10/08/19 10:01





  Toprol Xl -  PO   Not Given





  DAILY [facility]   





     





     





     





     


 


Morphine Sulfate  10 mg  10/07/19 18:49  10/08/19 14:08





  Morphine 10 Mg/5 Ml Liquid  PO   10 mg





  Q8H PRN   Administration





  PAIN LEVEL 7 - 10   





     





     





     


 


Nystatin  1 applic  10/08/19 07:00  10/08/19 06:52





  Mycostatin Ointment -  TP   Not Given





  AM [facility]   





     





     





     





     











ASSESSMENT/PLAN:


65 y/o M with PMH T4 spinal cord transection with  paraplegia, neurogenic 

bladder, recurrent UTIs (admitted at [facility]), COPD(though pt denies such history)

, chronic pain, splenectomy, adenocarcinoma of R lung admitted for leg fracture.





L TIb/Fib fx


s/p cast in ED by Dr Man


 f/u with ortho as out pt 


 Pt has 24h home services via [facility]





 Leukocytosis no clear source


 WBC 15.1 today


 pt endorsed abdominal pain. no BM since one day


 bowel regimen started.


 pt has hx of recurrent UTI poss due to neurogenic bladder 


 pt denies any fever, chills


 Urine culture pending


 No abx for now as pt does not appear toxic


 monitor for signs of systemic infection





 chronic chest and upper back pain


 EKG with sinus rhythm


 will cont home pain meds 





 fungal foot rash


 nystatin cream





DVT PPX 


heparin TID 





Visit type





- Emergency Visit


Emergency Visit: Yes


ED Registration Date: 10/07/19


Care time: The patient presented to the Emergency Department on the above date 

and was hospitalized for further evaluation of their emergent condition.





- New Patient


This patient is new to me today: No





- Critical Care


Critical Care patient: No





- Discharge Referral


Referred to [facility] No





ATTENDING PHYSICIAN STATEMENT





I saw and evaluated the patient.


I reviewed the resident's note and discussed the case with the resident.


I agree with the resident's findings and plan as documented.








SUBJECTIVE:








OBJECTIVE:








ASSESSMENT AND PLAN:

## 2019-10-09 LAB
ANION GAP SERPL CALC-SCNC: 6 MMOL/L (ref 8–16)
ANISOCYTOSIS BLD QL: (no result)
BASOPHILS # BLD: 0.6 % (ref 0–2)
BUN SERPL-MCNC: 2.9 MG/DL (ref 7–18)
CALCIUM SERPL-MCNC: 7.9 MG/DL (ref 8.5–10.1)
CHLORIDE SERPL-SCNC: 104 MMOL/L (ref 98–107)
CO2 SERPL-SCNC: 28 MMOL/L (ref 21–32)
CREAT SERPL-MCNC: 0.6 MG/DL (ref 0.55–1.3)
DEPRECATED RDW RBC AUTO: 14.8 % (ref 11.9–15.9)
EOSINOPHIL # BLD: 0.3 % (ref 0–4.5)
GLUCOSE SERPL-MCNC: 87 MG/DL (ref 74–106)
HCT VFR BLD CALC: 36.2 % (ref 35.4–49)
HGB BLD-MCNC: 12 GM/DL (ref 11.7–16.9)
LYMPHOCYTES # BLD: 3.8 % (ref 8–40)
MACROCYTES BLD QL: (no result)
MCH RBC QN AUTO: 32.4 PG (ref 25.7–33.7)
MCHC RBC AUTO-ENTMCNC: 33.3 G/DL (ref 32–35.9)
MCV RBC: 97.3 FL (ref 80–96)
MONOCYTES # BLD AUTO: 6.7 % (ref 3.8–10.2)
NEUTROPHILS # BLD: 88.6 % (ref 42.8–82.8)
OVALOCYTES BLD QL SMEAR: (no result)
PLATELET # BLD AUTO: 496 K/MM3 (ref 134–434)
PLATELET BLD QL SMEAR: (no result)
PMV BLD: 9.2 FL (ref 7.5–11.1)
POTASSIUM SERPLBLD-SCNC: 4.1 MMOL/L (ref 3.5–5.1)
RBC # BLD AUTO: 3.72 M/MM3 (ref 4–5.6)
SODIUM SERPL-SCNC: 138 MMOL/L (ref 136–145)
TARGETS BLD QL SMEAR: (no result)
WBC # BLD AUTO: 24.6 K/MM3 (ref 4–10)

## 2019-10-09 RX ADMIN — Medication SCH EACH: at 21:08

## 2019-10-09 RX ADMIN — POLYETHYLENE GLYCOL 3350 SCH: 17 POWDER, FOR SOLUTION ORAL at 21:08

## 2019-10-09 RX ADMIN — MORPHINE SULFATE PRN MG: 10 SOLUTION ORAL at 13:59

## 2019-10-09 RX ADMIN — BACITRACIN ZINC SCH APPLIC: 500 OINTMENT TOPICAL at 21:08

## 2019-10-09 RX ADMIN — NYSTATIN SCH: 100000 OINTMENT TOPICAL at 06:30

## 2019-10-09 RX ADMIN — HEPARIN SODIUM SCH: 5000 INJECTION, SOLUTION INTRAVENOUS; SUBCUTANEOUS at 13:24

## 2019-10-09 RX ADMIN — POLYETHYLENE GLYCOL 3350 SCH: 17 POWDER, FOR SOLUTION ORAL at 13:20

## 2019-10-09 RX ADMIN — MORPHINE SULFATE PRN MG: 10 SOLUTION ORAL at 05:48

## 2019-10-09 RX ADMIN — HEPARIN SODIUM SCH: 5000 INJECTION, SOLUTION INTRAVENOUS; SUBCUTANEOUS at 21:08

## 2019-10-09 RX ADMIN — HEPARIN SODIUM SCH: 5000 INJECTION, SOLUTION INTRAVENOUS; SUBCUTANEOUS at 06:24

## 2019-10-09 RX ADMIN — DOCUSATE SODIUM SCH MG: 100 CAPSULE, LIQUID FILLED ORAL at 10:40

## 2019-10-09 NOTE — PN
Physical Exam: 


SUBJECTIVE: Patient seen and examined. pt endorsed not feeling too well. when 

pressed for more info pt was dismissive








OBJECTIVE:





 Vital Signs











 Period  Temp  Pulse  Resp  BP Sys/Malone  Pulse Ox


 


 Last 24 Hr  98.2 F-99.8 F    17-20  /47-56  











GENERAL: Awake, alert, and fully oriented, in no acute distress.


HEAD: Normal with no signs of trauma.


EYES: Pupils equal, round and reactive to light, extraocular movements intact, 

sclera anicteric, conjunctiva clear. No lid lag.


EARS, NOSE, THROAT: oropharynx clear without exudates. Moist mucous membranes.


LUNGS: Breath sounds equal, clear to auscultation bilaterally. No wheezes, and 

no crackles. No accessory muscle use.


HEART: Regular rate and rhythm, normal S1 and S2 without murmur, rub or gallop.


ABDOMEN: Soft, nontender, mildly distended, normoactive bowel sounds, no 

guarding, no rebound, no masses.  No hepatomegaly or  splenomegaly. 


UPPER EXTREMITIES: 2+ pulses, warm, well-perfused. No cyanosis. No clubbing. No 

peripheral edema.


LOWER EXTREMITIES: 2+ pulses, warm, well-perfused. BLE significantly atrophied 

with splint around left knee and leg, distal pulses present


NEUROLOGICAL:  Cranial nerves II-XII intact. Normal speech. Normal gait.


SKIN: Warm, dry and scaly, with small lesion in left foot with fungal rash 

under toe











 Laboratory Results - last 24 hr











  10/09/19 10/09/19





  08:18 08:18


 


WBC  24.6 H 


 


RBC  3.72 L 


 


Hgb  12.0 


 


Hct  36.2 


 


MCV  97.3 H 


 


MCH  32.4 


 


MCHC  33.3 


 


RDW  14.8 


 


Plt Count  496 H 


 


MPV  9.2 


 


Absolute Neuts (auto)  21.8 H 


 


Neutrophils %  88.6 H 


 


Neutrophils % (Manual)  89.8 H 


 


Band Neutrophils %  3.1 


 


Lymphocytes %  3.8 L 


 


Lymphocytes % (Manual)  0.0 L 


 


Monocytes %  6.7 


 


Monocytes % (Manual)  6 


 


Eosinophils %  0.3 


 


Eosinophils % (Manual)  0.0  D 


 


Basophils %  0.6  D 


 


Basophils % (Manual)  0.0 


 


Myelocytes % (Man)  0  D 


 


Promyelocytes % (Man)  0 


 


Blast Cells % (Manual)  0 


 


Nucleated RBC %  0 


 


Metamyelocytes  0  D 


 


Hypochromia  0 


 


Platelet Estimate  Increased 


 


Polychromasia  0 


 


Poikilocytosis  0 


 


Anisocytosis  1+ 


 


Microcytosis  0 


 


Macrocytosis  1+ 


 


Target Cells  1+ 


 


Ovalocytes  1+ 


 


Sodium   138


 


Potassium   4.1


 


Chloride   104


 


Carbon Dioxide   28


 


Anion Gap   6 L


 


BUN   2.9 L*


 


Creatinine   0.6


 


Est GFR (CKD-EPI)AfAm   121.43


 


Est GFR (CKD-EPI)NonAf   104.77


 


Random Glucose   87


 


Calcium   7.9 L








Active Medications











Generic Name Dose Route Start Last Admin





  Trade Name Freq  PRN Reason Stop Dose Admin


 


Bacitracin  1 applic  10/07/19 22:00  10/08/19 22:28





  Bacitracin -  TP   1 applic





  HS ALE   Administration





     





     





     





     


 


Docusate Sodium  200 mg  10/09/19 10:00  10/09/19 10:40





  Colace -  PO   200 mg





  DAILY ALE   Administration





     





     





     





     


 


Heparin Sodium (Porcine)  5,000 unit  10/07/19 22:00  10/09/19 13:24





  Heparin -  SQ   Not Given





  TID ALE   





     





     





     





     


 


Levofloxacin  500 mg in 100 mls @ 100 mls/hr  10/09/19 12:35  10/09/19 13:20





  Levaquin 500 Mg Premixed Ivpb -  IVPB   100 mls/hr





  DAILY ALE   Administration





     





     





  Protocol   





     


 


Metoprolol Succinate  25 mg  10/08/19 10:00  10/09/19 10:41





  Toprol Xl -  PO   Not Given





  DAILY [facility]   





     





     





     





     


 


Miscellaneous  1 each  10/08/19 22:00  10/08/19 22:28





  Lidoderm Patch Removal  MC   1 each





  DAILY@2200 ALE   Administration





     





     





     





     


 


Morphine Sulfate  10 mg  10/07/19 18:49  10/09/19 13:59





  Morphine 10 Mg/5 Ml Liquid  PO   10 mg





  Q8H PRN   Administration





  PAIN LEVEL 7 - 10   





     





     





     


 


Nystatin  1 applic  10/08/19 07:00  10/09/19 06:30





  Mycostatin Ointment -  TP   Not Given





  AM ALE   





     





     





     





     


 


Polyethylene Glycol  17 gm  10/09/19 12:15  10/09/19 13:20





  Miralax (For Daily Use) -  PO   Not Given





  BID [facility]   





     





     





     





     











ASSESSMENT/PLAN:


65 y/o M with PMH T4 spinal cord transection with  paraplegia, neurogenic 

bladder, recurrent UTIs (admitted at [facility]), COPD(though pt denies such history)

, chronic pain, splenectomy, adenocarcinoma of R lung admitted for leg fracture.





L TIb/Fib fx


s/p cast in ED by Dr Man


 f/u with ortho as out pt in 2 weeks


 Pt has 24h home services via [facility]





 Leukocytosis no clear source


 WBC 24.6 today


 pt has hx of recurrent UTI poss due to neurogenic bladder 


 pt denies any fever, chills


Urine culture growing Non lactose fermenting GnB pending speciation adn 

sensitivity ( pt has hx of pseudomonas and serratia sensitive to levaquin)


 levaquin 500mg IV started


 1BM since this morning


 bowel regimen started.





 chronic chest and upper back pain


 EKG with sinus rhythm


 will cont home pain meds 





 fungal foot rash


 nystatin cream





DVT PPX 


heparin TID 





Visit type





- Emergency Visit


Emergency Visit: Yes


ED Registration Date: 10/07/19


Care time: The patient presented to the Emergency Department on the above date 

and was hospitalized for further evaluation of their emergent condition.





- New Patient


This patient is new to me today: No





- Critical Care


Critical Care patient: No





- Discharge Referral


Referred to [facility] No





ATTENDING PHYSICIAN STATEMENT





I saw and evaluated the patient.


I reviewed the resident's note and discussed the case with the resident.


I agree with the resident's findings and plan as documented.








SUBJECTIVE:








OBJECTIVE:








ASSESSMENT AND PLAN:
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
## 2019-10-09 NOTE — PN
Progress Note (short form)





- Note


Progress Note: 





Ortho





Pt seen and examined s/p left proximal tib/fib fx, splint intact


 Selected Entries











  10/09/19





  05:57


 


Temperature 99.8 F H


 


Pulse Rate 96 H


 


Respiratory 18





Rate 


 


Blood Pressure 113/47 L








well padded splint intact





a/p


maintain splint


ok to d/c from ortho pov


f/u in 2 weeks


d/w Dr. Man

## 2019-10-10 LAB
ANION GAP SERPL CALC-SCNC: 6 MMOL/L (ref 8–16)
BASOPHILS # BLD: 0.4 % (ref 0–2)
BUN SERPL-MCNC: 6.5 MG/DL (ref 7–18)
CALCIUM SERPL-MCNC: 8.2 MG/DL (ref 8.5–10.1)
CHLORIDE SERPL-SCNC: 101 MMOL/L (ref 98–107)
CO2 SERPL-SCNC: 30 MMOL/L (ref 21–32)
CREAT SERPL-MCNC: 0.6 MG/DL (ref 0.55–1.3)
DEPRECATED RDW RBC AUTO: 14.8 % (ref 11.9–15.9)
EOSINOPHIL # BLD: 0.8 % (ref 0–4.5)
GLUCOSE SERPL-MCNC: 103 MG/DL (ref 74–106)
HCT VFR BLD CALC: 36.2 % (ref 35.4–49)
HGB BLD-MCNC: 12.1 GM/DL (ref 11.7–16.9)
LYMPHOCYTES # BLD: 5.6 % (ref 8–40)
MCH RBC QN AUTO: 32.3 PG (ref 25.7–33.7)
MCHC RBC AUTO-ENTMCNC: 33.5 G/DL (ref 32–35.9)
MCV RBC: 96.3 FL (ref 80–96)
MONOCYTES # BLD AUTO: 4.5 % (ref 3.8–10.2)
NEUTROPHILS # BLD: 88.7 % (ref 42.8–82.8)
PLATELET # BLD AUTO: 521 K/MM3 (ref 134–434)
PMV BLD: 9.2 FL (ref 7.5–11.1)
POTASSIUM SERPLBLD-SCNC: 4.2 MMOL/L (ref 3.5–5.1)
RBC # BLD AUTO: 3.76 M/MM3 (ref 4–5.6)
SODIUM SERPL-SCNC: 137 MMOL/L (ref 136–145)
WBC # BLD AUTO: 16.1 K/MM3 (ref 4–10)

## 2019-10-10 RX ADMIN — NYSTATIN SCH: 100000 OINTMENT TOPICAL at 06:24

## 2019-10-10 RX ADMIN — HEPARIN SODIUM SCH: 5000 INJECTION, SOLUTION INTRAVENOUS; SUBCUTANEOUS at 22:15

## 2019-10-10 RX ADMIN — Medication SCH EACH: at 22:15

## 2019-10-10 RX ADMIN — POLYETHYLENE GLYCOL 3350 SCH: 17 POWDER, FOR SOLUTION ORAL at 22:15

## 2019-10-10 RX ADMIN — HEPARIN SODIUM SCH: 5000 INJECTION, SOLUTION INTRAVENOUS; SUBCUTANEOUS at 06:15

## 2019-10-10 RX ADMIN — Medication SCH: at 18:01

## 2019-10-10 RX ADMIN — MORPHINE SULFATE PRN MG: 10 SOLUTION ORAL at 06:35

## 2019-10-10 RX ADMIN — MORPHINE SULFATE PRN MG: 10 SOLUTION ORAL at 22:14

## 2019-10-10 RX ADMIN — BACITRACIN ZINC SCH: 500 OINTMENT TOPICAL at 22:15

## 2019-10-10 RX ADMIN — DOCUSATE SODIUM SCH: 100 CAPSULE, LIQUID FILLED ORAL at 10:53

## 2019-10-10 RX ADMIN — POLYETHYLENE GLYCOL 3350 SCH: 17 POWDER, FOR SOLUTION ORAL at 10:54

## 2019-10-10 RX ADMIN — MORPHINE SULFATE PRN MG: 10 SOLUTION ORAL at 14:13

## 2019-10-10 RX ADMIN — HEPARIN SODIUM SCH: 5000 INJECTION, SOLUTION INTRAVENOUS; SUBCUTANEOUS at 14:16
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
## 2019-10-10 NOTE — PN
Physical Exam: 


SUBJECTIVE: Patient seen and examined. pt offered no complaints. insisting on 

being ready to go home








OBJECTIVE:





 Vital Signs











 Period  Temp  Pulse  Resp  BP Sys/Malone  Pulse Ox


 


 Last 24 Hr  97.6 F-98.5 F  64-91  17-18  114-150/57-78  











GENERAL: Awake, alert, and fully oriented, in no acute distress.


HEAD: Normal with no signs of trauma.


EYES: Pupils equal, round and reactive to light, extraocular movements intact, 

sclera anicteric, conjunctiva clear. No lid lag.


EARS, NOSE, THROAT: oropharynx clear without exudates. Moist mucous membranes.


LUNGS: Breath sounds equal, clear to auscultation bilaterally. No wheezes, and 

no crackles. No accessory muscle use.


HEART: Regular rate and rhythm, normal S1 and S2 without murmur, rub or gallop.


ABDOMEN: Soft, nontender, mildly distended, normoactive bowel sounds, no 

guarding, no rebound, no masses.  No hepatomegaly or  splenomegaly. 


UPPER EXTREMITIES: 2+ pulses, warm, well-perfused. No cyanosis. No clubbing. No 

peripheral edema.


LOWER EXTREMITIES: 2+ pulses, warm, well-perfused. BLE significantly atrophied 

with splint around left knee and leg, distal pulses present


NEUROLOGICAL:  Cranial nerves II-XII intact. Normal speech. Normal gait.


SKIN: Warm, dry and scaly, with small lesion in left foot with fungal rash 

under toe














 Laboratory Results - last 24 hr











  10/10/19 10/10/19





  08:30 08:30


 


WBC  16.1 H 


 


RBC  3.76 L 


 


Hgb  12.1 


 


Hct  36.2 


 


MCV  96.3 H 


 


MCH  32.3 


 


MCHC  33.5 


 


RDW  14.8 


 


Plt Count  521 H 


 


MPV  9.2 


 


Absolute Neuts (auto)  14.3 H 


 


Neutrophils %  88.7 H 


 


Lymphocytes %  5.6 L D 


 


Monocytes %  4.5 


 


Eosinophils %  0.8  D 


 


Basophils %  0.4 


 


Nucleated RBC %  0 


 


Sodium   137


 


Potassium   4.2


 


Chloride   101


 


Carbon Dioxide   30


 


Anion Gap   6 L


 


BUN   6.5 L


 


Creatinine   0.6


 


Est GFR (CKD-EPI)AfAm   121.43


 


Est GFR (CKD-EPI)NonAf   104.77


 


Random Glucose   103


 


Calcium   8.2 L








Active Medications











Generic Name Dose Route Start Last Admin





  Trade Name Freq  PRN Reason Stop Dose Admin


 


Bacitracin  1 applic  10/07/19 22:00  10/09/19 21:08





  Bacitracin -  TP   1 applic





  [facility]   Administration





     





     





     





     


 


Diazepam  10 mg  10/10/19 14:35  





  Valium -  PO   





  TID PRN   





  ANXIETY   





     





     





     


 


Docusate Sodium  200 mg  10/09/19 10:00  10/10/19 10:53





  Colace -  PO   Not Given





  DAILY [facility]   





     





     





     





     


 


Heparin Sodium (Porcine)  5,000 unit  10/07/19 22:00  10/10/19 14:16





  Heparin -  SQ   Not Given





  TID ALE   





     





     





     





     


 


Levofloxacin  500 mg in 100 mls @ 100 mls/hr  10/09/19 12:35  10/10/19 10:52





  Levaquin 500 Mg Premixed Ivpb -  IVPB   100 mls/hr





  DAILY [facility]   Administration





     





     





  Protocol   





     


 


Lactobacillus Acidophilus  1 tab  10/10/19 15:00  





  Bacid -  PO   





  DAILY [facility]   





     





     





     





     


 


Metoprolol Succinate  25 mg  10/08/19 10:00  10/10/19 10:54





  Toprol Xl -  PO   Not Given





  DAILY [facility]   





     





     





     





     


 


Miscellaneous  1 each  10/08/19 22:00  10/09/19 21:08





  Lidoderm Patch Removal  MC   1 each





  DAILY@2200 [facility]   Administration





     





     





     





     


 


Morphine Sulfate  10 mg  10/10/19 14:34  





  Morphine 10 Mg/5 Ml Liquid  PO   





  Q6H PRN   





  PAIN LEVEL 7 - 10   





     





     





     


 


Nystatin  1 applic  10/08/19 07:00  10/10/19 06:24





  Mycostatin Ointment -  TP   Not Given





  AM [facility]   





     





     





     





     


 


Polyethylene Glycol  17 gm  10/09/19 12:15  10/10/19 10:54





  Miralax (For Daily Use) -  PO   Not Given





  BID [facility]   





     





     





     





     











ASSESSMENT/PLAN:


63 y/o M with PMH T4 spinal cord transection with  paraplegia, neurogenic 

bladder, recurrent UTIs (admitted at [facility]), COPD(though pt denies such history)

, chronic pain, splenectomy, adenocarcinoma of R lung admitted for leg fracture.





L TIb/Fib fx


s/p cast in ED by Dr Man


 f/u with ortho as out pt in 2 weeks


 Pt 24h home services via [facility] has been terminated due to high liability 

pending SNF





 Leukocytosis with positive culture


 WBC improved to 16.1


 pt has hx of recurrent UTI poss due to neurogenic bladder 


 pt denies any fever, chills


Urine culture growing citrobacter Koseri sensitive to current antibiotic 

levaquin


cont  levaquin 500mg IV day 2





 chronic chest and upper back pain


 EKG with sinus rhythm


 will cont home pain meds 





 fungal foot rash


 nystatin cream





DVT PPX 


heparin TID 








Visit type





- Emergency Visit


Emergency Visit: Yes


ED Registration Date: 10/07/19


Care time: The patient presented to the Emergency Department on the above date 

and was hospitalized for further evaluation of their emergent condition.





- New Patient


This patient is new to me today: No





- Critical Care


Critical Care patient: No





- Discharge Referral


Referred to [facility] No





ATTENDING PHYSICIAN STATEMENT





I saw and evaluated the patient.


I reviewed the resident's note and discussed the case with the resident.


I agree with the resident's findings and plan as documented.








SUBJECTIVE:








OBJECTIVE:








ASSESSMENT AND PLAN:

## 2019-10-11 VITALS — DIASTOLIC BLOOD PRESSURE: 56 MMHG | SYSTOLIC BLOOD PRESSURE: 97 MMHG | TEMPERATURE: 97.4 F | HEART RATE: 80 BPM

## 2019-10-11 LAB
ANION GAP SERPL CALC-SCNC: 6 MMOL/L (ref 8–16)
ANISOCYTOSIS BLD QL: 0
BASOPHILS # BLD: 0 % (ref 0–2)
BUN SERPL-MCNC: 5.5 MG/DL (ref 7–18)
CALCIUM SERPL-MCNC: 8.7 MG/DL (ref 8.5–10.1)
CHLORIDE SERPL-SCNC: 102 MMOL/L (ref 98–107)
CO2 SERPL-SCNC: 29 MMOL/L (ref 21–32)
CREAT SERPL-MCNC: 0.6 MG/DL (ref 0.55–1.3)
DEPRECATED RDW RBC AUTO: 14.8 % (ref 11.9–15.9)
EOSINOPHIL # BLD: 1.5 % (ref 0–4.5)
GLUCOSE SERPL-MCNC: 94 MG/DL (ref 74–106)
HCT VFR BLD CALC: 38.3 % (ref 35.4–49)
HGB BLD-MCNC: 12.7 GM/DL (ref 11.7–16.9)
LYMPHOCYTES # BLD: 10.9 % (ref 8–40)
MACROCYTES BLD QL: 0
MCH RBC QN AUTO: 31.8 PG (ref 25.7–33.7)
MCHC RBC AUTO-ENTMCNC: 33.2 G/DL (ref 32–35.9)
MCV RBC: 95.8 FL (ref 80–96)
MONOCYTES # BLD AUTO: 5.5 % (ref 3.8–10.2)
NEUTROPHILS # BLD: 82.1 % (ref 42.8–82.8)
PLATELET # BLD AUTO: 622 K/MM3 (ref 134–434)
PLATELET BLD QL SMEAR: (no result)
PMV BLD: 8.9 FL (ref 7.5–11.1)
POTASSIUM SERPLBLD-SCNC: 3.9 MMOL/L (ref 3.5–5.1)
RBC # BLD AUTO: 4 M/MM3 (ref 4–5.6)
SODIUM SERPL-SCNC: 137 MMOL/L (ref 136–145)
TARGETS BLD QL SMEAR: (no result)
TOXIC GRANULES BLD QL SMEAR: (no result)
WBC # BLD AUTO: 12.4 K/MM3 (ref 4–10)

## 2019-10-11 RX ADMIN — POLYETHYLENE GLYCOL 3350 SCH: 17 POWDER, FOR SOLUTION ORAL at 10:19

## 2019-10-11 RX ADMIN — NYSTATIN SCH: 100000 OINTMENT TOPICAL at 06:00

## 2019-10-11 RX ADMIN — DOCUSATE SODIUM SCH MG: 100 CAPSULE, LIQUID FILLED ORAL at 10:19

## 2019-10-11 RX ADMIN — MORPHINE SULFATE PRN MG: 10 SOLUTION ORAL at 04:48

## 2019-10-11 RX ADMIN — MORPHINE SULFATE PRN MG: 10 SOLUTION ORAL at 12:40

## 2019-10-11 RX ADMIN — HEPARIN SODIUM SCH: 5000 INJECTION, SOLUTION INTRAVENOUS; SUBCUTANEOUS at 05:57

## 2019-10-11 RX ADMIN — HEPARIN SODIUM SCH: 5000 INJECTION, SOLUTION INTRAVENOUS; SUBCUTANEOUS at 13:55

## 2019-10-11 RX ADMIN — Medication SCH TAB: at 10:20
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
## 2019-10-11 NOTE — PN
Progress Note (short form)





- Note


Progress Note: 





Ortho





Pt seen and examined s/p left proximal tib/fib fx, splint intact


 


 Selected Entries











  10/11/19





  10:00


 


Temperature 97.3 F L


 


Pulse Rate 81


 


Respiratory 18





Rate 


 


Blood Pressure 90/50 L











well padded splint intact





a/p


maintain splint


ok to d/c from ortho pov


f/u in 2-4weeks


d/w Dr. Man

## 2019-10-11 NOTE — DS
Physical Exam: 


SUBJECTIVE: Patient seen and examined. pt offered no complaints. pt ate entire 

breakfast and endorsed being ready to go home.








OBJECTIVE:





 Vital Signs











 Period  Temp  Pulse  Resp  BP Sys/Malone  Pulse Ox


 


 Last 24 Hr  97.3 F-98.6 F    18-20  /50-77  95








PHYSICAL EXAM





GENERAL: Awake, alert, and fully oriented, in no acute distress.


HEAD: Normal with no signs of trauma.


EYES: Pupils equal, round and reactive to light, extraocular movements intact, 

sclera anicteric, conjunctiva clear. No lid lag.


EARS, NOSE, THROAT: oropharynx clear without exudates. Moist mucous membranes.


LUNGS: Breath sounds equal, clear to auscultation bilaterally. No wheezes, and 

no crackles. No accessory muscle use.


HEART: Regular rate and rhythm, normal S1 and S2 without murmur, rub or gallop.


ABDOMEN: Soft, nontender, mildly distended, normoactive bowel sounds, no 

guarding, no rebound, no masses.  No hepatomegaly or  splenomegaly. 


UPPER EXTREMITIES: 2+ pulses, warm, well-perfused. No cyanosis. No clubbing. No 

peripheral edema.


LOWER EXTREMITIES: 2+ pulses, warm, well-perfused. BLE significantly atrophied 

with splint around left knee and leg, distal pulses present


NEUROLOGICAL:  Cranial nerves II-XII intact. Normal speech. Normal gait.


SKIN: Warm, dry and scaly, with small lesion in left foot with fungal rash 

under toe





LABS


 Laboratory Results - last 24 hr











  10/11/19 10/11/19





  09:10 09:10


 


WBC  12.4 H 


 


RBC  4.00 


 


Hgb  12.7 


 


Hct  38.3 


 


MCV  95.8 


 


MCH  31.8 


 


MCHC  33.2 


 


RDW  14.8 


 


Plt Count  622 H 


 


MPV  8.9 


 


Absolute Neuts (auto)  10.2 H 


 


Neutrophils %  82.1 


 


Neutrophils % (Manual)  75.2 


 


Band Neutrophils %  1.0 


 


Lymphocytes %  10.9  D 


 


Lymphocytes % (Manual)  8.9  D 


 


Monocytes %  5.5 


 


Monocytes % (Manual)  9 


 


Eosinophils %  1.5  D 


 


Eosinophils % (Manual)  3.0  D 


 


Basophils %  0.0 


 


Basophils % (Manual)  0.0 


 


Myelocytes % (Man)  0 


 


Promyelocytes % (Man)  0 


 


Blast Cells % (Manual)  0 


 


Nucleated RBC %  0 


 


Metamyelocytes  1  D 


 


Hypochromia  1+ 


 


Toxic Granulation  1+ 


 


Platelet Estimate  Increased 


 


Platelet Comment  Present 


 


Polychromasia  0 


 


Poikilocytosis  1+ 


 


Anisocytosis  0 


 


Microcytosis  0 


 


Macrocytosis  0 


 


Spherocytes  1+ 


 


Target Cells  1+ 


 


Stomatocytes  1+ 


 


Sodium   137


 


Potassium   3.9


 


Chloride   102


 


Carbon Dioxide   29


 


Anion Gap   6 L


 


BUN   5.5 L


 


Creatinine   0.6


 


Est GFR (CKD-EPI)AfAm   121.43


 


Est GFR (CKD-EPI)NonAf   104.77


 


Random Glucose   94


 


Calcium   8.7








XR leg tib/fig 2 views reveal acute fractures of the proximal left tibia and 

fibula and are partially healed fracture deformities of the distal tibia and 

fibula.


 XR knee proximal tibial and fibular fractures. 


XR hip and pelvis L An AP view of the pelvis and view of the left hip reveal 

scoliosis, deformed pelvis with assumed old bilateral femoral fractures 

stabilized with hardware, degenerative hip changes but no sign of an acute 

fracture or subluxation. There is retained stool. There is some abdominal 

distention. 


XR foot A single AP view of the left foot and ankle reveal healed fracture 

deformities of the distal tibia and fibula which looks similar to 9/20/2018. 

There is loss of bone density and angulated toes. An acute process is not seen. 

If symptoms persist, further imaging and orthopedic consultation is suggested.


XR femur 2 views of the left femur reveal degenerative changes, intramedullary 

mushtaq and screw stabilizing the left femur, acute proximal tibial and fibular 

fractures and a deformed pelvis with chronic changes. There are degenerative 

findings in both hips with the right hip more affected than the left. 


CXR Single AP view of the chest reveals no change since 12/20/2018. There is 

mediastinal shift to the right with some hyperinflation, scoliosis and no sign 

of an acute chest process. 


HOSPITAL COURSE:





Date of Admission:10/07/19


63 y/o M with PMH T4 spinal cord transection with  paraplegia, neurogenic 

bladder, recurrent UTIs (admitted at [facility]), COPD(though pt denies such history)

, chronic pain, splenectomy, adenocarcinoma of R lung admitted for leg 

fracture.s/p cast in ED by Dr Man and pain controlled with home regimen of 

mophine and valium; f/u with ortho as out pt in 2 weeks. pt complained of 

abdominal pain and not having a BM. started on bowel regimen though pt 

consistently refused the meds. 


while admitted, Pt was found to have leukocytosis with positive urine culture 

and due to hx of recurrent UTI poss due to neurogenic bladder, Urine culture 

grew citrobacter Koseri sensitive to antibiotic levaquin. pt was kept on 

levaquin and discharged with it for an additional 4 days.


Date of Discharge: 10/11/19











Minutes to complete discharge: 35





Discharge Summary


Problems reviewed: Yes


Reason For Visit: FRACTURE OF TIBIA, DISTAL, LEFT,CLOSED


Current Active Problems





Tibia/fibula fracture (Acute)








Condition: Guarded





- Instructions


Diet, Activity, Other Instructions: 


You came into the Ed because you fractured your leg. We took pictures of your 

leg and it confirmed the fracture. you were seen by Orthopedic surgery who 

splinted your leg. You were found to have an elevated white blood cell count 

and an infection in your urine. We started you on antibiotics which you will 

continue for another 5 days with prebiotics to prevent diarrhea. your symptoms 

have improved and you are now ready to be discharged home.





Medications: 


Please resume all of your home medications.


Please take the antibiotic Levaquin 500mg daily by mouth from 10/11/19-10/15/19.


Also continue to take the bacid (probiotics) concurrently to reduce the risk of 

diarrhea and also continue taking it for the rest of your life.





Follow up:


Please follow up with Dr Poon your primary care physician within one week


Please follow up with Dr Man the orthopedic surgeon within 2 weeks to ensure 

proper healing of your leg fracture








If you begin to experience chest pain, shortness of breath, bleeding, abdominal 

pain, excessive diarrhea please return to the ED immediately 





Referrals: 


Bernardo Man MD [Staff Physician] - 2 Weeks


Sony Poon MD [Primary Care Provider] - 1 Week


Disposition: SKILLED NURSING FACILITY





- Home Medications


Comprehensive Discharge Medication List: 


Ambulatory Orders





Diazepam [Valium] 10 mg PO TID PRN  tablet MDD 30mg 10/10/18 


Morphine 10 mg/5 ml Liquid [Morphine 10 mg/5 mL Liquid -] 20 mg PO Q4H MDD 60mg 

10/07/19 


Bacitracin - [Bacitracin Topical Ointment -] 1 applic TP HS  tube 10/10/19 


Heparin - 5,000 unit SQ TID  vial 10/10/19 


Lactobacillus Acidophilus [Bacid -] 1 tab PO DAILY  tab 10/10/19 


Lidocaine Patch Removal [Lidoderm Patch Removal] 1 each MC DAILY@2200  each 10/

10/19 


Metoprolol Succinate [Toprol XL -] 25 mg PO DAILY  tab.sr.24h 10/10/19 


Nystatin Ointment [Mycostatin Ointment -] 1 applic TP AM  applic 10/10/19 


Polyethylene Glycol 3350 [Miralax 119 gm Btl -] 17 gm PO BID  bottle 10/10/19 


levoFLOXacin [Levaquin -] 500 mg PO DAILY #4 tablet 10/11/19 











Problem List





- Problems


(1) Tibia/fibula fracture


Code(s): S82.209A - UNSP FRACTURE OF SHAFT OF UNSP TIBIA, INIT FOR CLOS FX; 

S82.409A - UNSP FRACTURE OF SHAFT OF UNSP FIBULA, INIT FOR CLOS FX   


Qualifiers: 


   Encounter type: initial encounter   Fracture type: closed   Laterality: left

   Qualified Code(s): S82.202A - Unspecified fracture of shaft of left tibia, 

initial encounter for closed fracture; S82.402A - Unspecified fracture of shaft 

of left fibula, initial encounter for closed fracture   





(2) Abdominal pain


Code(s): R10.9 - UNSPECIFIED ABDOMINAL PAIN   


Qualifiers: 


   Abdominal location: generalized   Qualified Code(s): R10.84 - Generalized 

abdominal pain   





(3) Adenocarcinoma of right lung


Code(s): C34.91 - MALIGNANT NEOPLASM OF UNSP PART OF RIGHT BRONCHUS OR LUNG   





(4) Anxiety


Code(s): F41.9 - ANXIETY DISORDER, UNSPECIFIED   





(5) Fracture of tibia, distal, left, closed


Code(s): S82.302A - UNSP FRACTURE OF LOWER END OF LEFT TIBIA, INIT FOR CLOS FX 

  


Qualifiers: 


   Encounter type: initial encounter 





(6) Ileus


Code(s): K56.7 - ILEUS, UNSPECIFIED   





(7) Leukocytosis


Code(s): D72.829 - ELEVATED WHITE BLOOD CELL COUNT, UNSPECIFIED   





(8) Lung nodule


Code(s): R91.1 - SOLITARY PULMONARY NODULE   





(9) Pulmonary nodule


Code(s): R91.1 - SOLITARY PULMONARY NODULE   





(10) UTI (urinary tract infection)


Code(s): N39.0 - URINARY TRACT INFECTION, SITE NOT SPECIFIED   


Qualifiers: 


 





(11) Chronic pain


Code(s): G89.29 - OTHER CHRONIC PAIN   


Qualifiers: 


   Chronic pain type: chronic pain syndrome   Qualified Code(s): G89.4 - 

Chronic pain syndrome   





(12) Constipation


Code(s): K59.00 - CONSTIPATION, UNSPECIFIED   





(13) Neurogenic bladder


Code(s): N31.9 - NEUROMUSCULAR DYSFUNCTION OF BLADDER, UNSPECIFIED   





(14) Paraplegia


Code(s): G82.20 - PARAPLEGIA, UNSPECIFIED   





(15) Abdominal pain


Code(s): R10.9 - UNSPECIFIED ABDOMINAL PAIN   


Qualifiers: 


   Abdominal location: unspecified location   Qualified Code(s): R10.9 - 

Unspecified abdominal pain   





(16) Abdominal pain in male


Code(s): R10.9 - UNSPECIFIED ABDOMINAL PAIN   





(17) Acute UTI


Code(s): N39.0 - URINARY TRACT INFECTION, SITE NOT SPECIFIED   


This patient is new to me today: No


Emergency Visit: Yes


ED Registration Date: 10/07/19


Care time: The patient presented to the Emergency Department on the above date 

and was hospitalized for further evaluation of their emergent condition.


Critical Care patient: No





- Discharge Referral


Referred to [facility] No





ATTENDING PHYSICIAN STATEMENT





I saw and evaluated the patient.


I reviewed the resident's note and discussed the case with the resident.


I agree with the resident's findings and plan as documented.








SUBJECTIVE:








OBJECTIVE:








ASSESSMENT AND PLAN:

## 2019-10-12 RX ADMIN — HEPARIN SODIUM SCH: 5000 INJECTION, SOLUTION INTRAVENOUS; SUBCUTANEOUS at 00:18

## 2019-10-12 RX ADMIN — Medication SCH: at 00:18

## 2019-10-12 RX ADMIN — POLYETHYLENE GLYCOL 3350 SCH: 17 POWDER, FOR SOLUTION ORAL at 00:18

## 2019-10-12 RX ADMIN — BACITRACIN ZINC SCH: 500 OINTMENT TOPICAL at 00:17

## 2019-10-15 ENCOUNTER — HOSPITAL ENCOUNTER (INPATIENT)
Dept: HOSPITAL 74 - JER | Age: 64
LOS: 9 days | Discharge: NURSING, REHAB OR LONG-TERM CARE | DRG: 689 | End: 2019-10-24
Attending: INTERNAL MEDICINE | Admitting: INTERNAL MEDICINE
Payer: COMMERCIAL

## 2019-10-15 VITALS — BODY MASS INDEX: 20.3 KG/M2
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
## 2019-10-15 DIAGNOSIS — C34.11: ICD-10-CM

## 2019-10-15 DIAGNOSIS — K59.00: ICD-10-CM

## 2019-10-15 DIAGNOSIS — E43: ICD-10-CM

## 2019-10-15 DIAGNOSIS — G82.20: ICD-10-CM

## 2019-10-15 DIAGNOSIS — N39.0: Primary | ICD-10-CM
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
## 2019-10-15 DIAGNOSIS — J44.9: ICD-10-CM

## 2019-10-15 DIAGNOSIS — T14.8XXA: ICD-10-CM

## 2019-10-15 DIAGNOSIS — S82.302A: ICD-10-CM

## 2019-10-15 DIAGNOSIS — D47.3: ICD-10-CM

## 2019-10-15 DIAGNOSIS — N31.8: ICD-10-CM

## 2019-10-15 DIAGNOSIS — A41.9: ICD-10-CM

## 2019-10-15 DIAGNOSIS — E88.09: ICD-10-CM

## 2019-10-15 DIAGNOSIS — D72.829: ICD-10-CM

## 2019-10-15 LAB
ALBUMIN SERPL-MCNC: 2.7 G/DL (ref 3.4–5)
ALP SERPL-CCNC: 129 U/L (ref 45–117)
ALT SERPL-CCNC: 17 U/L (ref 13–61)
ANION GAP SERPL CALC-SCNC: 8 MMOL/L (ref 8–16)
APPEARANCE UR: (no result)
AST SERPL-CCNC: 17 U/L (ref 15–37)
BACTERIA # UR AUTO: 653.5 /HPF
BASOPHILS # BLD: 0.1 % (ref 0–2)
BILIRUB SERPL-MCNC: 0.6 MG/DL (ref 0.2–1)
BILIRUB UR STRIP.AUTO-MCNC: NEGATIVE MG/DL
BUN SERPL-MCNC: 5.9 MG/DL (ref 7–18)
CALCIUM SERPL-MCNC: 8.7 MG/DL (ref 8.5–10.1)
CASTS URNS QL MICRO: 2 /LPF (ref 0–8)
CHLORIDE SERPL-SCNC: 102 MMOL/L (ref 98–107)
CO2 SERPL-SCNC: 29 MMOL/L (ref 21–32)
COLOR UR: YELLOW
CREAT SERPL-MCNC: 0.6 MG/DL (ref 0.55–1.3)
DEPRECATED RDW RBC AUTO: 15.1 % (ref 11.9–15.9)
EOSINOPHIL # BLD: 1.8 % (ref 0–4.5)
EPITH CASTS URNS QL MICRO: 3.2 /HPF
GLUCOSE SERPL-MCNC: 85 MG/DL (ref 74–106)
HCT VFR BLD CALC: 41.9 % (ref 35.4–49)
HGB BLD-MCNC: 13.9 GM/DL (ref 11.7–16.9)
INR BLD: 1.02 (ref 0.83–1.09)
KETONES UR QL STRIP: NEGATIVE
LEUKOCYTE ESTERASE UR QL STRIP.AUTO: (no result)
LIPASE SERPL-CCNC: 375 U/L (ref 73–393)
LYMPHOCYTES # BLD: 11.4 % (ref 8–40)
MCH RBC QN AUTO: 31.1 PG (ref 25.7–33.7)
MCHC RBC AUTO-ENTMCNC: 33 G/DL (ref 32–35.9)
MCV RBC: 94.1 FL (ref 80–96)
MONOCYTES # BLD AUTO: 9.6 % (ref 3.8–10.2)
NEUTROPHILS # BLD: 77.1 % (ref 42.8–82.8)
NITRITE UR QL STRIP: NEGATIVE
PH UR: 6 [PH] (ref 5–8)
PLATELET # BLD AUTO: 823 K/MM3 (ref 134–434)
PMV BLD: 9 FL (ref 7.5–11.1)
POTASSIUM SERPLBLD-SCNC: 4 MMOL/L (ref 3.5–5.1)
PROT SERPL-MCNC: 7.1 G/DL (ref 6.4–8.2)
PROT UR QL STRIP: NEGATIVE
PROT UR QL STRIP: NEGATIVE
PT PNL PPP: 12 SEC (ref 9.7–13)
RBC # BLD AUTO: 3 /HPF (ref 0–4)
RBC # BLD AUTO: 4.46 M/MM3 (ref 4–5.6)
SODIUM SERPL-SCNC: 140 MMOL/L (ref 136–145)
SP GR UR: 1.01 (ref 1.01–1.03)
UROBILINOGEN UR STRIP-MCNC: 0.2 MG/DL (ref 0.2–1)
WBC # BLD AUTO: 14.1 K/MM3 (ref 4–10)
WBC # UR AUTO: 235 /HPF (ref 0–5)
YEAST BUDDING URNS QL: (no result)

## 2019-10-15 PROCEDURE — G0378 HOSPITAL OBSERVATION PER HR: HCPCS

## 2019-10-15 PROCEDURE — C1751 CATH, INF, PER/CENT/MIDLINE: HCPCS

## 2019-10-15 NOTE — PDOC
History of Present Illness





- General


Chief Complaint: Back Pain


Stated Complaint: Back Pain


Time Seen by Provider: 10/15/19 13:40


Exam Limitations: No Limitations





- History of Present Illness


Initial Comments: 





10/15/19 13:43


PCP: Dr. Poon





HPI: 63yo M with a PMH of COPD, hypotension, adenocarcinoma of the right lung, 

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 

recurrent UTI (was admitted at [facility] in Jan, 2019), and splenectomy, recently 

admitted for leg fracture (10/7-10/11/19) presenting with back pain. Pain is 

severe, "feels like a sledge hammer" in mid thoracic spine, came on "all of a 

sudden" "gradually worsening" "two days ago" "I was talking with HHA, the pain 

came on and I called Dr. Poon and came to the ED," patient has not tried 

anything that makes it better or worse. Patient endorses home morphine, says he 

took 3 yesterday but isn't really sure, knows he didn't take any today, has a 

prescription at the pharmacy, thinks someone can pick it up for him, says he 

needs at least 6 right now, "they always give 4 but I need 6." No sensation 

from nipples down, incontinent of urine and feces - adamantly denies 

constipation or difficulty with BMs. Denies having any ortho follow up 

appointment scheduled, denies having antibiotic sent to his pharmacy, says 

someone may have picked it up for him yesterday. Has Hx of chronic UTI, denies 

ever having pyelonephritis. 





No fever, chills, cough or shortness of breath. Chronically immobilized, 

recently hospitalized, former smoker, no unilateral leg swelling / pain. States 

that the sledge hammer sensation is also in his chest. 





All: NKDA


Meds: per chart


PMH: as above


PSH: as above











Past History





- Travel


Traveled outside of the country in the last 30 days: No


Close contact w/someone who was outside of country & ill: No





- Past Medical History


Allergies/Adverse Reactions: 


 Allergies











Allergy/AdvReac Type Severity Reaction Status Date / Time


 


No Known Allergies Allergy   Verified 10/15/19 13:03











Home Medications: 


Ambulatory Orders





Diazepam [Valium] 10 mg PO TID PRN  tablet MDD 30mg 10/10/18 


Morphine 10 mg/5 ml Liquid [Morphine 10 mg/5 mL Liquid -] 20 mg PO Q4H MDD 60mg 

10/07/19 


Bacitracin - [Bacitracin Topical Ointment -] 1 applic TP HS  tube 10/10/19 


Heparin - 5,000 unit SQ TID  vial 10/10/19 


Lactobacillus Acidophilus [Bacid -] 1 tab PO DAILY  tab 10/10/19 


Lidocaine Patch Removal [Lidoderm Patch Removal] 1 each MC DAILY@2200  each 10/

10/19 


Metoprolol Succinate [Toprol XL -] 25 mg PO DAILY  tab.sr.24h 10/10/19 


Nystatin Ointment [Mycostatin Ointment -] 1 applic TP AM  applic 10/10/19 


Polyethylene Glycol 3350 [Miralax 119 gm Btl -] 17 gm PO BID  bottle 10/10/19 








Anemia: No


Asthma: No


Cancer: No


Cardiac Disorders: No


CVA: No


COPD: Yes


CHF: No


Dementia: No


Diabetes: No


GI Disorders: No


 Disorders: Yes (Recurrent UTI, CONDOM CATHETER)


HTN:  (Hypotension)


Hypercholesterolemia: No


Liver Disease: No


Seizures: No


Thyroid Disease: No





- Surgical History


Abdominal Surgery: No


Appendectomy: No


Cardiac Surgery: No


Cholecystectomy: No


Lung Surgery: No


Neurologic Surgery: No


Orthopedic Surgery: Yes (fracture right wrist s/p fusion)





- Immunization History


Td Vaccination: No


Immunization Up to Date: No





- Psycho Social/Smoking Cessation Hx


Smoking Status: No


Smoking History: Former smoker


Years of Tobacco Use: 0


Have you smoked in the past 12 months: No


Number of Cigarettes Smoked Daily: 3


If you are a former smoker, when did you quit?: 1986


Cigars Per Day: 0


Information on smoking cessation initiated: No


Hx Alcohol Use: No


Drug/Substance Use Hx: No


Substance Use Type: None


Hx Substance Use Treatment: No





**Review of Systems





- Review of Systems


Able to Perform ROS?: Yes


Is the patient limited English proficient: Yes


Constitutional: Yes: Weakness (chronic ).  No: Chills, Diaphoresis, Fever, 

Weight Stable


HEENTM: No: Blurred Vision, Recent change in vision, Nose Congestion, Tinnitus, 

Throat Swelling


Respiratory: No: Cough, Orthopnea, Shortness of Breath, Wheezing


Cardiac (ROS): Yes: See HPI, Chest Pain.  No: Edema, Irregular Heart Rate, 

Palpitations, Syncope, Chest Tightness


ABD/GI: No: Constipated, Diarrhea, Nausea, Poor Appetite, Poor Fluid Intake, 

Rectal Bleeding, Vomiting


: Yes: Symptoms Reported, See HPI, Incontinence.  No: Burning, Dysuria, 

Discharge, Frequency, Pain, Urgency


Musculoskeletal: Yes: Symptoms Reported, See HPI, Back Pain


Integumentary: No: Bruising, Erythema, Pruritus, Rash


Neurological: No: Headache, Numbness, Tingling, Weakness


Psychiatric: No: Sleep Pattern Change, Mood Swings, Change in Appetite


Endocrine: No: Increased Thirst, Increased Urine, Change in Weight


Hematologic/Lymphatic: No: Anemia, Blood Clots, Easy Bleeding


All Other Systems: Reviewed and Negative





*Physical Exam





- Vital Signs


 Last Vital Signs











Temp Pulse Resp BP Pulse Ox


 


 98.4 F   69   18   127/51 L  96 


 


 10/15/19 13:03  10/15/19 13:03  10/15/19 13:03  10/15/19 13:03  10/15/19 13:03














- Physical Exam


Comments: 





10/15/19 14:29


Vitals reviewed, AFVSS


WDWN man, appears stated age, laying in bed on right side, no acute distress


EOMI, MMM, NCAT, trachea midline


RRR, nl s1/s2, no murmurs


CTABL, normal WOB, no wheezes / rales / rhonchi


Soft, non-tender, non-distended


+CVA tenderness


WWP, 2+ DP and radial pulses, no clubbing / cyanosis / edema


Alert and oriented, no sensation below nipple line, exam c/w reported baseline





ED Treatment Course





- LABORATORY


CBC & Chemistry Diagram: 


 10/15/19 15:10





 10/15/19 17:00





Medical Decision Making





- Medical Decision Making





10/15/19 14:35


63yo M with a PMH of COPD, hypotension, adenocarcinoma of the right lung, 

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 

recurrent UTI (was admitted at [facility] in Jan, 2019), and splenectomy, recently 

admitted for leg fracture (10/7-10/11/19) presenting with back pain. History 

notable for recent and chronic immobilization, chronic UTI - without medication 

compliance, significant discussion regarding what pain medications patient 

would like. Exam notable for normal vitals, comfortable appearance. DDX: 

Pyelonephritis, MSK pain vs fracture, less likely r/o ACS, PE, history not 

consistent with aortic dissection / aneurysm.





-CBC, CMP, CP, Lipase


-UA, UCx


-EKG, CXR





10/15/19 15:13


-4mg Morphine IV for pain


-CT Lumbar and T Spine w/o contrast





10/15/19 17:26


-Patient continues to refuse to change into a gown, states that he will need 6 

of morphine because 4 is not adequate while morphine is being administered. 

Nurse reiterates providers initial discussion with patient regarding IV vs PO 

morphine strength. 





10/15/19 17:56


-UA c/w worsening UTI, given Zosyn based on prior sensitivities


-Leukocytosis and elevated platelets c/w inflammatory process





10/15/19 18:15


-Electrolytes, LFTs, Lipase wnl


-Pt returned from CT, pending read and Xray





10/15/19 19:27


-CXR unchanged from prior without clear infiltrates of effusions


-CT of T and L spine without acute fracture, chest with interval enlargement of 

several bilateral irregular pulmonary nodules suggestive of primary neoplastic 

disease


-EKG; NSR, normal rhythm, normal axis, no ischemic changes





Dispo: Med/Surg for pyelonephritis / failure of outpatient ABX





10/15/19 20:00


-Pain could be due to worsening lung malignancy vs pain from pyelo (+CVA, 

leukocytosis, elevated plts)





10/15/19 21:59


-Case d/w Dr. Mora who will endorse to hospitalist team





Discharge





- Discharge Information


Problems reviewed: Yes


Clinical Impression/Diagnosis: 


 Pyelonephritis, Adenocarcinoma of right lung





UTI (urinary tract infection)


Qualifiers:


 Urinary tract infection type: site unspecified Hematuria presence: without 

hematuria Qualified Code(s): N39.0 - Urinary tract infection, site not specified





Condition: Stable





- Follow up/Referral





- Patient Discharge Instructions





- Post Discharge Activity

## 2019-10-15 NOTE — PN
Teaching Attending Note


Name of Resident: Chrissy Pete





ATTENDING PHYSICIAN STATEMENT





I saw and evaluated the patient.


I reviewed the resident's note and discussed the case with the resident.


I agree with the resident's findings and plan as documented.








SUBJECTIVE:


Patient is a 64 year old man with a PMH of Adenocarcinoma of the right lung, 

Spinal cord transection at T4 with paraplegia, Neurogenic bladder with 

recurrent UTI, COPD, Splenectomy, Gluteal flap procedure for sacral ulcer and 

recent admission for tibia-fibula fracture presents to the ER with atraumatic 

diffuse back pain since yesterday. No fevers or chills. Patient  denies 

dizziness, weakness, chest pain, SOB, abdominal pain, nausea, vomiting or 

diarrhea. Patient reports taking morphine PO for the pain yesterday but none 

today. Patient  recently diagnosed with UTI, is not sure if he took antibiotics 

or not. 





OBJECTIVE:


Alert


 Vital Signs











 Period  Temp  Pulse  Resp  BP Sys/Malone  Pulse Ox


 


 Last 24 Hr  97.8 F-98.4 F  69-84  17-18  119-130/51-72  94-96








HEENT: No Jaundice, eye redness or discharge, PERRLA, EOMI. Normocephalic, 

atraumatic. External ears are normal and hearing is grossly intact. No nasal 

discharge.


Neck: Supple, nontender. No palpable adenopathy or thyromegaly. No JVD


Chest: Good effort. Clear to auscultation and percussion.


Heart: Regular. No S3, rub or murmur


Abdomen: Not distended, soft, nontender and no HSM. CVAT. No rebound or 

guarding.  Normal bowel sounds.


Ext: Peripheral pulses intact. No leg edema. LLE orthocast.


Skin: Warm and dry. No petechiae, rash or ecchymosis.


Neuro: Alert. Oriented x3. CN 2-12 grossly intact. No sensation below nipples. 

Paraplegia.


Psych: Appropriate mood and affect. Good insight.


 Home Medications











 Medication  Instructions  Recorded


 


Diazepam [Valium] 10 mg PO TID PRN  tablet MDD 30mg 10/10/18


 


Morphine 10 mg/5 ml Liquid 20 mg PO Q4H MDD 60mg 10/07/19





[Morphine 10 mg/5 mL Liquid -]  


 


Bacitracin - [Bacitracin Topical 1 applic TP HS  tube 10/10/19





Ointment -]  


 


Heparin - 5,000 unit SQ TID  vial 10/10/19


 


Lactobacillus Acidophilus [Bacid -] 1 tab PO DAILY  tab 10/10/19


 


Lidocaine Patch Removal [Lidoderm 1 each MC DAILY@2200  each 10/10/19





Patch Removal]  


 


Metoprolol Succinate [Toprol XL -] 25 mg PO DAILY  tab.sr.24h 10/10/19


 


Nystatin Ointment [Mycostatin 1 applic TP AM  applic 10/10/19





Ointment -]  


 


Polyethylene Glycol 3350 [Miralax 17 gm PO BID  bottle 10/10/19





119 gm Btl -]  








 Abnormal Lab Results











  10/15/19 10/15/19 10/15/19





  15:10 15:10 17:00


 


WBC  14.1 H  


 


Plt Count  823 H D  


 


Absolute Neuts (auto)  10.8 H  


 


BUN    5.9 L


 


Alkaline Phosphatase    129 H


 


Albumin    2.7 L


 


Ur Leukocyte Esterase   3+ H 








ASSESSMENT AND PLAN:


1. Pyelonephritis - Based on historical urine culture&sensitivities, will treat 

with IV meropenem pending culture report. CT of thoracic and lumbar spine 

didnot show acute fracture; showed interval enlargement of several bilateral 

irregular pulmonary nodules suggestive of primary neoplastic disease. CXR 

reveals mediastinal shift to the right, hyperinflation and scoliosis - 

unchanged from prior without clear infiltrates of effusions. EKG showed NSR 

with no ischemic changes. Thrombocytosis likely due to infection - will monitor 

daily. If pain persists, will consider bone scan to rule out compression/

pathological fracture at T3/T4 level. Will continue comprehensive care for all 

of patients comorbid conditions including switching to long acting morphine 

for pain control and providing bowel regimen. Consult pain management.





2. Hypoalbuminemia - Possibly due to combined effects of malnutrition and 

inflammation associated with comorbid chronic conditions. Will ensure adequate 

dietary protein intake and also consult dietician.





3. DVT prophylaxis - Lovenox 40 mg SQ q 24 hours.     





4. Advance directives - Full code

## 2019-10-15 NOTE — PDOC
Attending Attestation





- Resident


Resident Name: JaysonLan





- ED Attending Attestation


I have performed the following: I have examined & evaluated the patient, The 

case was reviewed & discussed with the resident, I agree w/resident's findings 

& plan, Exceptions are as noted





- HPI


HPI: 





10/15/19 17:09


65yo M hx T spine injury with paraplegia, lung ca, neurogenic bladder, recent 

admission for tib-fib fracture presents to the ED with atraumatic diffuse back 

pain since yesterday. No fevers or chills. Pt denies dizziness, weakness, cp, 

sob, abd pain, n/v/d. Pt reports taking morphine PO for the pain yesterday but 

none today. Pt recently diagnosed with UTI, is not sure if he took abx or not. 








- Physicial Exam


PE: 





10/15/19 18:00


agree with resident exam 





- Medical Decision Making





10/15/19 18:00


65yo M with MMP presents to the ED with atraumatic back pain


Tender in T3/4 midline


No new neuro deficits compared to baseline


No CP, SOB, hypoxia or tachycardia to suggest PE


DDx includes MSK pain vs fracture vs UTI 


Plan for labs, UA, CT T/L spine, pain control, reassess





10/15/19 19:00


UA+ for UTI, especially compared to UA checked recently in the EMR


Based on previous cultures, will cover with zosyn


CT scans done, but not read


Anticipate admission for IV abx once CT reads are back


Case signed out to overnight team for further mgmt/dispo








**Heart Score/ECG Review


  ** #1





10/15/19 18:04


Twelve-lead EKG was performed and reviewed by me.  Normal sinus rhythm, rate 

67.  Normal axis and intervals.  Wavy baseline but no ST elevations.

## 2019-10-16 LAB
ALBUMIN SERPL-MCNC: 2.6 G/DL (ref 3.4–5)
ALP SERPL-CCNC: 125 U/L (ref 45–117)
ALT SERPL-CCNC: 15 U/L (ref 13–61)
ANION GAP SERPL CALC-SCNC: 9 MMOL/L (ref 8–16)
AST SERPL-CCNC: 13 U/L (ref 15–37)
BILIRUB SERPL-MCNC: 0.8 MG/DL (ref 0.2–1)
BUN SERPL-MCNC: 8.6 MG/DL (ref 7–18)
CALCIUM SERPL-MCNC: 8.6 MG/DL (ref 8.5–10.1)
CHLORIDE SERPL-SCNC: 104 MMOL/L (ref 98–107)
CO2 SERPL-SCNC: 27 MMOL/L (ref 21–32)
CREAT SERPL-MCNC: 0.6 MG/DL (ref 0.55–1.3)
DEPRECATED RDW RBC AUTO: 14.7 % (ref 11.9–15.9)
GLUCOSE SERPL-MCNC: 119 MG/DL (ref 74–106)
HCT VFR BLD CALC: 38 % (ref 35.4–49)
HGB BLD-MCNC: 12.9 GM/DL (ref 11.7–16.9)
MAGNESIUM SERPL-MCNC: 2 MG/DL (ref 1.8–2.4)
MCH RBC QN AUTO: 31.7 PG (ref 25.7–33.7)
MCHC RBC AUTO-ENTMCNC: 33.8 G/DL (ref 32–35.9)
MCV RBC: 93.9 FL (ref 80–96)
PHOSPHATE SERPL-MCNC: 2.6 MG/DL (ref 2.5–4.9)
PLATELET # BLD AUTO: 721 K/MM3 (ref 134–434)
PMV BLD: 8.4 FL (ref 7.5–11.1)
POTASSIUM SERPLBLD-SCNC: 3.6 MMOL/L (ref 3.5–5.1)
PROT SERPL-MCNC: 6.6 G/DL (ref 6.4–8.2)
RBC # BLD AUTO: 4.05 M/MM3 (ref 4–5.6)
SODIUM SERPL-SCNC: 139 MMOL/L (ref 136–145)
WBC # BLD AUTO: 19.5 K/MM3 (ref 4–10)

## 2019-10-16 RX ADMIN — PIPERACILLIN SODIUM,TAZOBACTAM SODIUM SCH MLS/HR: 3; .375 INJECTION, POWDER, FOR SOLUTION INTRAVENOUS at 14:00

## 2019-10-16 RX ADMIN — POLYETHYLENE GLYCOL 3350 SCH: 17 POWDER, FOR SOLUTION ORAL at 02:26

## 2019-10-16 RX ADMIN — ENOXAPARIN SODIUM SCH: 40 INJECTION SUBCUTANEOUS at 10:08

## 2019-10-16 RX ADMIN — BACITRACIN ZINC SCH: 500 OINTMENT TOPICAL at 22:50

## 2019-10-16 RX ADMIN — PIPERACILLIN SODIUM,TAZOBACTAM SODIUM SCH MLS/HR: 3; .375 INJECTION, POWDER, FOR SOLUTION INTRAVENOUS at 17:31

## 2019-10-16 RX ADMIN — POLYETHYLENE GLYCOL 3350 SCH GM: 17 POWDER, FOR SOLUTION ORAL at 21:42

## 2019-10-16 RX ADMIN — NYSTATIN SCH: 100000 OINTMENT TOPICAL at 06:40

## 2019-10-16 RX ADMIN — MEROPENEM SCH MLS/HR: 1 INJECTION, POWDER, FOR SOLUTION INTRAVENOUS at 02:59

## 2019-10-16 RX ADMIN — POLYETHYLENE GLYCOL 3350 SCH: 17 POWDER, FOR SOLUTION ORAL at 10:08

## 2019-10-16 RX ADMIN — MEROPENEM SCH MLS/HR: 1 INJECTION, POWDER, FOR SOLUTION INTRAVENOUS at 10:08

## 2019-10-16 RX ADMIN — Medication SCH TAB: at 10:08

## 2019-10-16 NOTE — PN
Physical Exam: 


SUBJECTIVE: Patient seen and examined. offered no complaints.








OBJECTIVE:





 Vital Signs











 Period  Temp  Pulse  Resp  BP Sys/Malone  Pulse Ox


 


 Last 24 Hr  97.8 F-99.5 F  59-84  16-20  117-146/63-92  94-96











GENERAL: Awake, alert, and fully oriented, in no acute distress.


HEAD: Normal with no signs of trauma.


EYES: Pupils equal, round and reactive to light, extraocular movements intact, 

sclera anicteric, conjunctiva clear. No lid lag.


EARS, NOSE, THROAT: oropharynx clear without exudates. Moist mucous membranes.


LUNGS: Breath sounds equal, clear to auscultation bilaterally. No wheezes, and 

no crackles. No accessory muscle use.


HEART: Regular rate and rhythm, normal S1 and S2 without murmur, rub or gallop.


ABDOMEN: Soft, nontender, mildly distended, normoactive bowel sounds, no 

guarding, no rebound, no masses.  No hepatomegaly or  splenomegaly. 


UPPER EXTREMITIES: 2+ pulses, warm, well-perfused. No cyanosis. No clubbing. No 

peripheral edema.


LOWER EXTREMITIES: 2+ pulses, warm, well-perfused. BLE significantly atrophied 

with splint around left knee and leg, distal pulses present


NEUROLOGICAL:  Cranial nerves II-XII intact. Normal speech. gait not observed














 Laboratory Results - last 24 hr











  10/15/19 10/15/19 10/15/19





  15:10 15:10 15:10


 


WBC   


 


RBC   


 


Hgb   


 


Hct   


 


MCV   


 


MCH   


 


MCHC   


 


RDW   


 


Plt Count   


 


MPV   


 


PT with INR   


 


INR   


 


Sodium   Cancelled 


 


Potassium   Cancelled 


 


Chloride   Cancelled 


 


Carbon Dioxide   Cancelled 


 


Anion Gap   Cancelled 


 


BUN   Cancelled 


 


Creatinine   Cancelled 


 


Est GFR (CKD-EPI)AfAm   Cancelled 


 


Est GFR (CKD-EPI)NonAf   Cancelled 


 


Random Glucose   Cancelled 


 


Calcium   Cancelled 


 


Phosphorus   


 


Magnesium   


 


Total Bilirubin   Cancelled 


 


AST   Cancelled 


 


ALT   Cancelled 


 


Alkaline Phosphatase   Cancelled 


 


Creatine Kinase  Cancelled  


 


Troponin I  Cancelled  


 


Total Protein   Cancelled 


 


Albumin   Cancelled 


 


Lipase   


 


Urine Color    Yellow


 


Urine Appearance    Cloudy


 


Urine pH    6.0


 


Ur Specific Gravity    1.010


 


Urine Protein    Negative


 


Urine Glucose (UA)    Negative


 


Urine Ketones    Negative


 


Urine Blood    Negative


 


Urine Nitrite    Negative


 


Urine Bilirubin    Negative


 


Urine Urobilinogen    0.2


 


Ur Leukocyte Esterase    3+ H


 


Urine WBC (Auto)    235


 


Urine RBC (Auto)    3


 


Urine Casts (Auto)    2


 


U Epithel Cells (Auto)    3.2


 


Urine Bacteria (Auto)    653.5


 


Urine Yeast (Auto)    None














  10/15/19 10/15/19 10/15/19





  15:10 17:00 17:00


 


WBC   


 


RBC   


 


Hgb   


 


Hct   


 


MCV   


 


MCH   


 


MCHC   


 


RDW   


 


Plt Count   


 


MPV   


 


PT with INR  Cancelled  12.00 


 


INR  Cancelled  1.02 


 


Sodium    140


 


Potassium    4.0


 


Chloride    102


 


Carbon Dioxide    29


 


Anion Gap    8


 


BUN    5.9 L


 


Creatinine    0.6


 


Est GFR (CKD-EPI)AfAm    121.43


 


Est GFR (CKD-EPI)NonAf    104.77


 


Random Glucose    85


 


Calcium    8.7


 


Phosphorus   


 


Magnesium   


 


Total Bilirubin    0.6


 


AST    17


 


ALT    17


 


Alkaline Phosphatase    129 H


 


Creatine Kinase   


 


Troponin I   


 


Total Protein    7.1


 


Albumin    2.7 L


 


Lipase    375


 


Urine Color   


 


Urine Appearance   


 


Urine pH   


 


Ur Specific Gravity   


 


Urine Protein   


 


Urine Glucose (UA)   


 


Urine Ketones   


 


Urine Blood   


 


Urine Nitrite   


 


Urine Bilirubin   


 


Urine Urobilinogen   


 


Ur Leukocyte Esterase   


 


Urine WBC (Auto)   


 


Urine RBC (Auto)   


 


Urine Casts (Auto)   


 


U Epithel Cells (Auto)   


 


Urine Bacteria (Auto)   


 


Urine Yeast (Auto)   














  10/16/19 10/16/19





  07:30 07:30


 


WBC  19.5 H 


 


RBC  4.05 


 


Hgb  12.9 


 


Hct  38.0 


 


MCV  93.9 


 


MCH  31.7 


 


MCHC  33.8 


 


RDW  14.7 


 


Plt Count  721 H 


 


MPV  8.4 


 


PT with INR  


 


INR  


 


Sodium   139


 


Potassium   3.6


 


Chloride   104


 


Carbon Dioxide   27


 


Anion Gap   9


 


BUN   8.6


 


Creatinine   0.6


 


Est GFR (CKD-EPI)AfAm   121.43


 


Est GFR (CKD-EPI)NonAf   104.77


 


Random Glucose   119 H


 


Calcium   8.6


 


Phosphorus   2.6


 


Magnesium   2.0


 


Total Bilirubin   0.8


 


AST   13 L


 


ALT   15


 


Alkaline Phosphatase   125 H


 


Creatine Kinase  


 


Troponin I  


 


Total Protein   6.6


 


Albumin   2.6 L


 


Lipase  


 


Urine Color  


 


Urine Appearance  


 


Urine pH  


 


Ur Specific Gravity  


 


Urine Protein  


 


Urine Glucose (UA)  


 


Urine Ketones  


 


Urine Blood  


 


Urine Nitrite  


 


Urine Bilirubin  


 


Urine Urobilinogen  


 


Ur Leukocyte Esterase  


 


Urine WBC (Auto)  


 


Urine RBC (Auto)  


 


Urine Casts (Auto)  


 


U Epithel Cells (Auto)  


 


Urine Bacteria (Auto)  


 


Urine Yeast (Auto)  








Active Medications











Generic Name Dose Route Start Last Admin





  Trade Name Freq  PRN Reason Stop Dose Admin


 


Bacitracin  1 applic  10/16/19 22:00  





  Bacitracin -  TP   





  HS ALE   





     





     





     





     


 


Diazepam  10 mg  10/16/19 00:49  





  Valium -  PO   





  TID PRN   





  ANXIETY   





     





     





     


 


Enoxaparin Sodium  40 mg  10/16/19 10:00  10/16/19 10:08





  Lovenox -  SQ   Not Given





  DAILY ALE   





     





     





     





     


 


Meropenem 1 gm/ Dextrose  100 mls @ 200 mls/hr  10/16/19 18:00  





  IVPB   





  Q8H-IV ALE   





     





     





     





     


 


Piperacillin Sod/Tazobactam  50 mls @ 100 mls/hr  10/16/19 14:00  10/16/19 14:00





  Sod 3.375 gm/ Dextrose  IVPB   100 mls/hr





  Q8H-IV ALE   Administration





     





     





  Protocol   





     


 


Lactobacillus Acidophilus  1 tab  10/16/19 10:00  10/16/19 10:08





  Bacid -  PO   1 tab





  DAILY ALE   Administration





     





     





     





     


 


Metoprolol Succinate  25 mg  10/16/19 10:00  10/16/19 10:08





  Toprol Xl -  PO   25 mg





  DAILY ALE   Administration





     





     





     





     


 


Morphine Sulfate  4 mg  10/16/19 05:23  10/16/19 11:44





  Morphine Sulfate  IVPUSH   4 mg





  Q6H PRN   Administration





  PAIN LEVEL 7 - 10   





     





     





     


 


Nystatin  1 applic  10/16/19 07:00  10/16/19 06:40





  Mycostatin Ointment -  TP   Not Given





  AM [facility]   





     





     





     





     


 


Polyethylene Glycol  17 gm  10/16/19 01:00  10/16/19 10:08





  Miralax (For Daily Use) -  PO   Not Given





  BID [facility]   





     





     





     





     











ASSESSMENT/PLAN:


64 y.o. M PMH COPD, R lung adenocarcinoma (no chemo/rads/ surgery), paraplegia 2

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 

hypotension, recent admission at [facility] s/p left leg dital tibial fracture (d/c;d 

10/11/19) presenting d/t back pain.





 UTI 


Dr. Man rec Meropenem 1g IV q8h


F/u urine culture


blood cultures ordered


renal U/S unremarkable


Afebrile, vitals WNL


no need for long term abx per ID





Tibial fracture


Pt has not followed up with Ortho yet


lower extremity XR, ankle &foot Xray to assess alignment





R lung adenocarcinoma


Pt has enlarged nodules on CT spine


F/u heme onc recs-- r/o mets








Chronic pain


 morphine 4mg q6h PRN


F/u pain management





Constipation


C/w miralax bid


   


Hypoalbuminemia


f/u dietician, likely 2/2 malnutrition





FEN


no standing fluids


Na controlled diet





DVT PPX


LVX 40mg SQ daily





Visit type





- Emergency Visit


Emergency Visit: Yes


ED Registration Date: 10/15/19


Care time: The patient presented to the Emergency Department on the above date 

and was hospitalized for further evaluation of their emergent condition.





- New Patient


This patient is new to me today: Yes


Date on this admission: 10/16/19





- Critical Care


Critical Care patient: No





- Discharge Referral


Referred to [facility] No





ATTENDING PHYSICIAN STATEMENT





I saw and evaluated the patient.


I reviewed the resident's note and discussed the case with the resident.


I agree with the resident's findings and plan as documented.








SUBJECTIVE:








OBJECTIVE:








ASSESSMENT AND PLAN:

## 2019-10-16 NOTE — PN
Teaching Attending Note


Name of Resident: Bernardo King





ATTENDING PHYSICIAN STATEMENT





I saw and evaluated the patient.


I reviewed the resident's note and discussed the case with the resident.


I agree with the resident's findings and plan as documented.








SUBJECTIVE:


no fever or chills. no HA . Reason fro  visit is bumping his L leg not for back 

pain as his chronic upper pain is at his base line . patient changes his story 

frequently  





OBJECTIVE:


NAD, awake, alert, MMM.


Cv: RRR, no MRG 


Lungs: CTAB 


Abd: NT, ND, NL BS ,soft. 


Ext: muscle atrophy on RLE.  dry skin. no edema.L leg in cast. 








ASSESSMENT AND PLAN:





Unfortunate 63 y/o man with h/o spinal cord injury at level of T4, paraplegia, 

neurogenic bladder, recurrent UTIs, COPD, chronic pain, splenectomy, 

adenocarcinoma of R lung, recent Tib/Fib fx   and other medical problems who 

presented from home after bumping his L leg and foot against the wall 





1- leukocytosis , concern fro pyelo


2- Recent  L TIb/Fib fx. with trauma again 


3- Chronic back pain. not changed





plan : 


- obtain renal US 


- send blood cx 


- d/w ID , zosyn 


- no change in chronic back pain . 


- monitor CBC 


- Not celar about this repeat trauma to L leg. will get repeat xray of Tib/Fib


- ortho to evaluate as he did not follow as out pt 


- lovenox

## 2019-10-16 NOTE — CONSULT
Consult


Consult Specialty:: Hematology and Oncology


Referred by:: Dr. Pete


Reason for Consultation:: Lung masses seen on CT





- History of Present Illness


Chief Complaint: Back pain/LE pain


History of Present Illness: 





The patient the 64-year-old male with a past medical history of COPD, right 

lung adenocarcinoma (not treated), paraplegia secondary to spinal cord 

transection at T4, neurogenic bladder, recent left tibia-fibula fracture who 

comes into the emergency department complaining of 9/10 constant back pain. In 

the emergency department, urinalysis was indicative of a urinary tract 

infection. CT of the lumbar and sacral spine was taken to the emergency 

department to rule out acute fracture. The CT showed that his lung nodules 

which were previously noted on CT have since enlarged. Oncology was consulted 

for assistance with further workup and treatment.





On interview, the patient was found lying in bed comfortably in no apparent 

distress. The patient was unable to explain why he was in the hospital. Patient 

believes that he is in the hospital for his tibial fracture. When questioned 

regarding his follow-up, the patient states that he follows up with Dr. Poon 

regularly. The patient admits that he did not follow-up with an oncologist. He 

is aware of his diagnosis and states that he was previously told that his 

cancer was "inoperable". He received no other treatment and cannot recall ever 

having a biopsy. Patient endorses smoking two packs per day for approximately 

25 years and states that he quit approximately 30 years ago. The results of his 

CT scan were explained to the patient. He states that he is amenable to further 

workup and diagnosis as well as treatment in the future.





- History Source


History Provided By: Patient


Limitations to Obtaining History: No Limitations





- Past Medical History


CNS: Yes: Other (paraplegia due to accidental fall (T2 spinal fracture))


Gastrointestinal: Yes: Other (Hepatic hemangiomas)


Renal/: Yes: Other (Bladder dysfunction)


Infectious Disease: Yes: Other (multiple UTIs  Pseudomonas)


Musculoskeletal: Yes: Paraplegia, Other (Chronic pain)





- Past Surgical History


Past Surgical History: Yes: Splenectomy





- Alcohol/Substance Use


Hx Alcohol Use: No


History of Substance Use: reports: Marijuana





- Smoking History


Smoking history: Former smoker


Have you smoked in the past 12 months: No


Aproximately how many cigarettes per day: 3


If you are a former smoker, when did you quit?: 1986





- Social History


Usual Living Arrangement: With Significant Other


ADL: Support Services ([facility] (24hr))


History of Recent Travel: No





Home Medications





- Allergies


Allergies/Adverse Reactions: 


 Allergies











Allergy/AdvReac Type Severity Reaction Status Date / Time


 


No Known Allergies Allergy   Verified 10/15/19 13:03














- Home Medications


Home Medications: 


Ambulatory Orders





Diazepam [Valium] 10 mg PO TID PRN  tablet MDD 30mg 10/10/18 


Morphine 10 mg/5 ml Liquid [Morphine 10 mg/5 mL Liquid -] 20 mg PO Q4H MDD 60mg 

10/07/19 


Bacitracin - [Bacitracin Topical Ointment -] 1 applic TP HS  tube 10/10/19 


Heparin - 5,000 unit SQ TID  vial 10/10/19 


Lactobacillus Acidophilus [Bacid -] 1 tab PO DAILY  tab 10/10/19 


Lidocaine Patch Removal [Lidoderm Patch Removal] 1 each MC DAILY@2200  each 10/

10/19 


Metoprolol Succinate [Toprol XL -] 25 mg PO DAILY  tab.sr.24h 10/10/19 


Nystatin Ointment [Mycostatin Ointment -] 1 applic TP AM  applic 10/10/19 


Polyethylene Glycol 3350 [Miralax 119 gm Btl -] 17 gm PO BID  bottle 10/10/19 











Review of Systems





- Review of Systems


Constitutional: denies: Chills, Fever


Cardiovascular: denies: Chest Pain, Palpitations, Shortness of Breath


Respiratory: denies: Cough, SOB, SOB on Exertion


Gastrointestinal: denies: Abdominal Pain


Musculoskeletal: reports: Back Pain, Extremity Pain


Neurological: denies: Change in LOC


Hematology/Lymphatic: denies: Excessive Bleeding





Physical Exam


Vital Signs: 


 Vital Signs











Temperature  99.5 F   10/16/19 14:56


 


Pulse Rate  73   10/16/19 14:56


 


Respiratory Rate  18   10/16/19 14:56


 


Blood Pressure  117/63   10/16/19 14:56


 


O2 Sat by Pulse Oximetry (%)  96   10/16/19 09:00











Constitutional: Yes: Well Nourished, No Distress, Calm


HENT: Yes: Atraumatic, Normocephalic


Cardiovascular: Yes: Regular Rate and Rhythm, S1, S2.  No: Gallop, Murmur, Rub


Respiratory: Yes: Regular, CTA Bilaterally


Gastrointestinal: Yes: Normal Bowel Sounds, Soft.  No: Distention, Tenderness


Edema: No


Neurological: Yes: Alert, Oriented


Psychiatric: Yes: Alert, Oriented, Other (flat affect, sad appearing)


Labs: 


 CBC, BMP





 10/16/19 07:30 





 10/16/19 07:30 











Imaging





- Results


Chest X-ray: Report Reviewed, Image Reviewed


Cat Scan: Report Reviewed, Image Reviewed





Assessment/Plan


The patient the 64-year-old male with a past medical history of COPD, right 

lung adenocarcinoma (not treated), paraplegia secondary to spinal cord 

transection at T4, neurogenic bladder, recent left tibia-fibula fracture who 

comes into the emergency department complaining of 9/10 constant back pain. 





#Worsening lung masses on spinal CT


   -incidental finding


   -suggest dedicated CT chest to better evaluate nodules


   -patient may require CT of the abdomen and pelvis as well for full staging


   -once patient staged, will discuss options as far as treatment


   -with history of lung adenocarcinoma and unclear follow-up, this is likely 

metastatic disease with the primary tumor being lung


   -treatment will have to be deferred until acute infection resolved.





#Back pain likely secondary to previous spinal injury versus pyelonephritis


   -UA in emergency Department indicative of UTI


   -antibiotics per infectious disease


   -patient on home pain control regimen





#Tibial-fibular fracture


   -ortho onboard.

## 2019-10-16 NOTE — HP
CHIEF COMPLAINT: back pain





PCP: Dr. Poon





HISTORY OF PRESENT ILLNESS:


64 y.o. M PMH COPD, R lung adenocarcinoma (no chemo/rads/ surgery), paraplegia 2

/2 spinal cord transection T2-T4 in , neurogenic bladder, recurrent UTIs, 

hypotension, recent admission at [facility] s/p left leg dital tibial fracture (d/c;d 

10/11/19). Pt presenting for 9/10 constant back pain. The patient has chronic 

pain, but this back pain started suddenly 2-3 days ago and has increased in 

severity over the past day. Pain is localized to T2-T3 paraspinally L>R. Pt 

tried morphine x3 tablets 20mg each with no improvement of the pain. Lying on 

his R side alleviates the pain a little; lying flat on his back or L side make 

the pain more severe. He has never had this type of pain in the past. Patient 

routinely uses a starr cath d/t neurogenic bladder. Denie HA/ fevers/ chills/ N/

V/D/ abdominal pain. 





Patient recently diagnosed w/ lung CA. When asked about medical follow up care, 

patient seems to be in denial of his diagnosis. 





ER course was notable for:


(1) U/A 3+ LE; Zosyn 3.375g IV


(2) morphine 4mg IV, 1g IV tylenol


(3)CT T & L-spine neg for acute fracture


(4) CXR: enlargement of previously noted lung nodules. primary neoplasm.





Recent Travel: denies





PAST MEDICAL HISTORY: as per hpi





PAST SURGICAL HISTORY: Multiple orthopedic surgery's for broken bones, 

splenectomy





Social History:


Smokin packs per day x 25 years, quit @ age 28


Alcohol: denies


Drugs: denies





Allergies





No Known Allergies Allergy (Verified 10/15/19 13:03)


 








HOME MEDICATIONS:


 Home Medications











 Medication  Instructions  Recorded


 


Diazepam [Valium] 10 mg PO TID PRN  tablet MDD 30mg 10/10/18


 


Morphine 10 mg/5 ml Liquid 20 mg PO Q4H MDD 60mg 10/07/19





[Morphine 10 mg/5 mL Liquid -]  


 


Bacitracin - [Bacitracin Topical 1 applic TP HS  tube 10/10/19





Ointment -]  


 


Heparin - 5,000 unit SQ TID  vial 10/10/19


 


Lactobacillus Acidophilus [Bacid -] 1 tab PO DAILY  tab 10/10/19


 


Lidocaine Patch Removal [Lidoderm 1 each MC DAILY@2200  each 10/10/19





Patch Removal]  


 


Metoprolol Succinate [Toprol XL -] 25 mg PO DAILY  tab.sr.24h 10/10/19


 


Nystatin Ointment [Mycostatin 1 applic TP AM  applic 10/10/19





Ointment -]  


 


Polyethylene Glycol 3350 [Miralax 17 gm PO BID  bottle 10/10/19





119 gm Btl -]  

















PHYSICAL EXAMINATION


 Vital Signs - 24 hr











  10/15/19 10/15/19 10/15/19





  13:03 19:00 19:35


 


Temperature 98.4 F 97.8 F 97.9 F


 


Pulse Rate 69  


 


Pulse Rate [  73 84





Radial]   


 


Respiratory 18 18 17





Rate   


 


Blood Pressure 127/51 L  


 


Blood Pressure  130/63 119/72





[Left Arm]   


 


O2 Sat by Pulse 96 95 94 L





Oximetry (%)   











GENERAL: AOx3. Anxious and grumpy. 


HEENT: NCAT. EOMI. PERRLA.


LUNGS: CTABL although difficult to assess R lower lobe d/t patient's positioning

-- too painful for him to allow full auscultation


HEART: Regular rate and rhythm, normal S1 and S2 without murmurs.


ABDOMEN: Soft, nontender, no spurapubic tenderness, not distended, normoactive 

bowel sounds, no guarding. 


MUSCULOSKELETAL: + CVA tenderness left side; could not assess R side d/t body 

positioning. R hand flexed at rest able to extend digits & oppose thumbs b/l 

but with difficulty.


UPPER EXTREMITIES: 2+ pulses palpated b/l LE. 


LOWER EXTREMITIES: Periph pulses intact. No LE edema. LLE brace present. Mild 

erythema of LLE. No LE ulcerations/ breaks in skin 


NEUROLOGICAL:  Cranial nerves II-XII intact. Normal speech. Pt cannot ambulate. 

No sensation below T3; diminished sensation at T3. 


PSYCHIATRIC: Patient appears annoyed, bothered by the interview. 


SKIN: No rashes/ lesions/ ulcers noted.


 Laboratory Results - last 24 hr











  10/15/19 10/15/19 10/15/19





  15:10 15:10 15:10


 


WBC   14.1 H 


 


RBC   4.46 


 


Hgb   13.9 


 


Hct   41.9 


 


MCV   94.1 


 


MCH   31.1 


 


MCHC   33.0 


 


RDW   15.1 


 


Plt Count   823 H D 


 


MPV   9.0 


 


Absolute Neuts (auto)   10.8 H 


 


Neutrophils %   77.1 


 


Lymphocytes %   11.4 


 


Monocytes %   9.6 


 


Eosinophils %   1.8 


 


Basophils %   0.1  D 


 


Nucleated RBC %   0 


 


PT with INR   


 


INR   


 


Sodium    Cancelled


 


Potassium    Cancelled


 


Chloride    Cancelled


 


Carbon Dioxide    Cancelled


 


Anion Gap    Cancelled


 


BUN    Cancelled


 


Creatinine    Cancelled


 


Est GFR (CKD-EPI)AfAm    Cancelled


 


Est GFR (CKD-EPI)NonAf    Cancelled


 


Random Glucose    Cancelled


 


Calcium    Cancelled


 


Total Bilirubin    Cancelled


 


AST    Cancelled


 


ALT    Cancelled


 


Alkaline Phosphatase    Cancelled


 


Creatine Kinase  Cancelled  


 


Troponin I  Cancelled  


 


Total Protein    Cancelled


 


Albumin    Cancelled


 


Lipase   


 


Urine Color   


 


Urine Appearance   


 


Urine pH   


 


Ur Specific Gravity   


 


Urine Protein   


 


Urine Glucose (UA)   


 


Urine Ketones   


 


Urine Blood   


 


Urine Nitrite   


 


Urine Bilirubin   


 


Urine Urobilinogen   


 


Ur Leukocyte Esterase   


 


Urine WBC (Auto)   


 


Urine RBC (Auto)   


 


Urine Casts (Auto)   


 


U Epithel Cells (Auto)   


 


Urine Bacteria (Auto)   


 


Urine Yeast (Auto)   














  10/15/19 10/15/19 10/15/19





  15:10 15:10 17:00


 


WBC   


 


RBC   


 


Hgb   


 


Hct   


 


MCV   


 


MCH   


 


MCHC   


 


RDW   


 


Plt Count   


 


MPV   


 


Absolute Neuts (auto)   


 


Neutrophils %   


 


Lymphocytes %   


 


Monocytes %   


 


Eosinophils %   


 


Basophils %   


 


Nucleated RBC %   


 


PT with INR   Cancelled  12.00


 


INR   Cancelled  1.02


 


Sodium   


 


Potassium   


 


Chloride   


 


Carbon Dioxide   


 


Anion Gap   


 


BUN   


 


Creatinine   


 


Est GFR (CKD-EPI)AfAm   


 


Est GFR (CKD-EPI)NonAf   


 


Random Glucose   


 


Calcium   


 


Total Bilirubin   


 


AST   


 


ALT   


 


Alkaline Phosphatase   


 


Creatine Kinase   


 


Troponin I   


 


Total Protein   


 


Albumin   


 


Lipase   


 


Urine Color  Yellow  


 


Urine Appearance  Cloudy  


 


Urine pH  6.0  


 


Ur Specific Gravity  1.010  


 


Urine Protein  Negative  


 


Urine Glucose (UA)  Negative  


 


Urine Ketones  Negative  


 


Urine Blood  Negative  


 


Urine Nitrite  Negative  


 


Urine Bilirubin  Negative  


 


Urine Urobilinogen  0.2  


 


Ur Leukocyte Esterase  3+ H  


 


Urine WBC (Auto)  235  


 


Urine RBC (Auto)  3  


 


Urine Casts (Auto)  2  


 


U Epithel Cells (Auto)  3.2  


 


Urine Bacteria (Auto)  653.5  


 


Urine Yeast (Auto)  None  














  10/15/19





  17:00


 


WBC 


 


RBC 


 


Hgb 


 


Hct 


 


MCV 


 


MCH 


 


MCHC 


 


RDW 


 


Plt Count 


 


MPV 


 


Absolute Neuts (auto) 


 


Neutrophils % 


 


Lymphocytes % 


 


Monocytes % 


 


Eosinophils % 


 


Basophils % 


 


Nucleated RBC % 


 


PT with INR 


 


INR 


 


Sodium  140


 


Potassium  4.0


 


Chloride  102


 


Carbon Dioxide  29


 


Anion Gap  8


 


BUN  5.9 L


 


Creatinine  0.6


 


Est GFR (CKD-EPI)AfAm  121.43


 


Est GFR (CKD-EPI)NonAf  104.77


 


Random Glucose  85


 


Calcium  8.7


 


Total Bilirubin  0.6


 


AST  17


 


ALT  17


 


Alkaline Phosphatase  129 H


 


Creatine Kinase 


 


Troponin I 


 


Total Protein  7.1


 


Albumin  2.7 L


 


Lipase  375


 


Urine Color 


 


Urine Appearance 


 


Urine pH 


 


Ur Specific Gravity 


 


Urine Protein 


 


Urine Glucose (UA) 


 


Urine Ketones 


 


Urine Blood 


 


Urine Nitrite 


 


Urine Bilirubin 


 


Urine Urobilinogen 


 


Ur Leukocyte Esterase 


 


Urine WBC (Auto) 


 


Urine RBC (Auto) 


 


Urine Casts (Auto) 


 


U Epithel Cells (Auto) 


 


Urine Bacteria (Auto) 


 


Urine Yeast (Auto) 











ASSESSMENT/PLAN:


64 y.o. M PMH COPD, R lung adenocarcinoma (no chemo/rads/ surgery), paraplegia 2

/2 spinal cord transection T2-T4 in , neurogenic bladder, recurrent UTIs, 

hypotension, recent admission at [facility] s/p left leg dital tibial fracture (d/c;d 

10/11/19) presenting d/t back pain.





#Acute complicated UTI


-ID consulted- Dr. Man


-Meropenem 1g IV q8h


-F/u urine culture


-Afebrile, vitals WNL





#R lung adenocarcinoma


-Pt has enlarged nodules on CT spine


-F/u heme onc recs-- r/o mets


-CXR: hyperinflated. no new infiltrates/ no congestive changes





#Chronic pain


-C/w morphine 4mg q6h PRN


-F/u pain management





#Constipation


-C/w miralax bid


   


#Thrombocytosis


-likely 2/2 acute infection


-Trend cbc





#Hypoalbuminemia


-f/u dietician, likely 2/2 malnutrition





#Depressed mood


-Pain control-- f/u pain management


-consider starting ssri





#FEN


-no standing fluids


-trend lytes


-Na controlled diet





#DVT PPX


-LVX 40mg SQ daily





#Dispo


med surg





Code status


FULL CODE

















Visit type





- Emergency Visit


Emergency Visit: Yes


ED Registration Date: 10/15/19


Care time: The patient presented to the Emergency Department on the above date 

and was hospitalized for further evaluation of their emergent condition.





- New Patient


This patient is new to me today: Yes


Date on this admission: 10/16/19





- Critical Care


Critical Care patient: No





ATTENDING PHYSICIAN STATEMENT





I saw and evaluated the patient.


I reviewed the resident's note and discussed the case with the resident.


I agree with the resident's findings and plan as documented.








SUBJECTIVE:








OBJECTIVE:








ASSESSMENT AND PLAN:

## 2019-10-16 NOTE — CON.ID
Consult


Consult Specialty:: infectious diseases


Referred by:: 


Reason for Consultation:: uti,leukocytosis





- History of Present Illness


Chief Complaint: pain in the left leg,back pain


History of Present Illness: 





64 y.o. M PMH COPD, R lung adenocarcinoma , paraplegia 2/2 spinal cord 

transection T2-T4 , neurogenic bladder, recurrent UTIs, hypotension, recent 

admission at [facility] s/p left leg dital tibial fracture ). Pt presenting for 9/10 

constant back pain. The patient has chronic pain, but this back pain started 

suddenly 2-3 days ago and has increased in severity over the past day. . Pt 

tried morphine x3 tablets 20mg each with no improvement of the pain. Lying on 

his R side alleviates the pain a little; lying flat on his back or L side make 

the pain more severe. He has never had this type of pain in the past. Patient 

routinely uses a starr cath d/t neurogenic bladder. Denie HA/ fevers/ chills/ N/

V/D/ abdominal pain. 


patient has no urinary symptoms


denies any fever


patient was  admitted and worked up and found to have a very high wbc





- History Source


History Provided By: Patient, Medical Record


Limitations to Obtaining History: Poor Historian





- Past Medical History


CNS: Yes: Other (paraplegia due to accidental fall (T2 spinal fracture))


Gastrointestinal: Yes: Other (Hepatic hemangiomas)


Renal/: Yes: Other (Bladder dysfunction)


Infectious Disease: Yes: Other (multiple UTIs  Pseudomonas)


Musculoskeletal: Yes: Paraplegia, Other (Chronic pain)





- Past Surgical History


Past Surgical History: Yes: Splenectomy





- Alcohol/Substance Use


Hx Alcohol Use: No


History of Substance Use: reports: Marijuana





- Smoking History


Smoking history: Former smoker


Have you smoked in the past 12 months: No


Aproximately how many cigarettes per day: 3


If you are a former smoker, when did you quit?: 1986





- Social History


Usual Living Arrangement: With Significant Other


ADL: Support Services ([facility] (24hr))


History of Recent Travel: No





Home Medications





- Allergies


Allergies/Adverse Reactions: 


 Allergies











Allergy/AdvReac Type Severity Reaction Status Date / Time


 


No Known Allergies Allergy   Verified 10/15/19 13:03














- Home Medications


Home Medications: 


Ambulatory Orders





Diazepam [Valium] 10 mg PO TID PRN  tablet MDD 30mg 10/10/18 


Morphine 10 mg/5 ml Liquid [Morphine 10 mg/5 mL Liquid -] 20 mg PO Q4H MDD 60mg 

10/07/19 


Bacitracin - [Bacitracin Topical Ointment -] 1 applic TP HS  tube 10/10/19 


Heparin - 5,000 unit SQ TID  vial 10/10/19 


Lactobacillus Acidophilus [Bacid -] 1 tab PO DAILY  tab 10/10/19 


Lidocaine Patch Removal [Lidoderm Patch Removal] 1 each MC DAILY@2200  each 10/

10/19 


Metoprolol Succinate [Toprol XL -] 25 mg PO DAILY  tab.sr.24h 10/10/19 


Nystatin Ointment [Mycostatin Ointment -] 1 applic TP AM  applic 10/10/19 


Polyethylene Glycol 3350 [Miralax 119 gm Btl -] 17 gm PO BID  bottle 10/10/19 











Review of Systems





- Review of Systems


Constitutional: reports: No Symptoms


Eyes: reports: No Symptoms


HENT: reports: No Symptoms


Neck: reports: No Symptoms


Cardiovascular: reports: No Symptoms


Respiratory: reports: No Symptoms


Gastrointestinal: reports: No Symptoms


Genitourinary: reports: No Symptoms


Musculoskeletal: reports: Back Pain


Integumentary: reports: No Symptoms


Neurological: reports: No Symptoms


Endocrine: reports: No Symptoms


Hematology/Lymphatic: reports: No Symptoms


Psychiatric: reports: No Symptoms





Physical Exam


Vital Signs: 


 Vital Signs











Temperature  98 F   10/16/19 10:00


 


Pulse Rate  79   10/16/19 10:00


 


Respiratory Rate  16   10/16/19 10:00


 


Blood Pressure  123/78   10/16/19 10:00


 


O2 Sat by Pulse Oximetry (%)  94 L  10/16/19 02:44











Constitutional: Yes: No Distress, Calm, Thin


Eyes: Yes: Conjunctiva Clear


Cardiovascular: Yes: Regular Rate and Rhythm


Respiratory: Yes: Regular, CTA Bilaterally


Gastrointestinal: Yes: Normal Bowel Sounds, Soft


Musculoskeletal: Yes: Back Pain


Extremities: Yes: Other


Wound/Incision: Yes: Dressing Dry and Intact


Neurological: Yes: Alert, Oriented


Psychiatric: Yes: Alert, Oriented


Labs: 


 CBC, BMP





 10/16/19 07:30 





 10/16/19 07:30 











Imaging





- Results


Chest X-ray: Report Reviewed, Image Reviewed


Cat Scan: Report Reviewed, Image Reviewed





Assessment/Plan





64-year-old male with a past medical history of COPD, right lung adenocarcinoma 

(not treated), paraplegia secondary to spinal cord transection at T4, 

neurogenic bladder, recent left tibia-fibula fracture who comes into the 

emergency department complaining of 9/10 constant back pain. 





lung mass


uti


back pain


leukocytosis





plan


will start patient on abx


await for cx reports


monitor wbc


further work up of lunf nodule

## 2019-10-16 NOTE — PN
Teaching Attending Note


Name of Resident: Kojo Forte





ATTENDING PHYSICIAN STATEMENT





I saw and evaluated the patient.


I reviewed the resident's note and discussed the case with the resident.


I agree with the resident's findings and plan as documented.








ASSESSMENT AND PLAN:





65 y/o with h/o spinal cord injury at level of T4, paraplegia, neurogenic 

bladder, recurrent UTIs, COPD, chronic pain, splenectomy,  recent Tib/Fib fx   

and other medical problems who presented from home after bumping his L leg and 

foot against the wall 





 metastatic lung cancer, adenoca stage IV diagnosed 10./18. Samples were 

insufficient for mutational analysis


[id number].


ROS neg.





PAtient agreeable for IR guided biopsy for next gen sequencing for other 

mutation analysis


Discussed various options with this patient with poor performance status, 

advanced stage lung cnacer with comorbidities--paraplegia/UTIs, poor social 

support

## 2019-10-17 LAB
ANION GAP SERPL CALC-SCNC: 8 MMOL/L (ref 8–16)
ANISOCYTOSIS BLD QL: 0
BASOPHILS # BLD: 0.3 % (ref 0–2)
BUN SERPL-MCNC: 10 MG/DL (ref 7–18)
CALCIUM SERPL-MCNC: 8.7 MG/DL (ref 8.5–10.1)
CHLORIDE SERPL-SCNC: 100 MMOL/L (ref 98–107)
CO2 SERPL-SCNC: 28 MMOL/L (ref 21–32)
CREAT SERPL-MCNC: 0.6 MG/DL (ref 0.55–1.3)
DEPRECATED RDW RBC AUTO: 14.9 % (ref 11.9–15.9)
EOSINOPHIL # BLD: 1 % (ref 0–4.5)
GLUCOSE SERPL-MCNC: 93 MG/DL (ref 74–106)
HCT VFR BLD CALC: 38.4 % (ref 35.4–49)
HGB BLD-MCNC: 12.8 GM/DL (ref 11.7–16.9)
LYMPHOCYTES # BLD: 5.6 % (ref 8–40)
MACROCYTES BLD QL: 0
MAGNESIUM SERPL-MCNC: 2.1 MG/DL (ref 1.8–2.4)
MCH RBC QN AUTO: 31.5 PG (ref 25.7–33.7)
MCHC RBC AUTO-ENTMCNC: 33.4 G/DL (ref 32–35.9)
MCV RBC: 94.5 FL (ref 80–96)
MONOCYTES # BLD AUTO: 7.2 % (ref 3.8–10.2)
NEUTROPHILS # BLD: 85.9 % (ref 42.8–82.8)
PHOSPHATE SERPL-MCNC: 2.5 MG/DL (ref 2.5–4.9)
PLATELET # BLD AUTO: 722 K/MM3 (ref 134–434)
PLATELET BLD QL SMEAR: (no result)
PMV BLD: 9 FL (ref 7.5–11.1)
POTASSIUM SERPLBLD-SCNC: 4.2 MMOL/L (ref 3.5–5.1)
RBC # BLD AUTO: 4.06 M/MM3 (ref 4–5.6)
SODIUM SERPL-SCNC: 136 MMOL/L (ref 136–145)
WBC # BLD AUTO: 15.8 K/MM3 (ref 4–10)

## 2019-10-17 RX ADMIN — ENOXAPARIN SODIUM SCH MG: 40 INJECTION SUBCUTANEOUS at 10:37

## 2019-10-17 RX ADMIN — POLYETHYLENE GLYCOL 3350 SCH: 17 POWDER, FOR SOLUTION ORAL at 10:37

## 2019-10-17 RX ADMIN — MORPHINE SULFATE PRN MG: 10 SOLUTION ORAL at 21:33

## 2019-10-17 RX ADMIN — Medication SCH TAB: at 10:37

## 2019-10-17 RX ADMIN — PIPERACILLIN SODIUM,TAZOBACTAM SODIUM SCH MLS/HR: 3; .375 INJECTION, POWDER, FOR SOLUTION INTRAVENOUS at 10:38

## 2019-10-17 RX ADMIN — BACITRACIN ZINC SCH: 500 OINTMENT TOPICAL at 21:30

## 2019-10-17 RX ADMIN — PIPERACILLIN SODIUM,TAZOBACTAM SODIUM SCH MLS/HR: 3; .375 INJECTION, POWDER, FOR SOLUTION INTRAVENOUS at 01:19

## 2019-10-17 RX ADMIN — ENOXAPARIN SODIUM SCH: 40 INJECTION SUBCUTANEOUS at 10:45

## 2019-10-17 RX ADMIN — POLYETHYLENE GLYCOL 3350 SCH: 17 POWDER, FOR SOLUTION ORAL at 21:30

## 2019-10-17 RX ADMIN — MORPHINE SULFATE PRN MG: 10 SOLUTION ORAL at 12:37

## 2019-10-17 RX ADMIN — PIPERACILLIN SODIUM,TAZOBACTAM SODIUM SCH MLS/HR: 3; .375 INJECTION, POWDER, FOR SOLUTION INTRAVENOUS at 18:00

## 2019-10-17 RX ADMIN — NYSTATIN SCH: 100000 OINTMENT TOPICAL at 07:02

## 2019-10-17 NOTE — PN
Physical Exam: 


SUBJECTIVE: Patient seen and examined. Pt. states that his night went badly 

because he could not get any sleep because of the coldness of his room, did not 

like the food and his condom catheter came off. No acute events overnight.








OBJECTIVE:





 Vital Signs











 Period  Temp  Pulse  Resp  BP Sys/Malone  Pulse Ox


 


 Last 24 Hr  95.5 F-98.4 F  63-66  20-20  107-128/57-62  96











GENERAL: Awake, alert, and fully oriented, in no acute distress.


HEAD: Normal with no signs of trauma.


EYES: Pupils equal, round and reactive to light, extraocular movements intact, 

sclera anicteric, conjunctiva clear. No lid lag.


EARS, NOSE, THROAT: oropharynx clear without exudates. Moist mucous membranes.


LUNGS: Breath sounds equal, clear to auscultation bilaterally. No wheezes, and 

no crackles. No accessory muscle use.


HEART: Regular rate and rhythm, normal S1 and S2 without murmur, rub or gallop.


ABDOMEN: Soft, nontender, mildly distended, normoactive bowel sounds, no 

guarding, no rebound, no masses.  No hepatomegaly or  splenomegaly. 


UPPER EXTREMITIES: 2+ pulses, warm, well-perfused. No cyanosis. No clubbing. No 

peripheral edema.


LOWER EXTREMITIES: 2+ pulses, warm, well-perfused. BLE significantly atrophied 

with splint around left knee and leg, distal pulses present


NEUROLOGICAL:  Cranial nerves II-XII intact. Normal speech. gait not observed




















 Laboratory Results - last 24 hr











  10/17/19 10/17/19





  12:05 12:05


 


WBC  15.8 H 


 


RBC  4.06 


 


Hgb  12.8 


 


Hct  38.4 


 


MCV  94.5 


 


MCH  31.5 


 


MCHC  33.4 


 


RDW  14.9 


 


Plt Count  722 H 


 


MPV  9.0 


 


Absolute Neuts (auto)  13.6 H 


 


Neutrophils %  85.9 H 


 


Lymphocytes %  5.6 L D 


 


Monocytes %  7.2 


 


Eosinophils %  1.0 


 


Basophils %  0.3 


 


Nucleated RBC %  0 


 


Sodium   136


 


Potassium   4.2


 


Chloride   100


 


Carbon Dioxide   28


 


Anion Gap   8


 


BUN   10.0


 


Creatinine   0.6


 


Est GFR (CKD-EPI)AfAm   121.43


 


Est GFR (CKD-EPI)NonAf   104.77


 


Random Glucose   93


 


Calcium   8.7


 


Phosphorus   2.5


 


Magnesium   2.1








Active Medications





 Current Medications





Bacitracin (Bacitracin -)  1 applic TP HS ALE


   Last Admin: 10/17/19 21:30 Dose:  Not Given


Diazepam (Valium -)  10 mg PO TID PRN


   PRN Reason: ANXIETY


   Last Admin: 10/18/19 02:55 Dose:  10 mg


Enoxaparin Sodium (Lovenox -)  40 mg SQ DAILY [facility]


   Last Admin: 10/17/19 10:45 Dose:  Not Given


Piperacillin Sod/Tazobactam (Sod 3.375 gm/ Dextrose)  50 mls @ 100 mls/hr IVPB 

Q8H-IV ALE; Protocol


   Last Admin: 10/18/19 02:50 Dose:  100 mls/hr


Lactobacillus Acidophilus (Bacid -)  1 tab PO DAILY [facility]


   Last Admin: 10/17/19 10:37 Dose:  1 tab


Metoprolol Succinate (Toprol Xl -)  25 mg PO DAILY [facility]


   Last Admin: 10/17/19 10:41 Dose:  Not Given


Morphine Sulfate (Morphine 10 Mg/5 Ml Liquid)  20 mg PO Q8H PRN


   PRN Reason: PAIN LEVEL 7 - 10


   Last Admin: 10/18/19 05:11 Dose:  20 mg


Nystatin (Mycostatin Ointment -)  1 applic TP AM [facility]


   Last Admin: 10/17/19 07:02 Dose:  Not Given


Polyethylene Glycol (Miralax (For Daily Use) -)  17 gm PO BID [facility]


   Last Admin: 10/17/19 21:30 Dose:  Not Given











ASSESSMENT/PLAN:


64 y.o. M PMH COPD, R lung adenocarcinoma (no chemo/rads/ surgery), paraplegia 2

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 

hypotension, recent admission at [facility] s/p left leg dital tibial fracture (d/c;d 

10/11/19) presenting d/t back pain.





 UTI 


Dr. Man rec Meropenem 1g IV q8h


F/u urine culture final and speciations


blood cultures ordered


renal U/S unremarkable


Afebrile, vitals WNL


no need for long term abx per ID





Tibial fracture


Pt has not followed up with Ortho yet


lower extremity XR, ankle &foot Xray - per Ortho read and consult


Pt. stabe for d/c per Ortho, can collow up in 10-14 days as outpatient





R lung adenocarcinoma


Pt has enlarged nodules on CT spine


Per Heme/ Onc Pt. will need CT chest for assessment of when to biopsy, previous 

biopsies unsuccessful in the past 





Chronic pain


 morphine 4mg q6h PRN


F/u pain management





Constipation


C/w miralax bid


   


Hypoalbuminemia


f/u dietician, likely 2/2 malnutrition





FEN


no standing fluids


Na controlled diet





DVT PPX


LVX 40mg SQ daily








ATTENDING PHYSICIAN STATEMENT





I saw and evaluated the patient.


I reviewed the resident's note and discussed the case with the resident.


I agree with the resident's findings and plan as documented.








SUBJECTIVE:








OBJECTIVE:








ASSESSMENT AND PLAN:

## 2019-10-17 NOTE — PN
Progress Note, Physician


History of Present Illness: 





patient stable


back pain





- Current Medication List


Current Medications: 


Active Medications





Bacitracin (Bacitracin -)  1 applic TP HS [facility]


   Last Admin: 10/16/19 22:50 Dose:  Not Given


Diazepam (Valium -)  10 mg PO TID PRN


   PRN Reason: ANXIETY


   Last Admin: 10/17/19 04:39 Dose:  10 mg


Enoxaparin Sodium (Lovenox -)  40 mg SQ DAILY [facility]


   Last Admin: 10/17/19 10:45 Dose:  Not Given


Piperacillin Sod/Tazobactam (Sod 3.375 gm/ Dextrose)  50 mls @ 100 mls/hr IVPB 

Q8H-IV ALE; Protocol


   Last Admin: 10/17/19 10:38 Dose:  100 mls/hr


Lactobacillus Acidophilus (Bacid -)  1 tab PO DAILY [facility]


   Last Admin: 10/17/19 10:37 Dose:  1 tab


Metoprolol Succinate (Toprol Xl -)  25 mg PO DAILY [facility]


   Last Admin: 10/17/19 10:41 Dose:  Not Given


Morphine Sulfate (Morphine 10 Mg/5 Ml Liquid)  20 mg PO Q8H PRN


   PRN Reason: PAIN LEVEL 7 - 10


   Last Admin: 10/17/19 12:37 Dose:  20 mg


Nystatin (Mycostatin Ointment -)  1 applic TP AM [facility]


   Last Admin: 10/17/19 07:02 Dose:  Not Given


Polyethylene Glycol (Miralax (For Daily Use) -)  17 gm PO BID [facility]


   Last Admin: 10/17/19 10:37 Dose:  Not Given











- Objective


Vital Signs: 


 Vital Signs











Temperature  98.4 F   10/17/19 05:20


 


Pulse Rate  63   10/17/19 05:20


 


Respiratory Rate  20   10/17/19 05:20


 


Blood Pressure  128/62   10/17/19 05:20


 


O2 Sat by Pulse Oximetry (%)  96   10/16/19 21:00











Constitutional: Yes: No Distress, Calm


Cardiovascular: Yes: Regular Rate and Rhythm


Respiratory: Yes: Regular, CTA Bilaterally


Gastrointestinal: Yes: Normal Bowel Sounds, Soft


Musculoskeletal: Yes: Back Pain, Other (deformity of the spine)


Extremities: Yes: Other


Labs: 


 CBC, BMP





 10/17/19 12:05 





 INR, PTT











INR  1.02  (0.83-1.09)   10/15/19  17:00    














Assessment/Plan





64-year-old male with a past medical history of COPD, right lung adenocarcinoma 

(not treated), paraplegia secondary to spinal cord transection at T4, 

neurogenic bladder, recent left tibia-fibula fracture who comes into the 

emergency department complaining of 9/10 constant back pain. 





lung mass


uti


back pain





plan


continue abx


await for identification of the organism

## 2019-10-17 NOTE — CON.ORTH
Consult


Reason for Consultation:: left tib/fib fx





- Past Medical History


CNS: Yes: Other (paraplegia due to accidental fall (T2 spinal fracture))


Gastrointestinal: Yes: Other (Hepatic hemangiomas)


Renal/: Yes: Other (Bladder dysfunction)


Infectious Disease: Yes: Other (multiple UTIs  Pseudomonas)


Musculoskeletal: Yes: Paraplegia, Other (Chronic pain)





- Past Surgical History


Past Surgical History: Yes: Splenectomy





- Alcohol/Substance Use


Hx Alcohol Use: No


History of Substance Use: reports: Marijuana





- Smoking History


Smoking history: Former smoker


Have you smoked in the past 12 months: No


Aproximately how many cigarettes per day: 3


If you are a former smoker, when did you quit?: 1986





- Social History


Usual Living Arrangement: With Significant Other


ADL: Support Services ([facility] (24hr))


History of Recent Travel: No





Home Medications





- Allergies


Allergies/Adverse Reactions: 


 Allergies











Allergy/AdvReac Type Severity Reaction Status Date / Time


 


No Known Allergies Allergy   Verified 10/15/19 13:03














- Home Medications


Home Medications: 


Ambulatory Orders





Diazepam [Valium] 10 mg PO TID PRN  tablet MDD 30mg 10/10/18 


Morphine 10 mg/5 ml Liquid [Morphine 10 mg/5 mL Liquid -] 20 mg PO Q4H MDD 60mg 

10/07/19 


Bacitracin - [Bacitracin Topical Ointment -] 1 applic TP HS  tube 10/10/19 


Heparin - 5,000 unit SQ TID  vial 10/10/19 


Lactobacillus Acidophilus [Bacid -] 1 tab PO DAILY  tab 10/10/19 


Lidocaine Patch Removal [Lidoderm Patch Removal] 1 each MC DAILY@2200  each 10/

10/19 


Metoprolol Succinate [Toprol XL -] 25 mg PO DAILY  tab.sr.24h 10/10/19 


Nystatin Ointment [Mycostatin Ointment -] 1 applic TP AM  applic 10/10/19 


Polyethylene Glycol 3350 [Miralax 119 gm Btl -] 17 gm PO BID  bottle 10/10/19 











Physical Exam for Ortho


Vital Signs: 


 Vital Signs











Temperature  98.4 F   10/17/19 05:20


 


Pulse Rate  63   10/17/19 05:20


 


Respiratory Rate  20   10/17/19 05:20


 


Blood Pressure  128/62   10/17/19 05:20


 


O2 Sat by Pulse Oximetry (%)  96   10/16/19 21:00











Labs: 


 CBC, BMP





 10/17/19 12:05 





 10/17/19 12:05 





 INR, PTT











INR  1.02  (0.83-1.09)   10/15/19  17:00    














- Lower Extremity


Leg: Yes: Other (splint intact)





Imaging





- Results


X-ray: Image Reviewed





Assessment/Plan





64 y.o. M PMH COPD, R lung adenocarcinoma (no chemo/rads/ surgery), paraplegia 2

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 

hypotension, recent admission at [facility] s/p left leg dital tibial fracture (d/c;d 

10/11/19). Pt presenting for 9/10 constant back pain. The patient has chronic 

pain, but this back pain started suddenly 2-3 days ago and has increased in 

severity over the past day. Repeat xrays were done to assess the fx.





a/p left proximal tib/fib fx- unchanged from previous xrays


continue splint


NTD


pain management consult 


ok to d/c from ortho pov


f/u in 2-3 week as outpt


d/w Dr. Amin

## 2019-10-17 NOTE — PN
Teaching Attending Note


Name of Resident: Bernardo King





ATTENDING PHYSICIAN STATEMENT





I saw and evaluated the patient.


I reviewed the resident's note and discussed the case with the resident.


I agree with the resident's findings and plan as documented.








SUBJECTIVE:


No fever or chills. has pain in upper back ( chronic ) . no N/V , no painin 

legs . 








OBJECTIVE:


NAD, awake, alert, MMM.


Cv: RRR, no MRG 


Lungs: CTAB 


Abd: NT, ND, NL BS ,soft. 


Ext: muscle atrophy on RLE.  dry skin. no edema.L leg in cast. 








ASSESSMENT AND PLAN:





Unfortunate 65 y/o man with h/o spinal cord injury at level of T4, paraplegia, 

neurogenic bladder, recurrent UTIs, COPD, chronic pain, splenectomy, 

adenocarcinoma of R lung, recent Tib/Fib fx   and other medical problems who 

presented from home after bumping his L leg and foot against the wall 





1- Leukocytosis, concern for  pyelo


2- Recent  L Tib/Fib fx. with recurrent trauma 


3- Chronic back pain. not changed





plan : 


- US with no abscess or stranding 


- follow CBC today 


- follow blood cx and urine cx 


- cont zosyn 


- no change in chronic back pain. chnage IV morphine to his home dose  po  


- xray of Tib/Fib, pending. ortho eval pending 


- appreciate Onc help. will get CT C/A/P for staging 


- he is agreeable for Bx. will d/w heme about arrangements 


- lovenox . will hold if bx planned tomorrow

## 2019-10-18 LAB
ANION GAP SERPL CALC-SCNC: 10 MMOL/L (ref 8–16)
APTT BLD: 38.5 SECONDS (ref 25.2–36.5)
BASOPHILS # BLD: 0.2 % (ref 0–2)
BUN SERPL-MCNC: 9.8 MG/DL (ref 7–18)
CALCIUM SERPL-MCNC: 8.6 MG/DL (ref 8.5–10.1)
CHLORIDE SERPL-SCNC: 100 MMOL/L (ref 98–107)
CO2 SERPL-SCNC: 28 MMOL/L (ref 21–32)
CREAT SERPL-MCNC: 0.8 MG/DL (ref 0.55–1.3)
DEPRECATED RDW RBC AUTO: 14.6 % (ref 11.9–15.9)
EOSINOPHIL # BLD: 0.9 % (ref 0–4.5)
GLUCOSE SERPL-MCNC: 86 MG/DL (ref 74–106)
HCT VFR BLD CALC: 40.3 % (ref 35.4–49)
HGB BLD-MCNC: 13 GM/DL (ref 11.7–16.9)
INR BLD: 0.99 (ref 0.83–1.09)
LYMPHOCYTES # BLD: 3.8 % (ref 8–40)
MAGNESIUM SERPL-MCNC: 2.1 MG/DL (ref 1.8–2.4)
MCH RBC QN AUTO: 30.9 PG (ref 25.7–33.7)
MCHC RBC AUTO-ENTMCNC: 32.2 G/DL (ref 32–35.9)
MCV RBC: 96 FL (ref 80–96)
MONOCYTES # BLD AUTO: 5 % (ref 3.8–10.2)
NEUTROPHILS # BLD: 90.1 % (ref 42.8–82.8)
PHOSPHATE SERPL-MCNC: 3.2 MG/DL (ref 2.5–4.9)
PLATELET # BLD AUTO: 736 K/MM3 (ref 134–434)
PMV BLD: 9.2 FL (ref 7.5–11.1)
POTASSIUM SERPLBLD-SCNC: 3.9 MMOL/L (ref 3.5–5.1)
PT PNL PPP: 11.7 SEC (ref 9.7–13)
RBC # BLD AUTO: 4.2 M/MM3 (ref 4–5.6)
SODIUM SERPL-SCNC: 138 MMOL/L (ref 136–145)
WBC # BLD AUTO: 18.8 K/MM3 (ref 4–10)

## 2019-10-18 RX ADMIN — POLYETHYLENE GLYCOL 3350 SCH: 17 POWDER, FOR SOLUTION ORAL at 10:38

## 2019-10-18 RX ADMIN — MORPHINE SULFATE PRN MG: 10 SOLUTION ORAL at 16:37

## 2019-10-18 RX ADMIN — ENOXAPARIN SODIUM SCH: 40 INJECTION SUBCUTANEOUS at 10:38

## 2019-10-18 RX ADMIN — CEFTAZIDIME SCH MLS/HR: 1 INJECTION, POWDER, FOR SOLUTION INTRAMUSCULAR; INTRAVENOUS at 17:56

## 2019-10-18 RX ADMIN — PIPERACILLIN SODIUM,TAZOBACTAM SODIUM SCH MLS/HR: 3; .375 INJECTION, POWDER, FOR SOLUTION INTRAVENOUS at 10:40

## 2019-10-18 RX ADMIN — NYSTATIN SCH: 100000 OINTMENT TOPICAL at 07:20

## 2019-10-18 RX ADMIN — Medication SCH TAB: at 10:41

## 2019-10-18 RX ADMIN — BACITRACIN ZINC SCH: 500 OINTMENT TOPICAL at 21:25

## 2019-10-18 RX ADMIN — PIPERACILLIN SODIUM,TAZOBACTAM SODIUM SCH MLS/HR: 3; .375 INJECTION, POWDER, FOR SOLUTION INTRAVENOUS at 02:50

## 2019-10-18 RX ADMIN — POLYETHYLENE GLYCOL 3350 SCH: 17 POWDER, FOR SOLUTION ORAL at 21:25

## 2019-10-18 RX ADMIN — MORPHINE SULFATE PRN MG: 10 SOLUTION ORAL at 05:11

## 2019-10-18 NOTE — PN
Teaching Attending Note


Name of Resident: Bernardo iKng





ATTENDING PHYSICIAN STATEMENT





I saw and evaluated the patient.


I reviewed the resident's note and discussed the case with the resident.


I agree with the resident's findings and plan as documented.








SUBJECTIVE:


No fever or chills. no pain 





OBJECTIVE:


NAD, awake, alert, MMM.


Cv: RRR, no MRG 


Lungs: CTAB  


Ext: muscle atrophy on RLE.  dry skin. no edema.L leg in cast. 








ASSESSMENT AND PLAN:





Unfortunate 65 y/o man with h/o spinal cord injury at level of T4, paraplegia, 

neurogenic bladder, recurrent UTIs, COPD, chronic pain, splenectomy, 

adenocarcinoma of R lung, recent Tib/Fib fx   and other medical problems who 

presented from home after bumping his L leg and foot against the wall 





1- Leukocytosis, concern for  pyelo


2- Recent  L Tib/Fib fx. with recurrent trauma 


3- Chronic back pain. not changed





plan : 


- Ucx reviewed. Abx switched to Ceftazidime


- follow blood cx


- cont po morphine  


- appreciate Onc help. Ct chest reviewed. Ct of Abd/pelvis was nto required per 

heme 


- case d/w IR, Bx on Monday 


- f/u with ortho as out pt

## 2019-10-18 NOTE — PN
Progress Note, Physician


History of Present Illness: 





no complaints


feels better





- Current Medication List


Current Medications: 


Active Medications





Bacitracin (Bacitracin -)  1 applic TP HS [facility]


   Last Admin: 10/17/19 21:30 Dose:  Not Given


Diazepam (Valium -)  10 mg PO TID PRN


   PRN Reason: ANXIETY


   Last Admin: 10/18/19 02:55 Dose:  10 mg


Enoxaparin Sodium (Lovenox -)  40 mg SQ DAILY [facility]


   Last Admin: 10/18/19 10:38 Dose:  Not Given


Piperacillin Sod/Tazobactam (Sod 3.375 gm/ Dextrose)  50 mls @ 100 mls/hr IVPB 

Q8H-IV ALE; Protocol


   Last Admin: 10/18/19 10:40 Dose:  100 mls/hr


Lactobacillus Acidophilus (Bacid -)  1 tab PO DAILY [facility]


   Last Admin: 10/18/19 10:41 Dose:  1 tab


Metoprolol Succinate (Toprol Xl -)  25 mg PO DAILY [facility]


   Last Admin: 10/18/19 10:39 Dose:  Not Given


Morphine Sulfate (Morphine 10 Mg/5 Ml Liquid)  20 mg PO Q8H PRN


   PRN Reason: PAIN LEVEL 7 - 10


   Last Admin: 10/18/19 05:11 Dose:  20 mg


Nystatin (Mycostatin Ointment -)  1 applic TP AM [facility]


   Last Admin: 10/18/19 07:20 Dose:  Not Given


Polyethylene Glycol (Miralax (For Daily Use) -)  17 gm PO BID [facility]


   Last Admin: 10/18/19 10:38 Dose:  Not Given











- Objective


Vital Signs: 


 Vital Signs











Temperature  98.4 F   10/18/19 10:00


 


Pulse Rate  85   10/18/19 10:00


 


Respiratory Rate  20   10/18/19 10:00


 


Blood Pressure  134/65   10/18/19 10:00


 


O2 Sat by Pulse Oximetry (%)  95   10/17/19 21:00











Constitutional: Yes: No Distress, Calm


Cardiovascular: Yes: S1, S2


Respiratory: Yes: Regular, CTA Bilaterally


Gastrointestinal: Yes: Normal Bowel Sounds, Soft


Musculoskeletal: Yes: Other


Extremities: Yes: Other (contracted)


Wound/Incision: Yes: Dressing Dry and Intact


Neurological: Yes: Alert, Oriented


Labs: 


 CBC, BMP





 10/17/19 12:05 





 10/17/19 12:05 





 INR, PTT











INR  0.99  (0.83-1.09)   10/18/19  07:50    














Assessment/Plan





64-year-old male with a past medical history of COPD, right lung adenocarcinoma 

(not treated), paraplegia secondary to spinal cord transection at T4, 

neurogenic bladder, recent left tibia-fibula fracture who comes into the 

emergency department complaining of 9/10 constant back pain. 





lung mass


uti


back pain


leukocytosis





plan


continue abx


will change to ceftazidine


rest as per the team


further plan from lung point of view


monitor wbc

## 2019-10-18 NOTE — PN
Physical Exam: 


SUBJECTIVE: Patient seen and examined.pt was noted to have chills however, this 

is not unusual for him.








OBJECTIVE:





 Vital Signs











 Period  Temp  Pulse  Resp  BP Sys/Malone  Pulse Ox


 


 Last 24 Hr  97.9 F-99.1 F    18-20  /52-79  95-95











GENERAL: Awake, alert, and fully oriented, in no acute distress.


HEAD: Normal with no signs of trauma.


EYES: Pupils equal, round and reactive to light, extraocular movements intact, 

sclera anicteric, conjunctiva clear. No lid lag.


EARS, NOSE, THROAT: oropharynx clear without exudates. Moist mucous membranes.


LUNGS: Breath sounds equal, clear to auscultation bilaterally. No wheezes, and 

no crackles. No accessory muscle use.


HEART: Regular rate and rhythm, normal S1 and S2 without murmur, rub or gallop.


ABDOMEN: Soft, nontender, mildly distended, normoactive bowel sounds, no 

guarding, no rebound, no masses.  No hepatomegaly or  splenomegaly. 


UPPER EXTREMITIES: 2+ pulses, warm, well-perfused. No cyanosis. No clubbing. No 

peripheral edema.


LOWER EXTREMITIES: 2+ pulses, warm, well-perfused. BLE significantly atrophied 

with splint around left knee and leg, distal pulses present


NEUROLOGICAL:  Cranial nerves II-XII intact. Normal speech. gait not observed














 Laboratory Results - last 24 hr











  10/18/19 10/18/19 10/18/19





  07:50 12:26 12:26


 


WBC   18.8 H 


 


RBC   4.20 


 


Hgb   13.0 


 


Hct   40.3 


 


MCV   96.0 


 


MCH   30.9 


 


MCHC   32.2 


 


RDW   14.6 


 


Plt Count   736 H 


 


MPV   9.2 


 


Absolute Neuts (auto)   17.0 H 


 


Neutrophils %   90.1 H 


 


Lymphocytes %   3.8 L D 


 


Monocytes %   5.0 


 


Eosinophils %   0.9 


 


Basophils %   0.2 


 


Nucleated RBC %   0 


 


PT with INR  11.70  


 


INR  0.99  


 


PTT (Actin FS)  38.5 H  


 


Sodium    138


 


Potassium    3.9


 


Chloride    100


 


Carbon Dioxide    28


 


Anion Gap    10


 


BUN    9.8


 


Creatinine    0.8


 


Est GFR (CKD-EPI)AfAm    107.89


 


Est GFR (CKD-EPI)NonAf    93.09


 


Random Glucose    86


 


Calcium    8.6


 


Phosphorus    3.2


 


Magnesium    2.1








Active Medications











Generic Name Dose Route Start Last Admin





  Trade Name Freq  PRN Reason Stop Dose Admin


 


Bacitracin  1 applic  10/16/19 22:00  10/17/19 21:30





  Bacitracin -  TP   Not Given





  HS ALE   





     





     





     





     


 


Diazepam  10 mg  10/16/19 00:49  10/18/19 02:55





  Valium -  PO   10 mg





  TID PRN   Administration





  ANXIETY   





     





     





     


 


Enoxaparin Sodium  40 mg  10/16/19 10:00  10/18/19 10:38





  Lovenox -  SQ   Not Given





  DAILY [facility]   





     





     





     





     


 


Ceftazidime 1 gm/ Dextrose  50 mls @ 200 mls/hr  10/18/19 18:00  10/18/19 17:56





  IVPB   200 mls/hr





  Q8H-IV ALE   Administration





     





     





  Protocol   





     


 


Lactobacillus Acidophilus  1 tab  10/16/19 10:00  10/18/19 10:41





  Bacid -  PO   1 tab





  DAILY ALE   Administration





     





     





     





     


 


Metoprolol Succinate  25 mg  10/16/19 10:00  10/18/19 10:39





  Toprol Xl -  PO   Not Given





  DAILY [facility]   





     





     





     





     


 


Morphine Sulfate  20 mg  10/17/19 12:27  10/18/19 16:37





  Morphine 10 Mg/5 Ml Liquid  PO   20 mg





  Q8H PRN   Administration





  PAIN LEVEL 7 - 10   





     





     





     


 


Nystatin  1 applic  10/16/19 07:00  10/18/19 07:20





  Mycostatin Ointment -  TP   Not Given





  AM [facility]   





     





     





     





     


 


Polyethylene Glycol  17 gm  10/16/19 01:00  10/18/19 10:38





  Miralax (For Daily Use) -  PO   Not Given





  BID [facility]   





     





     





     





     











ASSESSMENT/PLAN:


64 y.o. M PMH COPD, R lung adenocarcinoma (no chemo/rads/ surgery), paraplegia 2

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 

hypotension, recent admission at [facility] s/p left leg dital tibial fracture (d/c;d 

10/11/19) presenting d/t back pain.





 UTI


WBC 12.8 today slight increase from yesterday 


Dr. Man rec switching to ceftazidine


urine culture acinetobacter, staph coag neg


blood cultures neg x48h


renal U/S unremarkable


Afebrile, vitals WNL





Tibial fracture


Per ortho no change in current management


lower extremity XR, ankle &foot Xray  no acute pathology


Pt. stabe for d/c per Ortho, can collow up in 10-14 days as outpatient





R lung adenocarcinoma


Pt has enlarged nodules on CT spine. CT chest done and reviewed showing 

multiple nodule


Per Heme/ Onc lung biopsy on monday.


holding lovenox 





Chronic pain


 morphine 4mg q6h PRN








Constipation


C/w miralax bid


   


Hypoalbuminemia


f/u dietician, likely 2/2 malnutrition





FEN


no standing fluids


Na controlled diet





DVT PPX


holding LVX 40mg SQ daily











Visit type





- Emergency Visit


Emergency Visit: Yes


ED Registration Date: 10/15/19


Care time: The patient presented to the Emergency Department on the above date 

and was hospitalized for further evaluation of their emergent condition.





- New Patient


This patient is new to me today: No





- Critical Care


Critical Care patient: No





- Discharge Referral


Referred to [facility] No





ATTENDING PHYSICIAN STATEMENT





I saw and evaluated the patient.


I reviewed the resident's note and discussed the case with the resident.


I agree with the resident's findings and plan as documented.








SUBJECTIVE:








OBJECTIVE:








ASSESSMENT AND PLAN:

## 2019-10-19 LAB
ANION GAP SERPL CALC-SCNC: 10 MMOL/L (ref 8–16)
BASOPHILS # BLD: 0.5 % (ref 0–2)
BUN SERPL-MCNC: 7.6 MG/DL (ref 7–18)
CALCIUM SERPL-MCNC: 8.6 MG/DL (ref 8.5–10.1)
CHLORIDE SERPL-SCNC: 102 MMOL/L (ref 98–107)
CO2 SERPL-SCNC: 25 MMOL/L (ref 21–32)
CREAT SERPL-MCNC: 0.6 MG/DL (ref 0.55–1.3)
DEPRECATED RDW RBC AUTO: 14.4 % (ref 11.9–15.9)
EOSINOPHIL # BLD: 2.6 % (ref 0–4.5)
GLUCOSE SERPL-MCNC: 90 MG/DL (ref 74–106)
HCT VFR BLD CALC: 38.3 % (ref 35.4–49)
HGB BLD-MCNC: 12.8 GM/DL (ref 11.7–16.9)
LYMPHOCYTES # BLD: 11 % (ref 8–40)
MCH RBC QN AUTO: 31.8 PG (ref 25.7–33.7)
MCHC RBC AUTO-ENTMCNC: 33.3 G/DL (ref 32–35.9)
MCV RBC: 95.5 FL (ref 80–96)
MONOCYTES # BLD AUTO: 6.4 % (ref 3.8–10.2)
NEUTROPHILS # BLD: 79.5 % (ref 42.8–82.8)
PLATELET # BLD AUTO: 644 K/MM3 (ref 134–434)
PMV BLD: 9.2 FL (ref 7.5–11.1)
POTASSIUM SERPLBLD-SCNC: 3.7 MMOL/L (ref 3.5–5.1)
RBC # BLD AUTO: 4.02 M/MM3 (ref 4–5.6)
SODIUM SERPL-SCNC: 138 MMOL/L (ref 136–145)
WBC # BLD AUTO: 11.8 K/MM3 (ref 4–10)

## 2019-10-19 RX ADMIN — NYSTATIN SCH APPLIC: 100000 OINTMENT TOPICAL at 06:17

## 2019-10-19 RX ADMIN — ENOXAPARIN SODIUM SCH: 40 INJECTION SUBCUTANEOUS at 09:16

## 2019-10-19 RX ADMIN — MORPHINE SULFATE PRN MG: 10 SOLUTION ORAL at 11:04

## 2019-10-19 RX ADMIN — BACITRACIN ZINC SCH: 500 OINTMENT TOPICAL at 21:39

## 2019-10-19 RX ADMIN — MORPHINE SULFATE PRN MG: 10 SOLUTION ORAL at 20:09

## 2019-10-19 RX ADMIN — CEFTAZIDIME SCH MLS/HR: 1 INJECTION, POWDER, FOR SOLUTION INTRAMUSCULAR; INTRAVENOUS at 18:01

## 2019-10-19 RX ADMIN — POLYETHYLENE GLYCOL 3350 SCH: 17 POWDER, FOR SOLUTION ORAL at 21:39

## 2019-10-19 RX ADMIN — POLYETHYLENE GLYCOL 3350 SCH GM: 17 POWDER, FOR SOLUTION ORAL at 09:17

## 2019-10-19 RX ADMIN — CEFTAZIDIME SCH MLS/HR: 1 INJECTION, POWDER, FOR SOLUTION INTRAMUSCULAR; INTRAVENOUS at 01:03

## 2019-10-19 RX ADMIN — Medication SCH TAB: at 09:15

## 2019-10-19 RX ADMIN — CEFTAZIDIME SCH MLS/HR: 1 INJECTION, POWDER, FOR SOLUTION INTRAMUSCULAR; INTRAVENOUS at 11:00

## 2019-10-19 RX ADMIN — MORPHINE SULFATE PRN MG: 10 SOLUTION ORAL at 01:21

## 2019-10-19 NOTE — PN
Progress Note, Physician


History of Present Illness: 





stable


wbc trending down





- Current Medication List


Current Medications: 


Active Medications





Bacitracin (Bacitracin -)  1 applic TP HS [facility]


   Last Admin: 10/18/19 21:25 Dose:  Not Given


Diazepam (Valium -)  10 mg PO TID [facility]


Enoxaparin Sodium (Lovenox -)  40 mg SQ DAILY [facility]


   Last Admin: 10/19/19 09:16 Dose:  Not Given


Ceftazidime 1 gm/ Dextrose  50 mls @ 200 mls/hr IVPB Q8H-IV ALE; Protocol


   Last Admin: 10/19/19 01:03 Dose:  200 mls/hr


Lactobacillus Acidophilus (Bacid -)  1 tab PO DAILY [facility]


   Last Admin: 10/19/19 09:15 Dose:  1 tab


Metoprolol Succinate (Toprol Xl -)  25 mg PO DAILY [facility]


   Last Admin: 10/19/19 09:16 Dose:  Not Given


Morphine Sulfate (Morphine 10 Mg/5 Ml Liquid)  20 mg PO Q8H PRN


   PRN Reason: PAIN LEVEL 7 - 10


   Last Admin: 10/19/19 01:21 Dose:  20 mg


Nystatin (Mycostatin Ointment -)  1 applic TP AM [facility]


   Last Admin: 10/19/19 06:17 Dose:  1 applic


Polyethylene Glycol (Miralax (For Daily Use) -)  17 gm PO BID [facility]


   Last Admin: 10/19/19 09:17 Dose:  17 gm











- Objective


Vital Signs: 


 Vital Signs











Temperature  98.3 F   10/19/19 05:00


 


Pulse Rate  62   10/19/19 05:00


 


Respiratory Rate  20   10/18/19 21:00


 


Blood Pressure  111/54 L  10/19/19 05:00


 


O2 Sat by Pulse Oximetry (%)  96   10/18/19 21:00











Constitutional: Yes: No Distress, Calm


Cardiovascular: Yes: S1, S2


Respiratory: Yes: Regular, CTA Bilaterally


Gastrointestinal: Yes: Normal Bowel Sounds, Soft


Musculoskeletal: Yes: WNL


Extremities: Yes: Other


Neurological: Yes: Alert, Oriented


Psychiatric: Yes: Alert, Oriented


Labs: 


 CBC, BMP





 10/19/19 08:00 





 10/19/19 08:00 





 INR, PTT











INR  0.99  (0.83-1.09)   10/18/19  07:50    














Assessment/Plan





64-year-old male with a past medical history of COPD, right lung adenocarcinoma 

(not treated), paraplegia secondary to spinal cord transection at T4, 

neurogenic bladder, recent left tibia-fibula fracture who comes into the 

emergency department complaining of 9/10 constant back pain. 





lung mass


uti


back pain


leukocytosis





plan


continue abx


wbc trending down


rest as per the team

## 2019-10-19 NOTE — PN
Physical Exam: 


SUBJECTIVE: Patient seen and examined. pt offered no complaints this morning no 

fever or chills.








OBJECTIVE:





 Vital Signs











 Period  Temp  Pulse  Resp  BP Sys/Malone  Pulse Ox


 


 Last 24 Hr  97.8 F-98.3 F  62-84  16-20  111-120/54-60  96-97











GENERAL: Awake, alert, and fully oriented, in no acute distress.


HEAD: Normal with no signs of trauma.


EYES: Pupils equal, round and reactive to light, extraocular movements intact, 

sclera anicteric, conjunctiva clear. No lid lag.


EARS, NOSE, THROAT: oropharynx clear without exudates. Moist mucous membranes.


LUNGS: Breath sounds equal, clear to auscultation bilaterally. No wheezes, and 

no crackles. No accessory muscle use.


HEART: Regular rate and rhythm, normal S1 and S2 without murmur, rub or gallop.


ABDOMEN: Soft, nontender, mildly distended, normoactive bowel sounds, no 

guarding, no rebound, no masses.  No hepatomegaly or  splenomegaly. 


UPPER EXTREMITIES: 2+ pulses, warm, well-perfused. No cyanosis. No clubbing. No 

peripheral edema.


LOWER EXTREMITIES: 2+ pulses, warm, well-perfused. BLE significantly atrophied 

with splint around left knee and leg, distal pulses present





 Laboratory Results - last 24 hr











  10/19/19 10/19/19





  08:00 08:00


 


WBC  11.8 H 


 


RBC  4.02 


 


Hgb  12.8 


 


Hct  38.3 


 


MCV  95.5 


 


MCH  31.8 


 


MCHC  33.3 


 


RDW  14.4 


 


Plt Count  644 H 


 


MPV  9.2 


 


Absolute Neuts (auto)  9.4 H 


 


Neutrophils %  79.5 


 


Lymphocytes %  11.0  D 


 


Monocytes %  6.4 


 


Eosinophils %  2.6  D 


 


Basophils %  0.5 


 


Nucleated RBC %  0 


 


Sodium   138


 


Potassium   3.7


 


Chloride   102


 


Carbon Dioxide   25


 


Anion Gap   10


 


BUN   7.6


 


Creatinine   0.6


 


Est GFR (CKD-EPI)AfAm   121.43


 


Est GFR (CKD-EPI)NonAf   104.77


 


Random Glucose   90


 


Calcium   8.6








Active Medications











Generic Name Dose Route Start Last Admin





  Trade Name Freq  PRN Reason Stop Dose Admin


 


Bacitracin  1 applic  10/16/19 22:00  10/18/19 21:25





  Bacitracin -  TP   Not Given





  HS ALE   





     





     





     





     


 


Diazepam  10 mg  10/19/19 14:00  10/19/19 13:35





  Valium -  PO   10 mg





  TID ALE   Administration





     





     





     





     


 


Enoxaparin Sodium  40 mg  10/16/19 10:00  10/19/19 09:16





  Lovenox -  SQ   Not Given





  DAILY [facility]   





     





     





     





     


 


Ceftazidime 1 gm/ Dextrose  50 mls @ 200 mls/hr  10/18/19 18:00  10/19/19 01:03





  IVPB   200 mls/hr





  Q8H-IV ALE   Administration





     





     





  Protocol   





     


 


Lactobacillus Acidophilus  1 tab  10/16/19 10:00  10/19/19 09:15





  Bacid -  PO   1 tab





  DAILY ALE   Administration





     





     





     





     


 


Metoprolol Succinate  25 mg  10/16/19 10:00  10/19/19 09:16





  Toprol Xl -  PO   Not Given





  DAILY [facility]   





     





     





     





     


 


Morphine Sulfate  20 mg  10/17/19 12:27  10/19/19 11:04





  Morphine 10 Mg/5 Ml Liquid  PO   20 mg





  Q8H PRN   Administration





  PAIN LEVEL 7 - 10   





     





     





     


 


Nystatin  1 applic  10/16/19 07:00  10/19/19 06:17





  Mycostatin Ointment -  TP   1 applic





  AM ALE   Administration





     





     





     





     


 


Polyethylene Glycol  17 gm  10/16/19 01:00  10/19/19 09:17





  Miralax (For Daily Use) -  PO   17 gm





  BID ALE   Administration





     





     





     





     











ASSESSMENT/PLAN:


64 y.o. M PMH COPD, R lung adenocarcinoma (no chemo/rads/ surgery), paraplegia 2

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 

hypotension, recent admission at [facility] s/p left leg dital tibial fracture (d/c;d 

10/11/19) presenting d/t back pain.





 UTI


WBC 11.8 today slight decrease from yesterday 


Dr. Man  switched to ceftazidine


urine culture acinetobacter, staph coag neg. isolation in place for 

acinetobacter


blood cultures neg x48h


renal U/S unremarkable


Afebrile, vitals WNL





Tibial fracture


Per ortho no change in current management


lower extremity XR, ankle &foot Xray  no acute pathology


Pt. stabe for d/c per Ortho, can collow up in 10-14 days as outpatient





R lung adenocarcinoma


Pt has enlarged nodules on CT spine. CT chest done and reviewed showing 

multiple nodule


Per Heme/ Onc lung biopsy on monday.


holding lovenox tomorrow





Chronic pain


 morphine 4mg q6h PRN


 home valium restarted





Constipation


C/w miralax bid


   


Hypoalbuminemia


f/u dietician, likely 2/2 malnutrition





FEN


no standing fluids


Na controlled diet





DVT PPX


holding LVX 40mg SQ daily





Visit type





- Emergency Visit


Emergency Visit: Yes


ED Registration Date: 10/15/19


Care time: The patient presented to the Emergency Department on the above date 

and was hospitalized for further evaluation of their emergent condition.





- New Patient


This patient is new to me today: No





- Critical Care


Critical Care patient: No





- Discharge Referral


Referred to [facility] No





ATTENDING PHYSICIAN STATEMENT





I saw and evaluated the patient.


I reviewed the resident's note and discussed the case with the resident.


I agree with the resident's findings and plan as documented.








SUBJECTIVE:








OBJECTIVE:








ASSESSMENT AND PLAN:

## 2019-10-19 NOTE — PN
Teaching Attending Note


Name of Resident: Grace Mcintosh





ATTENDING PHYSICIAN STATEMENT





I saw and evaluated the patient.


I reviewed the resident's note and discussed the case with the resident.


I agree with the resident's findings and plan as documented.








SUBJECTIVE:





no fever or chills. pain is controlled . had BM yesterday 





OBJECTIVE:





NAD, awake, alert, MMM.


Cv: RRR, no MRG 


Lungs: CTAB  


Ext: muscle atrophy on RLE.  dry skin. no edema.L leg in cast. 








ASSESSMENT AND PLAN:





Unfortunate 63 y/o man with h/o spinal cord injury at level of T4, paraplegia, 

neurogenic bladder, recurrent UTIs, COPD, chronic pain, splenectomy, 

adenocarcinoma of R lung, recent Tib/Fib fx   and other medical problems who 

presented from home after bumping his L leg and foot against the wall 





1- Leukocytosis, concern for  pyelo


2- Recent  L Tib/Fib fx. with recurrent trauma 


3- Chronic back pain. not changed





plan : 


- WBC improved . Cont  Ceftazidime


- follow blood cx. Neg x 72 hr


- cont po morphine  


- Bx of lung mass on Monday 


- f/u with ortho as out pt

## 2019-10-20 LAB
ANION GAP SERPL CALC-SCNC: 8 MMOL/L (ref 8–16)
BASOPHILS # BLD: 0.3 % (ref 0–2)
BUN SERPL-MCNC: 9.1 MG/DL (ref 7–18)
CALCIUM SERPL-MCNC: 8.2 MG/DL (ref 8.5–10.1)
CHLORIDE SERPL-SCNC: 101 MMOL/L (ref 98–107)
CO2 SERPL-SCNC: 28 MMOL/L (ref 21–32)
CREAT SERPL-MCNC: 0.5 MG/DL (ref 0.55–1.3)
DEPRECATED RDW RBC AUTO: 14.4 % (ref 11.9–15.9)
EOSINOPHIL # BLD: 2.5 % (ref 0–4.5)
GLUCOSE SERPL-MCNC: 103 MG/DL (ref 74–106)
HCT VFR BLD CALC: 36 % (ref 35.4–49)
HGB BLD-MCNC: 12.2 GM/DL (ref 11.7–16.9)
LYMPHOCYTES # BLD: 10.9 % (ref 8–40)
MCH RBC QN AUTO: 31.8 PG (ref 25.7–33.7)
MCHC RBC AUTO-ENTMCNC: 33.8 G/DL (ref 32–35.9)
MCV RBC: 93.9 FL (ref 80–96)
MONOCYTES # BLD AUTO: 6.6 % (ref 3.8–10.2)
NEUTROPHILS # BLD: 79.7 % (ref 42.8–82.8)
PLATELET # BLD AUTO: 715 K/MM3 (ref 134–434)
PMV BLD: 9.4 FL (ref 7.5–11.1)
POTASSIUM SERPLBLD-SCNC: 3.9 MMOL/L (ref 3.5–5.1)
RBC # BLD AUTO: 3.83 M/MM3 (ref 4–5.6)
SODIUM SERPL-SCNC: 138 MMOL/L (ref 136–145)
WBC # BLD AUTO: 12.6 K/MM3 (ref 4–10)

## 2019-10-20 RX ADMIN — MORPHINE SULFATE PRN MG: 10 SOLUTION ORAL at 23:56

## 2019-10-20 RX ADMIN — Medication SCH TAB: at 09:49

## 2019-10-20 RX ADMIN — MORPHINE SULFATE PRN MG: 10 SOLUTION ORAL at 15:34

## 2019-10-20 RX ADMIN — CEFTAZIDIME SCH MLS/HR: 1 INJECTION, POWDER, FOR SOLUTION INTRAMUSCULAR; INTRAVENOUS at 17:16

## 2019-10-20 RX ADMIN — ENOXAPARIN SODIUM SCH: 40 INJECTION SUBCUTANEOUS at 09:47

## 2019-10-20 RX ADMIN — POLYETHYLENE GLYCOL 3350 SCH: 17 POWDER, FOR SOLUTION ORAL at 22:36

## 2019-10-20 RX ADMIN — CEFTAZIDIME SCH MLS/HR: 1 INJECTION, POWDER, FOR SOLUTION INTRAMUSCULAR; INTRAVENOUS at 09:50

## 2019-10-20 RX ADMIN — POLYETHYLENE GLYCOL 3350 SCH GM: 17 POWDER, FOR SOLUTION ORAL at 09:49

## 2019-10-20 RX ADMIN — CEFTAZIDIME SCH MLS/HR: 1 INJECTION, POWDER, FOR SOLUTION INTRAMUSCULAR; INTRAVENOUS at 02:58

## 2019-10-20 RX ADMIN — BACITRACIN ZINC SCH: 500 OINTMENT TOPICAL at 22:35

## 2019-10-20 RX ADMIN — MORPHINE SULFATE PRN MG: 10 SOLUTION ORAL at 03:58

## 2019-10-20 RX ADMIN — NYSTATIN SCH APPLIC: 100000 OINTMENT TOPICAL at 06:02

## 2019-10-20 NOTE — PN
Progress Note (short form)





- Note


Progress Note: 





Subjective:


no fever or chills. back pain is tolerable 





Objective:








Vital Signs:


 Last Vital Signs











Temp Pulse Resp BP Pulse Ox


 


 98.4 F   67   18   112/57 L  98 


 


 10/20/19 15:03  10/20/19 15:03  10/20/19 15:03  10/20/19 15:03  10/20/19 09:00








 Laboratory Results - last 24 hr











  10/20/19 10/20/19





  06:35 06:35


 


WBC  12.6 H 


 


RBC  3.83 L 


 


Hgb  12.2 


 


Hct  36.0 


 


MCV  93.9 


 


MCH  31.8 


 


MCHC  33.8 


 


RDW  14.4 


 


Plt Count  715 H 


 


MPV  9.4 


 


Absolute Neuts (auto)  10.0 H 


 


Neutrophils %  79.7 


 


Lymphocytes %  10.9 


 


Monocytes %  6.6 


 


Eosinophils %  2.5 


 


Basophils %  0.3 


 


Nucleated RBC %  0 


 


Sodium   138


 


Potassium   3.9


 


Chloride   101


 


Carbon Dioxide   28


 


Anion Gap   8


 


BUN   9.1


 


Creatinine   0.5 L


 


Est GFR (CKD-EPI)AfAm   130.88


 


Est GFR (CKD-EPI)NonAf   112.92


 


Random Glucose   103


 


Calcium   8.2 L

















Physical Exam:


NAD, awake, alert, MMM.


Cv: RRR, no MRG 


Lungs: CTAB  


Ext: muscle atrophy on RLE.  dry skin. no edema.L leg in cast. 








ASSESSMENT AND PLAN:





Unfortunate 65 y/o man with h/o spinal cord injury at level of T4, paraplegia, 

neurogenic bladder, recurrent UTIs, COPD, chronic pain, splenectomy, 

adenocarcinoma of R lung, recent Tib/Fib fx   and other medical problems who 

presented from home after bumping his L leg and foot against the wall 





1- Leukocytosis, concern for  pyelo. improved 


2- Recent  L Tib/Fib fx.


3- Chronic back pain. not changed





plan : 


- WBC improved . Cont  Ceftazidime


- follow blood cx. Neg x 96 hr


- cont po morphine  


- Bx of lung mass on Monday


- hold DVT px ./ 


- MPO after MN  


- f/u with ortho as out pt 














Visit type





- Emergency Visit


Emergency Visit: Yes


ED Registration Date: 10/15/19


Care time: The patient presented to the Emergency Department on the above date 

and was hospitalized for further evaluation of their emergent condition.





- New Patient


This patient is new to me today: No





- Critical Care


Critical Care patient: No

## 2019-10-21 LAB
BASOPHILS # BLD: 1.3 % (ref 0–2)
DEPRECATED RDW RBC AUTO: 14.3 % (ref 11.9–15.9)
EOSINOPHIL # BLD: 4.1 % (ref 0–4.5)
HCT VFR BLD CALC: 34.1 % (ref 35.4–49)
HGB BLD-MCNC: 11.6 GM/DL (ref 11.7–16.9)
LYMPHOCYTES # BLD: 14.7 % (ref 8–40)
MCH RBC QN AUTO: 31.7 PG (ref 25.7–33.7)
MCHC RBC AUTO-ENTMCNC: 33.9 G/DL (ref 32–35.9)
MCV RBC: 93.5 FL (ref 80–96)
MONOCYTES # BLD AUTO: 7.7 % (ref 3.8–10.2)
NEUTROPHILS # BLD: 72.2 % (ref 42.8–82.8)
PLATELET # BLD AUTO: 665 K/MM3 (ref 134–434)
PMV BLD: 9.1 FL (ref 7.5–11.1)
RBC # BLD AUTO: 3.65 M/MM3 (ref 4–5.6)
WBC # BLD AUTO: 11 K/MM3 (ref 4–10)

## 2019-10-21 RX ADMIN — MORPHINE SULFATE PRN MG: 10 SOLUTION ORAL at 18:06

## 2019-10-21 RX ADMIN — Medication SCH: at 09:23

## 2019-10-21 RX ADMIN — CEFTAZIDIME SCH MLS/HR: 1 INJECTION, POWDER, FOR SOLUTION INTRAMUSCULAR; INTRAVENOUS at 09:23

## 2019-10-21 RX ADMIN — CEFTAZIDIME SCH: 1 INJECTION, POWDER, FOR SOLUTION INTRAMUSCULAR; INTRAVENOUS at 05:18

## 2019-10-21 RX ADMIN — POLYETHYLENE GLYCOL 3350 SCH: 17 POWDER, FOR SOLUTION ORAL at 09:24

## 2019-10-21 RX ADMIN — CEFTAZIDIME SCH MLS/HR: 1 INJECTION, POWDER, FOR SOLUTION INTRAMUSCULAR; INTRAVENOUS at 02:40

## 2019-10-21 RX ADMIN — MORPHINE SULFATE PRN MG: 10 SOLUTION ORAL at 09:21

## 2019-10-21 RX ADMIN — CEFTAZIDIME SCH MLS/HR: 1 INJECTION, POWDER, FOR SOLUTION INTRAMUSCULAR; INTRAVENOUS at 18:05

## 2019-10-21 RX ADMIN — POLYETHYLENE GLYCOL 3350 SCH: 17 POWDER, FOR SOLUTION ORAL at 22:07

## 2019-10-21 RX ADMIN — BACITRACIN ZINC SCH: 500 OINTMENT TOPICAL at 22:07

## 2019-10-21 RX ADMIN — NYSTATIN SCH APPLIC: 100000 OINTMENT TOPICAL at 06:36

## 2019-10-21 NOTE — PN
Teaching Attending Note


Name of Resident: Yessenia Mcgarry





ATTENDING PHYSICIAN STATEMENT





I saw and evaluated the patient.


I reviewed the resident's note and discussed the case with the resident.


I agree with the resident's findings and plan as documented.








SUBJECTIVE:


No fever or chills. No HA. No abd pain. has chronci back pain which has not 

changed 





OBJECTIVE:


NAD, awake, alert, MMM.


Cv: RRR, no MRG 


Lungs: CTAB  


Ext: muscle atrophy on RLE.  dry skin. no edema.L leg in cast. 








ASSESSMENT AND PLAN:





Unfortunate 63 y/o man with h/o spinal cord injury at level of T4, paraplegia, 

neurogenic bladder, recurrent UTIs, COPD, chronic pain, splenectomy, 

adenocarcinoma of R lung, recent Tib/Fib fx   and other medical problems who 

presented from home after bumping his L leg and foot against the wall 





1- Leukocytosis, concern for  pyelo. improved 


2- Recent  L Tib/Fib fx.


3- Chronic back pain. not changed





plan : 


-  will cont ceftazidim for a total of 10 days ( today day 4) 


- lung bx was not done today .


- PICC line placement for Abx 


-  cont po morphine 


-NPO after MN.


- f/u with ortho as out pt 








Dispo : [facility]. Pt prefers to get his Abx in Rehab .

## 2019-10-21 NOTE — PN
Physical Exam: 


SUBJECTIVE: Patient seen and examined


65 y/o M w/ PMH of COPD, hypotension, adenocarcinoma of right lung, paraplegia 

due to spinal cord transection T2-T4, neurogenic bladder w/ recurrent UTI's, 

splenectomy, recent lower leg fracture- Tib-Fib (10/7) presents for back pain 

is now being treated for UTI. Pt reports no complaints or overnight events. Pt 

reports he has been able to move bowel and urinate adequately. Admits to 

chronic back pain and chronic suprapubic pain. Denies f/c/n/v, diarrhea, 

abdominal pain. 





OBJECTIVE:





 Vital Signs











 Period  Temp  Pulse  Resp  BP Sys/Malone  Pulse Ox


 


 Last 24 Hr  97.6 F-98.7 F  80-86  18  116-127/62-65  











GENERAL: The patient is awake, alert, and fully oriented, NAD


EYES: PERRL, extraocular movements intact, conjunctiva clear. No ptosis. 


ENT: oropharynx clear without exudates, moist mucous 


membranes.


NECK: supple, No LAD or carotid bruit


LUNGS: Breath sounds equal, clear to auscultation bilaterally, no wheezes, no 

crackles


HEART: Regular rate and rhythm, S1, S2 without murmur, rub or gallop.


ABDOMEN: Soft, nontender, nondistended, normoactive bowel sounds, no guarding, 

no 


rebound


EXTREMITIES: 2+ pulses, warm, well-perfused, no edema. 


LE- paraplegic related disability- pulses intact. Left leg dressed and 

immobilized due to Tib-Fib fracture. 


NEUROLOGICAL: Cranial nerves II through XII grossly intact. UE strength 5/5 and 

sensation 5/5


SKIN: Warm, dry, normal turgor, no rashes or lesions noted











 Laboratory Results - last 24 hr


CBC,CMP











WBC  11.0 K/mm3 (4.0-10.0)  H  10/21/19  07:40    


 


RBC  3.65 M/mm3 (4.00-5.60)  L  10/21/19  07:40    


 


Hgb  11.6 GM/dL (11.7-16.9)  L  10/21/19  07:40    


 


Hct  34.1 % (35.4-49)  L  10/21/19  07:40    


 


MCV  93.5 fl (80-96)   10/21/19  07:40    


 


MCH  31.7 pg (25.7-33.7)   10/21/19  07:40    


 


MCHC  33.9 g/dl (32.0-35.9)   10/21/19  07:40    


 


RDW  14.3 % (11.9-15.9)   10/21/19  07:40    


 


Plt Count  665 K/MM3 (134-434)  H  10/21/19  07:40    


 


MPV  9.1 fl (7.5-11.1)   10/21/19  07:40    


 


Absolute Neuts (auto)  8.0 K/mm3 (1.5-8.0)   10/21/19  07:40    


 


Neutrophils %  72.2 % (42.8-82.8)   10/21/19  07:40    


 


Neutrophils % (Manual)  86.4 % (42.8-82.8)  H  10/17/19  12:05    


 


Band Neutrophils %  0.0 %  10/17/19  12:05    


 


Lymphocytes %  14.7 % (8-40)  D 10/21/19  07:40    


 


Lymphocytes % (Manual)  3.9 % (8-40)  L D 10/17/19  12:05    


 


Monocytes %  7.7 % (3.8-10.2)   10/21/19  07:40    


 


Monocytes % (Manual)  6 % (3.8-10.2)   10/17/19  12:05    


 


Eosinophils %  4.1 % (0-4.5)   10/21/19  07:40    


 


Eosinophils % (Manual)  0.0 % (0-4.5)  D 10/17/19  12:05    


 


Basophils %  1.3 % (0-2.0)  D 10/21/19  07:40    


 


Basophils % (Manual)  1.0 % (0-2.0)  D 10/17/19  12:05    


 


Myelocytes % (Man)  0 % (0-2)   10/17/19  12:05    


 


Promyelocytes % (Man)  0 % (0-2)   10/17/19  12:05    


 


Blast Cells % (Manual)  0 % (0-0)   10/17/19  12:05    


 


Nucleated RBC %  0 % (0-0)   10/21/19  07:40    


 


Metamyelocytes  0 % (0-2)  D 10/17/19  12:05    


 


Hypochromia  0   10/17/19  12:05    


 


Platelet Estimate  Increased   10/17/19  12:05    


 


Polychromasia  0   10/17/19  12:05    


 


Poikilocytosis  0   10/17/19  12:05    


 


Anisocytosis  0   10/17/19  12:05    


 


Microcytosis  0   10/17/19  12:05    


 


Macrocytosis  0   10/17/19  12:05    


 


Sodium  138 mmol/L (136-145)   10/20/19  06:35    


 


Potassium  3.9 mmol/L (3.5-5.1)   10/20/19  06:35    


 


Chloride  101 mmol/L ()   10/20/19  06:35    


 


Carbon Dioxide  28 mmol/L (21-32)   10/20/19  06:35    


 


Anion Gap  8 MMOL/L (8-16)   10/20/19  06:35    


 


BUN  9.1 mg/dL (7-18)   10/20/19  06:35    


 


Creatinine  0.5 mg/dL (0.55-1.3)  L  10/20/19  06:35    


 


Est GFR (CKD-EPI)AfAm  130.88   10/20/19  06:35    


 


Est GFR (CKD-EPI)NonAf  112.92   10/20/19  06:35    


 


Random Glucose  103 mg/dL ()   10/20/19  06:35    


 


Calcium  8.2 mg/dL (8.5-10.1)  L  10/20/19  06:35    


 


Phosphorus  3.2 mg/dL (2.5-4.9)   10/18/19  12:26    


 


Magnesium  2.1 mg/dL (1.8-2.4)   10/18/19  12:26    


 


Total Bilirubin  0.8 mg/dL (0.2-1)   10/16/19  07:30    


 


AST  13 U/L (15-37)  L  10/16/19  07:30    


 


ALT  15 U/L (13-61)   10/16/19  07:30    


 


Alkaline Phosphatase  125 U/L ()  H  10/16/19  07:30    


 


Creatine Kinase  Cancelled   10/15/19  15:10    


 


Troponin I  Cancelled   10/15/19  15:10    


 


Total Protein  6.6 g/dl (6.4-8.2)   10/16/19  07:30    


 


Albumin  2.6 g/dl (3.4-5.0)  L  10/16/19  07:30    


 


Lipase  375 U/L ()   10/15/19  17:00    














Active Medications











Generic Name Dose Route Start Last Admin





  Trade Name Freq  PRN Reason Stop Dose Admin


 


Bacitracin  1 applic  10/16/19 22:00  10/20/19 22:35





  Bacitracin -  TP   Not Given





  HS ALE   





     





     





     





     


 


Diazepam  10 mg  10/19/19 14:00  10/21/19 14:10





  Valium -  PO   10 mg





  TID ALE   Administration





     





     





     





     


 


Enoxaparin Sodium  40 mg  10/16/19 10:00  10/20/19 09:47





  Lovenox -  SQ   Not Given





  DAILY ALE   





     





     





     





     


 


Ceftazidime 1 gm/ Dextrose  50 mls @ 200 mls/hr  10/18/19 18:00  10/21/19 18:05





  IVPB   200 mls/hr





  Q8H-IV ALE   Administration





     





     





  Protocol   





     


 


Lactobacillus Acidophilus  1 tab  10/16/19 10:00  10/21/19 09:23





  Bacid -  PO   Not Given





  DAILY ALE   





     





     





     





     


 


Metoprolol Succinate  25 mg  10/16/19 10:00  10/21/19 09:23





  Toprol Xl -  PO   Not Given





  DAILY ALE   





     





     





     





     


 


Morphine Sulfate  20 mg  10/17/19 12:27  10/21/19 18:06





  Morphine 10 Mg/5 Ml Liquid  PO   20 mg





  Q8H PRN   Administration





  PAIN LEVEL 7 - 10   





     





     





     


 


Nystatin  1 applic  10/16/19 07:00  10/21/19 06:36





  Mycostatin Ointment -  TP   1 applic





  AM ALE   Administration





     





     





     





     


 


Polyethylene Glycol  17 gm  10/16/19 01:00  10/21/19 09:24





  Miralax (For Daily Use) -  PO   Not Given





  BID ALE   





     





     





     





     











ASSESSMENT/PLAN:


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 

transection T2-T4, neurogenic bladder w/ recurrent UTI's, splenectomy, recent 

lower leg fracture- Tib-Fib (10/7) presents for back pain is now being managed 

for UTI.





#Sepsis 2/2 UTI 


now resolved 


UCx: Acintobacter


continue Abx- ceftazidime IV


negative for Pyelo on imaging


f/u BCx, so far negative


ID recommended one more day of treatment


Pt will go for PICC placement by IR tomorrow 


Will f/u w/  for Nursing home placement- preferred by the 

hospitalist team


Pt prefers to get his abx at rehab





#Adenocarcinoma of the right lung


Pt will go for Lung bx tomorrow- for the purpose of directing treatment


CXR ordered





#Tib/Fib fracture


f/u outpt





#Chronic back pain


Pain management- PO morphine





#DVT ppx


SCDs b/l





#FEN


NPO after Midnight


regular diet till midnight 





Dispo: continue Abx, monitor White count, f/u w/ IR for picc placement and Lung 

bx





Visit type





- Emergency Visit


Emergency Visit: Yes


ED Registration Date: 10/15/19


Care time: The patient presented to the Emergency Department on the above date 

and was hospitalized for further evaluation of their emergent condition.





- New Patient


This patient is new to me today: Yes


Date on this admission: 10/22/19





- Critical Care


Critical Care patient: No





- Discharge Referral


Referred to [facility] No





ATTENDING PHYSICIAN STATEMENT





I saw and evaluated the patient.


I reviewed the resident's note and discussed the case with the resident.


I agree with the resident's findings and plan as documented.








SUBJECTIVE:








OBJECTIVE:








ASSESSMENT AND PLAN:

## 2019-10-21 NOTE — PN
Progress Note, Physician


History of Present Illness: 





no new issues





- Current Medication List


Current Medications: 


Active Medications





Bacitracin (Bacitracin -)  1 applic TP HS [facility]


   Last Admin: 10/20/19 22:35 Dose:  Not Given


Diazepam (Valium -)  10 mg PO TID [facility]


   Last Admin: 10/21/19 06:35 Dose:  10 mg


Enoxaparin Sodium (Lovenox -)  40 mg SQ DAILY [facility]


   Last Admin: 10/20/19 09:47 Dose:  Not Given


Ceftazidime 1 gm/ Dextrose  50 mls @ 200 mls/hr IVPB Q8H-IV ALE; Protocol


   Last Admin: 10/21/19 09:23 Dose:  200 mls/hr


Lactobacillus Acidophilus (Bacid -)  1 tab PO DAILY [facility]


   Last Admin: 10/21/19 09:23 Dose:  Not Given


Metoprolol Succinate (Toprol Xl -)  25 mg PO DAILY [facility]


   Last Admin: 10/21/19 09:23 Dose:  Not Given


Morphine Sulfate (Morphine 10 Mg/5 Ml Liquid)  20 mg PO Q8H PRN


   PRN Reason: PAIN LEVEL 7 - 10


   Last Admin: 10/21/19 09:21 Dose:  20 mg


Nystatin (Mycostatin Ointment -)  1 applic TP AM [facility]


   Last Admin: 10/21/19 06:36 Dose:  1 applic


Polyethylene Glycol (Miralax (For Daily Use) -)  17 gm PO BID [facility]


   Last Admin: 10/21/19 09:24 Dose:  Not Given











- Objective


Vital Signs: 


 Vital Signs











Temperature  97.6 F   10/21/19 05:47


 


Pulse Rate  80   10/21/19 05:47


 


Respiratory Rate  18   10/21/19 05:47


 


Blood Pressure  116/62   10/21/19 05:47


 


O2 Sat by Pulse Oximetry (%)  98   10/20/19 09:00











Constitutional: Yes: No Distress, Calm


Cardiovascular: Yes: S1, S2


Respiratory: Yes: Regular, CTA Bilaterally


Gastrointestinal: Yes: Normal Bowel Sounds, Soft


Musculoskeletal: Yes: WNL


Extremities: Yes: Other


Neurological: Yes: Alert, Oriented


Psychiatric: Yes: Alert, Oriented


Labs: 


 CBC, BMP





 10/21/19 07:40 





 10/20/19 06:35 





 INR, PTT











INR  0.99  (0.83-1.09)   10/18/19  07:50    














Assessment/Plan





64-year-old male with a past medical history of COPD, right lung adenocarcinoma 

(not treated), paraplegia secondary to spinal cord transection at T4, 

neurogenic bladder, recent left tibia-fibula fracture who comes into the 

emergency department complaining of 9/10 constant back pain. 





lung mass


uti


back pain


leukocytosis





plan


continue abx


wbc trending down


rest as per the team

## 2019-10-22 LAB
ALBUMIN SERPL-MCNC: 2.4 G/DL (ref 3.4–5)
ALP SERPL-CCNC: 111 U/L (ref 45–117)
ALT SERPL-CCNC: 15 U/L (ref 13–61)
ANION GAP SERPL CALC-SCNC: 9 MMOL/L (ref 8–16)
AST SERPL-CCNC: 15 U/L (ref 15–37)
BASOPHILS # BLD: 1.2 % (ref 0–2)
BILIRUB SERPL-MCNC: 0.3 MG/DL (ref 0.2–1)
BUN SERPL-MCNC: 10.7 MG/DL (ref 7–18)
CALCIUM SERPL-MCNC: 9.1 MG/DL (ref 8.5–10.1)
CHLORIDE SERPL-SCNC: 100 MMOL/L (ref 98–107)
CO2 SERPL-SCNC: 28 MMOL/L (ref 21–32)
CREAT SERPL-MCNC: 0.5 MG/DL (ref 0.55–1.3)
DEPRECATED RDW RBC AUTO: 14.5 % (ref 11.9–15.9)
EOSINOPHIL # BLD: 4.7 % (ref 0–4.5)
GLUCOSE SERPL-MCNC: 83 MG/DL (ref 74–106)
HCT VFR BLD CALC: 37.4 % (ref 35.4–49)
HGB BLD-MCNC: 12.3 GM/DL (ref 11.7–16.9)
LYMPHOCYTES # BLD: 13.3 % (ref 8–40)
MCH RBC QN AUTO: 31.1 PG (ref 25.7–33.7)
MCHC RBC AUTO-ENTMCNC: 32.8 G/DL (ref 32–35.9)
MCV RBC: 94.8 FL (ref 80–96)
MONOCYTES # BLD AUTO: 9.5 % (ref 3.8–10.2)
NEUTROPHILS # BLD: 71.3 % (ref 42.8–82.8)
PLATELET # BLD AUTO: 720 K/MM3 (ref 134–434)
PMV BLD: 9.6 FL (ref 7.5–11.1)
POTASSIUM SERPLBLD-SCNC: 4.4 MMOL/L (ref 3.5–5.1)
PROT SERPL-MCNC: 6.4 G/DL (ref 6.4–8.2)
RBC # BLD AUTO: 3.94 M/MM3 (ref 4–5.6)
SODIUM SERPL-SCNC: 137 MMOL/L (ref 136–145)
WBC # BLD AUTO: 11.6 K/MM3 (ref 4–10)

## 2019-10-22 PROCEDURE — 0BBK3ZX EXCISION OF RIGHT LUNG, PERCUTANEOUS APPROACH, DIAGNOSTIC: ICD-10-PCS | Performed by: RADIOLOGY

## 2019-10-22 RX ADMIN — Medication SCH TAB: at 09:10

## 2019-10-22 RX ADMIN — MORPHINE SULFATE PRN MG: 10 SOLUTION ORAL at 09:10

## 2019-10-22 RX ADMIN — BACITRACIN ZINC SCH: 500 OINTMENT TOPICAL at 22:02

## 2019-10-22 RX ADMIN — CEFTAZIDIME SCH MLS/HR: 1 INJECTION, POWDER, FOR SOLUTION INTRAMUSCULAR; INTRAVENOUS at 09:10

## 2019-10-22 RX ADMIN — POLYETHYLENE GLYCOL 3350 SCH GM: 17 POWDER, FOR SOLUTION ORAL at 22:04

## 2019-10-22 RX ADMIN — CEFTAZIDIME SCH MLS/HR: 1 INJECTION, POWDER, FOR SOLUTION INTRAMUSCULAR; INTRAVENOUS at 17:55

## 2019-10-22 RX ADMIN — NYSTATIN SCH APPLIC: 100000 OINTMENT TOPICAL at 06:09

## 2019-10-22 RX ADMIN — POLYETHYLENE GLYCOL 3350 SCH GM: 17 POWDER, FOR SOLUTION ORAL at 09:14

## 2019-10-22 RX ADMIN — MORPHINE SULFATE PRN MG: 10 SOLUTION ORAL at 02:02

## 2019-10-22 RX ADMIN — CEFTAZIDIME SCH MLS/HR: 1 INJECTION, POWDER, FOR SOLUTION INTRAMUSCULAR; INTRAVENOUS at 02:02

## 2019-10-22 NOTE — PN
Progress Note, Physician


History of Present Illness: 





no new issues





- Current Medication List


Current Medications: 


Active Medications





Bacitracin (Bacitracin -)  1 applic TP HS [facility]


   Last Admin: 10/21/19 22:07 Dose:  Not Given


Diazepam (Valium -)  10 mg PO TID [facility]


   Last Admin: 10/22/19 06:10 Dose:  10 mg


Enoxaparin Sodium (Lovenox -)  40 mg SQ DAILY [facility]


   Last Admin: 10/20/19 09:47 Dose:  Not Given


Ceftazidime 1 gm/ Dextrose  50 mls @ 200 mls/hr IVPB Q8H-IV ALE; Protocol


   Last Admin: 10/22/19 09:10 Dose:  200 mls/hr


Lactobacillus Acidophilus (Bacid -)  1 tab PO DAILY [facility]


   Last Admin: 10/22/19 09:10 Dose:  1 tab


Metoprolol Succinate (Toprol Xl -)  25 mg PO DAILY [facility]


   Last Admin: 10/22/19 09:10 Dose:  25 mg


Nystatin (Mycostatin Ointment -)  1 applic TP AM [facility]


   Last Admin: 10/22/19 06:09 Dose:  1 applic


Polyethylene Glycol (Miralax (For Daily Use) -)  17 gm PO BID ALE


   Last Admin: 10/22/19 09:14 Dose:  17 gm











- Objective


Vital Signs: 


 Vital Signs











Temperature  98.2 F   10/22/19 06:00


 


Pulse Rate  74   10/22/19 06:00


 


Respiratory Rate  12   10/22/19 06:00


 


Blood Pressure  137/65   10/22/19 06:00


 


O2 Sat by Pulse Oximetry (%)  98   10/21/19 21:00











Constitutional: Yes: No Distress, Calm


Cardiovascular: Yes: S1, S2


Respiratory: Yes: Regular, CTA Bilaterally


Gastrointestinal: Yes: Normal Bowel Sounds, Soft


Musculoskeletal: Yes: WNL


Extremities: Yes: Other


Neurological: Yes: Alert, Oriented


Psychiatric: Yes: Alert, Oriented


Labs: 


 CBC, BMP





 10/22/19 07:29 





 10/22/19 07:29 





 INR, PTT











INR  0.99  (0.83-1.09)   10/18/19  07:50    














Assessment/Plan





64-year-old male with a past medical history of COPD, right lung adenocarcinoma 

(not treated), paraplegia secondary to spinal cord transection at T4, 

neurogenic bladder, recent left tibia-fibula fracture who comes into the 

emergency department complaining of 9/10 constant back pain. 





lung mass


uti


back pain


leukocytosis





plan


continue abx


wbc trending down


rest as per the team

## 2019-10-22 NOTE — PN
Physical Exam: 


SUBJECTIVE: Patient seen and examined


63 y/o M w/ PMH of COPD, hypotension, adenocarcinoma of right lung, paraplegia 

due to spinal cord transection T2-T4, neurogenic bladder w/ recurrent UTI's, 

splenectomy, recent lower leg fracture- Tib-Fib (10/7) presents for back pain 

is now being treated for UTI. Pt reports no complaints or overnight events. Pt 

reports inability to pass adequate stool and would like a enema. Pt reports he 

has been able to urinate adequately. Admits to chronic back pain and chronic 

suprapubic pain. Denies f/c/n/v, diarrhea, urinary symptoms, chest pain, sob, 

headaches.








OBJECTIVE:





 Vital Signs











 Period  Temp  Pulse  Resp  BP Sys/Malone  Pulse Ox


 


 Last 24 Hr  97 F-98.6 F    11-27  /33-98  











GENERAL: The patient is awake, alert, and fully oriented, in no acute distress.


EYES: PERRL, extraocular movements intact, 


NECK: supple. 


LUNGS: Breath sounds equal, clear to auscultation bilaterally, no wheezes, no 

crackles,  


HEART: Regular rate and rhythm, S1, S2 without murmur, rub or gallop.


ABDOMEN: Soft, nontender, nondistended, normoactive bowel sounds, no guarding, 

no 


rebound. 


EXTREMITIES: 2+ pulses, paraplegic below the waist. 


NEUROLOGICAL: Normal speech. Paraplegia 


SKIN: Warm














 Laboratory Results - last 24 hr











  10/22/19 10/22/19





  07:29 07:29


 


WBC  11.6 H 


 


RBC  3.94 L 


 


Hgb  12.3 


 


Hct  37.4 


 


MCV  94.8 


 


MCH  31.1 


 


MCHC  32.8 


 


RDW  14.5 


 


Plt Count  720 H 


 


MPV  9.6 


 


Absolute Neuts (auto)  8.3 H 


 


Neutrophils %  71.3 


 


Lymphocytes %  13.3 


 


Monocytes %  9.5 


 


Eosinophils %  4.7 H 


 


Basophils %  1.2 


 


Nucleated RBC %  0 


 


Sodium   137


 


Potassium   4.4


 


Chloride   100


 


Carbon Dioxide   28


 


Anion Gap   9


 


BUN   10.7


 


Creatinine   0.5 L


 


Est GFR (CKD-EPI)AfAm   130.88


 


Est GFR (CKD-EPI)NonAf   112.92


 


Random Glucose   83


 


Calcium   9.1


 


Total Bilirubin   0.3


 


AST   15


 


ALT   15


 


Alkaline Phosphatase   111


 


Total Protein   6.4


 


Albumin   2.4 L








Active Medications











Generic Name Dose Route Start Last Admin





  Trade Name Freq  PRN Reason Stop Dose Admin


 


Bacitracin  1 applic  10/16/19 22:00  10/21/19 22:07





  Bacitracin -  TP   Not Given





  HS ALE   





     





     





     





     


 


Diazepam  10 mg  10/19/19 14:00  10/22/19 15:12





  Valium -  PO   10 mg





  TID ALE   Administration





     





     





     





     


 


Enoxaparin Sodium  40 mg  10/16/19 10:00  10/20/19 09:47





  Lovenox -  SQ   Not Given





  DAILY ALE   





     





     





     





     


 


Ceftazidime 1 gm/ Dextrose  50 mls @ 200 mls/hr  10/18/19 18:00  10/22/19 17:55





  IVPB   200 mls/hr





  Q8H-IV ALE   Administration





     





     





  Protocol   





     


 


Lactobacillus Acidophilus  1 tab  10/16/19 10:00  10/22/19 09:10





  Bacid -  PO   1 tab





  DAILY ALE   Administration





     





     





     





     


 


Metoprolol Succinate  25 mg  10/16/19 10:00  10/22/19 09:10





  Toprol Xl -  PO   25 mg





  DAILY ALE   Administration





     





     





     





     


 


Nystatin  1 applic  10/16/19 07:00  10/22/19 06:09





  Mycostatin Ointment -  TP   1 applic





  AM ALE   Administration





     





     





     





     


 


Polyethylene Glycol  17 gm  10/16/19 01:00  10/22/19 09:14





  Miralax (For Daily Use) -  PO   17 gm





  BID ALE   Administration





     





     





     





     











ASSESSMENT/PLAN:


63 y/o M w/ PMH of adenocarcinoma of right lung, paraplegia due to spinal cord 

transection T2-T4, neurogenic bladder w/ recurrent UTI's, splenectomy, recent 

lower leg fracture- Tib-Fib (10/7) presents for back pain is now being managed 

for UTI.





#Sepsis 2/2 UTI 


now resolved- white count normal


UCx: Acintobacter


continue Abx- ceftazidime IV- 5 more days after discharge


Pt will go for PICC placement before discharge tomorrow in the am


Spoke to [facility]-/ for Nursing home placement- 

preferred by the hospitalist team


Pt prefers to get his abx at rehab





#Adenocarcinoma of the right lung


Lung bx done today by IR- pending results





#Tib/Fib fracture


f/u outpt





#Chronic back pain


Pain management- PO morphine





#DVT ppx


SCDs b/l





#FEN


regular diet





Dispo: continue Abx, f/u lung bc results, f/u picc placement before discharge 

tomorrow 








Visit type





- Emergency Visit


Emergency Visit: Yes


ED Registration Date: 10/15/19


Care time: The patient presented to the Emergency Department on the above date 

and was hospitalized for further evaluation of their emergent condition.





- New Patient


This patient is new to me today: Yes


Date on this admission: 10/26/19





- Critical Care


Critical Care patient: No





- Discharge Referral


Referred to [facility] No





ATTENDING PHYSICIAN STATEMENT





I saw and evaluated the patient.


I reviewed the resident's note and discussed the case with the resident.


I agree with the resident's findings and plan as documented.








SUBJECTIVE:








OBJECTIVE:








ASSESSMENT AND PLAN:

## 2019-10-22 NOTE — PN
Teaching Attending Note


Name of Resident: Nilton Stevens





ATTENDING PHYSICIAN STATEMENT





I saw and evaluated the patient.


I reviewed the resident's note and discussed the case with the resident.


I agree with the resident's findings and plan as documented.








SUBJECTIVE:





No fever or chills. cont to have back pain . complains of No BM


OBJECTIVE:


NAD, awake, alert, MMM.


Cv: RRR, no MRG 


Lungs: CTAB  


Ext: muscle atrophy on RLE.  dry skin. no edema.L leg in cast. 








ASSESSMENT AND PLAN:





Unfortunate 65 y/o man with h/o spinal cord injury at level of T4, paraplegia, 

neurogenic bladder, recurrent UTIs, COPD, chronic pain, splenectomy, 

adenocarcinoma of R lung, recent Tib/Fib fx   and other medical problems who 

presented from home after bumping his L leg and foot against the wall 





1- Leukocytosis, concern for  pyelo. improved 


2- Recent  L Tib/Fib fx.


3- Chronic back pain. not changed





plan : 


-  will cont ceftazidim for a total of 10 days ( today day 4) 


- lung bx  done today ( for more genetic testing on the sample, to decide on 

chemotherapy) 


- cxray reviewed, no pkneumothorax seen. will follow final report 


- PICC line placement for IV Abx 


- cont po morphine 


- f/u with ortho as out pt 








Dispo : if no complications , can leave to rehab tomorrow

## 2019-10-23 LAB
ALBUMIN SERPL-MCNC: 2.4 G/DL (ref 3.4–5)
ALP SERPL-CCNC: 110 U/L (ref 45–117)
ALT SERPL-CCNC: 15 U/L (ref 13–61)
ANION GAP SERPL CALC-SCNC: 7 MMOL/L (ref 8–16)
AST SERPL-CCNC: 16 U/L (ref 15–37)
BASOPHILS # BLD: 0.2 % (ref 0–2)
BILIRUB SERPL-MCNC: 0.4 MG/DL (ref 0.2–1)
BUN SERPL-MCNC: 8.7 MG/DL (ref 7–18)
CALCIUM SERPL-MCNC: 9.1 MG/DL (ref 8.5–10.1)
CHLORIDE SERPL-SCNC: 102 MMOL/L (ref 98–107)
CO2 SERPL-SCNC: 30 MMOL/L (ref 21–32)
CREAT SERPL-MCNC: 0.6 MG/DL (ref 0.55–1.3)
DEPRECATED RDW RBC AUTO: 14.7 % (ref 11.9–15.9)
EOSINOPHIL # BLD: 6.4 % (ref 0–4.5)
GLUCOSE SERPL-MCNC: 118 MG/DL (ref 74–106)
HCT VFR BLD CALC: 37.8 % (ref 35.4–49)
HGB BLD-MCNC: 12.6 GM/DL (ref 11.7–16.9)
LYMPHOCYTES # BLD: 11.1 % (ref 8–40)
MCH RBC QN AUTO: 31.5 PG (ref 25.7–33.7)
MCHC RBC AUTO-ENTMCNC: 33.5 G/DL (ref 32–35.9)
MCV RBC: 94.1 FL (ref 80–96)
MONOCYTES # BLD AUTO: 7.7 % (ref 3.8–10.2)
NEUTROPHILS # BLD: 74.6 % (ref 42.8–82.8)
PLATELET # BLD AUTO: 743 K/MM3 (ref 134–434)
PMV BLD: 9 FL (ref 7.5–11.1)
POTASSIUM SERPLBLD-SCNC: 3.8 MMOL/L (ref 3.5–5.1)
PROT SERPL-MCNC: 6.4 G/DL (ref 6.4–8.2)
RBC # BLD AUTO: 4.02 M/MM3 (ref 4–5.6)
SODIUM SERPL-SCNC: 138 MMOL/L (ref 136–145)
WBC # BLD AUTO: 11.3 K/MM3 (ref 4–10)

## 2019-10-23 PROCEDURE — B518ZZA FLUOROSCOPY OF SUPERIOR VENA CAVA, GUIDANCE: ICD-10-PCS | Performed by: RADIOLOGY

## 2019-10-23 PROCEDURE — 02HV33Z INSERTION OF INFUSION DEVICE INTO SUPERIOR VENA CAVA, PERCUTANEOUS APPROACH: ICD-10-PCS | Performed by: RADIOLOGY

## 2019-10-23 RX ADMIN — ENOXAPARIN SODIUM SCH: 40 INJECTION SUBCUTANEOUS at 12:18

## 2019-10-23 RX ADMIN — POLYETHYLENE GLYCOL 3350 SCH GM: 17 POWDER, FOR SOLUTION ORAL at 11:48

## 2019-10-23 RX ADMIN — CEFTAZIDIME SCH MLS/HR: 1 INJECTION, POWDER, FOR SOLUTION INTRAMUSCULAR; INTRAVENOUS at 18:05

## 2019-10-23 RX ADMIN — BACITRACIN ZINC SCH: 500 OINTMENT TOPICAL at 21:21

## 2019-10-23 RX ADMIN — Medication SCH TAB: at 11:48

## 2019-10-23 RX ADMIN — MORPHINE SULFATE PRN MG: 10 SOLUTION ORAL at 15:34

## 2019-10-23 RX ADMIN — NYSTATIN SCH APPLIC: 100000 OINTMENT TOPICAL at 06:05

## 2019-10-23 RX ADMIN — CEFTAZIDIME SCH MLS/HR: 1 INJECTION, POWDER, FOR SOLUTION INTRAMUSCULAR; INTRAVENOUS at 01:51

## 2019-10-23 RX ADMIN — CEFTAZIDIME SCH MLS/HR: 1 INJECTION, POWDER, FOR SOLUTION INTRAMUSCULAR; INTRAVENOUS at 11:48

## 2019-10-23 RX ADMIN — MORPHINE SULFATE PRN MG: 10 SOLUTION ORAL at 23:03

## 2019-10-23 RX ADMIN — POLYETHYLENE GLYCOL 3350 SCH: 17 POWDER, FOR SOLUTION ORAL at 21:21

## 2019-10-23 NOTE — PN
Teaching Attending Note


Name of Resident: Nilton Stevens





ATTENDING PHYSICIAN STATEMENT





I saw and evaluated the patient.


I reviewed the resident's note and discussed the case with the resident.


I agree with the resident's findings and plan as documented.








SUBJECTIVE:


Patient is comfortable with no acute distress. No fever or chills, no shortness 

of breath. 


OBJECTIVE:





 Vital Signs











Temperature  98.6 F   10/23/19 19:25


 


Pulse Rate  66   10/23/19 19:25


 


Respiratory Rate  20   10/23/19 19:25


 


Blood Pressure  118/65   10/23/19 19:25


 


O2 Sat by Pulse Oximetry (%)  100   10/22/19 13:39








GENERAL: The patient is awake, alert, and oriented, in no acute distress.


HEAD: Normal with no signs of trauma.


EYES: PERRL, extraocular movements intact, sclera anicteric, conjunctiva clear. 


ENT: Ears normal,  oropharynx clear without exudates, moist mucous membranes.


NECK: Trachea midline, full range of motion, supple. 


LUNGS: Breath sounds equal, clear to auscultation bilaterally, no wheezes, no 

crackles, no accessory muscle use. 


HEART: Regular rate and rhythm, S1, S2 without murmur, rub or gallop.


ABDOMEN: Soft, nontender, nondistended, normoactive bowel sounds, no guarding, 

no rebound, no hepatosplenomegaly, no masses.


EXTREMITIES: 2+ pulses, warm, well-perfused, no edema. 


NEUROLOGICAL: Cranial nerves II through XII grossly intact. Normal speech, 

paraplegic 


PSYCH: Normal mood, normal affect.


SKIN: Warm, dry, normal turgor, no rashes or lesions noted        CBCD











WBC  11.3 K/mm3 (4.0-10.0)  H  10/23/19  08:50    


 


RBC  4.02 M/mm3 (4.00-5.60)   10/23/19  08:50    


 


Hgb  12.6 GM/dL (11.7-16.9)   10/23/19  08:50    


 


Hct  37.8 % (35.4-49)   10/23/19  08:50    


 


MCV  94.1 fl (80-96)   10/23/19  08:50    


 


MCHC  33.5 g/dl (32.0-35.9)   10/23/19  08:50    


 


RDW  14.7 % (11.9-15.9)   10/23/19  08:50    


 


Plt Count  743 K/MM3 (134-434)  H  10/23/19  08:50    


 


MPV  9.0 fl (7.5-11.1)   10/23/19  08:50    








 CMP











Sodium  138 mmol/L (136-145)   10/23/19  08:50    


 


Potassium  3.8 mmol/L (3.5-5.1)   10/23/19  08:50    


 


Chloride  102 mmol/L ()   10/23/19  08:50    


 


Carbon Dioxide  30 mmol/L (21-32)   10/23/19  08:50    


 


Anion Gap  7 MMOL/L (8-16)  L  10/23/19  08:50    


 


BUN  8.7 mg/dL (7-18)   10/23/19  08:50    


 


Creatinine  0.6 mg/dL (0.55-1.3)   10/23/19  08:50    


 


Random Glucose  118 mg/dL ()  H  10/23/19  08:50    


 


Calcium  9.1 mg/dL (8.5-10.1)   10/23/19  08:50    


 


Total Bilirubin  0.4 mg/dL (0.2-1)   10/23/19  08:50    


 


AST  16 U/L (15-37)   10/23/19  08:50    


 


ALT  15 U/L (13-61)   10/23/19  08:50    


 


Alkaline Phosphatase  110 U/L ()   10/23/19  08:50    


 


Total Protein  6.4 g/dl (6.4-8.2)   10/23/19  08:50    


 


Albumin  2.4 g/dl (3.4-5.0)  L  10/23/19  08:50    








 CARDIAC ENZYMES











Creatine Kinase  Cancelled   10/15/19  15:10    


 


Troponin I  Cancelled   10/15/19  15:10    





 Home Medications











 Medication  Instructions  Recorded


 


Diazepam [Valium] 10 mg PO TID PRN  tablet MDD 30mg 10/10/18


 


Morphine 10 mg/5 ml Liquid 20 mg PO Q4H MDD 60mg 10/07/19





[Morphine 10 mg/5 mL Liquid -]  


 


Bacitracin - [Bacitracin Topical 1 applic TP HS  tube 10/10/19





Ointment -]  


 


Heparin - 5,000 unit SQ TID  vial 10/10/19


 


Lactobacillus Acidophilus [Bacid -] 1 tab PO DAILY  tab 10/10/19


 


Lidocaine Patch Removal [Lidoderm 1 each MC DAILY@2200  each 10/10/19





Patch Removal]  


 


Metoprolol Succinate [Toprol XL -] 25 mg PO DAILY  tab.sr.24h 10/10/19


 


Nystatin Ointment [Mycostatin 1 applic TP AM  applic 10/10/19





Ointment -]  


 


Polyethylene Glycol 3350 [Miralax 17 gm PO BID  bottle 10/10/19





119 gm Btl -]  














ASSESSMENT AND PLAN:


Patient is a 65 y/o man with h/o spinal cord injury at level of T4, paraplegia, 

neurogenic bladder, recurrent UTIs, COPD, chronic pain, spleenectomy, 

adenocarcinoma of R lung, recent Tib/Fib fx   and other medical problems who 

presented from home after bumping his L leg and foot against the wall 





# Leukocytosis, concern for  pyelo. improving on IV ceftaz,  ID on the case. 

will continue Ceftaz total of 10 more days, 


# Recent  L Tib/Fib fx s/p fall s/p cast : f/u with ortho as out pt 


# Chronic back pain. not changed


# hx of paraplegia with hx of spinal cord injury at the level of T4





if no complications , can leave to rehab tomorrow , with a picc line.

## 2019-10-23 NOTE — PN
Physical Exam: 


SUBJECTIVE: Patient seen and examined





65 y/o M w/ PMH of COPD, hypotension, adenocarcinoma of right lung, paraplegia 

due to spinal cord transection T2-T4, neurogenic bladder w/ recurrent UTI's, 

splenectomy, recent lower leg fracture- Tib-Fib (10/7) presents for back pain 

is now being treated for UTI. Pt reports no complaints, issues or overnight 

events. Pt was afebrile and asymptomatic. Pt reports he has been able to 

urinate adequately and move his bowel. Admits to chronic back pain and chronic 

suprapubic pain. Denies f/c/n/v, hematochezia, melena, urinary symptoms, chest 

pain, sob, headaches, numbness or tingling. 





OBJECTIVE:





 Vital Signs











 Period  Temp  Pulse  Resp  BP Sys/Malone  Pulse Ox


 


 Last 24 Hr  98.0 F-98.3 F  67-75  18-20  /40-58  








GENERAL: The patient is awake, alert, and fully oriented, in no acute distress.


EYES: PERRL, extraocular movements intact, 


NECK: supple, no LAD, no carotid bruit 


LUNGS: Breath sounds equal, clear to auscultation bilaterally, no wheezes, no 

crackles,  


HEART: Regular rate and rhythm, S1, S2 without murmur, rub or gallop.


ABDOMEN: Soft, nontender, nondistended, normoactive bowel sounds, no guarding, 

no 


rebound. 


EXTREMITIES: 2+ pulses, paraplegic below the waist. Strength 5/5 b/l UE, 

sensation intact UE b/l


NEUROLOGICAL: Normal speech. Paraplegia 


SKIN: Warm

















 Laboratory Results - last 24 hr











  10/23/19 10/23/19





  08:50 08:50


 


WBC  11.3 H 


 


RBC  4.02 


 


Hgb  12.6 


 


Hct  37.8 


 


MCV  94.1 


 


MCH  31.5 


 


MCHC  33.5 


 


RDW  14.7 


 


Plt Count  743 H 


 


MPV  9.0 


 


Absolute Neuts (auto)  8.5 H 


 


Neutrophils %  74.6 


 


Lymphocytes %  11.1 


 


Monocytes %  7.7 


 


Eosinophils %  6.4 H 


 


Basophils %  0.2 


 


Nucleated RBC %  0 


 


Sodium   138


 


Potassium   3.8


 


Chloride   102


 


Carbon Dioxide   30


 


Anion Gap   7 L


 


BUN   8.7


 


Creatinine   0.6


 


Est GFR (CKD-EPI)AfAm   121.43


 


Est GFR (CKD-EPI)NonAf   104.77


 


Random Glucose   118 H


 


Calcium   9.1


 


Total Bilirubin   0.4


 


AST   16


 


ALT   15


 


Alkaline Phosphatase   110


 


Total Protein   6.4


 


Albumin   2.4 L








Active Medications





 Current Medications





Bacitracin (Bacitracin -)  1 applic TP HS [facility]


   Last Admin: 10/23/19 21:21 Dose:  Not Given


Diazepam (Valium -)  10 mg PO TID [facility]


   Last Admin: 10/24/19 05:37 Dose:  10 mg


Enoxaparin Sodium (Lovenox -)  40 mg SQ DAILY [facility]


   Last Admin: 10/23/19 12:18 Dose:  Not Given


Ceftazidime 1 gm/ Dextrose  50 mls @ 200 mls/hr IVPB Q8H-IV ALE; Protocol


   Last Admin: 10/24/19 01:01 Dose:  200 mls/hr


Lactobacillus Acidophilus (Bacid -)  1 tab PO DAILY [facility]


   Last Admin: 10/23/19 11:48 Dose:  1 tab


Metoprolol Succinate (Toprol Xl -)  25 mg PO DAILY [facility]


   Last Admin: 10/23/19 11:48 Dose:  25 mg


Morphine Sulfate (Morphine 10 Mg/5 Ml Liquid)  20 mg PO Q8H PRN


   PRN Reason: PAIN LEVEL 7 - 10


   Last Admin: 10/23/19 23:03 Dose:  20 mg


Nystatin (Mycostatin Ointment -)  1 applic TP AM [facility]


   Last Admin: 10/23/19 06:05 Dose:  1 applic


Polyethylene Glycol (Miralax (For Daily Use) -)  17 gm PO BID [facility]


   Last Admin: 10/23/19 21:21 Dose:  Not Given











ASSESSMENT/PLAN:


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 

transection T2-T4, neurogenic bladder w/ recurrent UTI's, splenectomy, recent 

lower leg fracture- Tib-Fib (10/7) presents for back pain is now being managed 

for UTI,





#Sepsis 2/2 UTI 


now resolved- white count normal


UCx: Acintobacter


Spoke to Dimitris/ID, Last dose ceftazidime IV today- could go home tomorrow 


Spoke to [facility]/ for Nursing home placement- bed 

available 


Labs ordered- cbc, bmp,


monitor for fevers overnight





#Adenocarcinoma of the right lung


Lung bx done today by IR- pending results





#Tib/Fib fracture


f/u outpt





#Chronic back pain


Pain management- PO morphine 20mg liquid q8h 





#DVT ppx


SCDs b/l





#FEN


regular diet





Dispo: finish Abx, f/u lung bc results, possible discharge tomorrow 








Visit type





- Emergency Visit


Emergency Visit: Yes


ED Registration Date: 10/15/19


Care time: The patient presented to the Emergency Department on the above date 

and was hospitalized for further evaluation of their emergent condition.





- New Patient


This patient is new to me today: Yes


Date on this admission: 10/26/19





- Critical Care


Critical Care patient: No





- Discharge Referral


Referred to [facility] No





ATTENDING PHYSICIAN STATEMENT





I saw and evaluated the patient.


I reviewed the resident's note and discussed the case with the resident.


I agree with the resident's findings and plan as documented.








SUBJECTIVE:








OBJECTIVE:








ASSESSMENT AND PLAN:

## 2019-10-23 NOTE — PN
Progress Note, Physician


History of Present Illness: 





no new issues





- Current Medication List


Current Medications: 


Active Medications





Bacitracin (Bacitracin -)  1 applic TP HS [facility]


   Last Admin: 10/22/19 22:02 Dose:  Not Given


Diazepam (Valium -)  10 mg PO TID [facility]


   Last Admin: 10/23/19 06:04 Dose:  10 mg


Enoxaparin Sodium (Lovenox -)  40 mg SQ DAILY [facility]


   Last Admin: 10/20/19 09:47 Dose:  Not Given


Ceftazidime 1 gm/ Dextrose  50 mls @ 200 mls/hr IVPB Q8H-IV ALE; Protocol


   Last Admin: 10/23/19 11:48 Dose:  200 mls/hr


Lactobacillus Acidophilus (Bacid -)  1 tab PO DAILY [facility]


   Last Admin: 10/23/19 11:48 Dose:  1 tab


Metoprolol Succinate (Toprol Xl -)  25 mg PO DAILY [facility]


   Last Admin: 10/23/19 11:48 Dose:  25 mg


Nystatin (Mycostatin Ointment -)  1 applic TP AM [facility]


   Last Admin: 10/23/19 06:05 Dose:  1 applic


Polyethylene Glycol (Miralax (For Daily Use) -)  17 gm PO BID [facility]


   Last Admin: 10/23/19 11:48 Dose:  17 gm











- Objective


Vital Signs: 


 Vital Signs











Temperature  98.3 F   10/23/19 09:59


 


Pulse Rate  69   10/23/19 09:59


 


Respiratory Rate  20   10/23/19 09:59


 


Blood Pressure  111/40 L  10/23/19 09:59


 


O2 Sat by Pulse Oximetry (%)  100   10/22/19 13:39











Constitutional: Yes: No Distress, Calm


Cardiovascular: Yes: S1, S2


Respiratory: Yes: Regular, CTA Bilaterally


Gastrointestinal: Yes: Normal Bowel Sounds, Soft


Musculoskeletal: Yes: WNL


Extremities: Yes: WNL


Neurological: Yes: Alert, Oriented


Labs: 


 CBC, BMP





 10/23/19 08:50 





 10/23/19 08:50 





 INR, PTT











INR  0.99  (0.83-1.09)   10/18/19  07:50    














Assessment/Plan





64-year-old male with a past medical history of COPD, right lung adenocarcinoma 

(not treated), paraplegia secondary to spinal cord transection at T4, 

neurogenic bladder, recent left tibia-fibula fracture who comes into the 

emergency department complaining of 9/10 constant back pain. 





lung mass


uti


back pain


leukocytosis





plan


continue abx


wbc trending down


rest as per the team

## 2019-10-24 VITALS — DIASTOLIC BLOOD PRESSURE: 55 MMHG | HEART RATE: 60 BPM | SYSTOLIC BLOOD PRESSURE: 108 MMHG | TEMPERATURE: 97.8 F

## 2019-10-24 LAB
ALBUMIN SERPL-MCNC: 2.5 G/DL (ref 3.4–5)
ALP SERPL-CCNC: 121 U/L (ref 45–117)
ALT SERPL-CCNC: 16 U/L (ref 13–61)
ANION GAP SERPL CALC-SCNC: 10 MMOL/L (ref 8–16)
AST SERPL-CCNC: 15 U/L (ref 15–37)
BASOPHILS # BLD: 1.8 % (ref 0–2)
BILIRUB SERPL-MCNC: 0.4 MG/DL (ref 0.2–1)
BUN SERPL-MCNC: 12.1 MG/DL (ref 7–18)
CALCIUM SERPL-MCNC: 8.7 MG/DL (ref 8.5–10.1)
CHLORIDE SERPL-SCNC: 102 MMOL/L (ref 98–107)
CO2 SERPL-SCNC: 27 MMOL/L (ref 21–32)
CREAT SERPL-MCNC: 0.6 MG/DL (ref 0.55–1.3)
DEPRECATED RDW RBC AUTO: 14.7 % (ref 11.9–15.9)
EOSINOPHIL # BLD: 5.1 % (ref 0–4.5)
GLUCOSE SERPL-MCNC: 84 MG/DL (ref 74–106)
HCT VFR BLD CALC: 37.8 % (ref 35.4–49)
HGB BLD-MCNC: 12.4 GM/DL (ref 11.7–16.9)
LYMPHOCYTES # BLD: 12.9 % (ref 8–40)
MCH RBC QN AUTO: 30.7 PG (ref 25.7–33.7)
MCHC RBC AUTO-ENTMCNC: 32.7 G/DL (ref 32–35.9)
MCV RBC: 94 FL (ref 80–96)
MONOCYTES # BLD AUTO: 12.5 % (ref 3.8–10.2)
NEUTROPHILS # BLD: 67.7 % (ref 42.8–82.8)
PLATELET # BLD AUTO: 727 K/MM3 (ref 134–434)
PMV BLD: 9.3 FL (ref 7.5–11.1)
POTASSIUM SERPLBLD-SCNC: 4.1 MMOL/L (ref 3.5–5.1)
PROT SERPL-MCNC: 6.6 G/DL (ref 6.4–8.2)
RBC # BLD AUTO: 4.03 M/MM3 (ref 4–5.6)
SODIUM SERPL-SCNC: 139 MMOL/L (ref 136–145)
WBC # BLD AUTO: 13 K/MM3 (ref 4–10)

## 2019-10-24 RX ADMIN — CEFTAZIDIME SCH MLS/HR: 1 INJECTION, POWDER, FOR SOLUTION INTRAMUSCULAR; INTRAVENOUS at 09:53

## 2019-10-24 RX ADMIN — NYSTATIN SCH APPLIC: 100000 OINTMENT TOPICAL at 06:10

## 2019-10-24 RX ADMIN — Medication SCH TAB: at 09:52

## 2019-10-24 RX ADMIN — POLYETHYLENE GLYCOL 3350 SCH: 17 POWDER, FOR SOLUTION ORAL at 09:52

## 2019-10-24 RX ADMIN — CEFTAZIDIME SCH MLS/HR: 1 INJECTION, POWDER, FOR SOLUTION INTRAMUSCULAR; INTRAVENOUS at 01:01

## 2019-10-24 RX ADMIN — ENOXAPARIN SODIUM SCH: 40 INJECTION SUBCUTANEOUS at 09:54

## 2019-10-24 RX ADMIN — NYSTATIN SCH: 100000 OINTMENT TOPICAL at 07:36

## 2019-10-24 RX ADMIN — MORPHINE SULFATE PRN MG: 10 SOLUTION ORAL at 09:52

## 2019-10-24 NOTE — DS
Physical Exam: 


SUBJECTIVE: Patient seen and examined. Patient is asymptomatic, afebrile, 

without any c/o or issues. Patient is stable and ready for discharge








OBJECTIVE:





 Vital Signs











 Period  Temp  Pulse  Resp  BP Sys/Malone  Pulse Ox


 


 Last 24 Hr  97.7 F-98.6 F  60-66  20-20  /48-65  








PHYSICAL EXAM





GENERAL: The patient is awake, alert, and fully oriented, in no acute distress.


EYES: PERRL, extraocular movements intact,


ENT: moist mucous membranes.


NECK:  supple. 


LUNGS: Breath sounds equal, clear to auscultation bilaterally, no wheezes, no 

crackles, 


HEART: Regular rate and rhythm, S1, S2 without murmur, rub or gallop.


ABDOMEN: Soft, nontender, nondistended, normoactive bowel sounds, no guarding, 

no rebound,


EXTREMITIES: 2+ pulses, warm, well-perfused, no edema. 


NEUROLOGICAL: Strength 5/5 b/l UE, sensation 5/5 UE b/l. Paraplegic


PSYCH: Normal mood, 


SKIN: Warm, dry,





LABS


 Laboratory Results - last 24 hr











  10/24/19 10/24/19





  07:38 07:38


 


WBC  13.0 H 


 


RBC  4.03 


 


Hgb  12.4 


 


Hct  37.8 


 


MCV  94.0 


 


MCH  30.7 


 


MCHC  32.7 


 


RDW  14.7 


 


Plt Count  727 H 


 


MPV  9.3 


 


Absolute Neuts (auto)  8.8 H 


 


Neutrophils %  67.7 


 


Lymphocytes %  12.9 


 


Monocytes %  12.5 H 


 


Eosinophils %  5.1 H 


 


Basophils %  1.8  D 


 


Nucleated RBC %  0 


 


Sodium   139


 


Potassium   4.1


 


Chloride   102


 


Carbon Dioxide   27


 


Anion Gap   10


 


BUN   12.1


 


Creatinine   0.6


 


Est GFR (CKD-EPI)AfAm   121.43


 


Est GFR (CKD-EPI)NonAf   104.77


 


Random Glucose   84


 


Calcium   8.7


 


Total Bilirubin   0.4


 


AST   15


 


ALT   16


 


Alkaline Phosphatase   121 H


 


Total Protein   6.6


 


Albumin   2.5 L











HOSPITAL COURSE:





Date of Admission:10/15/19





64 y.o. M PMH COPD, R lung adenocarcinoma (no chemo/rads/ surgery), paraplegia 2

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 

hypotension, recent admission at [facility] s/p left leg dital tibial fracture (d/c;d 

10/11/19) presented w/ sepsis 2/2 to UTI





Patient's sepsis and UTI was diagnosed with Urine Culture and UA and UTI 

treated with antibiotics and discharged w/ a Picc line to complete the dosage 

of Antibiotics. Further tests were done including blood work, labs, renal 

Ultrasound. Patient also came in with a tibial-fibular fracture that was 

immobilized.


Patient was also found to have a right lung nodule on CT which was consistent 

with Adenocarcinoma of the right lung. Further tests including biopsy by IR was 

done to target therapy and chemo. Patient's chronic neurogenic bladder pain and 

back pain was controlled with pain management. Patient is currently stable, 

asymptomatic and have no further c/o. 





CT chest: multiple pulmonary masses consistant with neoplastic lesions, mod-

severe COPD


CXR: no infiltrate, possible pulmonary nodule seen


Foot/Ankle x ray: tib-fib fracture 








Date of Discharge: 10/24/19











Minutes to complete discharge: 35





Discharge Summary


Problems reviewed: Yes


Reason For Visit: UTI,PYELONEPHRITIS


Condition: Good





- Instructions


Diet, Activity, Other Instructions: 


You were admitted to the hospital for urinary tract infection and was treated





While you were here we evaluated your urinary tract infection and treated you 

with antibiotics, we also performed multiple test to evaluate your Lung cancer 

including blood work, x rays, CT scans and lung biopsy. We also evaluated your 

leg fracture with test including x rays.





Continue Antibiotics Ceftazidime for 4 more days 





Continue all your home medications as prescribed


follow up with your pcp within 1 week


Return to the emergency department if you have chest pain, shortness of breath, 

nausea, vomiting, worsening of your symptoms or anything medical emergency. 


Referrals: 


Yoni Man MD [Staff Physician] - 1 Week


Sony Poon MD [Staff Physician] - 1 Week


Kelechi Amin MD [Staff Physician] - 2 Weeks


Disposition: SKILLED NURSING FACILITY





- Home Medications


Comprehensive Discharge Medication List: 


Ambulatory Orders





Diazepam [Valium] 10 mg PO TID PRN  tablet MDD 30mg 10/10/18 


Morphine 10 mg/5 ml Liquid [Morphine 10 mg/5 mL Liquid -] 20 mg PO Q4H MDD 60mg 

10/07/19 


Bacitracin - [Bacitracin Topical Ointment -] 1 applic TP HS  tube 10/10/19 


Heparin - 5,000 unit SQ TID  vial 10/10/19 


Lactobacillus Acidophilus [Bacid -] 1 tab PO DAILY  tab 10/10/19 


Lidocaine Patch Removal [Lidoderm Patch Removal] 1 each MC DAILY@2200  each 10/

10/19 


Metoprolol Succinate [Toprol XL -] 25 mg PO DAILY  tab.sr.24h 10/10/19 


Nystatin Ointment [Mycostatin Ointment -] 1 applic TP AM  applic 10/10/19 


Polyethylene Glycol 3350 [Miralax 119 gm Btl -] 17 gm PO BID  bottle 10/10/19 


Ceftazidime Pentahydrate [Fortaz (Restricted To Id) -] 1 gm IVPB Q8H-IV #12 

vial 10/24/19 








This patient is new to me today: Yes


Date on this admission: 10/26/19


Emergency Visit: Yes


ED Registration Date: 10/15/19


Care time: The patient presented to the Emergency Department on the above date 

and was hospitalized for further evaluation of their emergent condition.


Critical Care patient: No





- Discharge Referral


Referred to [facility] No





ATTENDING PHYSICIAN STATEMENT





I saw and evaluated the patient.


I reviewed the resident's note and discussed the case with the resident.


I agree with the resident's findings and plan as documented.








SUBJECTIVE:








OBJECTIVE:








ASSESSMENT AND PLAN:

## 2019-10-24 NOTE — PN
Progress Note, Physician


History of Present Illness: 





patient stable


no new issues





- Current Medication List


Current Medications: 


Active Medications





Bacitracin (Bacitracin -)  1 applic TP HS [facility]


   Last Admin: 10/23/19 21:21 Dose:  Not Given


Diazepam (Valium -)  10 mg PO TID [facility]


   Last Admin: 10/24/19 05:37 Dose:  10 mg


Enoxaparin Sodium (Lovenox -)  40 mg SQ DAILY [facility]


   Last Admin: 10/24/19 09:54 Dose:  Not Given


Ceftazidime 1 gm/ Dextrose  50 mls @ 200 mls/hr IVPB Q8H-IV ALE; Protocol


   Last Admin: 10/24/19 09:53 Dose:  200 mls/hr


Lactobacillus Acidophilus (Bacid -)  1 tab PO DAILY [facility]


   Last Admin: 10/24/19 09:52 Dose:  1 tab


Metoprolol Succinate (Toprol Xl -)  25 mg PO DAILY [facility]


   Last Admin: 10/24/19 09:52 Dose:  25 mg


Morphine Sulfate (Morphine 10 Mg/5 Ml Liquid)  20 mg PO Q8H PRN


   PRN Reason: PAIN LEVEL 7 - 10


   Last Admin: 10/24/19 09:52 Dose:  20 mg


Nystatin (Mycostatin Ointment -)  1 applic TP AM [facility]


   Last Admin: 10/24/19 07:36 Dose:  Not Given


Polyethylene Glycol (Miralax (For Daily Use) -)  17 gm PO BID [facility]


   Last Admin: 10/24/19 09:52 Dose:  Not Given











- Objective


Vital Signs: 


 Vital Signs











Temperature  97.7 F   10/24/19 06:00


 


Pulse Rate  63   10/24/19 06:00


 


Respiratory Rate  20   10/23/19 23:00


 


Blood Pressure  92/48 L  10/24/19 06:00


 


O2 Sat by Pulse Oximetry (%)  100   10/23/19 21:00











Constitutional: Yes: No Distress, Calm


Cardiovascular: Yes: S1, S2


Respiratory: Yes: Regular, CTA Bilaterally


Gastrointestinal: Yes: Normal Bowel Sounds, Soft


Genitourinary: Yes: Other


Musculoskeletal: Yes: Other


Extremities: Yes: Other (contracted)


Neurological: Yes: Alert, Oriented


Psychiatric: Yes: Alert, Oriented


Labs: 


 CBC, BMP





 10/24/19 07:38 





 10/24/19 07:38 





 INR, PTT











INR  0.99  (0.83-1.09)   10/18/19  07:50    














Assessment/Plan





64-year-old male with a past medical history of COPD, right lung adenocarcinoma 

(not treated), paraplegia secondary to spinal cord transection at T4, 

neurogenic bladder, recent left tibia-fibula fracture who comes into the 

emergency department complaining of 9/10 constant back pain. 





lung mass


uti


back pain


leukocytosis





plan


continue abx


monitor wbc


rest as per the team

## 2019-10-24 NOTE — PN
Teaching Attending Note


Name of Resident: Nilton Stevens





ATTENDING PHYSICIAN STATEMENT





I saw and evaluated the patient.


I reviewed the resident's note and discussed the case with the resident.


I agree with the resident's findings and plan as documented.








SUBJECTIVE:


Patient is comfortable with no acute distress. going to rehab. 


OBJECTIVE:


 Vital Signs











Temperature  97.7 F   10/24/19 06:00


 


Pulse Rate  63   10/24/19 06:00


 


Respiratory Rate  20   10/23/19 23:00


 


Blood Pressure  92/48 L  10/24/19 06:00


 


O2 Sat by Pulse Oximetry (%)  100   10/23/19 21:00








GENERAL: The patient is awake, alert, and oriented, in no acute distress.


HEAD: Normal with no signs of trauma.


EYES: PERRL, extraocular movements intact, sclera anicteric, conjunctiva clear. 


ENT: Ears normal,  oropharynx clear without exudates, moist mucous membranes.


NECK: Trachea midline, full range of motion, supple. 


LUNGS: Breath sounds equal, clear to auscultation bilaterally, no wheezes, no 

crackles, no accessory muscle use. 


HEART: Regular rate and rhythm, S1, S2 without murmur, rub or gallop.


ABDOMEN: Soft, NT,ND, normoactive bowel sounds, no guarding, no rebound, no 

hepatosplenomegaly, no masses.


EXTREMITIES: 2+ pulses, warm, well-perfused, no edema. 


NEUROLOGICAL: Cranial nerves II through XII grossly intact. Normal speech, 

paraplegic 


PSYCH: Normal mood, normal affect. 


SKIN: Warm, dry, normal turgor. 


 CBCD











WBC  13.0 K/mm3 (4.0-10.0)  H  10/24/19  07:38    


 


RBC  4.03 M/mm3 (4.00-5.60)   10/24/19  07:38    


 


Hgb  12.4 GM/dL (11.7-16.9)   10/24/19  07:38    


 


Hct  37.8 % (35.4-49)   10/24/19  07:38    


 


MCV  94.0 fl (80-96)   10/24/19  07:38    


 


MCHC  32.7 g/dl (32.0-35.9)   10/24/19  07:38    


 


RDW  14.7 % (11.9-15.9)   10/24/19  07:38    


 


Plt Count  727 K/MM3 (134-434)  H  10/24/19  07:38    


 


MPV  9.3 fl (7.5-11.1)   10/24/19  07:38    








 CMP











Sodium  139 mmol/L (136-145)   10/24/19  07:38    


 


Potassium  4.1 mmol/L (3.5-5.1)   10/24/19  07:38    


 


Chloride  102 mmol/L ()   10/24/19  07:38    


 


Carbon Dioxide  27 mmol/L (21-32)   10/24/19  07:38    


 


Anion Gap  10 MMOL/L (8-16)   10/24/19  07:38    


 


BUN  12.1 mg/dL (7-18)   10/24/19  07:38    


 


Creatinine  0.6 mg/dL (0.55-1.3)   10/24/19  07:38    


 


Random Glucose  84 mg/dL ()   10/24/19  07:38    


 


Calcium  8.7 mg/dL (8.5-10.1)   10/24/19  07:38    


 


Total Bilirubin  0.4 mg/dL (0.2-1)   10/24/19  07:38    


 


AST  15 U/L (15-37)   10/24/19  07:38    


 


ALT  16 U/L (13-61)   10/24/19  07:38    


 


Alkaline Phosphatase  121 U/L ()  H  10/24/19  07:38    


 


Total Protein  6.6 g/dl (6.4-8.2)   10/24/19  07:38    


 


Albumin  2.5 g/dl (3.4-5.0)  L  10/24/19  07:38    








 CARDIAC ENZYMES











Creatine Kinase  Cancelled   10/15/19  15:10    


 


Troponin I  Cancelled   10/15/19  15:10    








 Current Medications











Generic Name Dose Route Start Last Admin





  Trade Name Freq  PRN Reason Stop Dose Admin


 


Bacitracin  1 applic  10/16/19 22:00  10/23/19 21:21





  Bacitracin -  TP   Not Given





  HS ALE   





     





     





     





     


 


Diazepam  10 mg  10/19/19 14:00  10/24/19 05:37





  Valium -  PO   10 mg





  TID ALE   Administration





     





     





     





     


 


Enoxaparin Sodium  40 mg  10/16/19 10:00  10/24/19 09:54





  Lovenox -  SQ   Not Given





  DAILY ALE   





     





     





     





     


 


Ceftazidime 1 gm/ Dextrose  50 mls @ 200 mls/hr  10/18/19 18:00  10/24/19 09:53





  IVPB   200 mls/hr





  Q8H-IV ALE   Administration





     





     





  Protocol   





     


 


Lactobacillus Acidophilus  1 tab  10/16/19 10:00  10/24/19 09:52





  Bacid -  PO   1 tab





  DAILY ALE   Administration





     





     





     





     


 


Metoprolol Succinate  25 mg  10/16/19 10:00  10/24/19 09:52





  Toprol Xl -  PO   25 mg





  DAILY ALE   Administration





     





     





     





     


 


Morphine Sulfate  20 mg  10/23/19 15:30  10/24/19 09:52





  Morphine 10 Mg/5 Ml Liquid  PO   20 mg





  Q8H PRN   Administration





  PAIN LEVEL 7 - 10   





     





     





     


 


Nystatin  1 applic  10/16/19 07:00  10/24/19 07:36





  Mycostatin Ointment -  TP   Not Given





  AM [facility]   





     





     





     





     


 


Polyethylene Glycol  17 gm  10/16/19 01:00  10/24/19 09:52





  Miralax (For Daily Use) -  PO   Not Given





  BID [facility]   





     





     





     





     








 Home Medications











 Medication  Instructions  Recorded


 


Diazepam [Valium] 10 mg PO TID PRN  tablet MDD 30mg 10/10/18


 


Morphine 10 mg/5 ml Liquid 20 mg PO Q4H MDD 60mg 10/07/19





[Morphine 10 mg/5 mL Liquid -]  


 


Bacitracin - [Bacitracin Topical 1 applic TP HS  tube 10/10/19





Ointment -]  


 


Heparin - 5,000 unit SQ TID  vial 10/10/19


 


Lactobacillus Acidophilus [Bacid -] 1 tab PO DAILY  tab 10/10/19


 


Lidocaine Patch Removal [Lidoderm 1 each MC DAILY@2200  each 10/10/19





Patch Removal]  


 


Metoprolol Succinate [Toprol XL -] 25 mg PO DAILY  tab.sr.24h 10/10/19


 


Nystatin Ointment [Mycostatin 1 applic TP AM  applic 10/10/19





Ointment -]  


 


Polyethylene Glycol 3350 [Miralax 17 gm PO BID  bottle 10/10/19





119 gm Btl -]  


 


Ceftazidime Pentahydrate [Fortaz 1 gm IVPB Q8H-IV #12 vial 10/24/19





(Restricted To Id) -]  














ASSESSMENT AND PLAN:


Patient is a 63 y/o man with h/o spinal cord injury at level of T4, paraplegia, 

neurogenic bladder, recurrent UTIs, COPD, chronic pain, spleenectomy, 

adenocarcinoma of R lung, recent Tib/Fib fx   who presented from home after 

bumping his L leg and foot against the wall 





# Leukocytosis, concern for  pyelo. improving on IV ceftaz. day # 6, as per ID 

total of 10 day treatments,patient is going to rehab. Fany.for IV 

antibiotics.


# Recent  L Tib/Fib fx s/p fall s/p cast : f/u with ortho as out pt 


# Hx of paraplegia with hx of spinal cord injury at the level of T4





has a picc line. 


discharge the patient to rehab.

## 2019-10-24 NOTE — PATH
Surgical Pathology Report



Patient Name:  ELIAZAR ESCALANTE

Accession #:  [id number]

Med. Rec. #:  [id number]                                                        

   /Age/Gender:  1955 (Age: 64) / M

Account:  [id number]                                                          

             Location: [address] [place] [address]/[facility]

Taken:  10/22/2019

Received:  10/22/2019

Reported:  10/24/2019

Physicians:  IRAIS Coello M.D.

  



Specimen(s) Received

 RIGHT LUNG BIOPSY 





Clinical History

64-year-old male with bilateral spiculated lung nodules







Final Diagnosis

RIGHT LUNG BIOPSY:

ADENOCARCINOMA, MODERATELY DIFFERENTIATED, WITH LEPIDIC AND PAPILLARY FEATURES.



Comment: Immunohistochemical stained slides demonstrate tumor cells to be

positive for CK7, TTF-1, Napsin A, negative for p40 and CK20. The morphology and

immunophenotype support a diagnosis of adenocarcinoma of lung primary.



Immunohistochemistry stains P40 and Napsin A performed at [place], [place] ([facility] ) interpreted at [facility]. 

Immunohistochemistry stains CK7, CK20, and TTF-1 performed and interpreted at

[facility]. 

Positive and negative controls (internal if applicable) show appropriate

results.





***Electronically Signed***

Bill Felder M.D.





Gross Description

Received in formalin labeled "right lung biopsy," are 4 tan, cylindrical

portions of soft tissue ranging from 0.5-1.6 cm in length and averaging 0.1 cm

in diameter. The specimens are submitted in toto in one cassette.

/10/22/2019



saudi/10/22/2019

## 2019-10-24 NOTE — PN
Physical Exam: 


SUBJECTIVE: Patient seen and examined








OBJECTIVE:





 Vital Signs











 Period  Temp  Pulse  Resp  BP Sys/Malone  Pulse Ox


 


 Last 24 Hr  97.7 F-98.6 F  60-66  20-20  /48-65  











GENERAL: The patient is awake, alert, and fully oriented, in no acute distress.


HEAD: Normal with no signs of trauma.


EYES: PERRL, extraocular movements intact, sclera anicteric, conjunctiva clear. 

No ptosis. 


ENT: Ears normal, nares patent, oropharynx clear without exudates, moist mucous 


membranes.


NECK: Trachea midline, full range of motion, supple. 


LUNGS: Breath sounds equal, clear to auscultation bilaterally, no wheezes, no 

crackles, no 


accessory muscle use. 


HEART: Regular rate and rhythm, S1, S2 without murmur, rub or gallop.


ABDOMEN: Soft, nontender, nondistended, normoactive bowel sounds, no guarding, 

no 


rebound, no hepatosplenomegaly, no masses.


EXTREMITIES: 2+ pulses, warm, well-perfused, no edema. 


NEUROLOGICAL: Cranial nerves II through XII grossly intact. Normal speech, gait 

not 


observed.


PSYCH: Normal mood, normal affect.


SKIN: Warm, dry, normal turgor, no rashes or lesions noted














 Laboratory Results - last 24 hr











  10/24/19 10/24/19





  07:38 07:38


 


WBC  13.0 H 


 


RBC  4.03 


 


Hgb  12.4 


 


Hct  37.8 


 


MCV  94.0 


 


MCH  30.7 


 


MCHC  32.7 


 


RDW  14.7 


 


Plt Count  727 H 


 


MPV  9.3 


 


Absolute Neuts (auto)  8.8 H 


 


Neutrophils %  67.7 


 


Lymphocytes %  12.9 


 


Monocytes %  12.5 H 


 


Eosinophils %  5.1 H 


 


Basophils %  1.8  D 


 


Nucleated RBC %  0 


 


Sodium   139


 


Potassium   4.1


 


Chloride   102


 


Carbon Dioxide   27


 


Anion Gap   10


 


BUN   12.1


 


Creatinine   0.6


 


Est GFR (CKD-EPI)AfAm   121.43


 


Est GFR (CKD-EPI)NonAf   104.77


 


Random Glucose   84


 


Calcium   8.7


 


Total Bilirubin   0.4


 


AST   15


 


ALT   16


 


Alkaline Phosphatase   121 H


 


Total Protein   6.6


 


Albumin   2.5 L











ASSESSMENT/PLAN:








ATTENDING PHYSICIAN STATEMENT





I saw and evaluated the patient.


I reviewed the resident's note and discussed the case with the resident.


I agree with the resident's findings and plan as documented.








SUBJECTIVE:








OBJECTIVE:








ASSESSMENT AND PLAN:

## 2019-11-12 ENCOUNTER — HOSPITAL ENCOUNTER (EMERGENCY)
Dept: HOSPITAL 74 - JER | Age: 64
Discharge: HOME | End: 2019-11-12
Payer: COMMERCIAL

## 2019-11-12 VITALS — HEART RATE: 58 BPM | DIASTOLIC BLOOD PRESSURE: 62 MMHG | SYSTOLIC BLOOD PRESSURE: 156 MMHG

## 2019-11-12 VITALS — BODY MASS INDEX: 22.8 KG/M2

## 2019-11-12 VITALS — TEMPERATURE: 97.7 F

## 2019-11-12 DIAGNOSIS — Z87.891: ICD-10-CM

## 2019-11-12 DIAGNOSIS — G89.29: ICD-10-CM

## 2019-11-12 DIAGNOSIS — J44.9: ICD-10-CM

## 2019-11-12 DIAGNOSIS — C34.91: ICD-10-CM

## 2019-11-12 DIAGNOSIS — G82.20: ICD-10-CM

## 2019-11-12 DIAGNOSIS — M54.6: Primary | ICD-10-CM

## 2019-11-12 DIAGNOSIS — Z87.81: ICD-10-CM

## 2019-11-12 DIAGNOSIS — M21.832: ICD-10-CM

## 2019-11-12 DIAGNOSIS — Z87.440: ICD-10-CM

## 2019-11-12 DIAGNOSIS — M43.8X4: ICD-10-CM

## 2019-11-12 DIAGNOSIS — N31.9: ICD-10-CM

## 2019-11-12 LAB
ALBUMIN SERPL-MCNC: 2.7 G/DL (ref 3.4–5)
ALP SERPL-CCNC: 138 U/L (ref 45–117)
ALT SERPL-CCNC: 19 U/L (ref 13–61)
ANION GAP SERPL CALC-SCNC: 6 MMOL/L (ref 8–16)
ANISOCYTOSIS BLD QL: 0
AST SERPL-CCNC: 31 U/L (ref 15–37)
BASOPHILS # BLD: 0.2 % (ref 0–2)
BILIRUB SERPL-MCNC: 0.7 MG/DL (ref 0.2–1)
BUN SERPL-MCNC: 5.6 MG/DL (ref 7–18)
CALCIUM SERPL-MCNC: 8.9 MG/DL (ref 8.5–10.1)
CHLORIDE SERPL-SCNC: 103 MMOL/L (ref 98–107)
CO2 SERPL-SCNC: 27 MMOL/L (ref 21–32)
CREAT SERPL-MCNC: 0.6 MG/DL (ref 0.55–1.3)
DEPRECATED RDW RBC AUTO: 15.2 % (ref 11.9–15.9)
EOSINOPHIL # BLD: 3.3 % (ref 0–4.5)
GLUCOSE SERPL-MCNC: 102 MG/DL (ref 74–106)
HCT VFR BLD CALC: 44.5 % (ref 35.4–49)
HGB BLD-MCNC: 14.6 GM/DL (ref 11.7–16.9)
LYMPHOCYTES # BLD: 12.3 % (ref 8–40)
MACROCYTES BLD QL: 0
MCH RBC QN AUTO: 30.1 PG (ref 25.7–33.7)
MCHC RBC AUTO-ENTMCNC: 32.9 G/DL (ref 32–35.9)
MCV RBC: 91.6 FL (ref 80–96)
MONOCYTES # BLD AUTO: 9.6 % (ref 3.8–10.2)
NEUTROPHILS # BLD: 74.6 % (ref 42.8–82.8)
PLATELET # BLD AUTO: 562 K/MM3 (ref 134–434)
PLATELET BLD QL SMEAR: (no result)
PMV BLD: 8.6 FL (ref 7.5–11.1)
POTASSIUM SERPLBLD-SCNC: 5.1 MMOL/L (ref 3.5–5.1)
PROT SERPL-MCNC: 7 G/DL (ref 6.4–8.2)
RBC # BLD AUTO: 4.86 M/MM3 (ref 4–5.6)
SODIUM SERPL-SCNC: 135 MMOL/L (ref 136–145)
WBC # BLD AUTO: 14.4 K/MM3 (ref 4–10)

## 2019-11-12 PROCEDURE — 3E033NZ INTRODUCTION OF ANALGESICS, HYPNOTICS, SEDATIVES INTO PERIPHERAL VEIN, PERCUTANEOUS APPROACH: ICD-10-PCS
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
## 2019-12-28 ENCOUNTER — HOSPITAL ENCOUNTER (EMERGENCY)
Dept: HOSPITAL 74 - JER | Age: 64
LOS: 1 days | Discharge: HOME | End: 2019-12-29
Payer: COMMERCIAL

## 2019-12-28 VITALS — BODY MASS INDEX: 22.8 KG/M2

## 2019-12-28 DIAGNOSIS — N31.8: Primary | ICD-10-CM

## 2019-12-28 DIAGNOSIS — Z74.01: ICD-10-CM

## 2019-12-28 DIAGNOSIS — Z87.440: ICD-10-CM

## 2019-12-28 DIAGNOSIS — G82.20: ICD-10-CM

## 2019-12-28 DIAGNOSIS — C34.90: ICD-10-CM

## 2019-12-28 DIAGNOSIS — J44.9: ICD-10-CM

## 2019-12-28 DIAGNOSIS — R33.8: ICD-10-CM

## 2019-12-28 LAB
APPEARANCE UR: (no result)
BACTERIA # UR AUTO: 31 /HPF
BILIRUB UR STRIP.AUTO-MCNC: NEGATIVE MG/DL
CASTS URNS QL MICRO: 10 /LPF (ref 0–8)
COLOR UR: YELLOW
EPITH CASTS URNS QL MICRO: 2.2 /HPF
KETONES UR QL STRIP: NEGATIVE
LEUKOCYTE ESTERASE UR QL STRIP.AUTO: (no result)
NITRITE UR QL STRIP: NEGATIVE
PH UR: 6 [PH] (ref 5–8)
PROT UR QL STRIP: NEGATIVE
PROT UR QL STRIP: NEGATIVE
RBC # BLD AUTO: 3 /HPF (ref 0–4)
SP GR UR: 1 (ref 1.01–1.03)
UROBILINOGEN UR STRIP-MCNC: 0.2 MG/DL (ref 0.2–1)
WBC # UR AUTO: 391 /HPF (ref 0–5)

## 2019-12-28 PROCEDURE — 3E03329 INTRODUCTION OF OTHER ANTI-INFECTIVE INTO PERIPHERAL VEIN, PERCUTANEOUS APPROACH: ICD-10-PCS | Performed by: EMERGENCY MEDICINE

## 2019-12-28 PROCEDURE — 0T9B70Z DRAINAGE OF BLADDER WITH DRAINAGE DEVICE, VIA NATURAL OR ARTIFICIAL OPENING: ICD-10-PCS | Performed by: EMERGENCY MEDICINE

## 2019-12-28 PROCEDURE — 3E033NZ INTRODUCTION OF ANALGESICS, HYPNOTICS, SEDATIVES INTO PERIPHERAL VEIN, PERCUTANEOUS APPROACH: ICD-10-PCS | Performed by: EMERGENCY MEDICINE

## 2019-12-28 NOTE — PDOC
History of Present Illness





- General


Chief Complaint: Urinary Problem


Stated Complaint: URINARY PROBLEM


Time Seen by Provider: 12/28/19 20:53


History Source: Patient





- History of Present Illness


Initial Comments: 





64 y.o. M PMH COPD, R lung adenocarcinoma (no chemo/rads/ surgery), paraplegia 2

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 

hypotension, recent admission at [facility] s/p left leg dital tibial fracture (d/c;d 

10/11/19) presented for inability to urinate due to inability to self 

catheterize. Pt reports that he usually can self catheterize if he is unable 

hazel urinate but this time, he had difficulty and therefore was unable to 

urinate. admits to bladder distention. Denies f/c/n/v/d/chest pain, hematuria, 

dysuria, abdominal pain.





12/28/19 21:44





Modifying Factors: improves with: other


Associated Symptoms: denies: chest pain, cough, fever/chills, nausea/vomiting, 

shortness of breath





Past History





- Travel


Traveled outside of the country in the last 30 days: No


Close contact w/someone who was outside of country & ill: No





- Past Medical History


Allergies/Adverse Reactions: 


 Allergies











Allergy/AdvReac Type Severity Reaction Status Date / Time


 


No Known Allergies Allergy   Verified 11/12/19 10:29











Home Medications: 


Ambulatory Orders





Diazepam [Valium] 10 mg PO TID PRN  tablet MDD 30mg 10/10/18 


Morphine 10 mg/5 ml Liquid [Morphine 10 mg/5 mL Liquid -] 20 mg PO Q4H MDD 60mg 

10/07/19 


Bacitracin - [Bacitracin Topical Ointment -] 1 applic TP HS  tube 10/10/19 


Heparin - 5,000 unit SQ TID  vial 10/10/19 


Lactobacillus Acidophilus [Bacid -] 1 tab PO DAILY  tab 10/10/19 


Lidocaine Patch Removal [Lidoderm Patch Removal] 1 each MC DAILY@2200  each 10/

10/19 


Metoprolol Succinate [Toprol XL -] 25 mg PO DAILY  tab.sr.24h 10/10/19 


Polyethylene Glycol 3350 [Miralax 119 gm Btl -] 17 gm PO BID  bottle 10/10/19 


Levofloxacin [Levaquin] 500 mg PO DAILY 7 Days #7 tablet 12/29/19 








Anemia: No


Asthma: No


Cancer: No


Cardiac Disorders: No


CVA: No


COPD: Yes


CHF: No


Dementia: No


Diabetes: No


GI Disorders: No


 Disorders: Yes (Recurrent UTI, CONDOM CATHETER)


HTN:  (Hypotension)


Hypercholesterolemia: No


Liver Disease: No


Seizures: No


Thyroid Disease: No





- Surgical History


Abdominal Surgery: No


Appendectomy: No


Cardiac Surgery: No


Cholecystectomy: No


Lung Surgery: No


Neurologic Surgery: No


Orthopedic Surgery: Yes (fracture right wrist s/p fusion)





- Immunization History


Td Vaccination: No


Immunization Up to Date: No





- Psycho Social/Smoking Cessation Hx


Smoking Status: No


Smoking History: Never smoked


Years of Tobacco Use: 0


Have you smoked in the past 12 months: No


Number of Cigarettes Smoked Daily: 3


If you are a former smoker, when did you quit?: 1986


Cigars Per Day: 0


Hx Alcohol Use: No


Drug/Substance Use Hx: No


Substance Use Type: None


Hx Substance Use Treatment: No





**Review of Systems





- Review of Systems


Able to Perform ROS?: Yes


Is the patient limited English proficient: No


Constitutional: Yes: Weight Stable.  No: Chills, Diaphoresis, Fever


HEENTM: No: Blurred Vision, Tearing


Respiratory: No: Cough, Orthopnea, Shortness of Breath, Wheezing, Productive 

cough


Cardiac (ROS): No: Chest Pain, Palpitations, Syncope


ABD/GI: No: Abdominal Distended, Constipated, Diarrhea, Nausea, Vomiting


Musculoskeletal: No: Back Pain


Neurological: No: Headache, Numbness


All Other Systems: Reviewed and Negative





*Physical Exam





- Vital Signs


 Last Vital Signs











Temp Pulse Resp BP Pulse Ox


 


 97.3 F L  92 H  18   92/57 L  95 


 


 12/28/19 20:27  12/28/19 20:27  12/28/19 20:27  12/28/19 20:27  12/28/19 20:27














- Physical Exam


General Appearance: Yes: Appropriately Dressed, Disheveled, Cachetic


HEENT: positive: EOMI, TRISH, Normal ENT Inspection, Pharynx Normal


Neck: positive: Trachea midline, Normal Thyroid, Supple


Respiratory/Chest: positive: Lungs Clear, Normal Breath Sounds


Cardiovascular: positive: Regular Rhythm, Regular Rate, S1, S2.  negative: 

Murmur, Gallop/S3, Gallop/S4


Vascular Pulses: Dorsalis-Pedis (R): 2+, Doralis-Pedis (L): 2+


Gastrointestinal/Abdominal: positive: Normal Bowel Sounds, Soft, Distended (

suprapubic distention )


Musculoskeletal: negative: CVA Tenderness


Neurologic: positive: Fully Oriented, Alert, Normal Mood/Affect





Medical Decision Making





- Medical Decision Making


64 y.o. M PMH COPD, R lung adenocarcinoma (no chemo/rads/ surgery), paraplegia 2

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 

hypotension, recent admission at [facility] s/p left leg dital tibial fracture (d/c;d 

10/11/19) presented for inability to urinate





#inability to void


2/2 to neurogenic bladder and inability to self catheterize


Straight cath relieved pts distention


IVF given


Morphine 4mg for suprapubic discomfort


UA, UCx 





12/28/19 21:49





12/28/19 22:20








Discharge





- Discharge Information


Problems reviewed: Yes


Clinical Impression/Diagnosis: 


 Neurogenic bladder, Unable to void





Condition: Improved


Disposition: HOME





- Admission


No





- Additional Discharge Information


Prescriptions: 


Levofloxacin [Levaquin] 500 mg PO DAILY 7 Days #7 tablet





- Follow up/Referral


Referrals: 


Sony Poon MD [Staff Physician] - 





- Patient Discharge Instructions


Patient Printed Discharge Instructions:  DI for Neurogenic Bladder-Adult


Additional Instructions: 


You were seen in the emergency room due to inability to void


While you were in the emergency room, we evaluated you with lab work and blood 

work. We treated your symptoms with straight catheterization, and your symptoms 

improved significantly. We also found out that you have a urinary tract 

infection and we started treating you with antibiotics. 





To treat your urinary tract infection, please take the following medication as 

instructed 





Please take Levaquin 500mg daily by mouth for 7 days





Please take all your medications as prescribed


Please follow up with your primary care physician in 1 week


Return to the emergency room, if you experience worsening of your symptoms, 

nausea, vomiting, chest pain, urinary bleeding or any worsening of your 

condition. 





- Post Discharge Activity

## 2019-12-29 VITALS — HEART RATE: 79 BPM | DIASTOLIC BLOOD PRESSURE: 49 MMHG | TEMPERATURE: 98 F | SYSTOLIC BLOOD PRESSURE: 87 MMHG

## 2020-01-02 ENCOUNTER — HOSPITAL ENCOUNTER (INPATIENT)
Dept: HOSPITAL 74 - JER | Age: 65
LOS: 10 days | Discharge: HOME | DRG: 690 | End: 2020-01-12
Attending: INTERNAL MEDICINE | Admitting: INTERNAL MEDICINE
Payer: COMMERCIAL

## 2020-01-02 ENCOUNTER — HOSPITAL ENCOUNTER (EMERGENCY)
Dept: HOSPITAL 74 - JER | Age: 65
Discharge: HOME | End: 2020-01-02
Payer: COMMERCIAL

## 2020-01-02 VITALS — HEART RATE: 76 BPM | TEMPERATURE: 98.1 F | DIASTOLIC BLOOD PRESSURE: 71 MMHG | SYSTOLIC BLOOD PRESSURE: 113 MMHG

## 2020-01-02 VITALS — BODY MASS INDEX: 22.8 KG/M2

## 2020-01-02 DIAGNOSIS — N39.0: Primary | ICD-10-CM

## 2020-01-02 DIAGNOSIS — E88.09: ICD-10-CM

## 2020-01-02 DIAGNOSIS — S82.302G: ICD-10-CM

## 2020-01-02 DIAGNOSIS — R33.8: Primary | ICD-10-CM

## 2020-01-02 DIAGNOSIS — D72.829: ICD-10-CM

## 2020-01-02 DIAGNOSIS — J44.9: ICD-10-CM

## 2020-01-02 DIAGNOSIS — C34.90: ICD-10-CM

## 2020-01-02 DIAGNOSIS — N31.9: ICD-10-CM

## 2020-01-02 DIAGNOSIS — D47.3: ICD-10-CM

## 2020-01-02 DIAGNOSIS — G82.20: ICD-10-CM

## 2020-01-02 DIAGNOSIS — N31.8: ICD-10-CM

## 2020-01-02 DIAGNOSIS — E46: ICD-10-CM

## 2020-01-02 DIAGNOSIS — K59.09: ICD-10-CM

## 2020-01-02 DIAGNOSIS — Z88.1: ICD-10-CM

## 2020-01-02 DIAGNOSIS — Z87.81: ICD-10-CM

## 2020-01-02 DIAGNOSIS — E87.1: ICD-10-CM

## 2020-01-02 DIAGNOSIS — G89.29: ICD-10-CM

## 2020-01-02 DIAGNOSIS — R33.9: ICD-10-CM

## 2020-01-02 DIAGNOSIS — N39.0: ICD-10-CM

## 2020-01-02 DIAGNOSIS — F41.9: ICD-10-CM

## 2020-01-02 DIAGNOSIS — I48.0: ICD-10-CM

## 2020-01-02 DIAGNOSIS — I95.9: ICD-10-CM

## 2020-01-02 DIAGNOSIS — I74.5: ICD-10-CM

## 2020-01-02 LAB
ALBUMIN SERPL-MCNC: 2.7 G/DL (ref 3.4–5)
ALP SERPL-CCNC: 101 U/L (ref 45–117)
ALT SERPL-CCNC: 14 U/L (ref 13–61)
ANION GAP SERPL CALC-SCNC: 9 MMOL/L (ref 8–16)
APPEARANCE UR: (no result)
APPEARANCE UR: CLEAR
AST SERPL-CCNC: 18 U/L (ref 15–37)
BACTERIA # UR AUTO: 2.6 /HPF
BACTERIA # UR AUTO: 3.6 /HPF
BASOPHILS # BLD: 0.3 % (ref 0–2)
BILIRUB SERPL-MCNC: 0.6 MG/DL (ref 0.2–1)
BILIRUB UR STRIP.AUTO-MCNC: NEGATIVE MG/DL
BILIRUB UR STRIP.AUTO-MCNC: NEGATIVE MG/DL
BUN SERPL-MCNC: 7.9 MG/DL (ref 7–18)
CALCIUM SERPL-MCNC: 8.8 MG/DL (ref 8.5–10.1)
CASTS URNS QL MICRO: 17 /LPF (ref 0–8)
CASTS URNS QL MICRO: 4 /LPF (ref 0–8)
CHLORIDE SERPL-SCNC: 100 MMOL/L (ref 98–107)
CO2 SERPL-SCNC: 27 MMOL/L (ref 21–32)
COLOR UR: YELLOW
COLOR UR: YELLOW
CREAT SERPL-MCNC: 0.7 MG/DL (ref 0.55–1.3)
DEPRECATED RDW RBC AUTO: 16.9 % (ref 11.9–15.9)
EOSINOPHIL # BLD: 1.1 % (ref 0–4.5)
EPITH CASTS URNS QL MICRO: 12.9 /HPF
EPITH CASTS URNS QL MICRO: 21.3 /HPF
GLUCOSE SERPL-MCNC: 93 MG/DL (ref 74–106)
HCT VFR BLD CALC: 44.3 % (ref 35.4–49)
HGB BLD-MCNC: 14.6 GM/DL (ref 11.7–16.9)
KETONES UR QL STRIP: (no result)
KETONES UR QL STRIP: NEGATIVE
LEUKOCYTE ESTERASE UR QL STRIP.AUTO: (no result)
LEUKOCYTE ESTERASE UR QL STRIP.AUTO: (no result)
LYMPHOCYTES # BLD: 9.5 % (ref 8–40)
MCH RBC QN AUTO: 29.2 PG (ref 25.7–33.7)
MCHC RBC AUTO-ENTMCNC: 32.9 G/DL (ref 32–35.9)
MCV RBC: 88.9 FL (ref 80–96)
MONOCYTES # BLD AUTO: 10.2 % (ref 3.8–10.2)
NEUTROPHILS # BLD: 78.9 % (ref 42.8–82.8)
NITRITE UR QL STRIP: NEGATIVE
NITRITE UR QL STRIP: NEGATIVE
PH UR: 6.5 [PH] (ref 5–8)
PH UR: 6.5 [PH] (ref 5–8)
PLATELET # BLD AUTO: 660 K/MM3 (ref 134–434)
PMV BLD: 7.9 FL (ref 7.5–11.1)
POTASSIUM SERPLBLD-SCNC: 4 MMOL/L (ref 3.5–5.1)
PROT SERPL-MCNC: 6.7 G/DL (ref 6.4–8.2)
PROT UR QL STRIP: NEGATIVE
RBC # BLD AUTO: 1 /HPF (ref 0–4)
RBC # BLD AUTO: 4.98 M/MM3 (ref 4–5.6)
SODIUM SERPL-SCNC: 135 MMOL/L (ref 136–145)
SP GR UR: 1 (ref 1.01–1.03)
SP GR UR: 1.01 (ref 1.01–1.03)
UROBILINOGEN UR STRIP-MCNC: 0.2 MG/DL (ref 0.2–1)
UROBILINOGEN UR STRIP-MCNC: 1 MG/DL (ref 0.2–1)
WBC # BLD AUTO: 17.8 K/MM3 (ref 4–10)
WBC # UR AUTO: 34 /HPF (ref 0–5)
WBC # UR AUTO: 66 /HPF (ref 0–5)

## 2020-01-02 PROCEDURE — 0T9B70Z DRAINAGE OF BLADDER WITH DRAINAGE DEVICE, VIA NATURAL OR ARTIFICIAL OPENING: ICD-10-PCS

## 2020-01-02 NOTE — PN
Teaching Attending Note


Name of Resident: Grace Mcintosh





ATTENDING PHYSICIAN STATEMENT





I saw and evaluated the patient.


I reviewed the resident's note and discussed the case with the resident.


I agree with the resident's findings and plan as documented.








SUBJECTIVE:


Patient is a 64 year old man with a PMH of COPD, R lung adenocarcinoma (no chemo

/rads/ surgery), Paraplegia 2/2 spinal cord transection T2-T4 in 1980s, 

Neurogenic bladder, Recurrent UTIs, Hypotension, Recent admission at [facility] s/p 

left leg distal tibial fracture (d/c;d 10/11/19) returning to the ER for 

inability to urinate. He presented to the ER earlier today because of urinary 

retention. A catheter was placed and over 600 ml of urine was obtained. He was 

discharged to continue on PO levaquin that he had been on for UTI. He was seen 

on 12/28/19 with the same symptoms. At that time, he was straight cathed and 

discharged with 7 days of levofloxacin for UTI. Mr Sumner states that he ran 

out of his antibiotics yesterday, and he has been unable to urinate since then. 

He states that after he went home he was unable to urinate despite being on a 

condom catheter. Says that he is too weak to do it himself. The patient adds 

that he had gatorade and water to drink today. The patient denies fever, chills

, nausea, vomiting or diarrhea. Urine culture sent on 12/28/19 has had no 

growth. Denies alcohol, tobacco or illicit drug use.





OBJECTIVE:


Alert


 Vital Signs











 Period  Temp  Pulse  Resp  BP Sys/Malone  Pulse Ox


 


 Last 24 Hr  98.0 F  71  16  112/69  96








HEENT: No Jaundice, eye redness or discharge, PERRLA, EOMI. Normocephalic, 

atraumatic. External ears are normal and hearing is grossly intact. No nasal 

discharge.


Neck: Supple, nontender. No palpable adenopathy or thyromegaly. No JVD


Chest: Good effort. Clear to auscultation and percussion.


Heart: Regular. No S3, rub or murmur


Abdomen: Not distended, soft, nontender and no HSM. No rebound or guarding.  

Normal bowel sounds.


Ext: Peripheral pulses intact. No leg edema.


Skin: Warm and dry. No petechiae, rash or ecchymosis.


Neuro: Alert. Oriented x3. CN 2-12 grossly intact. Paraplegia.


Psych: Appropriate mood and affect. Good insight.


 Home Medications











 Medication  Instructions  Recorded


 


Diazepam [Valium] 10 mg PO TID PRN  tablet MDD 30mg 10/10/18


 


Morphine 10 mg/5 ml Liquid 20 mg PO Q4H MDD 60mg 10/07/19





[Morphine 10 mg/5 mL Liquid -]  


 


Bacitracin - [Bacitracin Topical 1 applic TP HS  tube 10/10/19





Ointment -]  


 


Heparin - 5,000 unit SQ TID  vial 10/10/19


 


Lactobacillus Acidophilus [Bacid -] 1 tab PO DAILY  tab 10/10/19


 


Lidocaine Patch Removal [Lidoderm 1 each MC DAILY@2200  each 10/10/19





Patch Removal]  


 


Metoprolol Succinate [Toprol XL -] 25 mg PO DAILY  tab.sr.24h 10/10/19


 


Polyethylene Glycol 3350 [Miralax 17 gm PO BID  bottle 10/10/19





119 gm Btl -]  


 


Levofloxacin [Levaquin] 500 mg PO DAILY 7 Days #7 tablet 12/29/19








 Abnormal Lab Results











  01/02/20 01/02/20 01/02/20





  19:00 20:04 20:04


 


WBC   17.8 H 


 


RDW   16.9 H 


 


Plt Count   660 H 


 


Absolute Neuts (auto)   14.1 H 


 


Sodium    135 L


 


Albumin    2.7 L


 


Ur Specific Gravity  1.009 L  


 


Urine Ketones  1+ H  


 


Urine Blood  1+ H  


 


Ur Leukocyte Esterase  3+ H  








ASSESSMENT AND PLAN:


1. UTI - Based on historical urine cultures, will treat with IV meropenem 

pending urine culture result. EKG shows sinus bradycardia with ni significant ST

-T wave changes. Encourage frequent repositioning to prevent decubitus ulcers. 

Consult Urology for recent increase in frequency of urinary retention. Will 

continue comprehensive care for all of patients comorbid conditions.





2. Hypoalbuminemia - Possibly due to combined effects of malnutrition and 

inflammation associated with comorbid chronic conditions. Will ensure adequate 

dietary protein intake and also consult dietician.





3. DVT prophylaxis - Lovenox 40 mg SQ q 24 hours.     





4. Advance directives - Full code

## 2020-01-02 NOTE — HP
CHIEF COMPLAINT: urine infection





PCP: Dr Poon





HISTORY OF PRESENT ILLNESS:


 64 year old man with a PMH of COPD, R lung adenocarcinoma (no chemo/rads/ 

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 

bladder, Recurrent UTIs, Hypotension, Recent admission at [facility] s/p left leg 

distal tibial fracture (d/c'd 10/11/19) returning to the ER for inability to 

urinate. He presented to the ER earlier today because of urinary retention. A 

catheter was placed and over 600 ml of urine was obtained. He was discharged to 

continue on PO levaquin that he had been on for UTI. He was seen on 19 

with the same symptoms. At that time, he was straight cathed and discharged 

with 7 days of levofloxacin for UTI. Today he is complaining of lower abdominal 

pain and of having a New UTI. According to pt, he took his prescribed 

antibiotics with one dose left.He denies any fever, chills, SOB, chest pain, 

hematuria  at this time. Has HHA named 





ER course was notable for:


(1)VSS, CBC with leukocytosis (chronic) 17.8 and thrombocytosis 660, CMP with 

hyponatremia 135


(2) UA positive , EKG NSR with janeen


(3) CXR no acute pathology. Morphine, NS, Rocephin





Recent Travel: none





PAST MEDICAL HISTORY: as above





PAST SURGICAL HISTORY: pt endorsed multiple surgeries without specifications








Social History:


SmokinPPD over 10-20 years but quit a long time ago


Alcohol: denies


Drugs: denies





Allergies





No Known Allergies Allergy (Verified 19 10:29)


 








HOME MEDICATIONS:


 Home Medications











 Medication  Instructions  Recorded


 


Diazepam [Valium] 10 mg PO TID PRN  tablet MDD 30mg 10/10/18


 


Morphine 10 mg/5 ml Liquid 20 mg PO Q4H MDD 60mg 10/07/19





[Morphine 10 mg/5 mL Liquid -]  


 


Bacitracin - [Bacitracin Topical 1 applic TP HS  tube 10/10/19





Ointment -]  


 


Heparin - 5,000 unit SQ TID  vial 10/10/19


 


Lactobacillus Acidophilus [Bacid -] 1 tab PO DAILY  tab 10/10/19


 


Lidocaine Patch Removal [Lidoderm 1 each MC DAILY@2200  each 10/10/19





Patch Removal]  


 


Metoprolol Succinate [Toprol XL -] 25 mg PO DAILY  tab.sr.24h 10/10/19


 


Polyethylene Glycol 3350 [Miralax 17 gm PO BID  bottle 10/10/19





119 gm Btl -]  


 


Levofloxacin [Levaquin] 500 mg PO DAILY 7 Days #7 tablet 19








REVIEW OF SYSTEMS


CONSTITUTIONAL: generalized weakness


Absent:  fever, chills, diaphoresis,  malaise, loss of appetite, weight change


HEENT: 


Absent:  rhinorrhea, nasal congestion, throat pain, throat swelling, difficulty 

swallowing, mouth swelling, ear pain, eye pain, visual changes


CARDIOVASCULAR: 


Absent: chest pain, syncope, palpitations, irregular heart rate, lightheadedness

, peripheral edema


RESPIRATORY: 


Absent: cough, shortness of breath, dyspnea with exertion, orthopnea, wheezing, 

stridor, hemoptysis


GASTROINTESTINAL: abdominal pain


Absent: abdominal distension, nausea, vomiting, diarrhea, constipation, melena, 

hematochezia


GENITOURINARY: 


Absent: dysuria, frequency, urgency, hesitancy, hematuria, flank pain, genital 

pain


MUSCULOSKELETAL: 


Absent: myalgia, arthralgia, joint swelling, back pain, neck pain


SKIN: 


Absent: rash, itching, pallor


HEMATOLOGIC/IMMUNOLOGIC: 


Absent: easy bleeding, easy bruising, lymphadenopathy, frequent infections


ENDOCRINE:


Absent: unexplained weight gain, unexplained weight loss, heat intolerance, 

cold intolerance


NEUROLOGIC: 


Absent: headache, focal weakness or paresthesias, dizziness, unsteady gait, 

seizure, mental status changes, bladder or bowel incontinence


PSYCHIATRIC: 


Absent: anxiety, depression, suicidal or homicidal ideation, hallucinations.








PHYSICAL EXAMINATION


 Vital Signs - 24 hr











  20





  17:45


 


Temperature 98.0 F


 


Pulse Rate 71


 


Respiratory 16





Rate 


 


Blood Pressure 112/69


 


O2 Sat by Pulse 96





Oximetry (%) 











GENERAL: Awake, alert, and fully oriented, in no acute distress.


HEAD: Normal with no signs of trauma.


EYES: Pupils equal, round and reactive to light, extraocular movements intact, 

sclera anicteric, conjunctiva clear. No lid lag.


EARS, NOSE, THROAT: oropharynx clear without exudates. Moist mucous membranes.


NECK: Normal range of motion, supple without lymphadenopathy, JVD, or masses.


LUNGS: Breath sounds equal, clear to auscultation bilaterally. No wheezes, and 

no crackles. No accessory muscle use.


HEART: Regular rate and rhythm, normal S1 and S2 without murmur, rub or gallop.


ABDOMEN: Soft, tender in lower quadrant, not distended, normoactive bowel sounds

, no guarding, no rebound, no masses.  No hepatomegaly or  splenomegaly. 


MUSCULOSKELETAL: paraplegic


UPPER EXTREMITIES: 2+ pulses, warm, well-perfused. No cyanosis. No clubbing. No 

peripheral edema.


LOWER EXTREMITIES: 2+ pulses, warm, well-perfused. No calf tenderness. No 

peripheral edema. BLE significantly atrophied 


PSYCHIATRIC: Cooperative. Good eye contact. Appropriate mood and affect.


SKIN: Warm, dry, normal turgor, no rashes or lesions noted, normal capillary 

refill. 


 Laboratory Results - last 24 hr











  20





  19:00 20:04 20:04


 


WBC   17.8 H 


 


RBC   4.98 


 


Hgb   14.6 


 


Hct   44.3 


 


MCV   88.9 


 


MCH   29.2 


 


MCHC   32.9 


 


RDW   16.9 H 


 


Plt Count   660 H 


 


MPV   7.9 


 


Absolute Neuts (auto)   14.1 H 


 


Neutrophils %   78.9 


 


Lymphocytes %   9.5  D 


 


Monocytes %   10.2 


 


Eosinophils %   1.1 


 


Basophils %   0.3 


 


Nucleated RBC %   0 


 


Sodium    135 L


 


Potassium    4.0


 


Chloride    100


 


Carbon Dioxide    27


 


Anion Gap    9


 


BUN    7.9


 


Creatinine    0.7


 


Est GFR (CKD-EPI)AfAm    113.98


 


Est GFR (CKD-EPI)NonAf    98.34


 


Random Glucose    93


 


Calcium    8.8


 


Total Bilirubin    0.6


 


AST    18


 


ALT    14


 


Alkaline Phosphatase    101


 


Total Protein    6.7


 


Albumin    2.7 L


 


Urine Color  Yellow  


 


Urine Appearance  Cloudy  


 


Urine pH  6.5  


 


Ur Specific Gravity  1.009 L  


 


Urine Protein  Negative  


 


Urine Glucose (UA)  Negative  


 


Urine Ketones  1+ H  


 


Urine Blood  1+ H  


 


Urine Nitrite  Negative  


 


Urine Bilirubin  Negative  


 


Urine Urobilinogen  1.0  


 


Ur Leukocyte Esterase  3+ H  


 


Urine WBC (Auto)  66  


 


Urine RBC (Auto)  10-20  


 


Urine Casts (Auto)  17  


 


U Epithel Cells (Auto)  21.3  


 


Urine Bacteria (Auto)  3.6  











ASSESSMENT/PLAN:


64 year old man with a PMH of COPD, R lung adenocarcinoma (no chemo/rads/ 

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 

bladder, Recurrent UTIs, Hypotension, Recent admission at [facility] s/p left leg 

distal tibial fracture (d/c'd 10/11/19) returning to the ER for inability to 

urinate and UTI despite outpatient antibiotics.





UTI with failed outpatient therapy with levaquin


Pt endorsed persistent lower abdominal pain 


urine culture sent


last culture  negative . previous culture from october Citrobacter Koseri , 

acinetobacter, staph epi, sensitive to many abx 


received Rocephin in the ED


will continue with meropenem until culture speciation and specificity returns


Urology Dr Villalta consulted


ID Dr Mendes consulted


[place] cath for urine output if fails consider starr catheter





COPD


Pt currently offers no respiratory complaints


monitor for now





DVT


lovenox daily





Admit to med-surge








Visit type





- Emergency Visit


Emergency Visit: Yes


ED Registration Date: 20


Care time: The patient presented to the Emergency Department on the above date 

and was hospitalized for further evaluation of their emergent condition.





- New Patient


This patient is new to me today: No





- Critical Care


Critical Care patient: No





ATTENDING PHYSICIAN STATEMENT





I saw and evaluated the patient.


I reviewed the resident's note and discussed the case with the resident.


I agree with the resident's findings and plan as documented.








SUBJECTIVE:








OBJECTIVE:








ASSESSMENT AND PLAN:

## 2020-01-02 NOTE — PDOC
Attending Attestation





- Resident


Resident Name: NeilAriana





- ED Attending Attestation


I have performed the following: I have examined & evaluated the patient, The 

case was reviewed & discussed with the resident, I agree w/resident's findings 

& plan





- HPI


HPI: 





01/02/20 05:28


64 y.o. M PMH COPD, R lung adenocarcinoma (no chemo/rads/ surgery), paraplegia 2

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 

hypotension, recent admission at [facility] s/p left leg dital tibial fracture (d/c;d 

10/11/19) presenting with urinary retention and suprapubic discomfort.


no f/c, no n/v, back pain.


seen here on 12/28/19 - seen for same problem, catheterized


started on levaquin, which pt states he finished, but does not add up to the 

number of tablets (7 days) he was rx'd.





- Physicial Exam


PE: 





01/02/20 05:27


Agree with the resident's HPI and PE as documented in the electronic medical 

record.


NAD, well appearing, EOMI, PERRL, nl conjunctiva, anicteric; neck supple. lungs 

clear, RRR, abdomen soft +suprapubic TTP. No rebound, no guarding. normal 

external genitalia. Back nontender. DING x4, contracted extremities. No 

peripheral edema. normal color for ethnicity, WWP.


paraplegic








- Medical Decision Making





01/02/20 04:43


Vital Signs











Temp Pulse Resp BP Pulse Ox


 


 97.3 F L  88   16   130/76   97 


 


 01/02/20 03:57  01/02/20 03:57  01/02/20 03:57  01/02/20 03:57  01/02/20 03:57





States that normally he urinates as usual; today couldn't pass urine.


seen earlier this week for similar sx, straight cath'd.





VS reviewed, wnl.


States that he doesnt want to have an indwelling cath.  


bladder scan with acute urinary retention, >700ml


straight cath done; pt refuses starr and unable to self cath.


UA


pt likely colonizer


f/u cultures


recent abx course.


hold abx for now, as prior urine culture is negative


pt to f/u urology, referral given, for definitive management for his periods of 

urinary retention likely 2/2 paraplegic status.





01/02/20 05:30





01/02/20 06:10

## 2020-01-02 NOTE — PDOC
Documentation entered by Miriam Perez SCRIBE, acting as scribe for Kelsey Hargrove DO.








Kelsey Hargrove DO:  This documentation has been prepared by the Ana whelan Joy, SCRIBE, under my direction and personally reviewed by me in its entirety.  

I confirm that the documentation accurately reflects all work, treatment, 

procedures, and medical decision making performed by me.  





History of Present Illness





- General


Chief Complaint: Urinary Problem


Stated Complaint: UNABLE TO URINATE


Time Seen by Provider: 01/02/20 17:59


History Source: Patient





- History of Present Illness


Initial Comments: 





01/02/20 18:27


The patient is a 64 year old on condom catheter with significant past medical 

history of COPD, R lung adenocarcinoma (no chemo/rads/ surgery), paraplegia 2/2 

spinal cord transection T2-T4 in 1980s, neurogenic bladder, recurrent UTIs (on 

levaquin for the last 2 weeks ), hypotension, recent admission at [facility] s/p left 

leg distal tibial fracture (d/c;d 10/11/19) who presents to the ED with 

difficulty urinating for x1 week. As per patient, he came in last night to the 

ED and was straight cathed. He states that after he went home he was unable to 

urinate despite being on a condom catheter. The patient endorses that he is too 

weak to do it himself. The patient adds that he had gatorade and water to drink 

today.





The patient denies fever, chills, nausea, vomiting, diarrhea.





Allergies: NKA


PCP: Dr. Jordan Poon





Past History





- Past Medical History


Allergies/Adverse Reactions: 


 Allergies











Allergy/AdvReac Type Severity Reaction Status Date / Time


 


No Known Allergies Allergy   Verified 11/12/19 10:29











Home Medications: 


Ambulatory Orders





Diazepam [Valium] 10 mg PO TID PRN  tablet MDD 30mg 10/10/18 


Morphine 10 mg/5 ml Liquid [Morphine 10 mg/5 mL Liquid -] 20 mg PO Q4H MDD 60mg 

10/07/19 


Bacitracin - [Bacitracin Topical Ointment -] 1 applic TP HS  tube 10/10/19 


Heparin - 5,000 unit SQ TID  vial 10/10/19 


Lactobacillus Acidophilus [Bacid -] 1 tab PO DAILY  tab 10/10/19 


Lidocaine Patch Removal [Lidoderm Patch Removal] 1 each MC DAILY@2200  each 10/

10/19 


Metoprolol Succinate [Toprol XL -] 25 mg PO DAILY  tab.sr.24h 10/10/19 


Polyethylene Glycol 3350 [Miralax 119 gm Btl -] 17 gm PO BID  bottle 10/10/19 


Levofloxacin [Levaquin] 500 mg PO DAILY 7 Days #7 tablet 12/29/19 








Anemia: No


Asthma: No


Cancer: No


Cardiac Disorders: No


CVA: No


COPD: Yes


CHF: No


Dementia: No


Diabetes: No


GI Disorders: No


 Disorders: Yes (Recurrent UTI, CONDOM CATHETER)


HTN:  (Hypotension)


Hypercholesterolemia: No


Liver Disease: No


Seizures: No


Thyroid Disease: No





- Surgical History


Abdominal Surgery: No


Appendectomy: No


Cardiac Surgery: No


Cholecystectomy: No


Lung Surgery: No


Neurologic Surgery: No


Orthopedic Surgery: Yes (fracture right wrist s/p fusion)





- Immunization History


Td Vaccination: No


Immunization Up to Date: No





- Psycho Social/Smoking Cessation Hx


Smoking Status: No


Smoking History: Never smoked


Years of Tobacco Use: 0


Have you smoked in the past 12 months: No


Number of Cigarettes Smoked Daily: 3


If you are a former smoker, when did you quit?: 1986


Cigars Per Day: 0


Information on smoking cessation initiated: No


Hx Alcohol Use: No


Drug/Substance Use Hx: No


Substance Use Type: None


Hx Substance Use Treatment: No





**Review of Systems





- Review of Systems


Able to Perform ROS?: Yes


Comments:: 





01/02/20 18:27


GENERAL/CONSTITUTIONAL: +General weakness. No fever or chills.


HEAD, EYES, EARS, NOSE AND THROAT: No change in vision. No ear pain or 

discharge. No sore throat.


CARDIOVASCULAR: No chest pain or shortness of breath.


RESPIRATORY: No cough, wheezing, or hemoptysis.


GASTROINTESTINAL: No nausea, vomiting, diarrhea or constipation.


GENITOURINARY: +Difficulty urinating.


SKIN: No rash


NEUROLOGIC: No headache, vertigo, loss of consciousness.


ALLERGIC/IMMUNOLOGIC: No hives or skin allergy.








*Physical Exam





- Vital Signs


 Last Vital Signs











Temp Pulse Resp BP Pulse Ox


 


 98.0 F   71   16   112/69   96 


 


 01/02/20 17:45  01/02/20 17:45  01/02/20 17:45  01/02/20 17:45  01/02/20 17:45














- Physical Exam


General Appearance: Yes: Nourished, Appropriately Dressed, Other (bedbound, 

paraplegic).  No: Apparent Distress


HEENT: positive: EOMI, Normal Voice


Neck: positive: Supple


Respiratory/Chest: positive: Lungs Clear, Normal Breath Sounds.  negative: 

Respiratory Distress


Cardiovascular: positive: Regular Rhythm, Regular Rate, S1, S2.  negative: Edema


Gastrointestinal/Abdominal: positive: Soft.  negative: Guarding, Rebound, 

Tenderness


Musculoskeletal: positive: Other (paraplegic from fall and hx of back fx)


Extremity: positive: Normal Capillary Refill, Other (paraplegic LE, generally 

weak 4/5 UE)


Integumentary: positive: Normal Color, Dry, Warm


Neurologic: positive: Fully Oriented, Normal Mood/Affect, Normal Response, 

Other (at baseline MS)





**Heart Score/ECG Review





- ECG Intrepretation


Comment:: 





01/02/20 19:15


sinus janeen at 55, nl axis, nl interval, baseline artifact, no acute st/t wave 

findings





ED Treatment Course





- LABORATORY


CBC & Chemistry Diagram: 


 01/02/20 20:04





 01/02/20 20:04





Medical Decision Making





- Medical Decision Making





01/02/20 18:53


a/p: 65yo male with recent dx of UTI with bladder pain


-pt states 3rd er visit for uti symptoms


-c/o bladder pain


-pt with chronic condom cath with decreased urine output


-pt has required straight cath to obtain urine


-pt with only 200cc in the catheter bag


-will send labs, straight cath for urine


-will monitor and reassess


01/02/20 20:21


pt with wbc 17


still with uti despite levaquin dosing


will start rocephin


pt will need admission


01/02/20 22:23


case discussed with [facility] who accepts pt to service





Discharge





- Discharge Information


Problems reviewed: Yes


Clinical Impression/Diagnosis: 


 Leukocytosis





UTI (urinary tract infection)


Qualifiers:


 Urinary tract infection type: site unspecified Hematuria presence: without 

hematuria Qualified Code(s): N39.0 - Urinary tract infection, site not specified





Condition: Fair





- Admission


Yes





- Follow up/Referral


Referrals: 


Sony Poon MD [Primary Care Provider] - 





- Patient Discharge Instructions





- Post Discharge Activity

## 2020-01-02 NOTE — PDOC
History of Present Illness





- General


Chief Complaint: Urinary Problem


Stated Complaint: URINARY PROBLEM


Time Seen by Provider: 01/02/20 04:12





- History of Present Illness


Initial Comments: 


Last Sumner is a 65yo man with a PMH of COPD, R lung adenocarcinoma (no chemo

/rads/ surgery), paraplegia 2/2 spinal cord transection T2-T4 in 1980s, 

neurogenic bladder, recurrent UTIs, hypotension, recent admission at [facility] s/p 

left leg dital tibial fracture (d/c;d 10/11/19) who presents to the ED 

reporting that he has been unable to urinate today. He was seen on 12/28/19 

with the same symptoms. At that time, he was straight cathed and discharged 

with 7 days of levofloxacin for UTI. Mr Sumner states that he ran out of his 

antibiotics yesterday, and he has been unable to urinate since then.





Per chart review, he previously reported that he had to occasionally 

catheterize himself at home but has since been unable to. Today, he denies that 

he ever needs a catheter. He currently denies any dysuria, fever, chills, or 

other recent symptoms but states that he believes he has a UTI due to the 

urinary retention. 











Past History





- Past Medical History


Allergies/Adverse Reactions: 


 Allergies











Allergy/AdvReac Type Severity Reaction Status Date / Time


 


No Known Allergies Allergy   Verified 11/12/19 10:29











Home Medications: 


Ambulatory Orders





Diazepam [Valium] 10 mg PO TID PRN  tablet MDD 30mg 10/10/18 


Morphine 10 mg/5 ml Liquid [Morphine 10 mg/5 mL Liquid -] 20 mg PO Q4H MDD 60mg 

10/07/19 


Bacitracin - [Bacitracin Topical Ointment -] 1 applic TP HS  tube 10/10/19 


Heparin - 5,000 unit SQ TID  vial 10/10/19 


Lactobacillus Acidophilus [Bacid -] 1 tab PO DAILY  tab 10/10/19 


Lidocaine Patch Removal [Lidoderm Patch Removal] 1 each MC DAILY@2200  each 10/

10/19 


Metoprolol Succinate [Toprol XL -] 25 mg PO DAILY  tab.sr.24h 10/10/19 


Polyethylene Glycol 3350 [Miralax 119 gm Btl -] 17 gm PO BID  bottle 10/10/19 


Levofloxacin [Levaquin] 500 mg PO DAILY 7 Days #7 tablet 12/29/19 








Anemia: No


Asthma: No


Cancer: No


Cardiac Disorders: No


CVA: No


COPD: Yes


CHF: No


Dementia: No


Diabetes: No


GI Disorders: No


 Disorders: Yes (Recurrent UTI, CONDOM CATHETER)


HTN:  (Hypotension)


Hypercholesterolemia: No


Liver Disease: No


Seizures: No


Thyroid Disease: No





- Surgical History


Abdominal Surgery: No


Appendectomy: No


Cardiac Surgery: No


Cholecystectomy: No


Lung Surgery: No


Neurologic Surgery: No


Orthopedic Surgery: Yes (fracture right wrist s/p fusion)





- Immunization History


Td Vaccination: No


Immunization Up to Date: No





- Psycho Social/Smoking Cessation Hx


Smoking Status: No


Smoking History: Never smoked


Years of Tobacco Use: 0


Have you smoked in the past 12 months: No


Number of Cigarettes Smoked Daily: 3


If you are a former smoker, when did you quit?: 1986


Cigars Per Day: 0


Hx Alcohol Use: No


Drug/Substance Use Hx: No


Substance Use Type: None


Hx Substance Use Treatment: No





**Review of Systems





- Review of Systems


Comments:: 


General: No fevers, no chills, no weight or appetite change, no malaise


HEENT: No changes in vision, no changes in hearing, no congestion, no sore 

throat


CV: No chest pain, no palpitations, no LE edema


Pulm: No SOB, no cough, no wheezing


GI: No nausea or vomiting, no change in bowel habits, no melena


: NSee HPI


Musc: No back pain, no joint swelling, no recent injury


Skin: No rash, no lesions, no erythema


Endo: No excessive thirst, no heat/cold intolerance


Heme: No unusual bruising or bleeding, no swollen glands


Neuro: No syncope, no numbness/tingling, no focal weakness. h/o paraplegia


Vasc: No claudication


Psych: No recent change in mood, no SI or HI











*Physical Exam





- Vital Signs


 Last Vital Signs











Temp Pulse Resp BP Pulse Ox


 


 97.3 F L  88   16   130/76   97 


 


 01/02/20 03:57  01/02/20 03:57  01/02/20 03:57  01/02/20 03:57  01/02/20 03:57














- Physical Exam


General: Comfortable, no acute distress


HEENT: Atraumatic, PERRL, EOMI, MMM, voice normal, normal neck ROM 


Cards: RRR, no murmur appreciated


Pulm: Comfortable on room air, clear to auscultation bilaterally


Abd: Soft, nontender, nondistended


: Condom catheter in place w/ constant dripping urine. Clear yellow urine in 

bag. 


Ext: Atraumatic. No LE edema. BLE paralysis


Vasc: Extremities WWP. 


Neuro: A&Ox3, CN grossly intact, normal speech. No BLE movement. 


Psych: Mood appropriate to situation 











Medical Decision Making





- Medical Decision Making





01/02/20 04:13


Last Sumner is a 65yo man with a PMH of COPD, R lung adenocarcinoma (no chemo

/rads/ surgery), paraplegia 2/2 spinal cord transection T2-T4 in 1980s, 

neurogenic bladder, recurrent UTIs, hypotension, recent admission at [facility] s/p 

left leg dital tibial fracture (d/c;d 10/11/19) who presents to the ED 

reporting that he has been unable to urinate today. 





- Most likely urinary retention secondary to paraplegia


- Discussed placement of a starr catheter; pt immediately and repeatedly 

refused an indwelling catheter


- Per review, recent urine culture on 12/28 was negative. Final w/ no growth


- Bladder scan with approximately 750mL of urine 


- Will straight cath





01/02/20 06:05


- Pt catheterized, approximately 650mL urine drained


- UA and culture sent


- Pt continues to decline starr catheter





01/02/20 06:27


- UA with 2+ leuk esterase, 34 WBC but only 2.6 bacteria. Pt reports 

colonization, should just be finishing levofloxacin prescription from visit 

last week


- Will wait for cultures prior to sending antibiotics


- Strongly encouraging urology follow up. Will give patient several [facility] 

urologists to contact as soon as possible.





Discussed with Dr Albertina Trejo


PGY2











Discharge





- Discharge Information


Problems reviewed: Yes


Clinical Impression/Diagnosis: 


 Neurogenic bladder, Unable to void





Condition: Stable


Disposition: HOME





- Admission


No





- Follow up/Referral


Referrals: 


Sony Poon MD [Primary Care Provider] - 


Rodney Paniagua MD [Staff Physician] - 


Last Gleason MD [Staff Physician] - 


Lubna Mendoza MD [Staff Physician] - 


CallBack Reminder: 


Urine culture





- Patient Discharge Instructions


Patient Printed Discharge Instructions:  DI for Urinary Retention in Men


Additional Instructions: 


Discharge Instructions


- You were seen for urinary retention. This is most likely due to your 

neurogenic bladder


- Your urine analysis did not strongly suggest a urinary tract infection. You 

had a culture sent, and you will be contacted if it indicates you need 

additional antibiotics. 


- Please follow up with a urologist as soon as possible. You have been given 

contact information for several urologists at [facility].


- Seek immediate care for worsening symptoms, pain with urination, blood in 

your urine, or any other medical emergency. 





- Post Discharge Activity

## 2020-01-03 LAB
ALBUMIN SERPL-MCNC: 2.4 G/DL (ref 3.4–5)
ALP SERPL-CCNC: 99 U/L (ref 45–117)
ALT SERPL-CCNC: 14 U/L (ref 13–61)
ANION GAP SERPL CALC-SCNC: 10 MMOL/L (ref 8–16)
AST SERPL-CCNC: 15 U/L (ref 15–37)
BASOPHILS # BLD: 0.3 % (ref 0–2)
BILIRUB SERPL-MCNC: 0.5 MG/DL (ref 0.2–1)
BUN SERPL-MCNC: 6.4 MG/DL (ref 7–18)
CALCIUM SERPL-MCNC: 8.5 MG/DL (ref 8.5–10.1)
CHLORIDE SERPL-SCNC: 104 MMOL/L (ref 98–107)
CO2 SERPL-SCNC: 25 MMOL/L (ref 21–32)
CREAT SERPL-MCNC: 0.6 MG/DL (ref 0.55–1.3)
DEPRECATED RDW RBC AUTO: 16.9 % (ref 11.9–15.9)
EOSINOPHIL # BLD: 2.3 % (ref 0–4.5)
GLUCOSE SERPL-MCNC: 77 MG/DL (ref 74–106)
HCT VFR BLD CALC: 43.5 % (ref 35.4–49)
HGB BLD-MCNC: 14.2 GM/DL (ref 11.7–16.9)
LYMPHOCYTES # BLD: 15.2 % (ref 8–40)
MAGNESIUM SERPL-MCNC: 1.9 MG/DL (ref 1.8–2.4)
MCH RBC QN AUTO: 29.4 PG (ref 25.7–33.7)
MCHC RBC AUTO-ENTMCNC: 32.7 G/DL (ref 32–35.9)
MCV RBC: 90 FL (ref 80–96)
MONOCYTES # BLD AUTO: 9.3 % (ref 3.8–10.2)
NEUTROPHILS # BLD: 72.9 % (ref 42.8–82.8)
PHOSPHATE SERPL-MCNC: 2.4 MG/DL (ref 2.5–4.9)
PLATELET # BLD AUTO: 553 K/MM3 (ref 134–434)
PMV BLD: 8.9 FL (ref 7.5–11.1)
POTASSIUM SERPLBLD-SCNC: 3.5 MMOL/L (ref 3.5–5.1)
PROT SERPL-MCNC: 6.6 G/DL (ref 6.4–8.2)
RBC # BLD AUTO: 4.83 M/MM3 (ref 4–5.6)
SODIUM SERPL-SCNC: 138 MMOL/L (ref 136–145)
WBC # BLD AUTO: 12.2 K/MM3 (ref 4–10)

## 2020-01-03 RX ADMIN — DOCUSATE SODIUM PRN MG: 100 CAPSULE, LIQUID FILLED ORAL at 23:49

## 2020-01-03 RX ADMIN — MORPHINE SULFATE PRN MG: 10 SOLUTION ORAL at 09:27

## 2020-01-03 RX ADMIN — MEROPENEM SCH MLS/HR: 1 INJECTION, POWDER, FOR SOLUTION INTRAVENOUS at 01:58

## 2020-01-03 RX ADMIN — MORPHINE SULFATE PRN MG: 10 SOLUTION ORAL at 18:11

## 2020-01-03 RX ADMIN — MORPHINE SULFATE PRN MG: 10 SOLUTION ORAL at 23:05

## 2020-01-03 RX ADMIN — POTASSIUM & SODIUM PHOSPHATES POWDER PACK 280-160-250 MG SCH PACKET: 280-160-250 PACK at 21:09

## 2020-01-03 RX ADMIN — DOCUSATE SODIUM PRN MG: 100 CAPSULE, LIQUID FILLED ORAL at 09:20

## 2020-01-03 RX ADMIN — POTASSIUM & SODIUM PHOSPHATES POWDER PACK 280-160-250 MG SCH PACKET: 280-160-250 PACK at 10:36

## 2020-01-03 RX ADMIN — SENNOSIDES PRN TAB: 8.6 TABLET, FILM COATED ORAL at 23:49

## 2020-01-03 RX ADMIN — MEROPENEM SCH MLS/HR: 1 INJECTION, POWDER, FOR SOLUTION INTRAVENOUS at 17:23

## 2020-01-03 RX ADMIN — MORPHINE SULFATE PRN MG: 10 SOLUTION ORAL at 02:50

## 2020-01-03 RX ADMIN — MEROPENEM SCH MLS/HR: 1 INJECTION, POWDER, FOR SOLUTION INTRAVENOUS at 09:20

## 2020-01-03 RX ADMIN — ENOXAPARIN SODIUM SCH MG: 40 INJECTION SUBCUTANEOUS at 09:20

## 2020-01-03 NOTE — PN
Teaching Attending Note


Name of Resident: Gwen Peace





ATTENDING PHYSICIAN STATEMENT





I saw and evaluated the patient.


I reviewed the resident's note and discussed the case with the resident.


I agree with the resident's findings and plan as documented.








SUBJECTIVE: Feeling well. No complaints. No fever/chills/nausea/vomiting/

abdominal pain.








OBJECTIVE: Afebrile, hemodynamically Stable





 Last Vital Signs











Temp Pulse Resp BP Pulse Ox


 


 97.7 F   98 H  20   108/71   94 L


 


 01/03/20 14:30  01/03/20 14:30  01/03/20 14:30  01/03/20 14:30  01/03/20 09:00








HEENT - Atraumatic, normocephalic.


Heart - S1, S2, RRR


Lungs - clear to auscultation


Abdomen -Soft, non-tender. Bowel Sounds normal.


Extremities - no edema, no calf tenderness. Wasting, contractures.


 - Campbell in situ, draining blood tinged urine.


Neuro - AAO x 3. Reduced power LEs.





 Laboratory Results - last 24 hr











  01/02/20 01/02/20 01/02/20





  19:00 20:04 20:04


 


WBC   17.8 H 


 


RBC   4.98 


 


Hgb   14.6 


 


Hct   44.3 


 


MCV   88.9 


 


MCH   29.2 


 


MCHC   32.9 


 


RDW   16.9 H 


 


Plt Count   660 H 


 


MPV   7.9 


 


Absolute Neuts (auto)   14.1 H 


 


Neutrophils %   78.9 


 


Lymphocytes %   9.5  D 


 


Monocytes %   10.2 


 


Eosinophils %   1.1 


 


Basophils %   0.3 


 


Nucleated RBC %   0 


 


Sodium    135 L


 


Potassium    4.0


 


Chloride    100


 


Carbon Dioxide    27


 


Anion Gap    9


 


BUN    7.9


 


Creatinine    0.7


 


Est GFR (CKD-EPI)AfAm    113.98


 


Est GFR (CKD-EPI)NonAf    98.34


 


Random Glucose    93


 


Calcium    8.8


 


Phosphorus   


 


Magnesium   


 


Total Bilirubin    0.6


 


AST    18


 


ALT    14


 


Alkaline Phosphatase    101


 


Total Protein    6.7


 


Albumin    2.7 L


 


Urine Color  Yellow  


 


Urine Appearance  Cloudy  


 


Urine pH  6.5  


 


Ur Specific Gravity  1.009 L  


 


Urine Protein  Negative  


 


Urine Glucose (UA)  Negative  


 


Urine Ketones  1+ H  


 


Urine Blood  1+ H  


 


Urine Nitrite  Negative  


 


Urine Bilirubin  Negative  


 


Urine Urobilinogen  1.0  


 


Ur Leukocyte Esterase  3+ H  


 


Urine WBC (Auto)  66  


 


Urine RBC (Auto)  10-20  


 


Urine Casts (Auto)  17  


 


U Epithel Cells (Auto)  21.3  


 


Urine Bacteria (Auto)  3.6  














  01/03/20 01/03/20





  07:06 07:06


 


WBC  12.2 H 


 


RBC  4.83 


 


Hgb  14.2 


 


Hct  43.5 


 


MCV  90.0 


 


MCH  29.4 


 


MCHC  32.7 


 


RDW  16.9 H 


 


Plt Count  553 H 


 


MPV  8.9  D 


 


Absolute Neuts (auto)  8.9 H 


 


Neutrophils %  72.9 


 


Lymphocytes %  15.2  D 


 


Monocytes %  9.3 


 


Eosinophils %  2.3  D 


 


Basophils %  0.3 


 


Nucleated RBC %  0 


 


Sodium   138


 


Potassium   3.5


 


Chloride   104


 


Carbon Dioxide   25


 


Anion Gap   10


 


BUN   6.4 L


 


Creatinine   0.6


 


Est GFR (CKD-EPI)AfAm   121.43


 


Est GFR (CKD-EPI)NonAf   104.77


 


Random Glucose   77


 


Calcium   8.5


 


Phosphorus   2.4 L


 


Magnesium   1.9


 


Total Bilirubin   0.5


 


AST   15


 


ALT   14


 


Alkaline Phosphatase   99


 


Total Protein   6.6


 


Albumin   2.4 L


 


Urine Color  


 


Urine Appearance  


 


Urine pH  


 


Ur Specific Gravity  


 


Urine Protein  


 


Urine Glucose (UA)  


 


Urine Ketones  


 


Urine Blood  


 


Urine Nitrite  


 


Urine Bilirubin  


 


Urine Urobilinogen  


 


Ur Leukocyte Esterase  


 


Urine WBC (Auto)  


 


Urine RBC (Auto)  


 


Urine Casts (Auto)  


 


U Epithel Cells (Auto)  


 


Urine Bacteria (Auto)  








 Current Medications











Generic Name Dose Route Start Last Admin





  Trade Name Freq  PRN Reason Stop Dose Admin


 


Acetaminophen  650 mg  01/03/20 01:01  





  Tylenol -  PO   





  Q4H PRN   





  PAIN LEVEL 6-10   





     





     





     


 


Docusate Sodium  100 mg  01/03/20 01:01  01/03/20 09:20





  Colace -  PO   100 mg





  BID PRN   Administration





  CONSTIPATION   





     





     





     


 


Enoxaparin Sodium  40 mg  01/03/20 10:00  01/03/20 09:20





  Lovenox -  SQ   40 mg





  DAILY ALE   Administration





     





     





     





     


 


Meropenem 1 gm/ Dextrose  100 mls @ 200 mls/hr  01/03/20 18:00  





  IVPB   





  Q8H-IV ALE   





     





     





     





     


 


Morphine Sulfate  20 mg  01/03/20 02:37  01/03/20 09:27





  Morphine 10 Mg/5 Ml Liquid  PO   20 mg





  Q4H PRN   Administration





  PAIN LEVEL 6-10   





     





     





     


 


Potassium Phos/Sodium Phos  1 packet  01/03/20 10:00  01/03/20 10:36





  Phos-Nak Packet -  PO  01/03/20 22:01  1 packet





  BID ALE   Administration





     





     





     





     


 


Senna  2 tab  01/03/20 01:01  





  Senna -  PO   





  HS PRN   





  CONSTIPATION   





     





     





     








 Home Medications











 Medication  Instructions  Recorded


 


Diazepam [Valium] 10 mg PO TID PRN  tablet MDD 30mg 10/10/18


 


Morphine 10 mg/5 ml Liquid 20 mg PO Q4H MDD 60mg 10/07/19





[Morphine 10 mg/5 mL Liquid -]  














ASSESSMENT AND PLAN:





64 year old male with history of COPD, R lung Adenocarcinoma (no CTx/RTx/Surgery

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 

(intermittent self catheterization), Recurrent UTIs, recent admission at [facility] s/

p left leg distal tibial fracture (d/joanne 10/11/19) now presenting with urinary 

retention and likely UTI. He recently completed a 7 day course of Levofloxacin 

for UTI. 





1. UTI associated with neurogenic bladder and self catheterization


Urine Cx pending.


Leukocytosis resolving.


Continue IV Meropenem


ID and Urology consulted.





2. RUL Adenocarcinoma, not on treatment - for out-patient Oncology follow up.





3. COPD - stable. No evidence of decompensation.





DVT Px - Lovenox SQ

## 2020-01-03 NOTE — PN
Physical Exam: 


SUBJECTIVE: Patient seen and examined. Starr inserted overnight due to urinary 

retention. Denies chest pain, abd pain, SOB, chills. Complains that starr is 

uncomfortable. 





OBJECTIVE:





 Vital Signs











 Period  Temp  Pulse  Resp  BP Sys/Malone  Pulse Ox


 


 Last 24 Hr  97.5 F-97.8 F  50-98  16-20  /46-74  94











GENERAL: The patient is awake, alert, and fully oriented, in no acute distress.


HEAD: Normal with no signs of trauma.


EYES: EOMI, no scleral icterus 


ENT: MMM


NECK: Trachea midline, full range of motion, supple. 


LUNGS: Breath sounds equal, clear to auscultation bilaterally, no wheezes, no 

crackles, no accessory muscle use. 


HEART: Regular rate and rhythm, S1, S2 without murmur, rub or gallop.


ABDOMEN: Soft, nondistended, normoactive bowel sounds, no guarding, no rebound, 

no hepatosplenomegaly, no masses. Mild superpubic tenderness to palpation. 


: starr in place. 


EXTREMITIES: 2+ pulses, warm, well-perfused. Right wrist fused.


NEUROLOGICAL: Normal speech, gait not observed.


PSYCH: Normal mood, normal affect.


SKIN: Warm, dry, normal turgor, no rashes or lesions noted








 Laboratory Results - last 24 hr











  01/02/20 01/02/20 01/02/20





  19:00 20:04 20:04


 


WBC   17.8 H 


 


RBC   4.98 


 


Hgb   14.6 


 


Hct   44.3 


 


MCV   88.9 


 


MCH   29.2 


 


MCHC   32.9 


 


RDW   16.9 H 


 


Plt Count   660 H 


 


MPV   7.9 


 


Absolute Neuts (auto)   14.1 H 


 


Neutrophils %   78.9 


 


Lymphocytes %   9.5  D 


 


Monocytes %   10.2 


 


Eosinophils %   1.1 


 


Basophils %   0.3 


 


Nucleated RBC %   0 


 


Sodium    135 L


 


Potassium    4.0


 


Chloride    100


 


Carbon Dioxide    27


 


Anion Gap    9


 


BUN    7.9


 


Creatinine    0.7


 


Est GFR (CKD-EPI)AfAm    113.98


 


Est GFR (CKD-EPI)NonAf    98.34


 


Random Glucose    93


 


Calcium    8.8


 


Phosphorus   


 


Magnesium   


 


Total Bilirubin    0.6


 


AST    18


 


ALT    14


 


Alkaline Phosphatase    101


 


Total Protein    6.7


 


Albumin    2.7 L


 


Urine Color  Yellow  


 


Urine Appearance  Cloudy  


 


Urine pH  6.5  


 


Ur Specific Gravity  1.009 L  


 


Urine Protein  Negative  


 


Urine Glucose (UA)  Negative  


 


Urine Ketones  1+ H  


 


Urine Blood  1+ H  


 


Urine Nitrite  Negative  


 


Urine Bilirubin  Negative  


 


Urine Urobilinogen  1.0  


 


Ur Leukocyte Esterase  3+ H  


 


Urine WBC (Auto)  66  


 


Urine RBC (Auto)  10-20  


 


Urine Casts (Auto)  17  


 


U Epithel Cells (Auto)  21.3  


 


Urine Bacteria (Auto)  3.6  














  01/03/20 01/03/20





  07:06 07:06


 


WBC  12.2 H 


 


RBC  4.83 


 


Hgb  14.2 


 


Hct  43.5 


 


MCV  90.0 


 


MCH  29.4 


 


MCHC  32.7 


 


RDW  16.9 H 


 


Plt Count  553 H 


 


MPV  8.9  D 


 


Absolute Neuts (auto)  8.9 H 


 


Neutrophils %  72.9 


 


Lymphocytes %  15.2  D 


 


Monocytes %  9.3 


 


Eosinophils %  2.3  D 


 


Basophils %  0.3 


 


Nucleated RBC %  0 


 


Sodium   138


 


Potassium   3.5


 


Chloride   104


 


Carbon Dioxide   25


 


Anion Gap   10


 


BUN   6.4 L


 


Creatinine   0.6


 


Est GFR (CKD-EPI)AfAm   121.43


 


Est GFR (CKD-EPI)NonAf   104.77


 


Random Glucose   77


 


Calcium   8.5


 


Phosphorus   2.4 L


 


Magnesium   1.9


 


Total Bilirubin   0.5


 


AST   15


 


ALT   14


 


Alkaline Phosphatase   99


 


Total Protein   6.6


 


Albumin   2.4 L


 


Urine Color  


 


Urine Appearance  


 


Urine pH  


 


Ur Specific Gravity  


 


Urine Protein  


 


Urine Glucose (UA)  


 


Urine Ketones  


 


Urine Blood  


 


Urine Nitrite  


 


Urine Bilirubin  


 


Urine Urobilinogen  


 


Ur Leukocyte Esterase  


 


Urine WBC (Auto)  


 


Urine RBC (Auto)  


 


Urine Casts (Auto)  


 


U Epithel Cells (Auto)  


 


Urine Bacteria (Auto)  








Active Medications











Generic Name Dose Route Start Last Admin





  Trade Name Freq  PRN Reason Stop Dose Admin


 


Acetaminophen  650 mg  01/03/20 01:01  





  Tylenol -  PO   





  Q4H PRN   





  PAIN LEVEL 6-10   





     





     





     


 


Docusate Sodium  100 mg  01/03/20 01:01  01/03/20 09:20





  Colace -  PO   100 mg





  BID PRN   Administration





  CONSTIPATION   





     





     





     


 


Enoxaparin Sodium  40 mg  01/03/20 10:00  01/03/20 09:20





  Lovenox -  SQ   40 mg





  DAILY ALE   Administration





     





     





     





     


 


Meropenem 1 gm/ Dextrose  100 mls @ 200 mls/hr  01/03/20 18:00  01/03/20 17:23





  IVPB   200 mls/hr





  Q8H-IV ALE   Administration





     





     





     





     


 


Morphine Sulfate  20 mg  01/03/20 02:37  01/03/20 09:27





  Morphine 10 Mg/5 Ml Liquid  PO   20 mg





  Q4H PRN   Administration





  PAIN LEVEL 6-10   





     





     





     


 


Potassium Phos/Sodium Phos  1 packet  01/03/20 10:00  01/03/20 10:36





  Phos-Nak Packet -  PO  01/03/20 22:01  1 packet





  BID ALE   Administration





     





     





     





     


 


Senna  2 tab  01/03/20 01:01  





  Senna -  PO   





  HS PRN   





  CONSTIPATION   





     





     





     











ASSESSMENT/PLAN:


65 y/o/m with a PMHx of COPD, R lung adenocarcinoma (no chemo/rads/ surgery), 

Paraplegia 2/2 spinal cord transection T2-T4 in 1980s, Neurogenic bladder, 

Recurrent UTIs, Hypotension, Recent admission at [facility] s/p left leg distal 

tibial fracture (d/c'd 10/11/19) returning to the ER for inability to urinate 

and UTI failing outpatient abx trial. 





#UTI s/p failed outpatient abx with Levaquin 


- UA positive for UTI


- patient with hx of self catheterizations, neurogenic bladder 


- received Rocephin in the ED


- Continue Meropenum, follow urine cx results 


- Urology consulted, Dr. Villalta 


- ID consulted, Dr. Man


- starr catheter in place


- Leukocytosis resolving, continue to monitor 





#COPD


- no signs of current exacerbation


- not on any COPD medications at home 


- monitor





#RUL adenocarcinoma


- Out-patient hematology/oncology follow up recommended 





#Prophylaxis


- Lovenox 





#FEN


- Regular diet 


- monitor and replete lytes as needed 





#Disposition


- pending urine cx, will adjust abx appropriately 











Visit type





- Emergency Visit


Emergency Visit: Yes


ED Registration Date: 01/02/20


Care time: The patient presented to the Emergency Department on the above date 

and was hospitalized for further evaluation of their emergent condition.





- New Patient


This patient is new to me today: No





- Critical Care


Critical Care patient: No





ATTENDING PHYSICIAN STATEMENT





I saw and evaluated the patient.


I reviewed the resident's note and discussed the case with the resident.


I agree with the resident's findings and plan as documented.








SUBJECTIVE:








OBJECTIVE:








ASSESSMENT AND PLAN:

## 2020-01-03 NOTE — EKG
Test Reason : 

Blood Pressure : ***/*** mmHG

Vent. Rate : 055 BPM     Atrial Rate : 055 BPM

   P-R Int : 146 ms          QRS Dur : 070 ms

    QT Int : 450 ms       P-R-T Axes : 071 071 078 degrees

   QTc Int : 430 ms

 

SINUS BRADYCARDIA

OTHERWISE NORMAL ECG

WHEN COMPARED WITH ECG OF 15-OCT-2019 16:55,

NO SIGNIFICANT CHANGE WAS FOUND

Confirmed by LENNOX JIMENEZ MD ([id number]) on 1/3/2020 1:22:05 PM

 

Referred By:             Confirmed By:LENNOX JIMENEZ MD

## 2020-01-03 NOTE — CON.ID
Consult


Consult Specialty:: infectious diseases


Referred by:: ronnell 


Reason for Consultation:: uti,hematuria





- History of Present Illness


Chief Complaint: abd pain


History of Present Illness: 





64 year old well known to me  on condom catheter with significant past medical 

history of COPD, R lung adenocarcinoma , paraplegia 2/2 spinal cord transection 

T2-T4 in 1980s, neurogenic bladder, recurrent UTIs (on levaquin for the last 2 

weeks ), hypotension, recent admission at [facility] s/p left leg distal tibial 

fracture (d/c;d 10/11/19) came for  difficulty urinating for x1 week. As per 

patient, he came in last night to the ED and was straight cathed. He states 

that after he went home he was unable to urinate despite being on a condom 

catheter. The patient endorses that he is too weak to do it himself. 


currently patient has pain in the lower part of the abdomen and is having 

hematuria





- History Source


History Provided By: Patient


Limitations to Obtaining History: Poor Historian





- Past Medical History


CNS: Yes: Other (paraplegia due to accidental fall (T2 spinal fracture))


Gastrointestinal: Yes: Other (Hepatic hemangiomas)


Renal/: Yes: Other (Bladder dysfunction)


Infectious Disease: Yes: Other (multiple UTIs  Pseudomonas)


Musculoskeletal: Yes: Paraplegia, Other (Chronic pain)





- Past Surgical History


Past Surgical History: Yes: Splenectomy





- Alcohol/Substance Use


Hx Alcohol Use: No


History of Substance Use: reports: Marijuana





- Smoking History


Smoking history: Never smoked


Have you smoked in the past 12 months: No


Aproximately how many cigarettes per day: 3


If you are a former smoker, when did you quit?: 1986





- Social History


Usual Living Arrangement: With Significant Other


ADL: Support Services ([facility] (24hr))


History of Recent Travel: No





Home Medications





- Allergies


Allergies/Adverse Reactions: 


 Allergies











Allergy/AdvReac Type Severity Reaction Status Date / Time


 


No Known Allergies Allergy   Verified 11/12/19 10:29














- Home Medications


Home Medications: 


Ambulatory Orders





Diazepam [Valium] 10 mg PO TID PRN  tablet MDD 30mg 10/10/18 


Morphine 10 mg/5 ml Liquid [Morphine 10 mg/5 mL Liquid -] 20 mg PO Q4H MDD 60mg 

10/07/19 


Bacitracin - [Bacitracin Topical Ointment -] 1 applic TP HS  tube 10/10/19 


Heparin - 5,000 unit SQ TID  vial 10/10/19 


Lactobacillus Acidophilus [Bacid -] 1 tab PO DAILY  tab 10/10/19 


Lidocaine Patch Removal [Lidoderm Patch Removal] 1 each MC DAILY@2200  each 10/

10/19 


Metoprolol Succinate [Toprol XL -] 25 mg PO DAILY  tab.sr.24h 10/10/19 


Polyethylene Glycol 3350 [Miralax 119 gm Btl -] 17 gm PO BID  bottle 10/10/19 


Levofloxacin [Levaquin] 500 mg PO DAILY 7 Days #7 tablet 12/29/19 











Review of Systems





- Review of Systems


Constitutional: reports: No Symptoms


Eyes: reports: No Symptoms


HENT: reports: No Symptoms


Neck: reports: No Symptoms


Cardiovascular: reports: No Symptoms


Respiratory: reports: No Symptoms


Gastrointestinal: reports: Abdominal Pain


Genitourinary: reports: Hematuria, Pain


Musculoskeletal: reports: No Symptoms


Integumentary: reports: No Symptoms


Neurological: reports: No Symptoms


Endocrine: reports: No Symptoms


Hematology/Lymphatic: reports: No Symptoms


Psychiatric: reports: No Symptoms





Physical Exam


Vital Signs: 


 Vital Signs











Temperature  97.5 F L  01/03/20 05:45


 


Pulse Rate  53 L  01/03/20 05:45


 


Respiratory Rate  18   01/03/20 05:45


 


Blood Pressure  128/59 L  01/03/20 05:45


 


O2 Sat by Pulse Oximetry (%)  96   01/02/20 17:45











Constitutional: Yes: Well Nourished, No Distress, Calm


Cardiovascular: Yes: Regular Rate and Rhythm


Respiratory: Yes: Regular, CTA Bilaterally


Gastrointestinal: Yes: Normal Bowel Sounds, Soft


Renal/: Yes: Campbell Present, Other (hematuria)


Musculoskeletal: Yes: WNL


Extremities: Yes: WNL


Neurological: Yes: Alert, Oriented


Psychiatric: Yes: Alert, Oriented


Labs: 


 CBC, BMP





 01/03/20 07:06 





 01/03/20 07:06 











Imaging





- Results


Chest X-ray: Report Reviewed, Image Reviewed





Assessment/Plan





64 year old man with a PMH of COPD, R lung adenocarcinoma (no chemo/rads/ 

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 

bladder, Recurrent UTIs, Hypotension, 





uti


hematuria


paraplegia





plan


will continue meropenam


await for cx reports


rest as per the team

## 2020-01-04 LAB
ALBUMIN SERPL-MCNC: 2.3 G/DL (ref 3.4–5)
ALP SERPL-CCNC: 92 U/L (ref 45–117)
ALT SERPL-CCNC: 12 U/L (ref 13–61)
ANION GAP SERPL CALC-SCNC: 9 MMOL/L (ref 8–16)
AST SERPL-CCNC: 14 U/L (ref 15–37)
BASOPHILS # BLD: 0.3 % (ref 0–2)
BILIRUB SERPL-MCNC: 0.5 MG/DL (ref 0.2–1)
BUN SERPL-MCNC: 5.7 MG/DL (ref 7–18)
CALCIUM SERPL-MCNC: 8.3 MG/DL (ref 8.5–10.1)
CHLORIDE SERPL-SCNC: 96 MMOL/L (ref 98–107)
CO2 SERPL-SCNC: 27 MMOL/L (ref 21–32)
CREAT SERPL-MCNC: 0.5 MG/DL (ref 0.55–1.3)
DEPRECATED RDW RBC AUTO: 16.9 % (ref 11.9–15.9)
EOSINOPHIL # BLD: 2.6 % (ref 0–4.5)
GLUCOSE SERPL-MCNC: 82 MG/DL (ref 74–106)
HCT VFR BLD CALC: 42.2 % (ref 35.4–49)
HGB BLD-MCNC: 13.8 GM/DL (ref 11.7–16.9)
LYMPHOCYTES # BLD: 12.5 % (ref 8–40)
MCH RBC QN AUTO: 29.5 PG (ref 25.7–33.7)
MCHC RBC AUTO-ENTMCNC: 32.7 G/DL (ref 32–35.9)
MCV RBC: 90.2 FL (ref 80–96)
MONOCYTES # BLD AUTO: 11.5 % (ref 3.8–10.2)
NEUTROPHILS # BLD: 73.1 % (ref 42.8–82.8)
PLATELET # BLD AUTO: 559 K/MM3 (ref 134–434)
PMV BLD: 9 FL (ref 7.5–11.1)
POTASSIUM SERPLBLD-SCNC: 3.6 MMOL/L (ref 3.5–5.1)
PROT SERPL-MCNC: 6 G/DL (ref 6.4–8.2)
RBC # BLD AUTO: 4.68 M/MM3 (ref 4–5.6)
SODIUM SERPL-SCNC: 132 MMOL/L (ref 136–145)
WBC # BLD AUTO: 13.6 K/MM3 (ref 4–10)

## 2020-01-04 RX ADMIN — MEROPENEM SCH MLS/HR: 1 INJECTION, POWDER, FOR SOLUTION INTRAVENOUS at 10:12

## 2020-01-04 RX ADMIN — MORPHINE SULFATE PRN MG: 10 SOLUTION ORAL at 22:25

## 2020-01-04 RX ADMIN — MEROPENEM SCH MLS/HR: 1 INJECTION, POWDER, FOR SOLUTION INTRAVENOUS at 01:28

## 2020-01-04 RX ADMIN — MORPHINE SULFATE PRN MG: 10 SOLUTION ORAL at 10:35

## 2020-01-04 RX ADMIN — DIPHENHYDRAMINE HCL ONE MG: 25 CAPSULE ORAL at 19:41

## 2020-01-04 RX ADMIN — ENOXAPARIN SODIUM SCH MG: 40 INJECTION SUBCUTANEOUS at 10:12

## 2020-01-04 RX ADMIN — DIPHENHYDRAMINE HCL ONE: 25 CAPSULE ORAL at 19:45

## 2020-01-04 NOTE — PN
Physical Exam: 


SUBJECTIVE: Patient seen and examined at bedside. Denies acute complaints. 

Denies subjective fevers, chills, shortness of breath, chest pain, palpitations

, abdominal pain, nausea, vomiting. 





OBJECTIVE:





 Vital Signs











 Period  Temp  Pulse  Resp  BP Sys/Malone  Pulse Ox


 


 Last 24 Hr  97.6 F-98.6 F    18-20  101-132/57-85  94-97











GENERAL: The patient is awake, alert, and fully oriented, in no acute distress.


HEAD: Normal with no signs of trauma.


EYES: PERRL, extraocular movements intact, sclera anicteric, conjunctiva clear. 

No ptosis. 


ENT: Oropharynx clear without exudates, moist mucous membranes.


NECK: Trachea midline, full range of motion, supple. 


LUNGS: Breath sounds equal, clear to auscultation bilaterally, no wheezes, no 

crackles, no accessory muscle use. 


HEART: Regular rate and rhythm, S1, S2 without murmur, rub or gallop.


ABDOMEN: Soft, nontender, nondistended, normoactive bowel sounds, no guarding, 

no rebound, no hepatosplenomegaly, no masses.


GENITOURINAR- Campbell catheter in situ. 


EXTREMITIES: 2+ pulses, warm, well-perfused, no edema. 


NEUROLOGICAL: Cranial nerves II through XII grossly intact. Normal speech, gait 

not observed.


PSYCH: Normal mood, normal affect.


SKIN: Warm, dry, normal turgor, no rashes or lesions noted








 Laboratory Results - last 24 hr











  01/03/20 01/03/20 01/04/20





  07:06 07:06 06:22


 


WBC  12.2 H   13.6 H


 


RBC  4.83   4.68


 


Hgb  14.2   13.8


 


Hct  43.5   42.2


 


MCV  90.0   90.2


 


MCH  29.4   29.5


 


MCHC  32.7   32.7


 


RDW  16.9 H   16.9 H


 


Plt Count  553 H   559 H


 


MPV  8.9  D   9.0


 


Absolute Neuts (auto)  8.9 H   9.9 H


 


Neutrophils %  72.9   73.1


 


Lymphocytes %  15.2  D   12.5


 


Monocytes %  9.3   11.5 H


 


Eosinophils %  2.3  D   2.6


 


Basophils %  0.3   0.3


 


Nucleated RBC %  0   0


 


Sodium   138 


 


Potassium   3.5 


 


Chloride   104 


 


Carbon Dioxide   25 


 


Anion Gap   10 


 


BUN   6.4 L 


 


Creatinine   0.6 


 


Est GFR (CKD-EPI)AfAm   121.43 


 


Est GFR (CKD-EPI)NonAf   104.77 


 


Random Glucose   77 


 


Calcium   8.5 


 


Phosphorus   2.4 L 


 


Magnesium   1.9 


 


Total Bilirubin   0.5 


 


AST   15 


 


ALT   14 


 


Alkaline Phosphatase   99 


 


Total Protein   6.6 


 


Albumin   2.4 L 














  01/04/20





  06:22


 


WBC 


 


RBC 


 


Hgb 


 


Hct 


 


MCV 


 


MCH 


 


MCHC 


 


RDW 


 


Plt Count 


 


MPV 


 


Absolute Neuts (auto) 


 


Neutrophils % 


 


Lymphocytes % 


 


Monocytes % 


 


Eosinophils % 


 


Basophils % 


 


Nucleated RBC % 


 


Sodium  132 L


 


Potassium  3.6


 


Chloride  96 L


 


Carbon Dioxide  27


 


Anion Gap  9


 


BUN  5.7 L


 


Creatinine  0.5 L


 


Est GFR (CKD-EPI)AfAm  130.88


 


Est GFR (CKD-EPI)NonAf  112.92


 


Random Glucose  82


 


Calcium  8.3 L


 


Phosphorus 


 


Magnesium 


 


Total Bilirubin  0.5


 


AST  14 L


 


ALT  12 L


 


Alkaline Phosphatase  92


 


Total Protein  6.0 L


 


Albumin  2.3 L








Active Medications











Generic Name Dose Route Start Last Admin





  Trade Name Freq  PRN Reason Stop Dose Admin


 


Acetaminophen  650 mg  01/03/20 01:01  





  Tylenol -  PO   





  Q4H PRN   





  PAIN LEVEL 6-10   





     





     





     


 


Docusate Sodium  100 mg  01/03/20 01:01  01/03/20 23:49





  Colace -  PO   100 mg





  BID PRN   Administration





  CONSTIPATION   





     





     





     


 


Enoxaparin Sodium  40 mg  01/03/20 10:00  01/03/20 09:20





  Lovenox -  SQ   40 mg





  DAILY ALE   Administration





     





     





     





     


 


Meropenem 1 gm/ Dextrose  100 mls @ 200 mls/hr  01/03/20 18:00  01/04/20 01:28





  IVPB   200 mls/hr





  Q8H-IV ALE   Administration





     





     





     





     


 


Morphine Sulfate  20 mg  01/03/20 02:37  01/03/20 23:05





  Morphine 10 Mg/5 Ml Liquid  PO   20 mg





  Q4H PRN   Administration





  PAIN LEVEL 6-10   





     





     





     


 


Senna  2 tab  01/03/20 01:01  01/03/20 23:49





  Senna -  PO   2 tab





  HS PRN   Administration





  CONSTIPATION   





     





     





     











ASSESSMENT/PLAN:


65 y/o/m with a PMHx of COPD, Right lung adenocarcinoma (no chemo/rads/ surgery)

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 

bladder, recurrent UTIs, hypotension, Recent admission at [facility] s/p left leg 

distal tibial fracture (d/c'd 10/11/19) returning to the ER for inability to 

urinate and UTI failing outpatient antibiotic trial. 





UTI s/p failed outpatient antibiotic therapy with Levaquin 


- UA positive for UTI. Cultures negative for growth WBC increased to 13.8,

however patient remains afebrile. 


- Patient with hx of self catheterizations, neurogenic bladder 


- Meropenum discontinued. ID recommendations appreciated (Dr. Farrar). 


- Urology consulted, Dr. Villalta. Campbell catheter in place





COPD


- Current not in exacerbation


- Not on any COPD medications at home 


- DuoNebs PRN





Adenocarcinoma right upper lung lobe


- Out-patient hematology/oncology follow up recommended 





FEN


- No IV fluids indicated.


- monitor and replete lytes as needed 


- Regular diet





Prophylaxis


- Lovenox 40mg subq daiily





Disposition


- Continue care in medical surgical floor. An








Visit type





- Emergency Visit


Emergency Visit: Yes


ED Registration Date: 01/02/20


Care time: The patient presented to the Emergency Department on the above date 

and was hospitalized for further evaluation of their emergent condition.





- New Patient


This patient is new to me today: Yes


Date on this admission: 01/04/20





- Critical Care


Critical Care patient: No





- Discharge Referral


Referred to [facility] No





ATTENDING PHYSICIAN STATEMENT





I saw and evaluated the patient.


I reviewed the resident's note and discussed the case with the resident.


I agree with the resident's findings and plan as documented.








SUBJECTIVE:








OBJECTIVE:








ASSESSMENT AND PLAN:

## 2020-01-04 NOTE — PN
Progress Note, Physician


History of Present Illness: 





Pt is alert. States he felt much better this am but recently doesn't feel well 

in general. Starr in place, has blood-tinged urine. Remains afebrile, vitals 

stable.





- Current Medication List


Current Medications: 


Active Medications





Acetaminophen (Tylenol -)  650 mg PO Q4H PRN


   PRN Reason: PAIN LEVEL 6-10


Docusate Sodium (Colace -)  100 mg PO BID PRN


   PRN Reason: CONSTIPATION


   Last Admin: 01/03/20 23:49 Dose:  100 mg


Enoxaparin Sodium (Lovenox -)  40 mg SQ DAILY ALE


   Last Admin: 01/04/20 10:12 Dose:  40 mg


Meropenem 1 gm/ Dextrose  100 mls @ 200 mls/hr IVPB Q8H-IV ALE


   Last Admin: 01/04/20 10:12 Dose:  200 mls/hr


Morphine Sulfate (Morphine 10 Mg/5 Ml Liquid)  20 mg PO Q4H PRN


   PRN Reason: PAIN LEVEL 6-10


   Last Admin: 01/04/20 10:35 Dose:  20 mg


Senna (Senna -)  2 tab PO HS PRN


   PRN Reason: CONSTIPATION


   Last Admin: 01/03/20 23:49 Dose:  2 tab











- Objective


Vital Signs: 


 Vital Signs











Temperature  98.6 F   01/04/20 06:26


 


Pulse Rate  102 H  01/04/20 06:26


 


Respiratory Rate  18   01/04/20 08:25


 


Blood Pressure  132/57 L  01/04/20 06:26


 


O2 Sat by Pulse Oximetry (%)  97   01/04/20 08:25











Constitutional: Yes: No Distress


Neck: Yes: Supple


Cardiovascular: Yes: Regular Rate and Rhythm


Respiratory: Yes: Regular


Gastrointestinal: Yes: Normal Bowel Sounds, Soft


Genitourinary: Yes: Starr Present, Hematuria


Musculoskeletal: Yes: Other (paraplegia)


Edema: No


Integumentary: Yes: WNL


Neurological: Yes: Alert


Labs: 


 CBC, BMP





 01/04/20 06:22 





 01/04/20 06:22 





 Laboratory Results - last 24 hr











  01/04/20 01/04/20





  06:22 06:22


 


WBC  13.6 H 


 


RBC  4.68 


 


Hgb  13.8 


 


Hct  42.2 


 


MCV  90.2 


 


MCH  29.5 


 


MCHC  32.7 


 


RDW  16.9 H 


 


Plt Count  559 H 


 


MPV  9.0 


 


Absolute Neuts (auto)  9.9 H 


 


Neutrophils %  73.1 


 


Lymphocytes %  12.5 


 


Monocytes %  11.5 H 


 


Eosinophils %  2.6 


 


Basophils %  0.3 


 


Nucleated RBC %  0 


 


Sodium   132 L


 


Potassium   3.6


 


Chloride   96 L


 


Carbon Dioxide   27


 


Anion Gap   9


 


BUN   5.7 L


 


Creatinine   0.5 L


 


Est GFR (CKD-EPI)AfAm   130.88


 


Est GFR (CKD-EPI)NonAf   112.92


 


Random Glucose   82


 


Calcium   8.3 L


 


Total Bilirubin   0.5


 


AST   14 L


 


ALT   12 L


 


Alkaline Phosphatase   92


 


Total Protein   6.0 L


 


Albumin   2.3 L








Urine culture negative





Problem List





- Problems


(1) UTI (urinary tract infection)


Code(s): N39.0 - URINARY TRACT INFECTION, SITE NOT SPECIFIED   


Qualifiers: 


   Urinary tract infection type: site unspecified   Hematuria presence: without 

hematuria   Qualified Code(s): N39.0 - Urinary tract infection, site not 

specified   





(2) Adenocarcinoma of right lung


Code(s): C34.91 - MALIGNANT NEOPLASM OF UNSP PART OF RIGHT BRONCHUS OR LUNG   





(3) Neurogenic bladder


Code(s): N31.9 - NEUROMUSCULAR DYSFUNCTION OF BLADDER, UNSPECIFIED   





(4) Paraplegia


Code(s): G82.20 - PARAPLEGIA, UNSPECIFIED   





(5) Leukocytosis


Code(s): D72.829 - ELEVATED WHITE BLOOD CELL COUNT, UNSPECIFIED   





(6) Tibia/fibula fracture


Code(s): S82.209A - UNSP FRACTURE OF SHAFT OF UNSP TIBIA, INIT FOR CLOS FX; 

S82.409A - UNSP FRACTURE OF SHAFT OF UNSP FIBULA, INIT FOR CLOS FX   


Qualifiers: 


   Encounter type: initial encounter   Fracture type: closed   Laterality: left

   Qualified Code(s): S82.202A - Unspecified fracture of shaft of left tibia, 

initial encounter for closed fracture; S82.402A - Unspecified fracture of shaft 

of left fibula, initial encounter for closed fracture   





Assessment/Plan





Leukocytosis


Urinary retention


Hematuria


Paraplegia s/p spinal cord transection


Rt lung AdenoCA





-- Pt remains afebrile, has hematuria (? post starr insertion)


-- Urine Cx negative


-- wbc had decreased but increased from yesterday - repeat cbc


-- will d/c antibiotics - is already s/p course of Levaquin 


-- monitor for resolution of hematuria


-- outpt urology/oncology followup





d/w Dr. Whitten

## 2020-01-04 NOTE — PN
Teaching Attending Note


Name of Resident: Ar Napoles





ATTENDING PHYSICIAN STATEMENT





I saw and evaluated the patient.


I reviewed the resident's note and discussed the case with the resident.


I agree with the resident's findings and plan as documented.








SUBJECTIVE: Feeling well. No complaints. No fever/chills/nausea/vomiting/

abdominal pain.








OBJECTIVE: Afebrile, Hemodynamically Stable





 Last Vital Signs











Temp Pulse Resp BP Pulse Ox


 


 98.6 F   102 H  18   132/57 L  97 


 


 01/04/20 06:26  01/04/20 06:26  01/04/20 08:25  01/04/20 06:26  01/04/20 08:25











Heart - S1, S2, RRR


Lungs - clear to auscultation


Abdomen -Soft, non-tender. Bowel Sounds normal.


Extremities - no edema, no calf tenderness. Wasting, contractures LEs.


 - Campbell in situ, draining mild blood tinged urine.


Neuro - AAO x 3. Reduced power LEs.





 Laboratory Results - last 24 hr











  01/04/20 01/04/20





  06:22 06:22


 


WBC  13.6 H 


 


RBC  4.68 


 


Hgb  13.8 


 


Hct  42.2 


 


MCV  90.2 


 


MCH  29.5 


 


MCHC  32.7 


 


RDW  16.9 H 


 


Plt Count  559 H 


 


MPV  9.0 


 


Absolute Neuts (auto)  9.9 H 


 


Neutrophils %  73.1 


 


Lymphocytes %  12.5 


 


Monocytes %  11.5 H 


 


Eosinophils %  2.6 


 


Basophils %  0.3 


 


Nucleated RBC %  0 


 


Sodium   132 L


 


Potassium   3.6


 


Chloride   96 L


 


Carbon Dioxide   27


 


Anion Gap   9


 


BUN   5.7 L


 


Creatinine   0.5 L


 


Est GFR (CKD-EPI)AfAm   130.88


 


Est GFR (CKD-EPI)NonAf   112.92


 


Random Glucose   82


 


Calcium   8.3 L


 


Total Bilirubin   0.5


 


AST   14 L


 


ALT   12 L


 


Alkaline Phosphatase   92


 


Total Protein   6.0 L


 


Albumin   2.3 L








 Current Medications











Generic Name Dose Route Start Last Admin





  Trade Name Freq  PRN Reason Stop Dose Admin


 


Acetaminophen  650 mg  01/03/20 01:01  





  Tylenol -  PO   





  Q4H PRN   





  PAIN LEVEL 6-10   





     





     





     


 


Docusate Sodium  100 mg  01/03/20 01:01  01/03/20 23:49





  Colace -  PO   100 mg





  BID PRN   Administration





  CONSTIPATION   





     





     





     


 


Enoxaparin Sodium  40 mg  01/03/20 10:00  01/04/20 10:12





  Lovenox -  SQ   40 mg





  DAILY ALE   Administration





     





     





     





     


 


Meropenem 1 gm/ Dextrose  100 mls @ 200 mls/hr  01/03/20 18:00  01/04/20 10:12





  IVPB   200 mls/hr





  Q8H-IV ALE   Administration





     





     





     





     


 


Morphine Sulfate  20 mg  01/03/20 02:37  01/04/20 10:35





  Morphine 10 Mg/5 Ml Liquid  PO   20 mg





  Q4H PRN   Administration





  PAIN LEVEL 6-10   





     





     





     


 


Senna  2 tab  01/03/20 01:01  01/03/20 23:49





  Senna -  PO   2 tab





  HS PRN   Administration





  CONSTIPATION   





     





     





     








 Home Medications











 Medication  Instructions  Recorded


 


Diazepam [Valium] 10 mg PO TID PRN  tablet MDD 30mg 10/10/18


 


Morphine 10 mg/5 ml Liquid 20 mg PO Q4H MDD 60mg 10/07/19





[Morphine 10 mg/5 mL Liquid -]  














 





ASSESSMENT AND PLAN:





64 year old male with history of COPD, R lung Adenocarcinoma (no CTx/RTx/Surgery

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 

(intermittent self catheterization), Recurrent UTIs, recent admission at [facility] s/

p left leg distal tibial fracture (d/joanne 10/11/19) now presenting with urinary 

retention. He recently completed a 7 day course of Levofloxacin for UTI. 





1. Recent UTI associated with neurogenic bladder and self catheterization


Urine Cx negative s/p course of Levofloxacin


On IV Meropenem - further abx titration as per ID. Can likely switch to oral or 

discontinue altogether pending ID eval.


ID following.


Urology consulted- discussed with Dr. Mejia who declines in-patient eval in 

favor of outpatient Urology follow up.





2. RUL Adenocarcinoma, not on treatment - for out-patient Oncology follow up.





3. COPD - stable. No evidence of decompensation.





Medically optimized for discharge once final ID recommendations are made, with 

Campbell in-situ and urology follow up next week.

## 2020-01-05 LAB
ANION GAP SERPL CALC-SCNC: 8 MMOL/L (ref 8–16)
BASOPHILS # BLD: 1.1 % (ref 0–2)
BUN SERPL-MCNC: 5.2 MG/DL (ref 7–18)
CALCIUM SERPL-MCNC: 7.6 MG/DL (ref 8.5–10.1)
CHLORIDE SERPL-SCNC: 103 MMOL/L (ref 98–107)
CO2 SERPL-SCNC: 28 MMOL/L (ref 21–32)
CREAT SERPL-MCNC: 0.6 MG/DL (ref 0.55–1.3)
DEPRECATED RDW RBC AUTO: 17.5 % (ref 11.9–15.9)
EOSINOPHIL # BLD: 3 % (ref 0–4.5)
GLUCOSE SERPL-MCNC: 122 MG/DL (ref 74–106)
HCT VFR BLD CALC: 38.1 % (ref 35.4–49)
HGB BLD-MCNC: 12.6 GM/DL (ref 11.7–16.9)
LYMPHOCYTES # BLD: 9.6 % (ref 8–40)
MCH RBC QN AUTO: 29.5 PG (ref 25.7–33.7)
MCHC RBC AUTO-ENTMCNC: 33 G/DL (ref 32–35.9)
MCV RBC: 89.3 FL (ref 80–96)
MONOCYTES # BLD AUTO: 13.9 % (ref 3.8–10.2)
NEUTROPHILS # BLD: 72.4 % (ref 42.8–82.8)
PLATELET # BLD AUTO: 515 K/MM3 (ref 134–434)
PMV BLD: 8.8 FL (ref 7.5–11.1)
POTASSIUM SERPLBLD-SCNC: 3.2 MMOL/L (ref 3.5–5.1)
RBC # BLD AUTO: 4.27 M/MM3 (ref 4–5.6)
SODIUM SERPL-SCNC: 139 MMOL/L (ref 136–145)
WBC # BLD AUTO: 13.2 K/MM3 (ref 4–10)

## 2020-01-05 RX ADMIN — DOCUSATE SODIUM PRN MG: 100 CAPSULE, LIQUID FILLED ORAL at 08:35

## 2020-01-05 RX ADMIN — MORPHINE SULFATE PRN MG: 10 SOLUTION ORAL at 08:35

## 2020-01-05 RX ADMIN — MORPHINE SULFATE PRN MG: 10 SOLUTION ORAL at 22:07

## 2020-01-05 RX ADMIN — SENNOSIDES PRN TAB: 8.6 TABLET, FILM COATED ORAL at 08:35

## 2020-01-05 RX ADMIN — POTASSIUM CHLORIDE SCH MEQ: 1500 TABLET, EXTENDED RELEASE ORAL at 10:45

## 2020-01-05 RX ADMIN — POTASSIUM CHLORIDE SCH MEQ: 1500 TABLET, EXTENDED RELEASE ORAL at 17:27

## 2020-01-05 NOTE — CONS
DATE OF CONSULTATION:  01/05/2020

 

HISTORY OF PRESENT ILLNESS:  I was called to see the patient because of his

presentation of urinary retention.  

 

PAST HISTORY/INDICATIONS:  The patient is a paraplegic for many years with a history

of lung cancer which has, as far as I can tell, not been treated.  

The patient has had voiding issues for many years and for a time had been on

intermittent self-catheterization.  However, he reports that he has not had to do

self-catheterization now for a number of years and feels that he got these on his own

quite well.  However, about a week ago he describes awakening with a full bladder,

which he attributed to drinking too much fluid, presented to the ER where he was

straight catheterized and then sent out.  He voided for a day and shortly thereafter

again gradually became distended and he again presented to the emergency room where

again a catheter was inserted, over 500 mL emptied and he was admitted at this time.

 

The patient has had a number of urinary infections in the past, the most recent

admission a urine culture was normal and his chemistry with particular attention to

BUN and creatinine was normal as well.

Currently the patient does have an indwelling catheter lying in bed.

While I suspect that this patient has had an elevated residual for quite some time,

he insisted that he voids on his own quite well.  

 

I would suggest starting him on tamsulosin, waiting another 36 hours and removing the

Campbell catheter while he is in the hospital and making sure to do a bladder scan to

check a postvoid residual for at least 48 hours after the catheter is removed.  If

the residual is less that 300 mL I think he can be left without a catheter and not

resorting back to intermittent self-catheterization.  However, if the residual after

2 days is greater than 300 mL, certainly if greater than 500 mL, I would suggest that

he reinitiate self-catheterization.  

At the present, the patient has been maintained on vancomycin.  When his course of

vancomycin is completed I would start him on Hiprex, which is a methenamine, 1 g p.o.

b.i.d. combined with vitamin C 1 g p.o. b.i.d. as a urinary antiseptic in hopes of

preventing future symptomatic urinary infections.

 

At the present time it would not appear that this patient would benefit from any

intervention.  I would also suggest that he have some imaging done here, just an

ultrasound to confirm that there are no upper tract stones or evidence of obstruction

that might be related to the chronic paraplegia which does often result in upper

tract deterioration in these patients.

 

If there are any questions, please feel free to be in touch with me.  Call me

currently on my cell if necessary, [phone].

 

MD SIA DUMONT/[id number]

DD: 01/05/2020 12:58

DT: 01/05/2020 13:20

Job #:  [id number]

## 2020-01-05 NOTE — PN
Progress Note, Physician


History of Present Illness: 





Pt is alert, states he feels well today. Has no specific complaints. Episode of 

hypotension yesterday improved with IV fluids. Has no complaints today. 





- Current Medication List


Current Medications: 


Active Medications





Acetaminophen (Tylenol -)  650 mg PO Q4H PRN


   PRN Reason: PAIN LEVEL 6-10


Albuterol/Ipratropium (Duoneb -)  1 amp NEB Q6H PRN


   PRN Reason: SHORTNESS OF BREATH


Docusate Sodium (Colace -)  100 mg PO BID PRN


   PRN Reason: CONSTIPATION


   Last Admin: 01/05/20 08:35 Dose:  100 mg


Morphine Sulfate (Morphine 10 Mg/5 Ml Liquid)  20 mg PO Q4H PRN


   PRN Reason: PAIN LEVEL 6-10


   Last Admin: 01/05/20 08:35 Dose:  20 mg


Potassium Chloride (K-Dur -)  40 meq PO Q6H ALE


   Stop: 01/05/20 14:46


   Last Admin: 01/05/20 10:45 Dose:  40 meq


Senna (Senna -)  2 tab PO HS PRN


   PRN Reason: CONSTIPATION


   Last Admin: 01/05/20 08:35 Dose:  2 tab











- Objective


Vital Signs: 


 Vital Signs











Temperature  98.3 F   01/05/20 10:00


 


Pulse Rate  118 H  01/05/20 10:00


 


Respiratory Rate  20 01/05/20 10:00


 


Blood Pressure  107/62   01/05/20 10:00


 


O2 Sat by Pulse Oximetry (%)  97   01/04/20 21:00











Constitutional: Yes: No Distress, Calm


Cardiovascular: Yes: Regular Rate and Rhythm


Respiratory: Yes: CTA Bilaterally


Gastrointestinal: Yes: Normal Bowel Sounds, Soft


Genitourinary: Yes: Campbell Present


Integumentary: Yes: WNL


Neurological: Yes: Alert


Labs: 


 CBC, BMP





 01/05/20 06:40 





 01/05/20 06:40 





 Microbiology





01/02/20 19:00   Urine - Urine - Catheterized   Urine Culture - Final


                            NO GROWTH OBTAINED





1/4/20 - blood culture - pending





- ....Imaging


Chest X-ray: Report Reviewed





Problem List





- Problems


(1) UTI (urinary tract infection)


Code(s): N39.0 - URINARY TRACT INFECTION, SITE NOT SPECIFIED   


Qualifiers: 


   Urinary tract infection type: site unspecified   Hematuria presence: without 

hematuria   Qualified Code(s): N39.0 - Urinary tract infection, site not 

specified   





(2) Adenocarcinoma of right lung


Code(s): C34.91 - MALIGNANT NEOPLASM OF UNSP PART OF RIGHT BRONCHUS OR LUNG   





(3) Neurogenic bladder


Code(s): N31.9 - NEUROMUSCULAR DYSFUNCTION OF BLADDER, UNSPECIFIED   





(4) Paraplegia


Code(s): G82.20 - PARAPLEGIA, UNSPECIFIED   





(5) Leukocytosis


Code(s): D72.829 - ELEVATED WHITE BLOOD CELL COUNT, UNSPECIFIED   





(6) Tibia/fibula fracture


Code(s): S82.209A - UNSP FRACTURE OF SHAFT OF UNSP TIBIA, INIT FOR CLOS FX; 

S82.409A - UNSP FRACTURE OF SHAFT OF UNSP FIBULA, INIT FOR CLOS FX   


Qualifiers: 


   Encounter type: initial encounter   Fracture type: closed   Laterality: left

   Qualified Code(s): S82.202A - Unspecified fracture of shaft of left tibia, 

initial encounter for closed fracture; S82.402A - Unspecified fracture of shaft 

of left fibula, initial encounter for closed fracture   





Assessment/Plan





Leukocytosis


Urinary retention


Hematuria


Paraplegia s/p spinal cord transection


Rt lung AdenoCA





-- Pt remains afebrile, BP improved after episode of hypotension yesterday


-- Urine Cx negative


-- follow up blood cultures, CXR is negative


-- leukocytosis improving, still mildlly elevated - continue monitor wbc


-- Will hold antibiotics for now - s/p recent 10 day course, Urine Cx neg


-- outpt urology/oncology followup

## 2020-01-05 NOTE — PN
Progress Note (short form)





- Note


Progress Note: 





SUBJECTIVE: Feeling well. No complaints. No fever/chills/nausea/vomiting/

abdominal pain.








OBJECTIVE: Afebrile, Hemodynamically Stable. 





 Last Vital Signs











Temp Pulse Resp BP Pulse Ox


 


 98.5 F   82   20   97/49 L  93 L


 


 01/05/20 14:57  01/05/20 18:00  01/05/20 18:00  01/05/20 18:00  01/05/20 09:00











 


Heart - S1, S2, RRR


Lungs - clear to auscultation


Abdomen -Soft, non-tender. Bowel Sounds normal.


Extremities - no edema, no calf tenderness. Wasting, contractures LEs.


 - Campbell in situ, draining mild blood tinged urine.


Neuro - AAO x 3. Reduced power LEs.


 Laboratory Results - last 24 hr











  01/05/20 01/05/20





  06:40 06:40


 


WBC  13.2 H 


 


RBC  4.27 


 


Hgb  12.6 


 


Hct  38.1 


 


MCV  89.3 


 


MCH  29.5 


 


MCHC  33.0 


 


RDW  17.5 H 


 


Plt Count  515 H 


 


MPV  8.8 


 


Absolute Neuts (auto)  9.6 H 


 


Neutrophils %  72.4 


 


Lymphocytes %  9.6  D 


 


Monocytes %  13.9 H 


 


Eosinophils %  3.0 


 


Basophils %  1.1  D 


 


Nucleated RBC %  0 


 


Sodium   139


 


Potassium   3.2 L


 


Chloride   103


 


Carbon Dioxide   28


 


Anion Gap   8


 


BUN   5.2 L


 


Creatinine   0.6


 


Est GFR (CKD-EPI)AfAm   121.43


 


Est GFR (CKD-EPI)NonAf   104.77


 


Random Glucose   122 H


 


Calcium   7.6 L








 Current Medications











Generic Name Dose Route Start Last Admin





  Trade Name Chintanq  PRN Reason Stop Dose Admin


 


Acetaminophen  650 mg  01/03/20 01:01  





  Tylenol -  PO   





  Q4H PRN   





  PAIN LEVEL 6-10   





     





     





     


 


Albuterol/Ipratropium  1 amp  01/04/20 14:47  





  Duoneb -  NEB   





  Q6H PRN   





  SHORTNESS OF BREATH   





     





     





     


 


Docusate Sodium  100 mg  01/03/20 01:01  01/05/20 08:35





  Colace -  PO   100 mg





  BID PRN   Administration





  CONSTIPATION   





     





     





     


 


Morphine Sulfate  20 mg  01/03/20 02:37  01/05/20 08:35





  Morphine 10 Mg/5 Ml Liquid  PO   20 mg





  Q4H PRN   Administration





  PAIN LEVEL 6-10   





     





     





     


 


Senna  2 tab  01/03/20 01:01  01/05/20 08:35





  Senna -  PO   2 tab





  HS PRN   Administration





  CONSTIPATION   





     





     





     








 Home Medications











 Medication  Instructions  Recorded


 


Diazepam [Valium] 10 mg PO TID PRN  tablet MDD 30mg 10/10/18


 


Morphine 10 mg/5 ml Liquid 20 mg PO Q4H MDD 60mg 10/07/19





[Morphine 10 mg/5 mL Liquid -]  














 





ASSESSMENT AND PLAN:





64 year old male with history of COPD, R lung Adenocarcinoma (no CTx/RTx/Surgery

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 

(intermittent self catheterization), Recurrent UTIs, recent admission at [facility] s/

p left leg distal tibial fracture (d/joanne 10/11/19) now presenting with urinary 

retention. He recently completed a 7 day course of Levofloxacin for UTI. 





1. Recent UTI associated with neurogenic bladder and history of self 

catheterization


Urine Cx negative s/p course of outpatient Levofloxacin


Initially started on IV Meropenem, now held given neg Urine Cx.


Patient subsequently developed Hypotension and was given 1 dose of Vanco but 

this was stopped early due to localized reaction at infusion site requiring 

benadryl.


ID following.


Urology consulted.





2. RUL Adenocarcinoma, not on treatment - for out-patient Oncology follow up. 

Persisting leukocytosis, chronic, likely sec to underlying Ca.





3. COPD - stable. No evidence of decompensation.





Medically optimized for discharge once final ID recommendations are made, with 

Campbell in-situ and urology follow up next week. 








Visit type





- Emergency Visit


Emergency Visit: Yes


ED Registration Date: 01/02/20


Care time: The patient presented to the Emergency Department on the above date 

and was hospitalized for further evaluation of their emergent condition.





- New Patient


This patient is new to me today: No





- Critical Care


Critical Care patient: No





- Discharge Referral


Referred to [facility] No

## 2020-01-06 LAB
ANION GAP SERPL CALC-SCNC: 6 MMOL/L (ref 8–16)
BASOPHILS # BLD: 0.7 % (ref 0–2)
BUN SERPL-MCNC: 4.6 MG/DL (ref 7–18)
CALCIUM SERPL-MCNC: 8.6 MG/DL (ref 8.5–10.1)
CHLORIDE SERPL-SCNC: 105 MMOL/L (ref 98–107)
CO2 SERPL-SCNC: 26 MMOL/L (ref 21–32)
CREAT SERPL-MCNC: 0.5 MG/DL (ref 0.55–1.3)
DEPRECATED RDW RBC AUTO: 17.8 % (ref 11.9–15.9)
EOSINOPHIL # BLD: 3.5 % (ref 0–4.5)
GLUCOSE SERPL-MCNC: 95 MG/DL (ref 74–106)
HCT VFR BLD CALC: 40.9 % (ref 35.4–49)
HGB BLD-MCNC: 13.4 GM/DL (ref 11.7–16.9)
LYMPHOCYTES # BLD: 9.6 % (ref 8–40)
MCH RBC QN AUTO: 29.4 PG (ref 25.7–33.7)
MCHC RBC AUTO-ENTMCNC: 32.8 G/DL (ref 32–35.9)
MCV RBC: 89.5 FL (ref 80–96)
MONOCYTES # BLD AUTO: 12.3 % (ref 3.8–10.2)
NEUTROPHILS # BLD: 73.9 % (ref 42.8–82.8)
PLATELET # BLD AUTO: 487 K/MM3 (ref 134–434)
PMV BLD: 9 FL (ref 7.5–11.1)
POTASSIUM SERPLBLD-SCNC: 4.5 MMOL/L (ref 3.5–5.1)
RBC # BLD AUTO: 4.58 M/MM3 (ref 4–5.6)
SODIUM SERPL-SCNC: 138 MMOL/L (ref 136–145)
WBC # BLD AUTO: 14 K/MM3 (ref 4–10)

## 2020-01-06 RX ADMIN — TAMSULOSIN HYDROCHLORIDE SCH MG: 0.4 CAPSULE ORAL at 09:08

## 2020-01-06 RX ADMIN — MORPHINE SULFATE PRN MG: 10 SOLUTION ORAL at 23:28

## 2020-01-06 RX ADMIN — MORPHINE SULFATE PRN MG: 10 SOLUTION ORAL at 11:24

## 2020-01-06 NOTE — PN
Teaching Attending Note


Name of Resident: Gwen Peace





ATTENDING PHYSICIAN STATEMENT





I saw and evaluated the patient.


I reviewed the resident's note and discussed the case with the resident.


I agree with the resident's findings and plan as documented.








SUBJECTIVE: Feeling better. No complaints. No fever/chills/nausea/vomiting/

abdominal pain.








OBJECTIVE: Afebrile, Hemodynamically Stable. 





 Last Vital Signs











Temp Pulse Resp BP Pulse Ox


 


 98.7 F   92 H  20   97/54 L  95 


 


 01/06/20 18:31  01/06/20 18:31  01/06/20 18:31  01/06/20 18:31  01/06/20 09:00











Heart - S1, S2, RRR


Lungs - clear to auscultation


Abdomen -Soft, non-tender. Bowel Sounds normal.


Extremities - no edema, no calf tenderness. Wasting, contractures LEs.


 - Starr in situ, draining mild blood tinged urine.


Neuro - AAO x 3. Reduced power LEs.





 Laboratory Results - last 24 hr











  01/06/20 01/06/20





  06:30 06:30


 


WBC  14.0 H 


 


RBC  4.58 


 


Hgb  13.4 


 


Hct  40.9 


 


MCV  89.5 


 


MCH  29.4 


 


MCHC  32.8 


 


RDW  17.8 H 


 


Plt Count  487 H 


 


MPV  9.0 


 


Absolute Neuts (auto)  10.4 H 


 


Neutrophils %  73.9 


 


Lymphocytes %  9.6 


 


Monocytes %  12.3 H 


 


Eosinophils %  3.5 


 


Basophils %  0.7 


 


Nucleated RBC %  0 


 


Sodium   138


 


Potassium   4.5


 


Chloride   105


 


Carbon Dioxide   26


 


Anion Gap   6 L


 


BUN   4.6 L


 


Creatinine   0.5 L


 


Est GFR (CKD-EPI)AfAm   130.88


 


Est GFR (CKD-EPI)NonAf   112.92


 


Random Glucose   95


 


Calcium   8.6








 Current Medications











Generic Name Dose Route Start Last Admin





  Trade Name Freq  PRN Reason Stop Dose Admin


 


Acetaminophen  650 mg  01/03/20 01:01  





  Tylenol -  PO   





  Q4H PRN   





  PAIN LEVEL 6-10   





     





     





     


 


Albuterol/Ipratropium  1 amp  01/04/20 14:47  





  Duoneb -  NEB   





  Q6H PRN   





  SHORTNESS OF BREATH   





     





     





     


 


Docusate Sodium  100 mg  01/03/20 01:01  01/05/20 08:35





  Colace -  PO   100 mg





  BID PRN   Administration





  CONSTIPATION   





     





     





     


 


Morphine Sulfate  20 mg  01/03/20 02:37  01/06/20 11:24





  Morphine 10 Mg/5 Ml Liquid  PO   20 mg





  Q4H PRN   Administration





  PAIN LEVEL 6-10   





     





     





     


 


Senna  2 tab  01/03/20 01:01  01/05/20 08:35





  Senna -  PO   2 tab





  HS PRN   Administration





  CONSTIPATION   





     





     





     


 


Tamsulosin HCl  0.4 mg  01/06/20 10:00  01/06/20 09:08





  Flomax -  PO   0.4 mg





  DAILY@0830 ALE   Administration





     





     





     





     








 Home Medications











 Medication  Instructions  Recorded


 


Diazepam [Valium] 10 mg PO TID PRN  tablet MDD 30mg 10/10/18


 


Morphine 10 mg/5 ml Liquid 20 mg PO Q4H MDD 60mg 10/07/19





[Morphine 10 mg/5 mL Liquid -]  











 








 





ASSESSMENT AND PLAN:





64 year old male with history of COPD, R lung Adenocarcinoma (no CTx/RTx/Surgery

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 

(intermittent self catheterization), Recurrent UTIs, recent admission at [facility] s/

p left leg distal tibial fracture (d/joanne 10/11/19) now presenting with urinary 

retention. He recently completed a 7 day course of Levofloxacin for UTI. 





1. Recent UTI associated with neurogenic bladder and history of self 

catheterization


Urine Cx negative s/p course of outpatient Levofloxacin


Initially started on IV Meropenem, subsequently held given neg Urine Cx.


Has remained afebrile, hemodynamically stable off Abx.


ID following.


Urology consulted - resident discussed case with Dr. Mejia who prefers TOV as in

-patient rather than out-patient. He recommends Tamsulosin, TOV, with Hiprex 

and Vitamin C on discharge on voiding. Starr removed today 1/6/20. If not 

voiding completely (with high residuals on bladder scan), will re-insert starr 

prior to discharge scheduled 1/7/20.





2. RUL Adenocarcinoma, not on treatment - for out-patient Oncology follow up. 

Persisting leukocytosis, chronic, likely sec to underlying Ca.





3. COPD - stable. No evidence of decompensation.





Medically optimized for discharge once TOV occurs, with urology and oncology 

follow ups as out-patient.

## 2020-01-06 NOTE — PN
Progress Note, Physician


History of Present Illness: 





stable


no new issues


some pain in the lower abd


going for u/s of the bladder today





- Current Medication List


Current Medications: 


Active Medications





Acetaminophen (Tylenol -)  650 mg PO Q4H PRN


   PRN Reason: PAIN LEVEL 6-10


Albuterol/Ipratropium (Duoneb -)  1 amp NEB Q6H PRN


   PRN Reason: SHORTNESS OF BREATH


Docusate Sodium (Colace -)  100 mg PO BID PRN


   PRN Reason: CONSTIPATION


   Last Admin: 01/05/20 08:35 Dose:  100 mg


Morphine Sulfate (Morphine 10 Mg/5 Ml Liquid)  20 mg PO Q4H PRN


   PRN Reason: PAIN LEVEL 6-10


   Last Admin: 01/06/20 11:24 Dose:  20 mg


Senna (Senna -)  2 tab PO HS PRN


   PRN Reason: CONSTIPATION


   Last Admin: 01/05/20 08:35 Dose:  2 tab


Tamsulosin HCl (Flomax -)  0.4 mg PO DAILY@0830 ALE


   Last Admin: 01/06/20 09:08 Dose:  0.4 mg











- Objective


Vital Signs: 


 Vital Signs











Temperature  97.9 F   01/06/20 10:00


 


Pulse Rate  112 H  01/06/20 10:00


 


Respiratory Rate  18   01/06/20 10:00


 


Blood Pressure  98/71   01/06/20 10:00


 


O2 Sat by Pulse Oximetry (%)  95   01/06/20 09:00











Constitutional: Yes: Calm, Mild Distress


Cardiovascular: Yes: S1, S2


Respiratory: Yes: Regular, CTA Bilaterally


Gastrointestinal: Yes: Normal Bowel Sounds, Soft


Musculoskeletal: Yes: WNL


Extremities: Yes: Other


Neurological: Yes: Alert, Oriented


Psychiatric: Yes: Alert, Oriented


Labs: 


 CBC, BMP





 01/06/20 06:30 





 01/06/20 06:30 











Assessment/Plan





64 year old man with a PMH of COPD, R lung adenocarcinoma (no chemo/rads/ 

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 

bladder, Recurrent UTIs, Hypotension, 





Problem List





- Problems


(1) UTI (urinary tract infection)


Code(s): N39.0 - URINARY TRACT INFECTION, SITE NOT SPECIFIED   


Qualifiers: 


   Urinary tract infection type: site unspecified   Hematuria presence: without 

hematuria   Qualified Code(s): N39.0 - Urinary tract infection, site not 

specified   





(2) Adenocarcinoma of right lung


Code(s): C34.91 - MALIGNANT NEOPLASM OF UNSP PART OF RIGHT BRONCHUS OR LUNG   





(3) Neurogenic bladder


Code(s): N31.9 - NEUROMUSCULAR DYSFUNCTION OF BLADDER, UNSPECIFIED   





(4) Paraplegia


Code(s): G82.20 - PARAPLEGIA, UNSPECIFIED   





(5) Leukocytosis


Code(s): D72.829 - ELEVATED WHITE BLOOD CELL COUNT, UNSPECIFIED   





(6) Tibia/fibula fracture


Code(s): S82.209A - UNSP FRACTURE OF SHAFT OF UNSP TIBIA, INIT FOR CLOS FX; 

S82.409A - UNSP FRACTURE OF SHAFT OF UNSP FIBULA, INIT FOR CLOS FX   


Qualifiers: 


   Encounter type: initial encounter   Fracture type: closed   Laterality: left

   Qualified Code(s): S82.202A - Unspecified fracture of shaft of left tibia, 

initial encounter for closed fracture; S82.402A - Unspecified fracture of shaft 

of left fibula, initial encounter for closed fracture   





Assessment/Plan





Leukocytosis


Urinary retention


Hematuria


Paraplegia s/p spinal cord transection


Rt lung AdenoCA





plan


continue current mgmt


await for imaging studies

## 2020-01-07 LAB
ANION GAP SERPL CALC-SCNC: 6 MMOL/L (ref 8–16)
BASOPHILS # BLD: 1 % (ref 0–2)
BUN SERPL-MCNC: 7.8 MG/DL (ref 7–18)
CALCIUM SERPL-MCNC: 8.5 MG/DL (ref 8.5–10.1)
CHLORIDE SERPL-SCNC: 104 MMOL/L (ref 98–107)
CO2 SERPL-SCNC: 27 MMOL/L (ref 21–32)
CREAT SERPL-MCNC: 0.4 MG/DL (ref 0.55–1.3)
DEPRECATED RDW RBC AUTO: 17.5 % (ref 11.9–15.9)
EOSINOPHIL # BLD: 5.5 % (ref 0–4.5)
GLUCOSE SERPL-MCNC: 86 MG/DL (ref 74–106)
HCT VFR BLD CALC: 38.4 % (ref 35.4–49)
HGB BLD-MCNC: 12.8 GM/DL (ref 11.7–16.9)
LYMPHOCYTES # BLD: 13.4 % (ref 8–40)
MCH RBC QN AUTO: 29.4 PG (ref 25.7–33.7)
MCHC RBC AUTO-ENTMCNC: 33.3 G/DL (ref 32–35.9)
MCV RBC: 88.3 FL (ref 80–96)
MONOCYTES # BLD AUTO: 10.2 % (ref 3.8–10.2)
NEUTROPHILS # BLD: 69.9 % (ref 42.8–82.8)
PLATELET # BLD AUTO: 516 K/MM3 (ref 134–434)
PMV BLD: 9.2 FL (ref 7.5–11.1)
POTASSIUM SERPLBLD-SCNC: 4 MMOL/L (ref 3.5–5.1)
RBC # BLD AUTO: 4.35 M/MM3 (ref 4–5.6)
SODIUM SERPL-SCNC: 137 MMOL/L (ref 136–145)
WBC # BLD AUTO: 14.3 K/MM3 (ref 4–10)

## 2020-01-07 RX ADMIN — MORPHINE SULFATE PRN MG: 10 SOLUTION ORAL at 22:32

## 2020-01-07 RX ADMIN — HEPARIN SODIUM SCH UNIT: 5000 INJECTION, SOLUTION INTRAVENOUS; SUBCUTANEOUS at 22:27

## 2020-01-07 RX ADMIN — TAMSULOSIN HYDROCHLORIDE SCH MG: 0.4 CAPSULE ORAL at 09:17

## 2020-01-07 RX ADMIN — MORPHINE SULFATE PRN MG: 10 SOLUTION ORAL at 05:45

## 2020-01-07 RX ADMIN — MORPHINE SULFATE PRN MG: 10 SOLUTION ORAL at 14:00

## 2020-01-07 NOTE — PN
Teaching Attending Note


Name of Resident: Kacie Griffin





ATTENDING PHYSICIAN STATEMENT





I saw and evaluated the patient.


I reviewed the resident's note and discussed the case with the resident.


I agree with the resident's findings and plan as documented.








SUBJECTIVE:


No fever or chills. constipated but does not need help with stool softener. 

denies pain. 





OBJECTIVE:


NAD 


CV: RRR


Lungs: CTAB 


Ext : muscle atrophy in pelvic area and LE s 


no pressure ulcers on feet 








ASSESSMENT AND PLAN:








Unfortunate 63 y/o man with h/o spinal cord injury at level of T4, paraplegia, 

neurogenic bladder, recurrent UTIs, COPD, chronic pain, splenectomy, 

adenocarcinoma of R lung, recent Tib/Fib fx   who presented due to worsening 

urinary retention, and being unable to self catheterize himself.   





1- Urinary retention. 


2- Pyuria 


3- chronic back pain. 


4- leukocytosis 


5- thrombocytosis  


6- h/o lung cancer: Recent Bx 10/19 : with adenocarcinoma





plan : 


- stop straight cath and monitor for spontaneous void till tomorrow . patient 

wants to cont to monitor until he feels ready. If by tomorrow , he can't void 

then he was given 2 options : chronic starr Vs suprapubic cath. ( he can't 

perform self cath at home and aid does not feel comfortable ) 


- will d/w uro tomorrow 


- cont home morphine po 


- monitor off Abx 


- heme eval for  chronic thrombocytosis and leukocytosis. he can't leave home 

for any appointments . ? BM bx 


- add SQ heaprin

## 2020-01-07 NOTE — PN
Physical Exam: 


SUBJECTIVE: Patient seen and examined. No acute events overnight. Patient 

denies chest pain, fever, abd pain, fever, chills. 








OBJECTIVE:





 Vital Signs











 Period  Temp  Pulse  Resp  BP Sys/Malone  Pulse Ox


 


 Last 24 Hr  97.9 F-99.2 F    18-20  /54-71  95-95











GENERAL: The patient is awake, alert, and fully oriented, in no acute distress.


HEAD: Normal with no signs of trauma.


EYES: EOMI, no scleral icterus 


ENT: MMM


NECK: Trachea midline, full range of motion, supple. 


LUNGS: Breath sounds equal, clear to auscultation bilaterally, no wheezes, no 

crackles, no accessory muscle use. 


HEART: Regular rate and rhythm, S1, S2 without murmur, rub or gallop.


ABDOMEN: Soft, nondistended, normoactive bowel sounds, no guarding, no rebound, 

no hepatosplenomegaly, no masses. 


: starr in place. 


EXTREMITIES: 2+ pulses, warm, well-perfused. Right wrist fused.


NEUROLOGICAL: Normal speech, gait not observed.


PSYCH: Normal mood, normal affect.


SKIN: Warm, dry, normal turgor, no rashes or lesions noted














 Laboratory Results - last 24 hr











  01/06/20 01/06/20





  06:30 06:30


 


WBC  14.0 H 


 


RBC  4.58 


 


Hgb  13.4 


 


Hct  40.9 


 


MCV  89.5 


 


MCH  29.4 


 


MCHC  32.8 


 


RDW  17.8 H 


 


Plt Count  487 H 


 


MPV  9.0 


 


Absolute Neuts (auto)  10.4 H 


 


Neutrophils %  73.9 


 


Lymphocytes %  9.6 


 


Monocytes %  12.3 H 


 


Eosinophils %  3.5 


 


Basophils %  0.7 


 


Nucleated RBC %  0 


 


Sodium   138


 


Potassium   4.5


 


Chloride   105


 


Carbon Dioxide   26


 


Anion Gap   6 L


 


BUN   4.6 L


 


Creatinine   0.5 L


 


Est GFR (CKD-EPI)AfAm   130.88


 


Est GFR (CKD-EPI)NonAf   112.92


 


Random Glucose   95


 


Calcium   8.6








Active Medications











Generic Name Dose Route Start Last Admin





  Trade Name Freq  PRN Reason Stop Dose Admin


 


Acetaminophen  650 mg  01/03/20 01:01  





  Tylenol -  PO   





  Q4H PRN   





  PAIN LEVEL 6-10   





     





     





     


 


Albuterol/Ipratropium  1 amp  01/04/20 14:47  





  Duoneb -  NEB   





  Q6H PRN   





  SHORTNESS OF BREATH   





     





     





     


 


Docusate Sodium  100 mg  01/03/20 01:01  01/05/20 08:35





  Colace -  PO   100 mg





  BID PRN   Administration





  CONSTIPATION   





     





     





     


 


Morphine Sulfate  20 mg  01/03/20 02:37  01/07/20 05:45





  Morphine 10 Mg/5 Ml Liquid  PO   20 mg





  Q4H PRN   Administration





  PAIN LEVEL 6-10   





     





     





     


 


Senna  2 tab  01/03/20 01:01  01/05/20 08:35





  Senna -  PO   2 tab





  HS PRN   Administration





  CONSTIPATION   





     





     





     


 


Tamsulosin HCl  0.4 mg  01/06/20 10:00  01/06/20 09:08





  Flomax -  PO   0.4 mg





  DAILY@0830 ALE   Administration





     





     





     





     











ASSESSMENT/PLAN:


65 y/o/m with a PMHx of COPD, Right lung adenocarcinoma (no chemo/rads/ surgery)

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 

bladder, recurrent UTIs, hypotension, Recent admission at [facility] s/p left leg 

distal tibial fracture (d/c'd 10/11/19) returning to the ER for inability to 

urinate and UTI failing outpatient antibiotic trial. 





UTI s/p failed outpatient antibiotic therapy with Levaquin 


- UA positive for UTI. Cultures negative for growth WBC increased to 14, 

however patient remains afebrile. 


- Patient with hx of self catheterizations, neurogenic bladder 


- Meropenum discontinued. ID recommendations appreciated (Dr. Farrar). 


- Urology consulted, Dr. Villalta. Starr catheter in place


   - recommending that we do a TOV overnight to assess patient's ability to 

urinate. F/U outpatient 


   - Tamsulosin, with Hiprex and Vitamin C on discharge


COPD


- Current not in exacerbation


- Not on any COPD medications at home 


- DuoNebs PRN





Adenocarcinoma right upper lung lobe


- Out-patient hematology/oncology follow up recommended 





FEN


- No IV fluids indicated.


- monitor and replete lytes as needed 


- Regular diet





Prophylaxis


- Lovenox 40mg subq daiily





Disposition


- TOV today, anticipate d/c tomorrow 





Visit type





- Emergency Visit


Emergency Visit: Yes


ED Registration Date: 01/02/20


Care time: The patient presented to the Emergency Department on the above date 

and was hospitalized for further evaluation of their emergent condition.





- New Patient


This patient is new to me today: No





- Critical Care


Critical Care patient: No





ATTENDING PHYSICIAN STATEMENT





I saw and evaluated the patient.


I reviewed the resident's note and discussed the case with the resident.


I agree with the resident's findings and plan as documented.








SUBJECTIVE:








OBJECTIVE:








ASSESSMENT AND PLAN:

## 2020-01-07 NOTE — PN
Progress Note, Physician


History of Present Illness: 





stable


no new issues





- Current Medication List


Current Medications: 


Active Medications





Acetaminophen (Tylenol -)  650 mg PO Q4H PRN


   PRN Reason: PAIN LEVEL 6-10


Albuterol/Ipratropium (Duoneb -)  1 amp NEB Q6H PRN


   PRN Reason: SHORTNESS OF BREATH


Docusate Sodium (Colace -)  100 mg PO BID PRN


   PRN Reason: CONSTIPATION


   Last Admin: 01/05/20 08:35 Dose:  100 mg


Morphine Sulfate (Morphine 10 Mg/5 Ml Liquid)  20 mg PO Q4H PRN


   PRN Reason: PAIN LEVEL 6-10


   Last Admin: 01/07/20 05:45 Dose:  20 mg


Senna (Senna -)  2 tab PO HS PRN


   PRN Reason: CONSTIPATION


   Last Admin: 01/05/20 08:35 Dose:  2 tab


Tamsulosin HCl (Flomax -)  0.4 mg PO DAILY@0830 ALE


   Last Admin: 01/07/20 09:17 Dose:  0.4 mg











- Objective


Vital Signs: 


 Vital Signs











Temperature  98.6 F   01/07/20 08:42


 


Pulse Rate  101 H  01/07/20 08:42


 


Respiratory Rate  18   01/07/20 09:00


 


Blood Pressure  112/62   01/07/20 08:42


 


O2 Sat by Pulse Oximetry (%)  94 L  01/07/20 09:00











Constitutional: Yes: No Distress, Calm


Cardiovascular: Yes: S1, S2


Respiratory: Yes: Regular, CTA Bilaterally


Gastrointestinal: Yes: Normal Bowel Sounds, Soft


Musculoskeletal: Yes: WNL


Extremities: Yes: Other


Neurological: Yes: Alert, Oriented


Psychiatric: Yes: Alert, Oriented


Labs: 


 CBC, BMP





 01/07/20 07:38 





 01/07/20 07:38 











Assessment/Plan





64 year old man with a PMH of COPD, R lung adenocarcinoma (no chemo/rads/ 

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 

bladder, Recurrent UTIs, Hypotension, 





Problem List





- Problems


(1) UTI (urinary tract infection)


Code(s): N39.0 - URINARY TRACT INFECTION, SITE NOT SPECIFIED   


Qualifiers: 


   Urinary tract infection type: site unspecified   Hematuria presence: without 

hematuria   Qualified Code(s): N39.0 - Urinary tract infection, site not 

specified   





(2) Adenocarcinoma of right lung


Code(s): C34.91 - MALIGNANT NEOPLASM OF UNSP PART OF RIGHT BRONCHUS OR LUNG   





(3) Neurogenic bladder


Code(s): N31.9 - NEUROMUSCULAR DYSFUNCTION OF BLADDER, UNSPECIFIED   





(4) Paraplegia


Code(s): G82.20 - PARAPLEGIA, UNSPECIFIED   





(5) Leukocytosis


Code(s): D72.829 - ELEVATED WHITE BLOOD CELL COUNT, UNSPECIFIED   





(6) Tibia/fibula fracture


Code(s): S82.209A - UNSP FRACTURE OF SHAFT OF UNSP TIBIA, INIT FOR CLOS FX; 

S82.409A - UNSP FRACTURE OF SHAFT OF UNSP FIBULA, INIT FOR CLOS FX   


Qualifiers: 


   Encounter type: initial encounter   Fracture type: closed   Laterality: left

   Qualified Code(s): S82.202A - Unspecified fracture of shaft of left tibia, 

initial encounter for closed fracture; S82.402A - Unspecified fracture of shaft 

of left fibula, initial encounter for closed fracture   





Assessment/Plan





Leukocytosis


Urinary retention


Hematuria


Paraplegia s/p spinal cord transection


Rt lung AdenoCA





plan


continue current mgmt


await for imaging studies

## 2020-01-08 LAB
ALBUMIN SERPL-MCNC: 2.2 G/DL (ref 3.4–5)
ALP SERPL-CCNC: 103 U/L (ref 45–117)
ALT SERPL-CCNC: 14 U/L (ref 13–61)
ANION GAP SERPL CALC-SCNC: 8 MMOL/L (ref 8–16)
AST SERPL-CCNC: 12 U/L (ref 15–37)
BASOPHILS # BLD: 1 % (ref 0–2)
BILIRUB SERPL-MCNC: 0.2 MG/DL (ref 0.2–1)
BUN SERPL-MCNC: 7.3 MG/DL (ref 7–18)
CALCIUM SERPL-MCNC: 8.2 MG/DL (ref 8.5–10.1)
CHLORIDE SERPL-SCNC: 103 MMOL/L (ref 98–107)
CO2 SERPL-SCNC: 27 MMOL/L (ref 21–32)
CREAT SERPL-MCNC: 0.4 MG/DL (ref 0.55–1.3)
DEPRECATED RDW RBC AUTO: 17.1 % (ref 11.9–15.9)
EOSINOPHIL # BLD: 7.5 % (ref 0–4.5)
GLUCOSE SERPL-MCNC: 74 MG/DL (ref 74–106)
HCT VFR BLD CALC: 38.9 % (ref 35.4–49)
HGB BLD-MCNC: 12.8 GM/DL (ref 11.7–16.9)
LYMPHOCYTES # BLD: 16.8 % (ref 8–40)
MCH RBC QN AUTO: 29.4 PG (ref 25.7–33.7)
MCHC RBC AUTO-ENTMCNC: 32.8 G/DL (ref 32–35.9)
MCV RBC: 89.5 FL (ref 80–96)
MONOCYTES # BLD AUTO: 9.5 % (ref 3.8–10.2)
NEUTROPHILS # BLD: 65.2 % (ref 42.8–82.8)
PLATELET # BLD AUTO: 487 K/MM3 (ref 134–434)
PMV BLD: 10.1 FL (ref 7.5–11.1)
POTASSIUM SERPLBLD-SCNC: 4 MMOL/L (ref 3.5–5.1)
PROT SERPL-MCNC: 5.7 G/DL (ref 6.4–8.2)
RBC # BLD AUTO: 4.34 M/MM3 (ref 4–5.6)
SODIUM SERPL-SCNC: 138 MMOL/L (ref 136–145)
WBC # BLD AUTO: 10.1 K/MM3 (ref 4–10)

## 2020-01-08 RX ADMIN — TAMSULOSIN HYDROCHLORIDE SCH MG: 0.4 CAPSULE ORAL at 09:28

## 2020-01-08 RX ADMIN — HEPARIN SODIUM SCH UNIT: 5000 INJECTION, SOLUTION INTRAVENOUS; SUBCUTANEOUS at 15:34

## 2020-01-08 RX ADMIN — HEPARIN SODIUM SCH UNIT: 5000 INJECTION, SOLUTION INTRAVENOUS; SUBCUTANEOUS at 21:49

## 2020-01-08 RX ADMIN — HEPARIN SODIUM SCH UNIT: 5000 INJECTION, SOLUTION INTRAVENOUS; SUBCUTANEOUS at 06:06

## 2020-01-08 RX ADMIN — MORPHINE SULFATE PRN MG: 10 SOLUTION ORAL at 18:07

## 2020-01-08 RX ADMIN — MORPHINE SULFATE PRN MG: 10 SOLUTION ORAL at 06:04

## 2020-01-08 RX ADMIN — GABAPENTIN SCH MG: 300 CAPSULE ORAL at 21:49

## 2020-01-08 RX ADMIN — MORPHINE SULFATE PRN MG: 10 SOLUTION ORAL at 21:49

## 2020-01-08 NOTE — PN
Progress Note, Physician


History of Present Illness: 





stable


no new issues





- Current Medication List


Current Medications: 


Active Medications





Acetaminophen (Tylenol -)  650 mg PO Q4H PRN


   PRN Reason: PAIN LEVEL 6-10


Albuterol/Ipratropium (Duoneb -)  1 amp NEB Q6H PRN


   PRN Reason: SHORTNESS OF BREATH


Docusate Sodium (Colace -)  100 mg PO BID PRN


   PRN Reason: CONSTIPATION


   Last Admin: 01/05/20 08:35 Dose:  100 mg


Heparin Sodium (Porcine) (Heparin -)  5,000 unit SQ TID [facility]


   Last Admin: 01/08/20 06:06 Dose:  5,000 unit


Morphine Sulfate (Morphine 10 Mg/5 Ml Liquid)  20 mg PO Q4H PRN


   PRN Reason: PAIN LEVEL 6-10


   Last Admin: 01/08/20 06:04 Dose:  20 mg


Senna (Senna -)  2 tab PO HS PRN


   PRN Reason: CONSTIPATION


   Last Admin: 01/05/20 08:35 Dose:  2 tab


Tamsulosin HCl (Flomax -)  0.4 mg PO DAILY@0830 [facility]


   Last Admin: 01/08/20 09:28 Dose:  0.4 mg











- Objective


Vital Signs: 


 Vital Signs











Temperature  97.9 F   01/08/20 10:00


 


Pulse Rate  93 H  01/08/20 10:00


 


Respiratory Rate  18   01/08/20 10:00


 


Blood Pressure  105/56 L  01/08/20 10:00


 


O2 Sat by Pulse Oximetry (%)  95   01/07/20 20:17











Constitutional: Yes: No Distress, Calm


Cardiovascular: Yes: S1, S2


Respiratory: Yes: Regular, CTA Bilaterally


Gastrointestinal: Yes: Normal Bowel Sounds, Soft


Musculoskeletal: Yes: WNL


Extremities: Yes: Other


Neurological: Yes: Alert


Psychiatric: Yes: Alert


Labs: 


 CBC, BMP





 01/08/20 06:20 





 01/08/20 06:20 











Assessment/Plan





64 year old man with a PMH of COPD, R lung adenocarcinoma (no chemo/rads/ 

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 

bladder, Recurrent UTIs, Hypotension, 





Problem List





- Problems


(1) UTI (urinary tract infection)


Code(s): N39.0 - URINARY TRACT INFECTION, SITE NOT SPECIFIED   


Qualifiers: 


   Urinary tract infection type: site unspecified   Hematuria presence: without 

hematuria   Qualified Code(s): N39.0 - Urinary tract infection, site not 

specified   





(2) Adenocarcinoma of right lung


Code(s): C34.91 - MALIGNANT NEOPLASM OF UNSP PART OF RIGHT BRONCHUS OR LUNG   





(3) Neurogenic bladder


Code(s): N31.9 - NEUROMUSCULAR DYSFUNCTION OF BLADDER, UNSPECIFIED   





(4) Paraplegia


Code(s): G82.20 - PARAPLEGIA, UNSPECIFIED   





(5) Leukocytosis


Code(s): D72.829 - ELEVATED WHITE BLOOD CELL COUNT, UNSPECIFIED   





(6) Tibia/fibula fracture


Code(s): S82.209A - UNSP FRACTURE OF SHAFT OF UNSP TIBIA, INIT FOR CLOS FX; 

S82.409A - UNSP FRACTURE OF SHAFT OF UNSP FIBULA, INIT FOR CLOS FX   


Qualifiers: 


   Encounter type: initial encounter   Fracture type: closed   Laterality: left

   Qualified Code(s): S82.202A - Unspecified fracture of shaft of left tibia, 

initial encounter for closed fracture; S82.402A - Unspecified fracture of shaft 

of left fibula, initial encounter for closed fracture   





Assessment/Plan





Leukocytosis


Urinary retention


Hematuria


Paraplegia s/p spinal cord transection


Rt lung AdenoCA





plan


continue current mgmt

## 2020-01-08 NOTE — PN
Addendum entered and electronically signed by Gwen Peace, RESIDENT  

01/08/20 17:24: 





Started patient on Gabapentin 300 QHS for neuropathy 





Original Note:





Physical Exam: 


SUBJECTIVE: Patient seen and examined. Able to urinate somewhat overnight but 

requested to be straight cathed as he was feeling uncomfortable. Patient denies 

the option of suprapubic cath. Denies chest pain, fever, chills, SOB, abd pain. 








OBJECTIVE:





 Vital Signs











 Period  Temp  Pulse  Resp  BP Sys/Malone  Pulse Ox


 


 Last 24 Hr  97.8 F-98.4 F  71-95  18-18  /43-66  95-95











GENERAL: The patient is awake, alert, and fully oriented, in no acute distress.


HEAD: Normal with no signs of trauma.


EYES: EOMI, no scleral icterus, no ptosis 


ENT: MMM


NECK: Trachea midline, full range of motion, supple. 


LUNGS: CTA B/L, no wheezes, no crackles, no accessory muscle use. 


HEART: Regular rate and rhythm, S1, S2 without murmur, rub or gallop.


ABDOMEN: Soft, nondistended, normoactive bowel sounds, no guarding, no rebound, 

no hepatosplenomegaly, no masses. 


: condom cath in place 


EXTREMITIES: 2+ pulses, warm, well-perfused. Right wrist fused. emaciated lower 

extremities. 


NEUROLOGICAL: Normal speech, gait not observed.


PSYCH: Normal mood, normal affect.


SKIN: Warm, dry, normal turgor, no rashes or lesions noted

















 Laboratory Results - last 24 hr











  01/08/20 01/08/20





  06:20 06:20


 


WBC  10.1 H 


 


RBC  4.34 


 


Hgb  12.8 


 


Hct  38.9 


 


MCV  89.5 


 


MCH  29.4 


 


MCHC  32.8 


 


RDW  17.1 H 


 


Plt Count  487 H 


 


MPV  10.1 


 


Absolute Neuts (auto)  6.6 


 


Neutrophils %  65.2 


 


Lymphocytes %  16.8  D 


 


Monocytes %  9.5 


 


Eosinophils %  7.5 H 


 


Basophils %  1.0 


 


Nucleated RBC %  0 


 


Sodium   138


 


Potassium   4.0


 


Chloride   103


 


Carbon Dioxide   27


 


Anion Gap   8


 


BUN   7.3


 


Creatinine   0.4 L


 


Est GFR (CKD-EPI)AfAm   143.45


 


Est GFR (CKD-EPI)NonAf   123.77


 


Random Glucose   74


 


Calcium   8.2 L


 


Total Bilirubin   0.2


 


AST   12 L


 


ALT   14


 


Alkaline Phosphatase   103


 


Total Protein   5.7 L


 


Albumin   2.2 L








Active Medications











Generic Name Dose Route Start Last Admin





  Trade Name Freq  PRN Reason Stop Dose Admin


 


Acetaminophen  650 mg  01/03/20 01:01  





  Tylenol -  PO   





  Q4H PRN   





  PAIN LEVEL 6-10   





     





     





     


 


Albuterol/Ipratropium  1 amp  01/04/20 14:47  





  Duoneb -  NEB   





  Q6H PRN   





  SHORTNESS OF BREATH   





     





     





     


 


Docusate Sodium  100 mg  01/03/20 01:01  01/05/20 08:35





  Colace -  PO   100 mg





  BID PRN   Administration





  CONSTIPATION   





     





     





     


 


Gabapentin  300 mg  01/08/20 22:00  





  Neurontin -  PO   





  HS ALE   





     





     





     





     


 


Heparin Sodium (Porcine)  5,000 unit  01/07/20 22:00  01/08/20 15:34





  Heparin -  SQ   5,000 unit





  TID ALE   Administration





     





     





     





     


 


Morphine Sulfate  20 mg  01/03/20 02:37  01/08/20 06:04





  Morphine 10 Mg/5 Ml Liquid  PO   20 mg





  Q4H PRN   Administration





  PAIN LEVEL 6-10   





     





     





     


 


Senna  2 tab  01/03/20 01:01  01/05/20 08:35





  Senna -  PO   2 tab





  HS PRN   Administration





  CONSTIPATION   





     





     





     


 


Tamsulosin HCl  0.4 mg  01/06/20 10:00  01/08/20 09:28





  Flomax -  PO   0.4 mg





  DAILY@0830 [facility]   Administration





     





     





     





     











ASSESSMENT/PLAN:


65 y/o/m with a PMHx of COPD, Right lung adenocarcinoma (no chemo/rads/ surgery)

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 

bladder, recurrent UTIs, hypotension, Recent admission at [facility] s/p left leg 

distal tibial fracture (d/c'd 10/11/19) returning to the ER for inability to 

urinate and UTI failing outpatient antibiotic trial. 





#UTI s/p failed outpatient antibiotic therapy with Levaquin 


- UA positive for UTI. Cultures negative for growth WBC downtrending, patient 

remains afebrile. 


- Patient with hx of self catheterizations, neurogenic bladder 


- Meropenum discontinued. ID recommendations appreciated (Dr. Farrar). 


- Urology consulted, Dr. Villalta. Starr catheter in place


   - ok with plan to send patient home with starr cath, will need to set up 

appointment for follow up 


   - Tamsulosin, with Hiprex and Vitamin C on discharge


- Bladder scan and straight cath orders canceled. If patient needs to be 

straight cathed overnight, would just place indwelling starr


- Patient not agreeable with option for suprapubic cath





#COPD


- Currently not in exacerbation


- Not on any COPD medications at home


- DuoNebs PRN





#Adenocarcinoma right upper lung lobe


- Out-patient hematology/oncology follow up recommended 


- Heme/onc consulted 


   - workup as per Heme/Onc





#FEN


- No IV fluids indicated


- monitor and replete lytes as needed 


- Regular diet





#Prophylaxis


- Lovenox 40mg subq daily





#Disposition


- starr cath overnight if needed. Anticipate D/C tomorrow if f/u arranged 





Visit type





- Emergency Visit


Emergency Visit: Yes


ED Registration Date: 01/02/20


Care time: The patient presented to the Emergency Department on the above date 

and was hospitalized for further evaluation of their emergent condition.





- New Patient


This patient is new to me today: No





- Critical Care


Critical Care patient: No





ATTENDING PHYSICIAN STATEMENT





I saw and evaluated the patient.


I reviewed the resident's note and discussed the case with the resident.


I agree with the resident's findings and plan as documented.








SUBJECTIVE:








OBJECTIVE:








ASSESSMENT AND PLAN:
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
## 2020-01-09 LAB
ALBUMIN SERPL-MCNC: 2.2 G/DL (ref 3.4–5)
ALP SERPL-CCNC: 83 U/L (ref 45–117)
ALT SERPL-CCNC: 12 U/L (ref 13–61)
ANION GAP SERPL CALC-SCNC: 6 MMOL/L (ref 8–16)
AST SERPL-CCNC: 11 U/L (ref 15–37)
BILIRUB SERPL-MCNC: 0.2 MG/DL (ref 0.2–1)
BUN SERPL-MCNC: 9 MG/DL (ref 7–18)
CALCIUM SERPL-MCNC: 8.5 MG/DL (ref 8.5–10.1)
CHLORIDE SERPL-SCNC: 102 MMOL/L (ref 98–107)
CO2 SERPL-SCNC: 30 MMOL/L (ref 21–32)
CREAT SERPL-MCNC: 0.5 MG/DL (ref 0.55–1.3)
DEPRECATED RDW RBC AUTO: 17.2 % (ref 11.9–15.9)
GLUCOSE SERPL-MCNC: 87 MG/DL (ref 74–106)
HCT VFR BLD CALC: 36.4 % (ref 35.4–49)
HGB BLD-MCNC: 12.1 GM/DL (ref 11.7–16.9)
MCH RBC QN AUTO: 29.7 PG (ref 25.7–33.7)
MCHC RBC AUTO-ENTMCNC: 33.2 G/DL (ref 32–35.9)
MCV RBC: 89.4 FL (ref 80–96)
PLATELET # BLD AUTO: 500 K/MM3 (ref 134–434)
PMV BLD: 9.6 FL (ref 7.5–11.1)
POTASSIUM SERPLBLD-SCNC: 4.3 MMOL/L (ref 3.5–5.1)
PROT SERPL-MCNC: 5.8 G/DL (ref 6.4–8.2)
RBC # BLD AUTO: 4.07 M/MM3 (ref 4–5.6)
SODIUM SERPL-SCNC: 138 MMOL/L (ref 136–145)
WBC # BLD AUTO: 10.1 K/MM3 (ref 4–10)

## 2020-01-09 RX ADMIN — TAMSULOSIN HYDROCHLORIDE SCH MG: 0.4 CAPSULE ORAL at 09:40

## 2020-01-09 RX ADMIN — MORPHINE SULFATE PRN MG: 10 SOLUTION ORAL at 20:05

## 2020-01-09 RX ADMIN — HEPARIN SODIUM SCH UNIT: 5000 INJECTION, SOLUTION INTRAVENOUS; SUBCUTANEOUS at 05:23

## 2020-01-09 RX ADMIN — MORPHINE SULFATE PRN MG: 10 SOLUTION ORAL at 02:45

## 2020-01-09 RX ADMIN — GABAPENTIN SCH MG: 300 CAPSULE ORAL at 22:41

## 2020-01-09 RX ADMIN — HEPARIN SODIUM SCH UNIT: 5000 INJECTION, SOLUTION INTRAVENOUS; SUBCUTANEOUS at 13:39

## 2020-01-09 RX ADMIN — HEPARIN SODIUM SCH UNIT: 5000 INJECTION, SOLUTION INTRAVENOUS; SUBCUTANEOUS at 22:41

## 2020-01-09 NOTE — PN
Progress Note (short form)





- Note


Progress Note: 





Patient seen and examined








 Last Vital Signs











Temp Pulse Resp BP Pulse Ox


 


 98.8 F   86   18   107/61   95 


 


 01/09/20 15:41  01/09/20 15:41  01/09/20 15:41  01/09/20 15:41  01/09/20 09:00








Cor: RSR, No murmurs, No gallops


Lungs: Clear to P&A


Abd: Soft, Normal bowel sounds, No organomegaly


Ext:No significant edema








Labs/Meds reviewed





A/P

## 2020-01-09 NOTE — PN
Progress Note (short form)





- Note


Progress Note: 





Events course noted


I spoke to patient about what seems to be his inability to empty but he is not 

convinced that he needs an indwelling catheter nor is he willing to resume 

uintermittent self cath


I think he may need to be discharged without starr but kept on either an 

antibiotic for a few days or maintained on Hiprex 1 gm BID and Vit C 1gm BID


It is likely he will be readmitted with infection and I told him so . he said 

if vthat happened he would reconsider his decision.


Would also make sure that he also is discharged and kept on tamsulosin though 

it will only be of limited benefit b/o his neurogenic bladder.

## 2020-01-09 NOTE — PN
Progress Note, Physician


History of Present Illness: 





stable


no new issues





- Current Medication List


Current Medications: 


Active Medications





Acetaminophen (Tylenol -)  650 mg PO Q4H PRN


   PRN Reason: PAIN LEVEL 6-10


Albuterol/Ipratropium (Duoneb -)  1 amp NEB Q6H PRN


   PRN Reason: SHORTNESS OF BREATH


Docusate Sodium (Colace -)  100 mg PO BID PRN


   PRN Reason: CONSTIPATION


   Last Admin: 01/05/20 08:35 Dose:  100 mg


Gabapentin (Neurontin -)  300 mg PO HS [facility]


   Last Admin: 01/08/20 21:49 Dose:  300 mg


Heparin Sodium (Porcine) (Heparin -)  5,000 unit SQ TID [facility]


   Last Admin: 01/09/20 05:23 Dose:  5,000 unit


Morphine Sulfate (Morphine 10 Mg/5 Ml Liquid)  20 mg PO Q4H PRN


   PRN Reason: PAIN LEVEL 6-10


   Last Admin: 01/09/20 02:45 Dose:  20 mg


Senna (Senna -)  2 tab PO HS PRN


   PRN Reason: CONSTIPATION


   Last Admin: 01/05/20 08:35 Dose:  2 tab


Tamsulosin HCl (Flomax -)  0.4 mg PO DAILY@0830 [facility]


   Last Admin: 01/09/20 09:40 Dose:  0.4 mg











- Objective


Vital Signs: 


 Vital Signs











Temperature  98.6 F   01/09/20 10:00


 


Pulse Rate  95 H  01/09/20 10:00


 


Respiratory Rate  18   01/09/20 10:00


 


Blood Pressure  90/59 L  01/09/20 10:00


 


O2 Sat by Pulse Oximetry (%)  95   01/09/20 09:00











Constitutional: Yes: No Distress, Calm


Cardiovascular: Yes: S1, S2


Respiratory: Yes: Regular, CTA Bilaterally


Gastrointestinal: Yes: Normal Bowel Sounds, Soft


Musculoskeletal: Yes: WNL


Extremities: Yes: Other


Labs: 


 CBC, BMP





 01/09/20 07:00 





 01/09/20 07:00 











Assessment/Plan





64 year old man with a PMH of COPD, R lung adenocarcinoma (no chemo/rads/ 

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 

bladder, Recurrent UTIs, Hypotension, 





Problem List





- Problems


(1) UTI (urinary tract infection)


Code(s): N39.0 - URINARY TRACT INFECTION, SITE NOT SPECIFIED   


Qualifiers: 


   Urinary tract infection type: site unspecified   Hematuria presence: without 

hematuria   Qualified Code(s): N39.0 - Urinary tract infection, site not 

specified   





(2) Adenocarcinoma of right lung


Code(s): C34.91 - MALIGNANT NEOPLASM OF UNSP PART OF RIGHT BRONCHUS OR LUNG   





(3) Neurogenic bladder


Code(s): N31.9 - NEUROMUSCULAR DYSFUNCTION OF BLADDER, UNSPECIFIED   





(4) Paraplegia


Code(s): G82.20 - PARAPLEGIA, UNSPECIFIED   





(5) Leukocytosis


Code(s): D72.829 - ELEVATED WHITE BLOOD CELL COUNT, UNSPECIFIED   





(6) Tibia/fibula fracture


Code(s): S82.209A - UNSP FRACTURE OF SHAFT OF UNSP TIBIA, INIT FOR CLOS FX; 

S82.409A - UNSP FRACTURE OF SHAFT OF UNSP FIBULA, INIT FOR CLOS FX   


Qualifiers: 


   Encounter type: initial encounter   Fracture type: closed   Laterality: left

   Qualified Code(s): S82.202A - Unspecified fracture of shaft of left tibia, 

initial encounter for closed fracture; S82.402A - Unspecified fracture of shaft 

of left fibula, initial encounter for closed fracture   





Assessment/Plan





Leukocytosis


Urinary retention


Hematuria


Paraplegia s/p spinal cord transection


Rt lung AdenoCA





plan


continue current mgmt


stable

## 2020-01-09 NOTE — PN
Teaching Attending Note


Name of Resident: Gwen Peace





ATTENDING PHYSICIAN STATEMENT





I saw and evaluated the patient.


I reviewed the resident's note and discussed the case with the resident.


I agree with the resident's findings and plan as documented.








SUBJECTIVE:


Patient with notable postvoid residual volume but refuses Campbell or suprapubic 

tube, has capacity incompetence with noted restricted insight to clinical 

condition.  Consistent with prior presentations.  He refused to sign any 

documentation stating that he would not want the tube, stating that maybe he 

would want it, and then refuses the procedure saying that we are not Dr. Stovall 

and we do not know.  When we informed him that the procedure is a neurologic 

issue, he will say that he would not want it under any circumstances but does 

not want to restrict himself from getting it in case he wants to have the 

option.  He says he knows he can get a significant infection from this and he 

states what is the difference I am going to die anyway





10 item review of systems completed and is negative aside from history of 

present illness





OBJECTIVE:


VS, labs, imaging reviewed


NAD, AAO, resting comfortably in bed.


RRR s1/2 no mgr


Normal muscle tone, Musculoskeletal exam consistent with history of paraplegia


Neck is supple, trachea midline, no yuliet LN


Lungs CTAB with sym expansion


NT ND +BS no yuliet organomegaly


CN2-12 wnl; no FND


NC AT EOMI PERRLA


Depressed mood, flat affect, restricted insight but apparent capacity





Urine culture reviewed, no fevers,








ASSESSMENT/PLAN:


Patient remains off antibiotics, hemodynamically stable.  ID continue these 

with plan.  Pending further discussion with urology.  If the patient does not 

wish to have Campbell placed, he will need to document his wishes in an 

appropriate manner and can be discharged home with a [place] catheter.  If he is 

not willing or able to document his wishes then we will consult psychiatry to 

ensure competency and capacity and the patient will have their care delineated 

as per the recommendations of the subspecialty services





Problems:


-Urinary retention


-Suspected urinary tract infection, follow-up final microbiology.  Negative so 

far, WBC is downtrending, patient is afebrile and clinically stable.  No 

indication for further treatment


-Neurogenic bladder secondary to spinal cord transection, worsening neuropathic 

issues.  


-Adenocarcinoma of the right upper lobe, follow-up hematology and oncology 

consultation. 


-COPD history, gold class unknown.  Not currently in any exacerbation.  PRN 

albuterol with outpatient pulmonary function tests planned.


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 

worsening neuropathy, c/w gabapentin


-Hypotension, may be a function of autonomic dysregulation secondary to the 

underlying neurological damage


-Recent left leg distal tibial fracture, discharged from [facility] on October 11, 2019.  Pain is controlled with no further issues.





Full Code

## 2020-01-09 NOTE — PN
Physical Exam: 


SUBJECTIVE: Patient seen and examined. Patient not amenable to going with home 

with starr or suprapubic catheter. He believs he will be able to void on his own

, however he cannot straight cath himself anymore if needed. No acute events 

overnight. 








OBJECTIVE:





 Vital Signs











 Period  Temp  Pulse  Resp  BP Sys/Malone  Pulse Ox


 


 Last 24 Hr  98.6 F-99.2 F  69-95  16-18  /59-61  95-95











GENERAL: The patient is awake, alert, and fully oriented, in no acute distress.


HEAD: Normal with no signs of trauma.


EYES: EOMI, no scleral icterus, no ptosis 


ENT: MMM


NECK: Trachea midline, full range of motion, supple. 


LUNGS: CTA B/L, no wheezes, no crackles, no accessory muscle use. 


HEART: Regular rate and rhythm, S1, S2 without murmur, rub or gallop.


ABDOMEN: Soft, nondistended, normoactive bowel sounds, no guarding, no rebound, 

no hepatosplenomegaly, no masses. 


: condom cath in place 


EXTREMITIES: 2+ pulses, warm, well-perfused. Right wrist fused. emaciated lower 

extremities. 


NEUROLOGICAL: Normal speech, gait not observed.


PSYCH: Normal mood, normal affect.


SKIN: Warm, dry, normal turgor, no rashes or lesions noted








 Laboratory Results - last 24 hr











  01/09/20 01/09/20





  07:00 07:00


 


WBC  10.1 H 


 


RBC  4.07 


 


Hgb  12.1 


 


Hct  36.4 


 


MCV  89.4 


 


MCH  29.7 


 


MCHC  33.2 


 


RDW  17.2 H 


 


Plt Count  500 H 


 


MPV  9.6 


 


Sodium   138


 


Potassium   4.3


 


Chloride   102


 


Carbon Dioxide   30


 


Anion Gap   6 L


 


BUN   9.0


 


Creatinine   0.5 L


 


Est GFR (CKD-EPI)AfAm   130.88


 


Est GFR (CKD-EPI)NonAf   112.92


 


Random Glucose   87


 


Calcium   8.5


 


Total Bilirubin   0.2


 


AST   11 L


 


ALT   12 L


 


Alkaline Phosphatase   83


 


Total Protein   5.8 L


 


Albumin   2.2 L








Active Medications











Generic Name Dose Route Start Last Admin





  Trade Name Freq  PRN Reason Stop Dose Admin


 


Acetaminophen  650 mg  01/03/20 01:01  





  Tylenol -  PO   





  Q4H PRN   





  PAIN LEVEL 6-10   





     





     





     


 


Albuterol/Ipratropium  1 amp  01/04/20 14:47  





  Duoneb -  NEB   





  Q6H PRN   





  SHORTNESS OF BREATH   





     





     





     


 


Docusate Sodium  100 mg  01/03/20 01:01  01/05/20 08:35





  Colace -  PO   100 mg





  BID PRN   Administration





  CONSTIPATION   





     





     





     


 


Gabapentin  300 mg  01/08/20 22:00  01/08/20 21:49





  Neurontin -  PO   300 mg





  HS ALE   Administration





     





     





     





     


 


Heparin Sodium (Porcine)  5,000 unit  01/07/20 22:00  01/09/20 13:39





  Heparin -  SQ   5,000 unit





  TID ALE   Administration





     





     





     





     


 


Morphine Sulfate  20 mg  01/03/20 02:37  01/09/20 02:45





  Morphine 10 Mg/5 Ml Liquid  PO   20 mg





  Q4H PRN   Administration





  PAIN LEVEL 6-10   





     





     





     


 


Senna  2 tab  01/03/20 01:01  01/05/20 08:35





  Senna -  PO   2 tab





  HS PRN   Administration





  CONSTIPATION   





     





     





     


 


Tamsulosin HCl  0.4 mg  01/06/20 10:00  01/09/20 09:40





  Flomax -  PO   0.4 mg





  DAILY@0830 ALE   Administration





     





     





     





     











ASSESSMENT/PLAN:


65 y/o/m with a PMHx of COPD, Right lung adenocarcinoma (no chemo/rads/ surgery)

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 

bladder, recurrent UTIs, hypotension, Recent admission at [facility] s/p left leg 

distal tibial fracture (d/c'd 10/11/19) returning to the ER for inability to 

urinate and UTI failing outpatient antibiotic trial. 





#UTI s/p failed outpatient antibiotic therapy with Levaquin 


- UA positive for UTI. Cultures negative for growth WBC downtrending, patient 

remains afebrile. 


- Patient with hx of self catheterizations, neurogenic bladder


- Meropenum discontinued. ID recommendations appreciated (Dr. Farrar)


- Urology consulted, Dr. Villalta. Starr catheter in place


   - Dr. Carlson to see patient today. Will follow recs. Patient now not 

amenable to go home with starr or suprapubic cath


   - Tamsulosin, with Hiprex and Vitamin C on discharge


- Bladder scan and straight cath orders canceled. If patient needs to be 

straight cathed overnight, would just place indwelling starr


- Started on Gabapentin 300 QHs for progressing neuropathy





#COPD


- Currently not in exacerbation


- Not on any COPD medications at home


- DuoNebs PRN





#Adenocarcinoma right upper lung lobe


- Out-patient hematology/oncology follow up recommended 


- Heme/onc consulted 


   - not a candidate for immunotherapy or EGFR inhibitor. poor candidate for 

chemo


   - patient states he does not want treatment for lung mass


   - no need for flow studies





#FEN


- No IV fluids indicated


- monitor and replete lytes as needed 


- Regular diet





#Prophylaxis


- Lovenox 40mg subq daily





#Disposition


- starr cath overnight if needed





Visit type





- Emergency Visit


Emergency Visit: Yes


ED Registration Date: 01/02/20


Care time: The patient presented to the Emergency Department on the above date 

and was hospitalized for further evaluation of their emergent condition.





- New Patient


This patient is new to me today: No





- Critical Care


Critical Care patient: No





ATTENDING PHYSICIAN STATEMENT





I saw and evaluated the patient.


I reviewed the resident's note and discussed the case with the resident.


I agree with the resident's findings and plan as documented.








SUBJECTIVE:








OBJECTIVE:








ASSESSMENT AND PLAN:

## 2020-01-10 LAB
ALBUMIN SERPL-MCNC: 2.4 G/DL (ref 3.4–5)
ALP SERPL-CCNC: 87 U/L (ref 45–117)
ALT SERPL-CCNC: 12 U/L (ref 13–61)
ANION GAP SERPL CALC-SCNC: 6 MMOL/L (ref 8–16)
AST SERPL-CCNC: 11 U/L (ref 15–37)
BILIRUB SERPL-MCNC: 0.2 MG/DL (ref 0.2–1)
BUN SERPL-MCNC: 9.4 MG/DL (ref 7–18)
CALCIUM SERPL-MCNC: 8.5 MG/DL (ref 8.5–10.1)
CHLORIDE SERPL-SCNC: 100 MMOL/L (ref 98–107)
CO2 SERPL-SCNC: 29 MMOL/L (ref 21–32)
CREAT SERPL-MCNC: 0.5 MG/DL (ref 0.55–1.3)
DEPRECATED RDW RBC AUTO: 17.3 % (ref 11.9–15.9)
GLUCOSE SERPL-MCNC: 86 MG/DL (ref 74–106)
HCT VFR BLD CALC: 37.6 % (ref 35.4–49)
HGB BLD-MCNC: 12.5 GM/DL (ref 11.7–16.9)
MCH RBC QN AUTO: 29.3 PG (ref 25.7–33.7)
MCHC RBC AUTO-ENTMCNC: 33.2 G/DL (ref 32–35.9)
MCV RBC: 88.2 FL (ref 80–96)
PLATELET # BLD AUTO: 497 K/MM3 (ref 134–434)
PMV BLD: 9.4 FL (ref 7.5–11.1)
POTASSIUM SERPLBLD-SCNC: 4.3 MMOL/L (ref 3.5–5.1)
PROT SERPL-MCNC: 6 G/DL (ref 6.4–8.2)
RBC # BLD AUTO: 4.26 M/MM3 (ref 4–5.6)
SODIUM SERPL-SCNC: 136 MMOL/L (ref 136–145)
WBC # BLD AUTO: 9.5 K/MM3 (ref 4–10)

## 2020-01-10 RX ADMIN — GABAPENTIN SCH MG: 300 CAPSULE ORAL at 21:03

## 2020-01-10 RX ADMIN — TAMSULOSIN HYDROCHLORIDE SCH MG: 0.4 CAPSULE ORAL at 09:10

## 2020-01-10 RX ADMIN — HEPARIN SODIUM SCH UNIT: 5000 INJECTION, SOLUTION INTRAVENOUS; SUBCUTANEOUS at 05:10

## 2020-01-10 RX ADMIN — HEPARIN SODIUM SCH UNIT: 5000 INJECTION, SOLUTION INTRAVENOUS; SUBCUTANEOUS at 21:03

## 2020-01-10 RX ADMIN — HEPARIN SODIUM SCH UNIT: 5000 INJECTION, SOLUTION INTRAVENOUS; SUBCUTANEOUS at 13:49

## 2020-01-10 RX ADMIN — MORPHINE SULFATE PRN MG: 10 SOLUTION ORAL at 17:53

## 2020-01-10 RX ADMIN — MORPHINE SULFATE PRN MG: 10 SOLUTION ORAL at 09:20

## 2020-01-10 RX ADMIN — MORPHINE SULFATE PRN MG: 10 SOLUTION ORAL at 03:08

## 2020-01-10 NOTE — PN
Teaching Attending Note


Name of Resident: Gwen Peace





ATTENDING PHYSICIAN STATEMENT





I saw and evaluated the patient.


I reviewed the resident's note and discussed the case with the resident.


I agree with the resident's findings and plan as documented.





Seen and examined; please see resident note for further historical information.

  I personally verified all key historical information and exam findings.  

Personally interpreted all imaging and diagnostics and reviewed appropriate 

consults.  I reviewed all labs and vital signs as per resident note and EMR as 

documented.  I agree with the above assessment and plan unless supplemented by 

myself in the following.  





Patient was noted to have urinary retention overnight and had Starr catheter 

placed.  He is not willing to speak with me or be examined this morning. Hiprex 

noted.





10 system review of systems completed and is negative aside from history of 

present illness











Problems:


-Urinary retention, now agreeable to DC with starr and arranging for home 

services.


-Suspected urinary tract infection, follow-up final microbiology.  Negative so 

far, WBC is downtrending, patient is afebrile and clinically stable.  No 

indication for further treatment


-Neurogenic bladder secondary to spinal cord transection, worsening neuropathic 

issues.  


-Adenocarcinoma of the right upper lobe, follow-up hematology and oncology 

consultation. 


-COPD history, gold class unknown.  Not currently in any exacerbation.  PRN 

albuterol with outpatient pulmonary function tests planned.


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 

worsening neuropathy, c/w gabapentin


-Hypotension, may be a function of autonomic dysregulation secondary to the 

underlying neurological damage


-Recent left leg distal tibial fracture, discharged from [facility] on October 11, 2019.  Pain is controlled with no further issues.





Full Code

## 2020-01-10 NOTE — PATH
Surgical Pathology Report



Patient Name:  ELIAZAR ESCALANTE

Accession #:  

Med. Rec. #:  [id number]                                                        

   /Age/Gender:  1955 (Age: 64) / M

Account:  [id number]                                                          

             Location: [facility] MED/SURG

Taken:  2020

Received:  2020

Reported:  1/10/2020

Physicians:  Juan Lyon M.D.

  



Specimen(s) Received

 PERIPHERAL BLOOD 





Clinical History

Leukocytosis







Final Diagnosis

COMPREHENSIVE FLOW PANEL performed and interpreted at [place], [place] ([id number]) shows the following:



INTERPRETATION:  No atypical flow cytometric findings seen.



Phenotype: 

Lymphocytes include polyclonal B cells, NK cells and immunophenotypically normal

CD4+ and CD8+ T cells in normal proportions. No evidence of a clonal lymphoid

expansion. 

Granulocytes are immunophenotypically mature.





MYELOPROLIFERATIVE NEOPLASM FISH PANEL performed and interpreted at [place], [place] ([id number]) shows the following:



INTERPRETATION:

No evidence of deletion 7q or monosomy 7 is present.

No evidence of trisomy 8 (+8) is present.

No evidence of deletion 20q12 is present.

No BCR/ABL1 t(9;22) translocation is detected.



See [facility] reports for additional details.





***Electronically Signed***

Yessenia Schmitz M.D.



Addendum     

Reported: 2020



Addendum Diagnosis

COMPREHENSIVE FLOW PANEL performed and interpreted at [place], [place] ([id number]) shows the following:



INTERPRETATION:

No atypical flow cytometric findings seen.



Phenotype:

Lymphocytes include polyclonal B cells, NK cells and immunophenotypically normal

CD4+ and CD8+ T

cells in normal proportions. No evidence of a clonal lymphoid expansion.

Granulocytes are immunophenotypically mature.



Cytomorphology:

Smears from flow sample show no increase in myeloblasts or atypical lymphocytes.





MYELOPROLIFERATIVE NEOPLASM FISH PANEL performed and interpreted at [place], [place] ([id number]) shows the following:



INTERPRETATION:

No evidence of deletion 7q or monosomy 7 is present. 

No evidence of trisomy 8 (+8) is present. 

No evidence of deletion 20q12 is present. 

No BCR/ABL1 t(9;22) translocation is detected.



See [facility] report for additional details.





 Yessenia Schmitz M.D.  

 



Gross Description

Received are 4 green top tubes of peripheral blood which are sent to [facility].





saudi

## 2020-01-10 NOTE — DS
Physical Exam: 


SUBJECTIVE: Patient seen and examined. Required starr placement overnight for 

discomfort. Patient agreeable for discharge with starr at this time due to 

multiple failed TOVs. Explained to him that a follow up appointment has been 

made for him with Dr. Villalta's office on 1/24. 








OBJECTIVE:





 Vital Signs











 Period  Temp  Pulse  Resp  BP Sys/Malone  Pulse Ox


 


 Last 24 Hr  97.8 F-98.7 F  86-92  18-18  /52-56  94-95








PHYSICAL EXAM





GENERAL: The patient is awake, alert, and fully oriented, in no acute distress.


HEAD: Normal with no signs of trauma.


EYES: EOMI, no scleral icterus, no ptosis 


ENT: MMM


NECK: Trachea midline, full range of motion, supple. 


LUNGS: CTA B/L, no wheezes, no crackles, no accessory muscle use. 


HEART: Regular rate and rhythm, S1, S2 without murmur, rub or gallop.


ABDOMEN: Soft, nondistended, normoactive bowel sounds, no guarding, no rebound, 

no hepatosplenomegaly, no masses. 


: condom cath in place 


EXTREMITIES: 2+ pulses, warm, well-perfused. Right wrist fused. emaciated lower 

extremities. 


NEUROLOGICAL: Normal speech, gait not observed.


PSYCH: Normal mood, normal affect.


SKIN: Warm, dry, normal turgor, no rashes or lesions noted





LABS


 Laboratory Results - last 24 hr











  01/10/20 01/10/20





  06:20 06:20


 


WBC  9.5 


 


RBC  4.26 


 


Hgb  12.5 


 


Hct  37.6 


 


MCV  88.2 


 


MCH  29.3 


 


MCHC  33.2 


 


RDW  17.3 H 


 


Plt Count  497 H 


 


MPV  9.4 


 


Sodium   136


 


Potassium   4.3


 


Chloride   100


 


Carbon Dioxide   29


 


Anion Gap   6 L


 


BUN   9.4


 


Creatinine   0.5 L


 


Est GFR (CKD-EPI)AfAm   130.88


 


Est GFR (CKD-EPI)NonAf   112.92


 


Random Glucose   86


 


Calcium   8.5


 


Total Bilirubin   0.2


 


AST   11 L


 


ALT   12 L


 


Alkaline Phosphatase   87


 


Total Protein   6.0 L


 


Albumin   2.4 L











HOSPITAL COURSE:





Date of Admission:01/02/20





Date of Discharge: 01/10/20





65 y/o/m with a PMHx of COPD, Right lung adenocarcinoma (no chemo/rads/ surgery)

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 

bladder, recurrent UTIs, hypotension, Recent admission at [facility] s/p left leg 

distal tibial fracture (d/c'd 10/11/19) returning to the ER for inability to 

urinate and UTI failing outpatient antibiotic trial. Patient treated for UTI on 

admission with Meropenem which was then discontinued. Seen by Dr. Villalta who 

recommended starting the patient on Tamsulosin and doing a TOV after removing 

starr. Patient still retaining urine at this time despite multiple TOV 

attempts. Patient reluctant to straight cath himself at home and felt confident 

that he would be able to urinate on his own if sent home with just a condom 

cath. Explained to patient that he failed multiple attempts at TOV and patient 

finally agreed to go home with starr and with follow up arranaged at Dr. Villalta'

s office for 1/24. Started on patient on Hiprex and Vitamin for prophylaxis 

against future UTIs as per Dr. Villalta recommendations. Started patient on 

Gabapentin for worsening neuropathy 2/2 paraplegia. Seen by hematology/oncology 

while admitted for adenocarcinoma of the lung, patient not a candidant for 

immunotherapy or EGFR inhibitor, poor candidate for chemo. Patient denied 

wanting treatment at this time. Recommended to follow up with Dr. Lyon as 

outpatient if he would like further options regarding treatment. Patient 

agreeable to discharge home with starr and with follow up arranged for 1/24. 





Minutes to complete discharge: 36





Discharge Summary


Problems reviewed: Yes


Reason For Visit: URINARY TRACK INFECTION


Condition: Stable





- Instructions


Diet, Activity, Other Instructions: 


You were admitted to hospital for treatment of urinary tract infection. You 

were evaluated by infectious disease physician and treated with antibiotics. 

You are stable for discharge home. You will be discharged with a starr catheter 

as you are retaining urine at this time. Please keep the starr catheter site 

clean and dry until you follow up with your Urologist. You were seen by 

Hematology/Oncology for your lung mass however you stated that you did not want 

any treatment at this time but a referral will be included for you if you would 

like further evaluation and treatment options.  





Medication changes: 


1. START Tamsulosin 0.4mg once daily to help decrease urinary retention 


2. START taking Hiprex 1gram twice daily as this will help fight against 

recurrent urinary tract infections. 


3. START taking Vitamin C 1gram twice a day as this will help fight against 

urinary tract infections. 


4. START taking Gabapentin 300m once daily at night for your worsening 

neuropathy. 





Continue taking your home medications as directed. 





Follow up: 


1. Follow up with your primary care physician within one-two days after 

discharge. A referral has been provided. 


2. Follow up with your Urologist, an appointment has been made for you on 

January 24th, 2020 with Dr. Villalta at his clinic in [place] at [address] [place] [address] [address], [place] [address]. Discuss further maintenance of your starr 

catheter at your follow up appointment.


3. Follow up with Hematology/Oncology after discharge regarding your right lung 

mass (adenocarcinoma) for further evaluation and treatment. 





Return to the nearest Emergency Department if you experience worsening symptoms

, subjective fevers, chills, shortness of breath, chest pain, palpitations, 

abdominal pain, nausea vomiting, diarrhea, painful urination, worsening 

bleeding with urination, or any trauma. 





Referrals: 


Tutu Villalta MD [Staff Physician] - 


Sony Poon MD [Primary Care Provider] - 


Juan Lyon MD [Staff Physician] - 


Disposition: HOME





- Home Medications


Comprehensive Discharge Medication List: 


Ambulatory Orders





Diazepam [Valium] 10 mg PO TID PRN  tablet MDD 30mg 10/10/18 


Morphine 10 mg/5 ml Liquid [Morphine 10 mg/5 mL Liquid -] 20 mg PO Q4H MDD 60mg 

10/07/19 


Ascorbic Acid [Vitamin C -] 1,000 mg PO BID 30 Days #120 tablet 01/10/20 


Gabapentin [Neurontin -] 300 mg PO HS 30 Days #30 capsule 01/10/20 


Methenamine Hippurate [Hiprex [Nf] -] 1 gm PO BID 30 Days #60 tablet 01/10/20 


Tamsulosin HCl [Flomax -] 0.4 mg PO DAILY@0830 30 Days #30 cap.er.24h 01/10/20 








This patient is new to me today: Yes


Date on this admission: 01/11/20


Emergency Visit: No


Critical Care patient: No





- Discharge Referral


Referred to [facility] No





ATTENDING PHYSICIAN STATEMENT





I saw and evaluated the patient.


I reviewed the resident's note and discussed the case with the resident.


I agree with the resident's findings and plan as documented.








SUBJECTIVE:








OBJECTIVE:








ASSESSMENT AND PLAN:

## 2020-01-10 NOTE — PN
Progress Note, Physician


History of Present Illness: 





stable


no new issues





- Current Medication List


Current Medications: 


Active Medications





Acetaminophen (Tylenol -)  650 mg PO Q4H PRN


   PRN Reason: PAIN LEVEL 6-10


Albuterol/Ipratropium (Duoneb -)  1 amp NEB Q6H PRN


   PRN Reason: SHORTNESS OF BREATH


Docusate Sodium (Colace -)  100 mg PO BID PRN


   PRN Reason: CONSTIPATION


   Last Admin: 01/05/20 08:35 Dose:  100 mg


Gabapentin (Neurontin -)  300 mg PO HS [facility]


   Last Admin: 01/09/20 22:41 Dose:  300 mg


Heparin Sodium (Porcine) (Heparin -)  5,000 unit SQ TID [facility]


   Last Admin: 01/10/20 05:10 Dose:  5,000 unit


Morphine Sulfate (Morphine 10 Mg/5 Ml Liquid)  20 mg PO Q4H PRN


   PRN Reason: PAIN LEVEL 6-10


   Last Admin: 01/10/20 09:20 Dose:  20 mg


Senna (Senna -)  2 tab PO HS PRN


   PRN Reason: CONSTIPATION


   Last Admin: 01/05/20 08:35 Dose:  2 tab


Tamsulosin HCl (Flomax -)  0.4 mg PO DAILY@0830 [facility]


   Last Admin: 01/10/20 09:10 Dose:  0.4 mg











- Objective


Vital Signs: 


 Vital Signs











Temperature  98 F   01/09/20 22:00


 


Pulse Rate  92 H  01/09/20 22:00


 


Respiratory Rate  18   01/09/20 22:00


 


Blood Pressure  112/52 L  01/09/20 22:00


 


O2 Sat by Pulse Oximetry (%)  95   01/09/20 21:00











Constitutional: Yes: No Distress, Calm


Cardiovascular: Yes: S1, S2


Respiratory: Yes: Regular, CTA Bilaterally


Gastrointestinal: Yes: Normal Bowel Sounds, Soft


Genitourinary: Yes: Campbell Present


Musculoskeletal: Yes: WNL


Extremities: Yes: WNL


Neurological: Yes: Alert, Oriented


Psychiatric: Yes: Alert, Oriented


Labs: 


 CBC, BMP





 01/10/20 06:20 





 01/10/20 06:20 











Assessment/Plan





64 year old man with a PMH of COPD, R lung adenocarcinoma (no chemo/rads/ 

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 

bladder, Recurrent UTIs, Hypotension, 





Problem List





- Problems


(1) UTI (urinary tract infection)


Code(s): N39.0 - URINARY TRACT INFECTION, SITE NOT SPECIFIED   


Qualifiers: 


   Urinary tract infection type: site unspecified   Hematuria presence: without 

hematuria   Qualified Code(s): N39.0 - Urinary tract infection, site not 

specified   





(2) Adenocarcinoma of right lung


Code(s): C34.91 - MALIGNANT NEOPLASM OF UNSP PART OF RIGHT BRONCHUS OR LUNG   





(3) Neurogenic bladder


Code(s): N31.9 - NEUROMUSCULAR DYSFUNCTION OF BLADDER, UNSPECIFIED   





(4) Paraplegia


Code(s): G82.20 - PARAPLEGIA, UNSPECIFIED   





(5) Leukocytosis


Code(s): D72.829 - ELEVATED WHITE BLOOD CELL COUNT, UNSPECIFIED   





(6) Tibia/fibula fracture


Code(s): S82.209A - UNSP FRACTURE OF SHAFT OF UNSP TIBIA, INIT FOR CLOS FX; 

S82.409A - UNSP FRACTURE OF SHAFT OF UNSP FIBULA, INIT FOR CLOS FX   


Qualifiers: 


   Encounter type: initial encounter   Fracture type: closed   Laterality: left

   Qualified Code(s): S82.202A - Unspecified fracture of shaft of left tibia, 

initial encounter for closed fracture; S82.402A - Unspecified fracture of shaft 

of left fibula, initial encounter for closed fracture   





Assessment/Plan





Leukocytosis


Urinary retention


Hematuria


Paraplegia s/p spinal cord transection


Rt lung AdenoCA





plan


continue current mgmt


stable

## 2020-01-11 RX ADMIN — HEPARIN SODIUM SCH UNIT: 5000 INJECTION, SOLUTION INTRAVENOUS; SUBCUTANEOUS at 06:26

## 2020-01-11 RX ADMIN — GABAPENTIN SCH MG: 300 CAPSULE ORAL at 22:53

## 2020-01-11 RX ADMIN — HEPARIN SODIUM SCH UNIT: 5000 INJECTION, SOLUTION INTRAVENOUS; SUBCUTANEOUS at 13:43

## 2020-01-11 RX ADMIN — MORPHINE SULFATE PRN MG: 10 SOLUTION ORAL at 01:00

## 2020-01-11 RX ADMIN — MORPHINE SULFATE PRN MG: 10 SOLUTION ORAL at 22:53

## 2020-01-11 RX ADMIN — DOCUSATE SODIUM PRN MG: 100 CAPSULE, LIQUID FILLED ORAL at 23:02

## 2020-01-11 RX ADMIN — MORPHINE SULFATE PRN MG: 10 SOLUTION ORAL at 15:37

## 2020-01-11 RX ADMIN — HEPARIN SODIUM SCH UNIT: 5000 INJECTION, SOLUTION INTRAVENOUS; SUBCUTANEOUS at 22:53

## 2020-01-11 RX ADMIN — TAMSULOSIN HYDROCHLORIDE SCH MG: 0.4 CAPSULE ORAL at 09:10

## 2020-01-11 NOTE — PN
Progress Note, Physician


History of Present Illness: 





no new issues





- Current Medication List


Current Medications: 


Active Medications





Acetaminophen (Tylenol -)  650 mg PO Q4H PRN


   PRN Reason: PAIN LEVEL 6-10


Albuterol/Ipratropium (Duoneb -)  1 amp NEB Q6H PRN


   PRN Reason: SHORTNESS OF BREATH


Docusate Sodium (Colace -)  100 mg PO BID PRN


   PRN Reason: CONSTIPATION


   Last Admin: 01/05/20 08:35 Dose:  100 mg


Gabapentin (Neurontin -)  300 mg PO HS [facility]


   Last Admin: 01/10/20 21:03 Dose:  300 mg


Heparin Sodium (Porcine) (Heparin -)  5,000 unit SQ TID [facility]


   Last Admin: 01/11/20 06:26 Dose:  5,000 unit


Morphine Sulfate (Morphine 10 Mg/5 Ml Liquid)  20 mg PO Q4H PRN


   PRN Reason: PAIN LEVEL 6-10


   Last Admin: 01/11/20 01:00 Dose:  20 mg


Senna (Senna -)  2 tab PO HS PRN


   PRN Reason: CONSTIPATION


   Last Admin: 01/05/20 08:35 Dose:  2 tab


Tamsulosin HCl (Flomax -)  0.4 mg PO DAILY@0830 [facility]


   Last Admin: 01/11/20 09:10 Dose:  0.4 mg











- Objective


Vital Signs: 


 Vital Signs











Temperature  98.9 F   01/11/20 05:41


 


Pulse Rate  82   01/11/20 05:41


 


Respiratory Rate  18   01/11/20 05:41


 


Blood Pressure  116/57 L  01/11/20 05:41


 


O2 Sat by Pulse Oximetry (%)  95   01/10/20 21:00











Constitutional: Yes: No Distress, Calm


Cardiovascular: Yes: S1, S2


Respiratory: Yes: Regular, CTA Bilaterally


Gastrointestinal: Yes: Normal Bowel Sounds, Soft


Musculoskeletal: Yes: WNL


Extremities: Yes: Other


Neurological: Yes: Alert, Oriented


Psychiatric: Yes: Alert, Oriented


Labs: 


 CBC, BMP





 01/10/20 06:20 





 01/10/20 06:20 











Assessment/Plan





64 year old man with a PMH of COPD, R lung adenocarcinoma (no chemo/rads/ 

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 

bladder, Recurrent UTIs, Hypotension, 





Problem List





- Problems


(1) UTI (urinary tract infection)


Code(s): N39.0 - URINARY TRACT INFECTION, SITE NOT SPECIFIED   


Qualifiers: 


   Urinary tract infection type: site unspecified   Hematuria presence: without 

hematuria   Qualified Code(s): N39.0 - Urinary tract infection, site not 

specified   





(2) Adenocarcinoma of right lung


Code(s): C34.91 - MALIGNANT NEOPLASM OF UNSP PART OF RIGHT BRONCHUS OR LUNG   





(3) Neurogenic bladder


Code(s): N31.9 - NEUROMUSCULAR DYSFUNCTION OF BLADDER, UNSPECIFIED   





(4) Paraplegia


Code(s): G82.20 - PARAPLEGIA, UNSPECIFIED   





(5) Leukocytosis


Code(s): D72.829 - ELEVATED WHITE BLOOD CELL COUNT, UNSPECIFIED   





(6) Tibia/fibula fracture


Code(s): S82.209A - UNSP FRACTURE OF SHAFT OF UNSP TIBIA, INIT FOR CLOS FX; 

S82.409A - UNSP FRACTURE OF SHAFT OF UNSP FIBULA, INIT FOR CLOS FX   


Qualifiers: 


   Encounter type: initial encounter   Fracture type: closed   Laterality: left

   Qualified Code(s): S82.202A - Unspecified fracture of shaft of left tibia, 

initial encounter for closed fracture; S82.402A - Unspecified fracture of shaft 

of left fibula, initial encounter for closed fracture   





Assessment/Plan





Leukocytosis


Urinary retention


Hematuria


Paraplegia s/p spinal cord transection


Rt lung AdenoCA





plan


continue current mgmt


stable

## 2020-01-11 NOTE — PN
Physical Exam: 


SUBJECTIVE: Patient seen and examined. He has no complaints.








OBJECTIVE:





 Vital Signs











 Period  Temp  Pulse  Resp  BP Sys/Malone  Pulse Ox


 


 Last 24 Hr  98.3 F-98.9 F  77-88  18-18  /49-66  94-95











GENERAL: The patient is awake, alert, and fully oriented, in no acute distress.


LUNGS: Breath sounds equal, clear to auscultation bilaterally, no wheezes, no 

crackles, no accessory muscle use. 


HEART: Regular rate and rhythm, S1, S2 without murmur, rub or gallop.


ABDOMEN: Soft, nontender, nondistended, normoactive bowel sounds, no guarding, 

no rebound, no hepatosplenomegaly, no masses.


EXTREMITIES: 2+ pulses, warm, well-perfused, no edema, atrophy of leg muscles.











Active Medications











Generic Name Dose Route Start Last Admin





  Trade Name Freq  PRN Reason Stop Dose Admin


 


Acetaminophen  650 mg  01/03/20 01:01  





  Tylenol -  PO   





  Q4H PRN   





  PAIN LEVEL 6-10   





     





     





     


 


Albuterol/Ipratropium  1 amp  01/04/20 14:47  





  Duoneb -  NEB   





  Q6H PRN   





  SHORTNESS OF BREATH   





     





     





     


 


Docusate Sodium  100 mg  01/03/20 01:01  01/05/20 08:35





  Colace -  PO   100 mg





  BID PRN   Administration





  CONSTIPATION   





     





     





     


 


Gabapentin  300 mg  01/08/20 22:00  01/10/20 21:03





  Neurontin -  PO   300 mg





  HS ALE   Administration





     





     





     





     


 


Heparin Sodium (Porcine)  5,000 unit  01/07/20 22:00  01/11/20 13:43





  Heparin -  SQ   5,000 unit





  TID ALE   Administration





     





     





     





     


 


Morphine Sulfate  20 mg  01/03/20 02:37  01/11/20 01:00





  Morphine 10 Mg/5 Ml Liquid  PO   20 mg





  Q4H PRN   Administration





  PAIN LEVEL 6-10   





     





     





     


 


Senna  2 tab  01/03/20 01:01  01/05/20 08:35





  Senna -  PO   2 tab





  HS PRN   Administration





  CONSTIPATION   





     





     





     


 


Tamsulosin HCl  0.4 mg  01/06/20 10:00  01/11/20 09:10





  Flomax -  PO   0.4 mg





  DAILY@0830 [facility]   Administration





     





     





     





     











ASSESSMENT/PLAN:


This is a 64 year old man with a history of COPD, PAF, lung adenocarcinoma, 

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 

recurrent UTIs, chronic bilateral iliac artery occlusion who presented to the 

ED with urinary retention. 





1. UTI, recurrent


   - Completed course of Levaquin as outpatient


   - Urine culture negative


   - Meropenem discontinued


   - Plan to start Hiprex at discharge


2. Neurogenic bladder with urinary retention


   - Patient does self-catheterization at home and is refusing suprapubic 

catheter, Campbell catheter


   - Continue Flomax


3. COPD


   - Stable


4. Lung adenocarcinoma


   - Patient has refused treatment


5. Paraplegia secondary to spinal cord transection


   - Continue Neurontin


6. Paroxysmal atrial fib


   - Currently in sinus rhythm


7. Anxiety


8. Chronic bilateral iliac artery occlusion


9. Disposition


   - Discharge home once services resumed





Visit type





- Emergency Visit


Emergency Visit: Yes


ED Registration Date: 01/02/20


Care time: The patient presented to the Emergency Department on the above date 

and was hospitalized for further evaluation of their emergent condition.





- New Patient


This patient is new to me today: Yes


Date on this admission: 01/11/20





- Critical Care


Critical Care patient: No





- Discharge Referral


Referred to [facility] No

## 2020-01-12 VITALS — SYSTOLIC BLOOD PRESSURE: 108 MMHG | TEMPERATURE: 98.7 F | DIASTOLIC BLOOD PRESSURE: 50 MMHG | HEART RATE: 77 BPM

## 2020-01-12 RX ADMIN — MORPHINE SULFATE PRN MG: 10 SOLUTION ORAL at 04:03

## 2020-01-12 RX ADMIN — HEPARIN SODIUM SCH: 5000 INJECTION, SOLUTION INTRAVENOUS; SUBCUTANEOUS at 14:00

## 2020-01-12 RX ADMIN — MORPHINE SULFATE PRN MG: 10 SOLUTION ORAL at 09:22

## 2020-01-12 RX ADMIN — MORPHINE SULFATE PRN MG: 10 SOLUTION ORAL at 13:46

## 2020-01-12 RX ADMIN — TAMSULOSIN HYDROCHLORIDE SCH MG: 0.4 CAPSULE ORAL at 09:21

## 2020-01-12 RX ADMIN — HEPARIN SODIUM SCH UNIT: 5000 INJECTION, SOLUTION INTRAVENOUS; SUBCUTANEOUS at 06:03

## 2020-01-12 NOTE — PN
<Stephen Napolesf - Last Filed: 01/12/20 16:43>


Physical Exam: 


SUBJECTIVE: Patient seen and examined at bedside. Denies acute complaints. 

Eager to go home. 





OBJECTIVE:





 Vital Signs











 Period  Temp  Pulse  Resp  BP Sys/Malone  Pulse Ox


 


 Last 24 Hr  97.8 F-97.9 F  61-85  17-20  /47-70  94-96








GENERAL: The patient is awake, alert, and fully oriented, in no acute distress.


HEAD: Normal with no signs of trauma.


EYES: PERRL, extraocular movements intact, sclera anicteric, conjunctiva clear. 

No ptosis. 


ENT: Oropharynx clear without exudates, moist mucous membranes.


NECK: Trachea midline, full range of motion, supple. 


LUNGS: Breath sounds equal, clear to auscultation bilaterally, no wheezes, no 

crackles, no accessory muscle use. 


HEART: Regular rate and rhythm, S1, S2 without murmur, rub or gallop.


ABDOMEN: Soft, nontender, nondistended, normoactive bowel sounds, no guarding, 

no rebound, no hepatosplenomegaly, no masses.


GENITOURINARY- Campbell catheter in situ, draining clear urine.   


EXTREMITIES: 2+ pulses, warm, well-perfused, no edema. 


NEUROLOGICAL: Cranial nerves II through XII grossly intact. Normal speech, gait 

not observed.


PSYCH: Normal mood, normal affect.


SKIN: Warm, dry, normal turgor, no rashes or lesions noted








Active Medications











Generic Name Dose Route Start Last Admin





  Trade Name Freq  PRN Reason Stop Dose Admin


 


Acetaminophen  650 mg  01/03/20 01:01  





  Tylenol -  PO   





  Q4H PRN   





  PAIN LEVEL 6-10   





     





     





     


 


Albuterol/Ipratropium  1 amp  01/04/20 14:47  





  Duoneb -  NEB   





  Q6H PRN   





  SHORTNESS OF BREATH   





     





     





     


 


Docusate Sodium  100 mg  01/03/20 01:01  01/11/20 23:02





  Colace -  PO   100 mg





  BID PRN   Administration





  CONSTIPATION   





     





     





     


 


Gabapentin  300 mg  01/08/20 22:00  01/11/20 22:53





  Neurontin -  PO   300 mg





  HS ALE   Administration





     





     





     





     


 


Heparin Sodium (Porcine)  5,000 unit  01/07/20 22:00  01/12/20 06:03





  Heparin -  SQ   5,000 unit





  TID ALE   Administration





     





     





     





     


 


Morphine Sulfate  20 mg  01/03/20 02:37  01/12/20 09:22





  Morphine 10 Mg/5 Ml Liquid  PO   20 mg





  Q4H PRN   Administration





  PAIN LEVEL 6-10   





     





     





     


 


Senna  2 tab  01/03/20 01:01  01/05/20 08:35





  Senna -  PO   2 tab





  HS PRN   Administration





  CONSTIPATION   





     





     





     


 


Tamsulosin HCl  0.4 mg  01/06/20 10:00  01/12/20 09:21





  Flomax -  PO   0.4 mg





  DAILY@0830 ALE   Administration





     





     





     





     











ASSESSMENT/PLAN:


63 y/o/m with a PMHx of COPD, Right lung adenocarcinoma (no chemo/rads/ surgery)

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 

bladder, recurrent UTIs, hypotension, Recent admission at [facility] s/p left leg 

distal tibial fracture (d/c'd 10/11/19) returning to the ER for inability to 

urinate and UTI failing outpatient antibiotic trial. 





UTI s/p failed outpatient antibiotic therapy with Levaquin 


- Patient with hx of self catheterizations, neurogenic bladder 


- Meropenum discontinued. ID recommendations appreciated (Dr. Man) 

appreciated 


- Urology consulted, Dr. Villalta. Campbell catheter in place. Patient with 

outpatient follow up confirmed. 





COPD


- Current not in exacerbation


- Not on any COPD medications at home 


- DuoNebs PRN





Adenocarcinoma right upper lung lobe


- Out-patient hematology/oncology follow up recommended 





FEN


- No IV fluids indicated.


- monitor and replete lytes as needed 


- Regular diet





Prophylaxis


- Lovenox 40mg subq daiily





Disposition


- Patient discharge today, with home health aide ervices reinstated. Please see 

DC summary from 01/10 for further details. 





Visit type





- Emergency Visit


Emergency Visit: Yes


ED Registration Date: 01/02/20


Care time: The patient presented to the Emergency Department on the above date 

and was hospitalized for further evaluation of their emergent condition.





- New Patient


This patient is new to me today: No





- Critical Care


Critical Care patient: No





- Discharge Referral


Referred to [facility] No





ATTENDING PHYSICIAN STATEMENT





I saw and evaluated the patient.


I reviewed the resident's note and discussed the case with the resident.


I agree with the resident's findings and plan as documented.








SUBJECTIVE:








OBJECTIVE:








ASSESSMENT AND PLAN:








<Gaudencio Galeana - Last Filed: 01/12/20 17:09>


Physical Exam: 


SUBJECTIVE: Patient seen and examined








OBJECTIVE:





 Vital Signs











 Period  Temp  Pulse  Resp  BP Sys/Malone  Pulse Ox


 


 Last 24 Hr  97.8 F-98.7 F  61-85  17-20  /47-60  94-96











GENERAL: The patient is awake, alert, and fully oriented, in no acute distress.


HEAD: Normal with no signs of trauma.


EYES: PERRL, extraocular movements intact, sclera anicteric, conjunctiva clear. 

No ptosis. 


ENT: Ears normal, nares patent, oropharynx clear without exudates, moist mucous 


membranes.


NECK: Trachea midline, full range of motion, supple. 


LUNGS: Breath sounds equal, clear to auscultation bilaterally, no wheezes, no 

crackles, no 


accessory muscle use. 


HEART: Regular rate and rhythm, S1, S2 without murmur, rub or gallop.


ABDOMEN: Soft, nontender, nondistended, normoactive bowel sounds, no guarding, 

no 


rebound, no hepatosplenomegaly, no masses.


EXTREMITIES: 2+ pulses, warm, well-perfused, no edema. 


NEUROLOGICAL: Cranial nerves II through XII grossly intact. Normal speech, gait 

not 


observed.


PSYCH: Normal mood, normal affect.


SKIN: Warm, dry, normal turgor, no rashes or lesions noted

















ASSESSMENT/PLAN:








ATTENDING PHYSICIAN STATEMENT





I saw and evaluated the patient.


I reviewed the resident's note and discussed the case with the resident.


I agree with the resident's findings and plan as documented.








SUBJECTIVE:








OBJECTIVE:








ASSESSMENT AND PLAN:

## 2020-01-12 NOTE — PN
Progress Note, Physician


History of Present Illness: 





stable


no new issues





- Current Medication List


Current Medications: 


Active Medications





Acetaminophen (Tylenol -)  650 mg PO Q4H PRN


   PRN Reason: PAIN LEVEL 6-10


Albuterol/Ipratropium (Duoneb -)  1 amp NEB Q6H PRN


   PRN Reason: SHORTNESS OF BREATH


Docusate Sodium (Colace -)  100 mg PO BID PRN


   PRN Reason: CONSTIPATION


   Last Admin: 01/11/20 23:02 Dose:  100 mg


Gabapentin (Neurontin -)  300 mg PO HS [facility]


   Last Admin: 01/11/20 22:53 Dose:  300 mg


Heparin Sodium (Porcine) (Heparin -)  5,000 unit SQ TID [facility]


   Last Admin: 01/12/20 06:03 Dose:  5,000 unit


Morphine Sulfate (Morphine 10 Mg/5 Ml Liquid)  20 mg PO Q4H PRN


   PRN Reason: PAIN LEVEL 6-10


   Last Admin: 01/12/20 09:22 Dose:  20 mg


Senna (Senna -)  2 tab PO HS PRN


   PRN Reason: CONSTIPATION


   Last Admin: 01/05/20 08:35 Dose:  2 tab


Tamsulosin HCl (Flomax -)  0.4 mg PO DAILY@0830 [facility]


   Last Admin: 01/12/20 09:21 Dose:  0.4 mg











- Objective


Vital Signs: 


 Vital Signs











Temperature  97.8 F   01/12/20 09:17


 


Pulse Rate  85   01/12/20 09:17


 


Respiratory Rate  18   01/12/20 09:17


 


Blood Pressure  94/47 L  01/12/20 09:17


 


O2 Sat by Pulse Oximetry (%)  96   01/12/20 09:00











Constitutional: Yes: No Distress, Calm


Cardiovascular: Yes: S1, S2


Respiratory: Yes: Regular, CTA Bilaterally


Gastrointestinal: Yes: Normal Bowel Sounds, Soft


Musculoskeletal: Yes: Other


Extremities: Yes: Other


Neurological: Yes: Alert, Oriented


Psychiatric: Yes: Alert, Oriented


Labs: 


 CBC, BMP





 01/10/20 06:20 





 01/10/20 06:20 











Assessment/Plan





64 year old man with a PMH of COPD, R lung adenocarcinoma (no chemo/rads/ 

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 

bladder, Recurrent UTIs, Hypotension, 





Problem List





- Problems


(1) UTI (urinary tract infection)


Code(s): N39.0 - URINARY TRACT INFECTION, SITE NOT SPECIFIED   


Qualifiers: 


   Urinary tract infection type: site unspecified   Hematuria presence: without 

hematuria   Qualified Code(s): N39.0 - Urinary tract infection, site not 

specified   





(2) Adenocarcinoma of right lung


Code(s): C34.91 - MALIGNANT NEOPLASM OF UNSP PART OF RIGHT BRONCHUS OR LUNG   





(3) Neurogenic bladder


Code(s): N31.9 - NEUROMUSCULAR DYSFUNCTION OF BLADDER, UNSPECIFIED   





(4) Paraplegia


Code(s): G82.20 - PARAPLEGIA, UNSPECIFIED   





(5) Leukocytosis


Code(s): D72.829 - ELEVATED WHITE BLOOD CELL COUNT, UNSPECIFIED   





(6) Tibia/fibula fracture


Code(s): S82.209A - UNSP FRACTURE OF SHAFT OF UNSP TIBIA, INIT FOR CLOS FX; 

S82.409A - UNSP FRACTURE OF SHAFT OF UNSP FIBULA, INIT FOR CLOS FX   


Qualifiers: 


   Encounter type: initial encounter   Fracture type: closed   Laterality: left

   Qualified Code(s): S82.202A - Unspecified fracture of shaft of left tibia, 

initial encounter for closed fracture; S82.402A - Unspecified fracture of shaft 

of left fibula, initial encounter for closed fracture   





Assessment/Plan





Leukocytosis


Urinary retention


Hematuria


Paraplegia s/p spinal cord transection


Rt lung AdenoCA





plan


continue current mgmt


stable

## 2020-01-12 NOTE — PN
Teaching Attending Note


Name of Resident: Ar Napoles





ATTENDING PHYSICIAN STATEMENT





I saw and evaluated the patient.


I reviewed the resident's note and discussed the case with the resident.


I agree with the resident's findings and plan as documented.





Seen and examined; please see resident note for further historical information.

  I personally verified all key historical information and exam findings.  

Personally interpreted all imaging and diagnostics and reviewed appropriate 

consults.  I reviewed all labs and vital signs as per resident note and EMR as 

documented.  I agree with the above assessment and plan unless supplemented by 

myself in the following.





Pending home services was the issue for delayed discharge, symptoms remain 

improved, hemodynamically stable.





10 item review of systems was completed and is negative aside from history of 

present illness





VS, labs, imaging reviewed


NAD, AAO, resting comfortably in bed.


RRR s1/2 no mgr


Baseline paraplegia noted


Neck is supple, trachea midline, no yuliet LN


Lungs CTAB with sym expansion


NT ND +BS no yuliet organomegaly


CN2-12 wnl; no FND


NC AT EOMI PERRLA


Normal mood, appropriate behavior, euthymic affect


No skin breakdown or rashes noted





Assessment and plan:





Problems:


-Urinary retention, now agreeable to DC with starr and arranging for home 

services.  Abx per ID.  DC planning completed friday; pending home services. 


-Suspected urinary tract infection, follow-up final microbiology.  Negative so 

far, WBC is downtrending, patient is afebrile and clinically stable.  No 

indication for further treatment


-Neurogenic bladder secondary to spinal cord transection, worsening neuropathic 

issues.  


-Adenocarcinoma of the right upper lobe, follow-up hematology and oncology 

consultation. 


-COPD history, gold class unknown.  Not currently in any exacerbation.  PRN 

albuterol with outpatient pulmonary function tests planned.


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 

worsening neuropathy, c/w gabapentin


-Hypotension, may be a function of autonomic dysregulation secondary to the 

underlying neurological damage


-Recent left leg distal tibial fracture, discharged from [facility] on October 11, 2019.  Pain is controlled with no further issues.





Patient informs me that he has his home care nurses phone number.  If his home 

care nurse is able to be reached and he is agreeable, the patient will be able 

to be discharged home today with a scheduled follow-up.





Full Code

## 2020-01-13 ENCOUNTER — HOSPITAL ENCOUNTER (INPATIENT)
Dept: HOSPITAL 74 - JER | Age: 65
LOS: 3 days | Discharge: HOME | DRG: 699 | End: 2020-01-16
Attending: GENERAL ACUTE CARE HOSPITAL | Admitting: GENERAL ACUTE CARE HOSPITAL
Payer: COMMERCIAL

## 2020-01-13 VITALS — BODY MASS INDEX: 18.3 KG/M2

## 2020-01-13 DIAGNOSIS — K59.09: ICD-10-CM

## 2020-01-13 DIAGNOSIS — G89.29: ICD-10-CM

## 2020-01-13 DIAGNOSIS — Z85.118: ICD-10-CM

## 2020-01-13 DIAGNOSIS — D72.829: ICD-10-CM

## 2020-01-13 DIAGNOSIS — Z74.01: ICD-10-CM

## 2020-01-13 DIAGNOSIS — G82.20: ICD-10-CM

## 2020-01-13 DIAGNOSIS — N39.0: ICD-10-CM

## 2020-01-13 DIAGNOSIS — J44.9: ICD-10-CM

## 2020-01-13 DIAGNOSIS — N31.9: ICD-10-CM

## 2020-01-13 DIAGNOSIS — T83.598A: Primary | ICD-10-CM

## 2020-01-13 LAB
ALBUMIN SERPL-MCNC: 2.7 G/DL (ref 3.4–5)
ALP SERPL-CCNC: 102 U/L (ref 45–117)
ALT SERPL-CCNC: 17 U/L (ref 13–61)
ANION GAP SERPL CALC-SCNC: 5 MMOL/L (ref 8–16)
ANISOCYTOSIS BLD QL: (no result)
APPEARANCE UR: CLEAR
AST SERPL-CCNC: 22 U/L (ref 15–37)
BACTERIA # UR AUTO: 685.9 /HPF
BASOPHILS # BLD: 0.1 % (ref 0–2)
BILIRUB SERPL-MCNC: 0.2 MG/DL (ref 0.2–1)
BILIRUB UR STRIP.AUTO-MCNC: NEGATIVE MG/DL
BUN SERPL-MCNC: 9.1 MG/DL (ref 7–18)
CALCIUM SERPL-MCNC: 9 MG/DL (ref 8.5–10.1)
CASTS URNS QL MICRO: 26 /LPF (ref 0–8)
CHLORIDE SERPL-SCNC: 102 MMOL/L (ref 98–107)
CO2 SERPL-SCNC: 29 MMOL/L (ref 21–32)
COLOR UR: YELLOW
CREAT SERPL-MCNC: 0.5 MG/DL (ref 0.55–1.3)
DEPRECATED RDW RBC AUTO: 17 % (ref 11.9–15.9)
EOSINOPHIL # BLD: 3.3 % (ref 0–4.5)
EPITH CASTS URNS QL MICRO: 0.8 /HPF
GLUCOSE SERPL-MCNC: 91 MG/DL (ref 74–106)
HCT VFR BLD CALC: 37.7 % (ref 35.4–49)
HGB BLD-MCNC: 12.9 GM/DL (ref 11.7–16.9)
KETONES UR QL STRIP: NEGATIVE
LEUKOCYTE ESTERASE UR QL STRIP.AUTO: (no result)
LYMPHOCYTES # BLD: 7.7 % (ref 8–40)
MACROCYTES BLD QL: (no result)
MCH RBC QN AUTO: 30 PG (ref 25.7–33.7)
MCHC RBC AUTO-ENTMCNC: 34.2 G/DL (ref 32–35.9)
MCV RBC: 87.9 FL (ref 80–96)
MONOCYTES # BLD AUTO: 8.2 % (ref 3.8–10.2)
NEUTROPHILS # BLD: 80.7 % (ref 42.8–82.8)
NITRITE UR QL STRIP: NEGATIVE
PH UR: 8 [PH] (ref 5–8)
PLATELET # BLD AUTO: 622 K/MM3 (ref 134–434)
PLATELET BLD QL SMEAR: (no result)
PMV BLD: 9.2 FL (ref 7.5–11.1)
POTASSIUM SERPLBLD-SCNC: 4.7 MMOL/L (ref 3.5–5.1)
PROT SERPL-MCNC: 7 G/DL (ref 6.4–8.2)
PROT UR QL STRIP: (no result)
PROT UR QL STRIP: NEGATIVE
RBC # BLD AUTO: 2 /HPF (ref 0–4)
RBC # BLD AUTO: 4.29 M/MM3 (ref 4–5.6)
SODIUM SERPL-SCNC: 135 MMOL/L (ref 136–145)
SP GR UR: 1.01 (ref 1.01–1.03)
UROBILINOGEN UR STRIP-MCNC: 0.2 MG/DL (ref 0.2–1)
WBC # BLD AUTO: 14 K/MM3 (ref 4–10)
WBC # UR AUTO: 45 /HPF (ref 0–5)

## 2020-01-13 PROCEDURE — G0378 HOSPITAL OBSERVATION PER HR: HCPCS

## 2020-01-13 RX ADMIN — SENNOSIDES SCH: 8.6 TABLET, FILM COATED ORAL at 23:41

## 2020-01-13 RX ADMIN — OXYCODONE HYDROCHLORIDE AND ACETAMINOPHEN SCH MG: 500 TABLET ORAL at 23:41

## 2020-01-13 RX ADMIN — POLYETHYLENE GLYCOL 3350 SCH GM: 17 POWDER, FOR SOLUTION ORAL at 18:50

## 2020-01-13 RX ADMIN — HEPARIN SODIUM SCH UNIT: 5000 INJECTION, SOLUTION INTRAVENOUS; SUBCUTANEOUS at 23:41

## 2020-01-13 NOTE — HP
64 M h/o paraplegia, COPD, R lung ca w/o chemo/RT/surgery, neurogenic bladder 

with chronic UTIs and chronic interstitial cystitis, recurrent admissions for 

UTIs and chronic cystitis presents to ER with complaints of difficulty voiding 

when trying to self catheterize. Patient recently DC yesterday with 1 week 

course of abx for UTI which was DCed by ID service. Urology was consulted whom 

recommended SPT in which patient refused. Patient with starr placed currently, 

draining clear yellow urine, UA showing leuk est. 3+ which was grossly 

unchanged from prior, with decrease in WBC count. Patient denies fever/chills/

back pain/SOB/CP/LOC/dizziness, endorses feeling "much better" after 

catherization.  





PE


VSS


GA bed bound, AAox3, speaks in full sentences


HEENT NC/AT, EOMI, neck supple, dry MM, no JVD


Chest CTAB, no wheezing or crackles


CVS S1, S2+ RRR


Abd Soft, NT, ND, BS+


 starr catheter placed, no SP tenderness


Ext paraplegic, atrophic lower extremities, contracted LE





Allergies





vancomycin Allergy (Verified 01/13/20 05:45)


 








HOME MEDICATIONS:


 Home Medications











 Medication  Instructions  Recorded


 


Diazepam [Valium] 10 mg PO TID PRN  tablet MDD 30mg 10/10/18


 


Morphine 10 mg/5 ml Liquid 20 mg PO Q4H MDD 60mg 10/07/19





[Morphine 10 mg/5 mL Liquid -]  


 


Ascorbic Acid [Vitamin C -] 1,000 mg PO BID 30 Days #120 tablet 01/10/20


 


Gabapentin [Neurontin -] 300 mg PO HS 30 Days #30 capsule 01/10/20


 


Methenamine Hippurate [Hiprex [Nf] 1 gm PO BID 30 Days #60 tablet 01/10/20





-]  


 


Tamsulosin HCl [Flomax -] 0.4 mg PO DAILY@0830 30 Days #30 01/10/20





 cap.er.24h 








REVIEW OF SYSTEMS


CONSTITUTIONAL: 


Absent:  fever, chills, diaphoresis, generalized weakness, malaise, loss of 

appetite, weight change


HEENT: 


Absent:  rhinorrhea, nasal congestion, throat pain, throat swelling, difficulty 

swallowing, mouth swelling, ear pain, eye pain, visual changes


CARDIOVASCULAR: 


Absent: chest pain, syncope, palpitations, irregular heart rate, lightheadedness

, peripheral edema


RESPIRATORY: 


Absent: cough, shortness of breath, dyspnea with exertion, orthopnea, wheezing, 

stridor, hemoptysis


GASTROINTESTINAL:


Absent: abdominal pain, abdominal distension, nausea, vomiting, diarrhea, 

constipation, melena, hematochezia


GENITOURINARY: 


Absent: dysuria, frequency, urgency, hesitancy, hematuria, flank pain, genital 

pain


MUSCULOSKELETAL: 


Absent: myalgia, arthralgia, joint swelling, back pain, neck pain


SKIN: 


Absent: rash, itching, pallor


HEMATOLOGIC/IMMUNOLOGIC: 


Absent: easy bleeding, easy bruising, lymphadenopathy, frequent infections


ENDOCRINE:


Absent: unexplained weight gain, unexplained weight loss, heat intolerance, 

cold intolerance


NEUROLOGIC: 


Absent: headache, focal weakness or paresthesias, dizziness, unsteady gait, 

seizure, mental status changes, bladder or bowel incontinence


PSYCHIATRIC: 


Absent: anxiety, depression, suicidal or homicidal ideation, hallucinations.








 Vital Signs - 24 hr











  01/13/20





  05:43


 


Temperature 98.3 F


 


Pulse Rate 91 H


 


Respiratory 15





Rate 


 


Blood Pressure 113/59 L


 


O2 Sat by Pulse 98





Oximetry (%) 














 Laboratory Results - last 24 hr











  01/13/20 01/13/20 01/13/20





  08:30 08:30 11:50


 


WBC  14.0 H  


 


RBC  4.29  


 


Hgb  12.9  


 


Hct  37.7  


 


MCV  87.9  


 


MCH  30.0  


 


MCHC  34.2  


 


RDW  17.0 H  


 


Plt Count  622 H D  


 


MPV  9.2  


 


Absolute Neuts (auto)  11.3 H  


 


Neutrophils %  80.7  D  


 


Neutrophils % (Manual)  75.0  


 


Band Neutrophils %  0.0  


 


Lymphocytes %  7.7 L D  


 


Lymphocytes % (Manual)  8.0  D  


 


Monocytes %  8.2  


 


Monocytes % (Manual)  7  D  


 


Eosinophils %  3.3  


 


Eosinophils % (Manual)  4.0  D  


 


Basophils %  0.1  


 


Basophils % (Manual)  0.0  


 


Myelocytes % (Man)  0  D  


 


Promyelocytes % (Man)  0  


 


Blast Cells % (Manual)  0  


 


Nucleated RBC %  0  


 


Metamyelocytes  0  D  


 


Platelet Estimate  Increased  


 


Polychromasia  1+  


 


Anisocytosis  1+  


 


Macrocytosis  1+  


 


Sodium   135 L 


 


Potassium   4.7 


 


Chloride   102 


 


Carbon Dioxide   29 


 


Anion Gap   5 L 


 


BUN   9.1 


 


Creatinine   0.5 L 


 


Est GFR (CKD-EPI)AfAm   130.88 


 


Est GFR (CKD-EPI)NonAf   112.92 


 


Random Glucose   91 


 


Calcium   9.0 


 


Total Bilirubin   0.2 


 


AST   22 


 


ALT   17 


 


Alkaline Phosphatase   102 


 


Total Protein   7.0 


 


Albumin   2.7 L 


 


Urine Color    Yellow


 


Urine Appearance    Clear


 


Urine pH    8.0  D


 


Ur Specific Gravity    1.009 L


 


Urine Protein    Trace


 


Urine Glucose (UA)    Negative


 


Urine Ketones    Negative


 


Urine Blood    Trace


 


Urine Nitrite    Negative


 


Urine Bilirubin    Negative


 


Urine Urobilinogen    0.2


 


Ur Leukocyte Esterase    3+ H


 


Urine WBC (Auto)    45


 


Urine RBC (Auto)    2


 


Urine Casts (Auto)    26


 


U Epithel Cells (Auto)    0.8


 


Urine Bacteria (Auto)    685.9








A/P:





64 M bed-bound, paraplegic, neurogenic bladder with urinary strictures and 

chronic cystitis and multiple admissions for UTI and chronic cystitis with 

urinary retention, admitted for urinary retention requiring starr placement and 

difficulty voiding w/ self-cath, refused SPT on last admission. No signs of new 

UTI, urine showing chronic inflammatory changes. 





Urinary retention


likely 2/2 urinary strictures, patient requires SPT but refusing, multiple 

admissions for the same, no signs of sepsis or active infection, just finished 

full antibiotic course yesterday, will hold off abx for now and treat symptoms


Start Hiprex 1gm BID, Vit C 1gm BID 


Consult Urology: Dr. Blake





DVT ppx: Heparin SC


Amdit to Med-Surg














Visit type





- Emergency Visit


Emergency Visit: Yes


ED Registration Date: 01/13/20


Care time: The patient presented to the Emergency Department on the above date 

and was hospitalized for further evaluation of their emergent condition.





- New Patient


This patient is new to me today: Yes


Date on this admission: 01/13/20





- Critical Care


Critical Care patient: No

## 2020-01-13 NOTE — PDOC
Attending Attestation





- Resident


Resident Name: Usman Mortensen





- ED Attending Attestation


I have performed the following: I have examined & evaluated the patient, The 

case was reviewed & discussed with the resident (w), I agree w/resident's 

findings & plan, Exceptions are as noted





- HPI


HPI: 





01/13/20 17:25


65 yo M w a hx of COPD, Right lung adenocarcinoma (no chemo/rads/ surgery), 

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 

bladder, recurrent UTIs, and frequent hypotension who was discharged from the 

hospital yesterday afternoon with a starr in place who returns to the ER with 

lower abdominal pain





- Physicial Exam


PE: 





01/13/20 17:25





Vitals: Triage Vital signs reviewed


General Appearance: No acute distress, well nourished well developed, 


Head: Atraumatic, 


Regular rate and rhythym, no murmurs, no rubs, no gallops, 


Lungs: Clear to auscultation bilateral, good air movement bilaterally,


Abdomen: Soft, non distended, normal bowel sounds suprapubic tenderness to 

palpation





- Medical Decision Making





01/13/20 17:27


64 years old CAT scan demonstrates cystitis new elevated white count will 

observe overnight for IV antibiotics and further management for cystitis/

complicated UTI

## 2020-01-13 NOTE — PDOC
History of Present Illness





- General


Chief Complaint: Urinary Problem


Stated Complaint: PAIN


Time Seen by Provider: 01/13/20 07:13


History Source: Patient


Exam Limitations: No Limitations





- History of Present Illness


Initial Comments: 








Last Sumner is a 65 yo M w a hx of COPD, Right lung adenocarcinoma (no chemo/

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

, neurogenic bladder, recurrent UTIs, and frequent hypotension who was 

discharged from the hospital yesterday afternoon with a starr in place who 

returns to the ER with lower abdominal pain stating he believes his bladder is 

distended and not draining properly. He is here primarily because he has a 

burning pain which is not the same pain as his normal UTI pain. 





During the patient's prior admission he was treated with Meropenem which was 

then discontinued. He was seen by Dr. Villalta who recommended starting the 

patient on Tamsulosin and doing a TOV after removing starr. The patient 

retained urine despite multiple TOVs. He has a FU appt w Dr. Villalta's office on 

1/24. 





Seen by hematology/oncology while admitted for adenocarcinoma of the lung, 

patient not a candidant for immunotherapy or EGFR inhibitor, poor candidate for 

chemo. Patient denied wanting treatment at this time. Recommended to follow up 

with Dr. Lyon as outpatient if he would like further options regarding 

treatment.


 


Uro: Dr. Villalta


PSH: Multiple orthopedic sx


Social Hx: Former heavy smoker. 2PPD over 10-20 years but quit many years ago


Allergies: Vacomycin


PCP: Dr. Jordan Poon











Past History





- Past Medical History


Allergies/Adverse Reactions: 


 Allergies











Allergy/AdvReac Type Severity Reaction Status Date / Time


 


vancomycin Allergy   Verified 01/13/20 05:45











Home Medications: 


Ambulatory Orders





Diazepam [Valium] 10 mg PO TID PRN  tablet MDD 30mg 10/10/18 


Morphine 10 mg/5 ml Liquid [Morphine 10 mg/5 mL Liquid -] 20 mg PO Q4H MDD 60mg 

10/07/19 


Ascorbic Acid [Vitamin C -] 1,000 mg PO BID 30 Days #120 tablet 01/10/20 


Gabapentin [Neurontin -] 300 mg PO HS 30 Days #30 capsule 01/10/20 


Methenamine Hippurate [Hiprex [Nf] -] 1 gm PO BID 30 Days #60 tablet 01/10/20 


Tamsulosin HCl [Flomax -] 0.4 mg PO DAILY@0830 30 Days #30 cap.er.24h 01/10/20 








Anemia: No


Asthma: No


Cancer: No


Cardiac Disorders: No


CVA: No


COPD: Yes


CHF: No


Dementia: No


Diabetes: No


GI Disorders: No


 Disorders: Yes (Recurrent UTI, CONDOM CATHETER)


HTN:  (Hypotension)


Hypercholesterolemia: No


Liver Disease: No


Seizures: No


Thyroid Disease: No





- Surgical History


Abdominal Surgery: No


Appendectomy: No


Cardiac Surgery: No


Cholecystectomy: No


Lung Surgery: No


Neurologic Surgery: No


Orthopedic Surgery: Yes (fracture right wrist s/p fusion)





- Immunization History


Td Vaccination: No


Immunization Up to Date: No





- Psycho Social/Smoking Cessation Hx


Smoking Status: No


Smoking History: Never smoked


Years of Tobacco Use: 0


Have you smoked in the past 12 months: No


Number of Cigarettes Smoked Daily: 3


If you are a former smoker, when did you quit?: 1986


Cigars Per Day: 0


Hx Alcohol Use: No


Drug/Substance Use Hx: No


Substance Use Type: None


Hx Substance Use Treatment: No





**Review of Systems





- Review of Systems


Able to Perform ROS?: Yes


Comments:: 








CONSTITUTIONAL:


Present: Chills


Absent: fever, no fatigue


EYES:


Absent: visual changes


ENT:


Absent: ear pain, no sore throat


CARDIOVASCULAR:


Absent: chest pain, no palpitations


RESPIRATORY:


Absent: cough, no SOB


GI:


Present: Abdominal pain, constipation


Absent: no nausea, no vomiting, no diarrhea


GENITOURINARY


Absent: dysuria, no frequency, no hematuria


MUSKULOSKELETAL:


Absent: back pain, no arthralgia, no myalgia


SKIN:


Absent: rash


NEURO:


Absent: headache











*Physical Exam





- Vital Signs


 Last Vital Signs











Temp Pulse Resp BP Pulse Ox


 


 98.3 F   91 H  15   113/59 L  98 


 


 01/13/20 05:43  01/13/20 05:43  01/13/20 05:43  01/13/20 05:43  01/13/20 05:43














- Physical Exam








GENERAL:


Bed bound, cold, unhappy. Mild apparent distress.


HEENT:


Normocephalic, atraumatic. PERRL, EOM intact.


CARDIOVASCULAR:


Normal S1, S2. Regular rate and rhythm.


PULMONARY:


No evidence of respiratory distress. Lungs clear to auscultation bilaterally. 

No wheezing, rales or rhonchi.


ABDOMEN:


There is mild lower/suprapubic abdominal TTP. Normal bowel sounds. Soft, mildly-

distended.


EXTREMITIES:


Limited ROM in lower extremities 2/2 paraplegia. No gross deformities.


GENITOURINARY: 


There is a starr in place, no rashes


RECTAL: 


There are multiple soft external hemorhoids. They do not appear to be 

erythematous, swollen, painful or thrombosed


SKIN:


Warm, dry.  No rash


NEUROLOGICAL:


No focal neurological deficits aside from lower extremity paraplegia. 

















ED Treatment Course





- LABORATORY


CBC & Chemistry Diagram: 


 01/13/20 08:30





 01/13/20 08:30





- RADIOLOGY


Radiograph Interpretation: 





CTAP: EXAM#:     TYPE/EXAM: RESULT: 


[id number] CT/ABDOMEN   PELVIS CT WITH CONTR 


Abdomen and pelvis CT with intravenous contrast 


 


 Clinical information: lower abdominal pain; evaluate for perforation versus 

rectal hernia 


 


 Multiplanar imaging was performed following the intravenous administration of 

nonionic contrast. 


Enteric contrast was not administered. 


 


 No evidence of pneumoperitoneum, abscess, ascites or bowel obstruction. 


 


 In comparison to a prior CT exam of 6/6/2019 note is made of prominently 

increased diffuse urinary 


bladder wall thickening. There is been interval insertion of a urinary bladder 

catheter. 


 


 The appendix is not definitely identified however no indirect CT signs of 

acute appendicitis is 


seen. There is no CT evidence of acute diverticulitis or obvious acute colitis. 


 


 Note is made of mildly increased fluid within the colon. There is no definite 

associated colonic 


wall edema or pericolonic edema/fluid. No gross noncontrast small bowel 

pathology is identified. 


 


 There is somewhat increased rectosigmoid fecal retention which currently 

appears mild to moderate. 


 


 The remainder of the exam demonstrates no definite interval change. 


 


 Status post splenectomy. 


 


 The pancreatic tail appears to demonstrate mildly truncated appearance which 

may be postsurgical. 


 


 Multiple small stable hepatic cysts. 


 


 Mild stable extrahepatic biliary tract dilatation. 


 


 Stable 1 cm left adrenal nodule which is also unchanged in comparison to 8/20/ 2017 CT exam. 


 


 The gallbladder, right adrenal gland and kidneys demonstrate no discrete 

pathology. Small 


bilateral renal cortical cysts. No definite lymphadenopathy is noted on the 

basis of size criteria. 


 


 Complete occlusion of the infrarenal aorta and apparent complete occlusion of 

the iliac arteries 


bilaterally. 


 


 Mild prostate enlargement. 


 


 Diffuse osteoporosis. There is partial imaging of intramedullary rods within 

the femora 


bilaterally. 


 


 Diffuse body wall muscle atrophy at the level the abdomen and pelvis. 


 


 


 IMPRESSION: 


 


 No CT evidence of pneumoperitoneum. 


 


 There is no definite hernia. 


 


 In comparison to a CT exam of 6/6/2019 interval prominently increased diffuse 

urinary bladder wall 


thickening is noted suggestive of cystitis which may be acute and/or chronic. 

Correlate clinically. 


Interval insertion of a Starr catheter is noted. 


 


 Increased fluid is seen within the colon - ? current/recent diarrheal illness. 


 


 Mild to moderate rectosigmoid fecal retention. 


 


 The remainder of the abdomen/pelvis exam demonstrates no obvious interval 

change demonstrating 


multiple chronic findings as noted above. 


 


 The partially imaged lower chest demonstrates several bilateral spiculated 

pulmonary lesions as 


described in detail on prior chest CT exams. The patient is also status post 

lung biopsy performed 


on 10/22/2019. Correlate with pathology findings. 


 








Medical Decision Making





- Medical Decision Making





Last Sumner is a 65 yo M w a hx of COPD, Right lung adenocarcinoma (no chemo/

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

, neurogenic bladder, recurrent UTIs, and frequent hypotension who was 

discharged from the hospital yesterday afternoon with a starr in place who 

returns to the ER with lower abdominal pain stating he believes his bladder is 

distended and not draining properly. He is here primarily because he has a 

burning pain which is not the same pain as his normal UTI pain. 





Vital Signs











Temp Pulse Resp BP Pulse Ox


 


 98.3 F   91 H  15   113/59 L  98 


 


 01/13/20 05:43  01/13/20 05:43  01/13/20 05:43  01/13/20 05:43  01/13/20 05:43











DDx IBNLT: Recurrent UTI, bladder perforation, peritonitis, rectal prolapse, 

thrombosed hemorrhoid, rectal hernia





Plan: Labs, Urine, POCUS bladder, CTAP, analgesia, re-assess. 





Labs: Leukocytosis w/ left shift


Urine: Appears to be newly infected





CTAP: prominently increased diffuse urinary bladder wall thickening. Note is 

made of mildly increased fluid within the colon. There is somewhat increased 

rectosigmoid fecal retention which currently appears mild to moderate. Complete 

occlusion of the infrarenal aorta and apparent complete occlusion of the iliac 

arteries bilaterally. 





Re-assessment: Patient continuously endorses lower abdominal pain and requests 

pain meds to alleviate his symptoms. 





Disposition: Admit to med/surg for persistent UTI











Discharge





- Discharge Information


Problems reviewed: Yes


Clinical Impression/Diagnosis: 


UTI (urinary tract infection)


Qualifiers:


 Urinary tract infection type: acute cystitis Hematuria presence: without 

hematuria Qualified Code(s): N30.00 - Acute cystitis without hematuria





Condition: Stable





- Admission


Yes





- Follow up/Referral





- Patient Discharge Instructions





- Post Discharge Activity

## 2020-01-14 RX ADMIN — HEPARIN SODIUM SCH UNIT: 5000 INJECTION, SOLUTION INTRAVENOUS; SUBCUTANEOUS at 21:26

## 2020-01-14 RX ADMIN — POLYETHYLENE GLYCOL 3350 SCH: 17 POWDER, FOR SOLUTION ORAL at 09:49

## 2020-01-14 RX ADMIN — GABAPENTIN SCH MG: 300 CAPSULE ORAL at 21:25

## 2020-01-14 RX ADMIN — SENNOSIDES SCH TAB: 8.6 TABLET, FILM COATED ORAL at 21:26

## 2020-01-14 RX ADMIN — HEPARIN SODIUM SCH UNIT: 5000 INJECTION, SOLUTION INTRAVENOUS; SUBCUTANEOUS at 14:18

## 2020-01-14 RX ADMIN — MORPHINE SULFATE PRN MG: 10 SOLUTION ORAL at 16:35

## 2020-01-14 RX ADMIN — OXYCODONE HYDROCHLORIDE AND ACETAMINOPHEN SCH MG: 500 TABLET ORAL at 21:25

## 2020-01-14 RX ADMIN — OXYCODONE HYDROCHLORIDE AND ACETAMINOPHEN SCH MG: 500 TABLET ORAL at 09:49

## 2020-01-14 RX ADMIN — MORPHINE SULFATE PRN MG: 10 SOLUTION ORAL at 21:26

## 2020-01-14 RX ADMIN — HEPARIN SODIUM SCH UNIT: 5000 INJECTION, SOLUTION INTRAVENOUS; SUBCUTANEOUS at 06:04

## 2020-01-14 NOTE — PN
Teaching Attending Note


Name of Resident: Gwen Peace





ATTENDING PHYSICIAN STATEMENT





I saw and evaluated the patient.


I reviewed the resident's note and discussed the case with the resident.


I agree with the resident's findings and plan as documented.








SUBJECTIVE: Feels well - no complains. No fever/chills. No nausea/vomiting








OBJECTIVE: Afebrile, Hemodynamically Stable.





 Last Vital Signs











Temp Pulse Resp BP Pulse Ox


 


 98.3 F   85   20   94/57 L  96 


 


 01/14/20 15:22  01/14/20 15:22  01/14/20 15:22  01/14/20 15:22  01/14/20 08:30








HEENT - Atramatic


Heart -S1, S2, RRR


Lungs - clear to auscultation


Abdomen - Soft, non-tender. Bowel Sounds normal.


Extremities - no calf tenderness, wasting, contractures.





 Current Medications











Generic Name Dose Route Start Last Admin





  Trade Name Freq  PRN Reason Stop Dose Admin


 


Acetaminophen  650 mg  01/14/20 07:46  





  Tylenol -  PO   





  Q6H PRN   





  MODERATE PAIN   





     





     





     


 


Ascorbic Acid  1,000 mg  01/13/20 22:00  01/14/20 09:49





  Vitamin C -  PO   1,000 mg





  BID ALE   Administration





     





     





     





     


 


Docusate Sodium  100 mg  01/13/20 15:24  





  Colace Liquid -  PO   





  DAILY PRN   





  CONSTIPATION   





     





     





     


 


Gabapentin  300 mg  01/14/20 22:00  





  Neurontin -  PO   





  HS ALE   





     





     





     





     


 


Heparin Sodium (Porcine)  5,000 unit  01/13/20 22:00  01/14/20 14:18





  Heparin -  SQ   5,000 unit





  TID ALE   Administration





     





     





     





     


 


Morphine Sulfate  20 mg  01/14/20 08:00  01/14/20 16:35





  Morphine 10 Mg/5 Ml Liquid  PO   20 mg





  Q4H PRN   Administration





  PAIN LEVEL 1-5   





     





     





     


 


Polyethylene Glycol  17 gm  01/13/20 15:30  01/14/20 09:49





  Miralax (For Daily Use) -  PO   Not Given





  DAILY ALE   





     





     





     





     


 


Senna  1 tab  01/13/20 22:00  01/13/20 23:41





  Senna -  PO   Not Given





  [facility]   





     





     





     





     








 Home Medications











 Medication  Instructions  Recorded


 


Diazepam [Valium] 10 mg PO TID PRN  tablet MDD 30mg 10/10/18


 


Morphine 10 mg/5 ml Liquid 20 mg PO Q4H MDD 60mg 10/07/19





[Morphine 10 mg/5 mL Liquid -]  


 


Ascorbic Acid [Vitamin C -] 1,000 mg PO BID 30 Days #120 tablet 01/10/20


 


Gabapentin [Neurontin -] 300 mg PO HS 30 Days #30 capsule 01/10/20


 


Methenamine Hippurate [Hiprex [Nf] 1 gm PO BID 30 Days #60 tablet 01/10/20





-]  


 


Tamsulosin HCl [Flomax -] 0.4 mg PO DAILY@0830 30 Days #30 01/10/20





 cap.er.24h 

















ASSESSMENT AND PLAN:





64 year old male with history of COPD, R lung Adenocarcinoma (no CTx/RTx/Surgery

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 

(intermittent self catheterization), Recurrent UTIs, recent admissions at [facility] s

/p left leg distal tibial fracture (d/joanne 10/11/19), and for Urinar retention (d

/joanne 1/12/20 with starr in situ), returns with reported abdominal pain, now 

resolved. 





1. UTI/Cystitis


CT A/P  - Diffuse bladder wall thickening suggestive of cystitis.


Urine Cx - GpD strep or Enterococcus > 100,000 CFU/ml


Started on Zosyn


ID eval.


Afebrile, hemodynamically stable.


Outpatient Urology follow up with Dr. Mejia.





2. RUL Adenocarcinoma, not on treatment - for out-patient Oncology follow up. 

Persisting leukocytosis, chronic, likely sec to underlying Ca.





3. COPD - stable. No evidence of decompensation.





4. Paraplegia sec to cord transection with Neurogenic Bladder - on Gabapentin.





5. Chronic constipation - on Senna, Miralax, manual disimpaction PRN.





DVT Px - Heparin SQ.

## 2020-01-14 NOTE — PN
Physical Exam: 


SUBJECTIVE: Patient seen and examined. Patient denies current abdominal pain, 

fever, or chills. Patient AAOx3 but seems confused regarding the fact that he 

was discharged home for one day. Unclear as to what prompted him to return to 

the hospital. 








OBJECTIVE:





 Vital Signs











 Period  Temp  Pulse  Resp  BP Sys/Malone  Pulse Ox


 


 Last 24 Hr  97.3 F-98.6 F    18-20  /44-68  94-96











GENERAL: The patient is awake, alert, and fully oriented, in no acute distress.


HEAD: Normal with no signs of trauma.


EYES: EOMI, no scleral icterus, no ptosis 


ENT: moist mucous membranes, oropharynx without exudate 


NECK: Trachea midline, full range of motion, supple. 


LUNGS: clear to auscultation bilaterally, no wheezes, no crackles, no accessory 

muscle use. 


HEART: Regular rate and rhythm, S1, S2 without murmur, rub or gallop.


ABDOMEN: Soft, nondistended, normoactive bowel sounds, no guarding, no rebound, 

no hepatosplenomegaly, no masses. 


: starr in place 


EXTREMITIES: 2+ pulses, warm, well-perfused. Right wrist fused. emaciated lower 

extremities. 


NEUROLOGICAL: Normal speech, gait not observed. AAOx3. sensation intact 

throughout 


PSYCH: Normal mood, normal affect.


SKIN: Warm, dry, normal turgor, no rashes or lesions noted











Active Medications











Generic Name Dose Route Start Last Admin





  Trade Name Freq  PRN Reason Stop Dose Admin


 


Acetaminophen  650 mg  01/14/20 07:46  





  Tylenol -  PO   





  Q6H PRN   





  MODERATE PAIN   





     





     





     


 


Ascorbic Acid  1,000 mg  01/13/20 22:00  01/14/20 09:49





  Vitamin C -  PO   1,000 mg





  BID ALE   Administration





     





     





     





     


 


Docusate Sodium  100 mg  01/13/20 15:24  





  Colace Liquid -  PO   





  DAILY PRN   





  CONSTIPATION   





     





     





     


 


Gabapentin  300 mg  01/14/20 22:00  





  Neurontin -  PO   





  HS ALE   





     





     





     





     


 


Heparin Sodium (Porcine)  5,000 unit  01/13/20 22:00  01/14/20 06:04





  Heparin -  SQ   5,000 unit





  TID ALE   Administration





     





     





     





     


 


Metoprolol Succinate  25 mg  01/14/20 10:00  01/14/20 09:49





  Toprol Xl -  PO   25 mg





  DAILY ALE   Administration





     





     





     





     


 


Morphine Sulfate  20 mg  01/14/20 08:00  





  Morphine 10 Mg/5 Ml Liquid  PO   





  Q4H PRN   





  PAIN LEVEL 1-5   





     





     





     


 


Polyethylene Glycol  17 gm  01/13/20 15:30  01/14/20 09:49





  Miralax (For Daily Use) -  PO   Not Given





  DAILY ALE   





     





     





     





     


 


Senna  1 tab  01/13/20 22:00  01/13/20 23:41





  Senna -  PO   Not Given





  HS ALE   





     





     





     





     











ASSESSMENT/PLAN:


63 y/o/m with a PMHx of COPD, Right lung adenocarcinoma (no chemo/rads/ surgery)

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 

bladder, recurrent UTIs, hypotension. Patient was discharged home on 1/12 with 

starr in place and follow up appointment with Dr. Villalta on 1/24 but returned 

to ED due to abd pain and burning pain. 





#UTI


- UA with increased bacteria count compared to UA on previous admission


- Urine culture growing Group D Strep or Entero Coccus


- ID consulted (Dr. Man)


- Zosyn x1 given, further abx per ID


- WBC at 14, patient afebrile, continue to monitor


- F/u Blood cultures


- Continue Vitamin C 1000mg BID





#Paraplegia


- Continue Gabapentin 300mg HS for worsening neuropathy 





#COPD


- Current not in exacerbation


- Not on any COPD medications at home 





#Adenocarcinoma right upper lung lobe


- Out-patient hematology/oncology follow up recommended 


- Heme/onc consulted on previous admission


   - not a candidate for immunotherapy or EGFR inhibitor. poor candidate for 

chemo


   - patient states he does not want treatment for lung mass


   - no need for flow studies





#FEN


- no IV fluids indicated


- monitor and replete lytes as needed


- regular diet





#Prophylaxis


- Heparin





#Disposition


- ID consulted for positive urine culture, D/C in the next few days pending abx 

recommendations 








Visit type





- Emergency Visit


Emergency Visit: Yes


ED Registration Date: 01/14/20


Care time: The patient presented to the Emergency Department on the above date 

and was hospitalized for further evaluation of their emergent condition.





- New Patient


This patient is new to me today: Yes


Date on this admission: 01/14/20





- Critical Care


Critical Care patient: No





ATTENDING PHYSICIAN STATEMENT





I saw and evaluated the patient.


I reviewed the resident's note and discussed the case with the resident.


I agree with the resident's findings and plan as documented.








SUBJECTIVE:








OBJECTIVE:








ASSESSMENT AND PLAN:

## 2020-01-14 NOTE — CON.ID
Consult


Consult Specialty:: infectious diseases


Referred by:: hospitalist


Reason for Consultation:: uti





- History of Present Illness


Chief Complaint: abd pain


History of Present Illness: 





this patient well known to me who was admitted with abd pain on last admission 

and had recently was discharged


came to the hospital with abd pain again


patient this time was worked  up and found to have uti 


abd pain 


denies fevers or any other issues





- History Source


History Provided By: Patient


Limitations to Obtaining History: No Limitations





- Past Medical History


CNS: Yes: Other (paraplegia due to accidental fall (T2 spinal fracture))


Gastrointestinal: Yes: Other (Hepatic hemangiomas)


Renal/: Yes: Other (Bladder dysfunction)


Infectious Disease: Yes: Other (multiple UTIs  Pseudomonas)


Musculoskeletal: Yes: Paraplegia, Other (Chronic pain)





- Past Surgical History


Past Surgical History: Yes: Splenectomy





- Alcohol/Substance Use


Hx Alcohol Use: No


History of Substance Use: reports: Marijuana





- Smoking History


Smoking history: Never smoked


Have you smoked in the past 12 months: No


Aproximately how many cigarettes per day: 3


If you are a former smoker, when did you quit?: 1986





- Social History


Usual Living Arrangement: With Significant Other


ADL: Support Services ([facility] (24hr))


History of Recent Travel: No





Home Medications





- Allergies


Allergies/Adverse Reactions: 


 Allergies











Allergy/AdvReac Type Severity Reaction Status Date / Time


 


vancomycin Allergy   Verified 01/13/20 05:45














- Home Medications


Home Medications: 


Ambulatory Orders





Diazepam [Valium] 10 mg PO TID PRN  tablet MDD 30mg 10/10/18 


Morphine 10 mg/5 ml Liquid [Morphine 10 mg/5 mL Liquid -] 20 mg PO Q4H MDD 60mg 

10/07/19 


Ascorbic Acid [Vitamin C -] 1,000 mg PO BID 30 Days #120 tablet 01/10/20 


Gabapentin [Neurontin -] 300 mg PO HS 30 Days #30 capsule 01/10/20 


Methenamine Hippurate [Hiprex [Nf] -] 1 gm PO BID 30 Days #60 tablet 01/10/20 


Tamsulosin HCl [Flomax -] 0.4 mg PO DAILY@0830 30 Days #30 cap.er.24h 01/10/20 











Review of Systems





- Review of Systems


Constitutional: reports: No Symptoms


Eyes: reports: No Symptoms


HENT: reports: No Symptoms


Neck: reports: No Symptoms


Cardiovascular: reports: No Symptoms


Respiratory: reports: No Symptoms


Gastrointestinal: reports: Abdominal Pain


Genitourinary: reports: No Symptoms


Musculoskeletal: reports: No Symptoms


Integumentary: reports: No Symptoms


Neurological: reports: No Symptoms


Endocrine: reports: No Symptoms


Hematology/Lymphatic: reports: No Symptoms


Psychiatric: reports: No Symptoms





Physical Exam


Vital Signs: 


 Vital Signs











Temperature  98.3 F   01/14/20 15:22


 


Pulse Rate  85   01/14/20 15:22


 


Respiratory Rate  20   01/14/20 15:22


 


Blood Pressure  94/57 L  01/14/20 15:22


 


O2 Sat by Pulse Oximetry (%)  96   01/14/20 08:30











Constitutional: Yes: Well Nourished, No Distress, Calm


Cardiovascular: Yes: Regular Rate and Rhythm


Respiratory: Yes: Regular, CTA Bilaterally


Gastrointestinal: Yes: Normal Bowel Sounds, Soft


Musculoskeletal: Yes: WNL


Extremities: Yes: Other (contracted)


Neurological: Yes: Alert, Oriented


Psychiatric: Yes: Alert, Oriented


Labs: 


 CBC, BMP





 01/13/20 08:30 





 01/13/20 08:30 











Imaging





- Results


Cat Scan: Report Reviewed, Image Reviewed





Assessment/Plan





Problem List





- Problems


(1) UTI (urinary tract infection)


Code(s): N39.0 - URINARY TRACT INFECTION, SITE NOT SPECIFIED   


Qualifiers: 


   Urinary tract infection type: site unspecified   Hematuria presence: without 

hematuria   Qualified Code(s): N39.0 - Urinary tract infection, site not 

specified   





(2) Adenocarcinoma of right lung


Code(s): C34.91 - MALIGNANT NEOPLASM OF UNSP PART OF RIGHT BRONCHUS OR LUNG   





(3) Neurogenic bladder


Code(s): N31.9 - NEUROMUSCULAR DYSFUNCTION OF BLADDER, UNSPECIFIED   





(4) Paraplegia


Code(s): G82.20 - PARAPLEGIA, UNSPECIFIED   





(5) Leukocytosis


Code(s): D72.829 - ELEVATED WHITE BLOOD CELL COUNT, UNSPECIFIED   





(6) Tibia/fibula fracture


Code(s): S82.209A - UNSP FRACTURE OF SHAFT OF UNSP TIBIA, INIT FOR CLOS FX; 

S82.409A - UNSP FRACTURE OF SHAFT OF UNSP FIBULA, INIT FOR CLOS FX   


Qualifiers: 


   Encounter type: initial encounter   Fracture type: closed   Laterality: left

   Qualified Code(s): S82.202A - Unspecified fracture of shaft of left tibia, 

initial encounter for closed fracture; S82.402A - Unspecified fracture of shaft 

of left fibula, initial encounter for closed fracture   





plan


will start on abx


monitor


await for identification


rest as per the team

## 2020-01-15 LAB
ALBUMIN SERPL-MCNC: 2.5 G/DL (ref 3.4–5)
ALP SERPL-CCNC: 86 U/L (ref 45–117)
ALT SERPL-CCNC: 18 U/L (ref 13–61)
ANION GAP SERPL CALC-SCNC: 6 MMOL/L (ref 8–16)
AST SERPL-CCNC: 14 U/L (ref 15–37)
BASOPHILS # BLD: 1.6 % (ref 0–2)
BILIRUB SERPL-MCNC: 0.3 MG/DL (ref 0.2–1)
BUN SERPL-MCNC: 8.6 MG/DL (ref 7–18)
CALCIUM SERPL-MCNC: 8.3 MG/DL (ref 8.5–10.1)
CHLORIDE SERPL-SCNC: 102 MMOL/L (ref 98–107)
CO2 SERPL-SCNC: 27 MMOL/L (ref 21–32)
CREAT SERPL-MCNC: 0.6 MG/DL (ref 0.55–1.3)
DEPRECATED RDW RBC AUTO: 17.1 % (ref 11.9–15.9)
EOSINOPHIL # BLD: 7.8 % (ref 0–4.5)
GLUCOSE SERPL-MCNC: 92 MG/DL (ref 74–106)
HCT VFR BLD CALC: 36.3 % (ref 35.4–49)
HGB BLD-MCNC: 12.1 GM/DL (ref 11.7–16.9)
LYMPHOCYTES # BLD: 18.2 % (ref 8–40)
MCH RBC QN AUTO: 29.8 PG (ref 25.7–33.7)
MCHC RBC AUTO-ENTMCNC: 33.3 G/DL (ref 32–35.9)
MCV RBC: 89.5 FL (ref 80–96)
MONOCYTES # BLD AUTO: 9.9 % (ref 3.8–10.2)
NEUTROPHILS # BLD: 62.5 % (ref 42.8–82.8)
PLATELET # BLD AUTO: 593 K/MM3 (ref 134–434)
PMV BLD: 9.1 FL (ref 7.5–11.1)
POTASSIUM SERPLBLD-SCNC: 3.7 MMOL/L (ref 3.5–5.1)
PROT SERPL-MCNC: 6.2 G/DL (ref 6.4–8.2)
RBC # BLD AUTO: 4.05 M/MM3 (ref 4–5.6)
SODIUM SERPL-SCNC: 135 MMOL/L (ref 136–145)
WBC # BLD AUTO: 10.7 K/MM3 (ref 4–10)

## 2020-01-15 RX ADMIN — MORPHINE SULFATE PRN MG: 10 SOLUTION ORAL at 06:13

## 2020-01-15 RX ADMIN — GABAPENTIN SCH MG: 300 CAPSULE ORAL at 21:45

## 2020-01-15 RX ADMIN — PIPERACILLIN SODIUM,TAZOBACTAM SODIUM SCH MLS/HR: 3; .375 INJECTION, POWDER, FOR SOLUTION INTRAVENOUS at 11:18

## 2020-01-15 RX ADMIN — OXYCODONE HYDROCHLORIDE AND ACETAMINOPHEN SCH MG: 500 TABLET ORAL at 21:45

## 2020-01-15 RX ADMIN — HEPARIN SODIUM SCH UNIT: 5000 INJECTION, SOLUTION INTRAVENOUS; SUBCUTANEOUS at 05:09

## 2020-01-15 RX ADMIN — PIPERACILLIN SODIUM,TAZOBACTAM SODIUM SCH MLS/HR: 3; .375 INJECTION, POWDER, FOR SOLUTION INTRAVENOUS at 17:24

## 2020-01-15 RX ADMIN — SENNOSIDES SCH TAB: 8.6 TABLET, FILM COATED ORAL at 21:44

## 2020-01-15 RX ADMIN — MORPHINE SULFATE PRN MG: 10 SOLUTION ORAL at 01:45

## 2020-01-15 RX ADMIN — MORPHINE SULFATE PRN MG: 10 SOLUTION ORAL at 17:44

## 2020-01-15 RX ADMIN — HEPARIN SODIUM SCH UNIT: 5000 INJECTION, SOLUTION INTRAVENOUS; SUBCUTANEOUS at 21:45

## 2020-01-15 RX ADMIN — MORPHINE SULFATE PRN MG: 10 SOLUTION ORAL at 10:45

## 2020-01-15 RX ADMIN — POLYETHYLENE GLYCOL 3350 SCH: 17 POWDER, FOR SOLUTION ORAL at 09:25

## 2020-01-15 RX ADMIN — OXYCODONE HYDROCHLORIDE AND ACETAMINOPHEN SCH MG: 500 TABLET ORAL at 09:24

## 2020-01-15 RX ADMIN — PIPERACILLIN SODIUM,TAZOBACTAM SODIUM SCH MLS/HR: 3; .375 INJECTION, POWDER, FOR SOLUTION INTRAVENOUS at 01:44

## 2020-01-15 RX ADMIN — HEPARIN SODIUM SCH: 5000 INJECTION, SOLUTION INTRAVENOUS; SUBCUTANEOUS at 14:39

## 2020-01-15 NOTE — PN
Teaching Attending Note


Name of Resident: Gwen Peace





ATTENDING PHYSICIAN STATEMENT





I saw and evaluated the patient.


I reviewed the resident's note and discussed the case with the resident.


I agree with the resident's findings and plan as documented.








SUBJECTIVE: Feels well - no complains. No fever/chills. No nausea/vomiting








OBJECTIVE: Afebrile, Hemodynamically Stable.





 Last Vital Signs











Temp Pulse Resp BP Pulse Ox


 


 98 F   71   20   124/64   95 


 


 01/15/20 14:20  01/15/20 14:20  01/15/20 14:20  01/15/20 14:20  01/15/20 09:00











HEENT - Atraumatic


Heart - S1, S2, RRR


Lungs - clear to auscultation


Abdomen - Soft, non-tender. Bowel Sounds normal.


Extremities - no calf tenderness, wasting, contractures LEs.


 - clear urine in starr.





 Laboratory Results - last 24 hr











  01/15/20 01/15/20





  07:57 07:57


 


WBC  10.7 H 


 


RBC  4.05 


 


Hgb  12.1 


 


Hct  36.3 


 


MCV  89.5 


 


MCH  29.8 


 


MCHC  33.3 


 


RDW  17.1 H 


 


Plt Count  593 H 


 


MPV  9.1 


 


Absolute Neuts (auto)  6.7 


 


Neutrophils %  62.5  D 


 


Lymphocytes %  18.2  D 


 


Monocytes %  9.9 


 


Eosinophils %  7.8 H D 


 


Basophils %  1.6  D 


 


Nucleated RBC %  0 


 


Sodium   135 L


 


Potassium   3.7


 


Chloride   102


 


Carbon Dioxide   27


 


Anion Gap   6 L


 


BUN   8.6


 


Creatinine   0.6


 


Est GFR (CKD-EPI)AfAm   121.43


 


Est GFR (CKD-EPI)NonAf   104.77


 


Random Glucose   92


 


Calcium   8.3 L


 


Total Bilirubin   0.3


 


AST   14 L


 


ALT   18


 


Alkaline Phosphatase   86


 


Total Protein   6.2 L


 


Albumin   2.5 L








 Current Medications











Generic Name Dose Route Start Last Admin





  Trade Name Freq  PRN Reason Stop Dose Admin


 


Acetaminophen  650 mg  01/14/20 07:46  





  Tylenol -  PO   





  Q6H PRN   





  MODERATE PAIN   





     





     





     


 


Ascorbic Acid  1,000 mg  01/13/20 22:00  01/15/20 09:24





  Vitamin C -  PO   1,000 mg





  BID ALE   Administration





     





     





     





     


 


Docusate Sodium  100 mg  01/13/20 15:24  





  Colace Liquid -  PO   





  DAILY PRN   





  CONSTIPATION   





     





     





     


 


Gabapentin  300 mg  01/14/20 22:00  01/14/20 21:25





  Neurontin -  PO   300 mg





  HS ALE   Administration





     





     





     





     


 


Heparin Sodium (Porcine)  5,000 unit  01/13/20 22:00  01/15/20 14:39





  Heparin -  SQ   Not Given





  TID ALE   





     





     





     





     


 


Piperacillin Sod/Tazobactam  50 mls @ 100 mls/hr  01/15/20 02:00  01/15/20 11:18





  Sod 3.375 gm/ Dextrose  IVPB   100 mls/hr





  Q8H-IV ALE   Administration





     





     





  Protocol   





     


 


Morphine Sulfate  20 mg  01/14/20 08:00  01/15/20 06:13





  Morphine 10 Mg/5 Ml Liquid  PO   20 mg





  Q4H PRN   Administration





  PAIN LEVEL 1-5   





     





     





     


 


Polyethylene Glycol  17 gm  01/13/20 15:30  01/15/20 09:25





  Miralax (For Daily Use) -  PO   Not Given





  DAILY ALE   





     





     





     





     


 


Senna  1 tab  01/13/20 22:00  01/14/20 21:26





  Senna -  PO   1 tab





  HS ALE   Administration





     





     





     





     








 Home Medications











 Medication  Instructions  Recorded


 


Diazepam [Valium] 10 mg PO TID PRN  tablet MDD 30mg 10/10/18


 


Morphine 10 mg/5 ml Liquid 20 mg PO Q4H MDD 60mg 10/07/19





[Morphine 10 mg/5 mL Liquid -]  


 


Ascorbic Acid [Vitamin C -] 1,000 mg PO BID 30 Days #120 tablet 01/10/20


 


Gabapentin [Neurontin -] 300 mg PO HS 30 Days #30 capsule 01/10/20


 


Methenamine Hippurate [Hiprex [Nf] 1 gm PO BID 30 Days #60 tablet 01/10/20





-]  


 


Tamsulosin HCl [Flomax -] 0.4 mg PO DAILY@0830 30 Days #30 01/10/20





 cap.er.24h 

















ASSESSMENT AND PLAN:





64 year old male with history of COPD, R lung Adenocarcinoma (no CTx/RTx/Surgery

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 

(intermittent self catheterization), Recurrent UTIs, recent admissions at [facility] s

/p left leg distal tibial fracture (d/joanne 10/11/19), and for Urinar retention (d

/joanne 1/12/20 with starr in situ), returns with reported abdominal pain, now 

resolved. 





1. Acute UTI/Cystitis, catheter associated


CT A/P  - Diffuse bladder wall thickening suggestive of cystitis.


Urine Cx - GpD strep or Enterococcus > 100,000 CFU/ml


Started on Zosyn


ID evaluated. Awaiting final ID and sensitivities.


Afebrile. BP borderline, improved with IV hydration. 


Outpatient Urology follow up with Dr. Mejia.





2. RUL Adenocarcinoma, not on treatment - for out-patient Oncology follow up. 

Persisting leukocytosis, chronic, likely sec to underlying Ca.





3. COPD - stable. No evidence of decompensation.





4. Paraplegia sec to cord transection with Neurogenic Bladder - on Gabapentin. 

Starr to remain in situ pending  outpatient follow up. 





5. Chronic constipation due to bowel dysfunction sec to cord transection, 

chronic opiates - on Senna, Miralax, manual disimpaction PRN.





DVT Px - Heparin SQ.

## 2020-01-15 NOTE — PN
Physical Exam: 


SUBJECTIVE: Patient seen and examined. No acute events overnight. As per nurse 

patient keeps touching his starr. Educated patient on importance of not 

touching starr and keep it clean. Denies chest pain, abd pain, SOB, fever, 

chills. 








OBJECTIVE:





 Vital Signs











 Period  Temp  Pulse  Resp  BP Sys/Malone  Pulse Ox


 


 Last 24 Hr  97.3 F-98.3 F  58-85  18-20  /43-64  95-96











GENERAL: The patient is awake, alert, and fully oriented, in no acute distress.


HEAD: Normal with no signs of trauma.


EYES: EOMI, no scleral icterus, no ptosis 


ENT: moist mucous membranes 


NECK: Trachea midline, full range of motion, supple. 


LUNGS: clear to auscultation bilaterally, no wheezes, no crackles, no accessory 

muscle use. 


HEART: Regular rate and rhythm, S1, S2 without murmur, rub or gallop.


ABDOMEN: Soft, nondistended, normoactive bowel sounds, no guarding, no rebound, 

no hepatosplenomegaly, no masses


: starr in place 


EXTREMITIES: 2+ pulses, warm, well-perfused. Right wrist fused. emaciated lower 

extremities. 


NEUROLOGICAL: Normal speech, gait not observed. AAOx3. sensation intact 

throughout 


PSYCH: Normal mood, normal affect


SKIN: Warm, dry, normal turgor, no rashes or lesions noted








 Laboratory Results - last 24 hr











  01/15/20 01/15/20





  07:57 07:57


 


WBC  10.7 H 


 


RBC  4.05 


 


Hgb  12.1 


 


Hct  36.3 


 


MCV  89.5 


 


MCH  29.8 


 


MCHC  33.3 


 


RDW  17.1 H 


 


Plt Count  593 H 


 


MPV  9.1 


 


Absolute Neuts (auto)  6.7 


 


Neutrophils %  62.5  D 


 


Lymphocytes %  18.2  D 


 


Monocytes %  9.9 


 


Eosinophils %  7.8 H D 


 


Basophils %  1.6  D 


 


Nucleated RBC %  0 


 


Sodium   135 L


 


Potassium   3.7


 


Chloride   102


 


Carbon Dioxide   27


 


Anion Gap   6 L


 


BUN   8.6


 


Creatinine   0.6


 


Est GFR (CKD-EPI)AfAm   121.43


 


Est GFR (CKD-EPI)NonAf   104.77


 


Random Glucose   92


 


Calcium   8.3 L


 


Total Bilirubin   0.3


 


AST   14 L


 


ALT   18


 


Alkaline Phosphatase   86


 


Total Protein   6.2 L


 


Albumin   2.5 L








Active Medications











Generic Name Dose Route Start Last Admin





  Trade Name Freq  PRN Reason Stop Dose Admin


 


Acetaminophen  650 mg  01/14/20 07:46  





  Tylenol -  PO   





  Q6H PRN   





  MODERATE PAIN   





     





     





     


 


Ascorbic Acid  1,000 mg  01/13/20 22:00  01/15/20 09:24





  Vitamin C -  PO   1,000 mg





  BID ALE   Administration





     





     





     





     


 


Docusate Sodium  100 mg  01/13/20 15:24  





  Colace Liquid -  PO   





  DAILY PRN   





  CONSTIPATION   





     





     





     


 


Gabapentin  300 mg  01/14/20 22:00  01/14/20 21:25





  Neurontin -  PO   300 mg





  HS ALE   Administration





     





     





     





     


 


Heparin Sodium (Porcine)  5,000 unit  01/13/20 22:00  01/15/20 14:39





  Heparin -  SQ   Not Given





  TID ALE   





     





     





     





     


 


Piperacillin Sod/Tazobactam  50 mls @ 100 mls/hr  01/15/20 02:00  01/15/20 11:18





  Sod 3.375 gm/ Dextrose  IVPB   100 mls/hr





  Q8H-IV ALE   Administration





     





     





  Protocol   





     


 


Morphine Sulfate  20 mg  01/14/20 08:00  01/15/20 06:13





  Morphine 10 Mg/5 Ml Liquid  PO   20 mg





  Q4H PRN   Administration





  PAIN LEVEL 1-5   





     





     





     


 


Polyethylene Glycol  17 gm  01/13/20 15:30  01/15/20 09:25





  Miralax (For Daily Use) -  PO   Not Given





  DAILY ALE   





     





     





     





     


 


Senna  1 tab  01/13/20 22:00  01/14/20 21:26





  Senna -  PO   1 tab





  HS ALE   Administration





     





     





     





     











ASSESSMENT/PLAN:


65 y/o/m with a PMHx of COPD, Right lung adenocarcinoma (no chemo/rads/ surgery)

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 

bladder, recurrent UTIs, hypotension. Patient was discharged home on 1/12 with 

starr in place and follow up appointment with Dr. Villalta on 1/24 but returned 

to ED due to abd pain and burning pain. 





#UTI


- UA with increased bacteria count compared to UA on previous admission


- Urine culture growing Group D Strep or Entero Coccus, pending sensitivities 


- ID consulted (Dr. Man)


- Continue Zosyn, will switch to oral abx after sensitivities are available 


- WBC at 10.7, patient afebrile, continue to monitor


- Blood cultures negative to date 


- Continue Vitamin C 1000mg BID





#Paraplegia


- Continue Gabapentin 300mg HS for worsening neuropathy 





#COPD


- Currently not in exacerbation


- Not on any COPD medications at home 





#Adenocarcinoma right upper lung lobe


- Out-patient hematology/oncology follow up recommended 


- Heme/onc consulted on previous admission:


   - not a candidate for immunotherapy or EGFR inhibitor. poor candidate for 

chemo


   - patient states he does not want treatment for lung mass


   - no need for flow studies





#FEN


- no IV fluids indicated


- monitor and replete lytes as needed


- regular diet





#Prophylaxis


- Heparin





#Disposition


- ID consulted for positive urine culture, D/C in the next few days pending abx 

recommendations 








Visit type





- Emergency Visit


Emergency Visit: Yes


ED Registration Date: 01/14/20


Care time: The patient presented to the Emergency Department on the above date 

and was hospitalized for further evaluation of their emergent condition.





- New Patient


This patient is new to me today: No





- Critical Care


Critical Care patient: No





ATTENDING PHYSICIAN STATEMENT





I saw and evaluated the patient.


I reviewed the resident's note and discussed the case with the resident.


I agree with the resident's findings and plan as documented.








SUBJECTIVE:








OBJECTIVE:








ASSESSMENT AND PLAN:

## 2020-01-16 VITALS — SYSTOLIC BLOOD PRESSURE: 97 MMHG | HEART RATE: 73 BPM | DIASTOLIC BLOOD PRESSURE: 46 MMHG | TEMPERATURE: 98.6 F

## 2020-01-16 LAB
ANION GAP SERPL CALC-SCNC: 5 MMOL/L (ref 8–16)
BUN SERPL-MCNC: 9.3 MG/DL (ref 7–18)
CALCIUM SERPL-MCNC: 8.7 MG/DL (ref 8.5–10.1)
CHLORIDE SERPL-SCNC: 106 MMOL/L (ref 98–107)
CO2 SERPL-SCNC: 29 MMOL/L (ref 21–32)
CREAT SERPL-MCNC: 0.6 MG/DL (ref 0.55–1.3)
DEPRECATED RDW RBC AUTO: 17.4 % (ref 11.9–15.9)
GLUCOSE SERPL-MCNC: 94 MG/DL (ref 74–106)
HCT VFR BLD CALC: 39.8 % (ref 35.4–49)
HGB BLD-MCNC: 13 GM/DL (ref 11.7–16.9)
MCH RBC QN AUTO: 29.3 PG (ref 25.7–33.7)
MCHC RBC AUTO-ENTMCNC: 32.6 G/DL (ref 32–35.9)
MCV RBC: 90 FL (ref 80–96)
PLATELET # BLD AUTO: 606 K/MM3 (ref 134–434)
PMV BLD: 9.3 FL (ref 7.5–11.1)
POTASSIUM SERPLBLD-SCNC: 3.9 MMOL/L (ref 3.5–5.1)
RBC # BLD AUTO: 4.43 M/MM3 (ref 4–5.6)
SODIUM SERPL-SCNC: 140 MMOL/L (ref 136–145)
WBC # BLD AUTO: 11.4 K/MM3 (ref 4–10)

## 2020-01-16 RX ADMIN — PIPERACILLIN SODIUM,TAZOBACTAM SODIUM SCH MLS/HR: 3; .375 INJECTION, POWDER, FOR SOLUTION INTRAVENOUS at 09:40

## 2020-01-16 RX ADMIN — MORPHINE SULFATE PRN MG: 10 SOLUTION ORAL at 05:18

## 2020-01-16 RX ADMIN — MORPHINE SULFATE PRN MG: 10 SOLUTION ORAL at 09:41

## 2020-01-16 RX ADMIN — HEPARIN SODIUM SCH UNIT: 5000 INJECTION, SOLUTION INTRAVENOUS; SUBCUTANEOUS at 14:15

## 2020-01-16 RX ADMIN — POLYETHYLENE GLYCOL 3350 SCH: 17 POWDER, FOR SOLUTION ORAL at 10:43

## 2020-01-16 RX ADMIN — HEPARIN SODIUM SCH UNIT: 5000 INJECTION, SOLUTION INTRAVENOUS; SUBCUTANEOUS at 05:18

## 2020-01-16 RX ADMIN — OXYCODONE HYDROCHLORIDE AND ACETAMINOPHEN SCH MG: 500 TABLET ORAL at 09:44

## 2020-01-16 RX ADMIN — PIPERACILLIN SODIUM,TAZOBACTAM SODIUM SCH MLS/HR: 3; .375 INJECTION, POWDER, FOR SOLUTION INTRAVENOUS at 01:25

## 2020-01-16 NOTE — PN
Progress Note, Physician


History of Present Illness: 





stable


no new issues





- Current Medication List


Current Medications: 


Active Medications





Acetaminophen (Tylenol -)  650 mg PO Q6H PRN


   PRN Reason: MODERATE PAIN


Ascorbic Acid (Vitamin C -)  1,000 mg PO BID [facility]


   Last Admin: 01/16/20 09:44 Dose:  1,000 mg


Docusate Sodium (Colace Liquid -)  100 mg PO DAILY PRN


   PRN Reason: CONSTIPATION


Gabapentin (Neurontin -)  300 mg PO [facility]


   Last Admin: 01/15/20 21:45 Dose:  300 mg


Heparin Sodium (Porcine) (Heparin -)  5,000 unit SQ TID [facility]


   Last Admin: 01/16/20 05:18 Dose:  5,000 unit


Piperacillin Sod/Tazobactam (Sod 3.375 gm/ Dextrose)  50 mls @ 100 mls/hr IVPB 

Q8H-IV [facility]; Protocol


   Last Admin: 01/16/20 09:40 Dose:  100 mls/hr


Morphine Sulfate (Morphine 10 Mg/5 Ml Liquid)  20 mg PO Q4H PRN


   PRN Reason: PAIN LEVEL 1-5


   Last Admin: 01/16/20 09:41 Dose:  20 mg


Polyethylene Glycol (Miralax (For Daily Use) -)  17 gm PO DAILY [facility]


   Last Admin: 01/16/20 10:43 Dose:  Not Given


Senna (Senna -)  1 tab PO [facility]


   Last Admin: 01/15/20 21:44 Dose:  1 tab











- Objective


Vital Signs: 


 Vital Signs











Temperature  99.2 F   01/16/20 10:00


 


Pulse Rate  67   01/16/20 10:00


 


Respiratory Rate  18   01/16/20 10:00


 


Blood Pressure  140/64   01/16/20 10:00


 


O2 Sat by Pulse Oximetry (%)  95   01/16/20 09:00











Constitutional: Yes: No Distress, Calm


Cardiovascular: Yes: S1, S2


Respiratory: Yes: Regular, CTA Bilaterally


Gastrointestinal: Yes: Normal Bowel Sounds, Soft


Musculoskeletal: Yes: Other


Extremities: Yes: Other


Neurological: Yes: Alert, Oriented


Psychiatric: Yes: Alert, Oriented


Labs: 


 CBC, BMP





 01/16/20 06:50 





 01/16/20 06:50 











Assessment/Plan





Problem List





- Problems


(1) UTI (urinary tract infection)


Code(s): N39.0 - URINARY TRACT INFECTION, SITE NOT SPECIFIED   


Qualifiers: 


   Urinary tract infection type: site unspecified   Hematuria presence: without 

hematuria   Qualified Code(s): N39.0 - Urinary tract infection, site not 

specified   





(2) Adenocarcinoma of right lung


Code(s): C34.91 - MALIGNANT NEOPLASM OF UNSP PART OF RIGHT BRONCHUS OR LUNG   





(3) Neurogenic bladder


Code(s): N31.9 - NEUROMUSCULAR DYSFUNCTION OF BLADDER, UNSPECIFIED   





(4) Paraplegia


Code(s): G82.20 - PARAPLEGIA, UNSPECIFIED   





(5) Leukocytosis


Code(s): D72.829 - ELEVATED WHITE BLOOD CELL COUNT, UNSPECIFIED   





(6) Tibia/fibula fracture


Code(s): S82.209A - UNSP FRACTURE OF SHAFT OF UNSP TIBIA, INIT FOR CLOS FX; 

S82.409A - UNSP FRACTURE OF SHAFT OF UNSP FIBULA, INIT FOR CLOS FX   


Qualifiers: 


   Encounter type: initial encounter   Fracture type: closed   Laterality: left

   Qualified Code(s): S82.202A - Unspecified fracture of shaft of left tibia, 

initial encounter for closed fracture; S82.402A - Unspecified fracture of shaft 

of left fibula, initial encounter for closed fracture   





plan


ct abx


awaiting for identification and sensitivities of the bacteria


rest as per the team

## 2020-01-16 NOTE — PN
Teaching Attending Note


Name of Resident: Gwen Peace





ATTENDING PHYSICIAN STATEMENT





I saw and evaluated the patient.


I reviewed the resident's note and discussed the case with the resident.


I agree with the resident's findings and plan as documented.








SUBJECTIVE: Feels well - no complains. No fever/chills. No nausea/vomiting








OBJECTIVE: Afebrile, Hemodynamically Stable.





 Last Vital Signs











Temp Pulse Resp BP Pulse Ox


 


 98.6 F   73   20   97/46 L  95 


 


 01/16/20 13:23  01/16/20 13:23  01/16/20 13:23  01/16/20 13:23  01/16/20 09:00











Heart - S1, S2, RRR


Lungs - clear to auscultation


Abdomen - Soft, non-tender. Bowel Sounds normal.


Extremities - no calf tenderness, wasting, contractures LEs.


 - clear urine in starr.





 Laboratory Results - last 24 hr











  01/16/20 01/16/20





  06:50 06:50


 


WBC  11.4 H 


 


RBC  4.43 


 


Hgb  13.0 


 


Hct  39.8 


 


MCV  90.0 


 


MCH  29.3 


 


MCHC  32.6 


 


RDW  17.4 H 


 


Plt Count  606 H 


 


MPV  9.3 


 


Sodium   140


 


Potassium   3.9


 


Chloride   106


 


Carbon Dioxide   29


 


Anion Gap   5 L


 


BUN   9.3


 


Creatinine   0.6


 


Est GFR (CKD-EPI)AfAm   121.43


 


Est GFR (CKD-EPI)NonAf   104.77


 


Random Glucose   94


 


Calcium   8.7








 Current Medications











Generic Name Dose Route Start Last Admin





  Trade Name Freq  PRN Reason Stop Dose Admin


 


Acetaminophen  650 mg  01/14/20 07:46  





  Tylenol -  PO   





  Q6H PRN   





  MODERATE PAIN   





     





     





     


 


Ascorbic Acid  1,000 mg  01/13/20 22:00  01/16/20 09:44





  Vitamin C -  PO   1,000 mg





  BID ALE   Administration





     





     





     





     


 


Docusate Sodium  100 mg  01/13/20 15:24  





  Colace Liquid -  PO   





  DAILY PRN   





  CONSTIPATION   





     





     





     


 


Gabapentin  300 mg  01/14/20 22:00  01/15/20 21:45





  Neurontin -  PO   300 mg





  HS ALE   Administration





     





     





     





     


 


Heparin Sodium (Porcine)  5,000 unit  01/13/20 22:00  01/16/20 14:15





  Heparin -  SQ   5,000 unit





  TID ALE   Administration





     





     





     





     


 


Piperacillin Sod/Tazobactam  50 mls @ 100 mls/hr  01/15/20 02:00  01/16/20 09:40





  Sod 3.375 gm/ Dextrose  IVPB   100 mls/hr





  Q8H-IV ALE   Administration





     





     





  Protocol   





     


 


Morphine Sulfate  20 mg  01/14/20 08:00  01/16/20 09:41





  Morphine 10 Mg/5 Ml Liquid  PO   20 mg





  Q4H PRN   Administration





  PAIN LEVEL 1-5   





     





     





     


 


Polyethylene Glycol  17 gm  01/13/20 15:30  01/16/20 10:43





  Miralax (For Daily Use) -  PO   Not Given





  DAILY ALE   





     





     





     





     


 


Senna  1 tab  01/13/20 22:00  01/15/20 21:44





  Senna -  PO   1 tab





  HS ALE   Administration





     





     





     





     








 Home Medications











 Medication  Instructions  Recorded


 


Diazepam [Valium] 10 mg PO TID PRN  tablet MDD 30mg 10/10/18


 


Morphine 10 mg/5 ml Liquid 20 mg PO Q4H MDD 60mg 10/07/19





[Morphine 10 mg/5 mL Liquid -]  


 


Ascorbic Acid [Vitamin C -] 1,000 mg PO BID 30 Days #120 tablet 01/10/20


 


Gabapentin [Neurontin -] 300 mg PO HS 30 Days #30 capsule 01/10/20


 


Methenamine Hippurate [Hiprex [Nf] 1 gm PO BID 30 Days #60 tablet 01/10/20





-]  


 


Tamsulosin HCl [Flomax -] 0.4 mg PO DAILY@0830 30 Days #30 01/10/20





 cap.er.24h 


 


Amox-Tr/K Cl [Augmentin - 875Mg 1 tab PO BID 7 Days #14 tablet 01/16/20





Tablet]  

















 








ASSESSMENT AND PLAN:





64 year old male with history of COPD, R lung Adenocarcinoma (no CTx/RTx/Surgery

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 

(intermittent self catheterization), Recurrent UTIs, recent admissions at [facility] s

/p left leg distal tibial fracture (d/joanne 10/11/19), and for Urinary retention (

d/joanne 1/12/20 with starr in situ), returns with reported abdominal pain, now 

resolved. 





1. Acute UTI/Cystitis, catheter associated


CT A/P  - Diffuse bladder wall thickening suggestive of cystitis.


Urine Cx - Enterococcus > 100,000 CFU/ml


Has been receiving Zosyn - discussed with ID - recommendation for 7 additional 

days of Augmentin on discharge.


Outpatient Urology follow up with Dr. Mejia.





2. RUL Adenocarcinoma, not on treatment - for out-patient Oncology follow up. 

Mild persisting leukocytosis, chronic, likely sec to underlying Ca.





3. COPD - stable. No evidence of decompensation.





4. Paraplegia sec to cord transection with Neurogenic Bladder - on Gabapentin. 

Starr to remain in situ pending  outpatient follow up. 





5. Chronic constipation due to bowel dysfunction sec to cord transection, 

chronic opiates - on Senna, Miralax, manual disimpaction PRN.





Medically optimized for discharge with 24hr HHA. Urology out-patient follow up.

## 2020-01-16 NOTE — DS
Physical Exam: 


SUBJECTIVE: Patient seen and examined. No acute events overnight. Patient 

denies chest pain, abd pain, SOB, fever, chills, or other concerns. 








OBJECTIVE:





 Vital Signs











 Period  Temp  Pulse  Resp  BP Sys/Malone  Pulse Ox


 


 Last 24 Hr  98 F-99.2 F  64-79  17-20  /46-72  95-96








PHYSICAL EXAM





GENERAL: The patient is awake, alert, and fully oriented, in no acute distress.


HEAD: Normal with no signs of trauma.


EYES: EOMI, no scleral icterus, no ptosis 


ENT: moist mucous membranes 


NECK: Trachea midline, full range of motion, supple. 


LUNGS: clear to auscultation bilaterally, no wheezes, no crackles, no accessory 

muscle use. 


HEART: Regular rate and rhythm, S1, S2 without murmur, rub or gallop.


ABDOMEN: Soft, nondistended, normoactive bowel sounds, no guarding, no rebound, 

no hepatosplenomegaly, no masses


: starr in place 


EXTREMITIES: 2+ pulses, warm, well-perfused. Right wrist fused. emaciated lower 

extremities. 


NEUROLOGICAL: Normal speech, gait not observed. AAOx3. sensation intact 

throughout 


PSYCH: Normal mood, normal affect


SKIN: Warm, dry, normal turgor, no rashes or lesions noted





LABS


 Laboratory Results - last 24 hr











  01/16/20 01/16/20





  06:50 06:50


 


WBC  11.4 H 


 


RBC  4.43 


 


Hgb  13.0 


 


Hct  39.8 


 


MCV  90.0 


 


MCH  29.3 


 


MCHC  32.6 


 


RDW  17.4 H 


 


Plt Count  606 H 


 


MPV  9.3 


 


Sodium   140


 


Potassium   3.9


 


Chloride   106


 


Carbon Dioxide   29


 


Anion Gap   5 L


 


BUN   9.3


 


Creatinine   0.6


 


Est GFR (CKD-EPI)AfAm   121.43


 


Est GFR (CKD-EPI)NonAf   104.77


 


Random Glucose   94


 


Calcium   8.7











HOSPITAL COURSE:





Date of Admission:01/14/20





Date of Discharge: 01/16/20








63 y/o/m with a PMHx of COPD, Right lung adenocarcinoma (no chemo/rads/ surgery)

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 

bladder, recurrent UTIs, hypotension. Patient was discharged home on 1/12 with 

starr in place and follow up appointment with Dr. Villalta on 1/24 but returned 

to ED due to abd pain and burning pain. Patient's UA had increased bacteria 

count compared to UA on previous admission. Urine culture was positive and 

patient was treated with Zosyn until sensitivities were finalized. Patient's 

white count improved and blood cultures have been negative to date. Patient 

safe for discharge on Augmentin 875mg BID for 7 days as per ID. Patient to be 

discharged home with starr in place as he was found to have urinary retention 

on previous admission. Patient to follow up with Dr. Villalta, Urology, on 1/24 

for further recommendations on starr catheter.  


Minutes to complete discharge: 36





Discharge Summary


Problems reviewed: Yes


Reason For Visit: UTI


Current Active Problems





UTI (urinary tract infection) (Acute)








Condition: Stable





- Instructions


Diet, Activity, Other Instructions: 


You presented to the hospital complaining of abdominal pain and a burning pain. 

You were diagnosed with a UTI and seen by Infectious disease specialist. You 

were started on antibiotics with improvement. You are being discharged with 

continuing antibiotics. You will be discharged with a starr catheter as you 

were found to be retaining urine during your last admission and have a follow 

up appointment scheduled for 1/24/20 with Dr. Villalta. Please keep the starr 

catheter site clean and dry until you follow up with your Urologist. You were 

seen by Hematology/Oncology during your last admission for your lung mass; 

however you stated that you did not want any treatment at this time but a 

referral will be included for you if you would like further evaluation and 

treatment options.  





Medication changes: 


1. START Augmentin 875mg twice a day for 7 days for continuing treatment of 

your UTI. 





Continue taking your home medications as directed. 





Follow up: 


1. Follow up with your primary care physician within one-two days after 

discharge. A referral has been provided. 


2. Follow up with your Urologist, an appointment has been made for you on 

January 24th, 2020 with Dr. Villalta at his clinic in [place] at [address] [place] [address] [address], [place] [address]. Discuss further maintenance of your starr 

catheter at your follow up appointment.


3. Follow up with Hematology/Oncology after discharge regarding your right lung 

mass (adenocarcinoma) for further evaluation and treatment. 





Return to the nearest Emergency Department if you experience worsening symptoms

, subjective fevers, chills, shortness of breath, chest pain, palpitations, 

abdominal pain, nausea vomiting, diarrhea, painful urination, worsening 

bleeding with urination, or any trauma. 


Referrals: 


Tutu Villalta MD [Staff Physician] - 


Sony Poon MD [Staff Physician] - 


Juan Lyon MD [Staff Physician] - 


Disposition: HOME





- Home Medications


Comprehensive Discharge Medication List: 


Ambulatory Orders





Diazepam [Valium] 10 mg PO TID PRN  tablet MDD 30mg 10/10/18 


Morphine 10 mg/5 ml Liquid [Morphine 10 mg/5 mL Liquid -] 20 mg PO Q4H MDD 60mg 

10/07/19 


Ascorbic Acid [Vitamin C -] 1,000 mg PO BID 30 Days #120 tablet 01/10/20 


Gabapentin [Neurontin -] 300 mg PO HS 30 Days #30 capsule 01/10/20 


Methenamine Hippurate [Hiprex [Nf] -] 1 gm PO BID 30 Days #60 tablet 01/10/20 


Tamsulosin HCl [Flomax -] 0.4 mg PO DAILY@0830 30 Days #30 cap.er.24h 01/10/20 


Amox-Tr/K Cl [Augmentin - 875Mg Tablet] 1 tab PO BID 7 Days #14 tablet 01/16/20 








This patient is new to me today: No


Emergency Visit: Yes


ED Registration Date: 01/14/20


Care time: The patient presented to the Emergency Department on the above date 

and was hospitalized for further evaluation of their emergent condition.


Critical Care patient: No





- Discharge Referral


Referred to [facility] No





ATTENDING PHYSICIAN STATEMENT





I saw and evaluated the patient.


I reviewed the resident's note and discussed the case with the resident.


I agree with the resident's findings and plan as documented.








SUBJECTIVE:








OBJECTIVE:








ASSESSMENT AND PLAN:

## 2020-01-31 ENCOUNTER — HOSPITAL ENCOUNTER (EMERGENCY)
Dept: HOSPITAL 74 - JER | Age: 65
Discharge: HOME | End: 2020-01-31
Payer: COMMERCIAL

## 2020-01-31 VITALS — BODY MASS INDEX: 20 KG/M2

## 2020-01-31 VITALS — TEMPERATURE: 98 F | DIASTOLIC BLOOD PRESSURE: 67 MMHG | HEART RATE: 76 BPM | SYSTOLIC BLOOD PRESSURE: 120 MMHG

## 2020-01-31 DIAGNOSIS — E03.9: ICD-10-CM

## 2020-01-31 DIAGNOSIS — G82.20: ICD-10-CM

## 2020-01-31 DIAGNOSIS — J44.9: ICD-10-CM

## 2020-01-31 DIAGNOSIS — S24.102A: ICD-10-CM

## 2020-01-31 DIAGNOSIS — N39.0: Primary | ICD-10-CM

## 2020-01-31 DIAGNOSIS — Z88.8: ICD-10-CM

## 2020-01-31 DIAGNOSIS — Z85.118: ICD-10-CM

## 2020-01-31 LAB
ALBUMIN SERPL-MCNC: 2.6 G/DL (ref 3.4–5)
ALP SERPL-CCNC: 106 U/L (ref 45–117)
ALT SERPL-CCNC: 10 U/L (ref 13–61)
ANION GAP SERPL CALC-SCNC: 9 MMOL/L (ref 8–16)
APPEARANCE UR: (no result)
AST SERPL-CCNC: 7 U/L (ref 15–37)
BACTERIA # UR AUTO: 100 /HPF
BASOPHILS # BLD: 0.1 % (ref 0–2)
BILIRUB SERPL-MCNC: 0.4 MG/DL (ref 0.2–1)
BILIRUB UR STRIP.AUTO-MCNC: NEGATIVE MG/DL
BUN SERPL-MCNC: 6.7 MG/DL (ref 7–18)
CALCIUM SERPL-MCNC: 9 MG/DL (ref 8.5–10.1)
CASTS URNS QL MICRO: 62 /LPF (ref 0–8)
CHLORIDE SERPL-SCNC: 102 MMOL/L (ref 98–107)
CO2 SERPL-SCNC: 26 MMOL/L (ref 21–32)
COLOR UR: YELLOW
CREAT SERPL-MCNC: 0.5 MG/DL (ref 0.55–1.3)
DEPRECATED RDW RBC AUTO: 17 % (ref 11.9–15.9)
EOSINOPHIL # BLD: 1.9 % (ref 0–4.5)
EPITH CASTS URNS QL MICRO: 1.4 /HPF
GLUCOSE SERPL-MCNC: 79 MG/DL (ref 74–106)
HCT VFR BLD CALC: 41.7 % (ref 35.4–49)
HGB BLD-MCNC: 13.9 GM/DL (ref 11.7–16.9)
KETONES UR QL STRIP: (no result)
LEUKOCYTE ESTERASE UR QL STRIP.AUTO: (no result)
LIPASE SERPL-CCNC: 73 U/L (ref 73–393)
LYMPHOCYTES # BLD: 12.3 % (ref 8–40)
MCH RBC QN AUTO: 29.2 PG (ref 25.7–33.7)
MCHC RBC AUTO-ENTMCNC: 33.4 G/DL (ref 32–35.9)
MCV RBC: 87.3 FL (ref 80–96)
MONOCYTES # BLD AUTO: 6.2 % (ref 3.8–10.2)
NEUTROPHILS # BLD: 79.5 % (ref 42.8–82.8)
NITRITE UR QL STRIP: NEGATIVE
PH UR: 5.5 [PH] (ref 5–8)
PLATELET # BLD AUTO: 658 K/MM3 (ref 134–434)
PMV BLD: 9.5 FL (ref 7.5–11.1)
POTASSIUM SERPLBLD-SCNC: 4.2 MMOL/L (ref 3.5–5.1)
PROT SERPL-MCNC: 6.7 G/DL (ref 6.4–8.2)
PROT UR QL STRIP: (no result)
PROT UR QL STRIP: NEGATIVE
RBC # BLD AUTO: 4.78 M/MM3 (ref 4–5.6)
RBC # BLD AUTO: 7 /HPF (ref 0–4)
SODIUM SERPL-SCNC: 137 MMOL/L (ref 136–145)
SP GR UR: 1.01 (ref 1.01–1.03)
UROBILINOGEN UR STRIP-MCNC: 0.2 MG/DL (ref 0.2–1)
WBC # BLD AUTO: 10.6 K/MM3 (ref 4–10)
WBC # UR AUTO: 89 /HPF (ref 0–5)

## 2020-01-31 PROCEDURE — 3E033NZ INTRODUCTION OF ANALGESICS, HYPNOTICS, SEDATIVES INTO PERIPHERAL VEIN, PERCUTANEOUS APPROACH: ICD-10-PCS

## 2020-01-31 NOTE — PDOC
Attending Attestation





- Resident


Resident Name: Lan Tyler





- ED Attending Attestation


I have performed the following: I have examined & evaluated the patient, The 

case was reviewed & discussed with the resident, I agree w/resident's findings 

& plan





- HPI


HPI: 





01/31/20 09:08


65yo M with a PMH of COPD, hypotension, adenocarcinoma of the right lung, 

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 

recurrent UTI (multiple admissions at [facility] for abx), and splenectomy, presenting 

with abdominal pain since waking up. Patient reports pain is "in the bladder" 

and was present when he woke up this morning. Starr catheter draining cloudy 

urine. Patient reports usually getting a condom catheter, office did not have 

any and placed an indwelling starr last exchange. 





prior notes reviewed, last admission 1/14 to 1/16/20 where he was treated with 

zosyn, dc'd on augmentin , starr placed and told to f/u Dr Villalta. 





Prior urine cultures have shown enterococcus most recently 1/13/2020 that was 

pansensitive to antibiotics


also previously with Citrobacter, staph epidermidis likely contaminant, 

Acetobacter, Serratia and yeast





01/31/20 09:09








- Physicial Exam


PE: 





01/31/20 09:03


Agree with the resident's HPI and PE as documented in the electronic medical 

record.


NAD, well appearing, EOMI, PERRL,  nl conjunctiva, anicteric; neck supple. 

lungs clear, RRR, abdomen soft left lower quadrant and suprapubic tenderness no 

rebound, guarding.  No CVA tenderness back nontender.  Catheter in place.  DING 

x4, no focal neuro deficits. No peripheral edema. normal color for ethnicity, 

WWP.  Contracted extremities and lower extremities














- Medical Decision Making





01/31/20 09:04


Vital Signs











Temp Pulse Resp BP Pulse Ox


 


 97.4 F L  53 L  16   117/52 L  94 L


 


 01/31/20 08:42  01/31/20 08:42  01/31/20 08:25  01/31/20 08:42  01/31/20 08:42





Vital signs reviewed within normal limits, no fever, normotensive.


Differential diagnosis includes cystitis, hematuria, bladder spasms, 

incontinence, pyelonephritis





prior notes reviewed, last admission 1/14 to 1/16/20 where he was treated with 

zosyn, dc'd on augmentin , starr placed and told to f/u Dr Villalta. 





Prior urine cultures have shown enterococcus most recently 1/13/2020 that was 

pansensitive to antibiotics


also previously with Citrobacter, staph epidermidis likely contaminant, 

Acetobacter, Serratia and yeast





prior cultures reviewed, levaquin sensitive to the multiple organisms


allegy to vancomycin





Patient without any septic symptoms nontoxic-appearing, patient symptoms are 

most consistent with his usual UTI versus colonization.  Will obtain basic labs 

UA sample and culture and based on that we will treat appropriately


normal labs/Cr function.


no wbc ct, lytes wnl


Discharge home, stable condition, PCP and urology followup Dr Villalta for starr 

management/UTI management. and return precautions discussed.


f/u urine cultures. 


x 1 week course of levaquin. low risk for tendon rupture/side effect, as pt is 

paraplegic, relatively immobile.





01/31/20 09:08





01/31/20 13:31

## 2020-01-31 NOTE — PDOC
History of Present Illness





- General


Chief Complaint: Pain


Stated Complaint: ABD PAIN


Time Seen by Provider: 01/31/20 08:29


History Source: Patient


Exam Limitations: No Limitations





- History of Present Illness


Initial Comments: 





01/31/20 08:29


PCP: Dr. Poon





HPI: 63yo M with a PMH of COPD, hypotension, adenocarcinoma of the right lung, 

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 

recurrent UTI (multiple admissions at [facility] for abx), and splenectomy, presenting 

with abdominal pain since waking up. Patient reports pain is "in the bladder" 

and was present when he woke up this morning. Starr catheter draining cloudy 

urine. Patient reports usually getting a condom catheter, office did not have 

any and placed an indwelling starr last exchange. Reports his most recent UTI 

was about 1 month ago and was treated with IV abx. 





Baseline no sensation from nipples down, incontinent of urine and feces. Denies 

constipation or difficulty with BMs, no bloody or dark stools, no history of 

diverticulitis. No fever, chills, nausea, vomiting, cough or shortness of 

breath. 





All: NKDA


Meds: per chart


PMH: as above


PSH: as above








Past History





- Travel


Traveled outside of the country in the last 30 days: No


Close contact w/someone who was outside of country & ill: No





- Past Medical History


Allergies/Adverse Reactions: 


 Allergies











Allergy/AdvReac Type Severity Reaction Status Date / Time


 


vancomycin Allergy   Verified 01/31/20 08:36











Home Medications: 


Ambulatory Orders





Diazepam [Valium] 10 mg PO TID PRN  tablet MDD 30mg 10/10/18 


Morphine 10 mg/5 ml Liquid [Morphine 10 mg/5 mL Liquid -] 20 mg PO Q4H MDD 60mg 

10/07/19 


Ascorbic Acid [Vitamin C -] 1,000 mg PO BID 30 Days #120 tablet 01/10/20 


Gabapentin [Neurontin -] 300 mg PO HS 30 Days #30 capsule 01/10/20 


Methenamine Hippurate [Hiprex [Nf] -] 1 gm PO BID 30 Days #60 tablet 01/10/20 


Tamsulosin HCl [Flomax -] 0.4 mg PO DAILY@0830 30 Days #30 cap.er.24h 01/10/20 


Amox-Tr/K Cl [Augmentin - 875Mg Tablet] 1 tab PO BID 7 Days #14 tablet 01/16/20 


Levofloxacin [Levaquin] 750 mg PO DAILY 6 Days #6 tablet 01/31/20 








Anemia: No


Asthma: No


Cancer: No


Cardiac Disorders: No


CVA: No


COPD: Yes


CHF: No


Dementia: No


Diabetes: No


GI Disorders: No


 Disorders: Yes (Recurrent UTI, CONDOM CATHETER)


HTN:  (Hypotension)


Hypercholesterolemia: No


Liver Disease: No


Seizures: No


Thyroid Disease: No





- Surgical History


Abdominal Surgery: No


Appendectomy: No


Cardiac Surgery: No


Cholecystectomy: No


Lung Surgery: No


Neurologic Surgery: No


Orthopedic Surgery: Yes (fracture right wrist s/p fusion)





- Immunization History


Td Vaccination: No


Immunization Up to Date: No





- Psycho Social/Smoking Cessation Hx


Smoking Status: No


Smoking History: Never smoked


Years of Tobacco Use: 0


Have you smoked in the past 12 months: No


Number of Cigarettes Smoked Daily: 3


If you are a former smoker, when did you quit?: 1986


Cigars Per Day: 0


Hx Alcohol Use: No


Drug/Substance Use Hx: No


Substance Use Type: None


Hx Substance Use Treatment: No





**Review of Systems





- Review of Systems


Able to Perform ROS?: Yes


Is the patient limited English proficient: Yes


Constitutional: No: Chills, Diaphoresis, Fever


HEENTM: No: Nose Congestion, Throat Pain


Respiratory: No: Cough, Shortness of Breath


Cardiac (ROS): No: Chest Pain, Irregular Heart Rate, Lightheadedness, 

Palpitations, Syncope, Chest Tightness


ABD/GI: Yes: Poor Appetite (yesterday).  No: Constipated, Diarrhea, Nausea, 

Rectal Bleeding, Vomiting


: Yes: See HPI, Pain, Other (cloudy urine).  No: Burning, Dysuria, Frequency


Musculoskeletal: No: Back Pain, Muscle Pain, Muscle Weakness


Integumentary: No: Pallor, Pruritus, Rash


Neurological: No: Headache, Numbness, Tingling, Weakness


Hematologic/Lymphatic: No: Anemia, Blood Clots, Easy Bleeding


All Other Systems: Reviewed and Negative





*Physical Exam





- Physical Exam





01/31/20 09:04


Vitals reviewed, AFVSS


GEN: Well appearing, appears stated age, NAD, comfortable. AAOx3.


HEENT: NCAT, EOMI. Sclera anicteric, noninjected. No facial asymmetry. Moist 

mucous membranes. Normal voice. Trachea midline. 


CV: RRR, S1/S2, no murmurs / rubs / gallops appreciated.


LUNG: CTAB, normal work of breathing. No wheezes, rales, rhonchi. No cough. 

Speaking full sentences. 


GI: Soft, Non-distended, +Tender suprapubically, no guarding, no rebound. No 

masses. Neg CVAT b/l. 


EXTREMITIES: 2+ distal pulses. No LE edema. Hemiplegic nipples down. 


SKIN: Warm, dry, no rashes appreciated, non-jaundiced.


PSYCH: Normal mood and affect. Cooperative and appropriate. 


NEURO: CN grossly intact. Hemiplegia, sensory deficit nipples down.  











ED Treatment Course





- LABORATORY


CBC & Chemistry Diagram: 


 01/31/20 08:49





 01/31/20 11:30





Medical Decision Making





- Medical Decision Making





01/31/20 08:57


63yo M with a PMH of COPD, hypotension, adenocarcinoma of the right lung, 

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 

recurrent UTI (multiple admissions at [facility] for abx), and splenectomy, presenting 

with abdominal pain since waking up. History notable for recurrent UTIs, no 

constitutional symptoms. Exam notable for non-toxic appearance, suprapubic 

tenderness, cloudy urine, stable vitals, no fever. DDX: UTI, Cystitis, 

Pyelonephritis, Bladder spasms, less likely ureterolithiasis, GI etiology. 





- CBC, CMP, Lipase


- UA, UCx


- Ofirmev





01/31/20 12:02


- UA with UTI


- Hx 10/19 Acinetobacter, Citrobacter UTIs, 1/14/20 Cx grew pan-sensitive 

enterococcus faecalis


- CMP pending





01/31/20 12:30


- Levaquin 750mg for 7 days


- Patient non-weight bearing or ambulatory, low risk for tendon rupture


- Patient requests call to [facility] back to the hospital who had returned to patient'

s house


- Call placed to patient's HHA Sunday ([phone]), no answer, no voicemail 

set up





01/31/20 12:45


- Second call placed to patient's H[facility], no answer





Dispo: Home 











Discharge





- Discharge Information


Problems reviewed: Yes


Clinical Impression/Diagnosis: 


UTI (urinary tract infection)


Qualifiers:


 Urinary tract infection type: site unspecified Hematuria presence: with 

hematuria Qualified Code(s): N39.0 - Urinary tract infection, site not specified





Condition: Stable


Disposition: HOME





- Admission


No





- Additional Discharge Information


Prescriptions: 


Levofloxacin [Levaquin] 750 mg PO DAILY 6 Days #6 tablet





- Follow up/Referral


Referrals: 


Tutu Villalta MD [Staff Physician] - 





- Patient Discharge Instructions


Patient Printed Discharge Instructions:  DI for Urinary Tract Infection (UTI)


Additional Instructions: 


A prescription has been sent to your pharmacy for antibiotics for your urinary 

tract infection. Take this as directed for the next 6 days. 





Follow up with your urologist and PCP in the next week. 





Please return to the ED for any new or concerning symptoms. These may include 

but are not limited to: nausea, vomiting, new back pain, alterations in your 

mental status.





- Post Discharge Activity

## 2020-01-31 NOTE — PDOC
*Physical Exam





- Vital Signs


 Last Vital Signs











Temp Pulse Resp BP Pulse Ox


 


 97.4 F L  53 L  16   117/52 L  94 L


 


 01/31/20 08:42  01/31/20 08:42  01/31/20 08:25  01/31/20 08:42  01/31/20 08:42














ED Treatment Course





- LABORATORY


CBC & Chemistry Diagram: 


 01/31/20 08:49





 01/31/20 11:30





Medical Decision Making





- Medical Decision Making





01/31/20 08:54


Patient seen as pre-attending with Dr. Tyler (PGY-1)





63 y/o male with a PMHx of neurogenic bladder, recurrent UTIs (w/recent 

hospitalization 01/2020 w/E Coli multiple susceptibilities), T2-T4 spinal cord 

transection, COPD (not on home O2), RL adenocardinoma and splenectomy here for 

abdominal pain.


L sided, non-qualifiable, states it is over his bladder


Non-toxic appearing, VS unremarkable, Equivocal LLQ TTP


Will check labs, UA/UCx.


Reassess.





01/31/20 09:00


As per EMR, patient discharged on a seven day course Augmentin (875 mg BID) on 

01/20 and urology follow-up; patient states his cathether was changed at that 

time.


 


01/31/20 12:25


UA w/ 3+ LE, 1+ Blood, 100 Bacteria


Hx 10/19 Acinetobacter, Citrobacter UTIs, 1/14/20 Cx grew pan-sensitive 

enterococcus faecalis


Will give Levaquin and d/c home.








Discharge





- Discharge Information


Problems reviewed: Yes


Clinical Impression/Diagnosis: 


UTI (urinary tract infection)


Qualifiers:


 Urinary tract infection type: site unspecified Hematuria presence: with 

hematuria Qualified Code(s): N39.0 - Urinary tract infection, site not specified





Condition: Stable


Disposition: HOME





- Additional Discharge Information


Prescriptions: 


Levofloxacin [Levaquin] 750 mg PO DAILY 6 Days #6 tablet





- Follow up/Referral


Referrals: 


Tutu Villalta MD [Staff Physician] - 





- Patient Discharge Instructions


Patient Printed Discharge Instructions:  DI for Urinary Tract Infection (UTI)


Additional Instructions: 


A prescription has been sent to your pharmacy for antibiotics for your urinary 

tract infection. Take this as directed for the next 6 days. 





Follow up with your urologist and PCP in the next week. 





Please return to the ED for any new or concerning symptoms. These may include 

but are not limited to: nausea, vomiting, new back pain, alterations in your 

mental status.





- Post Discharge Activity

## 2020-02-05 ENCOUNTER — HOSPITAL ENCOUNTER (INPATIENT)
Dept: HOSPITAL 74 - JER | Age: 65
LOS: 5 days | Discharge: HOME | DRG: 543 | End: 2020-02-10
Attending: NURSE PRACTITIONER | Admitting: INTERNAL MEDICINE
Payer: COMMERCIAL

## 2020-02-05 VITALS — BODY MASS INDEX: 19.1 KG/M2

## 2020-02-05 DIAGNOSIS — E87.1: ICD-10-CM

## 2020-02-05 DIAGNOSIS — D72.829: ICD-10-CM

## 2020-02-05 DIAGNOSIS — N31.9: ICD-10-CM

## 2020-02-05 DIAGNOSIS — D47.3: ICD-10-CM

## 2020-02-05 DIAGNOSIS — E88.09: ICD-10-CM

## 2020-02-05 DIAGNOSIS — N39.0: ICD-10-CM

## 2020-02-05 DIAGNOSIS — C34.91: ICD-10-CM

## 2020-02-05 DIAGNOSIS — C79.51: Primary | ICD-10-CM

## 2020-02-05 DIAGNOSIS — G82.20: ICD-10-CM

## 2020-02-05 LAB
ALBUMIN SERPL-MCNC: 2.7 G/DL (ref 3.4–5)
ALP SERPL-CCNC: 133 U/L (ref 45–117)
ALT SERPL-CCNC: 14 U/L (ref 13–61)
ANION GAP SERPL CALC-SCNC: 6 MMOL/L (ref 8–16)
AST SERPL-CCNC: 19 U/L (ref 15–37)
BASOPHILS # BLD: 0 % (ref 0–2)
BILIRUB SERPL-MCNC: 0.5 MG/DL (ref 0.2–1)
BUN SERPL-MCNC: 5.3 MG/DL (ref 7–18)
CALCIUM SERPL-MCNC: 8.8 MG/DL (ref 8.5–10.1)
CHLORIDE SERPL-SCNC: 98 MMOL/L (ref 98–107)
CO2 SERPL-SCNC: 28 MMOL/L (ref 21–32)
CREAT SERPL-MCNC: 0.5 MG/DL (ref 0.55–1.3)
DEPRECATED RDW RBC AUTO: 16.7 % (ref 11.9–15.9)
EOSINOPHIL # BLD: 0.7 % (ref 0–4.5)
GLUCOSE SERPL-MCNC: 112 MG/DL (ref 74–106)
HCT VFR BLD CALC: 40.7 % (ref 35.4–49)
HGB BLD-MCNC: 13.4 GM/DL (ref 11.7–16.9)
LYMPHOCYTES # BLD: 8.5 % (ref 8–40)
MCH RBC QN AUTO: 29 PG (ref 25.7–33.7)
MCHC RBC AUTO-ENTMCNC: 32.9 G/DL (ref 32–35.9)
MCV RBC: 88.1 FL (ref 80–96)
MONOCYTES # BLD AUTO: 10.3 % (ref 3.8–10.2)
NEUTROPHILS # BLD: 80.5 % (ref 42.8–82.8)
PLATELET # BLD AUTO: 560 K/MM3 (ref 134–434)
PMV BLD: 9 FL (ref 7.5–11.1)
POTASSIUM SERPLBLD-SCNC: 4.5 MMOL/L (ref 3.5–5.1)
PROT SERPL-MCNC: 6.8 G/DL (ref 6.4–8.2)
RBC # BLD AUTO: 4.62 M/MM3 (ref 4–5.6)
SODIUM SERPL-SCNC: 132 MMOL/L (ref 136–145)
WBC # BLD AUTO: 17.3 K/MM3 (ref 4–10)

## 2020-02-05 NOTE — PDOC
*Physical Exam





- Vital Signs


 Last Vital Signs











Temp Pulse Resp BP Pulse Ox


 


 97.7 F   71   18   120/92   95 


 


 02/05/20 18:04  02/05/20 18:04  02/05/20 18:04  02/05/20 21:10  02/05/20 21:10














ED Treatment Course





- LABORATORY


CBC & Chemistry Diagram: 


 02/06/20 08:46





 02/06/20 08:46





- Medications


Given in the ED: 


ED Medications














Discontinued Medications














Generic Name Dose Route Start Last Admin





  Trade Name Juan R  PRN Reason Stop Dose Admin


 


Morphine Sulfate  4 mg  02/05/20 20:14  02/05/20 21:11





  Morphine Injection -  IVPUSH  02/05/20 20:15  4 mg





  ONCE ONE   Administration





     





     





     





     














Medical Decision Making





- Medical Decision Making





02/05/20 22:09


Pt received on sign out from Dr. Enriquez.


63 y/o male. Finished course of levaquin for UTI 1 week ago. Fell from bed, hit 

left hand on table with subsequent swelling. Pt is paraplegic from below the 

axillas. F/u XR left hand/wrist. F/u labs. Plan to admit to hospitalist (Dr. Poon) for rehab placement. 





02/05/20 23:04


XR shows no acute fracture. Soft splint applied for support. 





02/05/20 23:43


D/w the hospitalist who accepts the patient for admission. 








Discharge





- Discharge Information


Problems reviewed: Yes


Clinical Impression/Diagnosis: 


 Left wrist injury





Condition: Stable





- Admission


Yes





- Follow up/Referral





- Patient Discharge Instructions





- Post Discharge Activity

## 2020-02-05 NOTE — PDOC
History of Present Illness





- General


Chief Complaint: Pain, Acute


Stated Complaint: HAND INJURY


History Source: Patient


Exam Limitations: No Limitations





- History of Present Illness


Initial Comments: 





65yo M with a PMH of COPD, hypotension, adenocarcinoma of the right lung, 

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 

recurrent UTI (multiple admissions at [facility] for abx), and splenectomy presents to 

the emergency department s/p hand injury. Per the patient, he states he was 

lying in bed when he reached over with his left hand (right hand has a fusion 

in the wrist severely limiting dexterity) when he had a falling motion. Per the 

patient, he hit his left hand on the dorsal side against a steel side table 

with immediate onset of pain and limited ROM. He denies hitting other body 

parts and denies head trauma and LOC. Currently, the patient states he has 

limited mobility of his left hand. He has 24/7 nursing care in his home. Denies 

the following: prodromal symptoms prior to the strike, fever, chills, SOB, 

chest pain, nausea, vomiting, abdominal pain, and ears/nose/throat pain. 





Allergies: vancomycin











Past History





- Past Medical History


Allergies/Adverse Reactions: 


 Allergies











Allergy/AdvReac Type Severity Reaction Status Date / Time


 


vancomycin Allergy   Verified 02/05/20 18:10











Home Medications: 


Ambulatory Orders





Diazepam [Valium] 10 mg PO TID PRN  tablet MDD 30mg 10/10/18 


Morphine 10 mg/5 ml Liquid [Morphine 10 mg/5 mL Liquid -] 20 mg PO Q4H MDD 60mg 

10/07/19 








Anemia: No


Asthma: No


Cancer: No


Cardiac Disorders: No


CVA: No


COPD: No


CHF: No


Dementia: No


Diabetes: No


GI Disorders: No


 Disorders: Yes (Recurrent UTI, CONDOM CATHETER)


HTN:  (Hypotension)


Hypercholesterolemia: No


Liver Disease: No


Seizures: No


Thyroid Disease: No





- Surgical History


Abdominal Surgery: No


Appendectomy: No


Cardiac Surgery: No


Cholecystectomy: No


Lung Surgery: No


Neurologic Surgery: No


Orthopedic Surgery: Yes (fracture right wrist s/p fusion)





- Immunization History


Td Vaccination: No


Immunization Up to Date: No





- Psycho Social/Smoking Cessation Hx


Smoking Status: No


Smoking History: Never smoked


Years of Tobacco Use: 0


Have you smoked in the past 12 months: No


Number of Cigarettes Smoked Daily: 3


If you are a former smoker, when did you quit?: 1986


Cigars Per Day: 0


Information on smoking cessation initiated: No


Hx Alcohol Use: No


Drug/Substance Use Hx: No


Substance Use Type: None


Hx Substance Use Treatment: No





**Review of Systems





- Review of Systems


Able to Perform ROS?: Yes


Is the patient limited English proficient: No


Constitutional: No: Chills, Diaphoresis, Fever, Weakness


HEENTM: No: Eye Pain, Ear Pain, Nose Pain, Throat Pain, Mouth Pain


Respiratory: No: Cough, Shortness of Breath, Hemoptysis


Cardiac (ROS): No: Chest Pain, Lightheadedness, Palpitations, Chest Tightness


ABD/GI: No: Constipated, Diarrhea, Nausea, Rectal Bleeding, Vomiting, Tarry 

Stools


: No: Burning, Dysuria, Hematuria


Musculoskeletal: Yes: Joint Pain (left wrist), Joint Swelling (left wrist).  No

: Back Pain


Integumentary: No: Bruising, Erythema, Rash


Neurological: No: Headache, Numbness, Tingling, Tremors


Psychiatric: No: Change in Appetite


Endocrine: No: Unexplained Weight Loss


Hematologic/Lymphatic: No: Anemia





*Physical Exam





- Vital Signs


 Last Vital Signs











Temp Pulse Resp BP Pulse Ox


 


 97.7 F   71   18   121/90   94 L


 


 02/05/20 18:04  02/05/20 18:04  02/05/20 18:04  02/05/20 18:04  02/05/20 18:04














- Physical Exam


General Appearance: Yes: Nourished, Appropriately Dressed.  No: Apparent 

Distress, Intoxicated


HEENT: positive: EOMI, TRISH, Normal Voice, Symmetrical, Pharynx Normal, Hearing 

Grossly Normal.  negative: Pale Conjunctivae, Scleral Icterus (R), Scleral 

Icterus (L), Muffled/Hoarse voice, Pharyngeal Erythema, Tonsillar Exudate, 

Tonsillar Erythema, Nasal Congestion, Rhinorrhea, Sinus Tenderness, Excessive 

drooling


Neck: positive: Trachea midline, Supple.  negative: Tender, Lymphadenopathy (R)

, Lymphadenopathy (L)


Respiratory/Chest: positive: Lungs Clear, Normal Breath Sounds.  negative: 

Chest Tender, Respiratory Distress, Accessory Muscle Use


Cardiovascular: positive: Regular Rhythm, Regular Rate, S1, S2.  negative: 

Systolic Murmur


Gastrointestinal/Abdominal: positive: Normal Bowel Sounds, Flat, Soft.  negative

: Tender, Distended, Guarding, Rebound


Lymphatic: negative: Adenopathy


Musculoskeletal: negative: Normal Inspection (contraction in the legs 

bilaterally. inability to extend digits in right hand with scar noted on the 

dorsal right wrist. Swelling noted in the left wrist and hand dorsal side at 

the site of 3-5th MTP. ), CVA Tenderness, Vertebral Tenderness


Extremity: positive: Normal Capillary Refill, Tender (reference m[facility] note).  

negative: Normal Range of Motion (unable to ROM fully his left wrist. Right 

wrist has chronic limitations of ROM), Coldness, Cyanosis


Integumentary: positive: Normal Color, Dry, Warm


Neurologic: positive: Fully Oriented, Alert, Normal Mood/Affect.  negative: 

Motor Strength 5/5 (paraplegia from T4 and distal. loss of motor function and 

sensation)





ED Treatment Course





- LABORATORY


CBC & Chemistry Diagram: 


 02/05/20 22:10





 02/05/20 22:10





Medical Decision Making





- Medical Decision Making





65yo M with a PMH of COPD, hypotension, adenocarcinoma of the right lung, 

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 

recurrent UTI (multiple admissions at [facility] for abx), and splenectomy presents to 

the emergency department s/p hand injury.





Initial vitals:


 Initial Vital Signs











Temp Pulse Resp BP Pulse Ox


 


 97.7 F   71   18   121/90   94 L


 


 02/05/20 18:04  02/05/20 18:04  02/05/20 18:04  02/05/20 18:04  02/05/20 18:04








Work up:


patient with a multitude of co-morbidities most notably tri-plegia (limited 

function of his right hand due to fusion and paraplegia of the legs) presents 

to the emergency department with inability to use left hand secondary to pain 

sustained from trauma inflicted by a household furniture item. Concerning for 

fracture vs sublaxation of the left wrist. 


Will obtain imaging. will obtain basic blood work.


In addition, patient had profuse diarrhea in the department with a strong odor 

of c-diff. c-diff toxin ordered. per the patient, he was on levaquin 1 week ago 

for UTI secondary to urinary retention secondary to neurogenic bladder. 





 Laboratory Tests











  02/05/20 02/05/20





  22:10 22:10


 


WBC  17.3 H 


 


RBC  4.62 


 


Hgb  13.4 


 


Hct  40.7 


 


MCV  88.1 


 


MCH  29.0 


 


MCHC  32.9 


 


RDW  16.7 H 


 


Plt Count  560 H 


 


MPV  9.0 


 


Absolute Neuts (auto)  13.9 H 


 


Neutrophils %  80.5 


 


Lymphocytes %  8.5  D 


 


Monocytes %  10.3 H 


 


Eosinophils %  0.7 


 


Basophils %  0.0 


 


Nucleated RBC %  0 


 


Sodium   132 L


 


Potassium   4.5


 


Chloride   98


 


Carbon Dioxide   28


 


Anion Gap   6 L


 


BUN   5.3 L


 


Creatinine   0.5 L


 


Est GFR (CKD-EPI)AfAm   130.88


 


Est GFR (CKD-EPI)NonAf   112.92


 


Random Glucose   112 H


 


Calcium   8.8


 


Total Bilirubin   0.5


 


AST   19


 


ALT   14


 


Alkaline Phosphatase   133 H


 


Total Protein   6.8


 


Albumin   2.7 L








Patient was given 4 mg of morphine for analgesia control


Patient was signed out to Dr. Dey for further work up and management. 





Discharge





- Discharge Information


Problems reviewed: Yes


Clinical Impression/Diagnosis: 


 Left wrist injury








- Follow up/Referral


Referrals: 


Sony Poon MD [Primary Care Provider] - 





- Patient Discharge Instructions





- Post Discharge Activity

## 2020-02-05 NOTE — PDOC
Attending Attestation





- Resident


Resident Name: Gaudencio Enriquez





- ED Attending Attestation


I have performed the following: I have examined & evaluated the patient, The 

case was reviewed & discussed with the resident, I agree w/resident's findings 

& plan





- HPI


HPI: 





02/05/20 23:11


see resident hpi





- Physicial Exam


PE: 





02/05/20 23:11


see resident exam





- Medical Decision Making





02/05/20 23:11


64-year-old male with history of spinal cord injury and triplegia status post 

fall with pain to left hand and wrist


There is no displaced fracture noted on x-ray, patient placed in splint


We will admit to medical service as patient now has no usable limb for further 

management and OT evaluation

## 2020-02-06 LAB
ANION GAP SERPL CALC-SCNC: 7 MMOL/L (ref 8–16)
APPEARANCE UR: CLEAR
BACTERIA # UR AUTO: 117.8 /HPF
BASOPHILS # BLD: 0.2 % (ref 0–2)
BILIRUB UR STRIP.AUTO-MCNC: NEGATIVE MG/DL
BUN SERPL-MCNC: 8.1 MG/DL (ref 7–18)
CALCIUM SERPL-MCNC: 8.7 MG/DL (ref 8.5–10.1)
CASTS URNS QL MICRO: 3 /LPF (ref 0–8)
CHLORIDE SERPL-SCNC: 100 MMOL/L (ref 98–107)
CO2 SERPL-SCNC: 28 MMOL/L (ref 21–32)
COLOR UR: YELLOW
CREAT SERPL-MCNC: 0.5 MG/DL (ref 0.55–1.3)
DEPRECATED RDW RBC AUTO: 17 % (ref 11.9–15.9)
EOSINOPHIL # BLD: 1.3 % (ref 0–4.5)
EPITH CASTS URNS QL MICRO: 0.3 /HPF
GLUCOSE SERPL-MCNC: 105 MG/DL (ref 74–106)
HCT VFR BLD CALC: 40.7 % (ref 35.4–49)
HGB BLD-MCNC: 13.5 GM/DL (ref 11.7–16.9)
KETONES UR QL STRIP: NEGATIVE
LEUKOCYTE ESTERASE UR QL STRIP.AUTO: (no result)
LYMPHOCYTES # BLD: 10.3 % (ref 8–40)
MCH RBC QN AUTO: 29 PG (ref 25.7–33.7)
MCHC RBC AUTO-ENTMCNC: 33.2 G/DL (ref 32–35.9)
MCV RBC: 87.3 FL (ref 80–96)
MONOCYTES # BLD AUTO: 12.4 % (ref 3.8–10.2)
NEUTROPHILS # BLD: 75.8 % (ref 42.8–82.8)
NITRITE UR QL STRIP: NEGATIVE
PH UR: 6.5 [PH] (ref 5–8)
PLATELET # BLD AUTO: 496 K/MM3 (ref 134–434)
PMV BLD: 8.8 FL (ref 7.5–11.1)
POTASSIUM SERPLBLD-SCNC: 4 MMOL/L (ref 3.5–5.1)
PROT UR QL STRIP: (no result)
PROT UR QL STRIP: NEGATIVE
RBC # BLD AUTO: 3.8 /HPF (ref 0–4)
RBC # BLD AUTO: 4.67 M/MM3 (ref 4–5.6)
SODIUM SERPL-SCNC: 135 MMOL/L (ref 136–145)
SP GR UR: 1.01 (ref 1.01–1.03)
UROBILINOGEN UR STRIP-MCNC: 0.2 MG/DL (ref 0.2–1)
WBC # BLD AUTO: 11.7 K/MM3 (ref 4–10)
WBC # UR AUTO: 38 /HPF (ref 0–5)

## 2020-02-06 RX ADMIN — KETOROLAC TROMETHAMINE PRN MG: 15 INJECTION, SOLUTION INTRAMUSCULAR; INTRAVENOUS at 16:18

## 2020-02-06 RX ADMIN — MORPHINE SULFATE PRN MG: 10 SOLUTION ORAL at 21:59

## 2020-02-06 RX ADMIN — HEPARIN SODIUM SCH: 5000 INJECTION, SOLUTION INTRAVENOUS; SUBCUTANEOUS at 06:42

## 2020-02-06 RX ADMIN — HEPARIN SODIUM SCH UNIT: 5000 INJECTION, SOLUTION INTRAVENOUS; SUBCUTANEOUS at 14:09

## 2020-02-06 RX ADMIN — HEPARIN SODIUM SCH: 5000 INJECTION, SOLUTION INTRAVENOUS; SUBCUTANEOUS at 21:32

## 2020-02-06 RX ADMIN — HEPARIN SODIUM SCH UNIT: 5000 INJECTION, SOLUTION INTRAVENOUS; SUBCUTANEOUS at 21:29
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
## 2020-02-06 NOTE — PN
Teaching Attending Note


Name of Resident: Leonides Galo





ATTENDING PHYSICIAN STATEMENT





I saw and evaluated the patient.


I reviewed the resident's note and discussed the case with the resident.


I agree with the resident's findings and plan as documented.








SUBJECTIVE:


63yo M with a PMH of COPD, hypotension, adenocarcinoma of the right lung, 

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 

recurrent UTI, Status post splenectomy Presents to emergency room status post 

fall from bed with outstretched left hand breaking his fall on a stool.Patient 

reported pain in  his left wrist after this incident. At baseline he is 

paralyzed from his upper chest down including his right upper extremity.  Has 

full function of his left upper extremity.


After attempting to break his fall prior to arrival he reported pain in his 

left wrist with some tenderness to touch.  No decreased sensation.No trauma to 

his head or any other part of body reported.He reports 24/7 aid at his home. 

Patient noted to be taking Levaquin for a UTI, took Levaquin for about 1 week 

and stopped taking it about 3 days prior.  Reports constipation at baseline 

however had a large BM in the emergency room on this arrival.








OBJECTIVE:


 Last Vital Signs











Temp Pulse Resp BP Pulse Ox


 


 97.7 F   66   18   130/64   92 L


 


 02/05/20 18:04  02/06/20 02:29  02/06/20 02:29  02/06/20 02:29  02/06/20 02:29





GENERAL:


Well developed, well nourished. Awake and alert. No acute distress.


HEENT:


Normocephalic, atraumatic. PERRLA, EOMI. No conjunctival pallor. Sclera are non-

icteric. Moist mucous membranes. 


NECK: 


Supple. Full ROM. No JVD. Carotid pulses 2+ and symmetric, without bruits. No 

thyromegaly. No lymphadenopathy.


CARDIOVASCULAR:


Regular rate and rhythm. No murmurs, rubs, or gallops. Distal pulses are 2+ and 

symmetric. 


PULMONARY: 


No evidence of respiratory distress. Lungs clear to auscultation bilaterally. 

No wheezing, rales or rhonchi.


ABDOMINAL:


Soft. Non-tender. Non-distended. No rebound or guarding. No organomegaly. 

Normoactive bowel sounds. 


MUSCULOSKELETAL 


Left mid forearm/wrist anterior tenderness to palpation.


EXTREMITIES: 


No cyanosis. No clubbing. No edema. No calf tenderness.


SKIN: 


Warm and dry. Normal capillary refill. No decubitus ulcers noted however large 

external hemorrhoids present


PSYCHIATRIC: 


Cooperative. Good eye contact. Appropriate mood and affect.


Campbell catheter presentstated that was inserted about 1 week ago





 Abnormal Lab Results











  02/05/20 02/05/20





  22:10 22:10


 


WBC  17.3 H 


 


RDW  16.7 H 


 


Plt Count  560 H 


 


Absolute Neuts (auto)  13.9 H 


 


Monocytes %  10.3 H 


 


Sodium   132 L


 


Anion Gap   6 L


 


BUN   5.3 L


 


Creatinine   0.5 L


 


Random Glucose   112 H


 


Alkaline Phosphatase   133 H


 


Albumin   2.7 L








X-ray of left wrist noted -Grossly was negative for any fractures or 

dislocation.





ASSESSMENT AND PLAN:


Status post fall with likely sprain of left wrist.  Given tenderness over wrist 

and midforearm should obtain x-ray of radius and ulna to exclude fracture.Left 

wrist was placed in splint.


Admit to [facility] [place]


X-ray left radius and ulna


Toradol 30 mg IV as needed for pain


Physical therapy evaluation


Better recent fall precautions


#Leukocytosisuncertain cause.  At this time would trend CBC, if persistent WBC 

would rule out infectious cause such as recurrent UTI.  May be leukemoid 

reaction status post fall to wrist.  No fevers or physical exam evidence of 

infection at this time.  If patient has diarrhea it hospital would seek to rule 

out C. difficile colitis as was recently taking antibiotics.


#Hypoalbuminemia


#Thrombocytosissuspect secondary


#Hyponatremia


#Chronic Campbell catheterPlaced secondary to acute renal retention,


will require frequent Campbell catheter change 


Urology follow-up


DVT prophylaxisheparin subcutaneously

## 2020-02-06 NOTE — EKG
Test Reason : 

Blood Pressure : ***/*** mmHG

Vent. Rate : 067 BPM     Atrial Rate : 067 BPM

   P-R Int : 150 ms          QRS Dur : 078 ms

    QT Int : 400 ms       P-R-T Axes : 073 075 084 degrees

   QTc Int : 422 ms

 

NORMAL SINUS RHYTHM

NORMAL ECG

WHEN COMPARED WITH ECG OF 02-JAN-2020 18:40,

NO SIGNIFICANT CHANGE WAS FOUND

Confirmed by LENNOX JIMENEZ MD ([id number]) on 2/6/2020 2:26:16 PM

 

Referred By:             Confirmed By:LENNOX JIMENEZ MD

## 2020-02-06 NOTE — PDOC
*Physical Exam





- Vital Signs


 Last Vital Signs











Temp Pulse Resp BP Pulse Ox


 


 97.6 F   102 H  20   122/76   95 


 


 02/06/20 06:30  02/06/20 06:30  02/06/20 06:30  02/06/20 06:30  02/06/20 06:30














ED Treatment Course





- LABORATORY


CBC & Chemistry Diagram: 


 02/06/20 08:46





 02/06/20 08:46





- Medications


Given in the ED: 


ED Medications














Discontinued Medications














Generic Name Dose Route Start Last Admin





  Trade Name Juan R  PRN Reason Stop Dose Admin


 


Ketorolac Tromethamine  15 mg  02/06/20 00:26  02/06/20 00:51





  Toradol Injection -  IVPUSH  02/06/20 00:27  15 mg





  ONCE ONE   Administration





     





     





     





     


 


Ketorolac Tromethamine  15 mg  02/06/20 03:00  02/06/20 04:14





  Toradol Injection -  IVPUSH  02/11/20 02:59  Not Given





  Q4H ALE   





     





     





     





     


 


Morphine Sulfate  4 mg  02/05/20 20:14  02/05/20 21:11





  Morphine Injection -  IVPUSH  02/05/20 20:15  4 mg





  ONCE ONE   Administration





     





     





     





     














Medical Decision Making





- Medical Decision Making





02/06/20 07:51


Received call from Dr Peterson. Patient has lucency in radius that requires 

follow up.





Discharge





- Discharge Information


Problems reviewed: Yes


Clinical Impression/Diagnosis: 


 Left wrist injury





Condition: Stable





- Follow up/Referral





- Patient Discharge Instructions





- Post Discharge Activity

## 2020-02-07 LAB
ALBUMIN SERPL-MCNC: 2.5 G/DL (ref 3.4–5)
ALP SERPL-CCNC: 119 U/L (ref 45–117)
ALT SERPL-CCNC: 16 U/L (ref 13–61)
ANION GAP SERPL CALC-SCNC: 7 MMOL/L (ref 8–16)
ANISOCYTOSIS BLD QL: 0
AST SERPL-CCNC: 13 U/L (ref 15–37)
BILIRUB SERPL-MCNC: 0.3 MG/DL (ref 0.2–1)
BUN SERPL-MCNC: 11.8 MG/DL (ref 7–18)
CALCIUM SERPL-MCNC: 8.6 MG/DL (ref 8.5–10.1)
CHLORIDE SERPL-SCNC: 106 MMOL/L (ref 98–107)
CO2 SERPL-SCNC: 28 MMOL/L (ref 21–32)
CREAT SERPL-MCNC: 0.5 MG/DL (ref 0.55–1.3)
DEPRECATED RDW RBC AUTO: 16.7 % (ref 11.9–15.9)
GLUCOSE SERPL-MCNC: 95 MG/DL (ref 74–106)
HCT VFR BLD CALC: 38.6 % (ref 35.4–49)
HGB BLD-MCNC: 12.9 GM/DL (ref 11.7–16.9)
MACROCYTES BLD QL: 0
MAGNESIUM SERPL-MCNC: 2.1 MG/DL (ref 1.8–2.4)
MCH RBC QN AUTO: 29.2 PG (ref 25.7–33.7)
MCHC RBC AUTO-ENTMCNC: 33.3 G/DL (ref 32–35.9)
MCV RBC: 87.7 FL (ref 80–96)
PLATELET # BLD AUTO: 483 K/MM3 (ref 134–434)
PLATELET BLD QL SMEAR: NORMAL
PMV BLD: 8.7 FL (ref 7.5–11.1)
POTASSIUM SERPLBLD-SCNC: 4.3 MMOL/L (ref 3.5–5.1)
PROT SERPL-MCNC: 6.6 G/DL (ref 6.4–8.2)
RBC # BLD AUTO: 4.4 M/MM3 (ref 4–5.6)
SODIUM SERPL-SCNC: 141 MMOL/L (ref 136–145)
WBC # BLD AUTO: 13.3 K/MM3 (ref 4–10)

## 2020-02-07 RX ADMIN — MORPHINE SULFATE PRN MG: 10 SOLUTION ORAL at 21:12

## 2020-02-07 RX ADMIN — HEPARIN SODIUM SCH UNIT: 5000 INJECTION, SOLUTION INTRAVENOUS; SUBCUTANEOUS at 14:01

## 2020-02-07 RX ADMIN — KETOROLAC TROMETHAMINE PRN MG: 15 INJECTION, SOLUTION INTRAMUSCULAR; INTRAVENOUS at 18:17

## 2020-02-07 RX ADMIN — HEPARIN SODIUM SCH: 5000 INJECTION, SOLUTION INTRAVENOUS; SUBCUTANEOUS at 05:46

## 2020-02-07 RX ADMIN — MORPHINE SULFATE PRN MG: 10 SOLUTION ORAL at 15:41

## 2020-02-07 RX ADMIN — HEPARIN SODIUM SCH UNIT: 5000 INJECTION, SOLUTION INTRAVENOUS; SUBCUTANEOUS at 21:14

## 2020-02-07 RX ADMIN — MORPHINE SULFATE PRN MG: 10 SOLUTION ORAL at 04:15

## 2020-02-07 RX ADMIN — KETOROLAC TROMETHAMINE PRN MG: 15 INJECTION, SOLUTION INTRAMUSCULAR; INTRAVENOUS at 07:28

## 2020-02-07 NOTE — CON.ORTH
Consult


Consult Specialty:: Orthopedics


Reason for Consultation:: Left wrist pain s/p mechanical fall





- History of Present Illness


Chief Complaint: Left wrist pain


History of Present Illness: 


This is a 64 year old male with PMHx of adenocarcinoma of right lung, COPD, HTN 

and paraplegia  who presented to ED with left wrist pain after falling out of 

his bed. Patient states he hit his hand on a steel stool he has next to his bed.

He states pain was mostly to dorsal aspect of wrist, worse with any ROM. During 

evaluation, patient notes pain is significantly improved since his admission. 

Has no complaints of pain at this time. Wearing arm splint placed by ER. Patient

states he does not have full ROM of wrist and hand at baseline due to several 

previous injuries. Denies any numbness or tingling to fingers.





- History Source


History Provided By: Patient


Limitations to Obtaining History: No Limitations





- Past Medical History


CNS: Yes: Other (paraplegia due to accidental fall (T2 spinal fracture))


Gastrointestinal: Yes: Other (Hepatic hemangiomas)


Renal/: Yes: Other (Bladder dysfunction)


Infectious Disease: Yes: Other (multiple UTIs  Pseudomonas)


Musculoskeletal: Yes: Paraplegia, Other (Chronic pain)





- Past Surgical History


Past Surgical History: Yes: Splenectomy





- Alcohol/Substance Use


Hx Alcohol Use: No


History of Substance Use: reports: Marijuana





- Smoking History


Smoking history: Former smoker


Have you smoked in the past 12 months: No


Aproximately how many cigarettes per day: 3


If you are a former smoker, when did you quit?: 1986





- Social History


Usual Living Arrangement: With Significant Other


ADL: Support Services ([facility] (24hr))


History of Recent Travel: No





Home Medications





- Allergies


Allergies/Adverse Reactions: 


                                    Allergies











Allergy/AdvReac Type Severity Reaction Status Date / Time


 


vancomycin Allergy   Verified 02/05/20 18:10














- Home Medications


Home Medications: 


Ambulatory Orders





Diazepam [Valium] 10 mg PO TID PRN  tablet MDD 30mg 10/10/18 


Morphine 10 mg/5 ml Liquid [Morphine 10 mg/5 mL Liquid -] 20 mg PO Q4H MDD 60mg 

10/07/19 











Review of Systems





- Review of Systems


Musculoskeletal: reports: Other (Left wrist pain)





Physical Exam for Ortho


Vital Signs: 


                                   Vital Signs











Temperature  98.3 F   02/07/20 15:00


 


Pulse Rate  73   02/07/20 15:00


 


Respiratory Rate  20   02/07/20 15:00


 


Blood Pressure  125/71   02/07/20 15:00


 


O2 Sat by Pulse Oximetry (%)  97   02/07/20 09:00











Labs: 


                                    CBC, BMP





                                 02/07/20 12:57 





                                 02/07/20 12:57 











- Upper Extremity


Wrist: Yes: Left (No skin lesions, increased warmth, erythema or swelling. 

Nontender throughout. Limited ROM which patient states is his baseline. 5/5 

strenght. NVID.)





Imaging





- Results


X-ray: Report Reviewed, Image Reviewed (2 views of left wrist show evidence of 

previous ulnar styloid fracture. No acute fracture or dislocation seen. Area of 

lucency seen at distal radius which was previously seen in XR on 11/12/2019 and 

unchanged.)





Assessment/Plan


This is a 64 year old male with PMHx of adenocarcinoma of right lung, COPD, HTN 

and paraplegia who presented to ED with left wrist pain after falling out of his

bed. Patient reports pain is significantly improved since his admission.


-Discussed case with Dr. Riley


-XR negative for fracture. Area of lucency seen at distal radius that was also 

seen on wrist XR from 11/12/2019. No change in shape or size.


-Explained to the patient the lesion does not appear to be invading the bone cor

tices and has low probability of causing a fracture


-Recommended he seek treatment from musculoskeletal oncologist


-Patient stated he does not wish to seek treatment as of yet


-He can continue wearing removable wrist splint put on by ER if he feels most 

comfortable this way


-Feel free to re-consult if needed
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
## 2020-02-08 RX ADMIN — HEPARIN SODIUM SCH UNIT: 5000 INJECTION, SOLUTION INTRAVENOUS; SUBCUTANEOUS at 21:39

## 2020-02-08 RX ADMIN — CEFTRIAXONE SCH MLS/HR: 1 INJECTION, POWDER, FOR SOLUTION INTRAMUSCULAR; INTRAVENOUS at 12:28

## 2020-02-08 RX ADMIN — HEPARIN SODIUM SCH UNIT: 5000 INJECTION, SOLUTION INTRAVENOUS; SUBCUTANEOUS at 06:29

## 2020-02-08 RX ADMIN — DOCUSATE SODIUM SCH MG: 100 CAPSULE, LIQUID FILLED ORAL at 08:55

## 2020-02-08 RX ADMIN — MORPHINE SULFATE PRN MG: 10 SOLUTION ORAL at 12:27

## 2020-02-08 RX ADMIN — MORPHINE SULFATE PRN MG: 10 SOLUTION ORAL at 06:27

## 2020-02-08 RX ADMIN — HEPARIN SODIUM SCH: 5000 INJECTION, SOLUTION INTRAVENOUS; SUBCUTANEOUS at 13:43

## 2020-02-08 RX ADMIN — DOCUSATE SODIUM SCH: 100 CAPSULE, LIQUID FILLED ORAL at 13:42

## 2020-02-08 RX ADMIN — MORPHINE SULFATE PRN MG: 10 SOLUTION ORAL at 21:39

## 2020-02-08 NOTE — CON.ID
Consult





- Past Medical History


CNS: Yes: Other (paraplegia due to accidental fall (T2 spinal fracture))


Gastrointestinal: Yes: Other (Hepatic hemangiomas)


Renal/: Yes: Other (Bladder dysfunction)


Infectious Disease: Yes: Other (multiple UTIs  Pseudomonas)


Musculoskeletal: Yes: Paraplegia, Other (Chronic pain)





- Past Surgical History


Past Surgical History: Yes: Splenectomy





- Alcohol/Substance Use


Hx Alcohol Use: No


History of Substance Use: reports: Marijuana





- Smoking History


Smoking history: Former smoker


Have you smoked in the past 12 months: No


Aproximately how many cigarettes per day: 3


If you are a former smoker, when did you quit?: 1986





- Social History


Usual Living Arrangement: With Significant Other


ADL: Support Services ([facility] (24hr))


History of Recent Travel: No





Home Medications





- Allergies


Allergies/Adverse Reactions: 


 Allergies











Allergy/AdvReac Type Severity Reaction Status Date / Time


 


vancomycin Allergy   Verified 02/05/20 18:10














- Home Medications


Home Medications: 


Ambulatory Orders





Diazepam [Valium] 10 mg PO TID PRN  tablet MDD 30mg 10/10/18 


Morphine 10 mg/5 ml Liquid [Morphine 10 mg/5 mL Liquid -] 20 mg PO Q4H MDD 60mg 

10/07/19 











Physical Exam


Vital Signs: 


 Vital Signs











Temperature  98.1 F   02/08/20 09:10


 


Pulse Rate  60   02/08/20 09:10


 


Respiratory Rate  18   02/08/20 09:10


 


Blood Pressure  129/59 L  02/08/20 09:10


 


O2 Sat by Pulse Oximetry (%)  98   02/07/20 21:00











Labs: 


 CBC, BMP





 02/07/20 12:57 





 02/07/20 12:57

## 2020-02-08 NOTE — PN
Physical Exam: 


SUBJECTIVE: Patient seen and examined





OBJECTIVE:


Patient with a history of adenocarcinoma of his lung.  Was evaluated by 

oncologist earlier this year and deemed not to be a candidate for immunotherapy 

and a poor candidate for chemotherapy.  Patient has declined treatment for lung 

cancer previously with oncologist.


----


Patient is a 64 year old male with a significant past medical history of 

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 

neurogenic bladder (chronic starr), recurrent UTIs, hypotension. He presented 

to the ED on 2/5/2020 with complaints of left forearm pain after  trauma 

earlier today.  He also c/o of suprapubic pain for the past 2 days.   Patient 

sustained trama to left forearm after he reached out from his bed and braced 

his fall with his left hand.  





imaging:


rad/wrist/left hand 2/5/2020: loss of bone density, degenrative changes, non 

healed ulnar styloid process fracture.  ovoid lucency distal radius.  on a 

splint.


shoulder left 2/5/2020: no acute pathology


rad/forearm left 2/5/2020: 2 views of the forearm reveal a lucency in the 

distal right radius.  possible mets from lung cancer.


 


 Vital Signs











 Period  Temp  Pulse  Resp  BP Sys/Malone  Pulse Ox


 


 Last 24 Hr  98.1 F-98.6 F  56-73  15-20  125-138/51-75  98








GENERAL: Awake, alert, and fully oriented, in no acute distress.


HEAD: Normal with no signs of trauma.


EYES: Pupils equal, round and reactive to light, extraocular movements intact, 

sclera anicteric, conjunctiva clear. No lid lag.


EARS, NOSE, THROAT: Ears normal, nares patent, oropharynx clear without 

exudates. Moist mucous membranes.


NECK: Normal range of motion, supple without lymphadenopathy, JVD, or masses.


LUNGS: Breath sounds equal, clear to auscultation bilaterally. No wheezes, and 

no crackles. No accessory muscle use.


HEART: Regular rate and rhythm, normal S1 and S2 without murmur


ABDOMEN: Soft, nontender, not distended, normoactive bowel sounds, no guarding, 

no rebound, no masses.  


MUSCULOSKELETAL: history of paraplagia (h/o spinal fracture)


UPPER EXTREMITIES:  left arm splint


LOWER EXTREMITIES: history of paraplagia (h/o spinal fracture)


PSYCHIATRIC: Cooperative. Good eye contact. Appropriate mood and affect





 Laboratory Results - last 24 hr











  02/07/20 02/07/20





  12:57 12:57


 


WBC  13.3 H 


 


RBC  4.40 


 


Hgb  12.9 


 


Hct  38.6 


 


MCV  87.7 


 


MCH  29.2 


 


MCHC  33.3 


 


RDW  16.7 H 


 


Plt Count  483 H 


 


MPV  8.7 


 


Absolute Neuts (auto)  9.6 H 


 


Neutrophils %  Np 


 


Neutrophils % (Manual)  75.2 


 


Band Neutrophils %  0.0 


 


Lymphocytes %  Np 


 


Lymphocytes % (Manual)  7.9 L 


 


Monocytes %  Np 


 


Monocytes % (Manual)  5 


 


Eosinophils %  Np 


 


Eosinophils % (Manual)  6.9 H 


 


Basophils %  Np 


 


Basophils % (Manual)  1.0  D 


 


Myelocytes % (Man)  0 


 


Promyelocytes % (Man)  0 


 


Blast Cells % (Manual)  0 


 


Nucleated RBC %  0 


 


Metamyelocytes  0 


 


Hypochromia  0 


 


Platelet Estimate  Normal 


 


Polychromasia  0 


 


Poikilocytosis  0 


 


Anisocytosis  0 


 


Microcytosis  0 


 


Macrocytosis  0 


 


Sodium   141


 


Potassium   4.3


 


Chloride   106


 


Carbon Dioxide   28


 


Anion Gap   7 L


 


BUN   11.8


 


Creatinine   0.5 L


 


Est GFR (CKD-EPI)AfAm   130.88


 


Est GFR (CKD-EPI)NonAf   112.92


 


Random Glucose   95


 


Calcium   8.6


 


Magnesium   2.1


 


Total Bilirubin   0.3


 


AST   13 L


 


ALT   16


 


Alkaline Phosphatase   119 H


 


Total Protein   6.6


 


Albumin   2.5 L








Active Medications











Generic Name Dose Route Start Last Admin





  Trade Name Freq  PRN Reason Stop Dose Admin


 


Diazepam  10 mg  02/06/20 00:36  02/08/20 06:29





  Valium -  PO   10 mg





  TID PRN   Administration





  ANXIETY   





     





     





     


 


Docusate Sodium  100 mg  02/08/20 08:00  02/08/20 08:55





  Colace -  PO   100 mg





  TID ALE   Administration





     





     





     





     


 


Heparin Sodium (Porcine)  5,000 unit  02/06/20 06:00  02/08/20 06:29





  Heparin -  SQ   5,000 unit





  TID ALE   Administration





     





     





     





     


 


Ceftriaxone Sodium 1 gm/  50 mls @ 100 mls/hr  02/08/20 12:15  02/08/20 12:28





  Dextrose  IVPB   100 mls/hr





  DAILY ALE   Administration





     





     





  Protocol   





     


 


Ketorolac Tromethamine  15 mg  02/06/20 12:00  02/07/20 18:17





  Toradol Injection -  IVPUSH  02/11/20 02:59  15 mg





  Q4H PRN   Administration





  PAIN LEVEL 4 - 6   





     





     





     


 


Morphine Sulfate  10 mg  02/06/20 17:45  02/08/20 12:27





  Morphine 10 Mg/5 Ml Liquid  PO   10 mg





  Q6H PRN   Administration





  PAIN LEVEL 7 - 10   





     





     





     


 


Polyethylene Glycol  17 gm  02/08/20 10:00  02/08/20 10:13





  Miralax (For Daily Use) -  PO   17 gm





  DAILY ALE   Administration





     





     





     





     


 


Senna  2 tab  02/08/20 07:53  





  Senna -  PO   





  HS PRN   





  CONSTIPATION   





     





     





     











ASSESSMENT/PLAN:








Problem List





- Problems


(1) Left wrist injury


Assessment/Plan: 


rad/wrist/left hand 2/5/2020: loss of bone density, degenrative changes, non 

healed ulnar styloid process fracture.  ovoid lucency distal radius.  on a 

splint.


shoulder left 2/5/2020: no acute pathology


rad/forearm left 2/5/2020: 2 views of the forearm reveal a lucency in the 

distal right radius.  possible mets from lung cancer.


seen by ortho and evaluation with a muscularskeletal oncologist recommended.


Code(s): S69.92XA - UNSP INJURY OF LEFT WRIST, HAND AND FINGER(S), INIT ENCNTR 

  





(2) Adenocarcinoma of right lung


Assessment/Plan: 


Patient has been evaluated by oncologist in the past and not a candidate for 

chemotherapy. 


Code(s): C34.91 - MALIGNANT NEOPLASM OF UNSP PART OF RIGHT BRONCHUS OR LUNG   





(3) Neurogenic bladder


Assessment/Plan: 


chronic starr catheter (condom).  maintain output.


start flomax


Code(s): N31.9 - NEUROMUSCULAR DYSFUNCTION OF BLADDER, UNSPECIFIED   





(4) Paraplegia


Assessment/Plan: 


bed bound s/p spinal injury


Code(s): G82.20 - PARAPLEGIA, UNSPECIFIED   





(5) Leukocytosis


Assessment/Plan: 


trending down, no fevers


pending ua/uc


Code(s): D72.829 - ELEVATED WHITE BLOOD CELL COUNT, UNSPECIFIED   





(6) Prophylactic measure


Assessment/Plan: 


fen


tolerating po


monitor electrolyes


low salt diet





full code





Code(s): Z29.9 - ENCOUNTER FOR PROPHYLACTIC MEASURES, UNSPECIFIED   





Visit type





- Emergency Visit


Emergency Visit: Yes


ED Registration Date: 02/05/20


Care time: The patient presented to the Emergency Department on the above date 

and was hospitalized for further evaluation of their emergent condition.





- New Patient


This patient is new to me today: No





- Critical Care


Critical Care patient: No





- Discharge Referral


Referred to [facility] No

## 2020-02-09 RX ADMIN — MORPHINE SULFATE PRN MG: 10 SOLUTION ORAL at 09:36

## 2020-02-09 RX ADMIN — TAMSULOSIN HYDROCHLORIDE SCH MG: 0.4 CAPSULE ORAL at 21:56

## 2020-02-09 RX ADMIN — MORPHINE SULFATE PRN MG: 10 SOLUTION ORAL at 02:59

## 2020-02-09 RX ADMIN — CEFTRIAXONE SCH MLS/HR: 1 INJECTION, POWDER, FOR SOLUTION INTRAMUSCULAR; INTRAVENOUS at 09:35

## 2020-02-09 RX ADMIN — KETOROLAC TROMETHAMINE PRN MG: 15 INJECTION, SOLUTION INTRAMUSCULAR; INTRAVENOUS at 08:10

## 2020-02-09 RX ADMIN — DOXYCYCLINE HYCLATE SCH MG: 100 CAPSULE ORAL at 17:34

## 2020-02-09 RX ADMIN — MORPHINE SULFATE PRN MG: 10 SOLUTION ORAL at 21:56

## 2020-02-09 RX ADMIN — HEPARIN SODIUM SCH UNIT: 5000 INJECTION, SOLUTION INTRAVENOUS; SUBCUTANEOUS at 05:58

## 2020-02-09 RX ADMIN — HEPARIN SODIUM SCH UNIT: 5000 INJECTION, SOLUTION INTRAVENOUS; SUBCUTANEOUS at 14:23

## 2020-02-09 RX ADMIN — HEPARIN SODIUM SCH UNIT: 5000 INJECTION, SOLUTION INTRAVENOUS; SUBCUTANEOUS at 21:56

## 2020-02-09 RX ADMIN — DOXYCYCLINE HYCLATE SCH MG: 100 CAPSULE ORAL at 12:08

## 2020-02-09 RX ADMIN — MORPHINE SULFATE PRN MG: 10 SOLUTION ORAL at 16:22

## 2020-02-09 NOTE — PN
Physical Exam: 


SUBJECTIVE: Patient seen and examined at the bedside. 





OBJECTIVE:


Patient is a 64 year old male with a significant past medical history of 

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 

neurogenic bladder (chronic starr), recurrent UTIs, hypotension. He presented 

to the ED on 2/5/2020 with complaints of left forearm pain after  trauma 

earlier today.  He also c/o of suprapubic pain for the past 2 days.   Patient 

sustained trauma to left forearm after he reached out from his bed and braced 

his fall with his left hand.  





imaging:


rad/wrist/left hand 2/5/2020: loss of bone density, degenerative changes, non 

healed ulnar styloid process fracture.  ovoid lucency distal radius.  on a 

splint.


shoulder left 2/5/2020: no acute pathology


rad/forearm left 2/5/2020: 2 views of the forearm reveal a lucency in the 

distal right radius.  possible mets from lung cancer.





remove starr in a.m. at 0600 and perform a voiding trail.  patient to be placed 

on condom cath.


 


 Vital Signs











 Period  Temp  Pulse  Resp  BP Sys/Malone  Pulse Ox


 


 Last 24 Hr  97.8 F-98.0 F  52-72  18-20  134-172/62-72  93-96








GENERAL: Awake, alert, and fully oriented, in no acute distress.


HEAD: Normal with no signs of trauma.


EYES: Pupils equal, round and reactive to light, extraocular movements intact, 

sclera anicteric, conjunctiva clear. No lid lag.


EARS, NOSE, THROAT: Ears normal, nares patent, oropharynx clear without 

exudates. Moist mucous membranes.


NECK: Normal range of motion, supple without lymphadenopathy, JVD, or masses.


LUNGS: Breath sounds equal, clear to auscultation bilaterally. No wheezes, and 

no crackles. No accessory muscle use.


HEART: Regular rate and rhythm, normal S1 and S2 without murmur


ABDOMEN: Soft, nontender, not distended, normoactive bowel sounds, no guarding, 

no rebound, no masses.  


MUSCULOSKELETAL: history of paraplagia (h/o spinal fracture)


UPPER EXTREMITIES:  left arm splint


LOWER EXTREMITIES: history of paraplagia (h/o spinal fracture)


PSYCHIATRIC: Cooperative. Good eye contact. Appropriate mood and affect


Active Medications











Generic Name Dose Route Start Last Admin





  Trade Name Freq  PRN Reason Stop Dose Admin


 


Diazepam  10 mg  02/06/20 00:36  02/09/20 09:55





  Valium -  PO   10 mg





  TID PRN   Administration





  ANXIETY   





     





     





     


 


Doxycycline Hyclate  100 mg  02/09/20 11:24  02/09/20 17:34





  Vibramycin -  PO   100 mg





  BID@1000,1800 [facility]   Administration





     





     





     





     


 


Heparin Sodium (Porcine)  5,000 unit  02/06/20 06:00  02/09/20 14:23





  Heparin -  SQ   5,000 unit





  TID [facility]   Administration





     





     





     





     


 


Ketorolac Tromethamine  15 mg  02/06/20 12:00  02/09/20 08:10





  Toradol Injection -  IVPUSH  02/11/20 02:59  15 mg





  Q4H PRN   Administration





  PAIN LEVEL 4 - 6   





     





     





     


 


Morphine Sulfate  10 mg  02/06/20 17:45  02/09/20 16:22





  Morphine 10 Mg/5 Ml Liquid  PO   10 mg





  Q6H PRN   Administration





  PAIN LEVEL 7 - 10   





     





     





     


 


Tamsulosin HCl  0.4 mg  02/10/20 08:30  





  Flomax -  PO   





  DAILY@0830 [facility]   





     





     





     





     











ASSESSMENT/PLAN:








Problem List





- Problems


(1) Left wrist injury


Assessment/Plan: 


rad/wrist/left hand 2/5/2020: loss of bone density, degenrative changes, non 

healed ulnar styloid process fracture.  ovoid lucency distal radius.  on a 

splint.


shoulder left 2/5/2020: no acute pathology


rad/forearm left 2/5/2020: 2 views of the forearm reveal a lucency in the 

distal right radius.  possible mets from lung cancer.


seen by ortho and evaluation with a muscularskeletal oncologist recommended.


Code(s): S69.92XA - UNSP INJURY OF LEFT WRIST, HAND AND FINGER(S), INIT ENCNTR 

  





(2) Adenocarcinoma of right lung


Assessment/Plan: 


Patient has been evaluated by oncologist in the past and not a candidate for 

chemotherapy. 


Code(s): C34.91 - MALIGNANT NEOPLASM OF UNSP PART OF RIGHT BRONCHUS OR LUNG   





(3) Neurogenic bladder


Assessment/Plan: 


discontinue indwelling starr and place on condom cath in am. to perform voiding 

trial. 


flomax bid ordered


Code(s): N31.9 - NEUROMUSCULAR DYSFUNCTION OF BLADDER, UNSPECIFIED   





(4) Paraplegia


Assessment/Plan: 


bed bound s/p spinal injury


Code(s): G82.20 - PARAPLEGIA, UNSPECIFIED   





(5) Leukocytosis


Assessment/Plan: 


trending down, no fevers


pending ua/uc


Code(s): D72.829 - ELEVATED WHITE BLOOD CELL COUNT, UNSPECIFIED   





(6) UTI (urinary tract infection)


Assessment/Plan: 


stopped ceftriaxone, as it is resistant to staph epi, patient with allergy to 

vanco


per ID, can start on doxycycline


Code(s): N39.0 - URINARY TRACT INFECTION, SITE NOT SPECIFIED   


Qualifiers: 


   Urinary tract infection type: site unspecified   Hematuria presence: with 

hematuria   Qualified Code(s): N39.0 - Urinary tract infection, site not 

specified; R31.9 - Hematuria, unspecified   





(7) Prophylactic measure


Assessment/Plan: 


fen


tolerating po


monitor electrolyes


low salt diet





full code





Code(s): Z29.9 - ENCOUNTER FOR PROPHYLACTIC MEASURES, UNSPECIFIED   





Visit type





- Emergency Visit


Emergency Visit: Yes


ED Registration Date: 02/05/20


Care time: The patient presented to the Emergency Department on the above date 

and was hospitalized for further evaluation of their emergent condition.





- New Patient


This patient is new to me today: No





- Critical Care


Critical Care patient: No





- Discharge Referral


Referred to [facility] No

## 2020-02-10 VITALS — HEART RATE: 57 BPM | TEMPERATURE: 97.4 F | SYSTOLIC BLOOD PRESSURE: 121 MMHG | DIASTOLIC BLOOD PRESSURE: 53 MMHG

## 2020-02-10 LAB
ALBUMIN SERPL-MCNC: 2.6 G/DL (ref 3.4–5)
ALP SERPL-CCNC: 96 U/L (ref 45–117)
ALT SERPL-CCNC: 13 U/L (ref 13–61)
ANION GAP SERPL CALC-SCNC: 7 MMOL/L (ref 8–16)
AST SERPL-CCNC: 13 U/L (ref 15–37)
BASOPHILS # BLD: 0.4 % (ref 0–2)
BILIRUB SERPL-MCNC: 0.3 MG/DL (ref 0.2–1)
BUN SERPL-MCNC: 10.4 MG/DL (ref 7–18)
CALCIUM SERPL-MCNC: 9.1 MG/DL (ref 8.5–10.1)
CHLORIDE SERPL-SCNC: 103 MMOL/L (ref 98–107)
CO2 SERPL-SCNC: 27 MMOL/L (ref 21–32)
CREAT SERPL-MCNC: 0.6 MG/DL (ref 0.55–1.3)
DEPRECATED RDW RBC AUTO: 16.7 % (ref 11.9–15.9)
EOSINOPHIL # BLD: 3.5 % (ref 0–4.5)
GLUCOSE SERPL-MCNC: 99 MG/DL (ref 74–106)
HCT VFR BLD CALC: 40.2 % (ref 35.4–49)
HGB BLD-MCNC: 13.4 GM/DL (ref 11.7–16.9)
LYMPHOCYTES # BLD: 16.3 % (ref 8–40)
MAGNESIUM SERPL-MCNC: 2 MG/DL (ref 1.8–2.4)
MCH RBC QN AUTO: 29.1 PG (ref 25.7–33.7)
MCHC RBC AUTO-ENTMCNC: 33.2 G/DL (ref 32–35.9)
MCV RBC: 87.6 FL (ref 80–96)
MONOCYTES # BLD AUTO: 8.9 % (ref 3.8–10.2)
NEUTROPHILS # BLD: 70.9 % (ref 42.8–82.8)
PLATELET # BLD AUTO: 490 K/MM3 (ref 134–434)
PMV BLD: 9.8 FL (ref 7.5–11.1)
POTASSIUM SERPLBLD-SCNC: 4.5 MMOL/L (ref 3.5–5.1)
PROT SERPL-MCNC: 6.6 G/DL (ref 6.4–8.2)
RBC # BLD AUTO: 4.59 M/MM3 (ref 4–5.6)
SODIUM SERPL-SCNC: 136 MMOL/L (ref 136–145)
WBC # BLD AUTO: 7.6 K/MM3 (ref 4–10)

## 2020-02-10 RX ADMIN — HEPARIN SODIUM SCH UNIT: 5000 INJECTION, SOLUTION INTRAVENOUS; SUBCUTANEOUS at 06:20

## 2020-02-10 RX ADMIN — TAMSULOSIN HYDROCHLORIDE SCH MG: 0.4 CAPSULE ORAL at 10:10

## 2020-02-10 RX ADMIN — KETOROLAC TROMETHAMINE PRN MG: 15 INJECTION, SOLUTION INTRAMUSCULAR; INTRAVENOUS at 02:16

## 2020-02-10 RX ADMIN — DOXYCYCLINE HYCLATE SCH MG: 100 CAPSULE ORAL at 10:10

## 2020-02-10 RX ADMIN — HEPARIN SODIUM SCH UNIT: 5000 INJECTION, SOLUTION INTRAVENOUS; SUBCUTANEOUS at 13:47

## 2020-02-10 RX ADMIN — MORPHINE SULFATE PRN MG: 10 SOLUTION ORAL at 20:29

## 2020-02-10 RX ADMIN — MORPHINE SULFATE PRN MG: 10 SOLUTION ORAL at 13:45

## 2020-02-10 RX ADMIN — DOXYCYCLINE HYCLATE SCH MG: 100 CAPSULE ORAL at 18:01

## 2020-02-10 NOTE — DS
Physical Exam: 


SUBJECTIVE: Patient seen and examined.  wants to go home today and refusing to 

stay for another day to monitor urine output.  States he has no issues and 

agrees to take Flomax  





OBJECTIVE:


patient placed on condom cath, and draining clear yellow urine, bladder scan 

shows no retention. 





Patient is a 64 year old male with a significant past medical history of 

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 

neurogenic bladder (chronic starr), recurrent UTIs, hypotension. He presented 

to the ED on 2/5/2020 with complaints of left forearm pain after  trauma 

earlier today.  He also c/o of suprapubic pain for the past 2 days.   Patient 

sustained trauma to left forearm after he reached out from his bed and braced 

his fall with his left hand.  





imaging:


rad/wrist/left hand 2/5/2020: loss of bone density, degenerative changes, non 

healed ulnar styloid process fracture.  ovoid lucency distal radius.  on a 

splint.


shoulder left 2/5/2020: no acute pathology


rad/forearm left 2/5/2020: 2 views of the forearm reveal a lucency in the 

distal right radius.  possible mets from lung cancer.








 Vital Signs











 Period  Temp  Pulse  Resp  BP Sys/Malone  Pulse Ox


 


 Last 24 Hr  97.5 F-98.0 F  54-69  18-20  110-138/52-66  95








PHYSICAL EXAM


GENERAL: Awake, alert, and fully oriented, in no acute distress.


HEAD: Normal with no signs of trauma.


EYES: Pupils equal, round and reactive to light, extraocular movements intact, 

sclera anicteric, conjunctiva clear. No lid lag.


EARS, NOSE, THROAT: Ears normal, nares patent, oropharynx clear without 

exudates. Moist mucous membranes.


NECK: Normal range of motion, supple without lymphadenopathy, JVD, or masses.


LUNGS: Breath sounds equal, clear to auscultation bilaterally. No wheezes, and 

no crackles. No accessory muscle use.


HEART: Regular rate and rhythm, normal S1 and S2 without murmur


ABDOMEN: Soft, nontender, not distended, normoactive bowel sounds, no guarding, 

no rebound, no masses.  


MUSCULOSKELETAL: history of paraplagia (h/o spinal fracture)


UPPER EXTREMITIES:  left arm splint


LOWER EXTREMITIES: history of paraplagia (h/o spinal fracture)


PSYCHIATRIC: Cooperative. Good eye contact. Appropriate mood and affect





LABS





HOSPITAL COURSE:





Date of Admission:02/05/20





Date of Discharge: 02/10/20











Minutes to complete discharge: 45





Discharge Summary


Problems reviewed: Yes


Reason For Visit: PARAPLEGIA


Current Active Problems





Left wrist injury (Acute)


Prophylactic measure (Acute)








Condition: Stable





- Instructions


Diet, Activity, Other Instructions: 


Mr. Sumner:





You were admitted for left wrist pain after falling and suprapubic pain.  You 

were evaluated by orthopedics surgery as well as infectious disease.  





Here are our recommendations:





Left wrist pain


No fracture was seen.  You were seen by an orthopedic specialist who 

recommended that you seek treatment from a musculoskeletal oncologist.  

Continue to wear the wrist split and follow up the orthopedic physician by 

calling their office for an appointment. 





Urinary Tract infection


You were started on an antibiotic called Doxycycline that you will take twice 

per day for 7 more days total. 


You were also be sent home with flomax to be take TWCE per day. 





Thank you for allowing us to care for you. 





Please follow up with Dr. Poon on discharge by calling his office to make an 

appointment.  











Referrals: 


Sony Poon MD [Primary Care Provider] - 


Julius Riley MD [Staff Physician] - 


Disposition: HOME





- Home Medications


Comprehensive Discharge Medication List: 


Ambulatory Orders





Diazepam [Valium] 10 mg PO TID PRN  tablet MDD 30mg 10/10/18 


Morphine 10 mg/5 ml Liquid [Morphine 10 mg/5 mL Liquid -] 20 mg PO Q4H MDD 60mg 

10/07/19 


Doxycycline Hyclate [Vibramycin -] 100 mg PO BID@1000,1800 #14 capsule 02/10/20 


Tamsulosin HCl [Flomax -] 0.4 mg PO BID #90 cap.er.24h 02/10/20 











Problem List





- Problems


(1) Left wrist injury


Assessment/Plan: 


rad/wrist/left hand 2/5/2020: loss of bone density, degenrative changes, non 

healed ulnar styloid process fracture.  ovoid lucency distal radius.  on a 

splint.


shoulder left 2/5/2020: no acute pathology


rad/forearm left 2/5/2020: 2 views of the forearm reveal a lucency in the 

distal right radius.  possible mets from lung cancer.


seen by ortho and evaluation with a muscularskeletal oncologist recommended.


Code(s): S69.92XA - UNSP INJURY OF LEFT WRIST, HAND AND FINGER(S), INIT ENCNTR 

  





(2) Adenocarcinoma of right lung


Assessment/Plan: 


Patient has been evaluated by oncologist in the past and not a candidate for 

chemotherapy. 


Code(s): C34.91 - MALIGNANT NEOPLASM OF UNSP PART OF RIGHT BRONCHUS OR LUNG   





(3) Neurogenic bladder


Assessment/Plan: 


patient back on his condom catheter and draining yellow  urine.  no retention 

per bladder scan.


flomax bid ordered for home use.


Code(s): N31.9 - NEUROMUSCULAR DYSFUNCTION OF BLADDER, UNSPECIFIED   





(4) Paraplegia


Assessment/Plan: 


bed bound s/p spinal injury


Code(s): G82.20 - PARAPLEGIA, UNSPECIFIED   





(5) UTI (urinary tract infection)


Assessment/Plan: 


stopped ceftriaxone, as it is resistant to staph epi, patient with allergy to 

vanco


per ID, can start on doxycycline for a total of 7 days


Code(s): N39.0 - URINARY TRACT INFECTION, SITE NOT SPECIFIED   


Qualifiers: 


   Urinary tract infection type: site unspecified   Hematuria presence: with 

hematuria   Qualified Code(s): N39.0 - Urinary tract infection, site not 

specified; R31.9 - Hematuria, unspecified   





(6) Prophylactic measure


Assessment/Plan: 


fen


tolerating po


monitor electrolyes


low salt diet





full code





Code(s): Z29.9 - ENCOUNTER FOR PROPHYLACTIC MEASURES, UNSPECIFIED   


This patient is new to me today: No


Emergency Visit: Yes


ED Registration Date: 02/05/20


Care time: The patient presented to the Emergency Department on the above date 

and was hospitalized for further evaluation of their emergent condition.


Critical Care patient: No





- Discharge Referral


Referred to [facility] No

## 2020-02-10 NOTE — PN
Progress Note, Physician


History of Present Illness: 





stable


no new issues





- Current Medication List


Current Medications: 


Active Medications





Diazepam (Valium -)  10 mg PO TID PRN


   PRN Reason: ANXIETY


   Last Admin: 02/09/20 09:55 Dose:  10 mg


Doxycycline Hyclate (Vibramycin -)  100 mg PO BID@1000,1800 [facility]


   Last Admin: 02/10/20 10:10 Dose:  100 mg


Heparin Sodium (Porcine) (Heparin -)  5,000 unit SQ TID [facility]


   Last Admin: 02/10/20 06:20 Dose:  5,000 unit


Ketorolac Tromethamine (Toradol Injection -)  15 mg IVPUSH Q4H PRN


   PRN Reason: PAIN LEVEL 4 - 6


   Stop: 02/11/20 02:59


   Last Admin: 02/10/20 02:16 Dose:  15 mg


Morphine Sulfate (Morphine 10 Mg/5 Ml Liquid)  10 mg PO Q6H PRN


   PRN Reason: PAIN LEVEL 7 - 10


   Last Admin: 02/09/20 21:56 Dose:  10 mg


Tamsulosin HCl (Flomax -)  0.4 mg PO BID [facility]


   Last Admin: 02/10/20 10:10 Dose:  0.4 mg











- Objective


Vital Signs: 


 Vital Signs











Temperature  97.5 F L  02/10/20 06:52


 


Pulse Rate  64   02/10/20 06:52


 


Respiratory Rate  18   02/10/20 06:52


 


Blood Pressure  118/56 L  02/10/20 06:52


 


O2 Sat by Pulse Oximetry (%)  95   02/09/20 21:00











Constitutional: Yes: No Distress, Calm


Cardiovascular: Yes: S1, S2


Respiratory: Yes: Regular, CTA Bilaterally


Gastrointestinal: Yes: Normal Bowel Sounds, Soft


Extremities: Yes: Other


Neurological: Yes: Alert, Oriented


Psychiatric: Yes: Alert, Oriented


Labs: 


 CBC, BMP





 02/07/20 12:57 





 02/07/20 12:57 











Assessment/Plan








Problem List





- Problems


(1) Left wrist injury


Code(s): S69.92XA - UNSP INJURY OF LEFT WRIST, HAND AND FINGER(S), INIT ENCNTR 

  





(2) Adenocarcinoma of right lung


Code(s): C34.91 - MALIGNANT NEOPLASM OF UNSP PART OF RIGHT BRONCHUS OR LUNG   





(3) Neurogenic bladder


Code(s): N31.9 - NEUROMUSCULAR DYSFUNCTION OF BLADDER, UNSPECIFIED   





(4) Paraplegia


Code(s): G82.20 - PARAPLEGIA, UNSPECIFIED   





(5) Leukocytosis


Code(s): D72.829 - ELEVATED WHITE BLOOD CELL COUNT, UNSPECIFIED   





(6) UTI (urinary tract infection)


Code(s): N39.0 - URINARY TRACT INFECTION, SITE NOT SPECIFIED   


Qualifiers: 


   Urinary tract infection type: site unspecified   Hematuria presence: with 

hematuria   Qualified Code(s): N39.0 - Urinary tract infection, site not 

specified; R31.9 - Hematuria, unspecified   





(7) Prophylactic measure





Code(s): Z29.9 - ENCOUNTER FOR PROPHYLACTIC MEASURES, UNSPECIFIED   





plan


 doxy


continue current mgmt

## 2020-02-10 NOTE — PN
Progress Note, Physician


History of Present Illness: 





stable


no burning today


no complaints





- Current Medication List


Current Medications: 


Active Medications





Diazepam (Valium -)  10 mg PO TID PRN


   PRN Reason: ANXIETY


   Last Admin: 02/09/20 09:55 Dose:  10 mg


Doxycycline Hyclate (Vibramycin -)  100 mg PO BID@1000,1800 [facility]


   Last Admin: 02/10/20 10:10 Dose:  100 mg


Heparin Sodium (Porcine) (Heparin -)  5,000 unit SQ TID [facility]


   Last Admin: 02/10/20 06:20 Dose:  5,000 unit


Ketorolac Tromethamine (Toradol Injection -)  15 mg IVPUSH Q4H PRN


   PRN Reason: PAIN LEVEL 4 - 6


   Stop: 02/11/20 02:59


   Last Admin: 02/10/20 02:16 Dose:  15 mg


Morphine Sulfate (Morphine 10 Mg/5 Ml Liquid)  10 mg PO Q6H PRN


   PRN Reason: PAIN LEVEL 7 - 10


   Last Admin: 02/09/20 21:56 Dose:  10 mg


Tamsulosin HCl (Flomax -)  0.4 mg PO BID [facility]


   Last Admin: 02/10/20 10:10 Dose:  0.4 mg











- Objective


Vital Signs: 


 Vital Signs











Temperature  97.5 F L  02/10/20 06:52


 


Pulse Rate  64   02/10/20 06:52


 


Respiratory Rate  18   02/10/20 06:52


 


Blood Pressure  118/56 L  02/10/20 06:52


 


O2 Sat by Pulse Oximetry (%)  95   02/09/20 21:00











Constitutional: Yes: No Distress, Calm


Cardiovascular: Yes: S1, S2


Respiratory: Yes: Regular, CTA Bilaterally


Gastrointestinal: Yes: Normal Bowel Sounds, Soft


Musculoskeletal: Yes: WNL


Extremities: Yes: Other


Neurological: Yes: Alert, Oriented


Psychiatric: Yes: Alert, Oriented


Labs: 


 CBC, BMP





 02/07/20 12:57 





 02/07/20 12:57 











Assessment/Plan








Problem List





- Problems


(1) Left wrist injury


Code(s): S69.92XA - UNSP INJURY OF LEFT WRIST, HAND AND FINGER(S), INIT ENCNTR 

  





(2) Adenocarcinoma of right lung


Code(s): C34.91 - MALIGNANT NEOPLASM OF UNSP PART OF RIGHT BRONCHUS OR LUNG   





(3) Neurogenic bladder


Code(s): N31.9 - NEUROMUSCULAR DYSFUNCTION OF BLADDER, UNSPECIFIED   





(4) Paraplegia


Code(s): G82.20 - PARAPLEGIA, UNSPECIFIED   





(5) Leukocytosis


Code(s): D72.829 - ELEVATED WHITE BLOOD CELL COUNT, UNSPECIFIED   





(6) UTI (urinary tract infection)


Code(s): N39.0 - URINARY TRACT INFECTION, SITE NOT SPECIFIED   


Qualifiers: 


   Urinary tract infection type: site unspecified   Hematuria presence: with 

hematuria   Qualified Code(s): N39.0 - Urinary tract infection, site not 

specified; R31.9 - Hematuria, unspecified   





(7) Prophylactic measure





Code(s): Z29.9 - ENCOUNTER FOR PROPHYLACTIC MEASURES, UNSPECIFIED   





plan


can start doxy


continue current mgmt

## 2021-02-03 ENCOUNTER — HOSPITAL ENCOUNTER (EMERGENCY)
Dept: HOSPITAL 74 - JER | Age: 66
Discharge: HOME | End: 2021-02-03
Payer: COMMERCIAL

## 2021-02-03 VITALS — HEART RATE: 65 BPM | SYSTOLIC BLOOD PRESSURE: 153 MMHG | DIASTOLIC BLOOD PRESSURE: 79 MMHG

## 2021-02-03 VITALS — BODY MASS INDEX: 20.3 KG/M2

## 2021-02-03 VITALS — TEMPERATURE: 98.4 F

## 2021-02-03 DIAGNOSIS — G82.20: Primary | ICD-10-CM

## 2021-02-03 DIAGNOSIS — L97.919: ICD-10-CM

## 2021-06-28 ENCOUNTER — HOSPITAL ENCOUNTER (EMERGENCY)
Dept: HOSPITAL 74 - JER | Age: 66
LOS: 1 days | Discharge: HOME | End: 2021-06-29
Payer: COMMERCIAL

## 2021-06-28 VITALS — BODY MASS INDEX: 15.6 KG/M2

## 2021-06-28 DIAGNOSIS — S71.111A: Primary | ICD-10-CM

## 2021-06-28 PROCEDURE — 0HQHXZZ REPAIR RIGHT UPPER LEG SKIN, EXTERNAL APPROACH: ICD-10-PCS

## 2021-06-29 VITALS — SYSTOLIC BLOOD PRESSURE: 166 MMHG | TEMPERATURE: 97.9 F | HEART RATE: 73 BPM | DIASTOLIC BLOOD PRESSURE: 65 MMHG

## 2021-11-07 ENCOUNTER — HOSPITAL ENCOUNTER (INPATIENT)
Dept: HOSPITAL 74 - JER | Age: 66
LOS: 2 days | Discharge: HOME | DRG: 689 | End: 2021-11-09
Attending: GENERAL ACUTE CARE HOSPITAL
Payer: COMMERCIAL

## 2021-11-07 VITALS — BODY MASS INDEX: 12.7 KG/M2

## 2021-11-07 DIAGNOSIS — N31.9: ICD-10-CM

## 2021-11-07 DIAGNOSIS — G82.20: ICD-10-CM

## 2021-11-07 DIAGNOSIS — E87.1: ICD-10-CM

## 2021-11-07 DIAGNOSIS — E43: ICD-10-CM

## 2021-11-07 DIAGNOSIS — K59.03: ICD-10-CM

## 2021-11-07 DIAGNOSIS — R64: ICD-10-CM

## 2021-11-07 DIAGNOSIS — T40.2X5A: ICD-10-CM

## 2021-11-07 DIAGNOSIS — N39.0: Primary | ICD-10-CM

## 2021-11-07 DIAGNOSIS — Z85.118: ICD-10-CM

## 2021-11-07 DIAGNOSIS — F41.9: ICD-10-CM

## 2021-11-07 LAB
ALBUMIN SERPL-MCNC: 2.8 G/DL (ref 3.4–5)
ALP SERPL-CCNC: 124 U/L (ref 45–117)
ALT SERPL-CCNC: 13 U/L (ref 13–61)
ANION GAP SERPL CALC-SCNC: 7 MMOL/L (ref 8–16)
APPEARANCE UR: CLEAR
AST SERPL-CCNC: 18 U/L (ref 15–37)
BACTERIA # UR AUTO: (no result) /UL (ref 0–1359)
BASOPHILS # BLD: 0.2 % (ref 0–2)
BILIRUB SERPL-MCNC: 0.4 MG/DL (ref 0.2–1)
BILIRUB UR STRIP.AUTO-MCNC: NEGATIVE MG/DL
BNP SERPL-MCNC: 898.1 PG/ML (ref 5–125)
BUN SERPL-MCNC: 11.3 MG/DL (ref 7–18)
CALCIUM SERPL-MCNC: 8.6 MG/DL (ref 8.5–10.1)
CASTS URNS QL MICRO: 1 /UL (ref 0–3.1)
CHLORIDE SERPL-SCNC: 91 MMOL/L (ref 98–107)
CO2 SERPL-SCNC: 31 MMOL/L (ref 21–32)
COLOR UR: YELLOW
CREAT SERPL-MCNC: 0.6 MG/DL (ref 0.55–1.3)
DEPRECATED RDW RBC AUTO: 14.7 % (ref 11.9–15.9)
EOSINOPHIL # BLD: 0.6 % (ref 0–4.5)
EPITH CASTS URNS QL MICRO: 18 /UL (ref 0–25.1)
GLUCOSE SERPL-MCNC: 82 MG/DL (ref 74–106)
HCT VFR BLD CALC: 36.6 % (ref 35.4–49)
HGB BLD-MCNC: 12.4 GM/DL (ref 11.7–16.9)
KETONES UR QL STRIP: NEGATIVE
LEUKOCYTE ESTERASE UR QL STRIP.AUTO: (no result)
LYMPHOCYTES # BLD: 11.2 % (ref 8–40)
MCH RBC QN AUTO: 29.3 PG (ref 25.7–33.7)
MCHC RBC AUTO-ENTMCNC: 33.8 G/DL (ref 32–35.9)
MCV RBC: 86.7 FL (ref 80–96)
MONOCYTES # BLD AUTO: 7.2 % (ref 3.8–10.2)
NEUTROPHILS # BLD: 80.8 % (ref 42.8–82.8)
NITRITE UR QL STRIP: NEGATIVE
PH UR: 7 [PH] (ref 5–8)
PLATELET # BLD AUTO: 570 10^3/UL (ref 134–434)
PMV BLD: 9 FL (ref 7.5–11.1)
PROT SERPL-MCNC: 6.8 G/DL (ref 6.4–8.2)
PROT UR QL STRIP: NEGATIVE
PROT UR QL STRIP: NEGATIVE
RBC # BLD AUTO: 2 /UL (ref 0–23.9)
RBC # BLD AUTO: 4.22 M/MM3 (ref 4–5.6)
SODIUM SERPL-SCNC: 129 MMOL/L (ref 136–145)
SP GR UR: 1 (ref 1.01–1.03)
UROBILINOGEN UR STRIP-MCNC: 0.2 MG/DL (ref 0.2–1)
WBC # BLD AUTO: 13.4 K/MM3 (ref 4–10)
WBC # UR AUTO: 53 /UL (ref 0–25.8)

## 2021-11-07 PROCEDURE — C9803 HOPD COVID-19 SPEC COLLECT: HCPCS

## 2021-11-07 PROCEDURE — U0003 INFECTIOUS AGENT DETECTION BY NUCLEIC ACID (DNA OR RNA); SEVERE ACUTE RESPIRATORY SYNDROME CORONAVIRUS 2 (SARS-COV-2) (CORONAVIRUS DISEASE [COVID-19]), AMPLIFIED PROBE TECHNIQUE, MAKING USE OF HIGH THROUGHPUT TECHNOLOGIES AS DESCRIBED BY CMS-2020-01-R: HCPCS

## 2021-11-07 PROCEDURE — U0005 INFEC AGEN DETEC AMPLI PROBE: HCPCS

## 2021-11-07 RX ADMIN — MORPHINE SULFATE PRN MG: 10 SOLUTION ORAL at 21:38

## 2021-11-07 RX ADMIN — HEPARIN SODIUM SCH UNIT: 5000 INJECTION, SOLUTION INTRAVENOUS; SUBCUTANEOUS at 21:39

## 2021-11-07 RX ADMIN — TAMSULOSIN HYDROCHLORIDE SCH MG: 0.4 CAPSULE ORAL at 21:37

## 2021-11-07 RX ADMIN — POLYETHYLENE GLYCOL 3350 SCH GM: 17 POWDER, FOR SOLUTION ORAL at 21:37

## 2021-11-08 LAB
ALBUMIN SERPL-MCNC: 2.8 G/DL (ref 3.4–5)
ALP SERPL-CCNC: 119 U/L (ref 45–117)
ALT SERPL-CCNC: 12 U/L (ref 13–61)
ANION GAP SERPL CALC-SCNC: 4 MMOL/L (ref 8–16)
AST SERPL-CCNC: 22 U/L (ref 15–37)
BASOPHILS # BLD: 1.3 % (ref 0–2)
BILIRUB SERPL-MCNC: 0.6 MG/DL (ref 0.2–1)
BUN SERPL-MCNC: 12.8 MG/DL (ref 7–18)
CALCIUM SERPL-MCNC: 8.9 MG/DL (ref 8.5–10.1)
CHLORIDE SERPL-SCNC: 100 MMOL/L (ref 98–107)
CO2 SERPL-SCNC: 32 MMOL/L (ref 21–32)
CREAT SERPL-MCNC: 0.5 MG/DL (ref 0.55–1.3)
DEPRECATED RDW RBC AUTO: 14.6 % (ref 11.9–15.9)
EOSINOPHIL # BLD: 1 % (ref 0–4.5)
GLUCOSE SERPL-MCNC: 79 MG/DL (ref 74–106)
HCT VFR BLD CALC: 34.5 % (ref 35.4–49)
HGB BLD-MCNC: 11.8 GM/DL (ref 11.7–16.9)
LYMPHOCYTES # BLD: 9.5 % (ref 8–40)
MCH RBC QN AUTO: 29.4 PG (ref 25.7–33.7)
MCHC RBC AUTO-ENTMCNC: 34.3 G/DL (ref 32–35.9)
MCV RBC: 85.6 FL (ref 80–96)
MONOCYTES # BLD AUTO: 7.9 % (ref 3.8–10.2)
NEUTROPHILS # BLD: 80.3 % (ref 42.8–82.8)
PLATELET # BLD AUTO: 541 10^3/UL (ref 134–434)
PMV BLD: 8.8 FL (ref 7.5–11.1)
PROT SERPL-MCNC: 6.7 G/DL (ref 6.4–8.2)
RBC # BLD AUTO: 4.03 M/MM3 (ref 4–5.6)
SODIUM SERPL-SCNC: 135 MMOL/L (ref 136–145)
WBC # BLD AUTO: 12.6 K/MM3 (ref 4–10)

## 2021-11-08 RX ADMIN — POLYETHYLENE GLYCOL 3350 SCH GM: 17 POWDER, FOR SOLUTION ORAL at 09:22

## 2021-11-08 RX ADMIN — MORPHINE SULFATE PRN MG: 10 SOLUTION ORAL at 07:04

## 2021-11-08 RX ADMIN — HEPARIN SODIUM SCH UNIT: 5000 INJECTION, SOLUTION INTRAVENOUS; SUBCUTANEOUS at 09:26

## 2021-11-08 RX ADMIN — POLYETHYLENE GLYCOL 3350 SCH: 17 POWDER, FOR SOLUTION ORAL at 21:12

## 2021-11-08 RX ADMIN — TAMSULOSIN HYDROCHLORIDE SCH MG: 0.4 CAPSULE ORAL at 21:16

## 2021-11-08 RX ADMIN — MORPHINE SULFATE PRN MG: 10 SOLUTION ORAL at 01:50

## 2021-11-08 RX ADMIN — MORPHINE SULFATE PRN MG: 10 SOLUTION ORAL at 22:10

## 2021-11-08 RX ADMIN — CEFTRIAXONE SCH MLS/HR: 1 INJECTION, POWDER, FOR SOLUTION INTRAMUSCULAR; INTRAVENOUS at 09:26

## 2021-11-08 RX ADMIN — HEPARIN SODIUM SCH UNIT: 5000 INJECTION, SOLUTION INTRAVENOUS; SUBCUTANEOUS at 21:16

## 2021-11-08 RX ADMIN — MORPHINE SULFATE PRN MG: 10 SOLUTION ORAL at 16:26

## 2021-11-08 RX ADMIN — TAMSULOSIN HYDROCHLORIDE SCH MG: 0.4 CAPSULE ORAL at 09:20

## 2021-11-08 RX ADMIN — MORPHINE SULFATE PRN MG: 10 SOLUTION ORAL at 11:09

## 2021-11-09 VITALS — TEMPERATURE: 99.4 F | SYSTOLIC BLOOD PRESSURE: 100 MMHG | DIASTOLIC BLOOD PRESSURE: 57 MMHG | HEART RATE: 93 BPM

## 2021-11-09 RX ADMIN — MORPHINE SULFATE PRN MG: 10 SOLUTION ORAL at 13:49

## 2021-11-09 RX ADMIN — TAMSULOSIN HYDROCHLORIDE SCH MG: 0.4 CAPSULE ORAL at 08:44

## 2021-11-09 RX ADMIN — POLYETHYLENE GLYCOL 3350 SCH GM: 17 POWDER, FOR SOLUTION ORAL at 10:03

## 2021-11-09 RX ADMIN — HEPARIN SODIUM SCH UNIT: 5000 INJECTION, SOLUTION INTRAVENOUS; SUBCUTANEOUS at 10:03

## 2021-11-09 RX ADMIN — MORPHINE SULFATE PRN MG: 10 SOLUTION ORAL at 06:18

## 2021-11-09 RX ADMIN — MORPHINE SULFATE PRN MG: 10 SOLUTION ORAL at 02:04

## 2021-11-09 RX ADMIN — CEFTRIAXONE SCH MLS/HR: 1 INJECTION, POWDER, FOR SOLUTION INTRAMUSCULAR; INTRAVENOUS at 10:03

## 2021-12-23 ENCOUNTER — HOSPITAL ENCOUNTER (EMERGENCY)
Dept: HOSPITAL 74 - JER | Age: 66
Discharge: HOME | End: 2021-12-23
Payer: COMMERCIAL

## 2021-12-23 VITALS — SYSTOLIC BLOOD PRESSURE: 102 MMHG | HEART RATE: 65 BPM | DIASTOLIC BLOOD PRESSURE: 58 MMHG

## 2021-12-23 VITALS — TEMPERATURE: 98.3 F

## 2021-12-23 VITALS — BODY MASS INDEX: 14.5 KG/M2

## 2021-12-23 DIAGNOSIS — R91.8: ICD-10-CM

## 2021-12-23 DIAGNOSIS — R06.02: Primary | ICD-10-CM

## 2021-12-23 LAB
ALBUMIN SERPL-MCNC: 2.4 G/DL (ref 3.4–5)
ALP SERPL-CCNC: 109 U/L (ref 45–117)
ALT SERPL-CCNC: 7 U/L (ref 13–61)
ANION GAP SERPL CALC-SCNC: 5 MMOL/L (ref 8–16)
APTT BLD: 32.3 SECONDS (ref 25.2–36.5)
AST SERPL-CCNC: 16 U/L (ref 15–37)
BASOPHILS # BLD: 0.2 % (ref 0–2)
BILIRUB SERPL-MCNC: 0.2 MG/DL (ref 0.2–1)
BUN SERPL-MCNC: 14.7 MG/DL (ref 7–18)
CALCIUM SERPL-MCNC: 8.7 MG/DL (ref 8.5–10.1)
CHLORIDE SERPL-SCNC: 97 MMOL/L (ref 98–107)
CO2 SERPL-SCNC: 35 MMOL/L (ref 21–32)
CREAT SERPL-MCNC: 0.7 MG/DL (ref 0.55–1.3)
DEPRECATED RDW RBC AUTO: 14 % (ref 11.9–15.9)
EOSINOPHIL # BLD: 1.2 % (ref 0–4.5)
GLUCOSE SERPL-MCNC: 99 MG/DL (ref 74–106)
HCT VFR BLD CALC: 35.8 % (ref 35.4–49)
HGB BLD-MCNC: 11.9 GM/DL (ref 11.7–16.9)
INR BLD: 1.06 (ref 0.83–1.09)
LYMPHOCYTES # BLD: 11.3 % (ref 8–40)
MAGNESIUM SERPL-MCNC: 2.3 MG/DL (ref 1.8–2.4)
MCH RBC QN AUTO: 28.5 PG (ref 25.7–33.7)
MCHC RBC AUTO-ENTMCNC: 33.2 G/DL (ref 32–35.9)
MCV RBC: 85.7 FL (ref 80–96)
MONOCYTES # BLD AUTO: 9.3 % (ref 3.8–10.2)
NEUTROPHILS # BLD: 78 % (ref 42.8–82.8)
PLATELET # BLD AUTO: 553 10^3/UL (ref 134–434)
PMV BLD: 8.7 FL (ref 7.5–11.1)
PROT SERPL-MCNC: 6.6 G/DL (ref 6.4–8.2)
PT PNL PPP: 12.4 SEC (ref 9.7–13)
RBC # BLD AUTO: 4.17 M/MM3 (ref 4–5.6)
SODIUM SERPL-SCNC: 137 MMOL/L (ref 136–145)
WBC # BLD AUTO: 15.5 K/MM3 (ref 4–10)

## 2021-12-23 PROCEDURE — U0003 INFECTIOUS AGENT DETECTION BY NUCLEIC ACID (DNA OR RNA); SEVERE ACUTE RESPIRATORY SYNDROME CORONAVIRUS 2 (SARS-COV-2) (CORONAVIRUS DISEASE [COVID-19]), AMPLIFIED PROBE TECHNIQUE, MAKING USE OF HIGH THROUGHPUT TECHNOLOGIES AS DESCRIBED BY CMS-2020-01-R: HCPCS

## 2021-12-23 PROCEDURE — U0005 INFEC AGEN DETEC AMPLI PROBE: HCPCS

## 2021-12-23 PROCEDURE — C9803 HOPD COVID-19 SPEC COLLECT: HCPCS

## 2022-02-16 NOTE — PDOC
Attending Attestation





- Resident


Resident Name: Nilton Stevens





- ED Attending Attestation


I have performed the following: I have examined & evaluated the patient, The 

case was reviewed & discussed with the resident, I agree w/resident's findings 

& plan





- HPI


HPI: 





12/28/19 22:00


Pt unable to cath self and needs to be cathed.States that normally he urinates 

as usual; today couldn't pass urine.


+urine


States that he doesnt want to have an indwelling cath.  Current HHAide refusing 

to cath him.


No other complaints.


12/28/19 22:21








- Physicial Exam


PE: 





12/28/19 22:00


Pt is paraplegic and bedridden


Afebrile


Abd soft NT ND


legs are cacectic bilat; paralyzed


No rashes


Pt able to move arms bilaterally


Pt has no  in the right hand/clawhand


Afebrile








- Medical Decision Making





12/28/19 22:21


Pt stable to go home with levaquin; pne dose in the ER and pt is eating a 

sandwich and drinking.  He has 24/7 HHAide and he is stable to go home with 

ambulance Notified Rashad who states appreciation.
